# Patient Record
Sex: MALE | Race: WHITE | NOT HISPANIC OR LATINO | Employment: OTHER | RURAL
[De-identification: names, ages, dates, MRNs, and addresses within clinical notes are randomized per-mention and may not be internally consistent; named-entity substitution may affect disease eponyms.]

---

## 2017-01-20 ENCOUNTER — OFFICE VISIT (OUTPATIENT)
Dept: FAMILY MEDICINE CLINIC | Facility: CLINIC | Age: 82
End: 2017-01-20

## 2017-01-20 VITALS
RESPIRATION RATE: 18 BRPM | OXYGEN SATURATION: 92 % | SYSTOLIC BLOOD PRESSURE: 110 MMHG | HEIGHT: 71 IN | HEART RATE: 54 BPM | BODY MASS INDEX: 29.96 KG/M2 | TEMPERATURE: 98 F | DIASTOLIC BLOOD PRESSURE: 70 MMHG | WEIGHT: 214 LBS

## 2017-01-20 DIAGNOSIS — R25.1 TREMOR: ICD-10-CM

## 2017-01-20 DIAGNOSIS — F51.5 BAD DREAMS: Primary | ICD-10-CM

## 2017-01-20 DIAGNOSIS — R25.8 NOCTURNAL LEG MOVEMENTS: ICD-10-CM

## 2017-01-20 DIAGNOSIS — F32.A DEPRESSION, UNSPECIFIED DEPRESSION TYPE: ICD-10-CM

## 2017-01-20 DIAGNOSIS — Z23 NEEDS FLU SHOT: ICD-10-CM

## 2017-01-20 PROBLEM — E78.5 HYPERLIPIDEMIA: Chronic | Status: ACTIVE | Noted: 2017-01-20

## 2017-01-20 PROBLEM — N52.9 ERECTILE DYSFUNCTION: Chronic | Status: ACTIVE | Noted: 2017-01-20

## 2017-01-20 PROBLEM — E11.9 DIABETES TYPE 2, CONTROLLED: Chronic | Status: ACTIVE | Noted: 2017-01-20

## 2017-01-20 PROBLEM — Z00.00 WELLNESS EXAMINATION: Status: ACTIVE | Noted: 2017-01-20

## 2017-01-20 PROBLEM — I83.90 VARICOSE VEIN OF LEG: Chronic | Status: ACTIVE | Noted: 2017-01-20

## 2017-01-20 PROBLEM — I25.10 CORONARY ARTERY DISEASE: Chronic | Status: ACTIVE | Noted: 2017-01-20

## 2017-01-20 PROBLEM — M72.2 PLANTAR FASCIITIS: Status: ACTIVE | Noted: 2017-01-20

## 2017-01-20 PROBLEM — N18.9 CHRONIC KIDNEY DISEASE: Chronic | Status: ACTIVE | Noted: 2017-01-20

## 2017-01-20 PROBLEM — I10 HYPERTENSION: Chronic | Status: ACTIVE | Noted: 2017-01-20

## 2017-01-20 PROBLEM — N40.0 PROSTATISM: Chronic | Status: ACTIVE | Noted: 2017-01-20

## 2017-01-20 PROBLEM — E66.9 OBESITY: Chronic | Status: ACTIVE | Noted: 2017-01-20

## 2017-01-20 PROCEDURE — G0008 ADMIN INFLUENZA VIRUS VAC: HCPCS | Performed by: FAMILY MEDICINE

## 2017-01-20 PROCEDURE — 99213 OFFICE O/P EST LOW 20 MIN: CPT | Performed by: FAMILY MEDICINE

## 2017-01-20 RX ORDER — FLUOXETINE 10 MG/1
CAPSULE ORAL
Qty: 20 CAPSULE | Refills: 0 | Status: SHIPPED | OUTPATIENT
Start: 2017-01-20 | End: 2018-07-16

## 2017-01-20 NOTE — MR AVS SNAPSHOT
Gonzalo Durham   1/20/2017 1:45 PM   Office Visit    Dept Phone:  [phone]   Encounter #:  [id number]    Provider:  Abel Romero MD   Department:  [facility]                Your Full Care Plan              Today's Medication Changes          These changes are accurate as of: 1/20/17  2:24 PM.  If you have any questions, ask your nurse or doctor.               Medication(s)that have changed:     FLUoxetine 10 MG capsule   Commonly known as:  PROZAC   Stop the 20 mg and start the 10 each day; after a week use 10 qod for a week then stop   What changed:    - medication strength  - how much to take  - how to take this  - when to take this  - additional instructions   Changed by:  Abel Romero MD            Where to Get Your Medications      These medications were sent to [facility] [address] [place] [address] - [phone] [phone] [address] [address]     Phone:  [phone]     FLUoxetine 10 MG capsule                  Your Updated Medication List          This list is accurate as of: 1/20/17  2:24 PM.  Always use your most recent med list.                acetaminophen 325 MG tablet   Commonly known as:  TYLENOL   Take 2 tablets by mouth 2 (Two) Times a Day.       aspirin  MG tablet   Take 1 tablet by mouth Every Other Day.       calcium carbonate-vitamin d 600-400 MG-UNIT per tablet   Take 1 tablet by mouth 2 (Two) Times a Day.       finasteride 5 MG tablet   Commonly known as:  PROSCAR   Take 1 tablet by mouth Daily.       FLUoxetine 10 MG capsule   Commonly known as:  PROZAC   Stop the 20 mg and start the 10 each day; after a week use 10 qod for a week then stop       freestyle lancets   Use once daily       FREESTYLE LITE test strip   Generic drug:  glucose blood   1 each by Other route Daily.       gabapentin 100 MG capsule   Commonly known as:  NEURONTIN   Take 1 capsule by  mouth 3 (Three) Times a Day.       glyBURIDE 5 MG tablet   Commonly known as:  DIAbeta   Take 1.5 tablets by mouth 2 (Two) Times a Day With Meals.       JUST TEARS EYE DROPS solution   Apply 1-2 drops to eye Daily As Needed (dry eyes).       loratadine 10 MG tablet   Commonly known as:  CLARITIN   Take 1 tablet by mouth Daily.       metFORMIN  MG 24 hr tablet   Commonly known as:  GLUCOPHAGE XR   Take 2 tablets by mouth Every Night.       PRAVACHOL 80 MG tablet   Generic drug:  pravastatin   Take 1 tablet by mouth Daily.       terazosin 10 MG capsule   Commonly known as:  HYTRIN   Take 1 capsule by mouth Daily.               We Performed the Following     Flu Vaccine Tri (FLUVIRIN)       You Were Diagnosed With        Codes Comments    Bad dreams    -  Primary ICD-10-CM: F51.5  ICD-9-CM: 307.47     Nocturnal leg movements     ICD-10-CM: R25.8  ICD-9-CM: 781.0     Tremor     ICD-10-CM: R25.1  ICD-9-CM: 781.0     Depression, unspecified depression type     ICD-10-CM: F32.9  ICD-9-CM: 311     Needs flu shot     ICD-10-CM: Z23  ICD-9-CM: V04.81       Instructions     None    Patient Instructions History      Upcoming Appointments     Visit Type Date Time Department    OFFICE VISIT 2017  1:45 PM [facility]    FOLLOW UP 3/6/2017 11:15 AM [facility] Signup     [facility] [employer] allows you to send messages to your doctor, view your test results, renew your prescriptions, schedule appointments, and more. To sign up, go to [employer] and click on the Sign Up Now link in the New User? box. Enter your [employer] Activation Code exactly as it appears below along with the last four digits of your Social Security Number and your Date of Birth () to complete the sign-up process. If you do not sign up before the expiration date, you must request a new code.    [employer] Activation Code: [email or web address]  Expires: 2/3/2017  2:23 PM    If you have questions, you can email  [email or web address] or call [phone] to talk to our MyChart staff. Remember, [employer] is NOT to be used for urgent needs. For medical emergencies, dial 911.               Other Info from Your Visit           Your Appointments     Mar 06, 2017 11:15 AM CST   Follow Up with Abel Romero MD   [facility] (--)    [address] [place] [id number] [phone]           Arrive 15 minutes prior to appointment.              Allergies     No Known Allergies      Reason for Visit     kicking in sleep           Vital Signs     Blood Pressure Pulse Temperature Respirations Height Weight    110/70 (BP Location: Right arm, Patient Position: Sitting, Cuff Size: Large Adult) 54 98 °F (36.7 °C) (Oral) 18 71\" (180.3 cm) 214 lb (97.1 kg)    Oxygen Saturation Body Mass Index Smoking Status             92% 29.85 kg/m2 Former Smoker         Problems and Diagnoses Noted     Bad dreams    Chronic kidney disease    Coronary heart disease    Depression    Type 2 diabetes mellitus, controlled    Erectile dysfunction    High cholesterol or triglycerides    High blood pressure    Nocturnal leg movements    Obesity    Plantar fasciitis    Enlarged prostate    Tremor    Varicose vein of leg    Wellness examination    Needs flu shot          Immunizations Administered     Name Date    Influenza (IM) Preservative Free         "

## 2017-01-20 NOTE — PROGRESS NOTES
Gonzalo Durham, 1934,  Is here today to receive a Influenza vaccine.      INFLUENZA VACCINE CONSENT (VERBAL)      Verbal Consent:  I have been given a copy of the current Vaccine Information Statement published by the CDC and Prevention CDC and I have read the information about the Influenza and the vaccine.  I have had an opportunity to ask questions, which were answered to my satisfaction.  I understand the benefits and risk of th flu vaccine as described and request that the flu vaccine be administered to me (or to the patient named below for whom I am legal guardian or for whom I hold power of  to make healthcare decisions).        Verbal Consent Give? [x] YES                [] NO      QUESTIONS    1. Do you have a moderate or  acute illness with or without a fever?   No                                                                                                                              ,  2. For women, are you pregnant or do you expect to become pregnant during the month after your flu vaccination?   No    3. Are you allergic to eggs or egg products?   No    4. Are you allergic to latex?   No,    5. Are you allergic to thimerosal?   No    6.   After receiving a vaccination in the past, have you ever had a serious reation?   No,     7.   Have you developed a neurological illness (such as Guillain -[place] Syndrome) after getting vaccinated?   No     8.  Do you have any drug allergies? If the answer is YES, please inform Doctor or Nurse   No

## 2017-01-21 NOTE — PROGRESS NOTES
Subjective   Gonzalo Durham is a 82 y.o. male presenting with chief complaint of:   Chief Complaint   Patient presents with   • kicking in sleep       History of Present Illness :  With wife.  Last year asked for Rx for anxiety/depression.  Prozac helped; but has noticed usual tremor has worsened; and has jerking of legs at night.  Also dreams are often now bad.     Other chronic problem/s to consider:  HTN.  The HTN has been present for years/it is chronic.  The HTN is assumed essential/without testing needed to look for other.  The HTN is controlled manifest by todays blood pressure and home weekly monitoring.   DM2: This has been present for years/over a year.  It is chronic.  It is controlled.  Home accuchecks run 100s fasting.   No hypoglycemia manifest as syncope/near syncope or diaphoretic/sweating episodes  Tremor: Has had > 1 yr/ a family and chronic problem.  No other associations.  Worse with the prozac or timing.     The following portions of the patient's history were reviewed and updated as appropriate: allergies, current medications, past family history, past medical history, past social history, past surgical history and problem list.  TCC migrated      Current Outpatient Prescriptions:   •  acetaminophen (TYLENOL) 325 MG tablet, Take 2 tablets by mouth 2 (Two) Times a Day., Disp: 360 tablet, Rfl: 1  •  Artificial Tear Solution (JUST TEARS EYE DROPS) solution, Apply 1-2 drops to eye Daily As Needed (dry eyes)., Disp: 45 mL, Rfl: 1  •  aspirin  MG tablet, Take 1 tablet by mouth Every Other Day., Disp: 45 tablet, Rfl: 1  •  calcium carbonate-vitamin d 600-400 MG-UNIT per tablet, Take 1 tablet by mouth 2 (Two) Times a Day., Disp: 180 tablet, Rfl: 1  •  finasteride (PROSCAR) 5 MG tablet, Take 1 tablet by mouth Daily., Disp: 90 tablet, Rfl: 1  •  FREESTYLE LITE test strip, 1 each by Other route Daily., Disp: 100 each, Rfl: 1  •  gabapentin (NEURONTIN) 100 MG capsule, Take 1 capsule by mouth 3 (Three)  Times a Day., Disp: 270 capsule, Rfl: 1  •  glyBURIDE (DIABETA) 5 MG tablet, Take 1.5 tablets by mouth 2 (Two) Times a Day With Meals., Disp: 270 tablet, Rfl: 1  •  Lancets (FREESTYLE) lancets, Use once daily, Disp: 100 each, Rfl: 1  •  loratadine (CLARITIN) 10 MG tablet, Take 1 tablet by mouth Daily., Disp: 90 tablet, Rfl: 1  •  metFORMIN XR (GLUCOPHAGE XR) 500 MG 24 hr tablet, Take 2 tablets by mouth Every Night., Disp: 180 tablet, Rfl: 1  •  PRAVACHOL 80 MG tablet, Take 1 tablet by mouth Daily., Disp: 90 tablet, Rfl: 1  •  terazosin (HYTRIN) 10 MG capsule, Take 1 capsule by mouth Daily., Disp: 90 capsule, Rfl: 1  •  FLUoxetine (PROZAC) 10 MG capsule, Stop the 20 mg and start the 10 each day; after a week use 10 qod for a week then stop, Disp: 20 capsule, Rfl: 0    No Known Allergies    Review of Systems    GENERAL:  Active/slower with limits, speed, samni for age. Sleeps ok just bad dreams. No fever.  ENDO:  No syncope, near or diaphoretic sweaty spells.  BS Ok; no download noted.  HEENT: No head injury or headache,  No vision change, No hearing loss/pain/drainage.  No sore throat.  No nasal/sinus congestion/drainage. No epistaxis.  CHEST: No chest wall tenderness or mass. No cough, wheeze, SOB or hemoptysis.  CV: No chest pain, palpatations, ankle edema.  GI: No heartburn, dysphagia, abdominal pain, diarrhea, constipation, rectal bleeding, or melena.  :  Voids without dysuria, or incontience to completion.  ORTHO: No painful/swollen joints but various on /off sore.  No change occ sore neck or back.  No acute neck or back pain without recent injury.   NEURO: No dizziness, weakness of extremities.  No change occ LE/numbness/parethesias.   PSYCH: No memory loss.  Mood good; not anxious, depressed or suicidal.  Tolerated stress.     Results for orders placed or performed in visit on 08/30/16   Comprehensive metabolic panel   Result Value Ref Range    Glucose 118 (H) 70 - 100 mg/dL    BUN 24 (H) 5 - 21 mg/dL     Creatinine 1.10 0.5 - 1.4 mg/dL    eGFR Non African Am 64 mL/min/1.732    eGFR African Am 78 mL/min/1.732    BUN/Creatinine Ratio 21.8     Sodium 142 135 - 145 mmol/L    Potassium 5.4 (H) 3.5 - 5.3 mmol/L    Chloride 99 98 - 110 mmol/L    Total CO2 33 (H) 24 - 31 mmol/L    Calcium 9.9 8.4 - 10.4 mg/dL    Total Protein 7.2 6.3 - 8.7 g/dL    Albumin 4.2 3.5 - 5.0 g/dL    Globulin 3.0 g/dL    A/G Ratio 1.4 1.1 - 2.5    Total Bilirubin 0.5 0.1 - 1.0 mg/dL    Alkaline Phosphatase 68 24 - 120 Units/L    AST (SGOT) 19 7 - 45 Units/L    ALT (SGPT) 33 0 - 54 Units/L   Hemoglobin A1c   Result Value Ref Range    Hemoglobin A1C 6.8 (H) 0.0 - 5.9 %   CBC w AUTO Differential   Result Value Ref Range    WBC 7.26 4.80 - 10.80 K/mcL    RBC 4.85 4.80 - 5.90 M/mcL    Hemoglobin 14.1 14.0 - 18.0 g/dL    Hematocrit 45.0 40.0 - 52.0 %    MCV 92.8 82.0 - 95.0 fL    MCH 29.1 28.0 - 32.0 pg    MCHC 31.3 (L) 33.0 - 36.0 gm/dL    RDW 14.0 12 - 15 %    Platelets 276 130 - 400 K/mcL    Neutrophil Rel % 61.80 39.00 - 78.00 %    Lymphocyte Rel % 21.80 15.00 - 45.00 %    Monocyte Rel % 8.70 4.00 - 12.00 %    Eosinophil Rel % 6.90 (H) 0.00 - 4.00 %    Basophil Rel % 0.70 0.00 - 2.00 %    Neutrophils Absolute 4.49 1.87 - 8.40 K/mcL    Lymphocytes Absolute 1.58 0.72 - 4.86 K/mcL    Monocytes Absolute 0.63 0.19 - 1.30 K/mcL    Eosinophils Absolute 0.50 0.00 - 0.70 K/mcL    Basophils Absolute 0.05 0.00 - 0.20 K/mcL    Immature Grans Absolute 0.01 K/mcL    Hematology Comments: Comment        Lab Results   Component Value Date    HGBA1C 6.8 (H) 08/30/2016       Lab Results   Component Value Date     08/30/2016    K 5.4 (H) 08/30/2016    CL 99 08/30/2016    CO2 33 (H) 08/30/2016    BUN 24 (H) 08/30/2016    CREATININE 1.10 08/30/2016    CALCIUM 9.9 08/30/2016       No results found for: PSA     Objective   Visit Vitals   • /70 (BP Location: Right arm, Patient Position: Sitting, Cuff Size: Large Adult)   • Pulse 54   • Temp 98 °F (36.7 °C)  "(Oral)   • Resp 18   • Ht 71\" (180.3 cm)   • Wt 214 lb (97.1 kg)   • SpO2 92%   • BMI 29.85 kg/m2       Physical Exam    GENERAL:  Well nourished/developed  in no acute distress. Obese.   SKIN: Turgor excellent, without wound, rash, lesion.   HEENT: Normal cephalic without trauma.  Pupils equal round reactive to light. Extraocular motions full without nystagmus.   No thyroidmegaly, mass, tenderness, lymphadenopathy and supple.   CV: Regular rhythm.  No murmur, gallop, edema. Posterior pulses intact.  No carotid bruits.   CHEST: No chest wall tenderness or mass.   LUNGS: Symmetric motion with clear to auscultation.    ABD: Soft, nontender without mass.   ORTHO: Symmetric extremities without swelling/point tenderness.  Full gross range of motion.    NEURO: CN 2-12 grossly intact.  Symmetric facies. 1/4 x 4 bicepss, knees equal reflexes.  UE/LE   4-/5 strength throughout.  Nonfocal use extremities. Speech clear.    PSYCH: Oriented x 3.  Pleasant calm, well kept.  Purposeful/directed conservation with intact short/long gross memory.     Assessment/Plan     1. Bad dreams  ? prozac    2. Nocturnal leg movements  ? prozac    3. 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 Worse prozac    4. Depression, unspecified depression type  Doing ok; thinks ok with/without Rx  - FLUoxetine (PROZAC) 10 MG capsule; Stop the 20 mg and start the 10 each day; after a week use 10 qod for a week then stop  Dispense: 20 capsule; Refill: 0    5. Needs flu shot  - Flu Vaccine Tri (FLUVIRIN)      Rx: reviewed.  Changes above and weaning prozac   report if problems  LAB: reviewed above, orders above and same 6/12m  Wrap-up/other instructions    There are no Patient Instructions on file for this visit.    Follow up: Return for 3.6.17 as planned.  "

## 2017-03-06 ENCOUNTER — OFFICE VISIT (OUTPATIENT)
Dept: FAMILY MEDICINE CLINIC | Facility: CLINIC | Age: 82
End: 2017-03-06

## 2017-03-06 VITALS
DIASTOLIC BLOOD PRESSURE: 70 MMHG | OXYGEN SATURATION: 90 % | HEART RATE: 64 BPM | HEIGHT: 71 IN | RESPIRATION RATE: 18 BRPM | WEIGHT: 215 LBS | TEMPERATURE: 97.9 F | SYSTOLIC BLOOD PRESSURE: 120 MMHG | BODY MASS INDEX: 30.1 KG/M2

## 2017-03-06 DIAGNOSIS — E78.5 HYPERLIPIDEMIA, UNSPECIFIED HYPERLIPIDEMIA TYPE: Chronic | ICD-10-CM

## 2017-03-06 DIAGNOSIS — I25.10 CORONARY ARTERY DISEASE INVOLVING NATIVE CORONARY ARTERY OF NATIVE HEART WITHOUT ANGINA PECTORIS: Primary | Chronic | ICD-10-CM

## 2017-03-06 DIAGNOSIS — E11.9 CONTROLLED TYPE 2 DIABETES MELLITUS WITHOUT COMPLICATION, WITHOUT LONG-TERM CURRENT USE OF INSULIN (HCC): Chronic | ICD-10-CM

## 2017-03-06 DIAGNOSIS — N18.30 CHRONIC KIDNEY DISEASE, STAGE 3 (MODERATE): Chronic | ICD-10-CM

## 2017-03-06 DIAGNOSIS — I10 ESSENTIAL HYPERTENSION: Chronic | ICD-10-CM

## 2017-03-06 DIAGNOSIS — R10.9 ABDOMINAL PAIN, UNSPECIFIED LOCATION: ICD-10-CM

## 2017-03-06 PROBLEM — G62.9 NEUROPATHY: Status: ACTIVE | Noted: 2017-03-06

## 2017-03-06 PROCEDURE — 99213 OFFICE O/P EST LOW 20 MIN: CPT | Performed by: FAMILY MEDICINE

## 2017-03-06 NOTE — PROGRESS NOTES
Subjective   Gonzalo Durham is a 83 y.o. male presenting with chief complaint of:   Chief Complaint   Patient presents with   • Abdominal Pain   • Coronary Artery Disease   • Hypertension   • Hyperlipidemia   • Diabetes   • Chronic Kidney Disease       History of Present Illness :  With wife.  Has multiple chronic problems; interval appointment.  One of these problems is HTN.  The HTN has been present for years/it is chronic.  The HTN is assumed essential/without testing needed to look for other.  The HTN is controlled manifest by todays blood pressure and no home monitoring.   Other chronic problem/s to consider:  Coronary artery disease: This was discovered years ago/it is chronic.  There is currently no chest pain, shortness of breath. No planned/recent CV evaluations.   CKD3: This has been present for years/over a year.  It is chronic.  It is stable by serial labs here. Warned of risks NSAID-many other Rx (best to be sure any prescriber aware of kidney issue), x-ray contrast, dehydration  DM2: This has been present for years/over a year.  It is chronic.  It is controlled.  Home accuchecks run ok/good.  No hypoglycemia manifest as syncope/near syncope or diaphoretic/sweating episodes.  A1c below.   Hyperlipidemia: This has been present for years/over a year.  It is chronic.   It is controlled.   Labs are done regularly and prove control    Has an acute issue today: 1m on/off AFSHIN, LLQ pain.      The following portions of the patient's history were reviewed and updated as appropriate: allergies, current medications, past family history, past medical history, past social history, past surgical history and problem list.  TCC migrated if needed/reviewed.      Current Outpatient Prescriptions:   •  acetaminophen (TYLENOL) 325 MG tablet, Take 2 tablets by mouth 2 (Two) Times a Day., Disp: 360 tablet, Rfl: 1  •  Artificial Tear Solution (JUST TEARS EYE DROPS) solution, Apply 1-2 drops to eye Daily As Needed (dry eyes).,  Disp: 45 mL, Rfl: 1  •  aspirin  MG tablet, Take 1 tablet by mouth Every Other Day., Disp: 45 tablet, Rfl: 1  •  calcium carbonate-vitamin d 600-400 MG-UNIT per tablet, Take 1 tablet by mouth 2 (Two) Times a Day., Disp: 180 tablet, Rfl: 1  •  finasteride (PROSCAR) 5 MG tablet, Take 1 tablet by mouth Daily., Disp: 90 tablet, Rfl: 1  •  FLUoxetine (PROZAC) 10 MG capsule, Stop the 20 mg and start the 10 each day; after a week use 10 qod for a week then stop, Disp: 20 capsule, Rfl: 0  •  FREESTYLE LITE test strip, 1 each by Other route Daily., Disp: 100 each, Rfl: 1  •  gabapentin (NEURONTIN) 100 MG capsule, Take 1 capsule by mouth 3 (Three) Times a Day., Disp: 270 capsule, Rfl: 1  •  glyBURIDE (DIABETA) 5 MG tablet, Take 1.5 tablets by mouth 2 (Two) Times a Day With Meals., Disp: 270 tablet, Rfl: 1  •  Lancets (FREESTYLE) lancets, Use once daily, Disp: 100 each, Rfl: 1  •  loratadine (CLARITIN) 10 MG tablet, Take 1 tablet by mouth Daily., Disp: 90 tablet, Rfl: 1  •  metFORMIN XR (GLUCOPHAGE XR) 500 MG 24 hr tablet, Take 2 tablets by mouth Every Night., Disp: 180 tablet, Rfl: 1  •  PRAVACHOL 80 MG tablet, Take 1 tablet by mouth Daily., Disp: 90 tablet, Rfl: 1  •  terazosin (HYTRIN) 10 MG capsule, Take 1 capsule by mouth Daily., Disp: 90 capsule, Rfl: 1    No Known Allergies    Review of Systems  GENERAL:  Active/slower with limits, speed, samni for age and no regular exercise. Sleep is ok; apnea denied.  ENDO:  No syncope, near or diaphoretic sweaty spells.  BS Ok: without download.  HEENT: No head injury  headache,  No vision change, No hearing loss.  Ears without pain/drainage.  No sore throat.  No nasal/sinus congestion/drainage. No epistaxis.  CHEST: No chest wall tenderness or mass. No cough, without wheeze, SOB; no hemoptysis.  CV: No chest pain, palpatations, ankle edema.  GI: No heartburn, dysphagia.  No diarrhea, constipation, rectal bleeding, or melena; but AFSHIN/LLQ on/off pain mild.   :  Voids without  dysuria, or incontience to completion.  ORTHO: No painful/swollen joints but various on /off sore.  No sore neck or back.  No acute neck or back pain without recent injury.   NEURO: No dizziness, weakness of extremities.  No numbness/parethesias.   PSYCH: No memory loss.  Mood good; occ mildly anxious, depressed but/and not suicidal.  Tolerated stress.     Results for orders placed or performed in visit on 08/30/16   Comprehensive metabolic panel   Result Value Ref Range    Glucose 118 (H) 70 - 100 mg/dL    BUN 24 (H) 5 - 21 mg/dL    Creatinine 1.10 0.5 - 1.4 mg/dL    eGFR Non African Am 64 mL/min/1.732    eGFR African Am 78 mL/min/1.732    BUN/Creatinine Ratio 21.8     Sodium 142 135 - 145 mmol/L    Potassium 5.4 (H) 3.5 - 5.3 mmol/L    Chloride 99 98 - 110 mmol/L    Total CO2 33 (H) 24 - 31 mmol/L    Calcium 9.9 8.4 - 10.4 mg/dL    Total Protein 7.2 6.3 - 8.7 g/dL    Albumin 4.2 3.5 - 5.0 g/dL    Globulin 3.0 g/dL    A/G Ratio 1.4 1.1 - 2.5    Total Bilirubin 0.5 0.1 - 1.0 mg/dL    Alkaline Phosphatase 68 24 - 120 Units/L    AST (SGOT) 19 7 - 45 Units/L    ALT (SGPT) 33 0 - 54 Units/L   Hemoglobin A1c   Result Value Ref Range    Hemoglobin A1C 6.8 (H) 0.0 - 5.9 %   CBC w AUTO Differential   Result Value Ref Range    WBC 7.26 4.80 - 10.80 K/mcL    RBC 4.85 4.80 - 5.90 M/mcL    Hemoglobin 14.1 14.0 - 18.0 g/dL    Hematocrit 45.0 40.0 - 52.0 %    MCV 92.8 82.0 - 95.0 fL    MCH 29.1 28.0 - 32.0 pg    MCHC 31.3 (L) 33.0 - 36.0 gm/dL    RDW 14.0 12 - 15 %    Platelets 276 130 - 400 K/mcL    Neutrophil Rel % 61.80 39.00 - 78.00 %    Lymphocyte Rel % 21.80 15.00 - 45.00 %    Monocyte Rel % 8.70 4.00 - 12.00 %    Eosinophil Rel % 6.90 (H) 0.00 - 4.00 %    Basophil Rel % 0.70 0.00 - 2.00 %    Neutrophils Absolute 4.49 1.87 - 8.40 K/mcL    Lymphocytes Absolute 1.58 0.72 - 4.86 K/mcL    Monocytes Absolute 0.63 0.19 - 1.30 K/mcL    Eosinophils Absolute 0.50 0.00 - 0.70 K/mcL    Basophils Absolute 0.05 0.00 - 0.20 K/mcL     "Immature Grans Absolute 0.01 K/mcL    Hematology Comments: Comment        CBC:     BMP:      Lab Results   Component Value Date    HGBA1C 6.8 (H) 08/30/2016       Lab Results   Component Value Date     08/30/2016    K 5.4 (H) 08/30/2016    CL 99 08/30/2016    CO2 33 (H) 08/30/2016    BUN 24 (H) 08/30/2016    CREATININE 1.10 08/30/2016    CALCIUM 9.9 08/30/2016       No results found for: PSA     Objective   Visit Vitals   • /70 (BP Location: Left arm, Patient Position: Sitting, Cuff Size: Adult)   • Pulse 64   • Temp 97.9 °F (36.6 °C) (Oral)   • Resp 18   • Ht 71\" (180.3 cm)   • Wt 215 lb (97.5 kg)   • SpO2 90%   • BMI 29.99 kg/m2       Physical Exam  GENERAL:  Well nourished/developed in no acute distress. Obese  SKIN: Turgor excellent, without wound, rash, lesion.  HEENT: Normal cephalic without trauma.  Pupils equal round reactive to light. Extraocular motions full without nystagmus.   External canals nonobstructive nontender without reddness. Tymphatic membranes tone with nigel structures intact.   Oral cavity without growths, exudates, and moist.  Posterior pharnyx without mass, obstruction, reddness.  No thyroidmegaly, mass, tenderness, lymphadenopathy and supple.  CV: Regular rhythm.  No murmur, gallop,  edema. Posterior pulses intact.  No carotid bruits.  CHEST: No chest wall tenderness or mass.   LUNGS: Symmetric motion with clear to auscultation.  No dullness to percussion  ABD: Soft, nontender without mass.   PROSTATE: Sees urology  ORTHO: Symmetric extremities without swelling/point tenderness.  Full gross range of motion.    NEURO: CN 2-12 grossly intact.  Symmetric facies. 1/4 x bicep knee equal reflexes.  UE/LE   3-4/5 strength throughout.  Nonfocal use extremities. Speech clear. Intact light touch with monofilament, vibratory sensation with tuning fork; equal toes/distal feet.    PSYCH: Oriented x 3.  Pleasant calm, well kept.  Purposeful/directed conservation with intact short/long " gross memory.     Assessment/Plan     1. Coronary artery disease involving native coronary artery of native heart without angina pectoris  asx  - CBC & Differential  - Comprehensive metabolic panel  - Hemoglobin A1c  - Lipid panel    2. Hyperlipidemia, unspecified hyperlipidemia type  pravachol 80 treated  - CBC & Differential  - Comprehensive metabolic panel  - Hemoglobin A1c  - Lipid panel    3. Essential hypertension  controlled  - CBC & Differential  - Comprehensive metabolic panel  - Hemoglobin A1c  - Lipid panel    4. Controlled type 2 diabetes mellitus without complication, without long-term current use of insulin  Last A1c  - CBC & Differential  - Comprehensive metabolic panel  - Hemoglobin A1c  - Lipid panel    5. Chronic kidney disease, stage 3 (moderate)  Stable last  - CBC & Differential  - Comprehensive metabolic panel  - Hemoglobin A1c  - Lipid panel    6. Abdominal pain, unspecified location  new  - CBC & Differential  - Comprehensive metabolic panel      Rx: reviewed.  Changes maybe based on labs to review  LAB: reviewed/above, and if orders today  Wrap-up/other instructions  Regular cardio exercise something everyone should consider and try to do; discussed  Normal weight a goal for everyone; discussed  Healthy diet helpful for weight management, illness prevention; discussed  If over 50-screening exams include men PSA/rectal exam, women mammograms, and  everyone colonoscopy screening for colon cancer.   Patient Instructions   Expect us after labs to do CT abdomen if able; and refer you back sooner to GI      Follow up: Return for lab today; ro 6m.

## 2017-03-07 LAB
ALBUMIN SERPL-MCNC: 4.3 G/DL (ref 3.5–5)
ALBUMIN/GLOB SERPL: 1.6 G/DL (ref 1.1–2.5)
ALP SERPL-CCNC: 65 U/L (ref 24–120)
ALT SERPL-CCNC: 24 U/L (ref 0–54)
AST SERPL-CCNC: 20 U/L (ref 7–45)
BASOPHILS # BLD AUTO: 0.04 10*3/MM3 (ref 0–0.2)
BASOPHILS NFR BLD AUTO: 0.5 % (ref 0–2)
BILIRUB SERPL-MCNC: 0.4 MG/DL (ref 0.1–1)
BUN SERPL-MCNC: 26 MG/DL (ref 5–21)
BUN/CREAT SERPL: 25.2 (ref 7–25)
CALCIUM SERPL-MCNC: 9.5 MG/DL (ref 8.4–10.4)
CHLORIDE SERPL-SCNC: 102 MMOL/L (ref 98–110)
CHOLEST SERPL-MCNC: 170 MG/DL (ref 130–200)
CO2 SERPL-SCNC: 30 MMOL/L (ref 24–31)
CREAT SERPL-MCNC: 1.03 MG/DL (ref 0.5–1.4)
EOSINOPHIL # BLD AUTO: 0.53 10*3/MM3 (ref 0–0.7)
EOSINOPHIL NFR BLD AUTO: 7.1 % (ref 0–4)
ERYTHROCYTE [DISTWIDTH] IN BLOOD BY AUTOMATED COUNT: 14.2 % (ref 12–15)
GLOBULIN SER CALC-MCNC: 2.7 GM/DL
GLUCOSE SERPL-MCNC: 120 MG/DL (ref 70–100)
HBA1C MFR BLD: 6.7 %
HCT VFR BLD AUTO: 44.1 % (ref 40–52)
HDLC SERPL-MCNC: 36 MG/DL
HGB BLD-MCNC: 13.8 G/DL (ref 14–18)
IMM GRANULOCYTES # BLD: 0.01 10*3/MM3 (ref 0–0.03)
IMM GRANULOCYTES NFR BLD: 0.1 % (ref 0–5)
LDLC SERPL CALC-MCNC: 101 MG/DL (ref 0–99)
LYMPHOCYTES # BLD AUTO: 1.81 10*3/MM3 (ref 0.72–4.86)
LYMPHOCYTES NFR BLD AUTO: 24.3 % (ref 15–45)
MCH RBC QN AUTO: 29.1 PG (ref 28–32)
MCHC RBC AUTO-ENTMCNC: 31.3 G/DL (ref 33–36)
MCV RBC AUTO: 92.8 FL (ref 82–95)
MONOCYTES # BLD AUTO: 0.62 10*3/MM3 (ref 0.19–1.3)
MONOCYTES NFR BLD AUTO: 8.3 % (ref 4–12)
NEUTROPHILS # BLD AUTO: 4.45 10*3/MM3 (ref 1.87–8.4)
NEUTROPHILS NFR BLD AUTO: 59.7 % (ref 39–78)
PLATELET # BLD AUTO: 235 10*3/MM3 (ref 130–400)
POTASSIUM SERPL-SCNC: 4.7 MMOL/L (ref 3.5–5.3)
PROT SERPL-MCNC: 7 G/DL (ref 6.3–8.7)
RBC # BLD AUTO: 4.75 10*6/MM3 (ref 4.8–5.9)
SODIUM SERPL-SCNC: 144 MMOL/L (ref 135–145)
TRIGL SERPL-MCNC: 164 MG/DL (ref 0–149)
VLDLC SERPL CALC-MCNC: 32.8 MG/DL
WBC # BLD AUTO: 7.46 10*3/MM3 (ref 4.8–10.8)

## 2017-03-28 RX ORDER — GLYBURIDE 5 MG/1
7.5 TABLET ORAL 2 TIMES DAILY WITH MEALS
Qty: 270 TABLET | Refills: 1 | Status: SHIPPED | OUTPATIENT
Start: 2017-03-28 | End: 2017-06-23 | Stop reason: SDUPTHER

## 2017-03-28 RX ORDER — DEXTRAN 70/HYPROMELLOSE
1-2 DROPS OPHTHALMIC (EYE) DAILY PRN
Qty: 45 ML | Refills: 1 | Status: SHIPPED | OUTPATIENT
Start: 2017-03-28 | End: 2017-06-23 | Stop reason: SDUPTHER

## 2017-03-28 RX ORDER — PRAVASTATIN SODIUM 80 MG/1
80 TABLET ORAL DAILY
Qty: 90 TABLET | Refills: 1 | Status: SHIPPED | OUTPATIENT
Start: 2017-03-28 | End: 2017-06-23 | Stop reason: SDUPTHER

## 2017-03-28 RX ORDER — BLOOD-GLUCOSE METER
1 KIT MISCELLANEOUS DAILY
Qty: 100 EACH | Refills: 1 | Status: SHIPPED | OUTPATIENT
Start: 2017-03-28 | End: 2017-06-23 | Stop reason: SDUPTHER

## 2017-03-28 RX ORDER — GABAPENTIN 100 MG/1
100 CAPSULE ORAL 3 TIMES DAILY
Qty: 270 CAPSULE | Refills: 1 | Status: SHIPPED | OUTPATIENT
Start: 2017-03-28 | End: 2017-06-23 | Stop reason: SDUPTHER

## 2017-03-28 RX ORDER — LORATADINE 10 MG/1
10 TABLET ORAL DAILY
Qty: 90 TABLET | Refills: 1 | Status: SHIPPED | OUTPATIENT
Start: 2017-03-28 | End: 2017-06-23 | Stop reason: SDUPTHER

## 2017-03-28 RX ORDER — FINASTERIDE 5 MG/1
5 TABLET, FILM COATED ORAL DAILY
Qty: 90 TABLET | Refills: 1 | Status: SHIPPED | OUTPATIENT
Start: 2017-03-28 | End: 2017-06-23 | Stop reason: SDUPTHER

## 2017-03-28 RX ORDER — METFORMIN HYDROCHLORIDE 500 MG/1
1000 TABLET, EXTENDED RELEASE ORAL NIGHTLY
Qty: 180 TABLET | Refills: 1 | Status: SHIPPED | OUTPATIENT
Start: 2017-03-28 | End: 2017-06-23 | Stop reason: SDUPTHER

## 2017-03-28 RX ORDER — LANCETS 28 GAUGE
EACH MISCELLANEOUS
Qty: 100 EACH | Refills: 1 | Status: SHIPPED | OUTPATIENT
Start: 2017-03-28 | End: 2017-06-23 | Stop reason: SDUPTHER

## 2017-03-28 RX ORDER — ACETAMINOPHEN 325 MG/1
650 TABLET ORAL 2 TIMES DAILY
Qty: 360 TABLET | Refills: 1 | Status: SHIPPED | OUTPATIENT
Start: 2017-03-28 | End: 2017-06-23 | Stop reason: SDUPTHER

## 2017-03-28 RX ORDER — TERAZOSIN 10 MG/1
10 CAPSULE ORAL DAILY
Qty: 90 CAPSULE | Refills: 1 | Status: SHIPPED | OUTPATIENT
Start: 2017-03-28 | End: 2017-06-23 | Stop reason: SDUPTHER

## 2017-03-28 RX ORDER — ASPIRIN 325 MG
325 TABLET, DELAYED RELEASE (ENTERIC COATED) ORAL EVERY OTHER DAY
Qty: 45 TABLET | Refills: 1 | Status: SHIPPED | OUTPATIENT
Start: 2017-03-28 | End: 2017-06-23 | Stop reason: SDUPTHER

## 2017-04-13 ENCOUNTER — OFFICE VISIT (OUTPATIENT)
Dept: GASTROENTEROLOGY | Facility: CLINIC | Age: 82
End: 2017-04-13

## 2017-04-13 VITALS
SYSTOLIC BLOOD PRESSURE: 118 MMHG | BODY MASS INDEX: 29.4 KG/M2 | HEART RATE: 64 BPM | HEIGHT: 71 IN | DIASTOLIC BLOOD PRESSURE: 64 MMHG | OXYGEN SATURATION: 91 % | WEIGHT: 210 LBS

## 2017-04-13 DIAGNOSIS — R10.32 LEFT LOWER QUADRANT PAIN: Primary | ICD-10-CM

## 2017-04-13 DIAGNOSIS — Z86.010 HX OF COLONIC POLYPS: ICD-10-CM

## 2017-04-13 DIAGNOSIS — E11.9 CONTROLLED TYPE 2 DIABETES MELLITUS WITHOUT COMPLICATION, WITHOUT LONG-TERM CURRENT USE OF INSULIN (HCC): ICD-10-CM

## 2017-04-13 DIAGNOSIS — K21.9 GASTROESOPHAGEAL REFLUX DISEASE, ESOPHAGITIS PRESENCE NOT SPECIFIED: ICD-10-CM

## 2017-04-13 DIAGNOSIS — I10 HTN (HYPERTENSION), BENIGN: ICD-10-CM

## 2017-04-13 PROBLEM — Z86.0100 HX OF COLONIC POLYPS: Status: ACTIVE | Noted: 2017-04-13

## 2017-04-13 PROCEDURE — 99214 OFFICE O/P EST MOD 30 MIN: CPT | Performed by: CLINICAL NURSE SPECIALIST

## 2017-04-13 RX ORDER — METRONIDAZOLE 500 MG/1
500 TABLET ORAL 3 TIMES DAILY
Qty: 30 TABLET | Refills: 0 | Status: ON HOLD | OUTPATIENT
Start: 2017-04-13 | End: 2017-06-15 | Stop reason: ALTCHOICE

## 2017-04-13 NOTE — PROGRESS NOTES
Gonzalo Durham  1934 4/13/2017  Chief Complaint   Patient presents with   • Abdominal Pain     Patient states that he has abdominal pain on upper and lower side and upper and lower stomach. He also states metformin is causing diarrhea.     Subjective   HPI  Gonzalo Durham is a 83 y.o. male who presents with a complaint of lower left side abdominal pain that  Began years ago off and on. He has had diverticulosis for years progressive ongoing. He says that he has had few flares with diverticulitis off and on over the years. He saw Dr Romero on 3/6/2017 and was hurting at that time it is better today. He says he did not require antibiotics at that time. He did have labs and his WBC was 7.46. No BRBPR. No fever chills or sweats. He is having diarrhea up to 3 times per day which he has had for years while taking his metformin he says.  He has had a colonoscopy in the past 2013 reviewed today with polyps removed. No family hx for colon cancer. No wt loss.     He is also having some indigestion and reflux often occurring several times per week moderate to severe progressive over the past few months. He is taking OTC antiacids for the treatment of this. No BRBPR. No melena. He describes it as a burn that comes up to his throat. He says that he awakens at night with reflux. He does say it is worse when he eats late at night.    Past Medical History:   Diagnosis Date   • Arthritis    • CAD (coronary artery disease)    • CKD (chronic kidney disease)    • Diabetes mellitus     Type 2   • DM (diabetes mellitus screen)    • Hypercholesteremia    • Hypertension      Past Surgical History:   Procedure Laterality Date   • APPENDECTOMY     • COLONOSCOPY W/ POLYPECTOMY  10/21/2013    2 Tubular adenomatous polyps, Diverticulosis repeat exam in 5 years   • CORONARY ARTERY BYPASS GRAFT       Outpatient Prescriptions Marked as Taking for the 4/13/17 encounter (Office Visit) with DOMINIQUE Oviedo   Medication Sig Dispense  Refill   • acetaminophen (TYLENOL) 325 MG tablet Take 2 tablets by mouth 2 (Two) Times a Day. 360 tablet 1   • Artificial Tear Solution (JUST TEARS EYE DROPS) solution Apply 1-2 drops to eye Daily As Needed (dry eyes). 45 mL 1   • aspirin  MG tablet Take 1 tablet by mouth Every Other Day. 45 tablet 1   • calcium carbonate-vitamin d 600-400 MG-UNIT per tablet Take 1 tablet by mouth 2 (Two) Times a Day. 180 tablet 1   • finasteride (PROSCAR) 5 MG tablet Take 1 tablet by mouth Daily. 90 tablet 1   • FLUoxetine (PROZAC) 10 MG capsule Stop the 20 mg and start the 10 each day; after a week use 10 qod for a week then stop 20 capsule 0   • FREESTYLE LITE test strip 1 each by Other route Daily. 100 each 1   • gabapentin (NEURONTIN) 100 MG capsule Take 1 capsule by mouth 3 (Three) Times a Day. 270 capsule 1   • glyBURIDE (DIAbeta) 5 MG tablet Take 1.5 tablets by mouth 2 (Two) Times a Day With Meals. 270 tablet 1   • Lancets (FREESTYLE) lancets Use once daily 100 each 1   • loratadine (CLARITIN) 10 MG tablet Take 1 tablet by mouth Daily. 90 tablet 1   • metFORMIN ER (GLUCOPHAGE XR) 500 MG 24 hr tablet Take 2 tablets by mouth Every Night. 180 tablet 1   • PRAVACHOL 80 MG tablet Take 1 tablet by mouth Daily. 90 tablet 1   • terazosin (HYTRIN) 10 MG capsule Take 1 capsule by mouth Daily. 90 capsule 1     No Known Allergies  Social History     Social History   • Marital status:      Spouse name: N/A   • Number of children: N/A   • Years of education: N/A     Occupational History   • Not on file.     Social History Main Topics   • Smoking status: Former Smoker     Packs/day: 1.00     Years: 26.00     Types: Cigarettes     Start date: 1954     Quit date: 1980   • Smokeless tobacco: Never Used   • Alcohol use No   • Drug use: No   • Sexual activity: Defer     Other Topics Concern   • Not on file     Social History Narrative     History reviewed. No pertinent family history.  Health Maintenance   Topic Date Due   •  "TDAP/TD VACCINES (1 - Tdap) 01/27/1953   • PNEUMOCOCCAL VACCINES (65+ LOW/MEDIUM RISK) (1 of 2 - PCV13) 01/27/1999   • MEDICARE ANNUAL WELLNESS  08/30/2016   • DIABETIC FOOT EXAM  08/30/2016   • URINE MICROALBUMIN  08/30/2016   • ZOSTER VACCINE  08/30/2016   • DIABETIC EYE EXAM  11/05/2016   • HEMOGLOBIN A1C  09/06/2017   • LIPID PANEL  03/06/2018   • INFLUENZA VACCINE  Completed     Review of Systems   Constitutional: Negative for activity change, appetite change, chills, diaphoresis, fatigue, fever and unexpected weight change.   HENT: Negative for ear pain, hearing loss, mouth sores, sore throat, trouble swallowing and voice change.    Eyes: Negative.    Respiratory: Negative for cough, choking, shortness of breath and wheezing.    Cardiovascular: Negative for chest pain and palpitations.   Gastrointestinal: Positive for abdominal pain and diarrhea. Negative for blood in stool, constipation, nausea and vomiting.   Endocrine: Negative for cold intolerance and heat intolerance.   Genitourinary: Positive for frequency. Negative for decreased urine volume, dysuria, hematuria and urgency.   Musculoskeletal: Positive for arthralgias and myalgias. Negative for back pain and gait problem.   Skin: Negative for color change, pallor and rash.   Allergic/Immunologic: Negative for food allergies and immunocompromised state.   Neurological: Negative for dizziness, tremors, seizures, syncope, weakness, light-headedness, numbness and headaches.   Hematological: Negative for adenopathy. Does not bruise/bleed easily.   Psychiatric/Behavioral: Negative for agitation and confusion. The patient is not nervous/anxious.    All other systems reviewed and are negative.    Objective   Vitals:    04/13/17 1015   BP: 118/64   Pulse: 64   SpO2: 91%   Weight: 210 lb (95.3 kg)   Height: 71\" (180.3 cm)     Body mass index is 29.29 kg/(m^2).  Physical Exam   Constitutional: He is oriented to person, place, and time. He appears well-developed and " well-nourished.   HENT:   Head: Normocephalic and atraumatic.   Eyes: Pupils are equal, round, and reactive to light.   Neck: Normal range of motion. Neck supple. No tracheal deviation present.   Cardiovascular: Normal rate, regular rhythm and normal heart sounds.  Exam reveals no gallop and no friction rub.    No murmur heard.  Pulmonary/Chest: Effort normal and breath sounds normal. No respiratory distress. He has no wheezes. He has no rales. He exhibits no tenderness.   Abdominal: Soft. Bowel sounds are normal. He exhibits no distension. There is no hepatosplenomegaly. There is tenderness (LLQ tenderness). There is no rigidity, no rebound and no guarding.   Musculoskeletal: Normal range of motion. He exhibits no edema, tenderness or deformity.   Neurological: He is alert and oriented to person, place, and time. He has normal reflexes.   Skin: Skin is warm and dry. No rash noted. No pallor.   Psychiatric: He has a normal mood and affect. His behavior is normal. Judgment and thought content normal.     Assessment/Plan   Gonzalo was seen today for abdominal pain.    Diagnoses and all orders for this visit:    Left lower quadrant pain  Comments:  possible diverticulitis better today exam tenderness on exam LLQ  Orders:  -     Case Request; Standing  -     Implement Anesthesia Orders Day of Procedure; Standing  -     Obtain Informed Consent; Standing  -     Verify Informed Consent; Standing  -     Verify bowel prep was successful; Standing  -     Case Request  -     metroNIDAZOLE (FLAGYL) 500 MG tablet; Take 1 tablet by mouth 3 (Three) Times a Day.    Hx of colonic polyps    Gastroesophageal reflux disease, esophagitis presence not specified  -     Case Request; Standing  -     Implement Anesthesia Orders Day of Procedure; Standing  -     Obtain Informed Consent; Standing  -     Verify Informed Consent; Standing  -     Case Request    Controlled type 2 diabetes mellitus without complication, without long-term current  use of insulin  Comments:  Hold metformin the am of procedure    HTN (hypertension), benign  Comments:  continue BP medication the am of procedure      ESOPHAGOGASTRODUODENOSCOPY WITH ANESTHESIA (N/A)  EMR Dragon/transcription disclaimer: Much of this encounter note is electronic transcription/translation of spoken language to printed text. The electronic translation of spoken language may be erroneous, or at times, nonsensical words or phrases may be inadvertently transcribed. Although I have reviewed the note for such errors, some may still exist.  Body mass index is 29.29 kg/(m^2).  Return if symptoms worsen or fail to improve.  There are no Patient Instructions on file for this visit.    All risks, benefits, alternatives, and indications of colonoscopy and/or Endoscopy procedure have been discussed with the patient. Risks to include perforation of the colon requiring possible surgery or colostomy, risk of bleeding from biopsies or removal of colon tissue, possibility of missing a colon polyp or cancer, or adverse drug reaction.  Benefits to include the diagnosis and management of disease of the colon and rectum. Alternatives to include barium enema, radiographic evaluation, lab testing or no intervention. Pt verbalizes understanding and agrees.

## 2017-05-31 ENCOUNTER — TELEPHONE (OUTPATIENT)
Dept: GASTROENTEROLOGY | Facility: CLINIC | Age: 82
End: 2017-05-31

## 2017-06-14 ENCOUNTER — ANESTHESIA EVENT (OUTPATIENT)
Dept: GASTROENTEROLOGY | Facility: HOSPITAL | Age: 82
End: 2017-06-14

## 2017-06-15 ENCOUNTER — HOSPITAL ENCOUNTER (OUTPATIENT)
Facility: HOSPITAL | Age: 82
Setting detail: HOSPITAL OUTPATIENT SURGERY
Discharge: HOME | End: 2017-06-15
Attending: INTERNAL MEDICINE | Admitting: INTERNAL MEDICINE

## 2017-06-15 ENCOUNTER — ANESTHESIA (OUTPATIENT)
Dept: GASTROENTEROLOGY | Facility: HOSPITAL | Age: 82
End: 2017-06-15

## 2017-06-15 VITALS
HEIGHT: 71 IN | OXYGEN SATURATION: 93 % | WEIGHT: 210 LBS | SYSTOLIC BLOOD PRESSURE: 137 MMHG | HEART RATE: 57 BPM | TEMPERATURE: 97.2 F | DIASTOLIC BLOOD PRESSURE: 99 MMHG | BODY MASS INDEX: 29.4 KG/M2 | RESPIRATION RATE: 17 BRPM

## 2017-06-15 DIAGNOSIS — R10.32 LEFT LOWER QUADRANT PAIN: ICD-10-CM

## 2017-06-15 PROCEDURE — 25010000002 PROPOFOL 10 MG/ML EMULSION: Performed by: NURSE ANESTHETIST, CERTIFIED REGISTERED

## 2017-06-15 PROCEDURE — 43235 EGD DIAGNOSTIC BRUSH WASH: CPT | Performed by: INTERNAL MEDICINE

## 2017-06-15 PROCEDURE — 45378 DIAGNOSTIC COLONOSCOPY: CPT | Performed by: INTERNAL MEDICINE

## 2017-06-15 RX ORDER — PANTOPRAZOLE SODIUM 40 MG/1
40 TABLET, DELAYED RELEASE ORAL DAILY
Qty: 90 TABLET | Refills: 3 | Status: SHIPPED | OUTPATIENT
Start: 2017-06-15 | End: 2017-06-23 | Stop reason: SDUPTHER

## 2017-06-15 RX ORDER — LIDOCAINE HYDROCHLORIDE 20 MG/ML
INJECTION, SOLUTION INFILTRATION; PERINEURAL AS NEEDED
Status: DISCONTINUED | OUTPATIENT
Start: 2017-06-15 | End: 2017-06-15 | Stop reason: SURG

## 2017-06-15 RX ORDER — SODIUM CHLORIDE 9 MG/ML
100 INJECTION, SOLUTION INTRAVENOUS CONTINUOUS
Status: DISCONTINUED | OUTPATIENT
Start: 2017-06-15 | End: 2017-06-15 | Stop reason: HOSPADM

## 2017-06-15 RX ORDER — SODIUM CHLORIDE 0.9 % (FLUSH) 0.9 %
1-10 SYRINGE (ML) INJECTION AS NEEDED
Status: DISCONTINUED | OUTPATIENT
Start: 2017-06-15 | End: 2017-06-15 | Stop reason: HOSPADM

## 2017-06-15 RX ORDER — PROPOFOL 10 MG/ML
VIAL (ML) INTRAVENOUS AS NEEDED
Status: DISCONTINUED | OUTPATIENT
Start: 2017-06-15 | End: 2017-06-15 | Stop reason: SURG

## 2017-06-15 RX ADMIN — PROPOFOL 50 MG: 10 INJECTION, EMULSION INTRAVENOUS at 13:12

## 2017-06-15 RX ADMIN — LIDOCAINE HYDROCHLORIDE 100 MG: 20 INJECTION, SOLUTION INFILTRATION; PERINEURAL at 13:00

## 2017-06-15 RX ADMIN — PROPOFOL 50 MG: 10 INJECTION, EMULSION INTRAVENOUS at 13:00

## 2017-06-15 RX ADMIN — PROPOFOL 50 MG: 10 INJECTION, EMULSION INTRAVENOUS at 13:06

## 2017-06-15 RX ADMIN — SODIUM CHLORIDE 100 ML/HR: 9 INJECTION, SOLUTION INTRAVENOUS at 11:34

## 2017-06-15 RX ADMIN — LIDOCAINE HYDROCHLORIDE 0.5 ML: 10 INJECTION, SOLUTION EPIDURAL; INFILTRATION; INTRACAUDAL; PERINEURAL at 11:34

## 2017-06-15 NOTE — PLAN OF CARE
Problem: Patient Care Overview (Adult)  Goal: Plan of Care Review  Outcome: Ongoing (interventions implemented as appropriate)    06/15/17 1308   Patient Care Overview   Progress improving   Outcome Evaluation   Outcome Summary/Follow up Plan pt tolerating procedure well          Problem: GI Endoscopy (Adult)  Goal: Signs and Symptoms of Listed Potential Problems Will be Absent or Manageable (GI Endoscopy)  Outcome: Ongoing (interventions implemented as appropriate)    06/15/17 1308   GI Endoscopy   Problems Assessed (GI Endoscopy) all   Problems Present (GI Endoscopy) none

## 2017-06-15 NOTE — H&P
[facility]  Pre Procedure History & Physical    Chief Complaint:   Abdominal pain    Subjective     HPI:   Here today for evaluation of abdominal pain.  Please see office note dated 4/13/2017    Past Medical History:   Past Medical History:   Diagnosis Date   • Arthritis    • CAD (coronary artery disease)    • CKD (chronic kidney disease)    • Diabetes mellitus     Type 2   • DM (diabetes mellitus screen)    • Hypercholesteremia    • Hypertension        Past Surgical History:  [unfilled]    Family History:  History reviewed. No pertinent family history.    Social History:   reports that he quit smoking about 37 years ago. His smoking use included Cigarettes. He started smoking about 63 years ago. He has a 26.00 pack-year smoking history. He has never used smokeless tobacco. He reports that he does not drink alcohol or use illicit drugs.    Medications:   Prior to Admission medications    Medication Sig Start Date End Date Taking? Authorizing Provider   acetaminophen (TYLENOL) 325 MG tablet Take 2 tablets by mouth 2 (Two) Times a Day. 3/28/17  Yes Abel Romero MD   Artificial Tear Solution (JUST TEARS EYE DROPS) solution Apply 1-2 drops to eye Daily As Needed (dry eyes). 3/28/17  Yes Abel Romero MD   aspirin  MG tablet Take 1 tablet by mouth Every Other Day. 3/28/17  Yes Abel Romero MD   calcium carbonate-vitamin d 600-400 MG-UNIT per tablet Take 1 tablet by mouth 2 (Two) Times a Day. 3/28/17  Yes Abel Romero MD   finasteride (PROSCAR) 5 MG tablet Take 1 tablet by mouth Daily. 3/28/17  Yes Abel Romero MD   FLUoxetine (PROZAC) 10 MG capsule Stop the 20 mg and start the 10 each day; after a week use 10 qod for a week then stop 1/20/17  Yes Abel Romero MD   gabapentin (NEURONTIN) 100 MG capsule Take 1 capsule by mouth 3 (Three) Times a Day. 3/28/17  Yes Abel Romero MD   glyBURIDE (DIAbeta) 5 MG tablet Take 1.5 tablets by mouth 2 (Two)  "Times a Day With Meals. 3/28/17  Yes Abel Romero MD   loratadine (CLARITIN) 10 MG tablet Take 1 tablet by mouth Daily. 3/28/17  Yes Abel Romero MD   metFORMIN ER (GLUCOPHAGE XR) 500 MG 24 hr tablet Take 2 tablets by mouth Every Night. 3/28/17  Yes Abel Romero MD   PRAVACHOL 80 MG tablet Take 1 tablet by mouth Daily. 3/28/17  Yes Abel Romero MD   terazosin (HYTRIN) 10 MG capsule Take 1 capsule by mouth Daily. 3/28/17  Yes Abel Romero MD   FREESTYLE LITE test strip 1 each by Other route Daily. 3/28/17   Abel Romero MD   Lancets (FREESTYLE) lancets Use once daily 3/28/17   Abel Romero MD   metroNIDAZOLE (FLAGYL) 500 MG tablet Take 1 tablet by mouth 3 (Three) Times a Day. 4/13/17 6/15/17  DOMINIQUE Oviedo       Allergies:  Review of patient's allergies indicates no known allergies.    Objective     Blood pressure 133/54, pulse 73, temperature 97.2 °F (36.2 °C), temperature source Temporal Artery , resp. rate 20, height 71\" (180.3 cm), weight 210 lb (95.3 kg), SpO2 90 %.    Physical Exam   Constitutional: Pt is oriented to person, place, and in no distress.   HENT: Mouth/Throat: Oropharynx is clear.   Cardiovascular: Normal rate, regular rhythm.    Pulmonary/Chest: Effort normal. No respiratory distress. No  wheezes.   Abdominal: Soft. Non-distended.  Skin: Skin is warm and dry.   Psychiatric: Mood, memory, affect and judgment appear normal.     Assessment/Plan     Diagnosis:  Tunnel pain    Anticipated Surgical Procedure:    Proceed with endoscopy and colonoscopy as scheduled    The risks, benefits, and alternatives of this procedure have been discussed with the patient or the responsible party- the patient understands and agrees to proceed.    EMR Dragon/transcription disclaimer: Much of this encounter note is an electronic transcription/translation of spoken language to printed text.  The electronic translation of spoken language may permit " erroneous, or at times, nonsensical words or phrases to be inadvertently transcribed.  Although I have reviewed the note for such errors, some may still exist.    Sly Ahn MD  12:52 PM  6/15/2017

## 2017-06-15 NOTE — PLAN OF CARE
Problem: Patient Care Overview (Adult)  Goal: Plan of Care Review  Outcome: Outcome(s) achieved Date Met:  06/15/17    06/15/17 132   Patient Care Overview   Progress improving   Outcome Evaluation   Outcome Summary/Follow up Plan D/C CRITERIA MET   Coping/Psychosocial Response Interventions   Plan Of Care Reviewed With patient;spouse

## 2017-06-15 NOTE — PLAN OF CARE
Problem: GI Endoscopy (Adult)  Goal: Signs and Symptoms of Listed Potential Problems Will be Absent or Manageable (GI Endoscopy)  Outcome: Outcome(s) achieved Date Met:  06/15/17

## 2017-06-15 NOTE — ANESTHESIA POSTPROCEDURE EVALUATION
Patient: Gonzalo Durham    Procedure Summary     Date Anesthesia Start Anesthesia Stop Room / Location    06/15/17 [id number] ENDOSCOPY 4 /  [facility]       Procedure Diagnosis Surgeon Provider    COLONOSCOPY WITH ANESTHESIA (N/A ); ESOPHAGOGASTRODUODENOSCOPY WITH ANESTHESIA (N/A Esophagus) Left lower quadrant pain  (Left lower quadrant pain [R10.32]) MD Maksim Patterson CRNA          Anesthesia Type: general  Last vitals  /68 (06/15/17 1330)    Temp      Pulse 63 (06/15/17 1330)   Resp 19 (06/15/17 1330)    SpO2 94 % (06/15/17 1330)      Post Anesthesia Care and Evaluation    Patient location during evaluation: PACU  Patient participation: complete - patient participated  Level of consciousness: awake and alert  Pain score: 0  Pain management: adequate  Airway patency: patent  Anesthetic complications: No anesthetic complications  PONV Status: none  Cardiovascular status: acceptable and stable  Respiratory status: acceptable  Hydration status: acceptable

## 2017-06-15 NOTE — ANESTHESIA PREPROCEDURE EVALUATION
Anesthesia Evaluation     Patient summary reviewed   no history of anesthetic complications:  NPO Solid Status: > 8 hours       Airway   Mallampati: II  TM distance: >3 FB  Neck ROM: full  Dental      Pulmonary    (-) sleep apnea, not a smoker    ROS comment: Baseline spO2 87%.  Denies dyspnea, cough.  Cardiovascular   Exercise tolerance: good (4-7 METS)    (+) CAD, CABG (1980), hyperlipidemia  (-) pacemaker, angina, cardiac stents      Neuro/Psych  (-) seizures, CVA  GI/Hepatic/Renal/Endo    (+)  GERD, diabetes mellitus,   (-) liver disease, no renal disease    Musculoskeletal     Abdominal    Substance History      OB/GYN          Other                                        Anesthesia Plan    ASA 3     general     intravenous induction   Anesthetic plan and risks discussed with patient.

## 2017-06-23 RX ORDER — FINASTERIDE 5 MG/1
5 TABLET, FILM COATED ORAL DAILY
Qty: 90 TABLET | Refills: 1 | Status: SHIPPED | OUTPATIENT
Start: 2017-06-23 | End: 2017-12-11 | Stop reason: SDUPTHER

## 2017-06-23 RX ORDER — PANTOPRAZOLE SODIUM 40 MG/1
40 TABLET, DELAYED RELEASE ORAL DAILY
Qty: 90 TABLET | Refills: 3 | Status: SHIPPED | OUTPATIENT
Start: 2017-06-23 | End: 2017-09-21 | Stop reason: SDUPTHER

## 2017-06-23 RX ORDER — PRAVASTATIN SODIUM 80 MG/1
80 TABLET ORAL DAILY
Qty: 90 TABLET | Refills: 1 | Status: SHIPPED | OUTPATIENT
Start: 2017-06-23 | End: 2017-12-11 | Stop reason: SDUPTHER

## 2017-06-23 RX ORDER — ASPIRIN 325 MG
325 TABLET, DELAYED RELEASE (ENTERIC COATED) ORAL EVERY OTHER DAY
Qty: 45 TABLET | Refills: 1 | Status: SHIPPED | OUTPATIENT
Start: 2017-06-23 | End: 2017-12-11 | Stop reason: SDUPTHER

## 2017-06-23 RX ORDER — FINASTERIDE 5 MG/1
5 TABLET, FILM COATED ORAL DAILY
Qty: 90 TABLET | Refills: 1 | Status: SHIPPED | OUTPATIENT
Start: 2017-06-23 | End: 2017-06-23 | Stop reason: SDUPTHER

## 2017-06-23 RX ORDER — TERAZOSIN 10 MG/1
10 CAPSULE ORAL DAILY
Qty: 90 CAPSULE | Refills: 1 | Status: SHIPPED | OUTPATIENT
Start: 2017-06-23 | End: 2017-12-11 | Stop reason: SDUPTHER

## 2017-06-23 RX ORDER — LANCETS 28 GAUGE
EACH MISCELLANEOUS
Qty: 100 EACH | Refills: 1 | Status: SHIPPED | OUTPATIENT
Start: 2017-06-23 | End: 2018-11-20 | Stop reason: SDUPTHER

## 2017-06-23 RX ORDER — LORATADINE 10 MG/1
10 TABLET ORAL DAILY
Qty: 90 TABLET | Refills: 1 | Status: SHIPPED | OUTPATIENT
Start: 2017-06-23 | End: 2017-12-11 | Stop reason: SDUPTHER

## 2017-06-23 RX ORDER — GABAPENTIN 100 MG/1
100 CAPSULE ORAL 3 TIMES DAILY
Qty: 270 CAPSULE | Refills: 1 | Status: SHIPPED | OUTPATIENT
Start: 2017-06-23 | End: 2017-12-11 | Stop reason: SDUPTHER

## 2017-06-23 RX ORDER — DEXTRAN 70/HYPROMELLOSE
1-2 DROPS OPHTHALMIC (EYE) DAILY PRN
Qty: 45 ML | Refills: 1 | Status: SHIPPED | OUTPATIENT
Start: 2017-06-23 | End: 2017-12-11 | Stop reason: SDUPTHER

## 2017-06-23 RX ORDER — LORATADINE 10 MG/1
10 TABLET ORAL DAILY
Qty: 90 TABLET | Refills: 1 | Status: SHIPPED | OUTPATIENT
Start: 2017-06-23 | End: 2017-06-23 | Stop reason: SDUPTHER

## 2017-06-23 RX ORDER — METFORMIN HYDROCHLORIDE 500 MG/1
1000 TABLET, EXTENDED RELEASE ORAL NIGHTLY
Qty: 180 TABLET | Refills: 1 | Status: SHIPPED | OUTPATIENT
Start: 2017-06-23 | End: 2017-12-11 | Stop reason: SDUPTHER

## 2017-06-23 RX ORDER — BLOOD-GLUCOSE METER
1 KIT MISCELLANEOUS DAILY
Qty: 100 EACH | Refills: 1 | Status: SHIPPED | OUTPATIENT
Start: 2017-06-23 | End: 2017-12-11 | Stop reason: SDUPTHER

## 2017-06-23 RX ORDER — GLYBURIDE 5 MG/1
7.5 TABLET ORAL 2 TIMES DAILY WITH MEALS
Qty: 270 TABLET | Refills: 1 | Status: SHIPPED | OUTPATIENT
Start: 2017-06-23 | End: 2017-12-11 | Stop reason: SDUPTHER

## 2017-06-23 RX ORDER — ACETAMINOPHEN 325 MG/1
650 TABLET ORAL 2 TIMES DAILY
Qty: 360 TABLET | Refills: 1 | Status: SHIPPED | OUTPATIENT
Start: 2017-06-23 | End: 2017-12-11 | Stop reason: SDUPTHER

## 2017-06-23 NOTE — TELEPHONE ENCOUNTER
Tylenol,Just tears, Aspirin, Kieran/Vit d, Proscar, test strips, Neurontin, Diabeta, lancets,Claritin, Glucophage ER, Protonix, Pravachol, Hytrin refill [facility] done and escribed

## 2017-08-07 ENCOUNTER — OFFICE VISIT (OUTPATIENT)
Dept: UROLOGY | Facility: CLINIC | Age: 82
End: 2017-08-07

## 2017-08-07 VITALS
DIASTOLIC BLOOD PRESSURE: 58 MMHG | TEMPERATURE: 98.3 F | BODY MASS INDEX: 30.46 KG/M2 | WEIGHT: 217.6 LBS | HEIGHT: 71 IN | SYSTOLIC BLOOD PRESSURE: 118 MMHG

## 2017-08-07 DIAGNOSIS — N40.1 BPH WITH URINARY OBSTRUCTION: Primary | ICD-10-CM

## 2017-08-07 DIAGNOSIS — N13.8 BPH WITH URINARY OBSTRUCTION: Primary | ICD-10-CM

## 2017-08-07 LAB
BILIRUB BLD-MCNC: NEGATIVE MG/DL
CLARITY, POC: CLEAR
COLOR UR: YELLOW
GLUCOSE UR STRIP-MCNC: NEGATIVE MG/DL
KETONES UR QL: NEGATIVE
LEUKOCYTE EST, POC: NEGATIVE
NITRITE UR-MCNC: NEGATIVE MG/ML
PH UR: 6.5 [PH] (ref 5–8)
PROT UR STRIP-MCNC: NEGATIVE MG/DL
RBC # UR STRIP: NEGATIVE /UL
SP GR UR: 1.02 (ref 1–1.03)
UROBILINOGEN UR QL: NORMAL

## 2017-08-07 PROCEDURE — 51798 US URINE CAPACITY MEASURE: CPT | Performed by: UROLOGY

## 2017-08-07 PROCEDURE — 81003 URINALYSIS AUTO W/O SCOPE: CPT | Performed by: UROLOGY

## 2017-08-07 PROCEDURE — 99203 OFFICE O/P NEW LOW 30 MIN: CPT | Performed by: UROLOGY

## 2017-08-07 NOTE — PROGRESS NOTES
Mr. Durham is 83 y.o. male    Chief Complaint   Patient presents with   • Benign Prostatic Hypertrophy       Benign Prostatic Hypertrophy   This is a chronic problem. The current episode started more than 1 year ago. The problem is unchanged. Irritative symptoms include urgency. Irritative symptoms do not include frequency. Obstructive symptoms include an intermittent stream. Pertinent negatives include no chills, dysuria or hematuria. AUA score is 8-19. Nothing aggravates the symptoms. Past treatments include finasteride and terazosin. The treatment provided moderate relief. He has been using treatment for 1 to 2 years.       The following portions of the patient's history were reviewed and updated as appropriate: allergies, current medications, past family history, past medical history, past social history, past surgical history and problem list.    Review of Systems   Constitutional: Negative for chills and fever.   HENT: Negative for congestion and sore throat.    Eyes: Negative for pain and itching.   Respiratory: Negative for cough and shortness of breath.    Cardiovascular: Negative for chest pain.   Gastrointestinal: Positive for abdominal pain. Negative for anal bleeding and blood in stool.   Endocrine: Negative for cold intolerance and heat intolerance.   Genitourinary: Positive for urgency. Negative for difficulty urinating, dysuria, frequency and hematuria.   Musculoskeletal: Negative for back pain and neck pain.   Skin: Negative for color change and rash.   Allergic/Immunologic: Negative for food allergies.   Neurological: Negative for dizziness and headaches.   Hematological: Does not bruise/bleed easily.   Psychiatric/Behavioral: Negative for confusion and sleep disturbance.         Current Outpatient Prescriptions:   •  acetaminophen (TYLENOL) 325 MG tablet, Take 2 tablets by mouth 2 (Two) Times a Day., Disp: 360 tablet, Rfl: 1  •  Artificial Tear Solution (JUST TEARS EYE DROPS) solution, Apply 1-2  drops to eye Daily As Needed (dry eyes)., Disp: 45 mL, Rfl: 1  •  aspirin  MG tablet, Take 1 tablet by mouth Every Other Day., Disp: 45 tablet, Rfl: 1  •  calcium carbonate-vitamin d 600-400 MG-UNIT per tablet, Take 1 tablet by mouth 2 (Two) Times a Day., Disp: 180 tablet, Rfl: 1  •  finasteride (PROSCAR) 5 MG tablet, Take 1 tablet by mouth Daily., Disp: 90 tablet, Rfl: 1  •  FLUoxetine (PROZAC) 10 MG capsule, Stop the 20 mg and start the 10 each day; after a week use 10 qod for a week then stop, Disp: 20 capsule, Rfl: 0  •  FREESTYLE LITE test strip, 1 each by Other route Daily., Disp: 100 each, Rfl: 1  •  gabapentin (NEURONTIN) 100 MG capsule, Take 1 capsule by mouth 3 (Three) Times a Day., Disp: 270 capsule, Rfl: 1  •  glyBURIDE (DIAbeta) 5 MG tablet, Take 1.5 tablets by mouth 2 (Two) Times a Day With Meals., Disp: 270 tablet, Rfl: 1  •  Lancets (FREESTYLE) lancets, Use once daily, Disp: 100 each, Rfl: 1  •  loratadine (CLARITIN) 10 MG tablet, Take 1 tablet by mouth Daily., Disp: 90 tablet, Rfl: 1  •  metFORMIN ER (GLUCOPHAGE XR) 500 MG 24 hr tablet, Take 2 tablets by mouth Every Night., Disp: 180 tablet, Rfl: 1  •  pantoprazole (PROTONIX) 40 MG EC tablet, Take 1 tablet by mouth Daily., Disp: 90 tablet, Rfl: 3  •  PRAVACHOL 80 MG tablet, Take 1 tablet by mouth Daily., Disp: 90 tablet, Rfl: 1  •  terazosin (HYTRIN) 10 MG capsule, Take 1 capsule by mouth Daily., Disp: 90 capsule, Rfl: 1    Past Medical History:   Diagnosis Date   • Arthritis    • BPH (benign prostatic hypertrophy)    • CAD (coronary artery disease)    • CKD (chronic kidney disease)    • Diabetes mellitus     Type 2   • DM (diabetes mellitus screen)    • Hypercholesteremia    • Hypertension        Past Surgical History:   Procedure Laterality Date   • APPENDECTOMY     • COLONOSCOPY N/A 6/15/2017    Procedure: COLONOSCOPY WITH ANESTHESIA;  Surgeon: Sly Ahn MD;  Location: [facility] ENDOSCOPY;  Service:    • COLONOSCOPY W/ POLYPECTOMY   "10/21/2013    2 Tubular adenomatous polyps, Diverticulosis repeat exam in 5 years   • CORONARY ARTERY BYPASS GRAFT     • ENDOSCOPY N/A 6/15/2017    Procedure: ESOPHAGOGASTRODUODENOSCOPY WITH ANESTHESIA;  Surgeon: Sly Ahn MD;  Location: [facility] ENDOSCOPY;  Service:        Social History     Social History   • Marital status:      Spouse name: N/A   • Number of children: N/A   • Years of education: N/A     Social History Main Topics   • Smoking status: Former Smoker     Packs/day: 1.00     Years: 26.00     Types: Cigarettes     Start date: 1954     Quit date: 1980   • Smokeless tobacco: Never Used   • Alcohol use No   • Drug use: No   • Sexual activity: Defer     Other Topics Concern   • None     Social History Narrative       History reviewed. No pertinent family history.    Objective    /58  Temp 98.3 °F (36.8 °C)  Ht 71\" (180.3 cm)  Wt 217 lb 9.6 oz (98.7 kg)  BMI 30.35 kg/m2    Physical Exam   Constitutional: He is oriented to person, place, and time. He appears well-developed and well-nourished. No distress.   HENT:   Nose: Nose normal.   Neck: Normal range of motion. Neck supple. No tracheal deviation present. No thyromegaly present.   Cardiovascular: Normal rate, regular rhythm and intact distal pulses.    No significant edema or tenderness    Pulmonary/Chest: Breath sounds normal. No accessory muscle usage. No respiratory distress.   Abdominal: Soft. Bowel sounds are normal. He exhibits no distension, no ascites and no mass. There is no hepatosplenomegaly. There is no tenderness. There is no rebound, no guarding and no CVA tenderness. No hernia.   Stool specimen is not indicated for my portion of the exam   Genitourinary: Testes normal and penis normal. Rectal exam shows no mass, no tenderness, anal tone normal and guaiac negative stool. Tender: no nodules. Right testis shows no mass, no swelling and no tenderness. Left testis shows no mass, no swelling and no tenderness. No penile " tenderness (no lesion or deformities).   Genitourinary Comments:  The urethral meatus normal in position without evidence of stricture. Epididymis without mass or tenderness. Vas Deferens is palpably normal.Anus and perineum without mass or tenderness. The seminal vesicle are without mass or enlargement. The prostate is approximately 40-50 ml. It is Symmetric, with a Soft consistency. There are no nodules present. .      Lymphadenopathy:     He has no cervical adenopathy. No inguinal adenopathy noted on the right or left side.        Right: No inguinal adenopathy present.        Left: No inguinal adenopathy present.   Neurological: He is alert and oriented to person, place, and time.   Skin: Skin is warm and dry. No rash noted. He is not diaphoretic. No pallor.   Psychiatric: He has a normal mood and affect. His behavior is normal.   Vitals reviewed.      Office Visit on 03/06/2017   Component Date Value Ref Range Status   • WBC 03/06/2017 7.46  4.80 - 10.80 10*3/mm3 Final   • RBC 03/06/2017 4.75* 4.80 - 5.90 10*6/mm3 Final   • Hemoglobin 03/06/2017 13.8* 14.0 - 18.0 g/dL Final   • Hematocrit 03/06/2017 44.1  40.0 - 52.0 % Final   • MCV 03/06/2017 92.8  82.0 - 95.0 fL Final   • MCH 03/06/2017 29.1  28.0 - 32.0 pg Final   • MCHC 03/06/2017 31.3* 33.0 - 36.0 g/dL Final   • RDW 03/06/2017 14.2  12.0 - 15.0 % Final   • Platelets 03/06/2017 235  130 - 400 10*3/mm3 Final   • Neutrophil Rel % 03/06/2017 59.7  39.0 - 78.0 % Final   • Lymphocyte Rel % 03/06/2017 24.3  15.0 - 45.0 % Final   • Monocyte Rel % 03/06/2017 8.3  4.0 - 12.0 % Final   • Eosinophil Rel % 03/06/2017 7.1* 0.0 - 4.0 % Final   • Basophil Rel % 03/06/2017 0.5  0.0 - 2.0 % Final   • Neutrophils Absolute 03/06/2017 4.45  1.87 - 8.40 10*3/mm3 Final   • Lymphocytes Absolute 03/06/2017 1.81  0.72 - 4.86 10*3/mm3 Final   • Monocytes Absolute 03/06/2017 0.62  0.19 - 1.30 10*3/mm3 Final   • Eosinophils Absolute 03/06/2017 0.53  0.00 - 0.70 10*3/mm3 Final   •  Basophils Absolute 03/06/2017 0.04  0.00 - 0.20 10*3/mm3 Final   • Immature Granulocyte Rel % 03/06/2017 0.1  0.0 - 5.0 % Final   • Immature Grans Absolute 03/06/2017 0.01  0.00 - 0.03 10*3/mm3 Final   • Glucose 03/06/2017 120* 70 - 100 mg/dL Final   • BUN 03/06/2017 26* 5 - 21 mg/dL Final   • Creatinine 03/06/2017 1.03  0.50 - 1.40 mg/dL Final   • eGFR Non African Am 03/06/2017 69  >60 mL/min/1.73 Final    Comment: The MDRD GFR formula is only valid for adults with stable  renal function between ages 18 and 70.     • eGFR  Am 03/06/2017 84  >60 mL/min/1.73 Final   • BUN/Creatinine Ratio 03/06/2017 25.2* 7.0 - 25.0 Final   • Sodium 03/06/2017 144  135 - 145 mmol/L Final   • Potassium 03/06/2017 4.7  3.5 - 5.3 mmol/L Final   • Chloride 03/06/2017 102  98 - 110 mmol/L Final   • Total CO2 03/06/2017 30.0  24.0 - 31.0 mmol/L Final   • Calcium 03/06/2017 9.5  8.4 - 10.4 mg/dL Final   • Total Protein 03/06/2017 7.0  6.3 - 8.7 g/dL Final   • Albumin 03/06/2017 4.30  3.50 - 5.00 g/dL Final   • Globulin 03/06/2017 2.7  gm/dL Final   • A/G Ratio 03/06/2017 1.6  1.1 - 2.5 g/dL Final   • Total Bilirubin 03/06/2017 0.4  0.1 - 1.0 mg/dL Final   • Alkaline Phosphatase 03/06/2017 65  24 - 120 U/L Final   • AST (SGOT) 03/06/2017 20  7 - 45 U/L Final   • ALT (SGPT) 03/06/2017 24  0 - 54 U/L Final   • Hemoglobin A1C 03/06/2017 6.70  % Final    Comment: Less than 6.0           Non-Diabetic Range  6.0-7.0                 ADA Therapeutic Target  Greater than 7.0        Action Suggested     • Total Cholesterol 03/06/2017 170  130 - 200 mg/dL Final   • Triglycerides 03/06/2017 164* 0 - 149 mg/dL Final   • HDL Cholesterol 03/06/2017 36* >=40 mg/dL Final   • VLDL Cholesterol 03/06/2017 32.8  mg/dL Final   • LDL Cholesterol  03/06/2017 101* 0 - 99 mg/dL Final       Results for orders placed or performed in visit on 08/07/17   POC Urinalysis Dipstick, Automated   Result Value Ref Range    Color Yellow Yellow, Straw, Dark Yellow, Beryl     Clarity, UA Clear Clear    Glucose, UA Negative Negative, 1000 mg/dL (3+) mg/dL    Bilirubin Negative Negative    Ketones, UA Negative Negative    Specific Gravity  1.020 1.005 - 1.030    Blood, UA Negative Negative    pH, Urine 6.5 5.0 - 8.0    Protein, POC Negative Negative mg/dL    Urobilinogen, UA Normal Normal    Leukocytes Negative Negative    Nitrite, UA Negative Negative     Assessment and Plan    Gonzalo was seen today for benign prostatic hypertrophy.    Diagnoses and all orders for this visit:    BPH with urinary obstruction  -     POC Urinalysis Dipstick, Automated     I reviewed records from Dr. Valdes.  This is an 83-year-old gentleman with a long-standing history of BPH on 10 mg of Hytrin and finasteride for several years.  Last PSA checked was last year and was resulted as 1.1, corrected to 2.2    Mr. Durham has stable symptoms at this point.  He is entering his bladder well.  He is overall happy with how he is urinating.  He would like to continue on these medications.  In fact at this point he has been getting his medications filled by his primary care physician.  In addition we discussed the utility of future PSA testing, and I discussed with him that at his age with a PSA last year of 2.2 that I would not recommend any further PSA testing.  He expressed understanding and was given the option to come back to me yearly for symptom check versus to continue with his primary care physician for refills on his medications.  He would like to go to his primary care physician.  I will see him back as needed if his symptoms worsen.    Estimation of residual urine via abdominal ultrasound  Residual Urine: 0 ml  Indication: bph  Position: Supine  Examination: Incremental scanning of the suprapubic area using 3 MHz transducer using copious amounts of acoustic gel.   Findings: An anechoic area was demonstrated which represented the bladder, with measurement of residual urine as noted. I inspected this myself.  In that the residual urine was stable or insignificant, no treatment will be necessary at this time.

## 2017-09-08 ENCOUNTER — OFFICE VISIT (OUTPATIENT)
Dept: FAMILY MEDICINE CLINIC | Facility: CLINIC | Age: 82
End: 2017-09-08

## 2017-09-08 VITALS
TEMPERATURE: 97.9 F | WEIGHT: 218 LBS | HEIGHT: 71 IN | OXYGEN SATURATION: 90 % | SYSTOLIC BLOOD PRESSURE: 134 MMHG | RESPIRATION RATE: 18 BRPM | BODY MASS INDEX: 30.52 KG/M2 | DIASTOLIC BLOOD PRESSURE: 72 MMHG | HEART RATE: 68 BPM

## 2017-09-08 DIAGNOSIS — K21.9 GASTROESOPHAGEAL REFLUX DISEASE, ESOPHAGITIS PRESENCE NOT SPECIFIED: ICD-10-CM

## 2017-09-08 DIAGNOSIS — I25.10 CORONARY ARTERY DISEASE INVOLVING NATIVE CORONARY ARTERY OF NATIVE HEART WITHOUT ANGINA PECTORIS: Primary | Chronic | ICD-10-CM

## 2017-09-08 DIAGNOSIS — I10 ESSENTIAL HYPERTENSION: Chronic | ICD-10-CM

## 2017-09-08 DIAGNOSIS — Z79.01 ANTICOAGULATED: ICD-10-CM

## 2017-09-08 DIAGNOSIS — N52.9 ERECTILE DYSFUNCTION, UNSPECIFIED ERECTILE DYSFUNCTION TYPE: Chronic | ICD-10-CM

## 2017-09-08 DIAGNOSIS — N18.30 CHRONIC KIDNEY DISEASE, STAGE 3 (MODERATE): Chronic | ICD-10-CM

## 2017-09-08 DIAGNOSIS — N40.0 PROSTATISM: Chronic | ICD-10-CM

## 2017-09-08 DIAGNOSIS — F32.A DEPRESSION, UNSPECIFIED DEPRESSION TYPE: ICD-10-CM

## 2017-09-08 DIAGNOSIS — E78.5 HYPERLIPIDEMIA, UNSPECIFIED HYPERLIPIDEMIA TYPE: Chronic | ICD-10-CM

## 2017-09-08 DIAGNOSIS — E11.9 CONTROLLED TYPE 2 DIABETES MELLITUS WITHOUT COMPLICATION, WITHOUT LONG-TERM CURRENT USE OF INSULIN (HCC): Chronic | ICD-10-CM

## 2017-09-08 PROBLEM — Z01.89 LABORATORY TEST: Status: ACTIVE | Noted: 2017-09-08

## 2017-09-08 PROBLEM — IMO0002 DIABETES TYPE 2, UNCONTROLLED: Status: ACTIVE | Noted: 2017-04-13

## 2017-09-08 PROCEDURE — 99213 OFFICE O/P EST LOW 20 MIN: CPT | Performed by: FAMILY MEDICINE

## 2017-09-08 NOTE — PROGRESS NOTES
Subjective   Gonzalo Durham is a 83 y.o. male presenting with chief complaint of:   Chief Complaint   Patient presents with   • Coronary Artery Disease   • Heartburn   • Hyperlipidemia   • Hypertension   • Diabetes       History of Present Illness :  With wife.   Has multiple chronic problems; interval appointment.  One of these problems is HTN.       Other chronic problem/s to consider:   HTN.  The HTN has been present for years/it is chronic.  The HTN is assumed essential/without testing needed to look for other.  The HTN is controlled manifest by todays blood pressure and no home monitoring.  Associated illness below.   Depression: This has been present for years/over a year.  It is chronic.  It is stable.  It is associated with stressors: that are stable.  Medications being used help.   No suicide ideation/intent.   Coronary artery disease: This was discovered years ago/it is chronic.  There is currently no chest pain, shortness of breath. No planned/recent CV evaluations.   CKD3/renal insufficiency: This has been present for years/over a year.  It is chronic.  It is stable by serial labs here. Previously and today warned of risks NSAID-many other Rx (best to be sure any prescriber aware of kidney issue), x-ray contrast, dehydration.  Associated with fatigue, occ LE edema.   Chronic neck pain:  The pain has been present for years/over a year.   It is chronic.   The pain is stable. 1-2 /10 pain most of time and usually is worse after activities.  The pain limits activities.  The problem has been evaulated and the patient has no desire for more testing/change in tx.   DM2: This has been present for years/over a year.  It is chronic.  It is controlled.  Home accuchecks run fasting 100, random 140.   No hypoglycemia manifest as syncope/near syncope or diaphoretic/sweating episodes.  A1c below if available. .   GE reflux/heartburn: This has been present for years/over a year.  It is a chronic condition.   It is stable  as there is no change in infrequent heartburn and no dysphagia.  Medication required to control symptoms.  Protonix treated.   Hyperlipidemia: This has been present for years/over a year.  It is chronic.   It is controlled.   Labs are done regularly and prove control.  Labs are needed periodic to monitor condition/safetly.  Compliant with Pravachol therapy.  Prostatism: This has been present for years/over a year. It is chronic.  With hytrin, proscar Rx he is voiding ok.  Sees urology if needed; released for routine care.   Erectile dysfunction/sexual dysfunction:  This has been present for   This is chronic.  This has not been tested.  It is manifest as weaker/at times erections making intercoarse difficult/incomplete.    Anticoagulation: Requires anticoagulation with ASA 81 for CAD, DM2.   This needs to be continually monitored for risk/benefit.     Has an/another acute issue today: .    The following portions of the patient's history were reviewed and updated as appropriate: allergies, current medications, past family history, past medical history, past social history, past surgical history and problem list.  Records acquired and reviewed; TCC migrated.      Current Outpatient Prescriptions:   •  acetaminophen (TYLENOL) 325 MG tablet, Take 2 tablets by mouth 2 (Two) Times a Day., Disp: 360 tablet, Rfl: 1  •  Artificial Tear Solution (JUST TEARS EYE DROPS) solution, Apply 1-2 drops to eye Daily As Needed (dry eyes)., Disp: 45 mL, Rfl: 1  •  aspirin  MG tablet, Take 1 tablet by mouth Every Other Day., Disp: 45 tablet, Rfl: 1  •  calcium carbonate-vitamin d 600-400 MG-UNIT per tablet, Take 1 tablet by mouth 2 (Two) Times a Day., Disp: 180 tablet, Rfl: 1  •  finasteride (PROSCAR) 5 MG tablet, Take 1 tablet by mouth Daily., Disp: 90 tablet, Rfl: 1  •  FLUoxetine (PROZAC) 10 MG capsule, Stop the 20 mg and start the 10 each day; after a week use 10 qod for a week then stop, Disp: 20 capsule, Rfl: 0  •  FREESTYLE  LITE test strip, 1 each by Other route Daily., Disp: 100 each, Rfl: 1  •  gabapentin (NEURONTIN) 100 MG capsule, Take 1 capsule by mouth 3 (Three) Times a Day., Disp: 270 capsule, Rfl: 1  •  glyBURIDE (DIAbeta) 5 MG tablet, Take 1.5 tablets by mouth 2 (Two) Times a Day With Meals., Disp: 270 tablet, Rfl: 1  •  Lancets (FREESTYLE) lancets, Use once daily, Disp: 100 each, Rfl: 1  •  loratadine (CLARITIN) 10 MG tablet, Take 1 tablet by mouth Daily., Disp: 90 tablet, Rfl: 1  •  metFORMIN ER (GLUCOPHAGE XR) 500 MG 24 hr tablet, Take 2 tablets by mouth Every Night., Disp: 180 tablet, Rfl: 1  •  pantoprazole (PROTONIX) 40 MG EC tablet, Take 1 tablet by mouth Daily., Disp: 90 tablet, Rfl: 3  •  PRAVACHOL 80 MG tablet, Take 1 tablet by mouth Daily., Disp: 90 tablet, Rfl: 1  •  terazosin (HYTRIN) 10 MG capsule, Take 1 capsule by mouth Daily., Disp: 90 capsule, Rfl: 1    No Known Allergies    Review of Systems  GENERAL:  Active/slower with limits, speed, samni for age; recent travels. Sleep is ok.   ENDO:  No syncope, near or diaphoretic sweaty spells.  BS Ok: without download noted.  HEENT: No head injury, headache,  No vision change,  Same mild hearing loss.  Ears without pain/drainage.  No sore throat.  No significant nasal/sinus congestion/drainage. No epistaxis.  CHEST: No chest wall tenderness or mass. No significant cough,  without wheeze, SOB; no hemoptysis.  CV: No chest pain, palpatations, ankle edema.  GI: No heartburn, dysphagia.  No abdominal pain, diarrhea, constipation, rectal bleeding, or melena.    Colon-div/Mc/BH/6.15.17/prn    :  Voids without dysuria, or incontience to completion.  ORTHO: No painful/swollen joints but various on /off sore.  No change occ sore neck or back.  No acute neck or back pain without recent injury.   NEURO: No dizziness, weakness of extremities.  No change some LE numbness/parethesias.   PSYCH: No memory loss.  Mood good; not thatanxious, depressed but/and not suicidal.  Tolerated  "stress.     Objective   /72 (BP Location: Right arm, Patient Position: Sitting, Cuff Size: Large Adult)  Pulse 68  Temp 97.9 °F (36.6 °C) (Oral)   Resp 18  Ht 71\" (180.3 cm)  Wt 218 lb (98.9 kg)  SpO2 90%  BMI 30.4 kg/m2    Physical Exam  GENERAL:  Well nourished/developed in no acute distress. Obese   SKIN: Turgor excellent, without wound, rash, lesion.  HEENT: Normal cephalic without trauma.  Pupils equal round reactive to light. Extraocular motions full without nystagmus.   External canals nonobstructive nontender without reddness. Tymphatic membranes tone with nigel structures intact.   Oral cavity without growths, exudates, and moist.  Posterior pharnyx without mass, obstruction, reddness.  No thyroidmegaly, mass, tenderness, lymphadenopathy and supple.  CV: Regular rhythm.  No murmur, gallop,  edema. Posterior pulses intact.  No carotid bruits.  CHEST: No chest wall tenderness or mass.   LUNGS: Symmetric motion with clear to auscultation.   ABD: Soft, nontender without mass.   ORTHO: Symmetric extremities without swelling/point tenderness.  Full gross range of motion.  NEURO: CN 2-12 grossly intact.  Symmetric facies. 1/4 x bicep knee equal reflexes.  UE/LE   3/5 strength throughout.  Nonfocal use extremities. Speech clear.  Reduced light touch with monofilament, vibratory sensation with tuning fork; equal toes/distal feet.    PSYCH: Oriented x 3.  Pleasant calm, well kept.  Purposeful/directed conservation with intact short/long gross memory.     Assessment/Plan     1. Coronary artery disease involving native coronary artery of native heart without angina pectoris  Post cabg/asx  - Comprehensive metabolic panel  - Lipid panel  - CBC and Differential  - TSH  - PSA  - Hemoglobin A1c  - Vitamin D 25 Hydroxy    2. Hyperlipidemia, unspecified hyperlipidemia type  pravachol treated  - Comprehensive metabolic panel  - Lipid panel  - CBC and Differential  - TSH  - PSA  - Hemoglobin A1c  - Vitamin D 25 " Hydroxy    3. Essential hypertension  controlled  - Comprehensive metabolic panel  - Lipid panel  - CBC and Differential  - TSH  - PSA  - Hemoglobin A1c  - Vitamin D 25 Hydroxy    4. Gastroesophageal reflux disease, esophagitis presence not specified  asx  - Comprehensive metabolic panel  - Lipid panel  - CBC and Differential  - TSH  - PSA  - Hemoglobin A1c  - Vitamin D 25 Hydroxy    5. Controlled type 2 diabetes mellitus without complication, without long-term current use of insulin  - Comprehensive metabolic panel  - Lipid panel  - CBC and Differential  - TSH  - PSA  - Hemoglobin A1c  - Vitamin D 25 Hydroxy    6. Chronic kidney disease, stage 3 (moderate)  Stable/followed  - Comprehensive metabolic panel  - Lipid panel  - CBC and Differential  - TSH  - PSA  - Hemoglobin A1c  - Vitamin D 25 Hydroxy    7. Depression, unspecified depression type  Chronic/stable  - Comprehensive metabolic panel  - Lipid panel  - CBC and Differential  - TSH  - PSA  - Hemoglobin A1c  - Vitamin D 25 Hydroxy    8. Anticoagulated-CAD, DM2/ASA 81  - Comprehensive metabolic panel  - Lipid panel  - CBC and Differential  - TSH  - PSA  - Hemoglobin A1c  - Vitamin D 25 Hydroxy    9. Prostatism-young available  Doing ok hytrin/proscar  - Comprehensive metabolic panel  - Lipid panel  - CBC and Differential  - TSH  - PSA  - Hemoglobin A1c  - Vitamin D 25 Hydroxy    10. Erectile dysfunction, unspecified erectile dysfunction type  Maybe partly proscar affect  - Comprehensive metabolic panel  - Lipid panel  - CBC and Differential  - TSH  - PSA  - Hemoglobin A1c  - Vitamin D 25 Hydroxy      Rx: reviewed.  Any other changes above and:   LAB: reviewed/above.  Orders above and:   6m CBC, BMP, A1c  12m CBC, CMP, A1c, TSH, LIPID, PSAs, Vit D    Wrap-up/other instructions: discussed as applicable  Regular cardio exercise something everyone should consider and try to do; even if health limitations (ie find that exercise UE/LE/cardio that they can  tolerate).  Normal weight a goal for everyone.  Healthy diet helpful for weight management, illness prevention.  If over 50-screening exams include men PSA/rectal exam, women mammograms, and  everyone colonoscopy screening for colon cancer.     There are no Patient Instructions on file for this visit.    Follow up: Return for 1) Dr Romero 6m.  Future Appointments  Date Time Provider Department Center   3/15/2018 11:15 AM Abel Romero MD [facility] None

## 2017-09-09 LAB
25(OH)D3+25(OH)D2 SERPL-MCNC: 42.1 NG/ML (ref 30–100)
ALBUMIN SERPL-MCNC: 4.5 G/DL (ref 3.5–5)
ALBUMIN/GLOB SERPL: 1.7 G/DL (ref 1.1–2.5)
ALP SERPL-CCNC: 61 U/L (ref 24–120)
ALT SERPL-CCNC: 34 U/L (ref 0–54)
AST SERPL-CCNC: 21 U/L (ref 7–45)
BASOPHILS # BLD AUTO: 0.05 10*3/MM3 (ref 0–0.2)
BASOPHILS NFR BLD AUTO: 0.6 % (ref 0–2)
BILIRUB SERPL-MCNC: 0.5 MG/DL (ref 0.1–1)
BUN SERPL-MCNC: 24 MG/DL (ref 5–21)
BUN/CREAT SERPL: 23.1 (ref 7–25)
CALCIUM SERPL-MCNC: 9.6 MG/DL (ref 8.4–10.4)
CHLORIDE SERPL-SCNC: 101 MMOL/L (ref 98–110)
CHOLEST SERPL-MCNC: 180 MG/DL (ref 130–200)
CO2 SERPL-SCNC: 31 MMOL/L (ref 24–31)
CREAT SERPL-MCNC: 1.04 MG/DL (ref 0.5–1.4)
EOSINOPHIL # BLD AUTO: 0.6 10*3/MM3 (ref 0–0.7)
EOSINOPHIL NFR BLD AUTO: 7.7 % (ref 0–4)
ERYTHROCYTE [DISTWIDTH] IN BLOOD BY AUTOMATED COUNT: 14.3 % (ref 12–15)
GLOBULIN SER CALC-MCNC: 2.7 GM/DL
GLUCOSE SERPL-MCNC: 118 MG/DL (ref 70–100)
HBA1C MFR BLD: 6.9 %
HCT VFR BLD AUTO: 46 % (ref 40–52)
HDLC SERPL-MCNC: 33 MG/DL
HGB BLD-MCNC: 14.3 G/DL (ref 14–18)
IMM GRANULOCYTES # BLD: 0.02 10*3/MM3 (ref 0–0.03)
IMM GRANULOCYTES NFR BLD: 0.3 % (ref 0–5)
LDLC SERPL CALC-MCNC: 104 MG/DL (ref 0–99)
LYMPHOCYTES # BLD AUTO: 1.49 10*3/MM3 (ref 0.72–4.86)
LYMPHOCYTES NFR BLD AUTO: 19.1 % (ref 15–45)
MCH RBC QN AUTO: 28.5 PG (ref 28–32)
MCHC RBC AUTO-ENTMCNC: 31.1 G/DL (ref 33–36)
MCV RBC AUTO: 91.8 FL (ref 82–95)
MONOCYTES # BLD AUTO: 0.57 10*3/MM3 (ref 0.19–1.3)
MONOCYTES NFR BLD AUTO: 7.3 % (ref 4–12)
NEUTROPHILS # BLD AUTO: 5.08 10*3/MM3 (ref 1.87–8.4)
NEUTROPHILS NFR BLD AUTO: 65 % (ref 39–78)
PLATELET # BLD AUTO: 244 10*3/MM3 (ref 130–400)
POTASSIUM SERPL-SCNC: 4.7 MMOL/L (ref 3.5–5.3)
PROT SERPL-MCNC: 7.2 G/DL (ref 6.3–8.7)
PSA SERPL-MCNC: 1.24 NG/ML (ref 0–4)
RBC # BLD AUTO: 5.01 10*6/MM3 (ref 4.8–5.9)
SODIUM SERPL-SCNC: 142 MMOL/L (ref 135–145)
TRIGL SERPL-MCNC: 216 MG/DL (ref 0–149)
TSH SERPL DL<=0.005 MIU/L-ACNC: 1.56 MIU/ML (ref 0.47–4.68)
VLDLC SERPL CALC-MCNC: 43.2 MG/DL
WBC # BLD AUTO: 7.81 10*3/MM3 (ref 4.8–10.8)

## 2017-09-21 RX ORDER — PANTOPRAZOLE SODIUM 40 MG/1
40 TABLET, DELAYED RELEASE ORAL DAILY
Qty: 90 TABLET | Refills: 1 | Status: SHIPPED | OUTPATIENT
Start: 2017-09-21 | End: 2017-12-11 | Stop reason: SDUPTHER

## 2017-12-11 RX ORDER — FINASTERIDE 5 MG/1
5 TABLET, FILM COATED ORAL DAILY
Qty: 90 TABLET | Refills: 1 | Status: SHIPPED | OUTPATIENT
Start: 2017-12-11 | End: 2018-06-18 | Stop reason: SDUPTHER

## 2017-12-11 RX ORDER — DEXTRAN 70/HYPROMELLOSE
1-2 DROPS OPHTHALMIC (EYE) DAILY PRN
Qty: 45 ML | Refills: 1 | Status: SHIPPED | OUTPATIENT
Start: 2017-12-11 | End: 2018-06-18 | Stop reason: SDUPTHER

## 2017-12-11 RX ORDER — METFORMIN HYDROCHLORIDE 500 MG/1
1000 TABLET, EXTENDED RELEASE ORAL NIGHTLY
Qty: 180 TABLET | Refills: 1 | Status: SHIPPED | OUTPATIENT
Start: 2017-12-11 | End: 2018-06-18 | Stop reason: SDUPTHER

## 2017-12-11 RX ORDER — BLOOD-GLUCOSE METER
1 KIT MISCELLANEOUS DAILY
Qty: 100 EACH | Refills: 1 | Status: SHIPPED | OUTPATIENT
Start: 2017-12-11 | End: 2018-06-18 | Stop reason: SDUPTHER

## 2017-12-11 RX ORDER — TERAZOSIN 10 MG/1
10 CAPSULE ORAL DAILY
Qty: 90 CAPSULE | Refills: 1 | Status: SHIPPED | OUTPATIENT
Start: 2017-12-11 | End: 2018-06-18 | Stop reason: SDUPTHER

## 2017-12-11 RX ORDER — ASPIRIN 325 MG
325 TABLET, DELAYED RELEASE (ENTERIC COATED) ORAL EVERY OTHER DAY
Qty: 45 TABLET | Refills: 1 | Status: SHIPPED | OUTPATIENT
Start: 2017-12-11 | End: 2018-06-18 | Stop reason: SDUPTHER

## 2017-12-11 RX ORDER — PRAVASTATIN SODIUM 80 MG/1
80 TABLET ORAL DAILY
Qty: 90 TABLET | Refills: 1 | Status: SHIPPED | OUTPATIENT
Start: 2017-12-11 | End: 2018-06-18 | Stop reason: SDUPTHER

## 2017-12-11 RX ORDER — GABAPENTIN 100 MG/1
100 CAPSULE ORAL 3 TIMES DAILY
Qty: 270 CAPSULE | Refills: 1 | Status: SHIPPED | OUTPATIENT
Start: 2017-12-11 | End: 2017-12-21 | Stop reason: SDUPTHER

## 2017-12-11 RX ORDER — GLYBURIDE 5 MG/1
7.5 TABLET ORAL 2 TIMES DAILY WITH MEALS
Qty: 270 TABLET | Refills: 1 | Status: SHIPPED | OUTPATIENT
Start: 2017-12-11 | End: 2018-06-18 | Stop reason: SDUPTHER

## 2017-12-11 RX ORDER — PANTOPRAZOLE SODIUM 40 MG/1
40 TABLET, DELAYED RELEASE ORAL DAILY
Qty: 90 TABLET | Refills: 1 | Status: SHIPPED | OUTPATIENT
Start: 2017-12-11 | End: 2018-06-18 | Stop reason: SDUPTHER

## 2017-12-11 RX ORDER — ACETAMINOPHEN 325 MG/1
650 TABLET ORAL 2 TIMES DAILY
Qty: 360 TABLET | Refills: 1 | Status: SHIPPED | OUTPATIENT
Start: 2017-12-11 | End: 2018-06-18 | Stop reason: SDUPTHER

## 2017-12-11 RX ORDER — LORATADINE 10 MG/1
10 TABLET ORAL DAILY
Qty: 90 TABLET | Refills: 1 | Status: SHIPPED | OUTPATIENT
Start: 2017-12-11 | End: 2018-06-18 | Stop reason: SDUPTHER

## 2017-12-21 RX ORDER — GABAPENTIN 100 MG/1
200 CAPSULE ORAL 2 TIMES DAILY
Qty: 360 CAPSULE | Refills: 1 | Status: SHIPPED | OUTPATIENT
Start: 2017-12-21 | End: 2018-03-15 | Stop reason: SDUPTHER

## 2018-01-29 ENCOUNTER — FLU SHOT (OUTPATIENT)
Dept: FAMILY MEDICINE CLINIC | Facility: CLINIC | Age: 83
End: 2018-01-29

## 2018-01-29 DIAGNOSIS — Z23 NEED FOR INFLUENZA VACCINE: ICD-10-CM

## 2018-01-29 PROCEDURE — 90686 IIV4 VACC NO PRSV 0.5 ML IM: CPT | Performed by: FAMILY MEDICINE

## 2018-01-29 PROCEDURE — G0008 ADMIN INFLUENZA VIRUS VAC: HCPCS | Performed by: FAMILY MEDICINE

## 2018-03-15 RX ORDER — GABAPENTIN 100 MG/1
200 CAPSULE ORAL 2 TIMES DAILY
Qty: 360 CAPSULE | Refills: 1 | Status: SHIPPED | OUTPATIENT
Start: 2018-03-15 | End: 2018-06-18 | Stop reason: SDUPTHER

## 2018-03-23 ENCOUNTER — PATIENT MESSAGE (OUTPATIENT)
Dept: FAMILY MEDICINE CLINIC | Facility: CLINIC | Age: 83
End: 2018-03-23

## 2018-06-18 DIAGNOSIS — E78.5 HYPERLIPIDEMIA, UNSPECIFIED HYPERLIPIDEMIA TYPE: Primary | Chronic | ICD-10-CM

## 2018-06-18 DIAGNOSIS — Z79.01 ANTICOAGULATED: ICD-10-CM

## 2018-06-18 DIAGNOSIS — E11.9 CONTROLLED TYPE 2 DIABETES MELLITUS WITHOUT COMPLICATION, WITHOUT LONG-TERM CURRENT USE OF INSULIN (HCC): Chronic | ICD-10-CM

## 2018-06-18 DIAGNOSIS — G62.9 NEUROPATHY: ICD-10-CM

## 2018-06-18 DIAGNOSIS — N40.0 PROSTATISM: Chronic | ICD-10-CM

## 2018-06-18 DIAGNOSIS — K21.9 GASTROESOPHAGEAL REFLUX DISEASE, ESOPHAGITIS PRESENCE NOT SPECIFIED: ICD-10-CM

## 2018-06-18 RX ORDER — METFORMIN HYDROCHLORIDE 500 MG/1
1000 TABLET, EXTENDED RELEASE ORAL NIGHTLY
Qty: 180 TABLET | Refills: 2 | Status: SHIPPED | OUTPATIENT
Start: 2018-06-18 | End: 2018-11-20 | Stop reason: SDUPTHER

## 2018-06-18 RX ORDER — DEXTRAN 70/HYPROMELLOSE
1-2 DROPS OPHTHALMIC (EYE) DAILY PRN
Qty: 45 ML | Refills: 2 | Status: SHIPPED | OUTPATIENT
Start: 2018-06-18 | End: 2018-11-20 | Stop reason: SDUPTHER

## 2018-06-18 RX ORDER — PANTOPRAZOLE SODIUM 40 MG/1
40 TABLET, DELAYED RELEASE ORAL DAILY
Qty: 90 TABLET | Refills: 2 | Status: SHIPPED | OUTPATIENT
Start: 2018-06-18 | End: 2018-11-20 | Stop reason: SDUPTHER

## 2018-06-18 RX ORDER — LORATADINE 10 MG/1
10 TABLET ORAL DAILY
Qty: 90 TABLET | Refills: 2 | Status: SHIPPED | OUTPATIENT
Start: 2018-06-18 | End: 2018-11-20 | Stop reason: SDUPTHER

## 2018-06-18 RX ORDER — BLOOD-GLUCOSE METER
1 KIT MISCELLANEOUS DAILY
Qty: 100 EACH | Refills: 2 | Status: SHIPPED | OUTPATIENT
Start: 2018-06-18 | End: 2018-11-20 | Stop reason: SDUPTHER

## 2018-06-18 RX ORDER — GLYBURIDE 5 MG/1
7.5 TABLET ORAL 2 TIMES DAILY WITH MEALS
Qty: 270 TABLET | Refills: 2 | Status: SHIPPED | OUTPATIENT
Start: 2018-06-18 | End: 2018-11-20 | Stop reason: SDUPTHER

## 2018-06-18 RX ORDER — ACETAMINOPHEN 325 MG/1
650 TABLET ORAL 2 TIMES DAILY
Qty: 360 TABLET | Refills: 2 | Status: SHIPPED | OUTPATIENT
Start: 2018-06-18 | End: 2018-11-20 | Stop reason: SDUPTHER

## 2018-06-18 RX ORDER — GABAPENTIN 100 MG/1
200 CAPSULE ORAL 2 TIMES DAILY
Qty: 360 CAPSULE | Refills: 2 | Status: SHIPPED | OUTPATIENT
Start: 2018-06-18 | End: 2018-06-18 | Stop reason: SDUPTHER

## 2018-06-18 RX ORDER — GABAPENTIN 100 MG/1
200 CAPSULE ORAL 2 TIMES DAILY
Qty: 360 CAPSULE | Refills: 2 | Status: SHIPPED | OUTPATIENT
Start: 2018-06-18 | End: 2018-10-04 | Stop reason: SDUPTHER

## 2018-06-18 RX ORDER — FINASTERIDE 5 MG/1
5 TABLET, FILM COATED ORAL DAILY
Qty: 90 TABLET | Refills: 2 | Status: SHIPPED | OUTPATIENT
Start: 2018-06-18 | End: 2018-06-20 | Stop reason: SDUPTHER

## 2018-06-18 RX ORDER — ASPIRIN 325 MG
325 TABLET, DELAYED RELEASE (ENTERIC COATED) ORAL EVERY OTHER DAY
Qty: 45 TABLET | Refills: 2 | Status: SHIPPED | OUTPATIENT
Start: 2018-06-18 | End: 2018-11-20 | Stop reason: SDUPTHER

## 2018-06-18 RX ORDER — PRAVASTATIN SODIUM 80 MG/1
80 TABLET ORAL DAILY
Qty: 90 TABLET | Refills: 2 | Status: SHIPPED | OUTPATIENT
Start: 2018-06-18 | End: 2018-11-20 | Stop reason: SDUPTHER

## 2018-06-18 RX ORDER — TERAZOSIN 10 MG/1
10 CAPSULE ORAL DAILY
Qty: 90 CAPSULE | Refills: 2 | Status: SHIPPED | OUTPATIENT
Start: 2018-06-18 | End: 2018-11-20 | Stop reason: SDUPTHER

## 2018-06-18 NOTE — TELEPHONE ENCOUNTER
Pt came in to have refills to [facility]    neurontin 100mg, two bid, protonix 40 mg, Ca vit D one bid, natural tears, metformin xr 500mg two QHS, ASA 325mg, test strips, pravachol 80mg, hytrin 10mg, tylenol 325mg two bid, proscar 5mg, claritin 10mg daily, glyburide 5mg 1.5 tablet bid, pt also requested prozac, but was to be weaned off? Called and left message for pt to return call to question this medication?

## 2018-06-18 NOTE — TELEPHONE ENCOUNTER
Called [facility] as Neurontin printed out again and pharmacy doesn't accept as E-rx and pt will have to bring hard copy, called pt and advised and placed at  with spouses Rx

## 2018-06-20 DIAGNOSIS — N40.0 PROSTATISM: Chronic | ICD-10-CM

## 2018-06-20 RX ORDER — FINASTERIDE 5 MG/1
5 TABLET, FILM COATED ORAL DAILY
Qty: 90 TABLET | Refills: 2 | Status: SHIPPED | OUTPATIENT
Start: 2018-06-20 | End: 2018-08-30 | Stop reason: SDUPTHER

## 2018-06-20 NOTE — TELEPHONE ENCOUNTER
[facility] did not have Proscar and needed a refill printed to take to local pharm done and printed

## 2018-07-16 ENCOUNTER — OFFICE VISIT (OUTPATIENT)
Dept: FAMILY MEDICINE CLINIC | Facility: CLINIC | Age: 83
End: 2018-07-16

## 2018-07-16 ENCOUNTER — HOSPITAL ENCOUNTER (OUTPATIENT)
Dept: HOSPITAL 58 - CAR | Age: 83
Discharge: HOME | End: 2018-07-16
Attending: FAMILY MEDICINE

## 2018-07-16 VITALS
HEIGHT: 71 IN | RESPIRATION RATE: 18 BRPM | SYSTOLIC BLOOD PRESSURE: 108 MMHG | WEIGHT: 208 LBS | DIASTOLIC BLOOD PRESSURE: 70 MMHG | HEART RATE: 71 BPM | OXYGEN SATURATION: 87 % | TEMPERATURE: 97.8 F | BODY MASS INDEX: 29.12 KG/M2

## 2018-07-16 DIAGNOSIS — N18.30 STAGE 3 CHRONIC KIDNEY DISEASE (HCC): Chronic | ICD-10-CM

## 2018-07-16 DIAGNOSIS — R06.02 SHORTNESS OF BREATH: ICD-10-CM

## 2018-07-16 DIAGNOSIS — IMO0001 UNCONTROLLED TYPE 2 DIABETES MELLITUS WITHOUT COMPLICATION, WITHOUT LONG-TERM CURRENT USE OF INSULIN: ICD-10-CM

## 2018-07-16 DIAGNOSIS — R06.02: Primary | ICD-10-CM

## 2018-07-16 DIAGNOSIS — E11.9 CONTROLLED TYPE 2 DIABETES MELLITUS WITHOUT COMPLICATION, WITHOUT LONG-TERM CURRENT USE OF INSULIN (HCC): Primary | Chronic | ICD-10-CM

## 2018-07-16 DIAGNOSIS — I10 HYPERTENSION, UNSPECIFIED TYPE: Chronic | ICD-10-CM

## 2018-07-16 DIAGNOSIS — R09.02 HYPOXEMIA: ICD-10-CM

## 2018-07-16 DIAGNOSIS — I25.10 CORONARY ARTERY DISEASE INVOLVING NATIVE CORONARY ARTERY OF NATIVE HEART WITHOUT ANGINA PECTORIS: Chronic | ICD-10-CM

## 2018-07-16 DIAGNOSIS — E78.5 HYPERLIPIDEMIA, UNSPECIFIED HYPERLIPIDEMIA TYPE: Chronic | ICD-10-CM

## 2018-07-16 DIAGNOSIS — Z79.01 ANTICOAGULATED: ICD-10-CM

## 2018-07-16 PROCEDURE — 94761 N-INVAS EAR/PLS OXIMETRY MLT: CPT

## 2018-07-16 PROCEDURE — 99214 OFFICE O/P EST MOD 30 MIN: CPT | Performed by: FAMILY MEDICINE

## 2018-07-16 RX ORDER — ASCORBIC ACID 500 MG
500 TABLET ORAL DAILY
COMMUNITY
End: 2018-11-20 | Stop reason: SDUPTHER

## 2018-07-16 NOTE — PROGRESS NOTES
Subjective   Gonzalo Durham is a 84 y.o. male presenting with chief complaint of:   Chief Complaint   Patient presents with   • Coronary Artery Disease     Patient is here today for a 6 month follow-up.   • Diabetes   • Hyperlipidemia       History of Present Illness :  With wife/daughter.   Has multiple chronic problems to consider that might have a bearing on today's issues;  an interval appointment.       Chronic/acute problems to review today:   1. Controlled type 2 diabetes mellitus without complication, without long-term current use of insulin (CMS/[facility]): chronic/stable for years with labs and home monitoring.  No hypoglycemia.    2. Stage 3 chronic kidney disease: chronic/stable with serial labs.  Knows nephrotoxic situations.   3. Coronary artery disease involving native coronary artery of native heart without angina pectoris: chronic/variable with recent sob with some exertion.  Denies chest pain.     4. Hyperlipidemia, unspecified hyperlipidemia type: chronic/statin treated and labs improved/stable.    5. Hypertension, unspecified type: chronic/stable on serial visits here and home numbers   7. Anticoagulated-CAD, DM2/ASA 81: chronic/for reasons noted and no GI upset, melena    8. Hypoxemia: acute; no past findings of 87% sat today.  Says when was in [place] on vacation/higher elevation was sob.  On/off SOB, but planned to go home and work on a  line.    9. Shortness of breath: chronic/stable as he did not feel that bad today.  No leg edema.      Has an/another acute issue today: none.    The following portions of the patient's history were reviewed and updated as appropriate: allergies, current medications, past family history, past medical history, past social history, past surgical history and problem list.  Records acquired and reviewed; [facility] migrated.      Current Outpatient Prescriptions:   •  acetaminophen (TYLENOL) 325 MG tablet, Take 2 tablets by mouth 2 (Two) Times a Day., Disp: 360 tablet, Rfl: 2  •   Artificial Tear Solution (JUST TEARS EYE DROPS) solution, Apply 1-2 drops to eye Daily As Needed (dry eyes)., Disp: 45 mL, Rfl: 2  •  aspirin  MG tablet, Take 1 tablet by mouth Every Other Day., Disp: 45 tablet, Rfl: 2  •  calcium carbonate-vitamin d 600-400 MG-UNIT per tablet, Take 1 tablet by mouth 2 (Two) Times a Day., Disp: 180 tablet, Rfl: 2  •  CINNAMON PO, Take  by mouth Daily., Disp: , Rfl:   •  finasteride (PROSCAR) 5 MG tablet, Take 1 tablet by mouth Daily., Disp: 90 tablet, Rfl: 2  •  FREESTYLE LITE test strip, 1 each by Other route Daily., Disp: 100 each, Rfl: 2  •  gabapentin (NEURONTIN) 100 MG capsule, Take 2 capsules by mouth 2 (Two) Times a Day., Disp: 360 capsule, Rfl: 2  •  glyBURIDE (DIAbeta) 5 MG tablet, Take 1.5 tablets by mouth 2 (Two) Times a Day With Meals., Disp: 270 tablet, Rfl: 2  •  Lancets (FREESTYLE) lancets, Use once daily, Disp: 100 each, Rfl: 1  •  loratadine (CLARITIN) 10 MG tablet, Take 1 tablet by mouth Daily., Disp: 90 tablet, Rfl: 2  •  metFORMIN ER (GLUCOPHAGE XR) 500 MG 24 hr tablet, Take 2 tablets by mouth Every Night., Disp: 180 tablet, Rfl: 2  •  Multiple Vitamins-Minerals (EYE VITAMINS & MINERALS PO), Take  by mouth Daily., Disp: , Rfl:   •  Multiple Vitamins-Minerals (MULTIVITAL PO), Take  by mouth Daily., Disp: , Rfl:   •  pantoprazole (PROTONIX) 40 MG EC tablet, Take 1 tablet by mouth Daily., Disp: 90 tablet, Rfl: 2  •  pravastatin (PRAVACHOL) 80 MG tablet, Take 1 tablet by mouth Daily., Disp: 90 tablet, Rfl: 2  •  terazosin (HYTRIN) 10 MG capsule, Take 1 capsule by mouth Daily., Disp: 90 capsule, Rfl: 2  •  vitamin C (ASCORBIC ACID) 500 MG tablet, Take 500 mg by mouth Daily., Disp: , Rfl:   •  vitamin E 100 UNIT capsule, Take 100 Units by mouth Daily., Disp: , Rfl:     No problems with medications.  Refills if needed done    Allergies   Allergen Reactions   • Prozac [Fluoxetine Hcl] Mental Status Change     Bad dreams.       Review of Systems  GENERAL:   Active/slower with limits, speed, samni for age; recent travels. Sleep is ok.   ENDO:  No syncope, near or diaphoretic sweaty spells.  BS Ok: without download noted.  HEENT: No head injury, headache,  No vision change,  Same mild hearing loss.  Ears without pain/drainage.  No sore throat.  No significant nasal/sinus congestion/drainage. No epistaxis.  CHEST: No chest wall tenderness or mass. No significant cough,  without wheeze.  Mild as above SOB; no hemoptysis.  CV: No chest pain, palpatations, ankle edema.  GI: No heartburn, dysphagia.  No abdominal pain, diarrhea, constipation, rectal bleeding, or melena.    :  Voids without dysuria, or incontience to completion.  ORTHO: No painful/swollen joints but various on /off sore.  No change occ sore neck or back.  No acute neck or back pain without recent injury.   NEURO: No dizziness, weakness of extremities.  No change some LE numbness/parethesias.   PSYCH: No memory loss.  Mood good; not that anxious, depressed but/and not suicidal.  Tolerated stress.   Screening:  Mammogram: NA  Bone density: NA  Low dose CT chest: Na  GI: Colon-div/[facility]/6.15.17/prn       Results for orders placed or performed in visit on 09/08/17   Comprehensive metabolic panel   Result Value Ref Range    Glucose 118 (H) 70 - 100 mg/dL    BUN 24 (H) 5 - 21 mg/dL    Creatinine 1.04 0.50 - 1.40 mg/dL    eGFR Non African Am 68 >60 mL/min/1.73    eGFR African Am 83 >60 mL/min/1.73    BUN/Creatinine Ratio 23.1 7.0 - 25.0    Sodium 142 135 - 145 mmol/L    Potassium 4.7 3.5 - 5.3 mmol/L    Chloride 101 98 - 110 mmol/L    Total CO2 31.0 24.0 - 31.0 mmol/L    Calcium 9.6 8.4 - 10.4 mg/dL    Total Protein 7.2 6.3 - 8.7 g/dL    Albumin 4.50 3.50 - 5.00 g/dL    Globulin 2.7 gm/dL    A/G Ratio 1.7 1.1 - 2.5 g/dL    Total Bilirubin 0.5 0.1 - 1.0 mg/dL    Alkaline Phosphatase 61 24 - 120 U/L    AST (SGOT) 21 7 - 45 U/L    ALT (SGPT) 34 0 - 54 U/L   Lipid panel   Result Value Ref Range    Total Cholesterol 180  130 - 200 mg/dL    Triglycerides 216 (H) 0 - 149 mg/dL    HDL Cholesterol 33 (L) >=40 mg/dL    VLDL Cholesterol 43.2 mg/dL    LDL Cholesterol  104 (H) 0 - 99 mg/dL   TSH   Result Value Ref Range    TSH 1.560 0.470 - 4.680 mIU/mL   PSA   Result Value Ref Range    PSA 1.240 0.000 - 4.000 ng/mL   Hemoglobin A1c   Result Value Ref Range    Hemoglobin A1C 6.90 %   Vitamin D 25 Hydroxy   Result Value Ref Range    25 Hydroxy, Vitamin D 42.1 30.0 - 100.0 ng/mL   CBC and Differential   Result Value Ref Range    WBC 7.81 4.80 - 10.80 10*3/mm3    RBC 5.01 4.80 - 5.90 10*6/mm3    Hemoglobin 14.3 14.0 - 18.0 g/dL    Hematocrit 46.0 40.0 - 52.0 %    MCV 91.8 82.0 - 95.0 fL    MCH 28.5 28.0 - 32.0 pg    MCHC 31.1 (L) 33.0 - 36.0 g/dL    RDW 14.3 12.0 - 15.0 %    Platelets 244 130 - 400 10*3/mm3    Neutrophil Rel % 65.0 39.0 - 78.0 %    Lymphocyte Rel % 19.1 15.0 - 45.0 %    Monocyte Rel % 7.3 4.0 - 12.0 %    Eosinophil Rel % 7.7 (H) 0.0 - 4.0 %    Basophil Rel % 0.6 0.0 - 2.0 %    Neutrophils Absolute 5.08 1.87 - 8.40 10*3/mm3    Lymphocytes Absolute 1.49 0.72 - 4.86 10*3/mm3    Monocytes Absolute 0.57 0.19 - 1.30 10*3/mm3    Eosinophils Absolute 0.60 0.00 - 0.70 10*3/mm3    Basophils Absolute 0.05 0.00 - 0.20 10*3/mm3    Immature Granulocyte Rel % 0.3 0.0 - 5.0 %    Immature Grans Absolute 0.02 0.00 - 0.03 10*3/mm3       Lab Results   Component Value Date    PSA 1.240 09/08/2017        Lab Results:  CBC:    Lab Results - Last 18 Months  Lab Units 09/08/17  1050 03/06/17  1105   WBC 10*3/mm3 7.81 7.46   HEMOGLOBIN g/dL 14.3 13.8*   HEMATOCRIT % 46.0 44.1   PLATELETS 10*3/mm3 244 235      BMP/CMP:    Lab Results - Last 18 Months  Lab Units 09/08/17  1050 03/06/17  1105   SODIUM mmol/L 142 144   POTASSIUM mmol/L 4.7 4.7   CHLORIDE mmol/L 101 102   TOTAL CO2, ARTERIAL mmol/L 31.0 30.0   GLUCOSE mg/dL 118* 120*   BUN mg/dL 24* 26*   CREATININE mg/dL 1.04 1.03   EGFR IF NONAFRICN AM mL/min/1.73 68 69   EGFR IF AFRICN AM mL/min/1.73 83  "84   CALCIUM mg/dL 9.6 9.5     HEPATIC:    Lab Results - Last 18 Months  Lab Units 09/08/17  1050 03/06/17  1105   ALT (SGPT) U/L 34 24   AST (SGOT) U/L 21 20   ALK PHOS U/L 61 65     THYROID:    Lab Results - Last 18 Months  Lab Units 09/08/17  1050   TSH mIU/mL 1.560     A1C:    Lab Results - Last 18 Months  Lab Units 09/08/17  1050 03/06/17  1105   HEMOGLOBIN A1C % 6.90 6.70     PSA:    Lab Results - Last 18 Months  Lab Units 09/08/17  1050   PSA ng/mL 1.240       Objective   /70 (BP Location: Left arm, Patient Position: Sitting)   Pulse 71   Temp 97.8 °F (36.6 °C) (Oral)   Resp 18   Ht 180.3 cm (71\")   Wt 94.3 kg (208 lb)   SpO2 (!) 87%   BMI 29.01 kg/m²   Body mass index is 29.01 kg/m².    Physical Exam  GENERAL:  Well nourished/developed in no acute distress. Obese   SKIN: Turgor excellent, without wound, rash, lesion.  HEENT: Normal cephalic without trauma.  Pupils equal round reactive to light. Extraocular motions full without nystagmus.   External canals nonobstructive nontender without reddness. Tymphatic membranes tone with nigel structures intact.   Oral cavity without growths, exudates, and moist.  Posterior pharnyx without mass, obstruction, reddness.  No thyroidmegaly, mass, tenderness, lymphadenopathy and supple.  CV: Regular rhythm.  No murmur, gallop,  edema. Posterior pulses intact.  No carotid bruits.  CHEST: No chest wall tenderness or mass.   LUNGS: Symmetric motion with clear to auscultation.   ABD: Soft, nontender without mass.   ORTHO: Symmetric extremities without swelling/point tenderness.  Full gross range of motion.  NEURO: CN 2-12 grossly intact.  Symmetric facies. 1/4 x bicep knee equal reflexes.  UE/LE   3/5 strength throughout.  Nonfocal use extremities. Speech clear.  Reduced light touch with monofilament, vibratory sensation with tuning fork; equal toes/distal feet.    PSYCH: Oriented x 3.  Pleasant calm, well kept.  Purposeful/directed conservation with intact " short/long gross memory.     Assessment/Plan     1. Controlled type 2 diabetes mellitus without complication, without long-term current use of insulin (CMS/[facility])    2. Stage 3 chronic kidney disease    3. Coronary artery disease involving native coronary artery of native heart without angina pectoris    4. Hyperlipidemia, unspecified hyperlipidemia type    5. Hypertension, unspecified type    6. Uncontrolled type 2 diabetes mellitus without complication, without long-term current use of insulin (CMS/HCC)    7. Anticoagulated-CAD, DM2/ASA 81    8. Hypoxemia    9. Shortness of breath    he looks quite well; especially for someone with sat ? 87% room air.     Rx: reviewed/changes:  same    LAB/Testing/Referrals: reviewed/orders:   Today:   Orders Placed This Encounter   Procedures   • XR Chest PA & Lateral   • Comprehensive Metabolic Panel   • proBNP   • Adult Transthoracic Echo Complete W/ Cont if Necessary Per Protocol   • Walking Oximetry   • CBC & Differential     Usual:   6m CBC, BMP, A1c  12m CBC, CMP, A1c, TSH, LIPID, PSAs, Vit D    Discussions:   ED if worsens;   Body mass index is 29.01 kg/m².   Patient's Body mass index is 29.01 kg/m². BMI is within normal parameters. No follow-up required.  Non-smoker      There are no Patient Instructions on file for this visit.    Follow up: Return for dr romero Thursday.  Future Appointments  Date Time Provider Department Center   7/19/2018 2:30 PM Abel Romero MD [facility] None

## 2018-07-17 DIAGNOSIS — I25.10 CORONARY ARTERY DISEASE INVOLVING NATIVE CORONARY ARTERY OF NATIVE HEART WITHOUT ANGINA PECTORIS: Primary | Chronic | ICD-10-CM

## 2018-07-17 DIAGNOSIS — R06.02 SHORTNESS OF BREATH: ICD-10-CM

## 2018-07-17 PROBLEM — R09.02 HYPOXIA: Status: ACTIVE | Noted: 2018-07-17

## 2018-07-17 LAB
ALBUMIN SERPL-MCNC: 4.4 G/DL (ref 3.5–4.7)
ALBUMIN/GLOB SERPL: 1.9 {RATIO} (ref 1.2–2.2)
ALP SERPL-CCNC: 55 IU/L (ref 39–117)
ALT SERPL-CCNC: 13 IU/L (ref 0–44)
AST SERPL-CCNC: 15 IU/L (ref 0–40)
BASOPHILS # BLD AUTO: 0 X10E3/UL (ref 0–0.2)
BASOPHILS NFR BLD AUTO: 1 %
BILIRUB SERPL-MCNC: 0.4 MG/DL (ref 0–1.2)
BUN SERPL-MCNC: 23 MG/DL (ref 8–27)
BUN/CREAT SERPL: 21 (ref 10–24)
CALCIUM SERPL-MCNC: 9.6 MG/DL (ref 8.6–10.2)
CHLORIDE SERPL-SCNC: 103 MMOL/L (ref 96–106)
CO2 SERPL-SCNC: 28 MMOL/L (ref 20–29)
CREAT SERPL-MCNC: 1.09 MG/DL (ref 0.76–1.27)
EOSINOPHIL # BLD AUTO: 0.7 X10E3/UL (ref 0–0.4)
EOSINOPHIL NFR BLD AUTO: 8 %
ERYTHROCYTE [DISTWIDTH] IN BLOOD BY AUTOMATED COUNT: 15.6 % (ref 12.3–15.4)
GLOBULIN SER CALC-MCNC: 2.3 G/DL (ref 1.5–4.5)
GLUCOSE SERPL-MCNC: 108 MG/DL (ref 65–99)
HCT VFR BLD AUTO: 45.6 % (ref 37.5–51)
HGB BLD-MCNC: 15.2 G/DL (ref 13–17.7)
IMM GRANULOCYTES # BLD: 0 X10E3/UL (ref 0–0.1)
IMM GRANULOCYTES NFR BLD: 0 %
LYMPHOCYTES # BLD AUTO: 2.1 X10E3/UL (ref 0.7–3.1)
LYMPHOCYTES NFR BLD AUTO: 25 %
MCH RBC QN AUTO: 28 PG (ref 26.6–33)
MCHC RBC AUTO-ENTMCNC: 33.3 G/DL (ref 31.5–35.7)
MCV RBC AUTO: 84 FL (ref 79–97)
MONOCYTES # BLD AUTO: 0.6 X10E3/UL (ref 0.1–0.9)
MONOCYTES NFR BLD AUTO: 7 %
NEUTROPHILS # BLD AUTO: 4.9 X10E3/UL (ref 1.4–7)
NEUTROPHILS NFR BLD AUTO: 59 %
NT-PROBNP SERPL-MCNC: 316 PG/ML (ref 0–486)
PLATELET # BLD AUTO: 171 X10E3/UL (ref 150–379)
POTASSIUM SERPL-SCNC: 5 MMOL/L (ref 3.5–5.2)
PROT SERPL-MCNC: 6.7 G/DL (ref 6–8.5)
RBC # BLD AUTO: 5.42 X10E6/UL (ref 4.14–5.8)
SODIUM SERPL-SCNC: 144 MMOL/L (ref 134–144)
WBC # BLD AUTO: 8.3 X10E3/UL (ref 3.4–10.8)

## 2018-07-19 ENCOUNTER — OFFICE VISIT (OUTPATIENT)
Dept: FAMILY MEDICINE CLINIC | Facility: CLINIC | Age: 83
End: 2018-07-19

## 2018-07-19 VITALS
BODY MASS INDEX: 29.4 KG/M2 | SYSTOLIC BLOOD PRESSURE: 138 MMHG | OXYGEN SATURATION: 87 % | DIASTOLIC BLOOD PRESSURE: 78 MMHG | HEIGHT: 71 IN | TEMPERATURE: 98.2 F | HEART RATE: 70 BPM | WEIGHT: 210 LBS | RESPIRATION RATE: 18 BRPM

## 2018-07-19 DIAGNOSIS — E11.9 CONTROLLED TYPE 2 DIABETES MELLITUS WITHOUT COMPLICATION, WITHOUT LONG-TERM CURRENT USE OF INSULIN (HCC): Chronic | ICD-10-CM

## 2018-07-19 DIAGNOSIS — I25.10 CORONARY ARTERY DISEASE INVOLVING NATIVE CORONARY ARTERY OF NATIVE HEART WITHOUT ANGINA PECTORIS: Chronic | ICD-10-CM

## 2018-07-19 DIAGNOSIS — I10 HYPERTENSION, UNSPECIFIED TYPE: Chronic | ICD-10-CM

## 2018-07-19 DIAGNOSIS — N18.30 STAGE 3 CHRONIC KIDNEY DISEASE (HCC): Primary | Chronic | ICD-10-CM

## 2018-07-19 PROCEDURE — 99214 OFFICE O/P EST MOD 30 MIN: CPT | Performed by: FAMILY MEDICINE

## 2018-07-19 NOTE — PROGRESS NOTES
RA 89%, exercise 85%, 91% on 2.5L    Add order for oxygen 2.5 L continuous  Add pulmonary referral  ADD CT angio

## 2018-07-19 NOTE — PROGRESS NOTES
Pt notified and would like O2 from [facility], Dr Turk pulmonary and CT angio at [facility] orders done

## 2018-07-19 NOTE — PROGRESS NOTES
Subjective   Gonzalo Durham is a 84 y.o. male presenting with chief complaint of:   Chief Complaint   Patient presents with   • Follow-up     oxygen study       History of Present Illness :  Alone.  Here for primarily an acute issue today; f/u last visit this week with asymptomatic hypoxemia; new.   Has multiple chronic problems to consider that might have a bearing on today's issues; not an interval appointment.       Review  Tobacco-smoker/age 22/1 ppd/dc 1980  [place]AB[place]/1981    Recent trip [place]; [place] SOB higher altitude  7.16.18 ro visit; asymptomatic  7.16.18 88%/RA sat  7.16.18 CXR neg  7.16.18/7.18.18   RA 89%, exercise 85%, 91% on 2.5L  Add order for oxygen 2.5 L continuous  Add pulmonary referral  ADD CT angio    Chronic/acute problems to review today:   1. Stage 3 chronic kidney disease: chronic/stable to consider with above.     2. Controlled type 2 diabetes mellitus without complication, without long-term current use of insulin (CMS/[facility]): chronic/stable to consider with above.  No pattern hyper/hypoglycemia.    3. Hypertension, unspecified type: chronic/stable through recent visits.    4. Coronary artery disease involving native coronary artery of native heart without angina pectoris: chronic/stable without chest pain.      Has an/another acute issue today: none.    The following portions of the patient's history were reviewed and updated as appropriate: allergies, current medications, past family history, past medical history, past social history, past surgical history and problem list.  Records acquired and reviewed; [facility] migrated.      Current Outpatient Prescriptions:   •  acetaminophen (TYLENOL) 325 MG tablet, Take 2 tablets by mouth 2 (Two) Times a Day., Disp: 360 tablet, Rfl: 2  •  Artificial Tear Solution (JUST TEARS EYE DROPS) solution, Apply 1-2 drops to eye Daily As Needed (dry eyes)., Disp: 45 mL, Rfl: 2  •  aspirin  MG tablet, Take 1 tablet by mouth Every Other Day., Disp: 45  tablet, Rfl: 2  •  calcium carbonate-vitamin d 600-400 MG-UNIT per tablet, Take 1 tablet by mouth 2 (Two) Times a Day., Disp: 180 tablet, Rfl: 2  •  CINNAMON PO, Take  by mouth Daily., Disp: , Rfl:   •  finasteride (PROSCAR) 5 MG tablet, Take 1 tablet by mouth Daily., Disp: 90 tablet, Rfl: 2  •  FREESTYLE LITE test strip, 1 each by Other route Daily., Disp: 100 each, Rfl: 2  •  gabapentin (NEURONTIN) 100 MG capsule, Take 2 capsules by mouth 2 (Two) Times a Day., Disp: 360 capsule, Rfl: 2  •  glyBURIDE (DIAbeta) 5 MG tablet, Take 1.5 tablets by mouth 2 (Two) Times a Day With Meals., Disp: 270 tablet, Rfl: 2  •  Lancets (FREESTYLE) lancets, Use once daily, Disp: 100 each, Rfl: 1  •  loratadine (CLARITIN) 10 MG tablet, Take 1 tablet by mouth Daily., Disp: 90 tablet, Rfl: 2  •  metFORMIN ER (GLUCOPHAGE XR) 500 MG 24 hr tablet, Take 2 tablets by mouth Every Night. (Patient taking differently: Take 500 mg by mouth Every Night.), Disp: 180 tablet, Rfl: 2  •  Multiple Vitamins-Minerals (EYE VITAMINS & MINERALS PO), Take  by mouth Daily., Disp: , Rfl:   •  Multiple Vitamins-Minerals (MULTIVITAL PO), Take  by mouth Daily., Disp: , Rfl:   •  pantoprazole (PROTONIX) 40 MG EC tablet, Take 1 tablet by mouth Daily., Disp: 90 tablet, Rfl: 2  •  pravastatin (PRAVACHOL) 80 MG tablet, Take 1 tablet by mouth Daily., Disp: 90 tablet, Rfl: 2  •  terazosin (HYTRIN) 10 MG capsule, Take 1 capsule by mouth Daily., Disp: 90 capsule, Rfl: 2  •  vitamin C (ASCORBIC ACID) 500 MG tablet, Take 500 mg by mouth Daily., Disp: , Rfl:   •  vitamin E 100 UNIT capsule, Take 100 Units by mouth Daily., Disp: , Rfl:        No problems with medications.  Refills if needed done    Allergies   Allergen Reactions   • Prozac [Fluoxetine Hcl] Mental Status Change     Bad dreams.       Review of Systems  GENERAL:  Active/slower with limits, speed, samni for age; recent travels. Sleep is ok.   ENDO:  No syncope, near or diaphoretic sweaty spells.  BS Ok:  without download noted.  HEENT: No head injury, headache,  No vision change,  Same mild hearing loss.  Ears without pain/drainage.  No sore throat.  No significant nasal/sinus congestion/drainage. No epistaxis.  CHEST: No chest wall tenderness or mass. No significant cough,  without wheeze.  Mild as above SOB; no hemoptysis.  CV: No chest pain, palpatations, ankle edema.  GI: No heartburn, dysphagia.  No abdominal pain, diarrhea, constipation, rectal bleeding, or melena.    :  Voids without dysuria, or incontience to completion.  ORTHO: No painful/swollen joints but various on /off sore.  No change occ sore neck or back.  No acute neck or back pain without recent injury.   NEURO: No dizziness, weakness of extremities.  No change some LE numbness/parethesias.   PSYCH: No memory loss.  Mood good; not that anxious, depressed but/and not suicidal.  Tolerated stress.   Screening:  Mammogram: NA  Bone density: NA  Low dose CT chest: Na  GI: Colon-div///6.15.17/prn       Results for orders placed or performed in visit on 07/16/18   Comprehensive Metabolic Panel   Result Value Ref Range    Glucose 108 (H) 65 - 99 mg/dL    BUN 23 8 - 27 mg/dL    Creatinine 1.09 0.76 - 1.27 mg/dL    eGFR Non African Am 62 >59 mL/min/1.73    eGFR African Am 72 >59 mL/min/1.73    BUN/Creatinine Ratio 21 10 - 24    Sodium 144 134 - 144 mmol/L    Potassium 5.0 3.5 - 5.2 mmol/L    Chloride 103 96 - 106 mmol/L    Total CO2 28 20 - 29 mmol/L    Calcium 9.6 8.6 - 10.2 mg/dL    Total Protein 6.7 6.0 - 8.5 g/dL    Albumin 4.4 3.5 - 4.7 g/dL    Globulin 2.3 1.5 - 4.5 g/dL    A/G Ratio 1.9 1.2 - 2.2    Total Bilirubin 0.4 0.0 - 1.2 mg/dL    Alkaline Phosphatase 55 39 - 117 IU/L    AST (SGOT) 15 0 - 40 IU/L    ALT (SGPT) 13 0 - 44 IU/L   proBNP   Result Value Ref Range    proBNP 316 0 - 486 pg/mL   CBC & Differential   Result Value Ref Range    WBC 8.3 3.4 - 10.8 x10E3/uL    RBC 5.42 4.14 - 5.80 x10E6/uL    Hemoglobin 15.2 13.0 - 17.7 g/dL     Hematocrit 45.6 37.5 - 51.0 %    MCV 84 79 - 97 fL    MCH 28.0 26.6 - 33.0 pg    MCHC 33.3 31.5 - 35.7 g/dL    RDW 15.6 (H) 12.3 - 15.4 %    Platelets 171 150 - 379 x10E3/uL    Neutrophil Rel % 59 Not Estab. %    Lymphocyte Rel % 25 Not Estab. %    Monocyte Rel % 7 Not Estab. %    Eosinophil Rel % 8 Not Estab. %    Basophil Rel % 1 Not Estab. %    Neutrophils Absolute 4.9 1.4 - 7.0 x10E3/uL    Lymphocytes Absolute 2.1 0.7 - 3.1 x10E3/uL    Monocytes Absolute 0.6 0.1 - 0.9 x10E3/uL    Eosinophils Absolute 0.7 (H) 0.0 - 0.4 x10E3/uL    Basophils Absolute 0.0 0.0 - 0.2 x10E3/uL    Immature Granulocyte Rel % 0 Not Estab. %    Immature Grans Absolute 0.0 0.0 - 0.1 x10E3/uL       Lab Results   Component Value Date    PSA 1.240 09/08/2017        Lab Results:  CBC:    Lab Results - Last 18 Months  Lab Units 07/15/18  2300 09/08/17  1050 03/06/17  1105   WBC 10*3/mm3  --  7.81 7.46   HEMOGLOBIN g/dL 15.2 14.3 13.8*   HEMATOCRIT % 45.6 46.0 44.1   PLATELETS x10E3/uL 171 244 235      BMP/CMP:    Lab Results - Last 18 Months  Lab Units 07/15/18  2300 09/08/17  1050 03/06/17  1105   SODIUM mmol/L 144 142 144   POTASSIUM mmol/L 5.0 4.7 4.7   CHLORIDE mmol/L 103 101 102   TOTAL CO2, ARTERIAL mmol/L 28 31.0 30.0   GLUCOSE mg/dL 108* 118* 120*   BUN mg/dL 23 24* 26*   CREATININE mg/dL 1.09 1.04 1.03   EGFR IF NONAFRICN AM mL/min/1.73 62 68 69   EGFR IF AFRICN AM mL/min/1.73 72 83 84   CALCIUM mg/dL 9.6 9.6 9.5     HEPATIC:    Lab Results - Last 18 Months  Lab Units 07/15/18  2300 09/08/17  1050 03/06/17  1105   ALT (SGPT) IU/L 13 34 24   AST (SGOT) IU/L 15 21 20   ALK PHOS IU/L 55 61 65     THYROID:    Lab Results - Last 18 Months  Lab Units 09/08/17  1050   TSH mIU/mL 1.560     A1C:    Lab Results - Last 18 Months  Lab Units 09/08/17  1050 03/06/17  1105   HEMOGLOBIN A1C % 6.90 6.70     PSA:    Lab Results - Last 18 Months  Lab Units 09/08/17  1050   PSA ng/mL 1.240       Objective   /78 (BP Location: Right arm, Patient  "Position: Sitting, Cuff Size: Large Adult)   Pulse 70   Temp 98.2 °F (36.8 °C) (Oral)   Resp 18   Ht 180.3 cm (71\")   Wt 95.3 kg (210 lb)   SpO2 (!) 87% Comment: 87% to 91%  BMI 29.29 kg/m²   Body mass index is 29.29 kg/m².    Physical Exam  GENERAL:  Well nourished/developed in no acute distress. Obese   SKIN: Turgor excellent, without wound, rash, lesion.  HEENT: Normal cephalic without trauma.  Pupils equal round reactive to light. Extraocular motions full without nystagmus.   External canals nonobstructive nontender without reddness. Tymphatic membranes tone with nigel structures intact.   Oral cavity without growths, exudates, and moist.  Posterior pharnyx without mass, obstruction, reddness.  No thyroidmegaly, mass, tenderness, lymphadenopathy and supple.  CV: Regular rhythm.  No murmur, gallop,  edema. Posterior pulses intact.  No carotid bruits.  CHEST: No chest wall tenderness or mass.   LUNGS: Symmetric motion with clear to auscultation.   ABD: Soft, nontender without mass.   ORTHO: Symmetric extremities without swelling/point tenderness.  Full gross range of motion.  NEURO: CN 2-12 grossly intact.  Symmetric facies. 1/4 x bicep knee equal reflexes.  UE/LE   3/5 strength throughout.  Nonfocal use extremities. Speech clear.  Reduced light touch with monofilament, vibratory sensation with tuning fork; equal toes/distal feet.    PSYCH: Oriented x 3.  Pleasant calm, well kept.  Purposeful/directed conservation with intact short/long gross memory.     Assessment/Plan     1. Stage 3 chronic kidney disease    2. Controlled type 2 diabetes mellitus without complication, without long-term current use of insulin (CMS/[facility])    3. Hypertension, unspecified type    4. Coronary artery disease involving native coronary artery of native heart without angina pectoris        Rx: reviewed/changes:  Adding oxygen 2.5 L continuous through eval    LAB/Testing/Referrals: reviewed/orders:   Today: above  No orders of the " defined types were placed in this encounter.      Discussions:   Continued eval until done  Body mass index is 29.29 kg/m².   Patient's Body mass index is 29.29 kg/m². BMI is within normal parameters. No follow-up required.  Non-smoker    There are no Patient Instructions on file for this visit.    Follow up: Return for Dr Romero-after testing.  Future Appointments  Date Time Provider Department Center   7/24/2018 11:00 AM [facility] ECHO ROOM 1  [facility]   7/24/2018 12:30 PM [facility] STRESS LAB 4  [facility]

## 2018-07-24 ENCOUNTER — HOSPITAL ENCOUNTER (OUTPATIENT)
Dept: CT IMAGING | Facility: HOSPITAL | Age: 83
Discharge: HOME | End: 2018-07-24
Attending: FAMILY MEDICINE

## 2018-07-24 ENCOUNTER — HOSPITAL ENCOUNTER (OUTPATIENT)
Dept: CARDIOLOGY | Facility: HOSPITAL | Age: 83
Discharge: HOME | End: 2018-07-24
Attending: FAMILY MEDICINE | Admitting: FAMILY MEDICINE

## 2018-07-24 ENCOUNTER — HOSPITAL ENCOUNTER (OUTPATIENT)
Dept: CARDIOLOGY | Facility: HOSPITAL | Age: 83
Discharge: HOME | End: 2018-07-24
Attending: FAMILY MEDICINE

## 2018-07-24 VITALS
DIASTOLIC BLOOD PRESSURE: 78 MMHG | SYSTOLIC BLOOD PRESSURE: 138 MMHG | BODY MASS INDEX: 29.4 KG/M2 | WEIGHT: 210 LBS | HEIGHT: 71 IN

## 2018-07-24 DIAGNOSIS — R06.02 SHORTNESS OF BREATH: ICD-10-CM

## 2018-07-24 DIAGNOSIS — I25.10 CORONARY ARTERY DISEASE INVOLVING NATIVE CORONARY ARTERY OF NATIVE HEART WITHOUT ANGINA PECTORIS: Chronic | ICD-10-CM

## 2018-07-24 LAB
BH CV ECHO MEAS - AO MAX PG (FULL): 0.17 MMHG
BH CV ECHO MEAS - AO MAX PG: 4.8 MMHG
BH CV ECHO MEAS - AO MEAN PG (FULL): 0 MMHG
BH CV ECHO MEAS - AO MEAN PG: 2 MMHG
BH CV ECHO MEAS - AO ROOT AREA (BSA CORRECTED): 1.6
BH CV ECHO MEAS - AO ROOT AREA: 8.8 CM^2
BH CV ECHO MEAS - AO ROOT DIAM: 3.4 CM
BH CV ECHO MEAS - AO V2 MAX: 109 CM/SEC
BH CV ECHO MEAS - AO V2 MEAN: 63.1 CM/SEC
BH CV ECHO MEAS - AO V2 VTI: 23.1 CM
BH CV ECHO MEAS - AVA(I,A): 4.8 CM^2
BH CV ECHO MEAS - AVA(I,D): 4.8 CM^2
BH CV ECHO MEAS - AVA(V,A): 3.7 CM^2
BH CV ECHO MEAS - AVA(V,D): 3.7 CM^2
BH CV ECHO MEAS - BSA(HAYCOCK): 2.2 M^2
BH CV ECHO MEAS - BSA: 2.2 M^2
BH CV ECHO MEAS - BZI_BMI: 29.3 KILOGRAMS/M^2
BH CV ECHO MEAS - BZI_METRIC_HEIGHT: 180.3 CM
BH CV ECHO MEAS - BZI_METRIC_WEIGHT: 95.3 KG
BH CV ECHO MEAS - CONTRAST EF 4CH: 64.5 ML/M^2
BH CV ECHO MEAS - EDV(CUBED): 193.1 ML
BH CV ECHO MEAS - EDV(MOD-SP4): 106 ML
BH CV ECHO MEAS - EDV(TEICH): 165.2 ML
BH CV ECHO MEAS - EF(CUBED): 60 %
BH CV ECHO MEAS - EF(MOD-BP): 66 %
BH CV ECHO MEAS - EF(MOD-SP4): 64.5 %
BH CV ECHO MEAS - EF(TEICH): 50.8 %
BH CV ECHO MEAS - ESV(CUBED): 77.3 ML
BH CV ECHO MEAS - ESV(MOD-SP4): 37.6 ML
BH CV ECHO MEAS - ESV(TEICH): 81.3 ML
BH CV ECHO MEAS - FS: 26.3 %
BH CV ECHO MEAS - IVS/LVPW: 0.85
BH CV ECHO MEAS - IVSD: 0.79 CM
BH CV ECHO MEAS - LA DIMENSION: 4.2 CM
BH CV ECHO MEAS - LA/AO: 1.3
BH CV ECHO MEAS - LAT PEAK E' VEL: 10.7 CM/SEC
BH CV ECHO MEAS - LV DIASTOLIC VOL/BSA (35-75): 49.2 ML/M^2
BH CV ECHO MEAS - LV MASS(C)D: 190 GRAMS
BH CV ECHO MEAS - LV MASS(C)DI: 88.2 GRAMS/M^2
BH CV ECHO MEAS - LV MAX PG: 4.6 MMHG
BH CV ECHO MEAS - LV MEAN PG: 2 MMHG
BH CV ECHO MEAS - LV SYSTOLIC VOL/BSA (12-30): 17.5 ML/M^2
BH CV ECHO MEAS - LV V1 MAX: 107 CM/SEC
BH CV ECHO MEAS - LV V1 MEAN: 59.7 CM/SEC
BH CV ECHO MEAS - LV V1 VTI: 29.1 CM
BH CV ECHO MEAS - LVIDD: 5.8 CM
BH CV ECHO MEAS - LVIDS: 4.3 CM
BH CV ECHO MEAS - LVLD AP4: 9.7 CM
BH CV ECHO MEAS - LVLS AP4: 8.8 CM
BH CV ECHO MEAS - LVOT AREA (M): 3.8 CM^2
BH CV ECHO MEAS - LVOT AREA: 3.8 CM^2
BH CV ECHO MEAS - LVOT DIAM: 2.2 CM
BH CV ECHO MEAS - LVPWD: 0.93 CM
BH CV ECHO MEAS - MED PEAK E' VEL: 4.79 CM/SEC
BH CV ECHO MEAS - MV A MAX VEL: 86.3 CM/SEC
BH CV ECHO MEAS - MV DEC TIME: 0.32 SEC
BH CV ECHO MEAS - MV E MAX VEL: 58.7 CM/SEC
BH CV ECHO MEAS - MV E/A: 0.68
BH CV ECHO MEAS - RAP SYSTOLE: 5 MMHG
BH CV ECHO MEAS - RVDD: 4.2 CM
BH CV ECHO MEAS - RVSP: 29.4 MMHG
BH CV ECHO MEAS - SI(AO): 94.6 ML/M^2
BH CV ECHO MEAS - SI(CUBED): 53.8 ML/M^2
BH CV ECHO MEAS - SI(LVOT): 51.4 ML/M^2
BH CV ECHO MEAS - SI(MOD-SP4): 31.8 ML/M^2
BH CV ECHO MEAS - SI(TEICH): 39 ML/M^2
BH CV ECHO MEAS - SV(AO): 203.6 ML
BH CV ECHO MEAS - SV(CUBED): 115.8 ML
BH CV ECHO MEAS - SV(LVOT): 110.6 ML
BH CV ECHO MEAS - SV(MOD-SP4): 68.4 ML
BH CV ECHO MEAS - SV(TEICH): 84 ML
BH CV ECHO MEAS - TR MAX VEL: 247 CM/SEC
BH CV ECHO MEASUREMENTS AVERAGE E/E' RATIO: 7.58
LEFT ATRIUM VOLUME INDEX: 27.9 ML/M2
LEFT ATRIUM VOLUME: 60 CM3

## 2018-07-24 PROCEDURE — 93350 STRESS TTE ONLY: CPT

## 2018-07-24 PROCEDURE — 93306 TTE W/DOPPLER COMPLETE: CPT

## 2018-07-24 PROCEDURE — 93017 CV STRESS TEST TRACING ONLY: CPT

## 2018-07-24 PROCEDURE — 93018 CV STRESS TEST I&R ONLY: CPT | Performed by: INTERNAL MEDICINE

## 2018-07-24 PROCEDURE — 25010000002 PERFLUTREN 6.52 MG/ML SUSPENSION: Performed by: INTERNAL MEDICINE

## 2018-07-24 PROCEDURE — 25010000003 DOBUTAMINE PER 250 MG: Performed by: INTERNAL MEDICINE

## 2018-07-24 PROCEDURE — 93350 STRESS TTE ONLY: CPT | Performed by: INTERNAL MEDICINE

## 2018-07-24 PROCEDURE — 71275 CT ANGIOGRAPHY CHEST: CPT

## 2018-07-24 PROCEDURE — 93352 ADMIN ECG CONTRAST AGENT: CPT | Performed by: INTERNAL MEDICINE

## 2018-07-24 PROCEDURE — 93306 TTE W/DOPPLER COMPLETE: CPT | Performed by: INTERNAL MEDICINE

## 2018-07-24 PROCEDURE — 0 IOPAMIDOL PER 1 ML: Performed by: FAMILY MEDICINE

## 2018-07-24 RX ORDER — DOBUTAMINE HYDROCHLORIDE 100 MG/100ML
10-50 INJECTION INTRAVENOUS CONTINUOUS
Status: DISCONTINUED | OUTPATIENT
Start: 2018-07-24 | End: 2018-07-25 | Stop reason: HOSPADM

## 2018-07-24 RX ADMIN — IOPAMIDOL 150 ML: 755 INJECTION, SOLUTION INTRAVENOUS at 11:00

## 2018-07-24 RX ADMIN — PERFLUTREN 8.48 MG: 6.52 INJECTION, SUSPENSION INTRAVENOUS at 13:00

## 2018-07-24 RX ADMIN — Medication 10 MCG/KG/MIN: at 12:59

## 2018-07-24 RX ADMIN — ATROPINE SULFATE 0.3 MG: 0.1 INJECTION, SOLUTION ENDOTRACHEAL; INTRAMUSCULAR; INTRAVENOUS; SUBCUTANEOUS at 13:36

## 2018-07-25 ENCOUNTER — TELEPHONE (OUTPATIENT)
Dept: FAMILY MEDICINE CLINIC | Facility: CLINIC | Age: 83
End: 2018-07-25

## 2018-07-25 DIAGNOSIS — I25.10 CORONARY ARTERY DISEASE INVOLVING NATIVE CORONARY ARTERY OF NATIVE HEART WITHOUT ANGINA PECTORIS: Primary | Chronic | ICD-10-CM

## 2018-07-25 LAB
BH CV STRESS BP STAGE 1: NORMAL
BH CV STRESS BP STAGE 2: NORMAL
BH CV STRESS BP STAGE 3: NORMAL
BH CV STRESS BP STAGE 4: NORMAL
BH CV STRESS DOB - ATROPINE STAGE 4: 0.3
BH CV STRESS DOSE DOBUTAMINE STAGE 1: 10
BH CV STRESS DOSE DOBUTAMINE STAGE 2: 20
BH CV STRESS DOSE DOBUTAMINE STAGE 3: 30
BH CV STRESS DOSE DOBUTAMINE STAGE 4: 40
BH CV STRESS DURATION MIN STAGE 1: 3
BH CV STRESS DURATION MIN STAGE 2: 3
BH CV STRESS DURATION MIN STAGE 3: 3
BH CV STRESS DURATION MIN STAGE 4: 0
BH CV STRESS DURATION SEC STAGE 1: 0
BH CV STRESS DURATION SEC STAGE 2: 0
BH CV STRESS DURATION SEC STAGE 3: 0
BH CV STRESS DURATION SEC STAGE 4: 51
BH CV STRESS HR STAGE 1: 67
BH CV STRESS HR STAGE 2: 81
BH CV STRESS HR STAGE 3: 85
BH CV STRESS HR STAGE 4: 133
BH CV STRESS PROTOCOL 1: NORMAL
BH CV STRESS RECOVERY BP: NORMAL MMHG
BH CV STRESS RECOVERY HR: 99 BPM
BH CV STRESS STAGE 1: 1
BH CV STRESS STAGE 2: 2
BH CV STRESS STAGE 3: 3
BH CV STRESS STAGE 4: 4
MAXIMAL PREDICTED HEART RATE: 136 BPM
PERCENT MAX PREDICTED HR: 97.79 %
STRESS BASELINE BP: NORMAL MMHG
STRESS BASELINE HR: 64 BPM
STRESS PERCENT HR: 115 %
STRESS POST EXERCISE DUR MIN: 9 MIN
STRESS POST EXERCISE DUR SEC: 51 SEC
STRESS POST PEAK BP: NORMAL MMHG
STRESS POST PEAK HR: 133 BPM
STRESS TARGET HR: 116 BPM

## 2018-07-25 NOTE — TELEPHONE ENCOUNTER
----- Message from Abel Romero MD sent at 7/25/2018  4:20 PM CDT -----  7.25.18 EST/echo  The following left ventricular wall segments are hypokinetic: mid anterior, apical lateral, apical septal, apex hypokinetic and mid anteroseptal.  HIGH PROBABILITY OF LAD DISTRIBUTION ISCHEMIA-to cardiology    Daja to try to get him into cardiology soon

## 2018-07-30 ENCOUNTER — OFFICE VISIT (OUTPATIENT)
Dept: CARDIOLOGY | Facility: CLINIC | Age: 83
End: 2018-07-30

## 2018-07-30 VITALS
OXYGEN SATURATION: 96 % | DIASTOLIC BLOOD PRESSURE: 64 MMHG | HEIGHT: 71 IN | BODY MASS INDEX: 29.12 KG/M2 | HEART RATE: 74 BPM | SYSTOLIC BLOOD PRESSURE: 130 MMHG | WEIGHT: 208 LBS

## 2018-07-30 DIAGNOSIS — E78.2 MIXED HYPERLIPIDEMIA: Chronic | ICD-10-CM

## 2018-07-30 DIAGNOSIS — I25.10 CORONARY ARTERY DISEASE INVOLVING NATIVE CORONARY ARTERY WITHOUT ANGINA PECTORIS, UNSPECIFIED WHETHER NATIVE OR TRANSPLANTED HEART: Primary | Chronic | ICD-10-CM

## 2018-07-30 DIAGNOSIS — Z79.01 ANTICOAGULATED: ICD-10-CM

## 2018-07-30 DIAGNOSIS — R06.02 SHORTNESS OF BREATH: ICD-10-CM

## 2018-07-30 DIAGNOSIS — R09.02 HYPOXIA: ICD-10-CM

## 2018-07-30 DIAGNOSIS — I83.90 VARICOSE VEIN OF LEG: Chronic | ICD-10-CM

## 2018-07-30 DIAGNOSIS — I10 ESSENTIAL HYPERTENSION: Chronic | ICD-10-CM

## 2018-07-30 PROCEDURE — 93000 ELECTROCARDIOGRAM COMPLETE: CPT | Performed by: INTERNAL MEDICINE

## 2018-07-30 PROCEDURE — 99214 OFFICE O/P EST MOD 30 MIN: CPT | Performed by: INTERNAL MEDICINE

## 2018-07-30 RX ORDER — ISOSORBIDE MONONITRATE 30 MG/1
30 TABLET, EXTENDED RELEASE ORAL DAILY
Qty: 90 TABLET | Refills: 3 | Status: SHIPPED | OUTPATIENT
Start: 2018-07-30 | End: 2018-08-30 | Stop reason: SDUPTHER

## 2018-07-30 NOTE — PROGRESS NOTES
Gonzalo Durham  [id number]  1934  84 y.o.  male    Referring Provider: Abel Romero MD    Reason for Follow-up Visit:  Initial visit for evaluation for  Shortness of breath   coronary artery disease Coronary artery bypass grafting 1980 x 3 [facility]    Subjective    Moderate chronic exertional shortness of breath on exertion relieved with rest  No significant cough or wheezing  Going on for several months  No palpitations  No associated chest pain  No significant pedal edema  No fever or chills  No significant expectoration  No hemoptysis  No presyncope or syncope   Feels tired   Arthritic pain in small joints   Abnormal cardiac stress test raising suspicion for underlying hemodynamically significant obstructive coronary artery disease .     History of present illness:  Gonzalo Durham is a 84 y.o. yo male with history of coronary artery disease Coronary artery bypass grafting who presents today for   Chief Complaint   Patient presents with   • Coronary Artery Disease     NEW PT    • Shortness of Breath   .    History  Past Medical History:   Diagnosis Date   • Arthritis    • BPH (benign prostatic hypertrophy)    • CAD (coronary artery disease)    • CKD (chronic kidney disease)    • Diabetes mellitus (CMS/HCC)     Type 2   • DM (diabetes mellitus screen)    • Hypercholesteremia    • Hypertension    ,   Past Surgical History:   Procedure Laterality Date   • APPENDECTOMY     • CARDIAC CATHETERIZATION     • COLONOSCOPY N/A 6/15/2017    Procedure: COLONOSCOPY WITH ANESTHESIA;  Surgeon: Sly Ahn MD;  Location: [facility] ENDOSCOPY;  Service:    • COLONOSCOPY W/ POLYPECTOMY  10/21/2013    2 Tubular adenomatous polyps, Diverticulosis repeat exam in 5 years   • CORONARY ARTERY BYPASS GRAFT  1980    X 3   • ENDOSCOPY N/A 6/15/2017    Procedure: ESOPHAGOGASTRODUODENOSCOPY WITH ANESTHESIA;  Surgeon: Sly Ahn MD;  Location: [facility] ENDOSCOPY;  Service:    ,   Family History   Problem Relation Age of Onset   •  Heart disease Mother    • Stroke Mother    • Melanoma Mother    • Heart disease Father    • Diabetes Father    • Prostate cancer Father    ,   Social History   Substance Use Topics   • Smoking status: Former Smoker     Packs/day: 1.00     Years: 26.00     Types: Cigarettes     Start date: 1954     Quit date: 1980   • Smokeless tobacco: Never Used   • Alcohol use No   ,     Medications  Current Outpatient Prescriptions   Medication Sig Dispense Refill   • acetaminophen (TYLENOL) 325 MG tablet Take 2 tablets by mouth 2 (Two) Times a Day. 360 tablet 2   • Artificial Tear Solution (JUST TEARS EYE DROPS) solution Apply 1-2 drops to eye Daily As Needed (dry eyes). 45 mL 2   • aspirin  MG tablet Take 1 tablet by mouth Every Other Day. 45 tablet 2   • calcium carbonate-vitamin d 600-400 MG-UNIT per tablet Take 1 tablet by mouth 2 (Two) Times a Day. 180 tablet 2   • CINNAMON PO Take  by mouth Daily.     • finasteride (PROSCAR) 5 MG tablet Take 1 tablet by mouth Daily. 90 tablet 2   • FREESTYLE LITE test strip 1 each by Other route Daily. 100 each 2   • gabapentin (NEURONTIN) 100 MG capsule Take 2 capsules by mouth 2 (Two) Times a Day. 360 capsule 2   • glyBURIDE (DIAbeta) 5 MG tablet Take 1.5 tablets by mouth 2 (Two) Times a Day With Meals. 270 tablet 2   • Lancets (FREESTYLE) lancets Use once daily 100 each 1   • loratadine (CLARITIN) 10 MG tablet Take 1 tablet by mouth Daily. 90 tablet 2   • metFORMIN ER (GLUCOPHAGE XR) 500 MG 24 hr tablet Take 2 tablets by mouth Every Night. (Patient taking differently: Take 500 mg by mouth Every Night.) 180 tablet 2   • Multiple Vitamins-Minerals (EYE VITAMINS & MINERALS PO) Take  by mouth Daily.     • Multiple Vitamins-Minerals (MULTIVITAL PO) Take  by mouth Daily.     • pantoprazole (PROTONIX) 40 MG EC tablet Take 1 tablet by mouth Daily. 90 tablet 2   • pravastatin (PRAVACHOL) 80 MG tablet Take 1 tablet by mouth Daily. 90 tablet 2   • terazosin (HYTRIN) 10 MG capsule Take 1  "capsule by mouth Daily. 90 capsule 2   • vitamin C (ASCORBIC ACID) 500 MG tablet Take 500 mg by mouth Daily.     • vitamin E 100 UNIT capsule Take 100 Units by mouth Daily.     • isosorbide mononitrate (IMDUR) 30 MG 24 hr tablet Take 1 tablet by mouth Daily. 90 tablet 3   • metoprolol tartrate (LOPRESSOR) 25 MG tablet Take 1 tablet by mouth 2 (Two) Times a Day. 180 tablet 3     No current facility-administered medications for this visit.        Allergies:  Prozac [fluoxetine hcl]    Review of Systems  Review of Systems   Constitution: Positive for weakness and malaise/fatigue.   HENT: Negative.    Eyes: Negative.    Cardiovascular: Positive for dyspnea on exertion. Negative for chest pain, claudication, cyanosis, irregular heartbeat, leg swelling, near-syncope, orthopnea, palpitations, paroxysmal nocturnal dyspnea and syncope.   Respiratory: Negative.    Endocrine: Negative.    Hematologic/Lymphatic: Negative.    Skin: Negative.    Musculoskeletal: Positive for arthritis and joint pain.   Gastrointestinal: Negative for anorexia.   Genitourinary: Negative.    Psychiatric/Behavioral: Negative.        Objective     Physical Exam:  /64   Pulse 74   Ht 180.3 cm (70.98\")   Wt 94.3 kg (208 lb)   SpO2 96%   BMI 29.02 kg/m²     Physical Exam   Constitutional: He appears well-developed.   HENT:   Head: Normocephalic.   Neck: Normal carotid pulses and no JVD present. No tracheal tenderness present. Carotid bruit is not present. No tracheal deviation and no edema present.   Cardiovascular: Regular rhythm and normal pulses.    Murmur heard.   Systolic murmur is present with a grade of 2/6   Pulmonary/Chest: Effort normal. No stridor.   Abdominal: Soft.   Neurological: He is alert. He has normal strength. No cranial nerve deficit or sensory deficit.   Skin: Skin is warm.   Psychiatric: He has a normal mood and affect. His speech is normal and behavior is normal.       Results Review:    Results for orders placed during " the hospital encounter of 07/24/18   Adult Stress Echo W/ Cont or Stress Agent if Necessary Per Protocol    Addendum  · The following left ventricular wall segments are hypokinetic: mid  anterior, apical lateral, apical septal, apex hypokinetic and mid  anteroseptal.   HIGH PROBABILITY OF LAD DISTRIBUTION ISCHEMIA       Tom Dhillon MD 7/25/2018  1:45 PM          Narrative · The following left ventricular wall segments are hypokinetic: mid   anterior, apical lateral, apical septal, apex hypokinetic and mid   anteroseptal.     HIGH PROBABILITY OF LAD DISTRIBUTION ISCHEMIA       ·  Right ventricular cavity is mild-to-moderately dilated.  · Left ventricular diastolic dysfunction (grade I) consistent with impaired relaxation.  · Left ventricular systolic function is normal.  Left atrial cavity size is borderline dilated.    ECG 12 Lead  Date/Time: 7/30/2018 10:31 AM  Performed by: FLIP CHARLES  Authorized by: FLIP CHARLES   Comparison: compared with previous ECG from 7/24/2018  Comparison to previous ECG: premature ventricular contraction not seen  atrial premature contractions new  Rhythm: sinus rhythm  Ectopy: atrial premature contractions  Rate: normal  Conduction: conduction normal  ST Segments: ST segments normal  T Waves: T waves normal  QRS axis: normal  Other: no other findings  Clinical impression: abnormal ECG            Assessment/Plan   Gonzalo was seen today for coronary artery disease and shortness of breath.    Diagnoses and all orders for this visit:    Coronary artery disease involving native coronary artery without angina pectoris, unspecified whether native or transplanted heart    Essential hypertension    Mixed hyperlipidemia    Varicose vein of leg    Shortness of breath    Hypoxia#to pulmonary    Anticoagulated-CAD, DM2/ASA 81    Other orders  -     ECG 12 Lead  -     metoprolol tartrate (LOPRESSOR) 25 MG tablet; Take 1 tablet by mouth 2 (Two) Times a Day.  -     isosorbide mononitrate  "(IMDUR) 30 MG 24 hr tablet; Take 1 tablet by mouth Daily.         No significant pedal edema. Compliant with medications and diet. Latest labs and medications reviewed.    Plan:    Low salt/ HTN/ Heart healthy carbohydrate restricted cardiac diet as applicable to this patient's current diagnoses.   This handout has relevant information regarding shopping for food, preparing meals, what to eat at restaurants, tracking of food intake, information regarding sodium intake and salt content, how to read food labels, knowing what to eat, tips reagarding physical activity, calorie count and calorie expenditure. What foods to avoid. Information regarding alcoholic drinks along with \"good\" and \"bad\" fats.  Reiterated prior recommendations     The patient is advised to reduce or avoid caffeine or other cardiac stimulants.     The patient was advised that NSAID-type medications have three  very important potential side effects: cardiovascular complications, gastrointestinal irritation including hemorrhage and renal injuries.  Do not use anti-inflammatories such as Motrin/ibuprofen, Alleve/naprosyn, Mobic and like medications Use Tylenol instead.The patient expresses understanding of these issues and questions were answered.   Monitor for any signs of bleeding including red or dark stools. Fall precautions.       Monitor for any signs of bleeding including red or dark stools. Fall precautions.   Patient is asked to monitor BP at home or work, several times per month and return with written values at next office visit.     Advised staying uptodate with immunizations per established standard guidelines.    Reviewed available prior notes, consults, prior visits, laboratory findings, radiology And cardiology relevant reports. Updated chart as applicable. I have reviewed the patient's medical history in detail and updated the computerized patient record as relevant.    Updated patient regarding any new or relevant abnormalities on " review of records or any new findings on physical exam. Mentioned to patient about purpose of visit and desirable health short and long term goals and objectives.    Offered to give patient  a copy of my notes and relevant tests/ prior ECG etc for the patient to review and follow specific advise and relevant findings if any, prognosis, along with my current and future plans.      Questions were encouraged, asked and answered to the patient's  understanding and satisfaction. Questions if any regarding current medications and side effects, need for refills and importance of compliance to medications stressed.    Reviewed available prior notes, consults, prior visits, laboratory findings, radiology and cardiology relevant reports. Updated chart as applicable. I have reviewed the patient's medical history in detail and updated the computerized patient record as relevant.    Updated patient regarding any new or relevant abnormalities on review of records or any new findings on physical exam. Mentioned to patient about purpose of visit and desirable health short and long term goals and objectives.    Monitor CBC, CMP, TSH (as indicated) and Lipid Panel by primary    Conservative medical therapy per patient. Risks, benefits and alternatives explained. The patient understands and wishes to proceed with only conservative therapy.     Requested Prescriptions     Signed Prescriptions Disp Refills   • metoprolol tartrate (LOPRESSOR) 25 MG tablet 180 tablet 3     Sig: Take 1 tablet by mouth 2 (Two) Times a Day.   • isosorbide mononitrate (IMDUR) 30 MG 24 hr tablet 90 tablet 3     Sig: Take 1 tablet by mouth Daily.          Return in about 3 months (around 10/30/2018).

## 2018-07-31 ENCOUNTER — OFFICE VISIT (OUTPATIENT)
Dept: FAMILY MEDICINE CLINIC | Facility: CLINIC | Age: 83
End: 2018-07-31

## 2018-07-31 VITALS
BODY MASS INDEX: 29.12 KG/M2 | DIASTOLIC BLOOD PRESSURE: 68 MMHG | WEIGHT: 208 LBS | RESPIRATION RATE: 18 BRPM | HEART RATE: 60 BPM | HEIGHT: 71 IN | OXYGEN SATURATION: 91 % | SYSTOLIC BLOOD PRESSURE: 124 MMHG | TEMPERATURE: 98.2 F

## 2018-07-31 DIAGNOSIS — R09.02 HYPOXIA: ICD-10-CM

## 2018-07-31 DIAGNOSIS — I10 HYPERTENSION, UNSPECIFIED TYPE: Chronic | ICD-10-CM

## 2018-07-31 DIAGNOSIS — J98.4 CHRONIC LUNG DISEASE: ICD-10-CM

## 2018-07-31 DIAGNOSIS — J96.11 CHRONIC RESPIRATORY FAILURE WITH HYPOXIA (HCC): ICD-10-CM

## 2018-07-31 DIAGNOSIS — I25.10 CORONARY ARTERY DISEASE INVOLVING NATIVE CORONARY ARTERY WITHOUT ANGINA PECTORIS, UNSPECIFIED WHETHER NATIVE OR TRANSPLANTED HEART: Primary | Chronic | ICD-10-CM

## 2018-07-31 DIAGNOSIS — R06.02 SHORTNESS OF BREATH: ICD-10-CM

## 2018-07-31 PROCEDURE — 99214 OFFICE O/P EST MOD 30 MIN: CPT | Performed by: FAMILY MEDICINE

## 2018-07-31 RX ORDER — MV-MIN/FA/VIT K/LUTEIN/ZEAXANT 200MCG-5MG
1 CAPSULE ORAL DAILY
COMMUNITY
End: 2018-11-20 | Stop reason: SDUPTHER

## 2018-08-01 PROBLEM — J96.90 RESPIRATORY FAILURE (HCC): Status: ACTIVE | Noted: 2018-08-01

## 2018-08-30 DIAGNOSIS — I10 HYPERTENSION, UNSPECIFIED TYPE: Chronic | ICD-10-CM

## 2018-08-30 DIAGNOSIS — I25.10 CORONARY ARTERY DISEASE INVOLVING NATIVE CORONARY ARTERY WITHOUT ANGINA PECTORIS, UNSPECIFIED WHETHER NATIVE OR TRANSPLANTED HEART: Primary | Chronic | ICD-10-CM

## 2018-08-30 DIAGNOSIS — J98.4 CHRONIC LUNG DISEASE: ICD-10-CM

## 2018-08-30 DIAGNOSIS — N40.0 PROSTATISM: Chronic | ICD-10-CM

## 2018-08-30 RX ORDER — ISOSORBIDE MONONITRATE 30 MG/1
30 TABLET, EXTENDED RELEASE ORAL DAILY
Qty: 90 TABLET | Refills: 3 | Status: SHIPPED | OUTPATIENT
Start: 2018-08-30 | End: 2018-11-20 | Stop reason: SDUPTHER

## 2018-08-30 RX ORDER — ISOSORBIDE MONONITRATE 30 MG/1
30 TABLET, EXTENDED RELEASE ORAL DAILY
Qty: 90 TABLET | Refills: 3 | Status: SHIPPED | OUTPATIENT
Start: 2018-08-30 | End: 2018-08-30 | Stop reason: SDUPTHER

## 2018-08-30 RX ORDER — FINASTERIDE 5 MG/1
5 TABLET, FILM COATED ORAL DAILY
Qty: 90 TABLET | Refills: 3 | Status: SHIPPED | OUTPATIENT
Start: 2018-08-30 | End: 2018-08-30 | Stop reason: SDUPTHER

## 2018-08-30 RX ORDER — FINASTERIDE 5 MG/1
5 TABLET, FILM COATED ORAL DAILY
Qty: 90 TABLET | Refills: 3 | Status: SHIPPED | OUTPATIENT
Start: 2018-08-30 | End: 2018-11-20 | Stop reason: SDUPTHER

## 2018-08-30 NOTE — TELEPHONE ENCOUNTER
Refill proscar, spiriva reapimat, lopressor 25mg bid, imdur 30mg daily, E-rx done to [facility] and printed at pt request. /got printed Rx's signed by Dr Romero and called pt and placed at , advised pt to call more than one day in advance, pt was thankful and agreeable

## 2018-10-04 DIAGNOSIS — G62.9 NEUROPATHY: ICD-10-CM

## 2018-10-04 RX ORDER — GABAPENTIN 100 MG/1
200 CAPSULE ORAL 2 TIMES DAILY
Qty: 360 CAPSULE | Refills: 2 | Status: SHIPPED | OUTPATIENT
Start: 2018-10-04 | End: 2018-11-20 | Stop reason: SDUPTHER

## 2018-10-05 ENCOUNTER — TELEPHONE (OUTPATIENT)
Dept: FAMILY MEDICINE CLINIC | Facility: CLINIC | Age: 83
End: 2018-10-05

## 2018-10-05 DIAGNOSIS — R06.02 SHORTNESS OF BREATH: ICD-10-CM

## 2018-10-05 DIAGNOSIS — R25.1 TREMOR: ICD-10-CM

## 2018-10-05 DIAGNOSIS — I83.90 VARICOSE VEINS OF LOWER EXTREMITY, UNSPECIFIED LATERALITY, UNSPECIFIED WHETHER COMPLICATED: Chronic | ICD-10-CM

## 2018-10-05 DIAGNOSIS — G62.9 NEUROPATHY: ICD-10-CM

## 2018-10-05 DIAGNOSIS — M72.2 PLANTAR FASCIITIS: Primary | ICD-10-CM

## 2018-10-05 DIAGNOSIS — I25.10 CORONARY ARTERY DISEASE INVOLVING NATIVE CORONARY ARTERY WITHOUT ANGINA PECTORIS, UNSPECIFIED WHETHER NATIVE OR TRANSPLANTED HEART: Chronic | ICD-10-CM

## 2018-10-05 DIAGNOSIS — E11.9 CONTROLLED TYPE 2 DIABETES MELLITUS WITHOUT COMPLICATION, WITHOUT LONG-TERM CURRENT USE OF INSULIN (HCC): Chronic | ICD-10-CM

## 2018-10-09 ENCOUNTER — FLU SHOT (OUTPATIENT)
Dept: FAMILY MEDICINE CLINIC | Facility: CLINIC | Age: 83
End: 2018-10-09

## 2018-10-09 PROCEDURE — 90662 IIV NO PRSV INCREASED AG IM: CPT | Performed by: FAMILY MEDICINE

## 2018-10-09 PROCEDURE — G0008 ADMIN INFLUENZA VIRUS VAC: HCPCS | Performed by: FAMILY MEDICINE

## 2018-10-24 ENCOUNTER — HOSPITAL ENCOUNTER (OUTPATIENT)
Dept: PULMONOLOGY | Facility: HOSPITAL | Age: 83
Discharge: HOME | End: 2018-10-24
Admitting: NURSE PRACTITIONER

## 2018-10-24 ENCOUNTER — TRANSCRIBE ORDERS (OUTPATIENT)
Dept: ADMINISTRATIVE | Facility: HOSPITAL | Age: 83
End: 2018-10-24

## 2018-10-24 ENCOUNTER — APPOINTMENT (OUTPATIENT)
Dept: LAB | Facility: HOSPITAL | Age: 83
End: 2018-10-24

## 2018-10-24 DIAGNOSIS — J96.11 CHRONIC RESPIRATORY FAILURE WITH HYPOXIA (HCC): Primary | ICD-10-CM

## 2018-10-24 LAB
ARTERIAL PATENCY WRIST A: ABNORMAL
ATMOSPHERIC PRESS: 757 MMHG
BASE EXCESS BLDA CALC-SCNC: 5.9 MMOL/L (ref 0–2)
BDY SITE: ABNORMAL
BODY TEMPERATURE: 37 C
COHGB MFR BLD: 1.3 % (ref 0–5)
GAS FLOW AIRWAY: 2.5 LPM
HCO3 BLDA-SCNC: 31.6 MMOL/L (ref 20–26)
HCT VFR BLD CALC: 40.4 % (ref 38–51)
HGB BLDA-MCNC: 13.2 G/DL (ref 14–18)
Lab: ABNORMAL
Lab: ABNORMAL
METHGB BLD QL: 0.6 % (ref 0–3)
MODALITY: ABNORMAL
NOTE: ABNORMAL
NOTIFIED BY: ABNORMAL
NOTIFIED WHO: ABNORMAL
OXYHGB MFR BLDV: 87 % (ref 94–99)
PCO2 BLDA: 48.9 MM HG (ref 35–45)
PCO2 TEMP ADJ BLD: ABNORMAL MM HG (ref 35–45)
PH BLDA: 7.42 PH UNITS (ref 7.35–7.45)
PH, TEMP CORRECTED: ABNORMAL PH UNITS (ref 7.35–7.45)
PO2 BLDA: 52.6 MM HG (ref 83–108)
PO2 TEMP ADJ BLD: ABNORMAL MM HG (ref 83–108)
POTASSIUM BLDA-SCNC: 4.3 MMOL/L (ref 3.5–5.2)
SAO2 % BLDCOA: 88.6 % (ref 94–99)
SODIUM BLDA-SCNC: 143 MMOL/L (ref 136–145)
VENTILATOR MODE: ABNORMAL

## 2018-10-24 PROCEDURE — 82375 ASSAY CARBOXYHB QUANT: CPT

## 2018-10-24 PROCEDURE — 36600 WITHDRAWAL OF ARTERIAL BLOOD: CPT

## 2018-10-24 PROCEDURE — 82805 BLOOD GASES W/O2 SATURATION: CPT

## 2018-10-24 PROCEDURE — 83050 HGB METHEMOGLOBIN QUAN: CPT

## 2018-10-30 ENCOUNTER — OFFICE VISIT (OUTPATIENT)
Dept: CARDIOLOGY | Facility: CLINIC | Age: 83
End: 2018-10-30

## 2018-10-30 VITALS
HEIGHT: 71 IN | HEART RATE: 70 BPM | OXYGEN SATURATION: 84 % | BODY MASS INDEX: 31.22 KG/M2 | WEIGHT: 223 LBS | SYSTOLIC BLOOD PRESSURE: 118 MMHG | DIASTOLIC BLOOD PRESSURE: 60 MMHG

## 2018-10-30 DIAGNOSIS — N18.30 STAGE 3 CHRONIC KIDNEY DISEASE (HCC): Chronic | ICD-10-CM

## 2018-10-30 DIAGNOSIS — J98.4 CHRONIC LUNG DISEASE: ICD-10-CM

## 2018-10-30 DIAGNOSIS — J96.11 CHRONIC RESPIRATORY FAILURE WITH HYPOXIA (HCC): ICD-10-CM

## 2018-10-30 DIAGNOSIS — E11.9 CONTROLLED TYPE 2 DIABETES MELLITUS WITHOUT COMPLICATION, WITHOUT LONG-TERM CURRENT USE OF INSULIN (HCC): Chronic | ICD-10-CM

## 2018-10-30 DIAGNOSIS — R09.02 HYPOXIA: ICD-10-CM

## 2018-10-30 DIAGNOSIS — I10 ESSENTIAL HYPERTENSION: Chronic | ICD-10-CM

## 2018-10-30 DIAGNOSIS — E11.65 UNCONTROLLED TYPE 2 DIABETES MELLITUS WITH HYPERGLYCEMIA (HCC): ICD-10-CM

## 2018-10-30 DIAGNOSIS — I83.90 VARICOSE VEINS OF LOWER EXTREMITY, UNSPECIFIED LATERALITY, UNSPECIFIED WHETHER COMPLICATED: Chronic | ICD-10-CM

## 2018-10-30 DIAGNOSIS — I25.10 CORONARY ARTERY DISEASE INVOLVING NATIVE CORONARY ARTERY WITHOUT ANGINA PECTORIS, UNSPECIFIED WHETHER NATIVE OR TRANSPLANTED HEART: Chronic | ICD-10-CM

## 2018-10-30 DIAGNOSIS — R06.02 SHORTNESS OF BREATH: Primary | ICD-10-CM

## 2018-10-30 DIAGNOSIS — Z79.01 ANTICOAGULATED: ICD-10-CM

## 2018-10-30 DIAGNOSIS — E78.2 MIXED HYPERLIPIDEMIA: Chronic | ICD-10-CM

## 2018-10-30 PROCEDURE — 93000 ELECTROCARDIOGRAM COMPLETE: CPT | Performed by: INTERNAL MEDICINE

## 2018-10-30 PROCEDURE — 99214 OFFICE O/P EST MOD 30 MIN: CPT | Performed by: INTERNAL MEDICINE

## 2018-10-30 NOTE — PROGRESS NOTES
Gonzalo Durham  [id number]  1934  84 y.o.  male    Referring Provider: Abel Romero MD    Reason for Follow-up Visit:   Routine follow up.   visit for evaluation for  Shortness of breath   coronary artery disease Coronary artery bypass grafting 1980 x 3 [facility]    Subjective    Moderate chronic exertional shortness of breath on exertion relieved with rest  No significant cough or wheezing  Going on for several months  No palpitations    No associated chest pain  No significant pedal edema  No fever or chills  No significant expectoration  No hemoptysis    No presyncope or syncope   Feels tired   Arthritic pain in small joints   Abnormal cardiac stress test raising suspicion for underlying hemodynamically significant obstructive coronary artery disease .     No symptoms reminiscent of prior angina.      History of present illness:  Gonzalo Durham is a 84 y.o. yo male with history of coronary artery disease Coronary artery bypass grafting who presents today for   Chief Complaint   Patient presents with   • Coronary Artery Disease     3 mo f/u   .    History  Past Medical History:   Diagnosis Date   • Arthritis    • BPH (benign prostatic hypertrophy)    • CAD (coronary artery disease)    • CKD (chronic kidney disease)    • Diabetes mellitus (CMS/HCC)     Type 2   • DM (diabetes mellitus screen)    • Hypercholesteremia    • Hypertension    ,   Past Surgical History:   Procedure Laterality Date   • APPENDECTOMY     • CARDIAC CATHETERIZATION     • COLONOSCOPY N/A 6/15/2017    Procedure: COLONOSCOPY WITH ANESTHESIA;  Surgeon: Sly Ahn MD;  Location: [facility] ENDOSCOPY;  Service:    • COLONOSCOPY W/ POLYPECTOMY  10/21/2013    2 Tubular adenomatous polyps, Diverticulosis repeat exam in 5 years   • CORONARY ARTERY BYPASS GRAFT  1980    X 3   • ENDOSCOPY N/A 6/15/2017    Procedure: ESOPHAGOGASTRODUODENOSCOPY WITH ANESTHESIA;  Surgeon: Sly Ahn MD;  Location: [facility] ENDOSCOPY;  Service:    ,   Family  History   Problem Relation Age of Onset   • Heart disease Mother    • Stroke Mother    • Melanoma Mother    • Heart disease Father    • Diabetes Father    • Prostate cancer Father    ,   Social History   Substance Use Topics   • Smoking status: Former Smoker     Packs/day: 1.00     Years: 26.00     Types: Cigarettes     Start date: 1954     Quit date: 1980   • Smokeless tobacco: Never Used   • Alcohol use No   ,     Medications  Current Outpatient Prescriptions   Medication Sig Dispense Refill   • acetaminophen (TYLENOL) 325 MG tablet Take 2 tablets by mouth 2 (Two) Times a Day. 360 tablet 2   • Artificial Tear Solution (JUST TEARS EYE DROPS) solution Apply 1-2 drops to eye Daily As Needed (dry eyes). 45 mL 2   • aspirin  MG tablet Take 1 tablet by mouth Every Other Day. 45 tablet 2   • calcium carbonate-vitamin d 600-400 MG-UNIT per tablet Take 1 tablet by mouth 2 (Two) Times a Day. 180 tablet 2   • CINNAMON PO Take  by mouth Daily.     • finasteride (PROSCAR) 5 MG tablet Take 1 tablet by mouth Daily. 90 tablet 3   • FREESTYLE LITE test strip 1 each by Other route Daily. 100 each 2   • gabapentin (NEURONTIN) 100 MG capsule Take 2 capsules by mouth 2 (Two) Times a Day. 360 capsule 2   • glyBURIDE (DIAbeta) 5 MG tablet Take 1.5 tablets by mouth 2 (Two) Times a Day With Meals. (Patient taking differently: Take 5 mg by mouth 2 (Two) Times a Day With Meals.) 270 tablet 2   • isosorbide mononitrate (IMDUR) 30 MG 24 hr tablet Take 1 tablet by mouth Daily. 90 tablet 3   • Lancets (FREESTYLE) lancets Use once daily 100 each 1   • loratadine (CLARITIN) 10 MG tablet Take 1 tablet by mouth Daily. 90 tablet 2   • metFORMIN ER (GLUCOPHAGE XR) 500 MG 24 hr tablet Take 2 tablets by mouth Every Night. 180 tablet 2   • metoprolol tartrate (LOPRESSOR) 25 MG tablet Take 1 tablet by mouth 2 (Two) Times a Day. 180 tablet 3   • Multiple Vitamins-Minerals (CENTRUM SILVER ADULT 50+ PO) Take 1 tablet/day by mouth Daily.     • Multiple  "Vitamins-Minerals (PRESERVISION AREDS 2+MULTI VIT) capsule Take 1 capsule by mouth Daily.     • pantoprazole (PROTONIX) 40 MG EC tablet Take 1 tablet by mouth Daily. 90 tablet 2   • pravastatin (PRAVACHOL) 80 MG tablet Take 1 tablet by mouth Daily. 90 tablet 2   • terazosin (HYTRIN) 10 MG capsule Take 1 capsule by mouth Daily. 90 capsule 2   • tiotropium bromide monohydrate (SPIRIVA RESPIMAT) 2.5 MCG/ACT aerosol solution inhaler Inhale 2 puffs Daily. 3 inhaler 3   • vitamin C (ASCORBIC ACID) 500 MG tablet Take 500 mg by mouth Daily.     • vitamin E 100 UNIT capsule Take 100 Units by mouth Daily.       No current facility-administered medications for this visit.        Allergies:  Prozac [fluoxetine hcl]    Review of Systems  Review of Systems   Constitution: Positive for weakness and malaise/fatigue.   HENT: Negative.    Eyes: Negative.    Cardiovascular: Positive for dyspnea on exertion. Negative for chest pain, claudication, cyanosis, irregular heartbeat, leg swelling, near-syncope, orthopnea, palpitations, paroxysmal nocturnal dyspnea and syncope.   Respiratory: Negative.    Endocrine: Negative.    Hematologic/Lymphatic: Negative.    Skin: Negative.    Musculoskeletal: Positive for arthritis and joint pain.   Gastrointestinal: Negative for anorexia.   Genitourinary: Negative.    Psychiatric/Behavioral: Negative.        Objective     Physical Exam:  /60   Pulse 70   Ht 180.3 cm (71\")   Wt 101 kg (223 lb)   SpO2 (!) 84% Comment: on 4 LPM 24/7  BMI 31.10 kg/m²     Physical Exam   Constitutional: He appears well-developed.   HENT:   Head: Normocephalic.   Neck: Normal carotid pulses and no JVD present. No tracheal tenderness present. Carotid bruit is not present. No tracheal deviation and no edema present.   Cardiovascular: Regular rhythm and normal pulses.    Murmur heard.   Systolic murmur is present with a grade of 2/6   Pulmonary/Chest: Effort normal. No stridor.   Abdominal: Soft.   Neurological: He " is alert. He has normal strength. No cranial nerve deficit or sensory deficit.   Skin: Skin is warm.   Psychiatric: He has a normal mood and affect. His speech is normal and behavior is normal.       Results Review:    Results for orders placed during the hospital encounter of 07/24/18   Adult Stress Echo W/ Cont or Stress Agent if Necessary Per Protocol    Addendum  · The following left ventricular wall segments are hypokinetic: mid  anterior, apical lateral, apical septal, apex hypokinetic and mid  anteroseptal.   HIGH PROBABILITY OF LAD DISTRIBUTION ISCHEMIA       Tom Dhillon MD 7/25/2018  1:45 PM          Narrative · The following left ventricular wall segments are hypokinetic: mid   anterior, apical lateral, apical septal, apex hypokinetic and mid   anteroseptal.     HIGH PROBABILITY OF LAD DISTRIBUTION ISCHEMIA       ·  Right ventricular cavity is mild-to-moderately dilated.  · Left ventricular diastolic dysfunction (grade I) consistent with impaired relaxation.  · Left ventricular systolic function is normal.  Left atrial cavity size is borderline dilated.    ECG 12 Lead  Date/Time: 10/30/2018 11:33 AM  Performed by: FLIP CHARLES  Authorized by: FLIP CHARLES   Comparison: compared with previous ECG from 7/30/2018  Similar to previous ECG  Rhythm: sinus rhythm  Ectopy: atrial premature contractions  Rate: normal  Conduction: conduction normal  ST Segments: ST segments normal  T Waves: T waves normal  QRS axis: normal  Other: no other findings  Clinical impression: abnormal ECG            Assessment/Plan   Gonzalo was seen today for coronary artery disease.    Diagnoses and all orders for this visit:    Shortness of breath    Hypoxia#to pulmonary    Chronic lung disease    Chronic respiratory failure with hypoxia (CMS/HCC)    Stage 3 chronic kidney disease (CMS/HCC)    Controlled type 2 diabetes mellitus without complication, without long-term current use of insulin (CMS/HCC)    Uncontrolled type 2  "diabetes mellitus with hyperglycemia (CMS/[facility])    Anticoagulated-CAD, DM2/ASA 81    Varicose veins of lower extremity, unspecified laterality, unspecified whether complicated    Essential hypertension    Mixed hyperlipidemia    Coronary artery disease involving native coronary artery without angina pectoris, unspecified whether native or transplanted heart    Other orders  -     ECG 12 Lead         No significant pedal edema. Compliant with medications and diet. Latest labs and medications reviewed.      Plan      Keep A1c less than 7 Primary to monitor  Keep LDL below 70 mg/dl. Monitor liver and renal functions.   Monitor CBC, CMP, TSH (as indicated) and Lipid Panel by primary      Similar recommendations as last visit  Conservative medical therapy per patient. Risks, benefits and alternatives explained. The patient understands and wishes to proceed with only conservative therapy.     S/L NTG PRN for chest pain, call me or go to ER if has to use S/L nitroglycerin     No additional cardiac testing required at this point in time.        xxxxxxxxxxxxxxxxxxxxxxxxxxxxxxxxxxxxxxxxxxxxxxxxxxxxxxxxxxxxxxxxxxxxxxxxxxxxxxxxxxxxxxxxxxxxxxxxxxxxxxxxxxxxxxxxxxxxxxxxxxxxxxxxxxxxxxxxxxxxxxxxxxxxxxxxxxxxxxxxxxxxxxxxxxxxxxxxxxxxxxxxxxxxxxxxxxxxxxxxxxxxxxxxxxxxxxxxxxxxxxxxxxxxxxxxxxxxxxxxxxxxxxxx  Health maintenance    Low salt/ HTN/ Heart healthy carbohydrate restricted cardiac diet as applicable to this patient's current diagnoses.   This handout has relevant information regarding shopping for food, preparing meals, what to eat at restaurants, tracking of food intake, information regarding sodium intake and salt content, how to read food labels, knowing what to eat, tips reagarding physical activity, calorie count and calorie expenditure. What foods to avoid. Information regarding alcoholic drinks along with \"good\" and \"bad\" fats.  Reiterated prior recommendations     The patient is advised to reduce or avoid caffeine or other " cardiac stimulants.     The patient was advised that NSAID-type medications have three  very important potential side effects: cardiovascular complications, gastrointestinal irritation including hemorrhage and renal injuries.  Do not use anti-inflammatories such as Motrin/ibuprofen, Alleve/naprosyn, Mobic and like medications Use Tylenol instead.The patient expresses understanding of these issues and questions were answered.   Monitor for any signs of bleeding including red or dark stools. Fall precautions.       Monitor for any signs of bleeding including red or dark stools. Fall precautions.   Patient is asked to monitor BP at home or work, several times per month and return with written values at next office visit.     Advised staying uptodate with immunizations per established standard guidelines.      Offered to give patient  a copy of my notes and relevant tests/ prior ECG etc for the patient to review and follow specific advise and relevant findings if any, prognosis, along with my current and future plans.      Questions were encouraged, asked and answered to the patient's  understanding and satisfaction. Questions if any regarding current medications and side effects, need for refills and importance of compliance to medications stressed.    Reviewed available prior notes, consults, prior visits, laboratory findings, radiology and cardiology relevant reports. Updated chart as applicable. I have reviewed the patient's medical history in detail and updated the computerized patient record as relevant.      Updated patient regarding any new or relevant abnormalities on review of records or any new findings on physical exam. Mentioned to patient about purpose of visit and desirable health short and long term goals and  objectives.        xxxxxxxxxxxxxxxxxxxxxxxxxxxxxxxxxxxxxxxxxxxxxxxxxxxxxxxxxxxxxxxxxxxxxxxxxxxxxxxxxxxxxxxxxxxxxxxxxxxxxxxxxxxxxxxxxxxxxxxxxxxxxxxxxxxxxxxxxxxxxxxxxxxxxxxxxxxxxxxxxxxxxxxxxxxxxxxxxxxxxxxxxxxxxxxxxxxxxxxxxxxxxxxxxxxxxxxxxxxxxxxxxxxxxxxxxxxxxxxxxxxxxxxx                 Return in about 6 months (around 4/30/2019).

## 2018-11-08 ENCOUNTER — OFFICE VISIT (OUTPATIENT)
Dept: FAMILY MEDICINE CLINIC | Facility: CLINIC | Age: 83
End: 2018-11-08

## 2018-11-08 ENCOUNTER — HOSPITAL ENCOUNTER (OUTPATIENT)
Dept: HOSPITAL 58 - RAD | Age: 83
Discharge: HOME | End: 2018-11-08
Attending: FAMILY MEDICINE

## 2018-11-08 VITALS
HEART RATE: 48 BPM | OXYGEN SATURATION: 97 % | TEMPERATURE: 97.7 F | BODY MASS INDEX: 31.08 KG/M2 | DIASTOLIC BLOOD PRESSURE: 72 MMHG | WEIGHT: 222 LBS | HEIGHT: 71 IN | SYSTOLIC BLOOD PRESSURE: 120 MMHG | RESPIRATION RATE: 18 BRPM

## 2018-11-08 DIAGNOSIS — M25.552: ICD-10-CM

## 2018-11-08 DIAGNOSIS — G62.9 PERIPHERAL POLYNEUROPATHY: ICD-10-CM

## 2018-11-08 DIAGNOSIS — I83.90 VARICOSE VEINS OF LOWER EXTREMITY, UNSPECIFIED LATERALITY, UNSPECIFIED WHETHER COMPLICATED: Primary | Chronic | ICD-10-CM

## 2018-11-08 DIAGNOSIS — E11.9 CONTROLLED TYPE 2 DIABETES MELLITUS WITHOUT COMPLICATION, WITHOUT LONG-TERM CURRENT USE OF INSULIN (HCC): Chronic | ICD-10-CM

## 2018-11-08 DIAGNOSIS — M25.551 PAIN OF BOTH HIP JOINTS: ICD-10-CM

## 2018-11-08 DIAGNOSIS — M25.552 PAIN OF BOTH HIP JOINTS: ICD-10-CM

## 2018-11-08 DIAGNOSIS — L84 CORN OR CALLUS: ICD-10-CM

## 2018-11-08 DIAGNOSIS — I10 ESSENTIAL HYPERTENSION: Chronic | ICD-10-CM

## 2018-11-08 DIAGNOSIS — M72.2 PLANTAR FASCIITIS: ICD-10-CM

## 2018-11-08 DIAGNOSIS — Z12.5 ENCOUNTER FOR PROSTATE CANCER SCREENING: ICD-10-CM

## 2018-11-08 DIAGNOSIS — M25.551: Primary | ICD-10-CM

## 2018-11-08 PROCEDURE — 99214 OFFICE O/P EST MOD 30 MIN: CPT | Performed by: FAMILY MEDICINE

## 2018-11-08 NOTE — PROGRESS NOTES
"Subjective   Gonzalo Durham is a 84 y.o. male presenting with chief complaint of:   Chief Complaint   Patient presents with   • Diabetes     \"foot exam for his diabetic shoes, my feet are burning\"   • Chronic Kidney Disease   • Hypertension   • Hip Pain     \"both are bothering me the more I walk\"       History of Present Illness :  With wife/daughter.  Here for primarily an acute issue today; DM shoes.   Has multiple chronic problems to consider that might have a bearing on today's issues; somewhat an interval appointment.       Chronic/acute problems to review today:   1. Varicose veins of lower extremity, unspecified laterality, unspecified whether complicated: chronic/variable soreness; has had for years.     2. Essential hypertension: Chronic/stable. Stable here/if home blood pressures.  No significant chest pain, SOB, LE edema, orthopnea, near syncope, dizziness/light headness.      3. Neuropathy: chronic/stable clinical opinion based on stinging/pains.  No TCC EMG   4. Plantar fasciitis: chornic/variable occ pain heels.    5. Controlled type 2 diabetes mellitus without complication, without long-term current use of insulin (CMS/[facility]): Chronic/stable. No problem/pattern hypoglycemia/hyperglycemia manifest by poly- dypsia, phagia, uria, or sweats, diaphoretic episodes, syncope/near.     6. Pain of both hip joints: chronic/stable with certain degree of walking both hips get sore.    7. Encounter for prostate cancer screening: chronic/yearly.    8.      History on/off callous of the feet; tried to keep them smooth.     Has an/another acute issue today: none.    The following portions of the patient's history were reviewed and updated as appropriate: allergies, current medications, past family history, past medical history, past social history, past surgical history and problem list.      Current Outpatient Prescriptions:   •  acetaminophen (TYLENOL) 325 MG tablet, Take 2 tablets by mouth 2 (Two) Times a Day., Disp: " 360 tablet, Rfl: 2  •  Artificial Tear Solution (JUST TEARS EYE DROPS) solution, Apply 1-2 drops to eye Daily As Needed (dry eyes)., Disp: 45 mL, Rfl: 2  •  aspirin  MG tablet, Take 1 tablet by mouth Every Other Day., Disp: 45 tablet, Rfl: 2  •  calcium carbonate-vitamin d 600-400 MG-UNIT per tablet, Take 1 tablet by mouth 2 (Two) Times a Day. (Patient taking differently: Take 1 tablet by mouth Daily.), Disp: 180 tablet, Rfl: 2  •  CINNAMON PO, Take  by mouth Daily., Disp: , Rfl:   •  finasteride (PROSCAR) 5 MG tablet, Take 1 tablet by mouth Daily., Disp: 90 tablet, Rfl: 3  •  FREESTYLE LITE test strip, 1 each by Other route Daily., Disp: 100 each, Rfl: 2  •  gabapentin (NEURONTIN) 100 MG capsule, Take 2 capsules by mouth 2 (Two) Times a Day., Disp: 360 capsule, Rfl: 2  •  glyBURIDE (DIAbeta) 5 MG tablet, Take 1.5 tablets by mouth 2 (Two) Times a Day With Meals. (Patient taking differently: Take 5 mg by mouth 2 (Two) Times a Day With Meals. One 5mg tablet twice daily), Disp: 270 tablet, Rfl: 2  •  isosorbide mononitrate (IMDUR) 30 MG 24 hr tablet, Take 1 tablet by mouth Daily., Disp: 90 tablet, Rfl: 3  •  Lancets (FREESTYLE) lancets, Use once daily, Disp: 100 each, Rfl: 1  •  loratadine (CLARITIN) 10 MG tablet, Take 1 tablet by mouth Daily., Disp: 90 tablet, Rfl: 2  •  metFORMIN ER (GLUCOPHAGE XR) 500 MG 24 hr tablet, Take 2 tablets by mouth Every Night. (Patient taking differently: Take 500 mg by mouth Every Night.), Disp: 180 tablet, Rfl: 2  •  metoprolol tartrate (LOPRESSOR) 25 MG tablet, Take 1 tablet by mouth 2 (Two) Times a Day., Disp: 180 tablet, Rfl: 3  •  Multiple Vitamins-Minerals (CENTRUM SILVER ADULT 50+ PO), Take 1 tablet/day by mouth Daily., Disp: , Rfl:   •  Multiple Vitamins-Minerals (PRESERVISION AREDS 2+MULTI VIT) capsule, Take 1 capsule by mouth Daily., Disp: , Rfl:   •  O2 (OXYGEN), Inhale 4 L/min Continuous. Per nasal cannula, Disp: , Rfl:   •  pantoprazole (PROTONIX) 40 MG EC tablet, Take  1 tablet by mouth Daily., Disp: 90 tablet, Rfl: 2  •  pravastatin (PRAVACHOL) 80 MG tablet, Take 1 tablet by mouth Daily., Disp: 90 tablet, Rfl: 2  •  terazosin (HYTRIN) 10 MG capsule, Take 1 capsule by mouth Daily., Disp: 90 capsule, Rfl: 2  •  tiotropium bromide monohydrate (SPIRIVA RESPIMAT) 2.5 MCG/ACT aerosol solution inhaler, Inhale 2 puffs Daily., Disp: 3 inhaler, Rfl: 3  •  vitamin C (ASCORBIC ACID) 500 MG tablet, Take 500 mg by mouth Daily., Disp: , Rfl:   •  vitamin E 100 UNIT capsule, Take 100 Units by mouth Daily., Disp: , Rfl:     No problems with medications.  Refills if needed done    Allergies   Allergen Reactions   • Prozac [Fluoxetine Hcl] Mental Status Change     Bad dreams.       Review of Systems  GENERAL:  Active/slower with limits, speed, samni for age; recent travels. Sleep is ok.   SKIN:  Ongoing callous of feet.   ENDO:  No syncope, near or diaphoretic sweaty spells.  BS Ok: without download noted last visit.   HEENT: No head injury, headache,  No vision change,  Same mild hearing loss.  Ears without pain/drainage.  No sore throat.  No significant nasal/sinus congestion/drainage. No epistaxis.  CHEST: No chest wall tenderness or mass. No significant cough,  without wheeze.  Mild as above SOB; no hemoptysis.  CV: No chest pain, palpatations, ankle edema.  GI: No heartburn, dysphagia.  No abdominal pain, diarrhea, constipation, rectal bleeding, or melena.    :  Voids without dysuria, or incontience to completion.  ORTHO: No painful/swollen joints but various on /off sore.  No change occ sore neck or back.  No acute neck or back pain without recent injury.   NEURO: No dizziness, weakness of extremities.  No change some LE numbness/parethesias.   PSYCH: No memory loss.  Mood good; not that anxious, depressed but/and not suicidal.  Tolerated stress.   Screening:  Mammogram: NA  Bone density: NA  Low dose CT chest: NA (had CT angio)  GI: Colon-div/[facility]/6.15.17/prn  Prostate: Kupper in past    Usual lab order  6m CBC, BMP, A1c  12m CBC, CMP, A1c, TSH, LIPID, PSAs, Vit D    Results for orders placed or performed during the hospital encounter of 10/24/18   Blood Gas, Arterial With Co-Ox   Result Value Ref Range    Site Left Brachial     Doni's Test N/A     pH, Arterial 7.418 7.350 - 7.450 pH units    pCO2, Arterial 48.9 (H) 35.0 - 45.0 mm Hg    pO2, Arterial 52.6 (C) 83.0 - 108.0 mm Hg    HCO3, Arterial 31.6 (H) 20.0 - 26.0 mmol/L    Base Excess, Arterial 5.9 (H) 0.0 - 2.0 mmol/L    O2 Saturation, Arterial 88.6 (L) 94.0 - 99.0 %    Hemoglobin, Blood Gas 13.2 (L) 14 - 18 g/dL    Hematocrit, Blood Gas 40.4 38.0 - 51.0 %    Oxyhemoglobin 87.0 (L) 94 - 99 %    Methemoglobin 0.60 0.00 - 3.00 %    Carboxyhemoglobin 1.3 0 - 5 %    Temperature 37.0 C    Sodium, Arterial 143 136 - 145 mmol/L    Potassium, Arterial 4.3 3.5 - 5.2 mmol/L    Barometric Pressure for Blood Gas 757 mmHg    Modality Nasal Cannula     Flow Rate 2.5 lpm    Ventilator Mode NA     Note      Notified Who Notification Not Needed     Notified By [id number]     Notified Time 10/24/2018 16:08     Collected by [id number]     pH, Temp Corrected  7.350 - 7.450 pH Units    pCO2, Temperature Corrected  35 - 45 mm Hg    pO2, Temperature Corrected  83 - 108 mm Hg       Lab Results   Component Value Date    PSA 1.240 09/08/2017        Lab Results:  CBC:    Lab Results - Last 18 Months  Lab Units 07/15/18  2300 09/08/17  1050   WBC 10*3/mm3  --  7.81   HEMOGLOBIN g/dL 15.2 14.3   HEMATOCRIT % 45.6 46.0   PLATELETS x10E3/uL 171 244      BMP/CMP:    Lab Results - Last 18 Months  Lab Units 10/24/18  1535 07/15/18  2300 09/08/17  1050   SODIUM mmol/L  --  144 142   SODIUM, ARTERIAL mmol/L 143  --   --    POTASSIUM mmol/L  --  5.0 4.7   CHLORIDE mmol/L  --  103 101   TOTAL CO2, ARTERIAL mmol/L  --  28 31.0   GLUCOSE mg/dL  --  108* 118*   BUN mg/dL  --  23 24*   CREATININE mg/dL  --  1.09 1.04   EGFR IF NONAFRICN AM mL/min/1.73  --  62 68   EGFR IF AFRICN AM  "mL/min/1.73  --  72 83   CALCIUM mg/dL  --  9.6 9.6     HEPATIC:    Lab Results - Last 18 Months  Lab Units 07/15/18  2300 09/08/17  1050   ALT (SGPT) IU/L 13 34   AST (SGOT) IU/L 15 21   ALK PHOS IU/L 55 61     THYROID:    Lab Results - Last 18 Months  Lab Units 09/08/17  1050   TSH mIU/mL 1.560     A1C:    Lab Results - Last 18 Months  Lab Units 09/08/17  1050   HEMOGLOBIN A1C % 6.90     PSA:    Lab Results - Last 18 Months  Lab Units 09/08/17  1050   PSA ng/mL 1.240       Objective   /72 (BP Location: Right arm, Patient Position: Sitting, Cuff Size: Large Adult)   Pulse (!) 48   Temp 97.7 °F (36.5 °C) (Oral)   Resp 18   Ht 180.3 cm (71\")   Wt 101 kg (222 lb)   SpO2 97%   BMI 30.96 kg/m²   Body mass index is 30.96 kg/m².    Physical Exam  GENERAL:  Well nourished/developed in no acute distress. Obese   SKIN: Turgor excellent, without wound, rash, lesion; but does have hyperkeratotic pressure areas (callous) of several pressure areas of both feet.   HEENT: Normal cephalic without trauma.  Pupils equal round reactive to light. Extraocular motions full without nystagmus.    No thyroidmegaly, mass, tenderness, lymphadenopathy and supple.  CV: Regular rhythm.  No murmur, gallop,  edema. Posterior pulses intact.  No carotid bruits.  CHEST: No chest wall tenderness or mass.   LUNGS: Symmetric motion with clear to auscultation.   ABD: Soft, nontender without mass.   ORTHO: Symmetric extremities without swelling/point tenderness.  Full gross range of motion except hips reduced.  Hips sore with ROM.   NEURO: CN 2-12 grossly intact.  Symmetric facies. 1/4 x bicep knee equal reflexes.  UE/LE   3/5 strength throughout.  Nonfocal use extremities. Speech clear.  Light touch decreased bilateral feet.   PSYCH: Oriented x 3.  Pleasant calm, well kept.  Purposeful/directed conservation with intact short/long gross memory.     Assessment/Plan     1. Varicose veins of lower extremity, unspecified laterality, unspecified " whether complicated    2. Essential hypertension    3. Peripheral polyneuropathy    4. Plantar fasciitis    5. Controlled type 2 diabetes mellitus without complication, without long-term current use of insulin (CMS/[facility])    6. Pain of both hip joints    7. Encounter for prostate cancer screening    8. Corn or callus        Rx: reviewed/changes:  DM shoes    LAB/Testing/Referrals: reviewed/orders:   Today:   Orders Placed This Encounter   Procedures   • XR Hip With or Without Pelvis 2 - 3 View Right   • XR Hip With or Without Pelvis 2 - 3 View Left   • PSA Screen   • Basic Metabolic Panel   • Hemoglobin A1c   • CBC & Differential       Discussions:   Travel to [place] likely possible if goes slow and has oxygen arranged along the way.   Body mass index is 30.96 kg/m².   Patient's Body mass index is 30.96 kg/m². BMI is above normal parameters. Recommendations include: exercise counseling, nutrition counseling and as able.  Non-smoker      There are no Patient Instructions on file for this visit.    Follow up: Return for lab today;  lab/Dr Romero 6m.  Future Appointments  Date Time Provider Department Center   11/29/2018 1:30 PM Erika Elena APRN [facility] None   2/25/2019 2:30 PM Feliz Frye APRN [facility] None   4/29/2019 9:00 AM Bradley Carver MD [facility]   5/14/2019 8:30 AM [facility] None   5/16/2019 1:30 PM Abel Romero MD [facility] None

## 2018-11-09 DIAGNOSIS — M48.062 NEUROGENIC CLAUDICATION DUE TO LUMBAR SPINAL STENOSIS: Primary | ICD-10-CM

## 2018-11-09 DIAGNOSIS — M25.552 PAIN OF BOTH HIP JOINTS: ICD-10-CM

## 2018-11-09 DIAGNOSIS — M25.551 PAIN OF BOTH HIP JOINTS: ICD-10-CM

## 2018-11-09 PROBLEM — L84 CORN OR CALLUS: Status: ACTIVE | Noted: 2018-11-09

## 2018-11-09 LAB
BASOPHILS # BLD AUTO: 0.05 10*3/MM3 (ref 0–0.2)
BASOPHILS NFR BLD AUTO: 0.7 % (ref 0–2)
BUN SERPL-MCNC: 27 MG/DL (ref 5–21)
BUN/CREAT SERPL: 25.5 (ref 7–25)
CALCIUM SERPL-MCNC: 9.9 MG/DL (ref 8.4–10.4)
CHLORIDE SERPL-SCNC: 99 MMOL/L (ref 98–110)
CO2 SERPL-SCNC: 33 MMOL/L (ref 24–31)
CREAT SERPL-MCNC: 1.06 MG/DL (ref 0.5–1.4)
EOSINOPHIL # BLD AUTO: 0.61 10*3/MM3 (ref 0–0.7)
EOSINOPHIL NFR BLD AUTO: 8.1 % (ref 0–4)
ERYTHROCYTE [DISTWIDTH] IN BLOOD BY AUTOMATED COUNT: 14.6 % (ref 12–15)
GLUCOSE SERPL-MCNC: 82 MG/DL (ref 70–100)
HBA1C MFR BLD: 6.3 %
HCT VFR BLD AUTO: 40.4 % (ref 40–52)
HGB BLD-MCNC: 12.6 G/DL (ref 14–18)
IMM GRANULOCYTES # BLD: 0.02 10*3/MM3 (ref 0–0.03)
IMM GRANULOCYTES NFR BLD: 0.3 % (ref 0–5)
LYMPHOCYTES # BLD AUTO: 1.84 10*3/MM3 (ref 0.72–4.86)
LYMPHOCYTES NFR BLD AUTO: 24.5 % (ref 15–45)
MCH RBC QN AUTO: 29.6 PG (ref 28–32)
MCHC RBC AUTO-ENTMCNC: 31.2 G/DL (ref 33–36)
MCV RBC AUTO: 95.1 FL (ref 82–95)
MONOCYTES # BLD AUTO: 0.68 10*3/MM3 (ref 0.19–1.3)
MONOCYTES NFR BLD AUTO: 9 % (ref 4–12)
NEUTROPHILS # BLD AUTO: 4.32 10*3/MM3 (ref 1.87–8.4)
NEUTROPHILS NFR BLD AUTO: 57.4 % (ref 39–78)
NRBC BLD AUTO-RTO: 0 /100 WBC (ref 0–0)
PLATELET # BLD AUTO: 242 10*3/MM3 (ref 130–400)
POTASSIUM SERPL-SCNC: 4.6 MMOL/L (ref 3.5–5.3)
PSA SERPL-MCNC: 1.23 NG/ML (ref 0–4)
RBC # BLD AUTO: 4.25 10*6/MM3 (ref 4.8–5.9)
SODIUM SERPL-SCNC: 141 MMOL/L (ref 135–145)
WBC # BLD AUTO: 7.52 10*3/MM3 (ref 4.8–10.8)

## 2018-11-09 NOTE — PROGRESS NOTES
Pt informed some hip arthritis, but not alot leaving the possibilty related to arthritis in the spine/back pitching a nerve. Pt is agreeable to get MRI of his back.

## 2018-11-13 ENCOUNTER — TELEPHONE (OUTPATIENT)
Dept: FAMILY MEDICINE CLINIC | Facility: CLINIC | Age: 83
End: 2018-11-13

## 2018-11-13 DIAGNOSIS — D64.9 ANEMIA, UNSPECIFIED TYPE: Primary | ICD-10-CM

## 2018-11-13 NOTE — TELEPHONE ENCOUNTER
She was informed about his labs and the miild anemia. He is coming in tomorrow for the estra anemia labs and  hemoccults. She wanted Dr. Romero to know he does have an appt the end of the month to see GI.

## 2018-11-14 ENCOUNTER — RESULTS ENCOUNTER (OUTPATIENT)
Dept: FAMILY MEDICINE CLINIC | Facility: CLINIC | Age: 83
End: 2018-11-14

## 2018-11-14 DIAGNOSIS — D64.9 ANEMIA, UNSPECIFIED TYPE: ICD-10-CM

## 2018-11-15 PROBLEM — D64.9 ANEMIA: Status: ACTIVE | Noted: 2018-11-15

## 2018-11-15 LAB
FOLATE SERPL-MCNC: >20 NG/ML
IRON SATN MFR SERPL: 25 % (ref 20–45)
IRON SERPL-MCNC: 81 MCG/DL (ref 42–180)
RETICS/RBC NFR AUTO: 1.04 % (ref 0.6–1.8)
TIBC SERPL-MCNC: 323 MCG/DL (ref 225–420)
UIBC SERPL-MCNC: 242 MCG/DL
VIT B12 SERPL-MCNC: 436 PG/ML (ref 239–931)

## 2018-11-19 ENCOUNTER — HOSPITAL ENCOUNTER (OUTPATIENT)
Dept: MRI IMAGING | Facility: HOSPITAL | Age: 83
Discharge: HOME | End: 2018-11-19
Attending: FAMILY MEDICINE | Admitting: FAMILY MEDICINE

## 2018-11-19 DIAGNOSIS — M48.062 NEUROGENIC CLAUDICATION DUE TO LUMBAR SPINAL STENOSIS: ICD-10-CM

## 2018-11-19 PROBLEM — I70.90 ATHEROSCLEROSIS: Status: ACTIVE | Noted: 2018-11-19

## 2018-11-19 PROBLEM — I71.9 AORTIC ANEURYSM: Status: ACTIVE | Noted: 2018-11-19

## 2018-11-19 PROCEDURE — 72148 MRI LUMBAR SPINE W/O DYE: CPT

## 2018-11-20 ENCOUNTER — CLINICAL SUPPORT (OUTPATIENT)
Dept: FAMILY MEDICINE CLINIC | Facility: CLINIC | Age: 83
End: 2018-11-20

## 2018-11-20 DIAGNOSIS — N40.0 PROSTATISM: Chronic | ICD-10-CM

## 2018-11-20 DIAGNOSIS — E11.9 CONTROLLED TYPE 2 DIABETES MELLITUS WITHOUT COMPLICATION, WITHOUT LONG-TERM CURRENT USE OF INSULIN (HCC): Chronic | ICD-10-CM

## 2018-11-20 DIAGNOSIS — Z79.01 ANTICOAGULATED: ICD-10-CM

## 2018-11-20 DIAGNOSIS — K21.9 GASTROESOPHAGEAL REFLUX DISEASE, ESOPHAGITIS PRESENCE NOT SPECIFIED: ICD-10-CM

## 2018-11-20 DIAGNOSIS — G62.9 NEUROPATHY: ICD-10-CM

## 2018-11-20 DIAGNOSIS — I10 HYPERTENSION, UNSPECIFIED TYPE: Chronic | ICD-10-CM

## 2018-11-20 DIAGNOSIS — J98.4 CHRONIC LUNG DISEASE: ICD-10-CM

## 2018-11-20 DIAGNOSIS — I25.10 CORONARY ARTERY DISEASE INVOLVING NATIVE CORONARY ARTERY WITHOUT ANGINA PECTORIS, UNSPECIFIED WHETHER NATIVE OR TRANSPLANTED HEART: Chronic | ICD-10-CM

## 2018-11-20 DIAGNOSIS — E78.5 HYPERLIPIDEMIA, UNSPECIFIED HYPERLIPIDEMIA TYPE: Chronic | ICD-10-CM

## 2018-11-20 DIAGNOSIS — D64.9 ANEMIA, UNSPECIFIED TYPE: Primary | ICD-10-CM

## 2018-11-20 LAB
HEMOCCULT STL QL IA: POSITIVE

## 2018-11-20 PROCEDURE — 82272 OCCULT BLD FECES 1-3 TESTS: CPT | Performed by: FAMILY MEDICINE

## 2018-11-20 RX ORDER — METFORMIN HYDROCHLORIDE 500 MG/1
500 TABLET, EXTENDED RELEASE ORAL NIGHTLY
Qty: 180 TABLET | Refills: 2 | Status: SHIPPED | OUTPATIENT
Start: 2018-11-20 | End: 2020-02-26 | Stop reason: SDUPTHER

## 2018-11-20 RX ORDER — DEXTRAN 70/HYPROMELLOSE
1-2 DROPS OPHTHALMIC (EYE) DAILY PRN
Qty: 45 ML | Refills: 2 | Status: SHIPPED | OUTPATIENT
Start: 2018-11-20 | End: 2021-04-08 | Stop reason: SDUPTHER

## 2018-11-20 RX ORDER — MV-MIN/FA/VIT K/LUTEIN/ZEAXANT 200MCG-5MG
1 CAPSULE ORAL DAILY
Qty: 90 CAPSULE | Refills: 1 | Status: SHIPPED | OUTPATIENT
Start: 2018-11-20 | End: 2021-04-08 | Stop reason: SDUPTHER

## 2018-11-20 RX ORDER — PANTOPRAZOLE SODIUM 40 MG/1
40 TABLET, DELAYED RELEASE ORAL DAILY
Qty: 90 TABLET | Refills: 2 | Status: SHIPPED | OUTPATIENT
Start: 2018-11-20 | End: 2019-08-30 | Stop reason: SDUPTHER

## 2018-11-20 RX ORDER — LORATADINE 10 MG/1
10 TABLET ORAL DAILY
Qty: 90 TABLET | Refills: 2 | Status: SHIPPED | OUTPATIENT
Start: 2018-11-20 | End: 2019-08-30 | Stop reason: SDUPTHER

## 2018-11-20 RX ORDER — TERAZOSIN 10 MG/1
10 CAPSULE ORAL DAILY
Qty: 90 CAPSULE | Refills: 2 | Status: SHIPPED | OUTPATIENT
Start: 2018-11-20 | End: 2020-02-26 | Stop reason: SDUPTHER

## 2018-11-20 RX ORDER — AMPICILLIN TRIHYDRATE 250 MG
CAPSULE ORAL
Qty: 90 CAPSULE | Refills: 1 | Status: SHIPPED | OUTPATIENT
Start: 2018-11-20 | End: 2019-08-22

## 2018-11-20 RX ORDER — GABAPENTIN 100 MG/1
200 CAPSULE ORAL 2 TIMES DAILY
Qty: 360 CAPSULE | Refills: 2 | Status: SHIPPED | OUTPATIENT
Start: 2018-11-20 | End: 2019-08-05 | Stop reason: SDUPTHER

## 2018-11-20 RX ORDER — BLOOD-GLUCOSE METER
1 KIT MISCELLANEOUS DAILY
Qty: 100 EACH | Refills: 2 | Status: SHIPPED | OUTPATIENT
Start: 2018-11-20 | End: 2020-02-26 | Stop reason: SDUPTHER

## 2018-11-20 RX ORDER — ACETAMINOPHEN 325 MG/1
650 TABLET ORAL 2 TIMES DAILY
Qty: 360 TABLET | Refills: 2 | Status: SHIPPED | OUTPATIENT
Start: 2018-11-20 | End: 2019-08-30 | Stop reason: SDUPTHER

## 2018-11-20 RX ORDER — PRAVASTATIN SODIUM 80 MG/1
80 TABLET ORAL DAILY
Qty: 90 TABLET | Refills: 2 | Status: SHIPPED | OUTPATIENT
Start: 2018-11-20 | End: 2019-08-30 | Stop reason: SDUPTHER

## 2018-11-20 RX ORDER — MULTIVIT-MIN/FA/LYCOPEN/LUTEIN .4-300-25
50 TABLET ORAL DAILY
Qty: 90 TABLET | Refills: 1 | Status: SHIPPED | OUTPATIENT
Start: 2018-11-20 | End: 2021-04-08 | Stop reason: SDUPTHER

## 2018-11-20 RX ORDER — ASCORBIC ACID 500 MG
500 TABLET ORAL DAILY
Qty: 90 TABLET | Refills: 1 | Status: SHIPPED | OUTPATIENT
Start: 2018-11-20 | End: 2021-04-08 | Stop reason: CLARIF

## 2018-11-20 RX ORDER — GABAPENTIN 100 MG/1
200 CAPSULE ORAL 2 TIMES DAILY
Qty: 360 CAPSULE | Refills: 2 | Status: SHIPPED | OUTPATIENT
Start: 2018-11-20 | End: 2018-11-20 | Stop reason: SDUPTHER

## 2018-11-20 RX ORDER — ISOSORBIDE MONONITRATE 30 MG/1
30 TABLET, EXTENDED RELEASE ORAL DAILY
Qty: 90 TABLET | Refills: 3 | Status: SHIPPED | OUTPATIENT
Start: 2018-11-20 | End: 2019-08-30 | Stop reason: SDUPTHER

## 2018-11-20 RX ORDER — GLYBURIDE 5 MG/1
7.5 TABLET ORAL 2 TIMES DAILY WITH MEALS
Qty: 270 TABLET | Refills: 2 | Status: SHIPPED | OUTPATIENT
Start: 2018-11-20 | End: 2019-05-16

## 2018-11-20 RX ORDER — LANCETS 28 GAUGE
EACH MISCELLANEOUS
Qty: 100 EACH | Refills: 1 | Status: SHIPPED | OUTPATIENT
Start: 2018-11-20 | End: 2020-02-26 | Stop reason: SDUPTHER

## 2018-11-20 RX ORDER — FINASTERIDE 5 MG/1
5 TABLET, FILM COATED ORAL DAILY
Qty: 90 TABLET | Refills: 3 | Status: SHIPPED | OUTPATIENT
Start: 2018-11-20 | End: 2020-02-26 | Stop reason: SDUPTHER

## 2018-11-20 RX ORDER — ASPIRIN 325 MG
325 TABLET, DELAYED RELEASE (ENTERIC COATED) ORAL EVERY OTHER DAY
Qty: 45 TABLET | Refills: 2 | Status: SHIPPED | OUTPATIENT
Start: 2018-11-20 | End: 2019-08-05 | Stop reason: DRUGHIGH

## 2018-11-20 NOTE — TELEPHONE ENCOUNTER
Needs all his rxs written for Mague Tamayo. He will  here.    Gabapentin was sent to [facility] by mistake. Needs written

## 2018-11-21 RX ORDER — FLUTICASONE PROPIONATE 50 MCG
SPRAY, SUSPENSION (ML) NASAL
Status: ON HOLD | COMMUNITY
Start: 2017-09-29 | End: 2019-05-22

## 2018-11-21 RX ORDER — NITROGLYCERIN 0.4 MG/1
TABLET SUBLINGUAL
COMMUNITY
Start: 2018-11-02 | End: 2018-11-30 | Stop reason: SDUPTHER

## 2018-11-21 RX ORDER — ALBUTEROL SULFATE 90 UG/1
AEROSOL, METERED RESPIRATORY (INHALATION)
COMMUNITY
Start: 2017-09-28 | End: 2018-11-30 | Stop reason: SDUPTHER

## 2018-11-29 ENCOUNTER — OFFICE VISIT (OUTPATIENT)
Dept: GASTROENTEROLOGY | Facility: CLINIC | Age: 83
End: 2018-11-29

## 2018-11-29 VITALS
BODY MASS INDEX: 29.68 KG/M2 | HEIGHT: 71 IN | OXYGEN SATURATION: 98 % | WEIGHT: 212 LBS | HEART RATE: 49 BPM | SYSTOLIC BLOOD PRESSURE: 142 MMHG | DIASTOLIC BLOOD PRESSURE: 82 MMHG

## 2018-11-29 DIAGNOSIS — J44.9 CHRONIC OBSTRUCTIVE PULMONARY DISEASE, UNSPECIFIED COPD TYPE (HCC): ICD-10-CM

## 2018-11-29 DIAGNOSIS — R19.5 HEME POSITIVE STOOL: Primary | ICD-10-CM

## 2018-11-29 PROCEDURE — 99212 OFFICE O/P EST SF 10 MIN: CPT | Performed by: CLINICAL NURSE SPECIALIST

## 2018-11-29 NOTE — PROGRESS NOTES
Gonzalo Durham  1934 11/29/2018  Chief Complaint   Patient presents with   • GI Problem     Heme + Stools     Subjective   HPI  Gonzalo Durham is a 84 y.o. male who presents with a complaint of heme positive stools incidental finding 11/20/2018. H/H 12.6/40.4 MCV 95.1. He has been followed by us in the past and has had fairly recent Endoscopy and colonoscopy in June 2017 reviewed today. He denies any associated symptoms. This was incidental finding. No visible bleeding.    Past Medical History:   Diagnosis Date   • Arthritis    • BPH (benign prostatic hypertrophy)    • CAD (coronary artery disease)    • CKD (chronic kidney disease)    • Diabetes mellitus (CMS/[facility])     Type 2   • DM (diabetes mellitus screen)    • Hx of colonic polyp    • Hypercholesteremia    • Hypertension      Past Surgical History:   Procedure Laterality Date   • APPENDECTOMY     • CARDIAC CATHETERIZATION     • COLONOSCOPY N/A 6/15/2017    Diverticulosis repeat exam prn   • COLONOSCOPY W/ POLYPECTOMY  10/21/2013    2 Tubular adenomatous polyps, Diverticulosis repeat exam in 5 years   • COLONOSCOPY WITH ANESTHESIA N/A 6/15/2017    Performed by Sly Ahn MD at [facility] ENDOSCOPY   • CORONARY ARTERY BYPASS GRAFT  1980    X 3   • ENDOSCOPY N/A 6/15/2017    LA Grade A reflux esophagitis   • ESOPHAGOGASTRODUODENOSCOPY WITH ANESTHESIA N/A 6/15/2017    Performed by Sly Ahn MD at [facility] ENDOSCOPY       Outpatient Medications Marked as Taking for the 11/29/18 encounter (Office Visit) with Erika Elena APRN   Medication Sig Dispense Refill   • acetaminophen (TYLENOL) 325 MG tablet Take 2 tablets by mouth 2 (Two) Times a Day. 360 tablet 2   • albuterol (PROAIR HFA) 108 (90 Base) MCG/ACT inhaler      • Artificial Tear Solution (JUST TEARS EYE DROPS) solution Apply 1-2 drops to eye(s) as directed by provider Daily As Needed (dry eyes). 45 mL 2   • aspirin  MG tablet Take 1 tablet by mouth Every Other Day. 45 tablet 2   •  calcium carbonate-vitamin d 600-400 MG-UNIT per tablet Take 1 tablet by mouth 2 (Two) Times a Day. 180 tablet 2   • Cinnamon 500 MG capsule Take 1 capsule daily 90 capsule 1   • finasteride (PROSCAR) 5 MG tablet Take 1 tablet by mouth Daily. 90 tablet 3   • fluticasone (FLONASE) 50 MCG/ACT nasal spray      • FREESTYLE LITE test strip 1 each by Other route Daily. 100 each 2   • gabapentin (NEURONTIN) 100 MG capsule Take 2 capsules by mouth 2 (Two) Times a Day. 360 capsule 2   • glyBURIDE (DIAbeta) 5 MG tablet Take 1.5 tablets by mouth 2 (Two) Times a Day With Meals. 270 tablet 2   • isosorbide mononitrate (IMDUR) 30 MG 24 hr tablet Take 1 tablet by mouth Daily. 90 tablet 3   • Lancets (FREESTYLE) lancets Use once daily 100 each 1   • loratadine (CLARITIN) 10 MG tablet Take 1 tablet by mouth Daily. 90 tablet 2   • metFORMIN ER (GLUCOPHAGE XR) 500 MG 24 hr tablet Take 1 tablet by mouth Every Night. 180 tablet 2   • metoprolol tartrate (LOPRESSOR) 25 MG tablet Take 1 tablet by mouth 2 (Two) Times a Day. 180 tablet 3   • Multiple Vitamins-Minerals (CENTRUM SILVER ADULT 50+) tablet Take 50 mg by mouth Daily. 90 tablet 1   • Multiple Vitamins-Minerals (PRESERVISION AREDS 2+MULTI VIT) capsule Take 1 capsule by mouth Daily. 90 capsule 1   • nitroglycerin (NITROSTAT) 0.4 MG SL tablet      • O2 (OXYGEN) Inhale 4 L/min Continuous. Per nasal cannula     • pantoprazole (PROTONIX) 40 MG EC tablet Take 1 tablet by mouth Daily. 90 tablet 2   • pravastatin (PRAVACHOL) 80 MG tablet Take 1 tablet by mouth Daily. 90 tablet 2   • terazosin (HYTRIN) 10 MG capsule Take 1 capsule by mouth Daily. 90 capsule 2   • tiotropium bromide monohydrate (SPIRIVA RESPIMAT) 2.5 MCG/ACT aerosol solution inhaler Inhale 2 puffs Daily. 3 inhaler 3   • vitamin C (ASCORBIC ACID) 500 MG tablet Take 1 tablet by mouth Daily. 90 tablet 1   • vitamin E 100 UNIT capsule Take 1 capsule by mouth Daily. 90 capsule 1     Allergies   Allergen Reactions   • Prozac  [Fluoxetine Hcl] Mental Status Change     Bad dreams.     Social History     Socioeconomic History   • Marital status:      Spouse name: Not on file   • Number of children: Not on file   • Years of education: Not on file   • Highest education level: Not on file   Social Needs   • Financial resource strain: Not on file   • Food insecurity - worry: Not on file   • Food insecurity - inability: Not on file   • Transportation needs - medical: Not on file   • Transportation needs - non-medical: Not on file   Occupational History   • Not on file   Tobacco Use   • Smoking status: Former Smoker     Packs/day: 1.00     Years: 26.00     Pack years: 26.00     Types: Cigarettes     Start date:      Last attempt to quit: 1980     Years since quittin.9   • Smokeless tobacco: Never Used   Substance and Sexual Activity   • Alcohol use: No   • Drug use: No   • Sexual activity: Defer   Other Topics Concern   • Not on file   Social History Narrative   • Not on file     Family History   Problem Relation Age of Onset   • Heart disease Mother    • Stroke Mother    • Melanoma Mother    • Heart disease Father    • Diabetes Father    • Prostate cancer Father    • Colon cancer Neg Hx    • Colon polyps Neg Hx      Health Maintenance   Topic Date Due   • URINE MICROALBUMIN  1934   • TDAP/TD VACCINES (1 - Tdap) 1953   • ZOSTER VACCINE (1 of 2) 1984   • MEDICARE ANNUAL WELLNESS  2016   • PNEUMOCOCCAL VACCINES (65+ LOW/MEDIUM RISK) (2 of 2 - PPSV23) 10/21/2016   • LIPID PANEL  2018   • HEMOGLOBIN A1C  2019   • INFLUENZA VACCINE  Completed     Review of Systems   Constitutional: Negative for activity change, appetite change, chills, diaphoresis, fatigue, fever and unexpected weight change.   HENT: Negative for ear pain, hearing loss, mouth sores, sore throat, trouble swallowing and voice change.    Eyes: Negative.    Respiratory: Positive for shortness of breath. Negative for cough, choking and  "wheezing.    Cardiovascular: Negative for chest pain and palpitations.   Gastrointestinal: Negative for abdominal pain, blood in stool, constipation, diarrhea, nausea and vomiting.   Endocrine: Negative for cold intolerance and heat intolerance.   Genitourinary: Negative for decreased urine volume, dysuria, frequency, hematuria and urgency.   Musculoskeletal: Negative for back pain, gait problem and myalgias.   Skin: Negative for color change, pallor and rash.   Allergic/Immunologic: Negative for food allergies and immunocompromised state.   Neurological: Negative for dizziness, tremors, seizures, syncope, weakness, light-headedness, numbness and headaches.   Hematological: Negative for adenopathy. Does not bruise/bleed easily.   Psychiatric/Behavioral: Negative for agitation and confusion. The patient is not nervous/anxious.    All other systems reviewed and are negative.    Objective   Vitals:    11/29/18 1317   BP: 142/82   Pulse: (!) 49   SpO2: 98%   Weight: 96.2 kg (212 lb)   Height: 180.3 cm (71\")     Body mass index is 29.57 kg/m².  Physical Exam   Constitutional: He is oriented to person, place, and time. He appears well-developed and well-nourished.   HENT:   Head: Normocephalic and atraumatic.   Eyes: Pupils are equal, round, and reactive to light.   Neck: Normal range of motion. Neck supple. No tracheal deviation present.   Cardiovascular: Normal rate, regular rhythm and normal heart sounds. Exam reveals no gallop and no friction rub.   No murmur heard.  Pulmonary/Chest: Effort normal and breath sounds normal. No respiratory distress. He has no wheezes. He has no rales. He exhibits no tenderness.   Abdominal: Soft. Bowel sounds are normal. He exhibits no distension. There is no hepatosplenomegaly. There is no tenderness. There is no rigidity, no rebound and no guarding.   Musculoskeletal: Normal range of motion. He exhibits no edema, tenderness or deformity.   Neurological: He is alert and oriented to " person, place, and time. He has normal reflexes.   Skin: Skin is warm and dry. No rash noted. No pallor.   Psychiatric: He has a normal mood and affect. His behavior is normal. Judgment and thought content normal.     Assessment/Plan   Gonzalo was seen today for gi problem.    Diagnoses and all orders for this visit:    Heme positive stool    Chronic obstructive pulmonary disease, unspecified COPD type (CMS/HCC)    HE HAS HAD FAIRLY RECENT ENDOSCOPY AND COLONOSCOPY. HE HAS PROGRESSIVE COPD WITH OXYGEN DEPENDENCE. WE HAVE DISCUSSED ALL RISKS BENEFITS AND ALTERNATIVES TO THE COLONOSCOPY AT THIS TIME. HE DOES NOT WISH TO PROCEED AT THIS TIME. HE IS AWARE THAT HE COULD DEVELOP A COLON CANCER OR OTHER AND DOES NOT WISH TO ASSUME THE RISK IN THE SETTING OF HIS LUNG DISEASE.     EMR Dragon/transcription disclaimer: Much of this encounter note is electronic transcription/translation of spoken language to printed text. The electronic translation of spoken language may be erroneous, or at times, nonsensical words or phrases may be inadvertently transcribed. Although I have reviewed the note for such errors, some may still exist.  Body mass index is 29.57 kg/m².  No Follow-up on file.  There are no Patient Instructions on file for this visit.  Patient's Body mass index is 29.57 kg/m². BMI is above normal parameters. Recommendations include: nutrition counseling.      All risks, benefits, alternatives, and indications of colonoscopy and/or Endoscopy procedure have been discussed with the patient. Risks to include perforation of the colon requiring possible surgery or colostomy, risk of bleeding from biopsies or removal of colon tissue, possibility of missing a colon polyp or cancer, or adverse drug reaction.  Benefits to include the diagnosis and management of disease of the colon and rectum. Alternatives to include barium enema, radiographic evaluation, lab testing or no intervention. Pt verbalizes understanding and agrees.      Erika Elena, APRN  11/29/2018  1:52 PM      Obesity, Adult  Obesity is the condition of having too much total body fat. Being overweight or obese means that your weight is greater than what is considered healthy for your body size. Obesity is determined by a measurement called BMI. BMI is an estimate of body fat and is calculated from height and weight. For adults, a BMI of 30 or higher is considered obese.  Obesity can eventually lead to other health concerns and major illnesses, including:  · Stroke.  · Coronary artery disease (CAD).  · Type 2 diabetes.  · Some types of cancer, including cancers of the colon, breast, uterus, and gallbladder.  · Osteoarthritis.  · High blood pressure (hypertension).  · High cholesterol.  · Sleep apnea.  · Gallbladder stones.  · Infertility problems.  What are the causes?  The main cause of obesity is taking in (consuming) more calories than your body uses for energy. Other factors that contribute to this condition may include:  · Being born with genes that make you more likely to become obese.  · Having a medical condition that causes obesity. These conditions include:  ¨ Hypothyroidism.  ¨ Polycystic ovarian syndrome (PCOS).  ¨ Binge-eating disorder.  ¨ Cushing syndrome.  · Taking certain medicines, such as steroids, antidepressants, and seizure medicines.  · Not being physically active (sedentary lifestyle).  · Living where there are limited places to exercise safely or buy healthy foods.  · Not getting enough sleep.  What increases the risk?  The following factors may increase your risk of this condition:  · Having a family history of obesity.  · Being a woman of -American descent.  · Being a man of  descent.  What are the signs or symptoms?  Having excessive body fat is the main symptom of this condition.  How is this diagnosed?  This condition may be diagnosed based on:  · Your symptoms.  · Your medical history.  · A physical exam. Your health care  provider may measure:  ¨ Your BMI. If you are an adult with a BMI between 25 and less than 30, you are considered overweight. If you are an adult with a BMI of 30 or higher, you are considered obese.  ¨ The distances around your hips and your waist (circumferences). These may be compared to each other to help diagnose your condition.  ¨ Your skinfold thickness. Your health care provider may gently pinch a fold of your skin and measure it.  How is this treated?  Treatment for this condition often includes changing your lifestyle. Treatment may include some or all of the following:  · Dietary changes. Work with your health care provider and a dietitian to set a weight-loss goal that is healthy and reasonable for you. Dietary changes may include eating:  ¨ Smaller portions. A portion size is the amount of a particular food that is healthy for you to eat at one time. This varies from person to person.  ¨ Low-calorie or low-fat options.  ¨ More whole grains, fruits, and vegetables.  · Regular physical activity. This may include aerobic activity (cardio) and strength training.  · Medicine to help you lose weight. Your health care provider may prescribe medicine if you are unable to lose 1 pound a week after 6 weeks of eating more healthily and doing more physical activity.  · Surgery. Surgical options may include gastric banding and gastric bypass. Surgery may be done if:  ¨ Other treatments have not helped to improve your condition.  ¨ You have a BMI of 40 or higher.  ¨ You have life-threatening health problems related to obesity.  Follow these instructions at home:     Eating and drinking     · Follow recommendations from your health care provider about what you eat and drink. Your health care provider may advise you to:  ¨ Limit fast foods, sweets, and processed snack foods.  ¨ Choose low-fat options, such as low-fat milk instead of whole milk.  ¨ Eat 5 or more servings of fruits or vegetables every day.  ¨ Eat at home  more often. This gives you more control over what you eat.  ¨ Choose healthy foods when you eat out.  ¨ Learn what a healthy portion size is.  ¨ Keep low-fat snacks on hand.  ¨ Avoid sugary drinks, such as soda, fruit juice, iced tea sweetened with sugar, and flavored milk.  ¨ Eat a healthy breakfast.  · Drink enough water to keep your urine clear or pale yellow.  · Do not go without eating for long periods of time (do not fast) or follow a fad diet. Fasting and fad diets can be unhealthy and even dangerous.  Physical Activity   · Exercise regularly, as told by your health care provider. Ask your health care provider what types of exercise are safe for you and how often you should exercise.  · Warm up and stretch before being active.  · Cool down and stretch after being active.  · Rest between periods of activity.  Lifestyle   · Limit the time that you spend in front of your TV, computer, or video game system.  · Find ways to reward yourself that do not involve food.  · Limit alcohol intake to no more than 1 drink a day for nonpregnant women and 2 drinks a day for men. One drink equals 12 oz of beer, 5 oz of wine, or 1½ oz of hard liquor.  General instructions   · Keep a weight loss journal to keep track of the food you eat and how much you exercise you get.  · Take over-the-counter and prescription medicines only as told by your health care provider.  · Take vitamins and supplements only as told by your health care provider.  · Consider joining a support group. Your health care provider may be able to recommend a support group.  · Keep all follow-up visits as told by your health care provider. This is important.  Contact a health care provider if:  · You are unable to meet your weight loss goal after 6 weeks of dietary and lifestyle changes.  This information is not intended to replace advice given to you by your health care provider. Make sure you discuss any questions you have with your health care  provider.  Document Released: 01/25/2006 Document Revised: 05/22/2017 Document Reviewed: 10/05/2016  [employer] Interactive Patient Education © 2017 [employer]      If you smoke or use tobacco, 4 minutes reading provided  Steps to Quit Smoking  Smoking tobacco can be harmful to your health and can affect almost every organ in your body. Smoking puts you, and those around you, at risk for developing many serious chronic diseases. Quitting smoking is difficult, but it is one of the best things that you can do for your health. It is never too late to quit.  What are the benefits of quitting smoking?  When you quit smoking, you lower your risk of developing serious diseases and conditions, such as:  · Lung cancer or lung disease, such as COPD.  · Heart disease.  · Stroke.  · Heart attack.  · Infertility.  · Osteoporosis and bone fractures.  Additionally, symptoms such as coughing, wheezing, and shortness of breath may get better when you quit. You may also find that you get sick less often because your body is stronger at fighting off colds and infections. If you are pregnant, quitting smoking can help to reduce your chances of having a baby of low birth weight.  How do I get ready to quit?  When you decide to quit smoking, create a plan to make sure that you are successful. Before you quit:  · Pick a date to quit. Set a date within the next two weeks to give you time to prepare.  · Write down the reasons why you are quitting. Keep this list in places where you will see it often, such as on your bathroom mirror or in your car or wallet.  · Identify the people, places, things, and activities that make you want to smoke (triggers) and avoid them. Make sure to take these actions:  ¨ Throw away all cigarettes at home, at work, and in your car.  ¨ Throw away smoking accessories, such as ashtrays and lighters.  ¨ Clean your car and make sure to empty the ashtray.  ¨ Clean your home, including curtains and carpets.  · Tell  your family, friends, and coworkers that you are quitting. Support from your loved ones can make quitting easier.  · Talk with your health care provider about your options for quitting smoking.  · Find out what treatment options are covered by your health insurance.  What strategies can I use to quit smoking?  Talk with your healthcare provider about different strategies to quit smoking. Some strategies include:  · Quitting smoking altogether instead of gradually lessening how much you smoke over a period of time. Research shows that quitting “cold turkey” is more successful than gradually quitting.  · Attending in-person counseling to help you build problem-solving skills. You are more likely to have success in quitting if you attend several counseling sessions. Even short sessions of 10 minutes can be effective.  · Finding resources and support systems that can help you to quit smoking and remain smoke-free after you quit. These resources are most helpful when you use them often. They can include:  ¨ Online chats with a counselor.  ¨ Telephone quitlines.  ¨ Printed self-help materials.  ¨ Support groups or group counseling.  ¨ Text messaging programs.  ¨ Mobile phone applications.  · Taking medicines to help you quit smoking. (If you are pregnant or breastfeeding, talk with your health care provider first.) Some medicines contain nicotine and some do not. Both types of medicines help with cravings, but the medicines that include nicotine help to relieve withdrawal symptoms. Your health care provider may recommend:  ¨ Nicotine patches, gum, or lozenges.  ¨ Nicotine inhalers or sprays.  ¨ Non-nicotine medicine that is taken by mouth.  Talk with your health care provider about combining strategies, such as taking medicines while you are also receiving in-person counseling. Using these two strategies together makes you more likely to succeed in quitting than if you used either strategy on its own.  If you are pregnant  or breastfeeding, talk with your health care provider about finding counseling or other support strategies to quit smoking. Do not take medicine to help you quit smoking unless told to do so by your health care provider.  What things can I do to make it easier to quit?  Quitting smoking might feel overwhelming at first, but there is a lot that you can do to make it easier. Take these important actions:  · Reach out to your family and friends and ask that they support and encourage you during this time. Call telephone quitlines, reach out to support groups, or work with a counselor for support.  · Ask people who smoke to avoid smoking around you.  · Avoid places that trigger you to smoke, such as bars, parties, or smoke-break areas at work.  · Spend time around people who do not smoke.  · Lessen stress in your life, because stress can be a smoking trigger for some people. To lessen stress, try:  ¨ Exercising regularly.  ¨ Deep-breathing exercises.  ¨ Yoga.  ¨ Meditating.  ¨ Performing a body scan. This involves closing your eyes, scanning your body from head to toe, and noticing which parts of your body are particularly tense. Purposefully relax the muscles in those areas.  · Download or purchase mobile phone or tablet apps (applications) that can help you stick to your quit plan by providing reminders, tips, and encouragement. There are many free apps, such as QuitGuide from the CDC (Centers for Disease Control and Prevention). You can find other support for quitting smoking (smoking cessation) through smokefree.gov and other websites.  How will I feel when I quit smoking?  Within the first 24 hours of quitting smoking, you may start to feel some withdrawal symptoms. These symptoms are usually most noticeable 2-3 days after quitting, but they usually do not last beyond 2-3 weeks. Changes or symptoms that you might experience include:  · Mood swings.  · Restlessness, anxiety, or irritation.  · Difficulty  concentrating.  · Dizziness.  · Strong cravings for sugary foods in addition to nicotine.  · Mild weight gain.  · Constipation.  · Nausea.  · Coughing or a sore throat.  · Changes in how your medicines work in your body.  · A depressed mood.  · Difficulty sleeping (insomnia).  After the first 2-3 weeks of quitting, you may start to notice more positive results, such as:  · Improved sense of smell and taste.  · Decreased coughing and sore throat.  · Slower heart rate.  · Lower blood pressure.  · Clearer skin.  · The ability to breathe more easily.  · Fewer sick days.  Quitting smoking is very challenging for most people. Do not get discouraged if you are not successful the first time. Some people need to make many attempts to quit before they achieve long-term success. Do your best to stick to your quit plan, and talk with your health care provider if you have any questions or concerns.  This information is not intended to replace advice given to you by your health care provider. Make sure you discuss any questions you have with your health care provider.  Document Released: 12/12/2002 Document Revised: 08/15/2017 Document Reviewed: 05/03/2016  [employer] Interactive Patient Education © 2017 [employer] Inc.

## 2018-11-30 ENCOUNTER — TELEPHONE (OUTPATIENT)
Dept: FAMILY MEDICINE CLINIC | Facility: CLINIC | Age: 83
End: 2018-11-30

## 2018-11-30 DIAGNOSIS — R04.0 EPISTAXIS, RECURRENT: Primary | ICD-10-CM

## 2018-11-30 RX ORDER — NITROGLYCERIN 0.4 MG/1
0.4 TABLET SUBLINGUAL
Qty: 30 TABLET | Refills: 0 | Status: SHIPPED | OUTPATIENT
Start: 2018-11-30 | End: 2019-02-25 | Stop reason: SDUPTHER

## 2018-11-30 RX ORDER — ALBUTEROL SULFATE 90 UG/1
2 AEROSOL, METERED RESPIRATORY (INHALATION)
Qty: 1 INHALER | Refills: 1 | Status: SHIPPED | OUTPATIENT
Start: 2018-11-30 | End: 2019-02-25 | Stop reason: SDUPTHER

## 2018-11-30 NOTE — TELEPHONE ENCOUNTER
He says he has been having occasional nose bleeds on the right side now for several months. He thinks the O2 has something to do with it. He is wanting to get a referral to ENT for this. Also he thinks he needs an inhaler like his Wife gets to help with congestion he gets in his chest.

## 2018-12-11 ENCOUNTER — OFFICE VISIT (OUTPATIENT)
Dept: OTOLARYNGOLOGY | Facility: CLINIC | Age: 83
End: 2018-12-11

## 2018-12-11 VITALS
TEMPERATURE: 98 F | SYSTOLIC BLOOD PRESSURE: 140 MMHG | DIASTOLIC BLOOD PRESSURE: 76 MMHG | WEIGHT: 215 LBS | RESPIRATION RATE: 20 BRPM | BODY MASS INDEX: 30.78 KG/M2 | HEART RATE: 65 BPM | HEIGHT: 70 IN

## 2018-12-11 DIAGNOSIS — R04.0 EPISTAXIS: Primary | ICD-10-CM

## 2018-12-11 DIAGNOSIS — Z79.01 ANTICOAGULATED: ICD-10-CM

## 2018-12-11 PROCEDURE — 99214 OFFICE O/P EST MOD 30 MIN: CPT | Performed by: NURSE PRACTITIONER

## 2018-12-11 RX ORDER — POLYVINYL ALCOHOL 14 MG/ML
SOLUTION/ DROPS OPHTHALMIC
COMMUNITY
Start: 2018-11-27 | End: 2020-02-26

## 2018-12-11 NOTE — PATIENT INSTRUCTIONS
MyPlate from [employer]  The general, healthful diet is based on the 2010 Dietary Guidelines for Americans. The amount of food you need to eat from each food group depends on your age, sex, and level of physical activity and can be individualized by a dietitian. Go to ChooseMyPlate.gov for more information.  What do I need to know about the MyPlate plan?  · Enjoy your food, but eat less.  · Avoid oversized portions.  ? ½ of your plate should include fruits and vegetables.  ? ¼ of your plate should be grains.  ? ¼ of your plate should be protein.  Grains  · Make at least half of your grains whole grains.  · For a 2,000 calorie daily food plan, eat 6 oz every day.  · 1 oz is about 1 slice bread, 1 cup cereal, or ½ cup cooked rice, cereal, or pasta.  Vegetables  · Make half your plate fruits and vegetables.  · For a 2,000 calorie daily food plan, eat 2½ cups every day.  · 1 cup is about 1 cup raw or cooked vegetables or vegetable juice or 2 cups raw leafy greens.  Fruits  · Make half your plate fruits and vegetables.  · For a 2,000 calorie daily food plan, eat 2 cups every day.  · 1 cup is about 1 cup fruit or 100% fruit juice or ½ cup dried fruit.  Protein  · For a 2,000 calorie daily food plan, eat 5½ oz every day.  · 1 oz is about 1 oz meat, poultry, or fish, ¼ cup cooked beans, 1 egg, 1 Tbsp peanut butter, or ½ oz nuts or seeds.  Dairy  · Switch to fat-free or low-fat (1%) milk.  · For a 2,000 calorie daily food plan, eat 3 cups every day.  · 1 cup is about 1 cup milk or yogurt or soy milk (soy beverage), 1½ oz natural cheese, or 2 oz processed cheese.  Fats, Oils, and Empty Calories  · Only small amounts of oils are recommended.  · Empty calories are calories from solid fats or added sugars.  · Compare sodium in foods like soup, bread, and frozen meals. Choose the foods with lower numbers.  · Drink water instead of sugary drinks.  What foods can I eat?  Grains  Whole grains such as whole wheat, quinoa, millet, and  bulgur. Bread, rolls, and pasta made from whole grains. Brown or wild rice. Hot or cold cereals made from whole grains and without added sugar.  Vegetables  All fresh vegetables, especially fresh red, dark green, or orange vegetables. Peas and beans. Low-sodium frozen or canned vegetables prepared without added salt. Low-sodium vegetable juices.  Fruits  All fresh, frozen, and dried fruits. Canned fruit packed in water or fruit juice without added sugar. Fruit juices without added sugar.  Meats and Other Protein Sources  Boiled, baked, or grilled lean meat trimmed of fat. Skinless poultry. Fresh seafood and shellfish. Canned seafood packed in water. Unsalted nuts and unsalted nut butters. Tofu. Dried beans and pea. Eggs.  Dairy  Low-fat or fat-free milk, yogurt, and cheeses.  Sweets and Desserts  Frozen desserts made from low-fat milk.  Fats and Oils  Olive, peanut, and canola oils and margarine. Salad dressing and mayonnaise made from these oils.  Other  Soups and casseroles made from allowed ingredients and without added fat or salt.  The items listed above may not be a complete list of recommended foods or beverages. Contact your dietitian for more options.  What foods are not recommended?  Grains  Sweetened, low-fiber cereals. Packaged baked goods. Snack crackers and chips. Cheese crackers, butter crackers, and biscuits. Frozen waffles, sweet breads, doughnuts, pastries, packaged baking mixes, pancakes, cakes, and cookies.  Vegetables  Regular canned or frozen vegetables or vegetables prepared with salt. Canned tomatoes. Canned tomato sauce. Fried vegetables. Vegetables in cream sauce or cheese sauce.  Fruits  Fruits packed in syrup or made with added sugar.  Meats and Other Protein Sources  Marbled or fatty meats such as ribs. Poultry with skin. Fried meats, poultry, eggs, or fish. Sausages, hot dogs, and deli meats such as pastrami, bologna, or salami.  Dairy  Whole milk, cream, cheeses made from whole milk,  sour cream. Ice cream or yogurt made from whole milk or with added sugar.  Beverages  For adults, no more than one alcoholic drink per day. Regular soft drinks or other sugary beverages. Juice drinks.  Sweets and Desserts  Sugary or fatty desserts, candy, and other sweets.  Fats and Oils  Solid shortening or partially hydrogenated oils. Solid margarine. Margarine that contains trans fats. Butter.  The items listed above may not be a complete list of foods and beverages to avoid. Contact your dietitian for more information.  This information is not intended to replace advice given to you by your health care provider. Make sure you discuss any questions you have with your health care provider.  Document Released: 01/06/2009 Document Revised: 05/25/2017 Document Reviewed: 11/26/2014  [employer] Interactive Patient Education © 2018 [employer]     Calorie Counting for Weight Loss  Calories are units of energy. Your body needs a certain amount of calories from food to keep you going throughout the day. When you eat more calories than your body needs, your body stores the extra calories as fat. When you eat fewer calories than your body needs, your body burns fat to get the energy it needs.  Calorie counting means keeping track of how many calories you eat and drink each day. Calorie counting can be helpful if you need to lose weight. If you make sure to eat fewer calories than your body needs, you should lose weight. Ask your health care provider what a healthy weight is for you.  For calorie counting to work, you will need to eat the right number of calories in a day in order to lose a healthy amount of weight per week. A dietitian can help you determine how many calories you need in a day and will give you suggestions on how to reach your calorie goal.  · A healthy amount of weight to lose per week is usually 1-2 lb (0.5-0.9 kg). This usually means that your daily calorie intake should be reduced by 500-750  calories.  · Eating 1,200 - 1,500 calories per day can help most women lose weight.  · Eating 1,500 - 1,800 calories per day can help most men lose weight.    What do I need to know about calorie counting?  In order to meet your daily calorie goal, you will need to:  · Find out how many calories are in each food you would like to eat. Try to do this before you eat.  · Decide how much of the food you plan to eat.  · Write down what you ate and how many calories it had. Doing this is called keeping a food log.    To successfully lose weight, it is important to balance calorie counting with a healthy lifestyle that includes regular activity. Aim for 150 minutes of moderate exercise (such as walking) or 75 minutes of vigorous exercise (such as running) each week.  Where do I find calorie information?    The number of calories in a food can be found on a Nutrition Facts label. If a food does not have a Nutrition Facts label, try to look up the calories online or ask your dietitian for help.  Remember that calories are listed per serving. If you choose to have more than one serving of a food, you will have to multiply the calories per serving by the amount of servings you plan to eat. For example, the label on a package of bread might say that a serving size is 1 slice and that there are 90 calories in a serving. If you eat 1 slice, you will have eaten 90 calories. If you eat 2 slices, you will have eaten 180 calories.  How do I keep a food log?  Immediately after each meal, record the following information in your food log:  · What you ate. Don't forget to include toppings, sauces, and other extras on the food.  · How much you ate. This can be measured in cups, ounces, or number of items.  · How many calories each food and drink had.  · The total number of calories in the meal.    Keep your food log near you, such as in a small notebook in your pocket, or use a mobile steven or website. Some programs will calculate calories  "for you and show you how many calories you have left for the day to meet your goal.  What are some calorie counting tips?  · Use your calories on foods and drinks that will fill you up and not leave you hungry:  ? Some examples of foods that fill you up are nuts and nut butters, vegetables, lean proteins, and high-fiber foods like whole grains. High-fiber foods are foods with more than 5 g fiber per serving.  ? Drinks such as sodas, specialty coffee drinks, alcohol, and juices have a lot of calories, yet do not fill you up.  · Eat nutritious foods and avoid empty calories. Empty calories are calories you get from foods or beverages that do not have many vitamins or protein, such as candy, sweets, and soda. It is better to have a nutritious high-calorie food (such as an avocado) than a food with few nutrients (such as a bag of chips).  · Know how many calories are in the foods you eat most often. This will help you calculate calorie counts faster.  · Pay attention to calories in drinks. Low-calorie drinks include water and unsweetened drinks.  · Pay attention to nutrition labels for \"low fat\" or \"fat free\" foods. These foods sometimes have the same amount of calories or more calories than the full fat versions. They also often have added sugar, starch, or salt, to make up for flavor that was removed with the fat.  · Find a way of tracking calories that works for you. Get creative. Try different apps or programs if writing down calories does not work for you.  What are some portion control tips?  · Know how many calories are in a serving. This will help you know how many servings of a certain food you can have.  · Use a measuring cup to measure serving sizes. You could also try weighing out portions on a kitchen scale. With time, you will be able to estimate serving sizes for some foods.  · Take some time to put servings of different foods on your favorite plates, bowls, and cups so you know what a serving looks " like.  · Try not to eat straight from a bag or box. Doing this can lead to overeating. Put the amount you would like to eat in a cup or on a plate to make sure you are eating the right portion.  · Use smaller plates, glasses, and bowls to prevent overeating.  · Try not to multitask (for example, watch TV or use your computer) while eating. If it is time to eat, sit down at a table and enjoy your food. This will help you to know when you are full. It will also help you to be aware of what you are eating and how much you are eating.  What are tips for following this plan?  Reading food labels  · Check the calorie count compared to the serving size. The serving size may be smaller than what you are used to eating.  · Check the source of the calories. Make sure the food you are eating is high in vitamins and protein and low in saturated and trans fats.  Shopping  · Read nutrition labels while you shop. This will help you make healthy decisions before you decide to purchase your food.  · Make a grocery list and stick to it.  Cooking  · Try to cook your favorite foods in a healthier way. For example, try baking instead of frying.  · Use low-fat dairy products.  Meal planning  · Use more fruits and vegetables. Half of your plate should be fruits and vegetables.  · Include lean proteins like poultry and fish.  How do I count calories when eating out?  · Ask for smaller portion sizes.  · Consider sharing an entree and sides instead of getting your own entree.  · If you get your own entree, eat only half. Ask for a box at the beginning of your meal and put the rest of your entree in it so you are not tempted to eat it.  · If calories are listed on the menu, choose the lower calorie options.  · Choose dishes that include vegetables, fruits, whole grains, low-fat dairy products, and lean protein.  · Choose items that are boiled, broiled, grilled, or steamed. Stay away from items that are buttered, battered, fried, or served  with cream sauce. Items labeled “crispy” are usually fried, unless stated otherwise.  · Choose water, low-fat milk, unsweetened iced tea, or other drinks without added sugar. If you want an alcoholic beverage, choose a lower calorie option such as a glass of wine or light beer.  · Ask for dressings, sauces, and syrups on the side. These are usually high in calories, so you should limit the amount you eat.  · If you want a salad, choose a garden salad and ask for grilled meats. Avoid extra toppings like molina, cheese, or fried items. Ask for the dressing on the side, or ask for olive oil and vinegar or lemon to use as dressing.  · Estimate how many servings of a food you are given. For example, a serving of cooked rice is ½ cup or about the size of half a baseball. Knowing serving sizes will help you be aware of how much food you are eating at restaurants. The list below tells you how big or small some common portion sizes are based on everyday objects:  ? 1 oz--4 stacked dice.  ? 3 oz--1 deck of cards.  ? 1 tsp--1 die.  ? 1 Tbsp--½ a ping-pong ball.  ? 2 Tbsp--1 ping-pong ball.  ? ½ cup--½ baseball.  ? 1 cup--1 baseball.  Summary  · Calorie counting means keeping track of how many calories you eat and drink each day. If you eat fewer calories than your body needs, you should lose weight.  · A healthy amount of weight to lose per week is usually 1-2 lb (0.5-0.9 kg). This usually means reducing your daily calorie intake by 500-750 calories.  · The number of calories in a food can be found on a Nutrition Facts label. If a food does not have a Nutrition Facts label, try to look up the calories online or ask your dietitian for help.  · Use your calories on foods and drinks that will fill you up, and not on foods and drinks that will leave you hungry.  · Use smaller plates, glasses, and bowls to prevent overeating.  This information is not intended to replace advice given to you by your health care provider. Make sure you  discuss any questions you have with your health care provider.  Document Released: 12/18/2006 Document Revised: 11/17/2017 Document Reviewed: 11/17/2017  [employer] Interactive Patient Education © 2018 [employer]     Exercising to Lose Weight  Exercising can help you to lose weight. In order to lose weight through exercise, you need to do vigorous-intensity exercise. You can tell that you are exercising with vigorous intensity if you are breathing very hard and fast and cannot hold a conversation while exercising.  Moderate-intensity exercise helps to maintain your current weight. You can tell that you are exercising at a moderate level if you have a higher heart rate and faster breathing, but you are still able to hold a conversation.  How often should I exercise?  Choose an activity that you enjoy and set realistic goals. Your health care provider can help you to make an activity plan that works for you. Exercise regularly as directed by your health care provider. This may include:  · Doing resistance training twice each week, such as:  ? Push-ups.  ? Sit-ups.  ? Lifting weights.  ? Using resistance bands.  · Doing a given intensity of exercise for a given amount of time. Choose from these options:  ? 150 minutes of moderate-intensity exercise every week.  ? 75 minutes of vigorous-intensity exercise every week.  ? A mix of moderate-intensity and vigorous-intensity exercise every week.    Children, pregnant women, people who are out of shape, people who are overweight, and older adults may need to consult a health care provider for individual recommendations. If you have any sort of medical condition, be sure to consult your health care provider before starting a new exercise program.  What are some activities that can help me to lose weight?  · Walking at a rate of at least 4.5 miles an hour.  · Jogging or running at a rate of 5 miles per hour.  · Biking at a rate of at least 10 miles per hour.  · Lap  swimming.  · Roller-skating or in-line skating.  · Cross-country skiing.  · Vigorous competitive sports, such as football, basketball, and soccer.  · Jumping rope.  · Aerobic dancing.  How can I be more active in my day-to-day activities?  · Use the stairs instead of the elevator.  · Take a walk during your lunch break.  · If you drive, park your car farther away from work or school.  · If you take public transportation, get off one stop early and walk the rest of the way.  · Make all of your phone calls while standing up and walking around.  · Get up, stretch, and walk around every 30 minutes throughout the day.  What guidelines should I follow while exercising?  · Do not exercise so much that you hurt yourself, feel dizzy, or get very short of breath.  · Consult your health care provider prior to starting a new exercise program.  · Wear comfortable clothes and shoes with good support.  · Drink plenty of water while you exercise to prevent dehydration or heat stroke. Body water is lost during exercise and must be replaced.  · Work out until you breathe faster and your heart beats faster.  This information is not intended to replace advice given to you by your health care provider. Make sure you discuss any questions you have with your health care provider.  Document Released: 01/20/2012 Document Revised: 05/25/2017 Document Reviewed: 05/21/2015  [employer] Interactive Patient Education © 2018 [employer]

## 2018-12-11 NOTE — PROGRESS NOTES
PRIMARY CARE PROVIDER: Abel Romero MD  REFERRING PROVIDER: No ref. provider found    Chief Complaint   Patient presents with   • Nose Bleed       Subjective   History of Present Illness:  Gonzalo Durham is a  84 y.o. male who complains of epistaxis. The symptoms are localized to the right nasal cavity. The patient has had moderate symptoms. The symptoms have been present for the last several years. He states he has had this problem for many years, but reports it is increasing in frequency. This occurs 2-3 times per week and takes several minutes to stop. This has not required intervention in the past. The symptoms are aggravated by  home O2, nose picking, ASA use. The symptoms are improved by digital pressure.     Review of Systems:  Review of Systems   Constitutional: Negative for chills and fever.   HENT: Positive for hearing loss, nosebleeds and postnasal drip. Negative for congestion, ear discharge, ear pain, trouble swallowing and voice change.    Cardiovascular: Negative for chest pain.   Musculoskeletal: Positive for arthralgias.   Hematological: Bruises/bleeds easily.       Past History:  Past Medical History:   Diagnosis Date   • Arthritis    • BPH (benign prostatic hypertrophy)    • CAD (coronary artery disease)    • CKD (chronic kidney disease)    • Diabetes mellitus (CMS/[facility])     Type 2   • DM (diabetes mellitus screen)    • Hx of colonic polyp    • Hypercholesteremia    • Hypertension      Past Surgical History:   Procedure Laterality Date   • APPENDECTOMY     • CARDIAC CATHETERIZATION     • COLONOSCOPY N/A 6/15/2017    Diverticulosis repeat exam prn   • COLONOSCOPY W/ POLYPECTOMY  10/21/2013    2 Tubular adenomatous polyps, Diverticulosis repeat exam in 5 years   • CORONARY ARTERY BYPASS GRAFT  1980    X 3   • ENDOSCOPY N/A 6/15/2017    LA Grade A reflux esophagitis     Family History   Problem Relation Age of Onset   • Heart disease Mother    • Stroke Mother    • Melanoma Mother    • Heart  disease Father    • Diabetes Father    • Prostate cancer Father    • Colon cancer Neg Hx    • Colon polyps Neg Hx      Social History     Tobacco Use   • Smoking status: Former Smoker     Packs/day: 1.00     Years: 26.00     Pack years: 26.00     Types: Cigarettes     Start date:      Last attempt to quit: 1980     Years since quittin.0   • Smokeless tobacco: Never Used   Substance Use Topics   • Alcohol use: No   • Drug use: No     Allergies:  Prozac [fluoxetine hcl]    Current Outpatient Medications:   •  acetaminophen (TYLENOL) 325 MG tablet, Take 2 tablets by mouth 2 (Two) Times a Day., Disp: 360 tablet, Rfl: 2  •  albuterol (PROAIR HFA) 108 (90 Base) MCG/ACT inhaler, Inhale 2 puffs 4 (Four) Times a Day., Disp: 1 inhaler, Rfl: 1  •  Artificial Tear Solution (JUST TEARS EYE DROPS) solution, Apply 1-2 drops to eye(s) as directed by provider Daily As Needed (dry eyes)., Disp: 45 mL, Rfl: 2  •  ARTIFICIAL TEARS 1.4 % ophthalmic solution, , Disp: , Rfl:   •  aspirin  MG tablet, Take 1 tablet by mouth Every Other Day., Disp: 45 tablet, Rfl: 2  •  calcium carbonate-vitamin d 600-400 MG-UNIT per tablet, Take 1 tablet by mouth 2 (Two) Times a Day., Disp: 180 tablet, Rfl: 2  •  Cinnamon 500 MG capsule, Take 1 capsule daily, Disp: 90 capsule, Rfl: 1  •  finasteride (PROSCAR) 5 MG tablet, Take 1 tablet by mouth Daily., Disp: 90 tablet, Rfl: 3  •  fluticasone (FLONASE) 50 MCG/ACT nasal spray, , Disp: , Rfl:   •  FREESTYLE LITE test strip, 1 each by Other route Daily., Disp: 100 each, Rfl: 2  •  gabapentin (NEURONTIN) 100 MG capsule, Take 2 capsules by mouth 2 (Two) Times a Day., Disp: 360 capsule, Rfl: 2  •  glyBURIDE (DIAbeta) 5 MG tablet, Take 1.5 tablets by mouth 2 (Two) Times a Day With Meals., Disp: 270 tablet, Rfl: 2  •  isosorbide mononitrate (IMDUR) 30 MG 24 hr tablet, Take 1 tablet by mouth Daily., Disp: 90 tablet, Rfl: 3  •  Lancets (FREESTYLE) lancets, Use once daily, Disp: 100 each, Rfl: 1  •   "loratadine (CLARITIN) 10 MG tablet, Take 1 tablet by mouth Daily., Disp: 90 tablet, Rfl: 2  •  metFORMIN ER (GLUCOPHAGE XR) 500 MG 24 hr tablet, Take 1 tablet by mouth Every Night., Disp: 180 tablet, Rfl: 2  •  metoprolol tartrate (LOPRESSOR) 25 MG tablet, Take 1 tablet by mouth 2 (Two) Times a Day., Disp: 180 tablet, Rfl: 3  •  Multiple Vitamins-Minerals (CENTRUM SILVER ADULT 50+) tablet, Take 50 mg by mouth Daily., Disp: 90 tablet, Rfl: 1  •  Multiple Vitamins-Minerals (PRESERVISION AREDS 2+MULTI VIT) capsule, Take 1 capsule by mouth Daily., Disp: 90 capsule, Rfl: 1  •  nitroglycerin (NITROSTAT) 0.4 MG SL tablet, Place 1 tablet under the tongue Every 5 (Five) Minutes As Needed for Chest Pain., Disp: 30 tablet, Rfl: 0  •  O2 (OXYGEN), Inhale 4 L/min Continuous. Per nasal cannula, Disp: , Rfl:   •  pantoprazole (PROTONIX) 40 MG EC tablet, Take 1 tablet by mouth Daily., Disp: 90 tablet, Rfl: 2  •  pravastatin (PRAVACHOL) 80 MG tablet, Take 1 tablet by mouth Daily., Disp: 90 tablet, Rfl: 2  •  terazosin (HYTRIN) 10 MG capsule, Take 1 capsule by mouth Daily., Disp: 90 capsule, Rfl: 2  •  tiotropium bromide monohydrate (SPIRIVA RESPIMAT) 2.5 MCG/ACT aerosol solution inhaler, Inhale 2 puffs Daily., Disp: 3 inhaler, Rfl: 3  •  vitamin C (ASCORBIC ACID) 500 MG tablet, Take 1 tablet by mouth Daily., Disp: 90 tablet, Rfl: 1  •  vitamin E 100 UNIT capsule, Take 1 capsule by mouth Daily., Disp: 90 capsule, Rfl: 1      Objective     Vital Signs:    /76   Pulse 65   Temp 98 °F (36.7 °C)   Resp 20   Ht 177.8 cm (70\")   Wt 97.5 kg (215 lb)   BMI 30.85 kg/m²     Physical Exam:  Physical Exam  CONSTITUTIONAL: well nourished, well-developed, alert, oriented, in no acute distress   COMMUNICATION AND VOICE: able to communicate normally, normal voice quality  HEAD: normocephalic, no lesions, atraumatic, no tenderness, no masses   FACE: appearance normal, no lesions, no tenderness, no deformities, facial motion " symmetric  SALIVARY GLANDS: parotid glands with no tenderness, no swelling, no masses, submandibular glands with normal size, nontender  EYES: ocular motility normal, eyelids normal, orbits normal, no proptosis, conjunctiva normal , pupils equal, round   EARS:  Hearing: response to conversational voice normal bilaterally   External Ears: auricles without lesions  Otoscopic: tympanic membrane appearance normal, no lesions, no perforation, normal mobility, no fluid  NOSE:  External Nose: structure normal, no tenderness on palpation, no nasal discharge, no lesions, no evidence of trauma, nostrils patent   Intranasal Exam: nasal mucosa normal, vestibule within normal limits, inferior turbinate normal, right nasal septum with crusting anteriorly at [place] area  ORAL:  Lips: upper and lower lips without lesion   Teeth: dentition within normal limits for age   Gums: gingivae healthy   Oral Mucosa: oral mucosa normal, no mucosal lesions   Floor of Mouth: Warthin’s duct patent, mucosa normal  Tongue: lingual mucosa normal without lesions, normal tongue mobility   Palate: soft and hard palates with normal mucosa and structure  Oropharynx: oropharyngeal mucosa normal  NECK: neck appearance normal, no masses or tenderness  LYMPH NODES: no lymphadenopathy  CHEST/RESPIRATORY: respiratory effort normal, normal breath sounds   CARDIOVASCULAR: rate and rhythm normal, extremities without cyanosis or edema    NEUROLOGIC/PSYCHIATRIC: oriented to time, place and person, mood normal, affect appropriate, CN II-XII intact grossly      Assessment   Assessment:  1. Epistaxis    2. BMI 30.0-30.9,adult    3. Anticoagulated-CAD, DM2/ASA 81        Plan   Plan:    New Medications Ordered This Visit   Medications   • mupirocin (BACTROBAN) 2 % ointment     Sig: Apply  topically to the appropriate area as directed 3 (Three) Times a Day for 7 days. APPLY INTRANASALLY     Dispense:  22 g     Refill:  0     Continue to humidify home O2. NO NOSE  PICKING. Stop Flonase for now. Start Mupirocin and saline spray intranasally. Epistaxis precautions discussed and handout given.     QUALITY MEASURES    Body Mass Index Screening and Follow-Up Plan  Body mass index is 30.85 kg/m².  Patient's Body mass index is 30.85 kg/m². BMI is above normal parameters. Recommendations include: educational material.    Tobacco Use: Screening and Cessation Intervention  Social History    Tobacco Use      Smoking status: Former Smoker        Packs/day: 1.00        Years: 26.00        Pack years: 26        Types: Cigarettes        Start date:         Quit date:         Years since quittin.0      Smokeless tobacco: Never Used      Return in about 6 weeks (around 2019) for Recheck.    My findings and recommendations were discussed and questions were answered.     Prema Barraza, APRN

## 2018-12-13 ENCOUNTER — TELEPHONE (OUTPATIENT)
Dept: PULMONOLOGY | Facility: CLINIC | Age: 83
End: 2018-12-13

## 2018-12-13 NOTE — TELEPHONE ENCOUNTER
Pt's dtr, Suyapa, called this morning asking if you thought he could use mupirocin ointment inside his nose. His ENT  Gave this to him for a noncancerous lesion inside his nose. Per Suyapa, [facility] has told the pt not to use any petroleum based products due to him wearing home 02. They told him it was a fire hazard.    Suyapa's phone number [phone]

## 2018-12-14 ENCOUNTER — TELEPHONE (OUTPATIENT)
Dept: FAMILY MEDICINE CLINIC | Facility: CLINIC | Age: 83
End: 2018-12-14

## 2018-12-14 DIAGNOSIS — R09.02 HYPOXIA: Primary | ICD-10-CM

## 2018-12-14 DIAGNOSIS — J96.11 CHRONIC RESPIRATORY FAILURE WITH HYPOXIA (HCC): ICD-10-CM

## 2018-12-14 DIAGNOSIS — R06.02 SHORTNESS OF BREATH: ICD-10-CM

## 2018-12-14 DIAGNOSIS — J98.4 CHRONIC LUNG DISEASE: ICD-10-CM

## 2018-12-14 NOTE — TELEPHONE ENCOUNTER
"\"I want an order for a portable oxygen concentrator\" order done and notified pt at the , per vm  "

## 2019-01-22 ENCOUNTER — OFFICE VISIT (OUTPATIENT)
Dept: OTOLARYNGOLOGY | Facility: CLINIC | Age: 84
End: 2019-01-22

## 2019-01-22 VITALS
DIASTOLIC BLOOD PRESSURE: 74 MMHG | HEART RATE: 80 BPM | RESPIRATION RATE: 20 BRPM | BODY MASS INDEX: 30.8 KG/M2 | WEIGHT: 220 LBS | HEIGHT: 71 IN | TEMPERATURE: 98 F | SYSTOLIC BLOOD PRESSURE: 132 MMHG

## 2019-01-22 DIAGNOSIS — Z79.01 ANTICOAGULATED: ICD-10-CM

## 2019-01-22 DIAGNOSIS — R04.0 EPISTAXIS: Primary | ICD-10-CM

## 2019-01-22 PROCEDURE — 99213 OFFICE O/P EST LOW 20 MIN: CPT | Performed by: NURSE PRACTITIONER

## 2019-01-22 NOTE — PROGRESS NOTES
PRIMARY CARE PROVIDER: Abel Romero MD  REFERRING PROVIDER: No ref. provider found    Chief Complaint   Patient presents with   • Follow-up     Nosebleeds       Subjective   History of Present Illness:  Gonzalo Durham is a  84 y.o. male who is here to follow-up from complaints of epistaxis. The symptoms are localized to the right nasal cavity. The patient has had a resolution of the symptoms. The symptoms have been resolved for the last several weeks. He has not had any more episodes of epistaxis since his last visit. The symptoms are aggravated by  home O2, nose picking, ASA use. The symptoms are improved by Mupirocin and saline nasal spray.       Review of Systems:  Review of Systems   Constitutional: Negative for chills and fever.   HENT: Positive for hearing loss and postnasal drip. Negative for congestion, ear discharge, ear pain, nosebleeds, trouble swallowing and voice change.    Cardiovascular: Negative for chest pain.   Musculoskeletal: Positive for arthralgias.   Hematological: Bruises/bleeds easily.       Past History:  Past Medical History:   Diagnosis Date   • Arthritis    • BPH (benign prostatic hypertrophy)    • CAD (coronary artery disease)    • CKD (chronic kidney disease)    • Diabetes mellitus (CMS/HCC)     Type 2   • DM (diabetes mellitus screen)    • Hx of colonic polyp    • Hypercholesteremia    • Hypertension      Past Surgical History:   Procedure Laterality Date   • APPENDECTOMY     • CARDIAC CATHETERIZATION     • COLONOSCOPY N/A 6/15/2017    Diverticulosis repeat exam prn   • COLONOSCOPY W/ POLYPECTOMY  10/21/2013    2 Tubular adenomatous polyps, Diverticulosis repeat exam in 5 years   • CORONARY ARTERY BYPASS GRAFT  1980    X 3   • ENDOSCOPY N/A 6/15/2017    LA Grade A reflux esophagitis     Family History   Problem Relation Age of Onset   • Heart disease Mother    • Stroke Mother    • Melanoma Mother    • Heart disease Father    • Diabetes Father    • Prostate cancer Father    •  Colon cancer Neg Hx    • Colon polyps Neg Hx      Social History     Tobacco Use   • Smoking status: Former Smoker     Packs/day: 1.00     Years: 26.00     Pack years: 26.00     Types: Cigarettes     Start date:      Last attempt to quit: 1980     Years since quittin.0   • Smokeless tobacco: Never Used   Substance Use Topics   • Alcohol use: No   • Drug use: No     Allergies:  Prozac [fluoxetine hcl]    Current Outpatient Medications:   •  acetaminophen (TYLENOL) 325 MG tablet, Take 2 tablets by mouth 2 (Two) Times a Day., Disp: 360 tablet, Rfl: 2  •  albuterol (PROAIR HFA) 108 (90 Base) MCG/ACT inhaler, Inhale 2 puffs 4 (Four) Times a Day., Disp: 1 inhaler, Rfl: 1  •  Artificial Tear Solution (JUST TEARS EYE DROPS) solution, Apply 1-2 drops to eye(s) as directed by provider Daily As Needed (dry eyes)., Disp: 45 mL, Rfl: 2  •  ARTIFICIAL TEARS 1.4 % ophthalmic solution, , Disp: , Rfl:   •  aspirin  MG tablet, Take 1 tablet by mouth Every Other Day., Disp: 45 tablet, Rfl: 2  •  calcium carbonate-vitamin d 600-400 MG-UNIT per tablet, Take 1 tablet by mouth 2 (Two) Times a Day., Disp: 180 tablet, Rfl: 2  •  Cinnamon 500 MG capsule, Take 1 capsule daily, Disp: 90 capsule, Rfl: 1  •  finasteride (PROSCAR) 5 MG tablet, Take 1 tablet by mouth Daily., Disp: 90 tablet, Rfl: 3  •  fluticasone (FLONASE) 50 MCG/ACT nasal spray, , Disp: , Rfl:   •  FREESTYLE LITE test strip, 1 each by Other route Daily., Disp: 100 each, Rfl: 2  •  gabapentin (NEURONTIN) 100 MG capsule, Take 2 capsules by mouth 2 (Two) Times a Day., Disp: 360 capsule, Rfl: 2  •  glyBURIDE (DIAbeta) 5 MG tablet, Take 1.5 tablets by mouth 2 (Two) Times a Day With Meals., Disp: 270 tablet, Rfl: 2  •  isosorbide mononitrate (IMDUR) 30 MG 24 hr tablet, Take 1 tablet by mouth Daily., Disp: 90 tablet, Rfl: 3  •  Lancets (FREESTYLE) lancets, Use once daily, Disp: 100 each, Rfl: 1  •  loratadine (CLARITIN) 10 MG tablet, Take 1 tablet by mouth Daily., Disp:  "90 tablet, Rfl: 2  •  metFORMIN ER (GLUCOPHAGE XR) 500 MG 24 hr tablet, Take 1 tablet by mouth Every Night., Disp: 180 tablet, Rfl: 2  •  metoprolol tartrate (LOPRESSOR) 25 MG tablet, Take 1 tablet by mouth 2 (Two) Times a Day., Disp: 180 tablet, Rfl: 3  •  Multiple Vitamins-Minerals (CENTRUM SILVER ADULT 50+) tablet, Take 50 mg by mouth Daily., Disp: 90 tablet, Rfl: 1  •  Multiple Vitamins-Minerals (PRESERVISION AREDS 2+MULTI VIT) capsule, Take 1 capsule by mouth Daily., Disp: 90 capsule, Rfl: 1  •  nitroglycerin (NITROSTAT) 0.4 MG SL tablet, Place 1 tablet under the tongue Every 5 (Five) Minutes As Needed for Chest Pain., Disp: 30 tablet, Rfl: 0  •  O2 (OXYGEN), Inhale 4 L/min Continuous. Per nasal cannula, Disp: , Rfl:   •  pantoprazole (PROTONIX) 40 MG EC tablet, Take 1 tablet by mouth Daily., Disp: 90 tablet, Rfl: 2  •  pravastatin (PRAVACHOL) 80 MG tablet, Take 1 tablet by mouth Daily., Disp: 90 tablet, Rfl: 2  •  terazosin (HYTRIN) 10 MG capsule, Take 1 capsule by mouth Daily., Disp: 90 capsule, Rfl: 2  •  tiotropium bromide monohydrate (SPIRIVA RESPIMAT) 2.5 MCG/ACT aerosol solution inhaler, Inhale 2 puffs Daily., Disp: 3 inhaler, Rfl: 3  •  vitamin C (ASCORBIC ACID) 500 MG tablet, Take 1 tablet by mouth Daily., Disp: 90 tablet, Rfl: 1  •  vitamin E 100 UNIT capsule, Take 1 capsule by mouth Daily., Disp: 90 capsule, Rfl: 1      Objective     Vital Signs:  Temp:  [98 °F (36.7 °C)] 98 °F (36.7 °C)  Heart Rate:  [80] 80  Resp:  [20] 20  BP: (132)/(74) 132/74 /74   Pulse 80   Temp 98 °F (36.7 °C)   Resp 20   Ht 180.3 cm (71\")   Wt 99.8 kg (220 lb)   BMI 30.68 kg/m²     Physical Exam:  Physical Exam  CONSTITUTIONAL: well nourished, well-developed, alert, oriented, in no acute distress   COMMUNICATION AND VOICE: able to communicate normally, normal voice quality  HEAD: normocephalic, no lesions, atraumatic, no tenderness, no masses   FACE: appearance normal, no lesions, no tenderness, no deformities, " facial motion symmetric  SALIVARY GLANDS: parotid glands with no tenderness, no swelling, no masses, submandibular glands with normal size, nontender  EYES: ocular motility normal, eyelids normal, orbits normal, no proptosis, conjunctiva normal , pupils equal, round   EARS:  Hearing: response to conversational voice normal bilaterally   External Ears: auricles without lesions  Otoscopic: tympanic membrane appearance normal, no lesions, no perforation, normal mobility, no fluid  NOSE:  External Nose: structure normal, no tenderness on palpation, no nasal discharge, no lesions, no evidence of trauma, nostrils patent   Intranasal Exam: nasal mucosa normal, vestibule within normal limits, inferior turbinate normal, right nasal septum is well healed without crusting  ORAL:  Lips: upper and lower lips without lesion   Teeth: dentition within normal limits for age   Gums: gingivae healthy   Oral Mucosa: oral mucosa normal, no mucosal lesions   Floor of Mouth: Warthin’s duct patent, mucosa normal  Tongue: lingual mucosa normal without lesions, normal tongue mobility   Palate: soft and hard palates with normal mucosa and structure  Oropharynx: oropharyngeal mucosa normal  NECK: neck appearance normal, no masses or tenderness  LYMPH NODES: no lymphadenopathy  CHEST/RESPIRATORY: respiratory effort normal, normal breath sounds   CARDIOVASCULAR: rate and rhythm normal, extremities without cyanosis or edema    NEUROLOGIC/PSYCHIATRIC: oriented to time, place and person, mood normal, affect appropriate, CN II-XII intact grossly      Assessment   Assessment:  1. Epistaxis    2. BMI 30.0-30.9,adult    3. Anticoagulated-CAD, DM2/ASA 81        Plan   Plan:    Continue to humidify home O2. NO NOSE PICKING. Continue saline spray intranasally PRN. Epistaxis precautions. Call for any problems or worsening symptoms especially recurrent epistaxis.    QUALITY MEASURES    Body Mass Index Screening and Follow-Up Plan  Body mass index is 30.68  kg/m².  Patient's Body mass index is 30.68 kg/m². BMI is above normal parameters. Recommendations include: educational material.    Tobacco Use: Screening and Cessation Intervention  Social History    Tobacco Use      Smoking status: Former Smoker        Packs/day: 1.00        Years: 26.00        Pack years: 26        Types: Cigarettes        Start date:         Quit date:         Years since quittin.0      Smokeless tobacco: Never Used      Return if symptoms worsen or fail to improve, for Recheck.    My findings and recommendations were discussed and questions were answered.     Prema Barraza, APRN

## 2019-02-20 PROBLEM — J44.9 STAGE 2 MODERATE COPD BY GOLD CLASSIFICATION (HCC): Status: ACTIVE | Noted: 2019-02-20

## 2019-02-23 NOTE — PROGRESS NOTES
DOMINIQUE Yeung  [facility]   Respiratory Disease Clinic  [address] [place] [address]  Phone: [phone]  Fax: [phone]     Gonzalo Durham is a 85 y.o. male.   CC:   Chief Complaint   Patient presents with   • Shortness of Breath        HPI: Mr. Durham is here for follow-up today with spirometry.  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  When he was here in October he was given Symbicort to try and had a bad reaction after taking it.  He reports that he passed out and that is now listed as an allergy.  He remains on oxygen most hours of the day.  He reports daily cough and sometimes has thick sputum that is difficult for him to clear.  He is agreeable to obtain a flutter valve and he is already taking Mucinex twice daily.  He continues on Spiriva and uses pro-air as needed.  He is followed by Dr. Romero for routine medical care and is up-to-date on his flu and pneumonia vaccines.  [facility]  He is accompanied by his daughter again today.    The following portions of the patient's history were reviewed and updated as appropriate: allergies, current medications, past family history, past medical history, past social history, past surgical history and problem list.    Past Medical History:   Diagnosis Date   • Arthritis    • BPH (benign prostatic hypertrophy)    • CAD (coronary artery disease)    • CKD (chronic kidney disease)    • Diabetes mellitus (CMS/[facility])     Type 2   • DM (diabetes mellitus screen)    • Hx of colonic polyp    • Hypercholesteremia    • Hypertension        Family History   Problem Relation Age of Onset   • Heart disease Mother    • Stroke Mother    • Melanoma Mother    • Heart disease Father    • Diabetes Father    • Prostate cancer Father    • Colon cancer  "Neg Hx    • Colon polyps Neg Hx        Social History     Socioeconomic History   • Marital status:      Spouse name: Not on file   • Number of children: Not on file   • Years of education: Not on file   • Highest education level: Not on file   Social Needs   • Financial resource strain: Not on file   • Food insecurity - worry: Not on file   • Food insecurity - inability: Not on file   • Transportation needs - medical: Not on file   • Transportation needs - non-medical: Not on file   Occupational History   • Not on file   Tobacco Use   • Smoking status: Former Smoker     Packs/day: 1.00     Years: 26.00     Pack years: 26.00     Types: Cigarettes     Start date:      Last attempt to quit: 1980     Years since quittin.1   • Smokeless tobacco: Never Used   Substance and Sexual Activity   • Alcohol use: No   • Drug use: No   • Sexual activity: Defer   Other Topics Concern   • Not on file   Social History Narrative   • Not on file       Review of Systems   Constitutional: Negative for appetite change, chills, fatigue and fever.   HENT: Negative for trouble swallowing and voice change.         Hard of hearing   Eyes: Negative for visual disturbance.   Respiratory: Positive for cough and shortness of breath. Negative for wheezing.    Cardiovascular: Negative for chest pain and leg swelling.   Gastrointestinal: Negative for abdominal distention, abdominal pain, nausea and vomiting.   Genitourinary: Negative.    Musculoskeletal: Negative for gait problem and myalgias.   Skin: Negative.    Neurological: Negative for weakness.   Hematological: Negative.    Psychiatric/Behavioral: The patient is not nervous/anxious.        /84   Pulse 70   Ht 181.6 cm (71.5\")   Wt 99.8 kg (220 lb)   SpO2 90% Comment: 3L  BMI 30.26 kg/m²     Physical Exam   Constitutional: He is oriented to person, place, and time. He appears well-developed and well-nourished. No distress. Nasal cannula in place.   HENT:   Head: " Normocephalic and atraumatic.   Bilateral hearing aids   Eyes: Pupils are equal, round, and reactive to light. No scleral icterus.   Neck: Normal range of motion. Neck supple.   Cardiovascular: Normal rate, regular rhythm, S1 normal and S2 normal.   Pulmonary/Chest: Effort normal. No tachypnea. He has decreased breath sounds (Throughout). He has no wheezes. He has no rhonchi. He has no rales.   Abdominal: Soft. Bowel sounds are normal. He exhibits no distension.   Musculoskeletal: Normal range of motion. He exhibits no edema.   Lymphadenopathy:     He has no cervical adenopathy.   Neurological: He is alert and oriented to person, place, and time.   Skin: Skin is warm and dry. No rash noted. No cyanosis. Nails show no clubbing.   Psychiatric: He has a normal mood and affect. His behavior is normal.   Vitals reviewed.      My interpretation of  Pulmonary Functions Testing: Spirometry shows a moderate obstructive defect with FEV1 of 66% predicted.  Actual FEV1/FVC is 54.93.  The patient has moderate, Gold stage II COPD.    FEV1   Date Value Ref Range Status   02/25/2019 66% liters Final     FVC   Date Value Ref Range Status   02/25/2019 92% liters Final     FEV1/FVC   Date Value Ref Range Status   02/25/2019 54.93% % Final       Gonzalo was seen today for shortness of breath.    Diagnoses and all orders for this visit:    Stage 2 moderate COPD by GOLD classification (CMS/[facility])  -     Pulmonary Function Test  -     OSCILLATING POSITIVE EXIRATORY PRESSURE (OPEP); Future    BMI 30.0-30.9,adult    Pulmonary emphysema, unspecified emphysema type (CMS/[facility])    Scarring of lung    Chronic respiratory failure with hypoxia and hypercapnia (CMS/[facility])    Asbestos exposure      Patient's Body mass index is 30.26 kg/m². BMI is above normal parameters. Recommendations include: Continue to try to increase physical activity.      Follow-up/Plan: He continues on chronic oxygen.  Continue Spiriva, pro-air and Mucinex.  He was given a  prescription to obtain a flutter valve to use throughout the day to help with airway clearance.  Return to clinic in 6 months.    Feliz Frye, APRN  2/25/2019  6:13 PM

## 2019-02-25 ENCOUNTER — OFFICE VISIT (OUTPATIENT)
Dept: PULMONOLOGY | Facility: CLINIC | Age: 84
End: 2019-02-25

## 2019-02-25 ENCOUNTER — TELEPHONE (OUTPATIENT)
Dept: FAMILY MEDICINE CLINIC | Facility: CLINIC | Age: 84
End: 2019-02-25

## 2019-02-25 VITALS
SYSTOLIC BLOOD PRESSURE: 130 MMHG | HEIGHT: 72 IN | HEART RATE: 70 BPM | DIASTOLIC BLOOD PRESSURE: 84 MMHG | WEIGHT: 220 LBS | OXYGEN SATURATION: 90 % | BODY MASS INDEX: 29.8 KG/M2

## 2019-02-25 DIAGNOSIS — J96.12 CHRONIC RESPIRATORY FAILURE WITH HYPOXIA AND HYPERCAPNIA (HCC): ICD-10-CM

## 2019-02-25 DIAGNOSIS — J43.9 PULMONARY EMPHYSEMA, UNSPECIFIED EMPHYSEMA TYPE (HCC): ICD-10-CM

## 2019-02-25 DIAGNOSIS — J44.9 STAGE 2 MODERATE COPD BY GOLD CLASSIFICATION (HCC): Primary | ICD-10-CM

## 2019-02-25 DIAGNOSIS — J96.11 CHRONIC RESPIRATORY FAILURE WITH HYPOXIA AND HYPERCAPNIA (HCC): ICD-10-CM

## 2019-02-25 DIAGNOSIS — J98.4 SCARRING OF LUNG: ICD-10-CM

## 2019-02-25 DIAGNOSIS — Z77.090 ASBESTOS EXPOSURE: ICD-10-CM

## 2019-02-25 LAB
FEV1/FVC: NORMAL %
FEV1: NORMAL LITERS
FVC VOL RESPIRATORY: NORMAL LITERS

## 2019-02-25 PROCEDURE — 94010 BREATHING CAPACITY TEST: CPT | Performed by: NURSE PRACTITIONER

## 2019-02-25 PROCEDURE — 99214 OFFICE O/P EST MOD 30 MIN: CPT | Performed by: NURSE PRACTITIONER

## 2019-02-25 RX ORDER — ALBUTEROL SULFATE 90 UG/1
2 AEROSOL, METERED RESPIRATORY (INHALATION)
Qty: 1 INHALER | Refills: 1 | Status: SHIPPED | OUTPATIENT
Start: 2019-02-25 | End: 2019-08-30 | Stop reason: SDUPTHER

## 2019-02-25 RX ORDER — NITROGLYCERIN 0.4 MG/1
0.4 TABLET SUBLINGUAL
Qty: 30 TABLET | Refills: 0 | Status: SHIPPED | OUTPATIENT
Start: 2019-02-25 | End: 2021-03-29 | Stop reason: SDUPTHER

## 2019-02-25 NOTE — TELEPHONE ENCOUNTER
Pt came in requesting that his Pro Air, nitro Rx be [facility].      Printed Rx to have Dr. Romero to sign

## 2019-03-06 ENCOUNTER — TELEPHONE (OUTPATIENT)
Dept: FAMILY MEDICINE CLINIC | Facility: CLINIC | Age: 84
End: 2019-03-06

## 2019-03-06 NOTE — TELEPHONE ENCOUNTER
"Per [facility] \"he is starting our program and wanted to see if we could get his last office visit and med list sent to us?  "

## 2019-03-26 ENCOUNTER — TELEPHONE (OUTPATIENT)
Dept: FAMILY MEDICINE CLINIC | Facility: CLINIC | Age: 84
End: 2019-03-26

## 2019-03-26 DIAGNOSIS — R00.1 SLOW HEART RATE: Primary | ICD-10-CM

## 2019-03-26 NOTE — TELEPHONE ENCOUNTER
She says he told her his heart rate yesterday was 37 and he felt dizzy like he was going to pass out. Today his bp was 110/60 pluse was 63. She said he told her he was going to stop by our office and talk to a nurse, but has not showed up yet.

## 2019-03-29 ENCOUNTER — HOSPITAL ENCOUNTER (OUTPATIENT)
Dept: CARDIOLOGY | Facility: HOSPITAL | Age: 84
Discharge: HOME | End: 2019-03-29
Admitting: FAMILY MEDICINE

## 2019-03-29 DIAGNOSIS — R00.1 SLOW HEART RATE: ICD-10-CM

## 2019-03-29 PROCEDURE — 93225 XTRNL ECG REC<48 HRS REC: CPT

## 2019-03-29 PROCEDURE — 93226 XTRNL ECG REC<48 HR SCAN A/R: CPT

## 2019-04-12 PROCEDURE — 93227 XTRNL ECG REC<48 HR R&I: CPT | Performed by: INTERNAL MEDICINE

## 2019-04-29 ENCOUNTER — OFFICE VISIT (OUTPATIENT)
Dept: CARDIOLOGY | Facility: CLINIC | Age: 84
End: 2019-04-29

## 2019-04-29 VITALS
BODY MASS INDEX: 30.1 KG/M2 | DIASTOLIC BLOOD PRESSURE: 60 MMHG | WEIGHT: 215 LBS | HEART RATE: 63 BPM | SYSTOLIC BLOOD PRESSURE: 110 MMHG | HEIGHT: 71 IN

## 2019-04-29 DIAGNOSIS — I25.10 CORONARY ARTERY DISEASE INVOLVING NATIVE CORONARY ARTERY OF NATIVE HEART WITHOUT ANGINA PECTORIS: Chronic | ICD-10-CM

## 2019-04-29 DIAGNOSIS — R25.1 TREMOR: ICD-10-CM

## 2019-04-29 DIAGNOSIS — N18.9 CHRONIC KIDNEY DISEASE, UNSPECIFIED CKD STAGE: Chronic | ICD-10-CM

## 2019-04-29 DIAGNOSIS — E78.2 MIXED HYPERLIPIDEMIA: Chronic | ICD-10-CM

## 2019-04-29 DIAGNOSIS — J98.4 CHRONIC LUNG DISEASE: ICD-10-CM

## 2019-04-29 DIAGNOSIS — R94.39 ABNORMAL STRESS TEST: ICD-10-CM

## 2019-04-29 DIAGNOSIS — I70.90 ATHEROSCLEROSIS: ICD-10-CM

## 2019-04-29 DIAGNOSIS — R06.09 DOE (DYSPNEA ON EXERTION): ICD-10-CM

## 2019-04-29 DIAGNOSIS — I10 ESSENTIAL HYPERTENSION: Chronic | ICD-10-CM

## 2019-04-29 DIAGNOSIS — E11.21 CONTROLLED TYPE 2 DIABETES MELLITUS WITH DIABETIC NEPHROPATHY, WITHOUT LONG-TERM CURRENT USE OF INSULIN (HCC): Chronic | ICD-10-CM

## 2019-04-29 DIAGNOSIS — Z79.01 ANTICOAGULATED: Primary | ICD-10-CM

## 2019-04-29 PROBLEM — IMO0002 DIABETES TYPE 2, UNCONTROLLED: Status: RESOLVED | Noted: 2017-04-13 | Resolved: 2019-04-29

## 2019-04-29 PROCEDURE — 99214 OFFICE O/P EST MOD 30 MIN: CPT | Performed by: INTERNAL MEDICINE

## 2019-04-29 PROCEDURE — 93000 ELECTROCARDIOGRAM COMPLETE: CPT | Performed by: INTERNAL MEDICINE

## 2019-04-29 NOTE — PROGRESS NOTES
Gonzalo Durhma  [id number]  1934  85 y.o.  male    Referring Provider: Abel Romero MD    Reason for Follow-up Visit:   Routine follow up.   visit for evaluation for  Shortness of breath   coronary artery disease Coronary artery bypass grafting 1980 x 3 [facility]    Subjective    Moderate chronic exertional shortness of breath on exertion relieved with rest  No significant cough or wheezing  Going on for several months  No palpitations    No significant pedal edema  No fever or chills  No significant expectoration  No hemoptysis    No presyncope or syncope   Feels tired   Arthritic pain in small joints   Abnormal cardiac stress test raising suspicion for underlying hemodynamically significant obstructive coronary artery disease .     No symptoms reminiscent of prior angina.      History of present illness:  Gonzalo Durham is a 85 y.o. yo male with history of coronary artery disease Coronary artery bypass grafting who presents today for   Chief Complaint   Patient presents with   • Coronary Artery Disease     6 mo f/u   • Fatigue     patient stated he doesnt have any energy    .    History  Past Medical History:   Diagnosis Date   • Arthritis    • BPH (benign prostatic hypertrophy)    • CAD (coronary artery disease)    • CKD (chronic kidney disease)    • Diabetes mellitus (CMS/HCC)     Type 2   • DM (diabetes mellitus screen)    • Hx of colonic polyp    • Hypercholesteremia    • Hypertension    ,   Past Surgical History:   Procedure Laterality Date   • APPENDECTOMY     • CARDIAC CATHETERIZATION     • COLONOSCOPY N/A 6/15/2017    Diverticulosis repeat exam prn   • COLONOSCOPY W/ POLYPECTOMY  10/21/2013    2 Tubular adenomatous polyps, Diverticulosis repeat exam in 5 years   • CORONARY ARTERY BYPASS GRAFT  1980    X 3   • ENDOSCOPY N/A 6/15/2017    LA Grade A reflux esophagitis   ,   Family History   Problem Relation Age of Onset   • Heart disease Mother    • Stroke Mother    • Melanoma Mother    • Heart  disease Father    • Diabetes Father    • Prostate cancer Father    • Colon cancer Neg Hx    • Colon polyps Neg Hx    ,   Social History     Tobacco Use   • Smoking status: Former Smoker     Packs/day: 1.00     Years: 26.00     Pack years: 26.00     Types: Cigarettes     Start date:      Last attempt to quit: 1980     Years since quittin.3   • Smokeless tobacco: Never Used   Substance Use Topics   • Alcohol use: No   • Drug use: No   ,     Medications  Current Outpatient Medications   Medication Sig Dispense Refill   • acetaminophen (TYLENOL) 325 MG tablet Take 2 tablets by mouth 2 (Two) Times a Day. 360 tablet 2   • albuterol sulfate HFA (PROAIR HFA) 108 (90 Base) MCG/ACT inhaler Inhale 2 puffs 4 (Four) Times a Day. 1 inhaler 1   • Artificial Tear Solution (JUST TEARS EYE DROPS) solution Apply 1-2 drops to eye(s) as directed by provider Daily As Needed (dry eyes). 45 mL 2   • ARTIFICIAL TEARS 1.4 % ophthalmic solution      • aspirin  MG tablet Take 1 tablet by mouth Every Other Day. 45 tablet 2   • calcium carbonate-vitamin d 600-400 MG-UNIT per tablet Take 1 tablet by mouth 2 (Two) Times a Day. 180 tablet 2   • Cinnamon 500 MG capsule Take 1 capsule daily 90 capsule 1   • finasteride (PROSCAR) 5 MG tablet Take 1 tablet by mouth Daily. 90 tablet 3   • fluticasone (FLONASE) 50 MCG/ACT nasal spray      • FREESTYLE LITE test strip 1 each by Other route Daily. 100 each 2   • gabapentin (NEURONTIN) 100 MG capsule Take 2 capsules by mouth 2 (Two) Times a Day. 360 capsule 2   • glyBURIDE (DIAbeta) 5 MG tablet Take 1.5 tablets by mouth 2 (Two) Times a Day With Meals. 270 tablet 2   • isosorbide mononitrate (IMDUR) 30 MG 24 hr tablet Take 1 tablet by mouth Daily. 90 tablet 3   • Lancets (FREESTYLE) lancets Use once daily 100 each 1   • loratadine (CLARITIN) 10 MG tablet Take 1 tablet by mouth Daily. 90 tablet 2   • metFORMIN ER (GLUCOPHAGE XR) 500 MG 24 hr tablet Take 1 tablet by mouth Every Night. 180 tablet  2   • metoprolol tartrate (LOPRESSOR) 25 MG tablet Take 1 tablet by mouth 2 (Two) Times a Day. 180 tablet 3   • Multiple Vitamins-Minerals (CENTRUM SILVER ADULT 50+) tablet Take 50 mg by mouth Daily. 90 tablet 1   • Multiple Vitamins-Minerals (PRESERVISION AREDS 2+MULTI VIT) capsule Take 1 capsule by mouth Daily. 90 capsule 1   • nitroglycerin (NITROSTAT) 0.4 MG SL tablet Place 1 tablet under the tongue Every 5 (Five) Minutes As Needed for Chest Pain. 30 tablet 0   • O2 (OXYGEN) Inhale 4 L/min Continuous. Per nasal cannula     • pantoprazole (PROTONIX) 40 MG EC tablet Take 1 tablet by mouth Daily. 90 tablet 2   • pravastatin (PRAVACHOL) 80 MG tablet Take 1 tablet by mouth Daily. 90 tablet 2   • terazosin (HYTRIN) 10 MG capsule Take 1 capsule by mouth Daily. 90 capsule 2   • tiotropium bromide monohydrate (SPIRIVA RESPIMAT) 2.5 MCG/ACT aerosol solution inhaler Inhale 2 puffs Daily. 3 inhaler 3   • vitamin C (ASCORBIC ACID) 500 MG tablet Take 1 tablet by mouth Daily. 90 tablet 1   • vitamin E 100 UNIT capsule Take 1 capsule by mouth Daily. 90 capsule 1     No current facility-administered medications for this visit.        Allergies:  Symbicort [budesonide-formoterol fumarate] and Prozac [fluoxetine hcl]    Review of Systems  Review of Systems   Constitution: Positive for weakness and malaise/fatigue.   HENT: Negative.    Eyes: Negative.    Cardiovascular: Positive for dyspnea on exertion. Negative for chest pain, claudication, cyanosis, irregular heartbeat, leg swelling, near-syncope, orthopnea, palpitations, paroxysmal nocturnal dyspnea and syncope.   Respiratory: Negative.    Endocrine: Negative.    Hematologic/Lymphatic: Negative.    Skin: Negative.    Musculoskeletal: Positive for arthritis and joint pain.   Gastrointestinal: Negative for anorexia.   Genitourinary: Negative.    Psychiatric/Behavioral: Negative.        Objective     Physical Exam:  /60 (BP Location: Left arm, Patient Position: Sitting, Cuff  "Size: Adult)   Pulse 63   Ht 180.3 cm (71\")   Wt 97.5 kg (215 lb)   BMI 29.99 kg/m²     Physical Exam   Constitutional: He appears well-developed.   HENT:   Head: Normocephalic.   Neck: Normal carotid pulses and no JVD present. No tracheal tenderness present. Carotid bruit is not present. No tracheal deviation and no edema present.   Cardiovascular: Regular rhythm and normal pulses.   Murmur heard.   Systolic murmur is present with a grade of 2/6.  Pulmonary/Chest: Effort normal. No stridor.   Abdominal: Soft.   Neurological: He is alert. He has normal strength. No cranial nerve deficit or sensory deficit.   Skin: Skin is warm.   Psychiatric: He has a normal mood and affect. His speech is normal and behavior is normal.       Results Review:    Echo     · Right ventricular cavity is mild-to-moderately dilated.  · Left ventricular diastolic dysfunction (grade I) consistent with impaired relaxation.  · Left ventricular systolic function is normal.  · Left atrial cavity size is borderline dilated.     RIGHT HEART ENLARGEMENT - RVSP NOT ASSESSABLE  NORMAL LV AND RV SYSTOLIC FUNCTION  NO SIGNIFICANT VALVULAR DYSFUNCTION    Holter    · Average HR: 71. Min HR: 37. Max HR: 173.     · Monitored for approximately 1 day   · The predominant rhythm noted during the testing period was sinus rhythm.  · Bradycardia in usual sleeping hours.   · 1 premature ventricular contractions: PVCs:  <0.1%   · 45   atrial premature contractions:   APCs: <0.1%             · No significant supraventricular  tachy or justina arrhythmia.  · No significant  ventricular tachy or justina arrhythmia.  · No correlated arrhythmia  · No significant pauses above 3 seconds    Results for orders placed during the hospital encounter of 07/24/18   Adult Stress Echo W/ Cont or Stress Agent if Necessary Per Protocol    Addendum  · The following left ventricular wall segments are hypokinetic: mid  anterior, apical lateral, apical septal, apex hypokinetic and mid  " anteroseptal.   HIGH PROBABILITY OF LAD DISTRIBUTION ISCHEMIA       Tom Dhillon MD 7/25/2018  1:45 PM          Narrative · The following left ventricular wall segments are hypokinetic: mid   anterior, apical lateral, apical septal, apex hypokinetic and mid   anteroseptal.     HIGH PROBABILITY OF LAD DISTRIBUTION ISCHEMIA       ·  Right ventricular cavity is mild-to-moderately dilated.  · Left ventricular diastolic dysfunction (grade I) consistent with impaired relaxation.  · Left ventricular systolic function is normal.  Left atrial cavity size is borderline dilated.    ECG 12 Lead  Date/Time: 4/29/2019 9:31 AM  Performed by: Bradley Carver MD  Authorized by: Bradley Carver MD   Comparison: compared with previous ECG from 10/30/2018  Similar to previous ECG  Rhythm: sinus rhythm  Ectopy: atrial premature contractions  Conduction: conduction normal  ST Segments: ST segments normal  T Waves: T waves normal  Other: no other findings    Clinical impression: abnormal EKG            Assessment/Plan   Gonzalo was seen today for coronary artery disease and fatigue.    Diagnoses and all orders for this visit:    Anticoagulated-CAD, DM2/ASA 81    Atherosclerosis: CAD, AAA    BMI 30.0-30.9,adult    Chronic kidney disease, unspecified CKD stage    Chronic lung disease    Coronary artery disease involving native coronary artery of native heart without angina pectoris  -     ECG 12 Lead    Controlled type 2 diabetes mellitus with diabetic nephropathy, without long-term current use of insulin (CMS/[facility])    Essential hypertension    Mixed hyperlipidemia    Tremor    Abnormal stress test  -     XR chest pa and lateral; Future  -     Case Request Cath Lab: Cardiac Catheterization/Vascular Study  Positive occult blood in stools   ? BMS vs REGGIE  Get cabg report        DAMON (dyspnea on exertion)  -     Adult Transthoracic Echo Limited W/ Cont if Necessary Per Protocol; Future    Other orders  -     Clip bilateral groin.;  Standing  -     Notify MD if patient is taking the following medications or has a contrast allergy.; Standing  -     CBC & Differential; Standing  -     Comprehensive Metabolic Panel; Standing  -     Protime-INR; Standing          Plan        In past wanted conservative management but due to marked  shortness of breath wants cardiac cath now    Recommend cardiac catheterization, selective coronary angiography, left ventriculography and percutaneous coronary intervention with application of arteriotomy hemostatic closure device.    I discussed cardiac catheterization, the procedure, risks (including bleeding, infection, vascular damage [including minor oozing, bruising, bleeding, and up to and including but not limited to the need for vascular surgery, emergency cardiothoracic surgery, contrast reaction, renal failure, respiratory failure, heart attack, stroke, arrhythmia and even death), benefits, and alternatives and the patient has voiced understanding and is willing to proceed.    Adequate pre-hydration and post cardiac catheterization hydration.  Premedications as required and indicated for cardiac catheterization.    No contraindication to drug eluting stent placement if required  Further recommendations pending results of cardiac catheterization      Limited echo for RVSP and LV and RV function to minimize contrast during cath if possible    ____________________________________________________________________________________________________________________________________________  Health maintenance and recommendations      Low salt/ HTN/ Heart healthy carbohydrate restricted cardiac diet   The patient is advised to reduce or avoid caffeine or other cardiac stimulants.     The patient was advised to avoid long term NSAIDS , use Tylenol PRN instead  Avoid cardiac stimulants including common drugs like Pseudoephedrine or excessive amounts of caffeine    Monitor for any signs of bleeding including red or dark  stools. Fall precautions.        Advised staying uptodate with immunizations per established standard guidelines.      Offered to give patient  a copy      Questions were encouraged, asked and answered to the patient's  understanding and satisfaction. Questions if any regarding current medications and side effects, need for refills and importance of compliance to medications stressed.    Reviewed available prior notes, consults, prior visits, laboratory findings, radiology and cardiology relevant reports. Updated chart as applicable. I have reviewed the patient's medical history in detail and updated the computerized patient record as relevant.      Updated patient regarding any new or relevant abnormalities on review of records or any new findings on physical exam. Mentioned to patient about purpose of visit and desirable health short and long term goals and objectives.    Primary to monitor CBC CMP Lipid panel and TSH as applicable    ___________________________________________________________________________________________________________________________________________            Return in about 3 months (around 7/29/2019).

## 2019-05-14 DIAGNOSIS — E55.9 VITAMIN D DEFICIENCY: ICD-10-CM

## 2019-05-14 DIAGNOSIS — E78.5 HYPERLIPIDEMIA, UNSPECIFIED HYPERLIPIDEMIA TYPE: ICD-10-CM

## 2019-05-14 DIAGNOSIS — Z12.5 SCREENING PSA (PROSTATE SPECIFIC ANTIGEN): ICD-10-CM

## 2019-05-14 DIAGNOSIS — I10 HYPERTENSION, UNSPECIFIED TYPE: Primary | ICD-10-CM

## 2019-05-14 DIAGNOSIS — D64.9 ANEMIA, UNSPECIFIED TYPE: ICD-10-CM

## 2019-05-14 DIAGNOSIS — E11.9 CONTROLLED TYPE 2 DIABETES MELLITUS WITHOUT COMPLICATION, WITHOUT LONG-TERM CURRENT USE OF INSULIN (HCC): ICD-10-CM

## 2019-05-15 ENCOUNTER — HOSPITAL ENCOUNTER (OUTPATIENT)
Dept: CARDIOLOGY | Facility: HOSPITAL | Age: 84
Discharge: HOME | End: 2019-05-15
Admitting: INTERNAL MEDICINE

## 2019-05-15 VITALS
WEIGHT: 215 LBS | DIASTOLIC BLOOD PRESSURE: 50 MMHG | HEIGHT: 71 IN | BODY MASS INDEX: 30.1 KG/M2 | SYSTOLIC BLOOD PRESSURE: 110 MMHG

## 2019-05-15 DIAGNOSIS — R06.09 DOE (DYSPNEA ON EXERTION): ICD-10-CM

## 2019-05-15 PROCEDURE — 93325 DOPPLER ECHO COLOR FLOW MAPG: CPT | Performed by: INTERNAL MEDICINE

## 2019-05-15 PROCEDURE — 93325 DOPPLER ECHO COLOR FLOW MAPG: CPT

## 2019-05-15 PROCEDURE — 93321 DOPPLER ECHO F-UP/LMTD STD: CPT

## 2019-05-15 PROCEDURE — 93308 TTE F-UP OR LMTD: CPT | Performed by: INTERNAL MEDICINE

## 2019-05-15 PROCEDURE — 93321 DOPPLER ECHO F-UP/LMTD STD: CPT | Performed by: INTERNAL MEDICINE

## 2019-05-15 PROCEDURE — 93308 TTE F-UP OR LMTD: CPT

## 2019-05-16 ENCOUNTER — OFFICE VISIT (OUTPATIENT)
Dept: FAMILY MEDICINE CLINIC | Facility: CLINIC | Age: 84
End: 2019-05-16

## 2019-05-16 VITALS
RESPIRATION RATE: 18 BRPM | SYSTOLIC BLOOD PRESSURE: 120 MMHG | WEIGHT: 214 LBS | OXYGEN SATURATION: 92 % | HEART RATE: 73 BPM | DIASTOLIC BLOOD PRESSURE: 78 MMHG | BODY MASS INDEX: 29.96 KG/M2 | HEIGHT: 71 IN | TEMPERATURE: 98.3 F

## 2019-05-16 DIAGNOSIS — I25.10 CORONARY ARTERY DISEASE INVOLVING NATIVE CORONARY ARTERY OF NATIVE HEART WITHOUT ANGINA PECTORIS: Chronic | ICD-10-CM

## 2019-05-16 DIAGNOSIS — L84 CORN OR CALLUS: ICD-10-CM

## 2019-05-16 DIAGNOSIS — I71.40 ABDOMINAL AORTIC ANEURYSM (AAA) WITHOUT RUPTURE (HCC): ICD-10-CM

## 2019-05-16 DIAGNOSIS — I10 ESSENTIAL HYPERTENSION: Chronic | ICD-10-CM

## 2019-05-16 DIAGNOSIS — R20.2 TINGLING OF BOTH FEET: ICD-10-CM

## 2019-05-16 DIAGNOSIS — E78.2 MIXED HYPERLIPIDEMIA: Chronic | ICD-10-CM

## 2019-05-16 DIAGNOSIS — N18.9 CHRONIC KIDNEY DISEASE, UNSPECIFIED CKD STAGE: Chronic | ICD-10-CM

## 2019-05-16 DIAGNOSIS — J96.11 CHRONIC RESPIRATORY FAILURE WITH HYPOXIA (HCC): ICD-10-CM

## 2019-05-16 DIAGNOSIS — R06.02 SHORTNESS OF BREATH: Primary | ICD-10-CM

## 2019-05-16 DIAGNOSIS — E11.9 CONTROLLED TYPE 2 DIABETES MELLITUS WITHOUT COMPLICATION, WITHOUT LONG-TERM CURRENT USE OF INSULIN (HCC): Chronic | ICD-10-CM

## 2019-05-16 PROBLEM — J98.4 CHRONIC LUNG DISEASE: Status: RESOLVED | Noted: 2018-07-31 | Resolved: 2019-05-16

## 2019-05-16 LAB
25(OH)D3+25(OH)D2 SERPL-MCNC: 40.9 NG/ML (ref 30–100)
ALBUMIN SERPL-MCNC: 3.9 G/DL (ref 3.5–5.2)
ALBUMIN/GLOB SERPL: 1.6 G/DL
ALP SERPL-CCNC: 55 U/L (ref 39–117)
ALT SERPL-CCNC: 11 U/L (ref 1–41)
AST SERPL-CCNC: 15 U/L (ref 1–40)
BASOPHILS # BLD AUTO: 0.05 10*3/MM3 (ref 0–0.2)
BASOPHILS NFR BLD AUTO: 0.7 % (ref 0–1.5)
BILIRUB SERPL-MCNC: 0.2 MG/DL (ref 0.2–1.2)
BUN SERPL-MCNC: 23 MG/DL (ref 8–23)
BUN/CREAT SERPL: 20.5 (ref 7–25)
CALCIUM SERPL-MCNC: 9.8 MG/DL (ref 8.6–10.5)
CHLORIDE SERPL-SCNC: 105 MMOL/L (ref 98–107)
CHOLEST SERPL-MCNC: 156 MG/DL (ref 0–200)
CHOLEST/HDLC SERPL: 4.59 {RATIO}
CO2 SERPL-SCNC: 27.8 MMOL/L (ref 22–29)
CREAT SERPL-MCNC: 1.12 MG/DL (ref 0.76–1.27)
EOSINOPHIL # BLD AUTO: 0.7 10*3/MM3 (ref 0–0.4)
EOSINOPHIL NFR BLD AUTO: 10.4 % (ref 0.3–6.2)
ERYTHROCYTE [DISTWIDTH] IN BLOOD BY AUTOMATED COUNT: 13.9 % (ref 12.3–15.4)
GLOBULIN SER CALC-MCNC: 2.5 GM/DL
GLUCOSE SERPL-MCNC: 128 MG/DL (ref 65–99)
HBA1C MFR BLD: 6.3 % (ref 4.8–5.6)
HCT VFR BLD AUTO: 40.6 % (ref 37.5–51)
HDLC SERPL-MCNC: 34 MG/DL (ref 40–60)
HGB BLD-MCNC: 12.3 G/DL (ref 13–17.7)
IMM GRANULOCYTES # BLD AUTO: 0.02 10*3/MM3 (ref 0–0.05)
IMM GRANULOCYTES NFR BLD AUTO: 0.3 % (ref 0–0.5)
LDLC SERPL CALC-MCNC: 93 MG/DL (ref 0–100)
LYMPHOCYTES # BLD AUTO: 1.48 10*3/MM3 (ref 0.7–3.1)
LYMPHOCYTES NFR BLD AUTO: 22 % (ref 19.6–45.3)
MCH RBC QN AUTO: 29.4 PG (ref 26.6–33)
MCHC RBC AUTO-ENTMCNC: 30.3 G/DL (ref 31.5–35.7)
MCV RBC AUTO: 96.9 FL (ref 79–97)
MICROALBUMIN UR-MCNC: 4.9 UG/ML
MONOCYTES # BLD AUTO: 0.62 10*3/MM3 (ref 0.1–0.9)
MONOCYTES NFR BLD AUTO: 9.2 % (ref 5–12)
NEUTROPHILS # BLD AUTO: 3.85 10*3/MM3 (ref 1.7–7)
NEUTROPHILS NFR BLD AUTO: 57.4 % (ref 42.7–76)
NRBC BLD AUTO-RTO: 0.1 /100 WBC (ref 0–0.2)
PLATELET # BLD AUTO: 234 10*3/MM3 (ref 140–450)
POTASSIUM SERPL-SCNC: 4.9 MMOL/L (ref 3.5–5.2)
PROT SERPL-MCNC: 6.4 G/DL (ref 6–8.5)
PSA SERPL-MCNC: 1.1 NG/ML (ref 0–4)
RBC # BLD AUTO: 4.19 10*6/MM3 (ref 4.14–5.8)
SODIUM SERPL-SCNC: 144 MMOL/L (ref 136–145)
TRIGL SERPL-MCNC: 145 MG/DL (ref 0–150)
TSH SERPL DL<=0.005 MIU/L-ACNC: 2.57 MIU/ML (ref 0.27–4.2)
VLDLC SERPL CALC-MCNC: 29 MG/DL
WBC # BLD AUTO: 6.72 10*3/MM3 (ref 3.4–10.8)

## 2019-05-16 PROCEDURE — 99214 OFFICE O/P EST MOD 30 MIN: CPT | Performed by: FAMILY MEDICINE

## 2019-05-16 RX ORDER — GLYBURIDE 5 MG/1
TABLET ORAL
Start: 2019-05-16 | End: 2020-07-17 | Stop reason: SDUPTHER

## 2019-05-16 NOTE — PATIENT INSTRUCTIONS
Your A1c today is 6.3  Lab Results - Last 18 Months   Lab Units 05/15/19  0708 11/08/18  1328   HEMOGLOBIN A1C % 6.30* 6.30       A1c values:   <5.5 what we see on a normal/non-diabetic person  6.5 what it takes to be diagnosed with diabetes AND what most endocrinologist recommend if you are a diabetic  7.5 what the American diabetic association says you should be.      ########################  Ask your julia guido pharmacist how to write or call in the [facility]

## 2019-05-16 NOTE — PROGRESS NOTES
Subjective   Gonzalo Durham is a 85 y.o. male presenting with chief complaint of:   Chief Complaint   Patient presents with   • Hypertension     6 month follow up   • Diabetes   • Tingling     bilateral feet       History of Present Illness :  With daughter.   Has multiple chronic problems to consider that might have a bearing on today's issues;  an interval appointment.       Chronic/acute problems reviewed today:   1. Shortness of breath: chronic/stable.  Worse with exertion/improved with oxgyen.  Dr Carver thinks needs cath and to be done next week.    2. Controlled type 2 diabetes mellitus without complication, without long-term current use of insulin (CMS/[facility]): Chronic/stable. No problem/pattern hyperglycemia manifest by poly- dypsia, phagia, uria, or sweats, diaphoretic episodes, syncope/near but often AM BS 60s; on glyburide 5.0 bid.     3. Coronary artery disease involving native coronary artery of native heart with ? angina pectoris: chronic/unstable as no chest pain but occ SOB; no use of NTG      4. Essential hypertension: Chronic/stable. Stable here/if home blood pressures.  No significant chest pain, SOB, LE edema, orthopnea, near syncope, dizziness/light headness.      5. Mixed hyperlipidemia: Chronic/stable.  Tolerated use of statin with labs showing improved lipid values and tolerant liver labs. No muscle aches unexpected.      6. Corn or callus: chronic/stable without open sores.    7. Chronic kidney disease, unspecified CKD stage: Chronic/stable.  Sees no nephrology.  No dysuria.  Previously warned/reminded:   To avoid further kidney function decline  A. Treat any time you think you have infection  B. Stay hydrated (dont get dehydrated); drink at least 60 oz fluid every 24 hr (1800 cc or nearly a 2L)  C. Do not allow any xrays with dye WITHOUT the doctor ordering checking your renal function  D. Do not get new medications without the doctor considering your renal condition  E. Do not use  motrin/ibuprofen, alleve/naprosyn and these types of medications     8. Tingling of both feet-to consider NCV: chronic/variable LE; no desire to change anything/tolerated.    9. Abdominal aortic aneurysm (AAA) without rupture (CMS/HCC): from 2015 CT incidental 4.0 cm to 2017 MRI incidential 4.2 cm.     10. Chronic respiratory failure with hypoxia (CMS/HCC): chronic/stable use of oxygen. Sees pulmonary.      Has an/another acute issue today: none.    The following portions of the patient's history were reviewed and updated as appropriate: allergies, current medications, past family history, past medical history, past social history, past surgical history and problem list.      Current Outpatient Medications:   •  acetaminophen (TYLENOL) 325 MG tablet, Take 2 tablets by mouth 2 (Two) Times a Day., Disp: 360 tablet, Rfl: 2  •  albuterol sulfate HFA (PROAIR HFA) 108 (90 Base) MCG/ACT inhaler, Inhale 2 puffs 4 (Four) Times a Day., Disp: 1 inhaler, Rfl: 1  •  Artificial Tear Solution (JUST TEARS EYE DROPS) solution, Apply 1-2 drops to eye(s) as directed by provider Daily As Needed (dry eyes)., Disp: 45 mL, Rfl: 2  •  ARTIFICIAL TEARS 1.4 % ophthalmic solution, , Disp: , Rfl:   •  aspirin  MG tablet, Take 1 tablet by mouth Every Other Day., Disp: 45 tablet, Rfl: 2  •  calcium carbonate-vitamin d 600-400 MG-UNIT per tablet, Take 1 tablet by mouth 2 (Two) Times a Day., Disp: 180 tablet, Rfl: 2  •  Cinnamon 500 MG capsule, Take 1 capsule daily, Disp: 90 capsule, Rfl: 1  •  finasteride (PROSCAR) 5 MG tablet, Take 1 tablet by mouth Daily., Disp: 90 tablet, Rfl: 3  •  fluticasone (FLONASE) 50 MCG/ACT nasal spray, , Disp: , Rfl:   •  FREESTYLE LITE test strip, 1 each by Other route Daily., Disp: 100 each, Rfl: 2  •  gabapentin (NEURONTIN) 100 MG capsule, Take 2 capsules by mouth 2 (Two) Times a Day., Disp: 360 capsule, Rfl: 2  •  glyBURIDE (DIAbeta) 5 MG tablet, Take 1.5 tablets by mouth 2 (Two) Times a Day With Meals.  (Patient taking differently: Take 5 mg by mouth 2 (Two) Times a Day With Meals.), Disp: 270 tablet, Rfl: 2  •  isosorbide mononitrate (IMDUR) 30 MG 24 hr tablet, Take 1 tablet by mouth Daily., Disp: 90 tablet, Rfl: 3  •  Lancets (FREESTYLE) lancets, Use once daily, Disp: 100 each, Rfl: 1  •  loratadine (CLARITIN) 10 MG tablet, Take 1 tablet by mouth Daily., Disp: 90 tablet, Rfl: 2  •  metFORMIN ER (GLUCOPHAGE XR) 500 MG 24 hr tablet, Take 1 tablet by mouth Every Night., Disp: 180 tablet, Rfl: 2  •  metoprolol tartrate (LOPRESSOR) 25 MG tablet, Take 1 tablet by mouth 2 (Two) Times a Day., Disp: 180 tablet, Rfl: 3  •  Multiple Vitamins-Minerals (CENTRUM SILVER ADULT 50+) tablet, Take 50 mg by mouth Daily., Disp: 90 tablet, Rfl: 1  •  Multiple Vitamins-Minerals (PRESERVISION AREDS 2+MULTI VIT) capsule, Take 1 capsule by mouth Daily., Disp: 90 capsule, Rfl: 1  •  nitroglycerin (NITROSTAT) 0.4 MG SL tablet, Place 1 tablet under the tongue Every 5 (Five) Minutes As Needed for Chest Pain., Disp: 30 tablet, Rfl: 0  •  O2 (OXYGEN), Inhale 4 L/min Continuous. Per nasal cannula, Disp: , Rfl:   •  pantoprazole (PROTONIX) 40 MG EC tablet, Take 1 tablet by mouth Daily., Disp: 90 tablet, Rfl: 2  •  pravastatin (PRAVACHOL) 80 MG tablet, Take 1 tablet by mouth Daily., Disp: 90 tablet, Rfl: 2  •  terazosin (HYTRIN) 10 MG capsule, Take 1 capsule by mouth Daily., Disp: 90 capsule, Rfl: 2  •  tiotropium bromide monohydrate (SPIRIVA RESPIMAT) 2.5 MCG/ACT aerosol solution inhaler, Inhale 2 puffs Daily., Disp: 3 inhaler, Rfl: 3  •  vitamin C (ASCORBIC ACID) 500 MG tablet, Take 1 tablet by mouth Daily., Disp: 90 tablet, Rfl: 1  •  vitamin E 100 UNIT capsule, Take 1 capsule by mouth Daily., Disp: 90 capsule, Rfl: 1  Oxygen home 4L; when out can only run 3L    No problems with medications.  Refills if needed done    Allergies   Allergen Reactions   • Symbicort [Budesonide-Formoterol Fumarate] Dizziness     Patient passed out and fell   •  Prozac [Fluoxetine Hcl] Mental Status Change     Bad dreams.       Review of Systems  GENERAL:  Active/slower with limits, speed, samni for age; recent travels. Sleep is ok.   SKIN:  Ongoing callous of feet.   ENDO:  No syncope, near or diaphoretic sweaty spells.  BS Ok: without download noted last visit.   HEENT: No head injury, headache,  No vision change,  Same mild hearing loss.  Ears without pain/drainage.  No sore throat.  No significant nasal/sinus congestion/drainage. No epistaxis.  CHEST: No chest wall tenderness or mass. No significant cough,  without wheeze.  Mild as above SOB; no hemoptysis.  CV: No chest pain, palpatations, ankle edema.  GI: No heartburn, dysphagia.  No abdominal pain, diarrhea, constipation, rectal bleeding, or melena.    :  Voids without dysuria, or incontience to completion.  ORTHO: No painful/swollen joints but various on /off sore.  No change occ sore neck or back.  No acute neck or back pain without recent injury.   NEURO: No dizziness, weakness of extremities.  No change some LE numbness/parethesias.   PSYCH: No memory loss.  Mood good; not that anxious, depressed but/and not suicidal.  Tolerated stress.   Screening:  Mammogram: NA  Bone density: NA  Low dose CT chest: NA (had CT angio)  GI: Colon-div/[facility]/6.15.17/prn  Prostate: Kupper in past   Usual lab order  6m CBC, BMP, A1c  12m CBC, CMP, A1c, TSH, LIPID, PSAs, Vit D    Lab Results:  Results for orders placed or performed during the hospital encounter of 05/15/19   Adult Transthoracic Echo Limited W/ Cont if Necessary Per Protocol   Result Value Ref Range    BSA 2.2 m^2    IVSd 1.1 cm    LVIDd 4.9 cm    LVIDs 3.2 cm    LVPWd 1.1 cm    IVS/LVPW 0.98     FS 35.1 %    EDV(Teich) 112.8 ml    ESV(Teich) 40.3 ml    EF(Teich) 64.2 %    EDV(cubed) 117.6 ml    ESV(cubed) 32.2 ml    EF(cubed) 72.7 %    LV mass(C)d 203.0 grams    LV mass(C)dI 93.4 grams/m^2    SV(Teich) 72.5 ml    SI(Teich) 33.3 ml/m^2    SV(cubed) 85.5 ml     SI(cubed) 39.3 ml/m^2    Ao root diam 3.8 cm    Ao root area 11.3 cm^2    LA dimension 4.1 cm    LA/Ao 1.1     LVOT diam 2.0 cm    LVOT area 3.1 cm^2    LVOT area(traced) 3.1 cm^2    LVLd ap4 8.3 cm    EDV(MOD-sp4) 117.0 ml    LVLs ap4 8.0 cm    ESV(MOD-sp4) 46.7 ml    EF(MOD-sp4) 60.1 %    SV(MOD-sp4) 70.3 ml    SI(MOD-sp4) 32.3 ml/m^2    Ao root area (BSA corrected) 1.7     LV Shaikh Vol (BSA corrected) 53.8 ml/m^2    LV Sys Vol (BSA corrected) 21.5 ml/m^2    MV E max chandler 101.0 cm/sec    MV A max chandler 92.2 cm/sec    MV E/A 1.1     MV P1/2t max chandler 107.0 cm/sec    MV P1/2t 105.2 msec    MVA(P1/2t) 2.1 cm^2    MV dec slope 298.0 cm/sec^2    MV dec time 0.15 sec    Ao pk chandler 138.0 cm/sec    Ao max PG 7.6 mmHg    Ao max PG (full) 1.8 mmHg    Ao V2 mean 81.5 cm/sec    Ao mean PG 3.0 mmHg    Ao mean PG (full) 0 mmHg    Ao V2 VTI 29.1 cm    TEMI(I,A) 3.2 cm^2    TEMI(I,D) 3.2 cm^2    TEMI(V,A) 2.8 cm^2    TEMI(V,D) 2.8 cm^2    LV V1 max PG 5.9 mmHg    LV V1 mean PG 3.0 mmHg    LV V1 max 121.0 cm/sec    LV V1 mean 76.0 cm/sec    LV V1 VTI 30.0 cm    SV(Ao) 330.0 ml    SI(Ao) 151.8 ml/m^2    SV(LVOT) 94.2 ml    SI(LVOT) 43.3 ml/m^2    PI end-d chandler 124.0 cm/sec    MVA P1/2T LCG 2.1 cm^2    BH CV ECHO JANAE - BZI_BMI 30.0 kilograms/m^2    BH CV ECHO JANAE - BSA([facility]) 2.2 m^2     CV ECHO JANAE - BZI_METRIC_WEIGHT 97.5 kg     CV ECHO JANAE - BZI_METRIC_HEIGHT 180.3 cm    Target HR (85%) 115 bpm    Max. Pred. HR (100%) 135 bpm    Avg E/e' ratio 11.81     Lat Peak E' Chandler 12.3 cm/sec    Med Peak E' Chandler 4.80 cm/sec       A1C:  Lab Results - Last 18 Months   Lab Units 05/15/19  0708 11/08/18  1328   HEMOGLOBIN A1C % 6.30* 6.30     PSA:  Lab Results - Last 18 Months   Lab Units 05/15/19  0708 11/08/18  1328   PSA ng/mL 1.100 1.230     CBC:  Lab Results - Last 18 Months   Lab Units 05/15/19  0708 11/14/18  0845 11/08/18  1328 07/15/18  2300   WBC 10*3/mm3 6.72  --  7.52 8.3   HEMOGLOBIN g/dL 12.3*  --  12.6* 15.2   HEMATOCRIT % 40.6  --   "40.4 45.6   PLATELETS 10*3/mm3 234  --  242 171   IRON mcg/dL  --  81  --   --       BMP/CMP:  Lab Results - Last 18 Months   Lab Units 05/15/19  0708 11/08/18  1328 10/24/18  1535 07/15/18  2300   SODIUM mmol/L 144 141  --  144   SODIUM, ARTERIAL mmol/L  --   --  143  --    POTASSIUM mmol/L 4.9 4.6  --  5.0   CHLORIDE mmol/L 105 99  --  103   TOTAL CO2 mmol/L 27.8 33.0*  --  28   GLUCOSE mg/dL 128* 82  --  108*   BUN mg/dL 23 27*  --  23   CREATININE mg/dL 1.12 1.06  --  1.09   EGFR IF NONAFRICN AM mL/min/1.73 62 67  --  62   EGFR IF AFRICN AM mL/min/1.73 76 81  --  72   CALCIUM mg/dL 9.8 9.9  --  9.6     HEPATIC:  Lab Results - Last 18 Months   Lab Units 05/15/19  0708 07/15/18  2300   ALT (SGPT) U/L 11 13   AST (SGOT) U/L 15 15   ALK PHOS U/L 55 55     THYROID:  Lab Results - Last 18 Months   Lab Units 05/15/19  0708   TSH mIU/mL 2.570       Objective   /78 (BP Location: Left arm, Patient Position: Sitting, Cuff Size: Adult)   Pulse 73   Temp 98.3 °F (36.8 °C) (Oral)   Resp 18   Ht 180.3 cm (71\")   Wt 97.1 kg (214 lb)   SpO2 92%   BMI 29.85 kg/m²   Body mass index is 29.85 kg/m².    Physical Exam  GENERAL:  Well nourished/developed in no acute distress. Obese   SKIN: Turgor excellent, without wound, rash, lesion; but does have hyperkeratotic pressure areas (callous) of several pressure areas of both feet.   HEENT: Normal cephalic without trauma.  Pupils equal round reactive to light. Extraocular motions full without nystagmus.    No thyroidmegaly, mass, tenderness, lymphadenopathy and supple.  CV: Regular rhythm.  No murmur, gallop,  edema. Posterior pulses intact.  No carotid bruits.  CHEST: No chest wall tenderness or mass.   LUNGS: Symmetric motion with clear to auscultation.   ABD: Soft, nontender without mass.   ORTHO: Symmetric extremities without swelling/point tenderness.  Full gross range of motion except hips reduced.  Hips sore with ROM.   NEURO: CN 2-12 grossly intact.  Symmetric facies. " 1/4 x bicep knee equal reflexes.  UE/LE   3/5 strength throughout.  Nonfocal use extremities. Speech clear.  Light touch decreased bilateral feet.   PSYCH: Oriented x 3.  Pleasant calm, well kept.  Purposeful/directed conservation with intact short/long gross memory.     Assessment/Plan     1. Shortness of breath    2. Controlled type 2 diabetes mellitus without complication, without long-term current use of insulin (CMS/HCC)    3. Coronary artery disease involving native coronary artery of native heart without angina pectoris    4. Essential hypertension    5. Mixed hyperlipidemia    6. Corn or callus    7. Chronic kidney disease, unspecified CKD stage    8. Tingling of both feet-to consider NCV    9. Abdominal aortic aneurysm (AAA) without rupture (CMS/HCC)    10. Chronic respiratory failure with hypoxia (CMS/HCC)        Rx: reviewed/changes:  New Medications Ordered This Visit   Medications   • glyBURIDE (DIAbeta) 5 MG tablet     Sig: Take 1 tablet by mouth Every Morning AND 0.5 tablets Daily Before Supper.       LAB/Testing/Referrals: reviewed/orders:   Today:   No orders of the defined types were placed in this encounter.    Chronic/recurrent labs above or change to:   same     Discussions:   Reduce glyburide; AM BS too low  Body mass index is 29.85 kg/m².  Patient's Body mass index is 29.85 kg/m². BMI is above normal parameters. Recommendations include: none (medical contraindication).    Tobacco use reviewed:  Non-smoker    Patient Instructions     Your A1c today is 6.3  Lab Results - Last 18 Months   Lab Units 05/15/19  0708 11/08/18  1328   HEMOGLOBIN A1C % 6.30* 6.30       A1c values:   <5.5 what we see on a normal/non-diabetic person  6.5 what it takes to be diagnosed with diabetes AND what most endocrinologist recommend if you are a diabetic  7.5 what the American diabetic association says you should be.      ########################  Ask your julia guido pharmacist how to write or call in the  [facility]      Follow up: Return for lab;, Dr Romero-, 6 m;.  Future Appointments   Date Time Provider Department Center   7/29/2019  8:30 AM Bradley Carver MD [facility]   8/29/2019 11:00 AM Feliz Frye APRN [facility] None   11/19/2019  8:10 AM LAB [facility] None   11/22/2019 11:00 AM Abel Romero MD [facility] None

## 2019-05-17 LAB
BH CV ECHO MEAS - AO MAX PG (FULL): 1.8 MMHG
BH CV ECHO MEAS - AO MAX PG: 7.6 MMHG
BH CV ECHO MEAS - AO MEAN PG (FULL): 0 MMHG
BH CV ECHO MEAS - AO MEAN PG: 3 MMHG
BH CV ECHO MEAS - AO ROOT AREA (BSA CORRECTED): 1.7
BH CV ECHO MEAS - AO ROOT AREA: 11.3 CM^2
BH CV ECHO MEAS - AO ROOT DIAM: 3.8 CM
BH CV ECHO MEAS - AO V2 MAX: 138 CM/SEC
BH CV ECHO MEAS - AO V2 MEAN: 81.5 CM/SEC
BH CV ECHO MEAS - AO V2 VTI: 29.1 CM
BH CV ECHO MEAS - AVA(I,A): 3.2 CM^2
BH CV ECHO MEAS - AVA(I,D): 3.2 CM^2
BH CV ECHO MEAS - AVA(V,A): 2.8 CM^2
BH CV ECHO MEAS - AVA(V,D): 2.8 CM^2
BH CV ECHO MEAS - BSA(HAYCOCK): 2.2 M^2
BH CV ECHO MEAS - BSA: 2.2 M^2
BH CV ECHO MEAS - BZI_BMI: 30 KILOGRAMS/M^2
BH CV ECHO MEAS - BZI_METRIC_HEIGHT: 180.3 CM
BH CV ECHO MEAS - BZI_METRIC_WEIGHT: 97.5 KG
BH CV ECHO MEAS - EDV(CUBED): 117.6 ML
BH CV ECHO MEAS - EDV(MOD-SP4): 117 ML
BH CV ECHO MEAS - EDV(TEICH): 112.8 ML
BH CV ECHO MEAS - EF(CUBED): 72.7 %
BH CV ECHO MEAS - EF(MOD-SP4): 60.1 %
BH CV ECHO MEAS - EF(TEICH): 64.2 %
BH CV ECHO MEAS - ESV(CUBED): 32.2 ML
BH CV ECHO MEAS - ESV(MOD-SP4): 46.7 ML
BH CV ECHO MEAS - ESV(TEICH): 40.3 ML
BH CV ECHO MEAS - FS: 35.1 %
BH CV ECHO MEAS - IVS/LVPW: 0.98
BH CV ECHO MEAS - IVSD: 1.1 CM
BH CV ECHO MEAS - LA DIMENSION: 4.1 CM
BH CV ECHO MEAS - LA/AO: 1.1
BH CV ECHO MEAS - LAT PEAK E' VEL: 12.3 CM/SEC
BH CV ECHO MEAS - LV DIASTOLIC VOL/BSA (35-75): 53.8 ML/M^2
BH CV ECHO MEAS - LV MASS(C)D: 203 GRAMS
BH CV ECHO MEAS - LV MASS(C)DI: 93.4 GRAMS/M^2
BH CV ECHO MEAS - LV MAX PG: 5.9 MMHG
BH CV ECHO MEAS - LV MEAN PG: 3 MMHG
BH CV ECHO MEAS - LV SYSTOLIC VOL/BSA (12-30): 21.5 ML/M^2
BH CV ECHO MEAS - LV V1 MAX: 121 CM/SEC
BH CV ECHO MEAS - LV V1 MEAN: 76 CM/SEC
BH CV ECHO MEAS - LV V1 VTI: 30 CM
BH CV ECHO MEAS - LVIDD: 4.9 CM
BH CV ECHO MEAS - LVIDS: 3.2 CM
BH CV ECHO MEAS - LVLD AP4: 8.3 CM
BH CV ECHO MEAS - LVLS AP4: 8 CM
BH CV ECHO MEAS - LVOT AREA (M): 3.1 CM^2
BH CV ECHO MEAS - LVOT AREA: 3.1 CM^2
BH CV ECHO MEAS - LVOT DIAM: 2 CM
BH CV ECHO MEAS - LVPWD: 1.1 CM
BH CV ECHO MEAS - MED PEAK E' VEL: 4.8 CM/SEC
BH CV ECHO MEAS - MV A MAX VEL: 92.2 CM/SEC
BH CV ECHO MEAS - MV DEC SLOPE: 298 CM/SEC^2
BH CV ECHO MEAS - MV DEC TIME: 0.15 SEC
BH CV ECHO MEAS - MV E MAX VEL: 101 CM/SEC
BH CV ECHO MEAS - MV E/A: 1.1
BH CV ECHO MEAS - MV P1/2T MAX VEL: 107 CM/SEC
BH CV ECHO MEAS - MV P1/2T: 105.2 MSEC
BH CV ECHO MEAS - MVA P1/2T LCG: 2.1 CM^2
BH CV ECHO MEAS - MVA(P1/2T): 2.1 CM^2
BH CV ECHO MEAS - PI END-D VEL: 124 CM/SEC
BH CV ECHO MEAS - SI(AO): 151.8 ML/M^2
BH CV ECHO MEAS - SI(CUBED): 39.3 ML/M^2
BH CV ECHO MEAS - SI(LVOT): 43.3 ML/M^2
BH CV ECHO MEAS - SI(MOD-SP4): 32.3 ML/M^2
BH CV ECHO MEAS - SI(TEICH): 33.3 ML/M^2
BH CV ECHO MEAS - SV(AO): 330 ML
BH CV ECHO MEAS - SV(CUBED): 85.5 ML
BH CV ECHO MEAS - SV(LVOT): 94.2 ML
BH CV ECHO MEAS - SV(MOD-SP4): 70.3 ML
BH CV ECHO MEAS - SV(TEICH): 72.5 ML
BH CV ECHO MEASUREMENTS AVERAGE E/E' RATIO: 11.81
LV EF 2D ECHO EST: 55 %
MAXIMAL PREDICTED HEART RATE: 135 BPM
STRESS TARGET HR: 115 BPM

## 2019-05-22 ENCOUNTER — HOSPITAL ENCOUNTER (OUTPATIENT)
Facility: HOSPITAL | Age: 84
Discharge: HOME | End: 2019-05-23
Attending: INTERNAL MEDICINE | Admitting: INTERNAL MEDICINE

## 2019-05-22 DIAGNOSIS — R94.39 ABNORMAL STRESS TEST: ICD-10-CM

## 2019-05-22 LAB
ALBUMIN SERPL-MCNC: 4.6 G/DL (ref 3.5–5)
ALBUMIN/GLOB SERPL: 1.4 G/DL (ref 1.1–2.5)
ALP SERPL-CCNC: 59 U/L (ref 24–120)
ALT SERPL W P-5'-P-CCNC: 17 U/L (ref 0–54)
ANION GAP SERPL CALCULATED.3IONS-SCNC: 6 MMOL/L (ref 4–13)
AST SERPL-CCNC: 33 U/L (ref 7–45)
BASOPHILS # BLD AUTO: 0.06 10*3/MM3 (ref 0–0.2)
BASOPHILS NFR BLD AUTO: 0.9 % (ref 0–2)
BILIRUB SERPL-MCNC: 0.4 MG/DL (ref 0.1–1)
BUN BLD-MCNC: 26 MG/DL (ref 5–21)
BUN/CREAT SERPL: 23.4 (ref 7–25)
CALCIUM SPEC-SCNC: 9.4 MG/DL (ref 8.4–10.4)
CHLORIDE SERPL-SCNC: 103 MMOL/L (ref 98–110)
CO2 SERPL-SCNC: 32 MMOL/L (ref 24–31)
CREAT BLD-MCNC: 1.11 MG/DL (ref 0.5–1.4)
DEPRECATED RDW RBC AUTO: 44.2 FL (ref 40–54)
EOSINOPHIL # BLD AUTO: 0.48 10*3/MM3 (ref 0–0.7)
EOSINOPHIL NFR BLD AUTO: 6.9 % (ref 0–4)
ERYTHROCYTE [DISTWIDTH] IN BLOOD BY AUTOMATED COUNT: 13.5 % (ref 12–15)
GFR SERPL CREATININE-BSD FRML MDRD: 63 ML/MIN/1.73
GLOBULIN UR ELPH-MCNC: 3.2 GM/DL
GLUCOSE BLD-MCNC: 112 MG/DL (ref 70–100)
HCT VFR BLD AUTO: 41.3 % (ref 40–52)
HGB BLD-MCNC: 13.4 G/DL (ref 14–18)
IMM GRANULOCYTES # BLD AUTO: 0.02 10*3/MM3 (ref 0–0.05)
IMM GRANULOCYTES NFR BLD AUTO: 0.3 % (ref 0–5)
INR PPP: 0.93 (ref 0.91–1.09)
LYMPHOCYTES # BLD AUTO: 1.72 10*3/MM3 (ref 0.72–4.86)
LYMPHOCYTES NFR BLD AUTO: 24.6 % (ref 15–45)
MCH RBC QN AUTO: 29.1 PG (ref 28–32)
MCHC RBC AUTO-ENTMCNC: 32.4 G/DL (ref 33–36)
MCV RBC AUTO: 89.6 FL (ref 82–95)
MONOCYTES # BLD AUTO: 0.58 10*3/MM3 (ref 0.19–1.3)
MONOCYTES NFR BLD AUTO: 8.3 % (ref 4–12)
NEUTROPHILS # BLD AUTO: 4.12 10*3/MM3 (ref 1.87–8.4)
NEUTROPHILS NFR BLD AUTO: 59 % (ref 39–78)
NRBC BLD AUTO-RTO: 0 /100 WBC (ref 0–0.2)
PLATELET # BLD AUTO: 253 10*3/MM3 (ref 130–400)
PMV BLD AUTO: 9.2 FL (ref 6–12)
POTASSIUM BLD-SCNC: 4.7 MMOL/L (ref 3.5–5.3)
PROT SERPL-MCNC: 7.8 G/DL (ref 6.3–8.7)
PROTHROMBIN TIME: 12.7 SECONDS (ref 11.9–14.6)
RBC # BLD AUTO: 4.61 10*6/MM3 (ref 4.8–5.9)
SODIUM BLD-SCNC: 141 MMOL/L (ref 135–145)
WBC NRBC COR # BLD: 6.98 10*3/MM3 (ref 4.8–10.8)

## 2019-05-22 PROCEDURE — A9270 NON-COVERED ITEM OR SERVICE: HCPCS | Performed by: INTERNAL MEDICINE

## 2019-05-22 PROCEDURE — 80053 COMPREHEN METABOLIC PANEL: CPT | Performed by: INTERNAL MEDICINE

## 2019-05-22 PROCEDURE — 85025 COMPLETE CBC W/AUTO DIFF WBC: CPT | Performed by: INTERNAL MEDICINE

## 2019-05-22 PROCEDURE — 63710000001 FINASTERIDE 5 MG TABLET: Performed by: INTERNAL MEDICINE

## 2019-05-22 PROCEDURE — 25010000002 HEPARIN (PORCINE): Performed by: INTERNAL MEDICINE

## 2019-05-22 PROCEDURE — 93567 NJX CAR CTH SPRVLV AORTGRPHY: CPT | Performed by: INTERNAL MEDICINE

## 2019-05-22 PROCEDURE — C1894 INTRO/SHEATH, NON-LASER: HCPCS | Performed by: INTERNAL MEDICINE

## 2019-05-22 PROCEDURE — 63710000001 VITAMIN C 500 MG TABLET: Performed by: INTERNAL MEDICINE

## 2019-05-22 PROCEDURE — C1769 GUIDE WIRE: HCPCS | Performed by: INTERNAL MEDICINE

## 2019-05-22 PROCEDURE — 63710000001 ISOSORBIDE MONONITRATE 30 MG TABLET SUSTAINED-RELEASE 24 HOUR: Performed by: INTERNAL MEDICINE

## 2019-05-22 PROCEDURE — 63710000001 ATORVASTATIN 10 MG TABLET: Performed by: INTERNAL MEDICINE

## 2019-05-22 PROCEDURE — 63710000001 PANTOPRAZOLE 40 MG TABLET DELAYED-RELEASE: Performed by: INTERNAL MEDICINE

## 2019-05-22 PROCEDURE — 25010000002 FENTANYL CITRATE (PF) 100 MCG/2ML SOLUTION: Performed by: INTERNAL MEDICINE

## 2019-05-22 PROCEDURE — 85610 PROTHROMBIN TIME: CPT | Performed by: INTERNAL MEDICINE

## 2019-05-22 PROCEDURE — 63710000001 GABAPENTIN 100 MG CAPSULE: Performed by: INTERNAL MEDICINE

## 2019-05-22 PROCEDURE — 25010000002 MIDAZOLAM PER 1 MG: Performed by: INTERNAL MEDICINE

## 2019-05-22 PROCEDURE — 63710000001 VITAMIN E 100 UNIT CAPSULE: Performed by: INTERNAL MEDICINE

## 2019-05-22 PROCEDURE — 93459 L HRT ART/GRFT ANGIO: CPT | Performed by: INTERNAL MEDICINE

## 2019-05-22 PROCEDURE — C1760 CLOSURE DEV, VASC: HCPCS | Performed by: INTERNAL MEDICINE

## 2019-05-22 PROCEDURE — 63710000001 ASPIRIN EC 325 MG TABLET DELAYED-RELEASE: Performed by: INTERNAL MEDICINE

## 2019-05-22 PROCEDURE — 99152 MOD SED SAME PHYS/QHP 5/>YRS: CPT | Performed by: INTERNAL MEDICINE

## 2019-05-22 PROCEDURE — 63710000001: Performed by: INTERNAL MEDICINE

## 2019-05-22 PROCEDURE — 94799 UNLISTED PULMONARY SVC/PX: CPT

## 2019-05-22 PROCEDURE — 63710000001 ACETAMINOPHEN 325 MG TABLET: Performed by: INTERNAL MEDICINE

## 2019-05-22 PROCEDURE — 25010000002 DIPHENHYDRAMINE PER 50 MG: Performed by: INTERNAL MEDICINE

## 2019-05-22 PROCEDURE — 63710000001 METOPROLOL TARTRATE 25 MG TABLET: Performed by: INTERNAL MEDICINE

## 2019-05-22 PROCEDURE — G0278 ILIAC ART ANGIO,CARDIAC CATH: HCPCS | Performed by: INTERNAL MEDICINE

## 2019-05-22 RX ORDER — DIPHENHYDRAMINE HYDROCHLORIDE 50 MG/ML
INJECTION INTRAMUSCULAR; INTRAVENOUS AS NEEDED
Status: DISCONTINUED | OUTPATIENT
Start: 2019-05-22 | End: 2019-05-22 | Stop reason: HOSPADM

## 2019-05-22 RX ORDER — LIDOCAINE HYDROCHLORIDE 20 MG/ML
INJECTION, SOLUTION INFILTRATION; PERINEURAL AS NEEDED
Status: DISCONTINUED | OUTPATIENT
Start: 2019-05-22 | End: 2019-05-22 | Stop reason: HOSPADM

## 2019-05-22 RX ORDER — PANTOPRAZOLE SODIUM 40 MG/1
40 TABLET, DELAYED RELEASE ORAL DAILY
Status: DISCONTINUED | OUTPATIENT
Start: 2019-05-22 | End: 2019-05-23 | Stop reason: HOSPADM

## 2019-05-22 RX ORDER — ASPIRIN 325 MG
325 TABLET, DELAYED RELEASE (ENTERIC COATED) ORAL EVERY OTHER DAY
Status: DISCONTINUED | OUTPATIENT
Start: 2019-05-22 | End: 2019-05-23 | Stop reason: HOSPADM

## 2019-05-22 RX ORDER — FENTANYL CITRATE 50 UG/ML
INJECTION, SOLUTION INTRAMUSCULAR; INTRAVENOUS AS NEEDED
Status: DISCONTINUED | OUTPATIENT
Start: 2019-05-22 | End: 2019-05-22 | Stop reason: HOSPADM

## 2019-05-22 RX ORDER — ACETAMINOPHEN 325 MG/1
650 TABLET ORAL 2 TIMES DAILY
Status: DISCONTINUED | OUTPATIENT
Start: 2019-05-22 | End: 2019-05-23 | Stop reason: HOSPADM

## 2019-05-22 RX ORDER — SODIUM CHLORIDE 9 MG/ML
50 INJECTION, SOLUTION INTRAVENOUS CONTINUOUS
Status: DISCONTINUED | OUTPATIENT
Start: 2019-05-22 | End: 2019-05-23 | Stop reason: HOSPADM

## 2019-05-22 RX ORDER — FINASTERIDE 5 MG/1
5 TABLET, FILM COATED ORAL DAILY
Status: DISCONTINUED | OUTPATIENT
Start: 2019-05-22 | End: 2019-05-23 | Stop reason: HOSPADM

## 2019-05-22 RX ORDER — NITROGLYCERIN 0.4 MG/1
0.4 TABLET SUBLINGUAL
Status: DISCONTINUED | OUTPATIENT
Start: 2019-05-22 | End: 2019-05-23 | Stop reason: HOSPADM

## 2019-05-22 RX ORDER — TERAZOSIN 10 MG/1
10 CAPSULE ORAL DAILY
Status: DISCONTINUED | OUTPATIENT
Start: 2019-05-22 | End: 2019-05-23 | Stop reason: HOSPADM

## 2019-05-22 RX ORDER — ENALAPRILAT 2.5 MG/2ML
INJECTION INTRAVENOUS AS NEEDED
Status: DISCONTINUED | OUTPATIENT
Start: 2019-05-22 | End: 2019-05-22 | Stop reason: HOSPADM

## 2019-05-22 RX ORDER — GABAPENTIN 100 MG/1
200 CAPSULE ORAL 2 TIMES DAILY
Status: DISCONTINUED | OUTPATIENT
Start: 2019-05-22 | End: 2019-05-23 | Stop reason: HOSPADM

## 2019-05-22 RX ORDER — ASCORBIC ACID 500 MG
500 TABLET ORAL DAILY
Status: DISCONTINUED | OUTPATIENT
Start: 2019-05-22 | End: 2019-05-23 | Stop reason: HOSPADM

## 2019-05-22 RX ORDER — SODIUM CHLORIDE 9 MG/ML
100 INJECTION, SOLUTION INTRAVENOUS CONTINUOUS
Status: DISCONTINUED | OUTPATIENT
Start: 2019-05-22 | End: 2019-05-23 | Stop reason: HOSPADM

## 2019-05-22 RX ORDER — SODIUM CHLORIDE 0.9 % (FLUSH) 0.9 %
5 SYRINGE (ML) INJECTION AS NEEDED
Status: DISCONTINUED | OUTPATIENT
Start: 2019-05-22 | End: 2019-05-23 | Stop reason: HOSPADM

## 2019-05-22 RX ORDER — MIDAZOLAM HYDROCHLORIDE 1 MG/ML
INJECTION INTRAMUSCULAR; INTRAVENOUS AS NEEDED
Status: DISCONTINUED | OUTPATIENT
Start: 2019-05-22 | End: 2019-05-22 | Stop reason: HOSPADM

## 2019-05-22 RX ORDER — ISOSORBIDE MONONITRATE 30 MG/1
30 TABLET, EXTENDED RELEASE ORAL DAILY
Status: DISCONTINUED | OUTPATIENT
Start: 2019-05-22 | End: 2019-05-23 | Stop reason: HOSPADM

## 2019-05-22 RX ORDER — GLIPIZIDE 5 MG/1
5 TABLET ORAL
Status: DISCONTINUED | OUTPATIENT
Start: 2019-05-23 | End: 2019-05-23 | Stop reason: HOSPADM

## 2019-05-22 RX ORDER — ATORVASTATIN CALCIUM 10 MG/1
20 TABLET, FILM COATED ORAL NIGHTLY
Status: DISCONTINUED | OUTPATIENT
Start: 2019-05-22 | End: 2019-05-23 | Stop reason: HOSPADM

## 2019-05-22 RX ORDER — SODIUM CHLORIDE 0.9 % (FLUSH) 0.9 %
3 SYRINGE (ML) INJECTION EVERY 12 HOURS SCHEDULED
Status: DISCONTINUED | OUTPATIENT
Start: 2019-05-22 | End: 2019-05-23 | Stop reason: HOSPADM

## 2019-05-22 RX ADMIN — ASPIRIN 325 MG: 325 TABLET, COATED ORAL at 18:32

## 2019-05-22 RX ADMIN — SODIUM CHLORIDE 50 ML/HR: 9 INJECTION, SOLUTION INTRAVENOUS at 13:11

## 2019-05-22 RX ADMIN — GABAPENTIN 200 MG: 100 CAPSULE ORAL at 21:49

## 2019-05-22 RX ADMIN — ISOSORBIDE MONONITRATE 30 MG: 30 TABLET, EXTENDED RELEASE ORAL at 18:32

## 2019-05-22 RX ADMIN — TERAZOSIN HYDROCHLORIDE 10 MG: 10 CAPSULE ORAL at 18:32

## 2019-05-22 RX ADMIN — METOPROLOL TARTRATE 25 MG: 25 TABLET, FILM COATED ORAL at 21:49

## 2019-05-22 RX ADMIN — PANTOPRAZOLE SODIUM 40 MG: 40 TABLET, DELAYED RELEASE ORAL at 18:32

## 2019-05-22 RX ADMIN — ACETAMINOPHEN 650 MG: 325 TABLET, FILM COATED ORAL at 21:49

## 2019-05-22 RX ADMIN — FINASTERIDE 5 MG: 5 TABLET, FILM COATED ORAL at 18:32

## 2019-05-22 RX ADMIN — ATORVASTATIN CALCIUM 20 MG: 10 TABLET, FILM COATED ORAL at 21:49

## 2019-05-22 RX ADMIN — HYPROMELLOSE 2910 2 DROP: 5 SOLUTION OPHTHALMIC at 21:50

## 2019-05-22 RX ADMIN — SODIUM CHLORIDE 50 ML/HR: 9 INJECTION, SOLUTION INTRAVENOUS at 17:17

## 2019-05-22 RX ADMIN — VITAMIN E CAP 100 UNIT 100 UNITS: 100 CAP at 18:33

## 2019-05-22 RX ADMIN — OXYCODONE HYDROCHLORIDE AND ACETAMINOPHEN 500 MG: 500 TABLET ORAL at 18:32

## 2019-05-23 VITALS
WEIGHT: 211.2 LBS | RESPIRATION RATE: 18 BRPM | TEMPERATURE: 97.9 F | DIASTOLIC BLOOD PRESSURE: 41 MMHG | BODY MASS INDEX: 28.61 KG/M2 | OXYGEN SATURATION: 98 % | HEART RATE: 64 BPM | SYSTOLIC BLOOD PRESSURE: 129 MMHG | HEIGHT: 72 IN

## 2019-05-23 DIAGNOSIS — E11.21 CONTROLLED TYPE 2 DIABETES MELLITUS WITH DIABETIC NEPHROPATHY, WITHOUT LONG-TERM CURRENT USE OF INSULIN (HCC): Primary | Chronic | ICD-10-CM

## 2019-05-23 LAB
ANION GAP SERPL CALCULATED.3IONS-SCNC: 8 MMOL/L (ref 4–13)
ARTICHOKE IGE QN: 93 MG/DL (ref 0–99)
BUN BLD-MCNC: 21 MG/DL (ref 5–21)
BUN/CREAT SERPL: 16.8 (ref 7–25)
CALCIUM SPEC-SCNC: 8.7 MG/DL (ref 8.4–10.4)
CHLORIDE SERPL-SCNC: 102 MMOL/L (ref 98–110)
CHOLEST SERPL-MCNC: 149 MG/DL (ref 130–200)
CO2 SERPL-SCNC: 31 MMOL/L (ref 24–31)
CREAT BLD-MCNC: 1.25 MG/DL (ref 0.5–1.4)
DEPRECATED RDW RBC AUTO: 47.5 FL (ref 40–54)
ERYTHROCYTE [DISTWIDTH] IN BLOOD BY AUTOMATED COUNT: 13.8 % (ref 12–15)
GFR SERPL CREATININE-BSD FRML MDRD: 55 ML/MIN/1.73
GLUCOSE BLD-MCNC: 114 MG/DL (ref 70–100)
HBA1C MFR BLD: 6.4 %
HCT VFR BLD AUTO: 38.1 % (ref 40–52)
HDLC SERPL-MCNC: 25 MG/DL
HGB BLD-MCNC: 11.9 G/DL (ref 14–18)
LDLC/HDLC SERPL: 3.81 {RATIO}
MCH RBC QN AUTO: 29.1 PG (ref 28–32)
MCHC RBC AUTO-ENTMCNC: 31.2 G/DL (ref 33–36)
MCV RBC AUTO: 93.2 FL (ref 82–95)
PLATELET # BLD AUTO: 229 10*3/MM3 (ref 130–400)
PMV BLD AUTO: 9.8 FL (ref 6–12)
POTASSIUM BLD-SCNC: 4.5 MMOL/L (ref 3.5–5.3)
RBC # BLD AUTO: 4.09 10*6/MM3 (ref 4.8–5.9)
SODIUM BLD-SCNC: 141 MMOL/L (ref 135–145)
TRIGL SERPL-MCNC: 144 MG/DL (ref 0–149)
WBC NRBC COR # BLD: 8.12 10*3/MM3 (ref 4.8–10.8)

## 2019-05-23 PROCEDURE — A9270 NON-COVERED ITEM OR SERVICE: HCPCS | Performed by: INTERNAL MEDICINE

## 2019-05-23 PROCEDURE — 63710000001 GLIPIZIDE 5 MG TABLET: Performed by: INTERNAL MEDICINE

## 2019-05-23 PROCEDURE — 63710000001 METOPROLOL TARTRATE 25 MG TABLET: Performed by: INTERNAL MEDICINE

## 2019-05-23 PROCEDURE — 63710000001 ISOSORBIDE MONONITRATE 30 MG TABLET SUSTAINED-RELEASE 24 HOUR: Performed by: INTERNAL MEDICINE

## 2019-05-23 PROCEDURE — 99217 PR OBSERVATION CARE DISCHARGE MANAGEMENT: CPT | Performed by: INTERNAL MEDICINE

## 2019-05-23 PROCEDURE — 94760 N-INVAS EAR/PLS OXIMETRY 1: CPT

## 2019-05-23 PROCEDURE — 63710000001 VITAMIN C 500 MG TABLET: Performed by: INTERNAL MEDICINE

## 2019-05-23 PROCEDURE — 85027 COMPLETE CBC AUTOMATED: CPT | Performed by: INTERNAL MEDICINE

## 2019-05-23 PROCEDURE — 63710000001 ACETAMINOPHEN 325 MG TABLET: Performed by: INTERNAL MEDICINE

## 2019-05-23 PROCEDURE — 80061 LIPID PANEL: CPT | Performed by: INTERNAL MEDICINE

## 2019-05-23 PROCEDURE — 63710000001 VITAMIN E 100 UNIT CAPSULE: Performed by: INTERNAL MEDICINE

## 2019-05-23 PROCEDURE — 83036 HEMOGLOBIN GLYCOSYLATED A1C: CPT | Performed by: INTERNAL MEDICINE

## 2019-05-23 PROCEDURE — 63710000001 PANTOPRAZOLE 40 MG TABLET DELAYED-RELEASE: Performed by: INTERNAL MEDICINE

## 2019-05-23 PROCEDURE — 94799 UNLISTED PULMONARY SVC/PX: CPT

## 2019-05-23 PROCEDURE — 63710000001 GABAPENTIN 100 MG CAPSULE: Performed by: INTERNAL MEDICINE

## 2019-05-23 PROCEDURE — 80048 BASIC METABOLIC PNL TOTAL CA: CPT | Performed by: INTERNAL MEDICINE

## 2019-05-23 PROCEDURE — 63710000001: Performed by: INTERNAL MEDICINE

## 2019-05-23 RX ORDER — BLOOD-GLUCOSE METER
1 KIT MISCELLANEOUS DAILY
Qty: 1 EACH | Refills: 0 | Status: SHIPPED | OUTPATIENT
Start: 2019-05-23 | End: 2020-02-26

## 2019-05-23 RX ADMIN — PANTOPRAZOLE SODIUM 40 MG: 40 TABLET, DELAYED RELEASE ORAL at 09:18

## 2019-05-23 RX ADMIN — HYPROMELLOSE 2910 2 DROP: 5 SOLUTION OPHTHALMIC at 09:19

## 2019-05-23 RX ADMIN — VITAMIN E CAP 100 UNIT 100 UNITS: 100 CAP at 09:18

## 2019-05-23 RX ADMIN — ACETAMINOPHEN 650 MG: 325 TABLET, FILM COATED ORAL at 09:18

## 2019-05-23 RX ADMIN — OXYCODONE HYDROCHLORIDE AND ACETAMINOPHEN 500 MG: 500 TABLET ORAL at 09:19

## 2019-05-23 RX ADMIN — GABAPENTIN 200 MG: 100 CAPSULE ORAL at 09:18

## 2019-05-23 RX ADMIN — SODIUM CHLORIDE, PRESERVATIVE FREE 3 ML: 5 INJECTION INTRAVENOUS at 09:21

## 2019-05-23 RX ADMIN — METOPROLOL TARTRATE 25 MG: 25 TABLET, FILM COATED ORAL at 09:19

## 2019-05-23 RX ADMIN — ISOSORBIDE MONONITRATE 30 MG: 30 TABLET, EXTENDED RELEASE ORAL at 09:19

## 2019-05-23 RX ADMIN — GLIPIZIDE 5 MG: 5 TABLET ORAL at 09:18

## 2019-05-23 RX ADMIN — TERAZOSIN HYDROCHLORIDE 10 MG: 10 CAPSULE ORAL at 09:18

## 2019-05-23 NOTE — PLAN OF CARE
Problem: Patient Care Overview  Goal: Plan of Care Review  Outcome: Ongoing (interventions implemented as appropriate)   05/23/19 0337   Coping/Psychosocial   Plan of Care Reviewed With patient   Plan of Care Review   Progress improving   OTHER   Outcome Summary VSS. No c/o pain. Cath to right groin, no interventions, safeguard removed/DORIS. Pt ambulating to bathroom with no c/o. Continue to monitor.     Goal: Individualization and Mutuality  Outcome: Ongoing (interventions implemented as appropriate)   05/23/19 0337   Individualization   Patient Specific Goals (Include Timeframe) No falls, No bleeding   Patient Specific Interventions Safety precautions, post cath precautions       Problem: Cardiac: ACS (Acute Coronary Syndrome) (Adult)  Goal: Signs and Symptoms of Listed Potential Problems Will be Absent, Minimized or Managed (Cardiac: ACS)  Outcome: Ongoing (interventions implemented as appropriate)   05/23/19 0337   Goal/Outcome Evaluation   Problems Assessed (Acute Coronary Syndrome) all   Problems Present (Acute Coronary Syn) situational response       Problem: Fall Risk (Adult)  Goal: Identify Related Risk Factors and Signs and Symptoms  Outcome: Ongoing (interventions implemented as appropriate)   05/23/19 0337   Fall Risk (Adult)   Related Risk Factors (Fall Risk) environment unfamiliar   Signs and Symptoms (Fall Risk) presence of risk factors     Goal: Absence of Fall  Outcome: Ongoing (interventions implemented as appropriate)   05/23/19 0337   Fall Risk (Adult)   Absence of Fall achieves outcome

## 2019-05-25 NOTE — DISCHARGE SUMMARY
Date of Discharge:  5/24/2019     LOS: 0 days   Patient Care Team:  Abel Romero MD as PCP - General (Family Medicine)  Abel Romero MD as PCP - Claims Attributed  Dawson Claros MD as Consulting Physician (Urology)  Abel Romero MD as Referring Physician (Family Medicine)  Bradley Carver MD as Cardiologist (Cardiology)  Sly Ahn MD as Consulting Physician (Gastroenterology)  Abel Romero MD as Referring Physician (Family Medicine)  Juan Lane MD as Consulting Physician (Otolaryngology)  Feliz Frye APRN as Nurse Practitioner (Pulmonary Disease)    Chief Complaint:   Abnormal stress test    Shortness of breath    Subjective  Feeling  better  No chest pain   No excessive shortness of breath  No new complaints    Interval History: Improved overall      Denies chest pain currently. Denies excessive shortness of breath. Denies abdominal pain, nausea vomiting or diarrhoea.    Telemetry: no malignant arrhythmia. No significant pauses.    Review of Systems   Constitutional: No chills   No fatigue   No fever.   HENT: Negative.    Eyes: Negative.    Respiratory: Negative for cough,   No chest wall soreness,   Mild shortness of breath,   no wheezing, no stridor.    Cardiovascular: Negative for chest pain,   No palpitations   No significant  leg swelling.   Gastrointestinal: Negative for abdominal distention,  No abdominal pain,   No blood in stool, constipation, diarrhea, nausea or vomiting.   Endocrine: Negative.    Genitourinary: Negative for difficulty urinating, dysuria, flank pain and hematuria.   Musculoskeletal: Negative.    Skin: Negative for rash and wound.   Allergic/Immunologic: Negative.    Neurological: Negative for dizziness, syncope, weakness,   No light-headedness or headaches.   Hematological: Does not bruise/bleed easily.   Psychiatric/Behavioral: Negative for agitation, behavioral problems, confusion, the patient does not appear to be  nervous/anxious.       History:   Past Medical History:   Diagnosis Date   • Arthritis    • BPH (benign prostatic hypertrophy)    • CAD (coronary artery disease)    • CKD (chronic kidney disease)    • Diabetes mellitus (CMS/HCC)     Type 2   • DM (diabetes mellitus screen)    • Hx of colonic polyp    • Hypercholesteremia    • Hypertension      Past Surgical History:   Procedure Laterality Date   • APPENDECTOMY     • CARDIAC CATHETERIZATION     • CARDIAC CATHETERIZATION Left 2019    Procedure: Cardiac Catheterization/Vascular Study Positive occult blood in stools  ? BMS vs REGGIE Get cabg report  ;  Surgeon: Bradley Carver MD;  Location:  [facility];  Service: Cardiology   • COLONOSCOPY N/A 6/15/2017    Diverticulosis repeat exam prn   • COLONOSCOPY W/ POLYPECTOMY  10/21/2013    2 Tubular adenomatous polyps, Diverticulosis repeat exam in 5 years   • CORONARY ARTERY BYPASS GRAFT  1980    X 3   • ENDOSCOPY N/A 6/15/2017    LA Grade A reflux esophagitis     Social History     Socioeconomic History   • Marital status:      Spouse name: Not on file   • Number of children: Not on file   • Years of education: Not on file   • Highest education level: Not on file   Tobacco Use   • Smoking status: Former Smoker     Packs/day: 1.00     Years: 26.00     Pack years: 26.00     Types: Cigarettes     Start date:      Last attempt to quit: 1980     Years since quittin.4   • Smokeless tobacco: Never Used   Substance and Sexual Activity   • Alcohol use: No   • Drug use: No   • Sexual activity: Defer     Family History   Problem Relation Age of Onset   • Heart disease Mother    • Stroke Mother    • Melanoma Mother    • Heart disease Father    • Diabetes Father    • Prostate cancer Father    • Colon cancer Neg Hx    • Colon polyps Neg Hx        Labs:  WBC WBC   Date Value Ref Range Status   2019 8.12 4.80 - 10.80 10*3/mm3 Final   2019 6.98 4.80 - 10.80 10*3/mm3 Final      HGB Hemoglobin    Date Value Ref Range Status   05/23/2019 11.9 (L) 14.0 - 18.0 g/dL Final   05/22/2019 13.4 (L) 14.0 - 18.0 g/dL Final      HCT Hematocrit   Date Value Ref Range Status   05/23/2019 38.1 (L) 40.0 - 52.0 % Final   05/22/2019 41.3 40.0 - 52.0 % Final      Platlets Platelets   Date Value Ref Range Status   05/23/2019 229 130 - 400 10*3/mm3 Final   05/22/2019 253 130 - 400 10*3/mm3 Final      MCV MCV   Date Value Ref Range Status   05/23/2019 93.2 82.0 - 95.0 fL Final   05/22/2019 89.6 82.0 - 95.0 fL Final        Results from last 7 days   Lab Units 05/23/19  0423 05/22/19  1211   SODIUM mmol/L 141 141   POTASSIUM mmol/L 4.5 4.7   CHLORIDE mmol/L 102 103   CO2 mmol/L 31.0 32.0*   BUN mg/dL 21 26*   CREATININE mg/dL 1.25 1.11   CALCIUM mg/dL 8.7 9.4   BILIRUBIN mg/dL  --  0.4   ALK PHOS U/L  --  59   ALT (SGPT) U/L  --  17   AST (SGOT) U/L  --  33   GLUCOSE mg/dL 114* 112*   No results found for: CKTOTAL, CKMB, CKMBINDEX, TROPONINI, TROPONINT  PT/INR:    Protime   Date Value Ref Range Status   05/22/2019 12.7 11.9 - 14.6 Seconds Final   /  INR   Date Value Ref Range Status   05/22/2019 0.93 0.91 - 1.09 Final       Imaging Results (last 72 hours)     ** No results found for the last 72 hours. **          Objective     Allergies   Allergen Reactions   • Symbicort [Budesonide-Formoterol Fumarate] Dizziness     Patient passed out and fell   • Prozac [Fluoxetine Hcl] Mental Status Change     Bad dreams.       Medication Review: Performed  No current facility-administered medications for this encounter.      Current Outpatient Medications   Medication Sig Dispense Refill   • acetaminophen (TYLENOL) 325 MG tablet Take 2 tablets by mouth 2 (Two) Times a Day. 360 tablet 2   • albuterol sulfate HFA (PROAIR HFA) 108 (90 Base) MCG/ACT inhaler Inhale 2 puffs 4 (Four) Times a Day. 1 inhaler 1   • Artificial Tear Solution (JUST TEARS EYE DROPS) solution Apply 1-2 drops to eye(s) as directed by provider Daily As Needed (dry eyes). 45  mL 2   • aspirin  MG tablet Take 1 tablet by mouth Every Other Day. 45 tablet 2   • calcium carbonate-vitamin d 600-400 MG-UNIT per tablet Take 1 tablet by mouth 2 (Two) Times a Day. 180 tablet 2   • Cinnamon 500 MG capsule Take 1 capsule daily 90 capsule 1   • finasteride (PROSCAR) 5 MG tablet Take 1 tablet by mouth Daily. 90 tablet 3   • gabapentin (NEURONTIN) 100 MG capsule Take 2 capsules by mouth 2 (Two) Times a Day. 360 capsule 2   • glyBURIDE (DIAbeta) 5 MG tablet Take 1 tablet by mouth Every Morning AND 0.5 tablets Daily Before Supper.     • isosorbide mononitrate (IMDUR) 30 MG 24 hr tablet Take 1 tablet by mouth Daily. 90 tablet 3   • loratadine (CLARITIN) 10 MG tablet Take 1 tablet by mouth Daily. 90 tablet 2   • metoprolol tartrate (LOPRESSOR) 25 MG tablet Take 1 tablet by mouth 2 (Two) Times a Day. 180 tablet 3   • Multiple Vitamins-Minerals (CENTRUM SILVER ADULT 50+) tablet Take 50 mg by mouth Daily. 90 tablet 1   • Multiple Vitamins-Minerals (PRESERVISION AREDS 2+MULTI VIT) capsule Take 1 capsule by mouth Daily. 90 capsule 1   • pantoprazole (PROTONIX) 40 MG EC tablet Take 1 tablet by mouth Daily. 90 tablet 2   • pravastatin (PRAVACHOL) 80 MG tablet Take 1 tablet by mouth Daily. 90 tablet 2   • terazosin (HYTRIN) 10 MG capsule Take 1 capsule by mouth Daily. 90 capsule 2   • vitamin C (ASCORBIC ACID) 500 MG tablet Take 1 tablet by mouth Daily. 90 tablet 1   • vitamin E 100 UNIT capsule Take 1 capsule by mouth Daily. 90 capsule 1   • ARTIFICIAL TEARS 1.4 % ophthalmic solution      • Blood Glucose Monitoring Suppl (FREESTYLE LITE) device 1 Device Daily. 1 each 0   • FREESTYLE LITE test strip 1 each by Other route Daily. 100 each 2   • Lancets (FREESTYLE) lancets Use once daily 100 each 1   • metFORMIN ER (GLUCOPHAGE XR) 500 MG 24 hr tablet Take 1 tablet by mouth Every Night. 180 tablet 2   • nitroglycerin (NITROSTAT) 0.4 MG SL tablet Place 1 tablet under the tongue Every 5 (Five) Minutes As Needed  "for Chest Pain. 30 tablet 0   • O2 (OXYGEN) Inhale 4 L/min Continuous. Per nasal cannula     • tiotropium bromide monohydrate (SPIRIVA RESPIMAT) 2.5 MCG/ACT aerosol solution inhaler Inhale 2 puffs Daily. 3 inhaler 3       Vital Sign Min/Max for last 24 hours  No Data Recorded   No Data Recorded   No Data Recorded   No Data Recorded   No Data Recorded   No Data Recorded   No Data Recorded     Flowsheet Rows      First Filed Value   Admission Height  180.3 cm (71\") Documented at 05/22/2019 1305   Admission Weight  94.1 kg (207 lb 6 oz) Documented at 05/22/2019 1305          Results for orders placed during the hospital encounter of 05/15/19   Adult Transthoracic Echo Limited W/ Cont if Necessary Per Protocol    Narrative · Estimated EF = 55%.  · Left ventricular systolic function is normal.  · Left ventricular diastolic dysfunction.            Consults:   Consults     No orders found from 4/23/2019 to 5/23/2019.          Procedures Performed  Procedure(s):  Cardiac Catheterization/Vascular Study Positive occult blood in stools  ? BMS vs REGGIE Get cabg report         Physical Exam:  General Appearance: Awake, alert, in no acute distress  Eyes: Pupils equal and reactive    Ears: Appear intact with no abnormalities noted  Nose: Nares normal, no drainage  Neck: supple, trachea midline, no carotid bruit and no JVD  Back: no kyphosis present,    Lungs: respirations regular, respirations even and respirations unlabored  Heart: normal S1, S2,  2/6 pansystolic murmur in the left sternal border,  no rub and no click  Abdomen: normal bowel sounds, no masses, no hepatomegaly, no splenomegaly, guarding and no rebound tenderness   Skin: no bleeding, bruising or rash  Extremities: no cyanosis  Psychiatric/Behavioral: Negative for agitation, behavioral problems, confusion, the patient does not appear to be nervous/anxious.     Right groin: Arteriotomy site healthy,  No bleeding, swelling or excessive bruising. Preserved distal pulses. "      Results Review:   I reviewed the patient's new clinical results.  I reviewed the patient's new imaging results and agree with the interpretation.  I reviewed the patient's other test results and agree with the interpretation  I personally viewed and interpreted the patient's EKG/Telemetry data  Discussed with patient,        Assessment/Plan     Discharge diagnosis with prior   Abnormal stress test  Anticoagulated-CAD, DM2/ASA 81  Atherosclerosis: CAD, AAA  BMI 30.0-30.9,adult  Chronic kidney disease, unspecified CKD stage  Chronic lung disease  Coronary artery disease involving native coronary artery of native heart without angina pectoris  Controlled type 2 diabetes mellitus with diabetic nephropathy, without long-term current use of insulin (CMS/[facility])  Essential hypertension  Mixed hyperlipidemia  Tremor  Abnormal stress test  70% stenosis of diagonal branch          Hospital Course  Patient is a 85 y.o. male presented with symptoms and findings consistent with ischemic heart disease and underwent cardiac  Catheterization with findings and recommendations as below:  Conclusion of cardiac catheterization           Left main coronary artery has moderate atherosclerotic plaque without any high-grade lesions  Left anterior descending coronary artery is occluded after origin of a diagonal branch  Saphenous vein graft to the left anterior descending coronary artery has moderate atherosclerotic plaque without any high-grade lesions and patent  The first diagonal branch has a 70% tubular stenosis that extends from the left anterior descending coronary artery to the proximal aspect  Right coronary artery is occluded in the mid segment  Widely patent saphenous venous graft to the right coronary artery without any significant stenosis  Bilateral internal mammary arteries have not been used as a conduit  No other grafts identified     Left ventricular end-diastolic pressure moderately elevated to 90 mmHg with no gradient  across aortic valve on pullback     Left ventricular ejection fraction approximately 45% with basal inferior hypokinesis no significant mitral regurgitation  Aortogram performed to look for vein grafts with no dissection or significant dilatation  Atherosclerotic plaque buildup noted in the aortic root will admit patient has significant use of contrast and given his age high risk of contrast-induced nephropathy           ____________________________________________________________________________________________________________________________________________     Plan after cardiac catheterization        70% stenosis of diagonal branch without any grafting of this vessel  Patent saphenous venous graft to the left anterior descending coronary artery  Patent saphenous venous graft to the right coronary artery  Occluded mid left anterior stent coronary artery  Occluded proximal right coronary artery           Will hydrate patient  Monitor for any signs of bleeding around the right femoral arteriotomy site where he had oozing from around the 7 Syriac sheath  Intensive risk factor modifications for both primary and secondary prevention if applicable  Hydration   Observation     If patient continues to be symptomatic consider coronary intervention with drug-eluting stent placement into the ostial and proximal portion of the diagonal branch.      Condition on Discharge: Stable and Improved  ______________________________________________________________________________________________________________________________________________________________________________________________________________________________       Plan on discharge  ______________________________________________________________________________________________________________________________________________________________________________________________________________________________       OK to discharge  Low salt cardiac diet.   Discharge to home and  or self care with improvement or resolution of presenting symptoms or complaints  Ambulating    Optimal medical therapy  Deep vein thrombosis precautions with hydration and increasing mobility  Counseled regarding disease appropriate diet, fluid, sodium, caffeine, stimulants intake   Stressed compliance to diet and medications   Avoid NSAIDS    Follow up with primary provider and primary cardiologist as scheduled   Overall improved and deemed fit for discharge  Will be provided with written discharge instructions including diet and medications including prescriptions as required and labs as applicable    Questions were encouraged, asked and answered to the patient's  understanding and satisfaction.    Go to nearest emergency room if recurrence of primary complaints or any suspected disabling or life threatening symptoms or complaints such as chest pain, increasing shortness of breath, profound dizziness, weakness, significant palpitations, passing out    episodes, cold sweats, excessive nausea etc  More than 30 minutes spent in the discharge process.     No heavy lifting for 5-7 days greater than 10 pounds.  No heavy or strenuous pushing or pulling.  No tub baths, hot tubs, swimming pools, or submerging groin underwater for 1 week.  Wash groin site daily with antibacterial soap and water. Rinse and keep clean and dry.  No lotions, powders, or topical ointments to site. Keep open to air and dry.  Call our office with any concerns or if you notice redness, swelling, drainage, warmth, or pain at the site.     _______________________________________________________________________________________________________________________________________________________________________________________________________________________________________    Discharge Disposition: To home and self care  Home or Self Care    Discharge Medications     Discharge Medications      Continue These Medications      Instructions Start Date    acetaminophen 325 MG tablet  Commonly known as:  TYLENOL   650 mg, Oral, 2 Times Daily      albuterol sulfate  (90 Base) MCG/ACT inhaler  Commonly known as:  PROAIR HFA   2 puffs, Inhalation, 4 Times Daily - RT      ARTIFICIAL TEARS 1.4 % ophthalmic solution  Generic drug:  polyvinyl alcohol   No dose, route, or frequency recorded.      aspirin  MG tablet   325 mg, Oral, Every Other Day      calcium carbonate-vitamin d 600-400 MG-UNIT per tablet   1 tablet, Oral, 2 Times Daily      Cinnamon 500 MG capsule   Take 1 capsule daily      finasteride 5 MG tablet  Commonly known as:  PROSCAR   5 mg, Oral, Daily      freestyle lancets   Use once daily      FREESTYLE LITE test strip  Generic drug:  glucose blood   1 each, Other, Daily      gabapentin 100 MG capsule  Commonly known as:  NEURONTIN   200 mg, Oral, 2 Times Daily      glyBURIDE 5 MG tablet  Commonly known as:  DIAbeta   Take 1 tablet by mouth Every Morning AND 0.5 tablets Daily Before Supper.      isosorbide mononitrate 30 MG 24 hr tablet  Commonly known as:  IMDUR   30 mg, Oral, Daily      JUST TEARS EYE DROPS solution   1-2 drops, Ophthalmic, Daily PRN      loratadine 10 MG tablet  Commonly known as:  CLARITIN   10 mg, Oral, Daily      metFORMIN  MG 24 hr tablet  Commonly known as:  GLUCOPHAGE XR   500 mg, Oral, Nightly      metoprolol tartrate 25 MG tablet  Commonly known as:  LOPRESSOR   25 mg, Oral, 2 Times Daily      nitroglycerin 0.4 MG SL tablet  Commonly known as:  NITROSTAT   0.4 mg, Sublingual, Every 5 Minutes PRN      O2  Commonly known as:  OXYGEN   4 L/min, Inhalation, Continuous, Per nasal cannula       pantoprazole 40 MG EC tablet  Commonly known as:  PROTONIX   40 mg, Oral, Daily      pravastatin 80 MG tablet  Commonly known as:  PRAVACHOL   80 mg, Oral, Daily      PRESERVISION AREDS 2+MULTI VIT capsule   1 capsule, Oral, Daily      CENTRUM SILVER ADULT 50+ tablet   50 mg, Oral, Daily      terazosin 10 MG capsule  Commonly  known as:  HYTRIN   10 mg, Oral, Daily      tiotropium bromide monohydrate 2.5 MCG/ACT aerosol solution inhaler  Commonly known as:  SPIRIVA RESPIMAT   2 puffs, Inhalation, Daily - RT      vitamin C 500 MG tablet  Commonly known as:  ASCORBIC ACID   500 mg, Oral, Daily      vitamin E 100 UNIT capsule   100 Units, Oral, Daily             Discharge Diet:  Low salt heart healthy cardiac diet    Activity at Discharge:  As tolerated    Follow-up Appointments  Future Appointments   Date Time Provider Department Center   6/18/2019 10:00 AM Abel Romero MD [facility] None   6/26/2019 10:00 AM Bradley Carver MD [facility]   8/29/2019 11:00 AM Feliz Frye APRN [facility] None   11/19/2019  8:10 AM [facility] [place] [facility] None   11/22/2019 11:00 AM Abel Romero MD [facility] None         Test Results Pending at Discharge: None       Bradley Carver MD  05/24/19  10:08 PM

## 2019-06-18 ENCOUNTER — OFFICE VISIT (OUTPATIENT)
Dept: FAMILY MEDICINE CLINIC | Facility: CLINIC | Age: 84
End: 2019-06-18

## 2019-06-18 VITALS
OXYGEN SATURATION: 94 % | RESPIRATION RATE: 18 BRPM | DIASTOLIC BLOOD PRESSURE: 68 MMHG | HEART RATE: 63 BPM | BODY MASS INDEX: 28.99 KG/M2 | SYSTOLIC BLOOD PRESSURE: 108 MMHG | WEIGHT: 214 LBS | HEIGHT: 72 IN | TEMPERATURE: 97.6 F

## 2019-06-18 DIAGNOSIS — R06.02 SHORTNESS OF BREATH: ICD-10-CM

## 2019-06-18 DIAGNOSIS — J96.11 CHRONIC RESPIRATORY FAILURE WITH HYPOXIA (HCC): ICD-10-CM

## 2019-06-18 DIAGNOSIS — E11.21 CONTROLLED TYPE 2 DIABETES MELLITUS WITH DIABETIC NEPHROPATHY, WITHOUT LONG-TERM CURRENT USE OF INSULIN (HCC): Chronic | ICD-10-CM

## 2019-06-18 DIAGNOSIS — R53.83 OTHER FATIGUE: ICD-10-CM

## 2019-06-18 DIAGNOSIS — I25.10 CORONARY ARTERY DISEASE INVOLVING NATIVE CORONARY ARTERY OF NATIVE HEART WITHOUT ANGINA PECTORIS: Primary | Chronic | ICD-10-CM

## 2019-06-18 DIAGNOSIS — I10 ESSENTIAL HYPERTENSION: Chronic | ICD-10-CM

## 2019-06-18 PROCEDURE — 99214 OFFICE O/P EST MOD 30 MIN: CPT | Performed by: FAMILY MEDICINE

## 2019-06-18 NOTE — PROGRESS NOTES
Subjective   Gonzalo Durham is a 85 y.o. male presenting with chief complaint of:   Chief Complaint   Patient presents with   • Coronary Artery Disease     Heart Cath 5/22- Hospital Follow up       History of Present Illness :  With daughter.   Has multiple chronic problems to consider that might have a bearing on today's issues; somewhat an interval appointment.   Recent and if her heart cath and this is somewhat a follow-up visit for that.  Nothing significant was found on the calf but medication changes were made and he feels significantly better.    Chronic/acute problems reviewed today:   1. Coronary artery disease involving native coronary artery of native heart without angina pectoris: chronic/stable as no chest pain, SOB, use of NTG     2. SOB: copd,CAD,#: Chronic/stable.  Primary players to this point include COPD coronary disease and his weight.  Medications help.  Is worse with exertion.  He has portable concentrator.   3. Other fatigue chronic/variable.  Currently doing much better.   4. Essential hypertension: Chronic/stable. Stable here/if home blood pressures.  No significant chest pain, SOB, LE edema, orthopnea, near syncope, dizziness/light headness.      5. Chronic respiratory failure with hypoxia (CMS/HCC): Chronic/stable.  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   6. Controlled type 2 diabetes mellitus with diabetic nephropathy, without long-term current use of insulin (CMS/HCC): Chronic/stable.variable. No problem/pattern hypoglycemia/hyperglycemia manifest by poly- dypsia, phagia, uria, or sweats, diaphoretic episodes, syncope/near.       Has an/another acute issue today: none.    The following portions of the patient's history were reviewed and updated as appropriate: allergies, current medications, past family history, past medical history, past social history, past  surgical history and problem list.      Current Outpatient Medications:   •  acetaminophen (TYLENOL) 325 MG tablet, Take 2 tablets by mouth 2 (Two) Times a Day., Disp: 360 tablet, Rfl: 2  •  albuterol sulfate HFA (PROAIR HFA) 108 (90 Base) MCG/ACT inhaler, Inhale 2 puffs 4 (Four) Times a Day., Disp: 1 inhaler, Rfl: 1  •  Artificial Tear Solution (JUST TEARS EYE DROPS) solution, Apply 1-2 drops to eye(s) as directed by provider Daily As Needed (dry eyes)., Disp: 45 mL, Rfl: 2  •  ARTIFICIAL TEARS 1.4 % ophthalmic solution, , Disp: , Rfl:   •  aspirin  MG tablet, Take 1 tablet by mouth Every Other Day., Disp: 45 tablet, Rfl: 2  •  Blood Glucose Monitoring Suppl (FREESTYLE LITE) device, 1 Device Daily., Disp: 1 each, Rfl: 0  •  calcium carbonate-vitamin d 600-400 MG-UNIT per tablet, Take 1 tablet by mouth 2 (Two) Times a Day., Disp: 180 tablet, Rfl: 2  •  Cinnamon 500 MG capsule, Take 1 capsule daily, Disp: 90 capsule, Rfl: 1  •  finasteride (PROSCAR) 5 MG tablet, Take 1 tablet by mouth Daily., Disp: 90 tablet, Rfl: 3  •  FREESTYLE LITE test strip, 1 each by Other route Daily., Disp: 100 each, Rfl: 2  •  gabapentin (NEURONTIN) 100 MG capsule, Take 2 capsules by mouth 2 (Two) Times a Day., Disp: 360 capsule, Rfl: 2  •  glyBURIDE (DIAbeta) 5 MG tablet, Take 1 tablet by mouth Every Morning AND 0.5 tablets Daily Before Supper., Disp: , Rfl:   •  isosorbide mononitrate (IMDUR) 30 MG 24 hr tablet, Take 1 tablet by mouth Daily., Disp: 90 tablet, Rfl: 3  •  Lancets (FREESTYLE) lancets, Use once daily, Disp: 100 each, Rfl: 1  •  loratadine (CLARITIN) 10 MG tablet, Take 1 tablet by mouth Daily., Disp: 90 tablet, Rfl: 2  •  metFORMIN ER (GLUCOPHAGE XR) 500 MG 24 hr tablet, Take 1 tablet by mouth Every Night., Disp: 180 tablet, Rfl: 2  •  metoprolol tartrate (LOPRESSOR) 25 MG tablet, Take 1 tablet by mouth 2 (Two) Times a Day., Disp: 180 tablet, Rfl: 3  •  Multiple Vitamins-Minerals (CENTRUM SILVER ADULT 50+) tablet, Take 50  mg by mouth Daily., Disp: 90 tablet, Rfl: 1  •  Multiple Vitamins-Minerals (PRESERVISION AREDS 2+MULTI VIT) capsule, Take 1 capsule by mouth Daily., Disp: 90 capsule, Rfl: 1  •  nitroglycerin (NITROSTAT) 0.4 MG SL tablet, Place 1 tablet under the tongue Every 5 (Five) Minutes As Needed for Chest Pain., Disp: 30 tablet, Rfl: 0  •  O2 (OXYGEN), Inhale 4 L/min Continuous. Per nasal cannula, Disp: , Rfl:   •  pantoprazole (PROTONIX) 40 MG EC tablet, Take 1 tablet by mouth Daily., Disp: 90 tablet, Rfl: 2  •  pravastatin (PRAVACHOL) 80 MG tablet, Take 1 tablet by mouth Daily., Disp: 90 tablet, Rfl: 2  •  terazosin (HYTRIN) 10 MG capsule, Take 1 capsule by mouth Daily., Disp: 90 capsule, Rfl: 2  •  tiotropium bromide monohydrate (SPIRIVA RESPIMAT) 2.5 MCG/ACT aerosol solution inhaler, Inhale 2 puffs Daily., Disp: 3 inhaler, Rfl: 3  •  vitamin C (ASCORBIC ACID) 500 MG tablet, Take 1 tablet by mouth Daily., Disp: 90 tablet, Rfl: 1  •  vitamin E 100 UNIT capsule, Take 1 capsule by mouth Daily., Disp: 90 capsule, Rfl: 1    No problems with medications.  Refills if needed done    Allergies   Allergen Reactions   • Symbicort [Budesonide-Formoterol Fumarate] Dizziness     Patient passed out and fell   • Prozac [Fluoxetine Hcl] Mental Status Change     Bad dreams.       Review of Systems  GENERAL:  Active/slower with limits, speed, samni for age and SOB.  Sleep is ok.   SKIN:  Ongoing callous of feet.   ENDO:  No syncope, near or diaphoretic sweaty spells.  BS Ok: without download noted last visit.   HEENT: No head injury, headache.  No vision change.  Same mild hearing loss.  Ears without pain/drainage.  No sore throat.  No significant nasal/sinus congestion/drainage. No epistaxis.  CHEST: No chest wall tenderness or mass. No significant cough (occ cough),  without wheeze.  Mild as above SOB; no hemoptysis.  CV: No chest pain, palpatations, ankle edema.  GI: No heartburn, dysphagia.  No abdominal pain, diarrhea, constipation,  rectal bleeding, or melena.    :  Voids without dysuria, or incontience to completion.  ORTHO: No painful/swollen joints but various on /off sore.  No change occ sore neck or back.  No acute neck or back pain without recent injury.   NEURO: No dizziness, weakness of extremities.  No change some LE numbness/parethesias.   PSYCH: No memory loss.  Mood good; not that anxious, depressed but/and not suicidal.  Tolerated stress.   Screening:  Mammogram: NA  Bone density: NA  Low dose CT chest: NA (had CT angio)  GI: Colon-div///6.15.17/prn  Prostate: Kupper in past   Usual lab order  6m CBC, BMP, A1c  12m CBC, CMP, A1c, TSH, LIPID, PSAs, Vit D    Lab Results:  Results for orders placed or performed during the hospital encounter of 05/22/19   Comprehensive Metabolic Panel   Result Value Ref Range    Glucose 112 (H) 70 - 100 mg/dL    BUN 26 (H) 5 - 21 mg/dL    Creatinine 1.11 0.50 - 1.40 mg/dL    Sodium 141 135 - 145 mmol/L    Potassium 4.7 3.5 - 5.3 mmol/L    Chloride 103 98 - 110 mmol/L    CO2 32.0 (H) 24.0 - 31.0 mmol/L    Calcium 9.4 8.4 - 10.4 mg/dL    Total Protein 7.8 6.3 - 8.7 g/dL    Albumin 4.60 3.50 - 5.00 g/dL    ALT (SGPT) 17 0 - 54 U/L    AST (SGOT) 33 7 - 45 U/L    Alkaline Phosphatase 59 24 - 120 U/L    Total Bilirubin 0.4 0.1 - 1.0 mg/dL    eGFR Non African Amer 63 >60 mL/min/1.73    Globulin 3.2 gm/dL    A/G Ratio 1.4 1.1 - 2.5 g/dL    BUN/Creatinine Ratio 23.4 7.0 - 25.0    Anion Gap 6.0 4.0 - 13.0 mmol/L   Protime-INR   Result Value Ref Range    Protime 12.7 11.9 - 14.6 Seconds    INR 0.93 0.91 - 1.09   CBC Auto Differential   Result Value Ref Range    WBC 6.98 4.80 - 10.80 10*3/mm3    RBC 4.61 (L) 4.80 - 5.90 10*6/mm3    Hemoglobin 13.4 (L) 14.0 - 18.0 g/dL    Hematocrit 41.3 40.0 - 52.0 %    MCV 89.6 82.0 - 95.0 fL    MCH 29.1 28.0 - 32.0 pg    MCHC 32.4 (L) 33.0 - 36.0 g/dL    RDW 13.5 12.0 - 15.0 %    RDW-SD 44.2 40.0 - 54.0 fl    MPV 9.2 6.0 - 12.0 fL    Platelets 253 130 - 400 10*3/mm3     Neutrophil % 59.0 39.0 - 78.0 %    Lymphocyte % 24.6 15.0 - 45.0 %    Monocyte % 8.3 4.0 - 12.0 %    Eosinophil % 6.9 (H) 0.0 - 4.0 %    Basophil % 0.9 0.0 - 2.0 %    Immature Grans % 0.3 0.0 - 5.0 %    Neutrophils, Absolute 4.12 1.87 - 8.40 10*3/mm3    Lymphocytes, Absolute 1.72 0.72 - 4.86 10*3/mm3    Monocytes, Absolute 0.58 0.19 - 1.30 10*3/mm3    Eosinophils, Absolute 0.48 0.00 - 0.70 10*3/mm3    Basophils, Absolute 0.06 0.00 - 0.20 10*3/mm3    Immature Grans, Absolute 0.02 0.00 - 0.05 10*3/mm3    nRBC 0.0 0.0 - 0.2 /100 WBC   CBC (No Diff)   Result Value Ref Range    WBC 8.12 4.80 - 10.80 10*3/mm3    RBC 4.09 (L) 4.80 - 5.90 10*6/mm3    Hemoglobin 11.9 (L) 14.0 - 18.0 g/dL    Hematocrit 38.1 (L) 40.0 - 52.0 %    MCV 93.2 82.0 - 95.0 fL    MCH 29.1 28.0 - 32.0 pg    MCHC 31.2 (L) 33.0 - 36.0 g/dL    RDW 13.8 12.0 - 15.0 %    RDW-SD 47.5 40.0 - 54.0 fl    MPV 9.8 6.0 - 12.0 fL    Platelets 229 130 - 400 10*3/mm3   Basic Metabolic Panel   Result Value Ref Range    Glucose 114 (H) 70 - 100 mg/dL    BUN 21 5 - 21 mg/dL    Creatinine 1.25 0.50 - 1.40 mg/dL    Sodium 141 135 - 145 mmol/L    Potassium 4.5 3.5 - 5.3 mmol/L    Chloride 102 98 - 110 mmol/L    CO2 31.0 24.0 - 31.0 mmol/L    Calcium 8.7 8.4 - 10.4 mg/dL    eGFR Non African Amer 55 (L) >60 mL/min/1.73    BUN/Creatinine Ratio 16.8 7.0 - 25.0    Anion Gap 8.0 4.0 - 13.0 mmol/L   Hemoglobin A1c   Result Value Ref Range    Hemoglobin A1C 6.4 %   Lipid Panel   Result Value Ref Range    Total Cholesterol 149 130 - 200 mg/dL    Triglycerides 144 0 - 149 mg/dL    HDL Cholesterol 25 (L) >=40 mg/dL    LDL Cholesterol  93 0 - 99 mg/dL    LDL/HDL Ratio 3.81        A1C:  Lab Results - Last 18 Months   Lab Units 05/23/19  0423 05/15/19  0708 11/08/18  1328   HEMOGLOBIN A1C % 6.4 6.30* 6.30     PSA:  Lab Results - Last 18 Months   Lab Units 05/15/19  0708 11/08/18  1328   PSA ng/mL 1.100 1.230     CBC:  Lab Results - Last 18 Months   Lab Units 05/23/19  0423  "05/22/19  1211 05/15/19  0708 11/14/18  0845 11/08/18  1328 07/15/18  2300   WBC 10*3/mm3 8.12 6.98 6.72  --  7.52 8.3   HEMOGLOBIN g/dL 11.9* 13.4* 12.3*  --  12.6* 15.2   HEMATOCRIT % 38.1* 41.3 40.6  --  40.4 45.6   PLATELETS 10*3/mm3 229 253 234  --  242 171   IRON mcg/dL  --   --   --  81  --   --       BMP/CMP:  Lab Results - Last 18 Months   Lab Units 05/23/19  0423 05/22/19  1211 05/15/19  0708 11/08/18  1328 10/24/18  1535 07/15/18  2300   SODIUM mmol/L 141 141 144 141  --  144   SODIUM, ARTERIAL mmol/L  --   --   --   --  143  --    POTASSIUM mmol/L 4.5 4.7 4.9 4.6  --  5.0   CHLORIDE mmol/L 102 103 105 99  --  103   TOTAL CO2 mmol/L  --   --  27.8 33.0*  --  28   CO2 mmol/L 31.0 32.0*  --   --   --   --    GLUCOSE mg/dL  --   --  128* 82  --  108*   BUN mg/dL 21 26* 23 27*  --  23   CREATININE mg/dL 1.25 1.11 1.12 1.06  --  1.09   EGFR IF NONAFRICN AM mL/min/1.73 55* 63 62 67  --  62   EGFR IF AFRICN AM mL/min/1.73  --   --  76 81  --  72   CALCIUM mg/dL 8.7 9.4 9.8 9.9  --  9.6     HEPATIC:  Lab Results - Last 18 Months   Lab Units 05/22/19  1211 05/15/19  0708 07/15/18  2300   ALT (SGPT) U/L 17 11 13   AST (SGOT) U/L 33 15 15   ALK PHOS U/L 59 55 55     THYROID:  Lab Results - Last 18 Months   Lab Units 05/15/19  0708   TSH mIU/mL 2.570       Objective   /68 (BP Location: Left arm, Patient Position: Sitting, Cuff Size: Adult)   Pulse 63   Temp 97.6 °F (36.4 °C) (Oral)   Resp 18   Ht 181.6 cm (71.5\")   Wt 97.1 kg (214 lb)   SpO2 94%   BMI 29.43 kg/m²   Body mass index is 29.43 kg/m².    Physical Exam  GENERAL:  Well nourished/developed in no acute distress. Obese   SKIN: Turgor excellent, without wound, rash, lesion; but does have hyperkeratotic pressure areas (callous) of several pressure areas of both feet.   HEENT: Normal cephalic without trauma.  Pupils equal round reactive to light. Extraocular motions full without nystagmus.    No thyroidmegaly, mass, tenderness, lymphadenopathy and " supple.  CV: Regular rhythm.  No murmur, gallop,  edema. Posterior pulses intact.  No carotid bruits.  CHEST: No chest wall tenderness or mass.   LUNGS: Symmetric motion with clear to auscultation.   ABD: Soft, nontender without mass.   ORTHO: Symmetric extremities without swelling/point tenderness.  Full gross range of motion except hips reduced.  Hips sore with ROM.   NEURO: CN 2-12 grossly intact.  Symmetric facies. 1/4 x bicep knee equal reflexes.  UE/LE   3/5 strength throughout.  Nonfocal use extremities. Speech clear.  Light touch decreased bilateral feet.   PSYCH: Oriented x 3.  Pleasant calm, well kept.  Purposeful/directed conservation with intact short/long gross memory.     Assessment/Plan     1. Coronary artery disease involving native coronary artery of native heart without angina pectoris    2. SOB: copd,CAD,#    3. Other fatigue    4. Essential hypertension    5. Chronic respiratory failure with hypoxia (CMS/HCC)    6. Controlled type 2 diabetes mellitus with diabetic nephropathy, without long-term current use of insulin (CMS/HCC)      It appears recent medication changes makes him feel better in general.  He still has a various diagnosis above that appear to be stable.    Rx: reviewed/changes:  No orders of the defined types were placed in this encounter.    LAB/Testing/Referrals: reviewed/orders:   Today:   No orders of the defined types were placed in this encounter.    Chronic/recurrent labs above or change to:   same     Discussions:   same  Body mass index is 29.43 kg/m².  Patient's Body mass index is 29.43 kg/m². BMI is above normal parameters. Recommendations include: exercise counseling, nutrition counseling and as able.    Tobacco use reviewed:  Non-smoker      There are no Patient Instructions on file for this visit.    Follow up: Return for lab;, Dr Romero-, as planned;.  Future Appointments   Date Time Provider Department Center   6/26/2019 10:00 AM Bradley Carver MD [facility]   8/29/2019 11:00 AM Feliz Frye APRN [facility] None   11/19/2019  8:10 AM [facility] None   11/22/2019 11:00 AM Abel Romero MD [facility] None

## 2019-06-26 ENCOUNTER — OFFICE VISIT (OUTPATIENT)
Dept: CARDIOLOGY | Facility: CLINIC | Age: 84
End: 2019-06-26

## 2019-06-26 VITALS
DIASTOLIC BLOOD PRESSURE: 60 MMHG | HEART RATE: 64 BPM | BODY MASS INDEX: 29.46 KG/M2 | OXYGEN SATURATION: 90 % | WEIGHT: 210.4 LBS | HEIGHT: 71 IN | SYSTOLIC BLOOD PRESSURE: 140 MMHG

## 2019-06-26 DIAGNOSIS — E11.21 CONTROLLED TYPE 2 DIABETES MELLITUS WITH DIABETIC NEPHROPATHY, WITHOUT LONG-TERM CURRENT USE OF INSULIN (HCC): Chronic | ICD-10-CM

## 2019-06-26 DIAGNOSIS — E78.2 MIXED HYPERLIPIDEMIA: Chronic | ICD-10-CM

## 2019-06-26 DIAGNOSIS — R06.02 SHORTNESS OF BREATH: ICD-10-CM

## 2019-06-26 DIAGNOSIS — M72.2 PLANTAR FASCIITIS: ICD-10-CM

## 2019-06-26 DIAGNOSIS — I70.90 ATHEROSCLEROSIS: ICD-10-CM

## 2019-06-26 DIAGNOSIS — I25.10 CORONARY ARTERY DISEASE INVOLVING NATIVE CORONARY ARTERY OF NATIVE HEART WITHOUT ANGINA PECTORIS: Chronic | ICD-10-CM

## 2019-06-26 DIAGNOSIS — I10 ESSENTIAL HYPERTENSION: Chronic | ICD-10-CM

## 2019-06-26 DIAGNOSIS — R25.1 TREMOR: ICD-10-CM

## 2019-06-26 DIAGNOSIS — Z79.01 ANTICOAGULATED: Primary | ICD-10-CM

## 2019-06-26 PROCEDURE — 99214 OFFICE O/P EST MOD 30 MIN: CPT | Performed by: INTERNAL MEDICINE

## 2019-06-26 NOTE — PROGRESS NOTES
Gonzalo Durham  [id number]  1934  85 y.o.  male    Referring Provider: Abel Romero MD    Reason for Follow-up Visit:   Routine follow up.   visit for evaluation for  Shortness of breath   coronary artery disease Coronary artery bypass grafting 1980 x 3 [facility]  S/P cardiac cath    Subjective      Feels much better     Much less  exertional shortness of breath on exertion relieved with rest  Walking 5 rounds inside [employer] now  No significant cough or wheezing  Going on for several months  No palpitations    No significant pedal edema  No fever or chills  No significant expectoration  No hemoptysis    No presyncope or syncope   Feels less  tired   Arthritic pain in small joints     No symptoms reminiscent of prior angina.    Using oxygen at home both during daytime and at night continously       History of present illness:  Gonzalo Durham is a 85 y.o. yo male with history of coronary artery disease Coronary artery bypass grafting who presents today for   Chief Complaint   Patient presents with   • Coronary Artery Disease     1 month s/p cath - patient stated he feels better now after the cath then he did before.    .    History  Past Medical History:   Diagnosis Date   • Arthritis    • BPH (benign prostatic hypertrophy)    • CAD (coronary artery disease)    • CKD (chronic kidney disease)    • Diabetes mellitus (CMS/[facility])     Type 2   • DM (diabetes mellitus screen)    • Hx of colonic polyp    • Hypercholesteremia    • Hypertension    ,   Past Surgical History:   Procedure Laterality Date   • APPENDECTOMY     • CARDIAC CATHETERIZATION     • CARDIAC CATHETERIZATION Left 5/22/2019    Procedure: Cardiac Catheterization/Vascular Study Positive occult blood in stools  ? BMS vs REGGIE Get cabg report  ;  Surgeon: Bradley Carver MD;  Location: [facility] CATH INVASIVE LOCATION;  Service: Cardiology   • COLONOSCOPY N/A 6/15/2017    Diverticulosis repeat exam prn   • COLONOSCOPY W/ POLYPECTOMY  10/21/2013    2 Tubular  adenomatous polyps, Diverticulosis repeat exam in 5 years   • CORONARY ARTERY BYPASS GRAFT  1980    X 3   • ENDOSCOPY N/A 6/15/2017    LA Grade A reflux esophagitis   ,   Family History   Problem Relation Age of Onset   • Heart disease Mother    • Stroke Mother    • Melanoma Mother    • Heart disease Father    • Diabetes Father    • Prostate cancer Father    • Colon cancer Neg Hx    • Colon polyps Neg Hx    ,   Social History     Tobacco Use   • Smoking status: Former Smoker     Packs/day: 1.00     Years: 26.00     Pack years: 26.00     Types: Cigarettes     Start date:      Last attempt to quit: 1980     Years since quittin.5   • Smokeless tobacco: Never Used   Substance Use Topics   • Alcohol use: No   • Drug use: No   ,     Medications  Current Outpatient Medications   Medication Sig Dispense Refill   • acetaminophen (TYLENOL) 325 MG tablet Take 2 tablets by mouth 2 (Two) Times a Day. 360 tablet 2   • albuterol sulfate HFA (PROAIR HFA) 108 (90 Base) MCG/ACT inhaler Inhale 2 puffs 4 (Four) Times a Day. 1 inhaler 1   • Artificial Tear Solution (JUST TEARS EYE DROPS) solution Apply 1-2 drops to eye(s) as directed by provider Daily As Needed (dry eyes). 45 mL 2   • ARTIFICIAL TEARS 1.4 % ophthalmic solution      • aspirin  MG tablet Take 1 tablet by mouth Every Other Day. 45 tablet 2   • Blood Glucose Monitoring Suppl (FREESTYLE LITE) device 1 Device Daily. 1 each 0   • calcium carbonate-vitamin d 600-400 MG-UNIT per tablet Take 1 tablet by mouth 2 (Two) Times a Day. 180 tablet 2   • Cinnamon 500 MG capsule Take 1 capsule daily 90 capsule 1   • finasteride (PROSCAR) 5 MG tablet Take 1 tablet by mouth Daily. 90 tablet 3   • FREESTYLE LITE test strip 1 each by Other route Daily. 100 each 2   • gabapentin (NEURONTIN) 100 MG capsule Take 2 capsules by mouth 2 (Two) Times a Day. 360 capsule 2   • glyBURIDE (DIAbeta) 5 MG tablet Take 1 tablet by mouth Every Morning AND 0.5 tablets Daily Before Supper.     •  isosorbide mononitrate (IMDUR) 30 MG 24 hr tablet Take 1 tablet by mouth Daily. 90 tablet 3   • Lancets (FREESTYLE) lancets Use once daily 100 each 1   • loratadine (CLARITIN) 10 MG tablet Take 1 tablet by mouth Daily. 90 tablet 2   • metFORMIN ER (GLUCOPHAGE XR) 500 MG 24 hr tablet Take 1 tablet by mouth Every Night. 180 tablet 2   • metoprolol tartrate (LOPRESSOR) 25 MG tablet Take 1 tablet by mouth 2 (Two) Times a Day. 180 tablet 3   • Multiple Vitamins-Minerals (CENTRUM SILVER ADULT 50+) tablet Take 50 mg by mouth Daily. 90 tablet 1   • Multiple Vitamins-Minerals (PRESERVISION AREDS 2+MULTI VIT) capsule Take 1 capsule by mouth Daily. 90 capsule 1   • nitroglycerin (NITROSTAT) 0.4 MG SL tablet Place 1 tablet under the tongue Every 5 (Five) Minutes As Needed for Chest Pain. 30 tablet 0   • O2 (OXYGEN) Inhale 4 L/min Continuous. Per nasal cannula     • pantoprazole (PROTONIX) 40 MG EC tablet Take 1 tablet by mouth Daily. 90 tablet 2   • pravastatin (PRAVACHOL) 80 MG tablet Take 1 tablet by mouth Daily. 90 tablet 2   • terazosin (HYTRIN) 10 MG capsule Take 1 capsule by mouth Daily. 90 capsule 2   • tiotropium bromide monohydrate (SPIRIVA RESPIMAT) 2.5 MCG/ACT aerosol solution inhaler Inhale 2 puffs Daily. 3 inhaler 3   • vitamin C (ASCORBIC ACID) 500 MG tablet Take 1 tablet by mouth Daily. 90 tablet 1   • vitamin E 100 UNIT capsule Take 1 capsule by mouth Daily. 90 capsule 1     No current facility-administered medications for this visit.        Allergies:  Symbicort [budesonide-formoterol fumarate] and Prozac [fluoxetine hcl]    Review of Systems  Review of Systems   Constitution: Positive for malaise/fatigue.   HENT: Negative.    Eyes: Negative.    Cardiovascular: Positive for dyspnea on exertion. Negative for chest pain, claudication, cyanosis, irregular heartbeat, leg swelling, near-syncope, orthopnea, palpitations, paroxysmal nocturnal dyspnea and syncope.   Respiratory: Negative.    Endocrine: Negative.   "  Hematologic/Lymphatic: Negative.    Skin: Negative.    Musculoskeletal: Positive for arthritis and joint pain.   Gastrointestinal: Negative for anorexia.   Genitourinary: Negative.    Psychiatric/Behavioral: Negative.        Objective     Physical Exam:  /60 (BP Location: Right arm, Patient Position: Sitting, Cuff Size: Adult)   Pulse 64   Ht 180.3 cm (71\")   Wt 95.4 kg (210 lb 6.4 oz)   SpO2 90%   BMI 29.34 kg/m²     Physical Exam   Constitutional: He appears well-developed.   HENT:   Head: Normocephalic.   Neck: Normal carotid pulses and no JVD present. No tracheal tenderness present. Carotid bruit is not present. No tracheal deviation and no edema present.   Cardiovascular: Regular rhythm and normal pulses.   Murmur heard.   Systolic murmur is present with a grade of 2/6.  Pulmonary/Chest: Effort normal. No stridor.   Abdominal: Soft.   Neurological: He is alert. He has normal strength. No cranial nerve deficit or sensory deficit.   Skin: Skin is warm.   Psychiatric: He has a normal mood and affect. His speech is normal and behavior is normal.       Results Review:    Echo     · Right ventricular cavity is mild-to-moderately dilated.  · Left ventricular diastolic dysfunction (grade I) consistent with impaired relaxation.  · Left ventricular systolic function is normal.  · Left atrial cavity size is borderline dilated.     RIGHT HEART ENLARGEMENT - RVSP NOT ASSESSABLE  NORMAL LV AND RV SYSTOLIC FUNCTION  NO SIGNIFICANT VALVULAR DYSFUNCTION    Holter    · Average HR: 71. Min HR: 37. Max HR: 173.     · Monitored for approximately 1 day   · The predominant rhythm noted during the testing period was sinus rhythm.  · Bradycardia in usual sleeping hours.   · 1 premature ventricular contractions: PVCs:  <0.1%   · 45   atrial premature contractions:   APCs: <0.1%             · No significant supraventricular  tachy or justina arrhythmia.  · No significant  ventricular tachy or justina arrhythmia.  · No correlated " arrhythmia  · No significant pauses above 3 seconds    Results for orders placed during the hospital encounter of 05/15/19   Adult Transthoracic Echo Limited W/ Cont if Necessary Per Protocol    Narrative · Estimated EF = 55%.  · Left ventricular systolic function is normal.  · Left ventricular diastolic dysfunction.        ·  Right ventricular cavity is mild-to-moderately dilated.  · Left ventricular diastolic dysfunction (grade I) consistent with impaired relaxation.  · Left ventricular systolic function is normal.  Left atrial cavity size is borderline dilated.  Procedures    Assessment/Plan   Gonzalo was seen today for coronary artery disease.    Diagnoses and all orders for this visit:    Anticoagulated-CAD, DM2/ASA 81    Atherosclerosis: CAD, AAA    Coronary artery disease involving native coronary artery of native heart without angina pectoris    Controlled type 2 diabetes mellitus with diabetic nephropathy, without long-term current use of insulin (CMS/[facility])    Essential hypertension    Mixed hyperlipidemia    Plantar fasciitis    SOB: copd,CAD,#, hypoxemia    Tremor          Plan      The current medical regimen is effective;  continue present plan and medications.   Overall much better   Defer PCI of diagonal branch and treat medically    Keep A1c less than 7 Primary to monitor  Keep LDL below 70 mg/dl. Monitor liver and renal functions.   Monitor CBC, CMP, TSH (as indicated) and Lipid Panel by primary      S/L NTG PRN for chest pain, call me or go to ER if has to use S/L nitroglycerin     ____________________________________________________________________________________________________________________________________________  Health maintenance and recommendations      Similar recommendations as last visit       Offered to give patient  a copy      Questions were encouraged, asked and answered to the patient's  understanding and satisfaction. Questions if any regarding current medications and side effects,  need for refills and importance of compliance to medications stressed.    Reviewed available prior notes, consults, prior visits, laboratory findings, radiology and cardiology relevant reports. Updated chart as applicable. I have reviewed the patient's medical history in detail and updated the computerized patient record as relevant.      Updated patient regarding any new or relevant abnormalities on review of records or any new findings on physical exam. Mentioned to patient about purpose of visit and desirable health short and long term goals and objectives.    Primary to monitor CBC CMP Lipid panel and TSH as applicable    ___________________________________________________________________________________________________________________________________________            Return in about 6 months (around 12/26/2019).

## 2019-06-27 DIAGNOSIS — G62.9 NEUROPATHY: ICD-10-CM

## 2019-06-27 RX ORDER — GABAPENTIN 100 MG/1
CAPSULE ORAL
Qty: 360 CAPSULE | Refills: 2 | Status: SHIPPED | OUTPATIENT
Start: 2019-06-27 | End: 2019-12-27

## 2019-07-30 ENCOUNTER — HOSPITAL ENCOUNTER (OUTPATIENT)
Dept: CT IMAGING | Facility: HOSPITAL | Age: 84
Discharge: HOME | End: 2019-07-30
Admitting: FAMILY MEDICINE

## 2019-07-30 ENCOUNTER — HOSPITAL ENCOUNTER (OUTPATIENT)
Dept: ULTRASOUND IMAGING | Facility: HOSPITAL | Age: 84
Discharge: HOME | End: 2019-07-30

## 2019-07-30 ENCOUNTER — OFFICE VISIT (OUTPATIENT)
Dept: FAMILY MEDICINE CLINIC | Facility: CLINIC | Age: 84
End: 2019-07-30

## 2019-07-30 VITALS
RESPIRATION RATE: 22 BRPM | SYSTOLIC BLOOD PRESSURE: 108 MMHG | HEIGHT: 71 IN | OXYGEN SATURATION: 91 % | WEIGHT: 211 LBS | HEART RATE: 80 BPM | DIASTOLIC BLOOD PRESSURE: 50 MMHG | TEMPERATURE: 98.3 F | BODY MASS INDEX: 29.54 KG/M2

## 2019-07-30 DIAGNOSIS — N18.9 CHRONIC KIDNEY DISEASE, UNSPECIFIED CKD STAGE: Chronic | ICD-10-CM

## 2019-07-30 DIAGNOSIS — I63.49 CEREBRAL INFARCTION DUE TO EMBOLISM OF OTHER CEREBRAL ARTERY (HCC): ICD-10-CM

## 2019-07-30 DIAGNOSIS — I67.848 OTHER CEREBROVASCULAR VASOSPASM AND VASOCONSTRICTION: ICD-10-CM

## 2019-07-30 DIAGNOSIS — R29.898 LUE WEAKNESS: ICD-10-CM

## 2019-07-30 DIAGNOSIS — I70.90 ATHEROSCLEROSIS: ICD-10-CM

## 2019-07-30 DIAGNOSIS — I10 ESSENTIAL HYPERTENSION: Chronic | ICD-10-CM

## 2019-07-30 DIAGNOSIS — G45.9 TIA (TRANSIENT ISCHEMIC ATTACK): ICD-10-CM

## 2019-07-30 DIAGNOSIS — E11.21 CONTROLLED TYPE 2 DIABETES MELLITUS WITH DIABETIC NEPHROPATHY, WITHOUT LONG-TERM CURRENT USE OF INSULIN (HCC): Chronic | ICD-10-CM

## 2019-07-30 DIAGNOSIS — I63.9 CEREBROVASCULAR ACCIDENT (CVA), UNSPECIFIED MECHANISM (HCC): Primary | ICD-10-CM

## 2019-07-30 PROCEDURE — 70450 CT HEAD/BRAIN W/O DYE: CPT

## 2019-07-30 PROCEDURE — 93880 EXTRACRANIAL BILAT STUDY: CPT

## 2019-07-30 PROCEDURE — 99214 OFFICE O/P EST MOD 30 MIN: CPT | Performed by: FAMILY MEDICINE

## 2019-07-30 PROCEDURE — 93880 EXTRACRANIAL BILAT STUDY: CPT | Performed by: SURGERY

## 2019-07-31 NOTE — PROGRESS NOTES
Subjective   Gonzalo Durham is a 85 y.o. male presenting with chief complaint of:   Chief Complaint   Patient presents with   • COPD   • Hypertension   • Diabetes       History of Present Illness :  With daughter.  Here for primarily an acute issue today; LUE weakness.  Was out of town when 7.27.19 noticed LUE weakness.  Last significantly 2-3 hrs; then got better to now mild weakness.  With this he denied palpitations chest pain slurred speech extremity weakness anywhere else.  His balance was okay.  He went to a walk-in clinic where he was; no CAT scan was done and he was discussed more that he was having shoulder problems.  Here to review..       Has multiple chronic problems to consider that might have a bearing on today's issues; not an interval appointment.       Chronic/acute problems reviewed today:   1. Cerebrovascular accident (CVA), unspecified mechanism (CMS/HCC): acute above   2. Atherosclerosis: CAD, AAA: Chronic/stable ongoing known coronary disease and aortic disease.  Recent heart cath though was nonsurgical.   3. Essential hypertension: Chronic/stable. Stable here/if home blood pressures.  No significant chest pain, SOB, LE edema, orthopnea, near syncope, dizziness/light headness.      4. Controlled type 2 diabetes mellitus with diabetic nephropathy, without long-term current use of insulin (CMS/HCC): Chronic/stable. No problem/pattern hypoglycemia/hyperglycemia manifest by poly- dypsia, phagia, uria, or sweats, diaphoretic episodes, syncope/near.     5. Chronic kidney disease, unspecified CKD stage: Chronic/stable.  Sees no nephrology.  No dysuria.  Previously warned/reminded:   To avoid further kidney function decline  A. Treat any time you think you have infection  B. Stay hydrated (dont get dehydrated); drink at least 60 oz fluid every 24 hr (1800 cc or nearly a 2L)  C. Do not allow any xrays with dye WITHOUT the doctor ordering checking your renal function  D. Do not get new medications without  the doctor considering your renal condition  E. Do not use motrin/ibuprofen, alleve/naprosyn and these types of medications      6. LUE weakness: acute/above   7. TIA (transient ischemic attack): acute/above; working dx    8. Other cerebrovascular vasospasm and vasoconstriction     9. 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    The following portions of the patient's history were reviewed and updated as appropriate: allergies, current medications, past family history, past medical history, past social history, past surgical history and problem list.      Current Outpatient Medications:   •  acetaminophen (TYLENOL) 325 MG tablet, Take 2 tablets by mouth 2 (Two) Times a Day., Disp: 360 tablet, Rfl: 2  •  albuterol sulfate HFA (PROAIR HFA) 108 (90 Base) MCG/ACT inhaler, Inhale 2 puffs 4 (Four) Times a Day., Disp: 1 inhaler, Rfl: 1  •  Artificial Tear Solution (JUST TEARS EYE DROPS) solution, Apply 1-2 drops to eye(s) as directed by provider Daily As Needed (dry eyes)., Disp: 45 mL, Rfl: 2  •  ARTIFICIAL TEARS 1.4 % ophthalmic solution, , Disp: , Rfl:   •  aspirin  MG tablet, Take 1 tablet by mouth Every Other Day., Disp: 45 tablet, Rfl: 2  •  Blood Glucose Monitoring Suppl (FREESTYLE LITE) device, 1 Device Daily., Disp: 1 each, Rfl: 0  •  calcium carbonate-vitamin d 600-400 MG-UNIT per tablet, Take 1 tablet by mouth 2 (Two) Times a Day., Disp: 180 tablet, Rfl: 2  •  Cinnamon 500 MG capsule, Take 1 capsule daily, Disp: 90 capsule, Rfl: 1  •  finasteride (PROSCAR) 5 MG tablet, Take 1 tablet by mouth Daily., Disp: 90 tablet, Rfl: 3  •  FREESTYLE LITE test strip, 1 each by Other route Daily., Disp: 100 each, Rfl: 2  •  gabapentin (NEURONTIN) 100 MG capsule, Take 2 capsules by mouth 2 (Two) Times a Day., Disp: 360 capsule, Rfl: 2  •  gabapentin (NEURONTIN) 100 MG capsule, TAKE 2 CAPSULES BY MOUTH TWICE DAILY, Disp: 360 capsule, Rfl: 2  •   glyBURIDE (DIAbeta) 5 MG tablet, Take 1 tablet by mouth Every Morning AND 0.5 tablets Daily Before Supper., Disp: , Rfl:   •  isosorbide mononitrate (IMDUR) 30 MG 24 hr tablet, Take 1 tablet by mouth Daily., Disp: 90 tablet, Rfl: 3  •  Lancets (FREESTYLE) lancets, Use once daily, Disp: 100 each, Rfl: 1  •  loratadine (CLARITIN) 10 MG tablet, Take 1 tablet by mouth Daily., Disp: 90 tablet, Rfl: 2  •  metFORMIN ER (GLUCOPHAGE XR) 500 MG 24 hr tablet, Take 1 tablet by mouth Every Night., Disp: 180 tablet, Rfl: 2  •  metoprolol tartrate (LOPRESSOR) 25 MG tablet, Take 1 tablet by mouth 2 (Two) Times a Day., Disp: 180 tablet, Rfl: 3  •  Multiple Vitamins-Minerals (CENTRUM SILVER ADULT 50+) tablet, Take 50 mg by mouth Daily., Disp: 90 tablet, Rfl: 1  •  Multiple Vitamins-Minerals (PRESERVISION AREDS 2+MULTI VIT) capsule, Take 1 capsule by mouth Daily., Disp: 90 capsule, Rfl: 1  •  nitroglycerin (NITROSTAT) 0.4 MG SL tablet, Place 1 tablet under the tongue Every 5 (Five) Minutes As Needed for Chest Pain., Disp: 30 tablet, Rfl: 0  •  O2 (OXYGEN), Inhale 4 L/min Continuous. Per nasal cannula, Disp: , Rfl:   •  pantoprazole (PROTONIX) 40 MG EC tablet, Take 1 tablet by mouth Daily., Disp: 90 tablet, Rfl: 2  •  pravastatin (PRAVACHOL) 80 MG tablet, Take 1 tablet by mouth Daily., Disp: 90 tablet, Rfl: 2  •  terazosin (HYTRIN) 10 MG capsule, Take 1 capsule by mouth Daily., Disp: 90 capsule, Rfl: 2  •  tiotropium bromide monohydrate (SPIRIVA RESPIMAT) 2.5 MCG/ACT aerosol solution inhaler, Inhale 2 puffs Daily., Disp: 3 inhaler, Rfl: 3  •  vitamin C (ASCORBIC ACID) 500 MG tablet, Take 1 tablet by mouth Daily., Disp: 90 tablet, Rfl: 1  •  vitamin E 100 UNIT capsule, Take 1 capsule by mouth Daily., Disp: 90 capsule, Rfl: 1    No problems with medications.  Refills if needed done    Allergies   Allergen Reactions   • Symbicort [Budesonide-Formoterol Fumarate] Dizziness     Patient passed out and fell   • Prozac [Fluoxetine Hcl] Mental  Status Change     Bad dreams.       Review of Systems  GENERAL:  Active/slower with limits, speed, samni for age and SOB.  Sleep is ok.   SKIN:  Ongoing callous of feet; no problems with this/other skin today.   ENDO:  No syncope, near or diaphoretic sweaty spells.  BS Ok: without download noted last visit.   HEENT: No head injury, headache.  No vision change.  Same mild hearing loss.  Ears without pain/drainage.  No sore throat.  No significant nasal/sinus congestion/drainage. No epistaxis.  CHEST: No chest wall tenderness or mass. No significant cough (occ cough),  without wheeze.  Mild as above SOB; no hemoptysis.  CV: No chest pain, palpatations, ankle edema.  GI: No heartburn, dysphagia.  No abdominal pain, diarrhea, constipation, rectal bleeding, or melena.    :  Voids without dysuria, or incontience to completion.  ORTHO: No painful/swollen joints but various on /off sore.  No change occ sore neck or back.  No acute neck or back pain without recent injury.   NEURO: No dizziness; recent LUE weakness of extremities.  No change some LE numbness/parethesias.   PSYCH: No memory loss.  Mood good; not that anxious, depressed but/and not suicidal.  Tolerated stress.   Screening:  Mammogram: NA  Bone density: NA  Low dose CT chest: NA (had CT angio)  GI: Colon-div/[facility]/6.15.17/prn  Prostate: Kupper in past   Usual lab order  6m CBC, BMP, A1c  12m CBC, CMP, A1c, TSH, LIPID, PSAs, Vit D    Lab Results:  Results for orders placed or performed during the hospital encounter of 05/22/19   Comprehensive Metabolic Panel   Result Value Ref Range    Glucose 112 (H) 70 - 100 mg/dL    BUN 26 (H) 5 - 21 mg/dL    Creatinine 1.11 0.50 - 1.40 mg/dL    Sodium 141 135 - 145 mmol/L    Potassium 4.7 3.5 - 5.3 mmol/L    Chloride 103 98 - 110 mmol/L    CO2 32.0 (H) 24.0 - 31.0 mmol/L    Calcium 9.4 8.4 - 10.4 mg/dL    Total Protein 7.8 6.3 - 8.7 g/dL    Albumin 4.60 3.50 - 5.00 g/dL    ALT (SGPT) 17 0 - 54 U/L    AST (SGOT) 33 7 - 45  U/L    Alkaline Phosphatase 59 24 - 120 U/L    Total Bilirubin 0.4 0.1 - 1.0 mg/dL    eGFR Non African Amer 63 >60 mL/min/1.73    Globulin 3.2 gm/dL    A/G Ratio 1.4 1.1 - 2.5 g/dL    BUN/Creatinine Ratio 23.4 7.0 - 25.0    Anion Gap 6.0 4.0 - 13.0 mmol/L   Protime-INR   Result Value Ref Range    Protime 12.7 11.9 - 14.6 Seconds    INR 0.93 0.91 - 1.09   CBC Auto Differential   Result Value Ref Range    WBC 6.98 4.80 - 10.80 10*3/mm3    RBC 4.61 (L) 4.80 - 5.90 10*6/mm3    Hemoglobin 13.4 (L) 14.0 - 18.0 g/dL    Hematocrit 41.3 40.0 - 52.0 %    MCV 89.6 82.0 - 95.0 fL    MCH 29.1 28.0 - 32.0 pg    MCHC 32.4 (L) 33.0 - 36.0 g/dL    RDW 13.5 12.0 - 15.0 %    RDW-SD 44.2 40.0 - 54.0 fl    MPV 9.2 6.0 - 12.0 fL    Platelets 253 130 - 400 10*3/mm3    Neutrophil % 59.0 39.0 - 78.0 %    Lymphocyte % 24.6 15.0 - 45.0 %    Monocyte % 8.3 4.0 - 12.0 %    Eosinophil % 6.9 (H) 0.0 - 4.0 %    Basophil % 0.9 0.0 - 2.0 %    Immature Grans % 0.3 0.0 - 5.0 %    Neutrophils, Absolute 4.12 1.87 - 8.40 10*3/mm3    Lymphocytes, Absolute 1.72 0.72 - 4.86 10*3/mm3    Monocytes, Absolute 0.58 0.19 - 1.30 10*3/mm3    Eosinophils, Absolute 0.48 0.00 - 0.70 10*3/mm3    Basophils, Absolute 0.06 0.00 - 0.20 10*3/mm3    Immature Grans, Absolute 0.02 0.00 - 0.05 10*3/mm3    nRBC 0.0 0.0 - 0.2 /100 WBC   CBC (No Diff)   Result Value Ref Range    WBC 8.12 4.80 - 10.80 10*3/mm3    RBC 4.09 (L) 4.80 - 5.90 10*6/mm3    Hemoglobin 11.9 (L) 14.0 - 18.0 g/dL    Hematocrit 38.1 (L) 40.0 - 52.0 %    MCV 93.2 82.0 - 95.0 fL    MCH 29.1 28.0 - 32.0 pg    MCHC 31.2 (L) 33.0 - 36.0 g/dL    RDW 13.8 12.0 - 15.0 %    RDW-SD 47.5 40.0 - 54.0 fl    MPV 9.8 6.0 - 12.0 fL    Platelets 229 130 - 400 10*3/mm3   Basic Metabolic Panel   Result Value Ref Range    Glucose 114 (H) 70 - 100 mg/dL    BUN 21 5 - 21 mg/dL    Creatinine 1.25 0.50 - 1.40 mg/dL    Sodium 141 135 - 145 mmol/L    Potassium 4.5 3.5 - 5.3 mmol/L    Chloride 102 98 - 110 mmol/L    CO2 31.0 24.0 - 31.0  mmol/L    Calcium 8.7 8.4 - 10.4 mg/dL    eGFR Non African Amer 55 (L) >60 mL/min/1.73    BUN/Creatinine Ratio 16.8 7.0 - 25.0    Anion Gap 8.0 4.0 - 13.0 mmol/L   Hemoglobin A1c   Result Value Ref Range    Hemoglobin A1C 6.4 %   Lipid Panel   Result Value Ref Range    Total Cholesterol 149 130 - 200 mg/dL    Triglycerides 144 0 - 149 mg/dL    HDL Cholesterol 25 (L) >=40 mg/dL    LDL Cholesterol  93 0 - 99 mg/dL    LDL/HDL Ratio 3.81        A1C:  Lab Results - Last 18 Months   Lab Units 05/23/19  0423 05/15/19  0708 11/08/18  1328   HEMOGLOBIN A1C % 6.4 6.30* 6.30     PSA:  Lab Results - Last 18 Months   Lab Units 05/15/19  0708 11/08/18  1328   PSA ng/mL 1.100 1.230     CBC:  Lab Results - Last 18 Months   Lab Units 05/23/19  0423 05/22/19  1211 05/15/19  0708 11/14/18  0845 11/08/18  1328 07/15/18  2300   WBC 10*3/mm3 8.12 6.98 6.72  --  7.52 8.3   HEMOGLOBIN g/dL 11.9* 13.4* 12.3*  --  12.6* 15.2   HEMATOCRIT % 38.1* 41.3 40.6  --  40.4 45.6   PLATELETS 10*3/mm3 229 253 234  --  242 171   IRON mcg/dL  --   --   --  81  --   --       BMP/CMP:  Lab Results - Last 18 Months   Lab Units 05/23/19  0423 05/22/19  1211 05/15/19  0708 11/08/18  1328 10/24/18  1535 07/15/18  2300   SODIUM mmol/L 141 141 144 141  --  144   SODIUM, ARTERIAL mmol/L  --   --   --   --  143  --    POTASSIUM mmol/L 4.5 4.7 4.9 4.6  --  5.0   CHLORIDE mmol/L 102 103 105 99  --  103   TOTAL CO2 mmol/L  --   --  27.8 33.0*  --  28   CO2 mmol/L 31.0 32.0*  --   --   --   --    GLUCOSE mg/dL  --   --  128* 82  --  108*   BUN mg/dL 21 26* 23 27*  --  23   CREATININE mg/dL 1.25 1.11 1.12 1.06  --  1.09   EGFR IF NONAFRICN AM mL/min/1.73 55* 63 62 67  --  62   EGFR IF AFRICN AM mL/min/1.73  --   --  76 81  --  72   CALCIUM mg/dL 8.7 9.4 9.8 9.9  --  9.6     HEPATIC:  Lab Results - Last 18 Months   Lab Units 05/22/19  1211 05/15/19  0708 07/15/18  2300   ALT (SGPT) U/L 17 11 13   AST (SGOT) U/L 33 15 15   ALK PHOS U/L 59 55 55     THYROID:  Lab Results  "- Last 18 Months   Lab Units 05/15/19  0708   TSH mIU/mL 2.570       Objective   /50 (BP Location: Left arm, Patient Position: Sitting)   Pulse 80   Temp 98.3 °F (36.8 °C) (Oral)   Resp 22   Ht 180.3 cm (71\")   Wt 95.7 kg (211 lb)   SpO2 91%   BMI 29.43 kg/m²   Body mass index is 29.43 kg/m².    Physical Exam  GENERAL:  Well nourished/developed in no acute distress. Obese   SKIN: Turgor excellent, without wound, rash, lesion; but does have hyperkeratotic pressure areas (callous) of several pressure areas of both feet.   HEENT: Normal cephalic without trauma.  Pupils equal round reactive to light. Extraocular motions full without nystagmus.    No thyroidmegaly, mass, tenderness, lymphadenopathy and supple.  CV: Regular rhythm.  No murmur, gallop,  edema. Posterior pulses intact.  No carotid bruits.  CHEST: No chest wall tenderness or mass.   LUNGS: Symmetric motion with clear to auscultation.   ABD: Soft, nontender without mass.   ORTHO: Symmetric extremities without swelling/point tenderness.  Full gross range of motion except hips reduced.  Hips sore with ROM.   NEURO: CN 2-12 grossly intact.  Symmetric facies. 1/4 x bicep knee equal reflexes.  UE/LE   3/5 strength throughout except 2/5  L.  Nonfocal use extremities. Speech clear.  Light touch decreased bilateral feet.   PSYCH: Oriented x 3.  Pleasant calm, well kept.  Purposeful/directed conservation with intact short/long gross memory.     Assessment/Plan     1. Cerebrovascular accident (CVA), unspecified mechanism (CMS/HCC)    2. Atherosclerosis: CAD, AAA    3. Essential hypertension    4. Controlled type 2 diabetes mellitus with diabetic nephropathy, without long-term current use of insulin (CMS/HCC)    5. Chronic kidney disease, unspecified CKD stage    6. LUE weakness    7. TIA (transient ischemic attack)    8. Other cerebrovascular vasospasm and vasoconstriction     9. 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  We will send him for carotids that were nonsurgical or acute.  He had a CT of his head showed a small right-sided stroke; ischemic without bleed.  Course is change the diagnosis to CVA.  He has had several days now that this is not worsened; I think we only need to add to this an echocardiogram and consideration for whether his aspirin needs to be modified by the addition of say Plavix.    Rx: reviewed/changes:  No orders of the defined types were placed in this encounter.    LAB/Testing/Referrals: reviewed/orders:   Today:   Orders Placed This Encounter   Procedures   • CT Head Without Contrast   • US Carotid Bilateral   • Ambulatory Referral to Neurology   • Adult Transthoracic Echo Complete W/ Cont if Necessary Per Protocol     Chronic/recurrent labs above or change to:   same     Discussions:   Add echo  Body mass index is 29.43 kg/m².  Patient's Body mass index is 29.43 kg/m². BMI is within normal parameters. No follow-up required..    Tobacco use reviewed:    Non-smoker    There are no Patient Instructions on file for this visit.    Follow up: Return for as planned.  Future Appointments   Date Time Provider Department Center   8/29/2019 11:00 AM Feliz Frye APRN [facility] None   11/19/2019  8:10 AM [facility] None   11/22/2019 11:00 AM Abel Romero MD [facility] None   12/30/2019 10:00 AM Bradley Carver MD [facility]     Patient is first visit note for imaging.  Returned after that we discussed the results  A copy of this is being sent to Dr. Carver to see if he thinks the Plavix be added to his aspirin (and change ASA 81+  qod to just ASA 81/plavix?)

## 2019-08-01 ENCOUNTER — HOSPITAL ENCOUNTER (OUTPATIENT)
Dept: CARDIOLOGY | Facility: HOSPITAL | Age: 84
Discharge: HOME | End: 2019-08-01
Admitting: FAMILY MEDICINE

## 2019-08-01 VITALS
HEIGHT: 71 IN | SYSTOLIC BLOOD PRESSURE: 108 MMHG | BODY MASS INDEX: 29.54 KG/M2 | WEIGHT: 211 LBS | DIASTOLIC BLOOD PRESSURE: 50 MMHG

## 2019-08-01 DIAGNOSIS — I63.49 CEREBRAL INFARCTION DUE TO EMBOLISM OF OTHER CEREBRAL ARTERY (HCC): ICD-10-CM

## 2019-08-01 DIAGNOSIS — I63.9 CEREBROVASCULAR ACCIDENT (CVA), UNSPECIFIED MECHANISM (HCC): ICD-10-CM

## 2019-08-01 PROCEDURE — 93306 TTE W/DOPPLER COMPLETE: CPT | Performed by: INTERNAL MEDICINE

## 2019-08-01 PROCEDURE — 93306 TTE W/DOPPLER COMPLETE: CPT

## 2019-08-02 LAB
BH CV ECHO MEAS - AO MAX PG (FULL): 4.5 MMHG
BH CV ECHO MEAS - AO MAX PG: 11.4 MMHG
BH CV ECHO MEAS - AO MEAN PG (FULL): 2 MMHG
BH CV ECHO MEAS - AO MEAN PG: 5 MMHG
BH CV ECHO MEAS - AO ROOT AREA (BSA CORRECTED): 1.7
BH CV ECHO MEAS - AO ROOT AREA: 10.8 CM^2
BH CV ECHO MEAS - AO ROOT DIAM: 3.7 CM
BH CV ECHO MEAS - AO V2 MAX: 169 CM/SEC
BH CV ECHO MEAS - AO V2 MEAN: 100 CM/SEC
BH CV ECHO MEAS - AO V2 VTI: 34.6 CM
BH CV ECHO MEAS - AVA(I,A): 2.7 CM^2
BH CV ECHO MEAS - AVA(I,D): 2.7 CM^2
BH CV ECHO MEAS - AVA(V,A): 2.5 CM^2
BH CV ECHO MEAS - AVA(V,D): 2.5 CM^2
BH CV ECHO MEAS - BSA(HAYCOCK): 2.2 M^2
BH CV ECHO MEAS - BSA: 2.2 M^2
BH CV ECHO MEAS - BZI_BMI: 29.4 KILOGRAMS/M^2
BH CV ECHO MEAS - BZI_METRIC_HEIGHT: 180.3 CM
BH CV ECHO MEAS - BZI_METRIC_WEIGHT: 95.7 KG
BH CV ECHO MEAS - EDV(CUBED): 111.3 ML
BH CV ECHO MEAS - EDV(MOD-SP4): 125 ML
BH CV ECHO MEAS - EDV(TEICH): 108 ML
BH CV ECHO MEAS - EF(CUBED): 70.6 %
BH CV ECHO MEAS - EF(MOD-SP4): 63.6 %
BH CV ECHO MEAS - EF(TEICH): 62.1 %
BH CV ECHO MEAS - ESV(CUBED): 32.8 ML
BH CV ECHO MEAS - ESV(MOD-SP4): 45.5 ML
BH CV ECHO MEAS - ESV(TEICH): 41 ML
BH CV ECHO MEAS - FS: 33.5 %
BH CV ECHO MEAS - IVS/LVPW: 1.1
BH CV ECHO MEAS - IVSD: 1.2 CM
BH CV ECHO MEAS - LA DIMENSION: 4.4 CM
BH CV ECHO MEAS - LA/AO: 1.2
BH CV ECHO MEAS - LAT PEAK E' VEL: 12.2 CM/SEC
BH CV ECHO MEAS - LV DIASTOLIC VOL/BSA (35-75): 57.9 ML/M^2
BH CV ECHO MEAS - LV MASS(C)D: 193.4 GRAMS
BH CV ECHO MEAS - LV MASS(C)DI: 89.6 GRAMS/M^2
BH CV ECHO MEAS - LV MAX PG: 7 MMHG
BH CV ECHO MEAS - LV MEAN PG: 3 MMHG
BH CV ECHO MEAS - LV SYSTOLIC VOL/BSA (12-30): 21.1 ML/M^2
BH CV ECHO MEAS - LV V1 MAX: 132 CM/SEC
BH CV ECHO MEAS - LV V1 MEAN: 78.4 CM/SEC
BH CV ECHO MEAS - LV V1 VTI: 29.8 CM
BH CV ECHO MEAS - LVIDD: 4.8 CM
BH CV ECHO MEAS - LVIDS: 3.2 CM
BH CV ECHO MEAS - LVLD AP4: 9.8 CM
BH CV ECHO MEAS - LVLS AP4: 7.2 CM
BH CV ECHO MEAS - LVOT AREA (M): 3.1 CM^2
BH CV ECHO MEAS - LVOT AREA: 3.1 CM^2
BH CV ECHO MEAS - LVOT DIAM: 2 CM
BH CV ECHO MEAS - LVPWD: 1 CM
BH CV ECHO MEAS - MED PEAK E' VEL: 5.5 CM/SEC
BH CV ECHO MEAS - MV A MAX VEL: 85.9 CM/SEC
BH CV ECHO MEAS - MV DEC SLOPE: 381 CM/SEC^2
BH CV ECHO MEAS - MV DEC TIME: 0.19 SEC
BH CV ECHO MEAS - MV E MAX VEL: 61.6 CM/SEC
BH CV ECHO MEAS - MV E/A: 0.72
BH CV ECHO MEAS - MV P1/2T MAX VEL: 103 CM/SEC
BH CV ECHO MEAS - MV P1/2T: 79.2 MSEC
BH CV ECHO MEAS - MVA P1/2T LCG: 2.1 CM^2
BH CV ECHO MEAS - MVA(P1/2T): 2.8 CM^2
BH CV ECHO MEAS - PI END-D VEL: 179 CM/SEC
BH CV ECHO MEAS - RAP SYSTOLE: 5 MMHG
BH CV ECHO MEAS - RVSP: 40.8 MMHG
BH CV ECHO MEAS - SI(AO): 172.4 ML/M^2
BH CV ECHO MEAS - SI(CUBED): 36.4 ML/M^2
BH CV ECHO MEAS - SI(LVOT): 43.4 ML/M^2
BH CV ECHO MEAS - SI(MOD-SP4): 36.8 ML/M^2
BH CV ECHO MEAS - SI(TEICH): 31.1 ML/M^2
BH CV ECHO MEAS - SV(AO): 372 ML
BH CV ECHO MEAS - SV(CUBED): 78.5 ML
BH CV ECHO MEAS - SV(LVOT): 93.6 ML
BH CV ECHO MEAS - SV(MOD-SP4): 79.5 ML
BH CV ECHO MEAS - SV(TEICH): 67.1 ML
BH CV ECHO MEAS - TR MAX VEL: 299 CM/SEC
BH CV ECHO MEASUREMENTS AVERAGE E/E' RATIO: 6.96
LEFT ATRIUM VOLUME INDEX: 28.5 ML/M2
LV EF 2D ECHO EST: 60 %
MAXIMAL PREDICTED HEART RATE: 135 BPM
STRESS TARGET HR: 115 BPM

## 2019-08-04 PROBLEM — I50.9 CHF (CONGESTIVE HEART FAILURE): Status: ACTIVE | Noted: 2019-08-04

## 2019-08-05 RX ORDER — ASPIRIN 81 MG/1
81 TABLET ORAL DAILY
Start: 2019-08-05 | End: 2020-07-17 | Stop reason: SDUPTHER

## 2019-08-05 RX ORDER — CLOPIDOGREL BISULFATE 75 MG/1
75 TABLET ORAL DAILY
Qty: 90 TABLET | Refills: 1 | Status: SHIPPED | OUTPATIENT
Start: 2019-08-05 | End: 2019-08-30 | Stop reason: SDUPTHER

## 2019-08-06 ENCOUNTER — TELEPHONE (OUTPATIENT)
Dept: FAMILY MEDICINE CLINIC | Facility: CLINIC | Age: 84
End: 2019-08-06

## 2019-08-06 NOTE — TELEPHONE ENCOUNTER
"[facility] called to advise \"his heart rate is in the low 40's, it is 44 and his blood pressure is ok 118/60. He is has no symptoms although he dose say that he gets dizzy and that is no new. He also said his pulse runs low, but I felt like I needed to let Dr Romero be aware.\"  "

## 2019-08-06 NOTE — TELEPHONE ENCOUNTER
"Daisha we received this message from a counseling [facility] service that we have here [place].  He goes there to be in group therapy (and gets a lot of good out of that).  I am not sure that his pulse being 44 means anything.  I did not know whether you might want to have 1 of the mid levels in your office to review this from the cardiac standpoint     Message: [facility] called to advise \"his heart rate is in the low 40's, it is 44 and his blood pressure is ok 118/60. He is has no symptoms although he dose say that he gets dizzy and that is no new. He also said his pulse runs low, but I felt like I needed to let Dr Romero be aware.\"  "

## 2019-08-08 ENCOUNTER — APPOINTMENT (OUTPATIENT)
Dept: CARDIOLOGY | Facility: HOSPITAL | Age: 84
End: 2019-08-08

## 2019-08-09 ENCOUNTER — OFFICE VISIT (OUTPATIENT)
Dept: CARDIOLOGY | Facility: CLINIC | Age: 84
End: 2019-08-09

## 2019-08-09 VITALS
BODY MASS INDEX: 29.26 KG/M2 | OXYGEN SATURATION: 96 % | WEIGHT: 209 LBS | HEART RATE: 64 BPM | HEIGHT: 71 IN | DIASTOLIC BLOOD PRESSURE: 52 MMHG | SYSTOLIC BLOOD PRESSURE: 112 MMHG

## 2019-08-09 DIAGNOSIS — R19.5 HEME POSITIVE STOOL: ICD-10-CM

## 2019-08-09 DIAGNOSIS — I25.10 CORONARY ARTERY DISEASE INVOLVING NATIVE CORONARY ARTERY OF NATIVE HEART WITHOUT ANGINA PECTORIS: Chronic | ICD-10-CM

## 2019-08-09 DIAGNOSIS — I63.9 CEREBROVASCULAR ACCIDENT (CVA), UNSPECIFIED MECHANISM (HCC): ICD-10-CM

## 2019-08-09 DIAGNOSIS — Z79.01 ANTICOAGULATED: ICD-10-CM

## 2019-08-09 DIAGNOSIS — I71.40 ABDOMINAL AORTIC ANEURYSM (AAA) WITHOUT RUPTURE (HCC): ICD-10-CM

## 2019-08-09 DIAGNOSIS — J44.9 STAGE 2 MODERATE COPD BY GOLD CLASSIFICATION (HCC): ICD-10-CM

## 2019-08-09 DIAGNOSIS — Z86.010 HX OF COLONIC POLYPS: ICD-10-CM

## 2019-08-09 DIAGNOSIS — I70.90 ATHEROSCLEROSIS: Primary | ICD-10-CM

## 2019-08-09 DIAGNOSIS — R06.02 SHORTNESS OF BREATH: ICD-10-CM

## 2019-08-09 PROCEDURE — 99214 OFFICE O/P EST MOD 30 MIN: CPT | Performed by: INTERNAL MEDICINE

## 2019-08-09 PROCEDURE — 93000 ELECTROCARDIOGRAM COMPLETE: CPT | Performed by: INTERNAL MEDICINE

## 2019-08-09 NOTE — PROGRESS NOTES
Gonzalo Durham  [id number]  1934  85 y.o.  male    Referring Provider: Abel Romero MD    Reason for Follow-up Visit:   Routine follow up.   visit for evaluation for  Shortness of breath   coronary artery disease Coronary artery bypass grafting 1980 x 3 [facility]  S/P cardiac cath  Recent cerebrovascular accident with left arm weakness now resolved  Lasted for for over 1 week 7/27/19  CT head showed anterior small infarction  7/27/19  sinus rhythm in [place]       Subjective    Mild chronic exertional shortness of breath on exertion relieved with rest  No significant cough or wheezing  Going on for several months or longer    No palpitations  No associated chest pain  No significant pedal edema    No fever or chills  No significant expectoration    No hemoptysis  No presyncope or syncope     No symptoms reminiscent of prior angina.    Using oxygen at home both during daytime and at night continously       History of present illness:  Gonzalo Durham is a 85 y.o. yo male with history of coronary artery disease Coronary artery bypass grafting who presents today for   Chief Complaint   Patient presents with   • Slow Heart Rate     1 MON FU/ RESULTS (PER DR ROMERO)   .    History  Past Medical History:   Diagnosis Date   • Arthritis    • BPH (benign prostatic hypertrophy)    • CAD (coronary artery disease)    • CKD (chronic kidney disease)    • Diabetes mellitus (CMS/HCC)     Type 2   • DM (diabetes mellitus screen)    • Hx of colonic polyp    • Hypercholesteremia    • Hypertension    ,   Past Surgical History:   Procedure Laterality Date   • APPENDECTOMY     • CARDIAC CATHETERIZATION     • CARDIAC CATHETERIZATION Left 5/22/2019    Procedure: Cardiac Catheterization/Vascular Study Positive occult blood in stools  ? BMS vs REGGIE Get cabg report  ;  Surgeon: Bradley Carver MD;  Location:  [facility];  Service: Cardiology   • COLONOSCOPY N/A 6/15/2017    Diverticulosis repeat exam prn   • COLONOSCOPY  W/ POLYPECTOMY  10/21/2013    2 Tubular adenomatous polyps, Diverticulosis repeat exam in 5 years   • CORONARY ARTERY BYPASS GRAFT  1980    X 3   • ENDOSCOPY N/A 6/15/2017    LA Grade A reflux esophagitis   ,   Family History   Problem Relation Age of Onset   • Heart disease Mother    • Stroke Mother    • Melanoma Mother    • Heart disease Father    • Diabetes Father    • Prostate cancer Father    • Colon cancer Neg Hx    • Colon polyps Neg Hx    ,   Social History     Tobacco Use   • Smoking status: Former Smoker     Packs/day: 1.00     Years: 26.00     Pack years: 26.00     Types: Cigarettes     Start date:      Last attempt to quit: 1980     Years since quittin.6   • Smokeless tobacco: Never Used   Substance Use Topics   • Alcohol use: No   • Drug use: No   ,     Medications  Current Outpatient Medications   Medication Sig Dispense Refill   • acetaminophen (TYLENOL) 325 MG tablet Take 2 tablets by mouth 2 (Two) Times a Day. 360 tablet 2   • albuterol sulfate HFA (PROAIR HFA) 108 (90 Base) MCG/ACT inhaler Inhale 2 puffs 4 (Four) Times a Day. 1 inhaler 1   • Artificial Tear Solution (JUST TEARS EYE DROPS) solution Apply 1-2 drops to eye(s) as directed by provider Daily As Needed (dry eyes). 45 mL 2   • ARTIFICIAL TEARS 1.4 % ophthalmic solution      • aspirin 81 MG EC tablet Take 1 tablet by mouth Daily.     • Blood Glucose Monitoring Suppl (FREESTYLE LITE) device 1 Device Daily. 1 each 0   • calcium carbonate-vitamin d 600-400 MG-UNIT per tablet Take 1 tablet by mouth 2 (Two) Times a Day. 180 tablet 2   • Cinnamon 500 MG capsule Take 1 capsule daily 90 capsule 1   • clopidogrel (PLAVIX) 75 MG tablet Take 1 tablet by mouth Daily. 90 tablet 1   • finasteride (PROSCAR) 5 MG tablet Take 1 tablet by mouth Daily. 90 tablet 3   • FREESTYLE LITE test strip 1 each by Other route Daily. 100 each 2   • gabapentin (NEURONTIN) 100 MG capsule TAKE 2 CAPSULES BY MOUTH TWICE DAILY 360 capsule 2   • glyBURIDE (DIAbeta) 5  MG tablet Take 1 tablet by mouth Every Morning AND 0.5 tablets Daily Before Supper.     • isosorbide mononitrate (IMDUR) 30 MG 24 hr tablet Take 1 tablet by mouth Daily. 90 tablet 3   • Lancets (FREESTYLE) lancets Use once daily 100 each 1   • loratadine (CLARITIN) 10 MG tablet Take 1 tablet by mouth Daily. 90 tablet 2   • metFORMIN ER (GLUCOPHAGE XR) 500 MG 24 hr tablet Take 1 tablet by mouth Every Night. 180 tablet 2   • metoprolol tartrate (LOPRESSOR) 25 MG tablet Take 1 tablet by mouth 2 (Two) Times a Day. 180 tablet 3   • Multiple Vitamins-Minerals (CENTRUM SILVER ADULT 50+) tablet Take 50 mg by mouth Daily. 90 tablet 1   • Multiple Vitamins-Minerals (PRESERVISION AREDS 2+MULTI VIT) capsule Take 1 capsule by mouth Daily. 90 capsule 1   • nitroglycerin (NITROSTAT) 0.4 MG SL tablet Place 1 tablet under the tongue Every 5 (Five) Minutes As Needed for Chest Pain. 30 tablet 0   • O2 (OXYGEN) Inhale 4 L/min Continuous. Per nasal cannula     • pantoprazole (PROTONIX) 40 MG EC tablet Take 1 tablet by mouth Daily. 90 tablet 2   • pravastatin (PRAVACHOL) 80 MG tablet Take 1 tablet by mouth Daily. 90 tablet 2   • terazosin (HYTRIN) 10 MG capsule Take 1 capsule by mouth Daily. 90 capsule 2   • tiotropium bromide monohydrate (SPIRIVA RESPIMAT) 2.5 MCG/ACT aerosol solution inhaler Inhale 2 puffs Daily. 3 inhaler 3   • vitamin C (ASCORBIC ACID) 500 MG tablet Take 1 tablet by mouth Daily. 90 tablet 1   • vitamin E 100 UNIT capsule Take 1 capsule by mouth Daily. 90 capsule 1     No current facility-administered medications for this visit.        Allergies:  Symbicort [budesonide-formoterol fumarate] and Prozac [fluoxetine hcl]    Review of Systems  Review of Systems   Constitution: Positive for malaise/fatigue.   HENT: Negative.    Eyes: Negative.    Cardiovascular: Positive for dyspnea on exertion. Negative for chest pain, claudication, cyanosis, irregular heartbeat, leg swelling, near-syncope, orthopnea, palpitations,  "paroxysmal nocturnal dyspnea and syncope.   Respiratory: Negative.    Endocrine: Negative.    Hematologic/Lymphatic: Negative.    Skin: Negative.    Musculoskeletal: Positive for arthritis and joint pain.   Gastrointestinal: Negative for anorexia.   Genitourinary: Negative.    Psychiatric/Behavioral: Negative.        Objective     Physical Exam:  /52   Pulse 64   Ht 180.3 cm (70.98\")   Wt 94.8 kg (209 lb)   SpO2 96%   BMI 29.16 kg/m²     Physical Exam   Constitutional: He appears well-developed.   HENT:   Head: Normocephalic.   Neck: Normal carotid pulses and no JVD present. No tracheal tenderness present. Carotid bruit is not present. No tracheal deviation and no edema present.   Cardiovascular: Regular rhythm and normal pulses.   Murmur heard.   Systolic murmur is present with a grade of 2/6.  Pulmonary/Chest: Effort normal. No stridor.   Abdominal: Soft.   Neurological: He is alert. He has normal strength. No cranial nerve deficit or sensory deficit.   Skin: Skin is warm.   Psychiatric: He has a normal mood and affect. His speech is normal and behavior is normal.       Results Review:      5/19 Cardiac Cath        Conclusion of cardiac catheterization           Left main coronary artery has moderate atherosclerotic plaque without any high-grade lesions  Left anterior descending coronary artery is occluded after origin of a diagonal branch  Saphenous vein graft to the left anterior descending coronary artery has moderate atherosclerotic plaque without any high-grade lesions and patent  The first diagonal branch has a 70% tubular stenosis that extends from the left anterior descending coronary artery to the proximal aspect  Right coronary artery is occluded in the mid segment  Widely patent saphenous venous graft to the right coronary artery without any significant stenosis  Bilateral internal mammary arteries have not been used as a conduit  No other grafts identified     Left ventricular end-diastolic " pressure moderately elevated to 90 mmHg with no gradient across aortic valve on pullback     Left ventricular ejection fraction approximately 45% with basal inferior hypokinesis no significant mitral regurgitation  Aortogram performed to look for vein grafts with no dissection or significant dilatation  Atherosclerotic plaque buildup noted in the aortic root will admit patient has significant use of contrast and given his age high risk of contrast-induced nephropathy           ____________________________________________________________________________________________________________________________________________     Plan after cardiac catheterization        70% stenosis of diagonal branch without any grafting of this vessel  Patent saphenous venous graft to the left anterior descending coronary artery  Patent saphenous venous graft to the right coronary artery  Occluded mid left anterior stent coronary artery  Occluded proximal right coronary artery           Will hydrate patient  Monitor for any signs of bleeding around the right femoral arteriotomy site where he had oozing from around the 7 Telugu sheath  Intensive risk factor modifications for both primary and secondary prevention if applicable  Hydration   Observation     If patient continues to be symptomatic consider coronary intervention with drug-eluting stent placement into the ostial and proximal portion of the diagonal branch.    Echo     · Right ventricular cavity is mild-to-moderately dilated.  · Left ventricular diastolic dysfunction (grade I) consistent with impaired relaxation.  · Left ventricular systolic function is normal.  · Left atrial cavity size is borderline dilated.     RIGHT HEART ENLARGEMENT - RVSP NOT ASSESSABLE  NORMAL LV AND RV SYSTOLIC FUNCTION  NO SIGNIFICANT VALVULAR DYSFUNCTION    Holter    · Average HR: 71. Min HR: 37. Max HR: 173.     · Monitored for approximately 1 day   · The predominant rhythm noted during the testing period  was sinus rhythm.  · Bradycardia in usual sleeping hours.   · 1 premature ventricular contractions: PVCs:  <0.1%   · 45   atrial premature contractions:   APCs: <0.1%             · No significant supraventricular  tachy or justina arrhythmia.  · No significant  ventricular tachy or justina arrhythmia.  · No correlated arrhythmia  · No significant pauses above 3 seconds        Results for orders placed during the hospital encounter of 08/01/19   Adult Transthoracic Echo Complete W/ Cont if Necessary Per Protocol    Narrative · Estimated EF = 60%.  · Left ventricular systolic function is normal.  · Left ventricular diastolic dysfunction.  · Mild pulmonary hypertension is present.        ·  Right ventricular cavity is mild-to-moderately dilated.  · Left ventricular diastolic dysfunction (grade I) consistent with impaired relaxation.  · Left ventricular systolic function is normal.  Left atrial cavity size is borderline dilated.    ECG 12 Lead  Date/Time: 8/9/2019 9:10 AM  Performed by: Bradley Carver MD  Authorized by: Bradley Carver MD   Comparison: compared with previous ECG from 4/29/2019  Rhythm: sinus rhythm  Ectopy: atrial premature contractions  Rate: normal  T inversion: II, III and aVF  QRS axis: normal    Clinical impression: abnormal EKG            Assessment/Plan   Gonzalo was seen today for slow heart rate.    Diagnoses and all orders for this visit:    Atherosclerosis: CAD, AAA    Anticoagulated-CAD, DM2, CVA/ASA 81+()+plavix     Abdominal aortic aneurysm (AAA) without rupture (CMS/HCC)    Cerebrovascular accident (CVA), unspecified mechanism (CMS/HCC)    Heme positive stool    Hx of colonic polyps    Stage 2 moderate COPD by GOLD classification (CMS/HCC)    SOB: copd,CAD,#, hypoxemia          Plan         Orders Placed This Encounter   Procedures   • Loop Recorder Implant - Treatment Room     Standing Status:   Future     Standing Expiration Date:   8/9/2020     Order Specific Question:   What type of  sedation does the patient require?     Answer:   Other     Order Specific Question:   Other (comment)     Answer:   Sedation     Order Specific Question:   Reason for Exam:     Answer:   Cryptogenic stroke   • ECG 12 Lead     This order was created via procedure documentation        ____________________________________________________________________________________________________________________________________________  Health maintenance and recommendations      Similar recommendations as last visit       Offered to give patient  a copy      Questions were encouraged, asked and answered to the patient's  understanding and satisfaction. Questions if any regarding current medications and side effects, need for refills and importance of compliance to medications stressed.    Reviewed available prior notes, consults, prior visits, laboratory findings, radiology and cardiology relevant reports. Updated chart as applicable. I have reviewed the patient's medical history in detail and updated the computerized patient record as relevant.      Updated patient regarding any new or relevant abnormalities on review of records or any new findings on physical exam. Mentioned to patient about purpose of visit and desirable health short and long term goals and objectives.    Primary to monitor CBC CMP Lipid panel and TSH as applicable    ___________________________________________________________________________________________________________________________________________          Keep existing follow up appointment   No Follow-up on file.

## 2019-08-22 ENCOUNTER — HOSPITAL ENCOUNTER (OUTPATIENT)
Dept: CARDIOLOGY | Facility: HOSPITAL | Age: 84
Discharge: HOME | End: 2019-08-22
Admitting: INTERNAL MEDICINE

## 2019-08-22 VITALS
SYSTOLIC BLOOD PRESSURE: 128 MMHG | WEIGHT: 206.13 LBS | DIASTOLIC BLOOD PRESSURE: 68 MMHG | BODY MASS INDEX: 28.86 KG/M2 | HEART RATE: 50 BPM | OXYGEN SATURATION: 100 % | RESPIRATION RATE: 12 BRPM | HEIGHT: 71 IN

## 2019-08-22 DIAGNOSIS — I63.9 CEREBROVASCULAR ACCIDENT (CVA), UNSPECIFIED MECHANISM (HCC): ICD-10-CM

## 2019-08-22 PROCEDURE — 33285 INSJ SUBQ CAR RHYTHM MNTR: CPT

## 2019-08-22 PROCEDURE — 33285 INSJ SUBQ CAR RHYTHM MNTR: CPT | Performed by: INTERNAL MEDICINE

## 2019-08-22 PROCEDURE — C1764 EVENT RECORDER, CARDIAC: HCPCS

## 2019-08-22 RX ORDER — LIDOCAINE HYDROCHLORIDE 10 MG/ML
INJECTION, SOLUTION INFILTRATION; PERINEURAL
Status: DISPENSED
Start: 2019-08-22 | End: 2019-08-23

## 2019-08-28 NOTE — PROGRESS NOTES
" DOMINIQUE Yeung  [facility]   [facility]  [address] [place] [address]  Phone: [phone]  Fax: [phone]     Gonzalo Durham is a 85 y.o. male.   CC:   Chief Complaint   Patient presents with   • Follow up COPD        HPI: Since he was last seen here in February he has had several events to happen including having a \"mild stroke\" for which she is now on Plavix.  He and his daughter report that he has no residual effects of the stroke.  He also had a heart cath done by Dr. Carver and had some medication changes but no intervention was required.  Last week he had a loop monitor implanted.  On exam today, it is noted that he is bradycardic with a heart rate of 48 when I checked him.  The patient says he is not symptomatic with that but I wanted him to be aware of this finding.  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  He is a former smoker who quit in 1980.  He is accompanied by his daughter as typical, who helps in his care.  He is on chronic oxygen therapy and also takes Mucinex daily and Spiriva.  He obtained a flutter valve as I recommended at his last visit and he does find that helpful in mobilizing secretions.  He has pro-air to use as needed.  He is followed by Dr. Romero for routine medical care and stays up-to-date on flu and pneumonia vaccines.  [facility]    The following portions of the patient's history were reviewed and updated as appropriate: allergies, current medications, past family history, past medical history, past social history, past surgical history and problem list.    Past Medical History:   Diagnosis Date   • Arthritis    • BPH (benign prostatic hypertrophy)    • CAD (coronary artery disease)    • CKD (chronic kidney disease)    • " "Diabetes mellitus (CMS/HCC)     Type 2   • DM (diabetes mellitus screen)    • Hx of colonic polyp    • Hypercholesteremia    • Hypertension        Family History   Problem Relation Age of Onset   • Heart disease Mother    • Stroke Mother    • Melanoma Mother    • Heart disease Father    • Diabetes Father    • Prostate cancer Father    • Colon cancer Neg Hx    • Colon polyps Neg Hx        Social History     Socioeconomic History   • Marital status:      Spouse name: Not on file   • Number of children: Not on file   • Years of education: Not on file   • Highest education level: Not on file   Tobacco Use   • Smoking status: Former Smoker     Packs/day: 1.00     Years: 26.00     Pack years: 26.00     Types: Cigarettes     Start date:      Last attempt to quit:      Years since quittin.6   • Smokeless tobacco: Never Used   Substance and Sexual Activity   • Alcohol use: No   • Drug use: No   • Sexual activity: Defer       Review of Systems   Constitutional: Negative for appetite change, chills, fatigue and fever.   HENT: Negative for swollen glands, trouble swallowing and voice change.    Eyes: Negative for visual disturbance.   Respiratory: Positive for shortness of breath. Negative for cough and wheezing.    Cardiovascular: Positive for palpitations (Occasionally). Negative for leg swelling.   Gastrointestinal: Negative for abdominal distention, abdominal pain, nausea and vomiting.   Genitourinary: Negative.    Musculoskeletal: Negative for gait problem and myalgias.   Skin: Negative.    Neurological: Negative for dizziness, weakness and light-headedness.   Hematological: Negative.    Psychiatric/Behavioral: The patient is not nervous/anxious.        /58   Pulse (!) 46   Ht 180.3 cm (71\")   Wt 96.2 kg (212 lb)   SpO2 98% Comment: 3L  BMI 29.57 kg/m²     Physical Exam   Constitutional: He is oriented to person, place, and time. He appears well-developed and well-nourished. No distress. " Nasal cannula in place.   HENT:   Head: Normocephalic and atraumatic.   Bilateral hearing aids   Eyes: Pupils are equal, round, and reactive to light. No scleral icterus.   Neck: Normal range of motion. Neck supple.   Cardiovascular: Normal rate, S1 normal and S2 normal. An irregularly irregular rhythm present.   Pulmonary/Chest: Effort normal. No tachypnea. He has decreased breath sounds (Diminished throughout). He has no wheezes. He has no rhonchi. He has no rales.   Abdominal: Soft. Bowel sounds are normal. He exhibits no distension.   Musculoskeletal: Normal range of motion. He exhibits no edema.   Lymphadenopathy:     He has no cervical adenopathy.   Neurological: He is alert and oriented to person, place, and time.   Skin: Skin is warm and dry. No rash noted. No cyanosis. Nails show no clubbing.   Psychiatric: He has a normal mood and affect. His behavior is normal.   Vitals reviewed.      Gonzalo was seen today for follow up copd.    Diagnoses and all orders for this visit:    Stage 2 moderate COPD by GOLD classification (CMS/[facility])  -     Cancel: XR Chest 2 View; Future  -     XR Chest 2 View; Future    Pulmonary emphysema, unspecified emphysema type (CMS/HCC)  -     Cancel: XR Chest 2 View; Future  -     XR Chest 2 View; Future    Scarring of lung  -     Cancel: XR Chest 2 View; Future  -     XR Chest 2 View; Future    Chronic respiratory failure with hypoxia and hypercapnia (CMS/[facility])  -     Mask    Asbestos exposure  -     Cancel: XR Chest 2 View; Future  -     XR Chest 2 View; Future      Patient's Body mass index is 29.57 kg/m². BMI is above normal parameters. Recommendations include: referral to primary care.      Follow-up/Plan: The patient was made aware of his slow heart rate today.  He was rechecked to confirm that his heart rate was around 48.  I sent an order to [facility] for the patient to obtain simple oxygen facemasks to wear at night because he reports he is having difficulty keeping the nasal  cannula and while he sleeps.  He will continue on his pulmonary regimen of daily Spiriva, Mucinex, oxygen and using the flutter valve.  He has a rescue inhaler (ProAir air) to use as needed.  I encouraged him to update his flu shot by mid October.  I will see him back in office in approximately 8 months and he will obtain a chest x-ray at [facility] just prior.  The patient plans to take a trip to [place] in May so I will see him back in late April prior to this.    Feliz Frye, APRN  8/29/2019  12:06 PM

## 2019-08-29 ENCOUNTER — OFFICE VISIT (OUTPATIENT)
Dept: PULMONOLOGY | Facility: CLINIC | Age: 84
End: 2019-08-29

## 2019-08-29 VITALS
BODY MASS INDEX: 29.68 KG/M2 | HEART RATE: 46 BPM | WEIGHT: 212 LBS | HEIGHT: 71 IN | SYSTOLIC BLOOD PRESSURE: 120 MMHG | OXYGEN SATURATION: 98 % | DIASTOLIC BLOOD PRESSURE: 58 MMHG

## 2019-08-29 DIAGNOSIS — Z77.090 ASBESTOS EXPOSURE: ICD-10-CM

## 2019-08-29 DIAGNOSIS — J96.12 CHRONIC RESPIRATORY FAILURE WITH HYPOXIA AND HYPERCAPNIA (HCC): ICD-10-CM

## 2019-08-29 DIAGNOSIS — J43.9 PULMONARY EMPHYSEMA, UNSPECIFIED EMPHYSEMA TYPE (HCC): ICD-10-CM

## 2019-08-29 DIAGNOSIS — J96.11 CHRONIC RESPIRATORY FAILURE WITH HYPOXIA AND HYPERCAPNIA (HCC): ICD-10-CM

## 2019-08-29 DIAGNOSIS — J98.4 SCARRING OF LUNG: ICD-10-CM

## 2019-08-29 DIAGNOSIS — J44.9 STAGE 2 MODERATE COPD BY GOLD CLASSIFICATION (HCC): Primary | ICD-10-CM

## 2019-08-29 PROCEDURE — 99214 OFFICE O/P EST MOD 30 MIN: CPT | Performed by: NURSE PRACTITIONER

## 2019-08-29 NOTE — PATIENT INSTRUCTIONS
Mucinex-plain take up to 2400 mg per day with adequate water    Guaifenesin oral ER tablets  What is this medicine?  GUAIFENESIN (gwnino FEN e sin) is an expectorant. It helps to thin mucous and make coughs more productive. This medicine is used to treat coughs caused by colds or the flu. It is not intended to treat chronic cough caused by smoking, asthma, emphysema, or heart failure.  This medicine may be used for other purposes; ask your health care provider or pharmacist if you have questions.  COMMON BRAND NAME(S): Gumaro, Mucinex  What should I tell my health care provider before I take this medicine?  They need to know if you have any of these conditions:  -fever  -kidney disease  -an unusual or allergic reaction to guaifenesin, other medicines, foods, dyes, or preservatives  -pregnant or trying to get pregnant  -breast-feeding  How should I use this medicine?  Take this medicine by mouth with a full glass of water. Follow the directions on the prescription label. Do not break, chew or crush this medicine. You may take with food or on an empty stomach. Take your medicine at regular intervals. Do not take your medicine more often than directed.  Talk to your pediatrician regarding the use of this medicine in children. While this drug may be prescribed for children as young as 12 years old for selected conditions, precautions do apply.  Overdosage: If you think you have taken too much of this medicine contact a poison control center or emergency room at once.  NOTE: This medicine is only for you. Do not share this medicine with others.  What if I miss a dose?  If you miss a dose, take it as soon as you can. If it is almost time for your next dose, take only that dose. Do not take double or extra doses.  What may interact with this medicine?  Interactions are not expected.  This list may not describe all possible interactions. Give your health care provider a list of all the medicines, herbs, non-prescription  drugs, or dietary supplements you use. Also tell them if you smoke, drink alcohol, or use illegal drugs. Some items may interact with your medicine.  What should I watch for while using this medicine?  Do not treat a cough for more than 1 week without consulting your doctor or health care professional. If you also have a high fever, skin rash, continuing headache, or sore throat, see your doctor.  For best results, drink 6 to 8 glasses water daily while you are taking this medicine.  What side effects may I notice from receiving this medicine?  Side effects that you should report to your doctor or health care professional as soon as possible:  -allergic reactions like skin rash, itching or hives, swelling of the face, lips, or tongue  Side effects that usually do not require medical attention (report to your doctor or health care professional if they continue or are bothersome):  -dizziness  -headache  -stomach upset  This list may not describe all possible side effects. Call your doctor for medical advice about side effects. You may report side effects to FDA at 8-200-FDA-4340.  Where should I keep my medicine?  Keep out of the reach of children.  Store at room temperature between 20 and 25 degrees C (68 and 77 degrees F). Keep container tightly closed. Throw away any unused medicine after the expiration date.  NOTE: This sheet is a summary. It may not cover all possible information. If you have questions about this medicine, talk to your doctor, pharmacist, or health care provider.  © 2019 [employer]/Gold Standard (2009-04-29 12:14:14)

## 2019-08-30 DIAGNOSIS — K21.9 GASTROESOPHAGEAL REFLUX DISEASE, ESOPHAGITIS PRESENCE NOT SPECIFIED: ICD-10-CM

## 2019-08-30 DIAGNOSIS — I25.10 CORONARY ARTERY DISEASE INVOLVING NATIVE CORONARY ARTERY WITHOUT ANGINA PECTORIS, UNSPECIFIED WHETHER NATIVE OR TRANSPLANTED HEART: Chronic | ICD-10-CM

## 2019-08-30 DIAGNOSIS — E78.5 HYPERLIPIDEMIA, UNSPECIFIED HYPERLIPIDEMIA TYPE: Chronic | ICD-10-CM

## 2019-08-30 DIAGNOSIS — I10 HYPERTENSION, UNSPECIFIED TYPE: Chronic | ICD-10-CM

## 2019-08-30 RX ORDER — ACETAMINOPHEN 325 MG/1
650 TABLET ORAL 2 TIMES DAILY
Qty: 360 TABLET | Refills: 2 | Status: SHIPPED | OUTPATIENT
Start: 2019-08-30 | End: 2020-07-17 | Stop reason: SDUPTHER

## 2019-08-30 RX ORDER — LORATADINE 10 MG/1
10 TABLET ORAL DAILY
Qty: 90 TABLET | Refills: 2 | Status: SHIPPED | OUTPATIENT
Start: 2019-08-30 | End: 2020-07-17 | Stop reason: SDUPTHER

## 2019-08-30 RX ORDER — ISOSORBIDE MONONITRATE 30 MG/1
30 TABLET, EXTENDED RELEASE ORAL DAILY
Qty: 90 TABLET | Refills: 3 | Status: SHIPPED | OUTPATIENT
Start: 2019-08-30 | End: 2019-11-22 | Stop reason: SDUPTHER

## 2019-08-30 RX ORDER — PRAVASTATIN SODIUM 80 MG/1
80 TABLET ORAL DAILY
Qty: 90 TABLET | Refills: 2 | Status: SHIPPED | OUTPATIENT
Start: 2019-08-30 | End: 2020-07-17 | Stop reason: SDUPTHER

## 2019-08-30 RX ORDER — ALBUTEROL SULFATE 90 UG/1
2 AEROSOL, METERED RESPIRATORY (INHALATION)
Qty: 1 INHALER | Refills: 1 | Status: SHIPPED | OUTPATIENT
Start: 2019-08-30 | End: 2020-07-17 | Stop reason: SDUPTHER

## 2019-08-30 RX ORDER — PANTOPRAZOLE SODIUM 40 MG/1
40 TABLET, DELAYED RELEASE ORAL DAILY
Qty: 90 TABLET | Refills: 2 | Status: SHIPPED | OUTPATIENT
Start: 2019-08-30 | End: 2020-07-17 | Stop reason: SDUPTHER

## 2019-08-30 RX ORDER — CLOPIDOGREL BISULFATE 75 MG/1
75 TABLET ORAL DAILY
Qty: 90 TABLET | Refills: 1 | Status: SHIPPED | OUTPATIENT
Start: 2019-08-30 | End: 2019-09-16

## 2019-09-11 ENCOUNTER — TELEPHONE (OUTPATIENT)
Dept: CARDIOLOGY | Facility: CLINIC | Age: 84
End: 2019-09-11

## 2019-09-11 ENCOUNTER — DOCUMENTATION (OUTPATIENT)
Dept: CARDIOLOGY | Facility: CLINIC | Age: 84
End: 2019-09-11

## 2019-09-11 DIAGNOSIS — I63.9 CRYPTOGENIC STROKE (HCC): Primary | ICD-10-CM

## 2019-09-11 NOTE — PROGRESS NOTES
[employer] Report- REMOTE/CARELINK    September 11, 2019    Primary Cardiologist:  Wen  Reason for implant:  cryptogenic stroke  Battery:  OK  Events recorded since 8/22/2019:  280 AF episodes, burden 28.6%.  Some artifact noted but some true AF as well.  Overall ventricular rates controlled.  Medications include ASA and Plavix.  Nighttime bradycardia noted, longest duration 2 minutes, rates 38-39 bpm.  Changes:  No changes    Follow up:  1 month

## 2019-09-11 NOTE — PROGRESS NOTES
I have reviewed the notes, assessments, and/or procedures performed by  Cordelia Worrell RN, I concur with her  documentation  of Gonzalo Durham.  Needs to be seen sooner for discussion regarding anticoagulation

## 2019-09-16 ENCOUNTER — OFFICE VISIT (OUTPATIENT)
Dept: CARDIOLOGY | Facility: CLINIC | Age: 84
End: 2019-09-16

## 2019-09-16 VITALS
HEIGHT: 71 IN | HEART RATE: 48 BPM | WEIGHT: 212 LBS | DIASTOLIC BLOOD PRESSURE: 62 MMHG | OXYGEN SATURATION: 96 % | BODY MASS INDEX: 29.68 KG/M2 | SYSTOLIC BLOOD PRESSURE: 126 MMHG

## 2019-09-16 DIAGNOSIS — J44.9 STAGE 2 MODERATE COPD BY GOLD CLASSIFICATION (HCC): ICD-10-CM

## 2019-09-16 DIAGNOSIS — I71.40 ABDOMINAL AORTIC ANEURYSM (AAA) WITHOUT RUPTURE (HCC): ICD-10-CM

## 2019-09-16 DIAGNOSIS — I63.9 CRYPTOGENIC STROKE (HCC): ICD-10-CM

## 2019-09-16 DIAGNOSIS — R19.5 HEME POSITIVE STOOL: ICD-10-CM

## 2019-09-16 DIAGNOSIS — I25.10 CORONARY ARTERY DISEASE INVOLVING NATIVE CORONARY ARTERY OF NATIVE HEART WITHOUT ANGINA PECTORIS: Chronic | ICD-10-CM

## 2019-09-16 DIAGNOSIS — I48.0 PAF (PAROXYSMAL ATRIAL FIBRILLATION) (HCC): ICD-10-CM

## 2019-09-16 DIAGNOSIS — N18.9 CHRONIC KIDNEY DISEASE, UNSPECIFIED CKD STAGE: Primary | Chronic | ICD-10-CM

## 2019-09-16 DIAGNOSIS — I83.90 VARICOSE VEINS OF LOWER EXTREMITY, UNSPECIFIED LATERALITY, UNSPECIFIED WHETHER COMPLICATED: Chronic | ICD-10-CM

## 2019-09-16 PROCEDURE — 93000 ELECTROCARDIOGRAM COMPLETE: CPT | Performed by: INTERNAL MEDICINE

## 2019-09-16 PROCEDURE — 99214 OFFICE O/P EST MOD 30 MIN: CPT | Performed by: INTERNAL MEDICINE

## 2019-09-16 NOTE — PROGRESS NOTES
Gonzalo Durham  [id number]  1934  85 y.o.  male    Referring Provider: Abel Romero MD    Reason for Follow-up Visit:          Called back as possible Paroxysmal atrial fibrillation on LINQ   Some are atrial premature contractions that are very frequent with often bigeminy    Prior visit for evaluation for  Shortness of breath   coronary artery disease Coronary artery bypass grafting 1980 x 3 [place]  S/P cardiac cath    Recent cerebrovascular accident with left arm weakness now resolved  Lasted for for over 1 week 7/27/19  CT head showed anterior small infarction  7/27/19  sinus rhythm in [place]    Labs reviewed including A1c         Subjective      Overall the patient feels no major change from baseline symptoms   No new events or complaints since last visit   No further transient ischemic attacks or cerebrovascular accident     Mild chronic exertional shortness of breath on exertion relieved with rest  No significant cough or wheezing  Going on for several months or longer    No palpitations  No associated chest pain  No significant pedal edema    No fever or chills  No significant expectoration    No hemoptysis  No presyncope or syncope     No symptoms reminiscent of prior angina.    Using oxygen at home both during daytime and at night continously       History of present illness:  Gonzalo Durham is a 85 y.o. yo male with history of coronary artery disease Coronary artery bypass grafting who presents today for   Chief Complaint   Patient presents with   • Atrial Fibrillation     1 mo f/u    .    History  Past Medical History:   Diagnosis Date   • Arthritis    • BPH (benign prostatic hypertrophy)    • CAD (coronary artery disease)    • CKD (chronic kidney disease)    • Diabetes mellitus (CMS/HCC)     Type 2   • DM (diabetes mellitus screen)    • Hx of colonic polyp    • Hypercholesteremia    • Hypertension    ,   Past Surgical History:   Procedure Laterality Date   • APPENDECTOMY     • CARDIAC  CATHETERIZATION     • CARDIAC CATHETERIZATION Left 2019    Procedure: Cardiac Catheterization/Vascular Study Positive occult blood in stools  ? BMS vs REGGIE Get cabg report  ;  Surgeon: Bradley Carver MD;  Location: [facility] CATH INVASIVE LOCATION;  Service: Cardiology   • COLONOSCOPY N/A 6/15/2017    Diverticulosis repeat exam prn   • COLONOSCOPY W/ POLYPECTOMY  10/21/2013    2 Tubular adenomatous polyps, Diverticulosis repeat exam in 5 years   • CORONARY ARTERY BYPASS GRAFT  1980    X 3   • ENDOSCOPY N/A 6/15/2017    LA Grade A reflux esophagitis   ,   Family History   Problem Relation Age of Onset   • Heart disease Mother    • Stroke Mother    • Melanoma Mother    • Heart disease Father    • Diabetes Father    • Prostate cancer Father    • Colon cancer Neg Hx    • Colon polyps Neg Hx    ,   Social History     Tobacco Use   • Smoking status: Former Smoker     Packs/day: 1.00     Years: 26.00     Pack years: 26.00     Types: Cigarettes     Start date:      Last attempt to quit: 1980     Years since quittin.7   • Smokeless tobacco: Never Used   Substance Use Topics   • Alcohol use: No   • Drug use: No   ,     Medications  Current Outpatient Medications   Medication Sig Dispense Refill   • acetaminophen (TYLENOL) 325 MG tablet Take 2 tablets by mouth 2 (Two) Times a Day. 360 tablet 2   • albuterol sulfate HFA (PROAIR HFA) 108 (90 Base) MCG/ACT inhaler Inhale 2 puffs 4 (Four) Times a Day. 1 inhaler 1   • Artificial Tear Solution (JUST TEARS EYE DROPS) solution Apply 1-2 drops to eye(s) as directed by provider Daily As Needed (dry eyes). 45 mL 2   • ARTIFICIAL TEARS 1.4 % ophthalmic solution      • aspirin 81 MG EC tablet Take 1 tablet by mouth Daily.     • Blood Glucose Monitoring Suppl (FREESTYLE LITE) device 1 Device Daily. 1 each 0   • calcium carbonate-vitamin d 600-400 MG-UNIT per tablet Take 1 tablet by mouth 2 (Two) Times a Day. 180 tablet 2   • clopidogrel (PLAVIX) 75 MG tablet Take 1 tablet by mouth  Daily. 90 tablet 1   • finasteride (PROSCAR) 5 MG tablet Take 1 tablet by mouth Daily. 90 tablet 3   • FREESTYLE LITE test strip 1 each by Other route Daily. 100 each 2   • gabapentin (NEURONTIN) 100 MG capsule TAKE 2 CAPSULES BY MOUTH TWICE DAILY 360 capsule 2   • glyBURIDE (DIAbeta) 5 MG tablet Take 1 tablet by mouth Every Morning AND 0.5 tablets Daily Before Supper.     • isosorbide mononitrate (IMDUR) 30 MG 24 hr tablet Take 1 tablet by mouth Daily. 90 tablet 3   • Lancets (FREESTYLE) lancets Use once daily 100 each 1   • loratadine (CLARITIN) 10 MG tablet Take 1 tablet by mouth Daily. 90 tablet 2   • metFORMIN ER (GLUCOPHAGE XR) 500 MG 24 hr tablet Take 1 tablet by mouth Every Night. 180 tablet 2   • metoprolol tartrate (LOPRESSOR) 25 MG tablet Take 1 tablet by mouth 2 (Two) Times a Day. 180 tablet 3   • Multiple Vitamins-Minerals (CENTRUM SILVER ADULT 50+) tablet Take 50 mg by mouth Daily. 90 tablet 1   • Multiple Vitamins-Minerals (PRESERVISION AREDS 2+MULTI VIT) capsule Take 1 capsule by mouth Daily. 90 capsule 1   • nitroglycerin (NITROSTAT) 0.4 MG SL tablet Place 1 tablet under the tongue Every 5 (Five) Minutes As Needed for Chest Pain. 30 tablet 0   • O2 (OXYGEN) Inhale 4 L/min Continuous. Per nasal cannula     • O2 (OXYGEN) Inhale 4 L/min. 4lpm     • pantoprazole (PROTONIX) 40 MG EC tablet Take 1 tablet by mouth Daily. 90 tablet 2   • pravastatin (PRAVACHOL) 80 MG tablet Take 1 tablet by mouth Daily. 90 tablet 2   • terazosin (HYTRIN) 10 MG capsule Take 1 capsule by mouth Daily. 90 capsule 2   • tiotropium bromide monohydrate (SPIRIVA RESPIMAT) 2.5 MCG/ACT aerosol solution inhaler Inhale 2 puffs Daily. 3 inhaler 3   • vitamin C (ASCORBIC ACID) 500 MG tablet Take 1 tablet by mouth Daily. 90 tablet 1     No current facility-administered medications for this visit.        Allergies:  Symbicort [budesonide-formoterol fumarate] and Prozac [fluoxetine hcl]    Review of Systems  Review of Systems  "  Constitution: Positive for malaise/fatigue.   HENT: Negative.    Eyes: Negative.    Cardiovascular: Positive for dyspnea on exertion. Negative for chest pain, claudication, cyanosis, irregular heartbeat, leg swelling, near-syncope, orthopnea, palpitations, paroxysmal nocturnal dyspnea and syncope.   Respiratory: Negative.    Endocrine: Negative.    Hematologic/Lymphatic: Negative.    Skin: Negative.    Musculoskeletal: Positive for arthritis and joint pain.   Gastrointestinal: Negative for anorexia.   Genitourinary: Negative.    Psychiatric/Behavioral: Negative.        Objective     Physical Exam:  /62   Pulse (!) 48   Ht 180.3 cm (71\")   Wt 96.2 kg (212 lb)   SpO2 96%   BMI 29.57 kg/m²     Physical Exam   Constitutional: He appears well-developed.   HENT:   Head: Normocephalic.   Neck: Normal carotid pulses and no JVD present. No tracheal tenderness present. Carotid bruit is not present. No tracheal deviation and no edema present.   Cardiovascular: Regular rhythm and normal pulses.   Murmur heard.   Systolic murmur is present with a grade of 2/6.  Pulmonary/Chest: Effort normal. No stridor.   Abdominal: Soft. He exhibits no distension. There is no hepatosplenomegaly. There is no tenderness.   Neurological: He is alert. He has normal strength. No cranial nerve deficit or sensory deficit.   Skin: Skin is warm.   Psychiatric: He has a normal mood and affect. His speech is normal and behavior is normal.       Results Review:      5/19 Cardiac Cath        Conclusion of cardiac catheterization           Left main coronary artery has moderate atherosclerotic plaque without any high-grade lesions  Left anterior descending coronary artery is occluded after origin of a diagonal branch  Saphenous vein graft to the left anterior descending coronary artery has moderate atherosclerotic plaque without any high-grade lesions and patent  The first diagonal branch has a 70% tubular stenosis that extends from the left " anterior descending coronary artery to the proximal aspect  Right coronary artery is occluded in the mid segment  Widely patent saphenous venous graft to the right coronary artery without any significant stenosis  Bilateral internal mammary arteries have not been used as a conduit  No other grafts identified     Left ventricular end-diastolic pressure moderately elevated to 90 mmHg with no gradient across aortic valve on pullback     Left ventricular ejection fraction approximately 45% with basal inferior hypokinesis no significant mitral regurgitation  Aortogram performed to look for vein grafts with no dissection or significant dilatation  Atherosclerotic plaque buildup noted in the aortic root will admit patient has significant use of contrast and given his age high risk of contrast-induced nephropathy           ____________________________________________________________________________________________________________________________________________     Plan after cardiac catheterization        70% stenosis of diagonal branch without any grafting of this vessel  Patent saphenous venous graft to the left anterior descending coronary artery  Patent saphenous venous graft to the right coronary artery  Occluded mid left anterior stent coronary artery  Occluded proximal right coronary artery           Will hydrate patient  Monitor for any signs of bleeding around the right femoral arteriotomy site where he had oozing from around the 7 [place] sheath  Intensive risk factor modifications for both primary and secondary prevention if applicable  Hydration   Observation     If patient continues to be symptomatic consider coronary intervention with drug-eluting stent placement into the ostial and proximal portion of the diagonal branch.    Echo     · Right ventricular cavity is mild-to-moderately dilated.  · Left ventricular diastolic dysfunction (grade I) consistent with impaired relaxation.  · Left ventricular systolic  function is normal.  · Left atrial cavity size is borderline dilated.     RIGHT HEART ENLARGEMENT - RVSP NOT ASSESSABLE  NORMAL LV AND RV SYSTOLIC FUNCTION  NO SIGNIFICANT VALVULAR DYSFUNCTION    Holter    · Average HR: 71. Min HR: 37. Max HR: 173.     · Monitored for approximately 1 day   · The predominant rhythm noted during the testing period was sinus rhythm.  · Bradycardia in usual sleeping hours.   · 1 premature ventricular contractions: PVCs:  <0.1%   · 45   atrial premature contractions:   APCs: <0.1%             · No significant supraventricular  tachy or justina arrhythmia.  · No significant  ventricular tachy or justina arrhythmia.  · No correlated arrhythmia  · No significant pauses above 3 seconds        Results for orders placed during the hospital encounter of 08/01/19   Adult Transthoracic Echo Complete W/ Cont if Necessary Per Protocol    Narrative · Estimated EF = 60%.  · Left ventricular systolic function is normal.  · Left ventricular diastolic dysfunction.  · Mild pulmonary hypertension is present.        ·  Right ventricular cavity is mild-to-moderately dilated.  · Left ventricular diastolic dysfunction (grade I) consistent with impaired relaxation.  · Left ventricular systolic function is normal.  Left atrial cavity size is borderline dilated.    ECG 12 Lead  Date/Time: 9/16/2019 9:14 AM  Performed by: Bradley Carver MD  Authorized by: Bradley Carver MD   Comparison: compared with previous ECG from 8/9/2019  Similar to previous ECG  Rhythm: sinus rhythm  Ectopy: atrial premature contractions  Rate: normal  Conduction: conduction normal  ST Segments: ST segments normal  T inversion: III  QRS axis: normal  Other: no other findings    Clinical impression: abnormal EKG            Assessment/Plan   Gonzalo was seen today for atrial fibrillation.    Diagnoses and all orders for this visit:    Chronic kidney disease, unspecified CKD stage    Coronary artery disease involving native coronary artery of native  heart without angina pectoris    Abdominal aortic aneurysm (AAA) without rupture (CMS/HCC)    Cryptogenic stroke (CMS/HCC)    Heme positive stool    Stage 2 moderate COPD by GOLD classification (CMS/HCC)    Varicose veins of lower extremity, unspecified laterality, unspecified whether complicated    Other orders  -     ECG 12 Lead          Plan         Given prior CVA and high [id number]  Would recommend on anticoagulation     Stop Plavix    Start Eliquis  Requested Prescriptions     Signed Prescriptions Disp Refills   • apixaban (ELIQUIS) 5 MG tablet tablet 60 tablet 11     Sig: Take 1 tablet by mouth Every 12 (Twelve) Hours.        Prior hemoccult positive with negative colonoscopy and also UGIE  and no signs of bleeding  Monitor cardiac rhythm deviceLINQ  function regularly per established schedule or PRN        ____________________________________________________________________________________________________________________________________________  Health maintenance and recommendations      Similar recommendations as last visit     Monitor for GIB bleeding      Offered to give patient  a copy      Questions were encouraged, asked and answered to the patient's  understanding and satisfaction. Questions if any regarding current medications and side effects, need for refills and importance of compliance to medications stressed.    Reviewed available prior notes, consults, prior visits, laboratory findings, radiology and cardiology relevant reports. Updated chart as applicable. I have reviewed the patient's medical history in detail and updated the computerized patient record as relevant.      Updated patient regarding any new or relevant abnormalities on review of records or any new findings on physical exam. Mentioned to patient about purpose of visit and desirable health short and long term goals and objectives.    Primary to monitor CBC CMP Lipid panel and TSH as  applicable    ___________________________________________________________________________________________________________________________________________       Keep follow up as scheduled or PRN sooner if any new or worsening problem.   No Follow-up on file.

## 2019-09-25 ENCOUNTER — CLINICAL SUPPORT (OUTPATIENT)
Dept: CARDIOLOGY | Facility: CLINIC | Age: 84
End: 2019-09-25

## 2019-09-25 DIAGNOSIS — I63.9 CRYPTOGENIC STROKE (HCC): ICD-10-CM

## 2019-09-25 PROCEDURE — 93299 PR REM INTERROG ICPMS/SCRMS <30 D TECH REVIEW: CPT | Performed by: PHYSICIAN ASSISTANT

## 2019-09-25 PROCEDURE — 93298 REM INTERROG DEV EVAL SCRMS: CPT | Performed by: PHYSICIAN ASSISTANT

## 2019-09-27 ENCOUNTER — OFFICE VISIT (OUTPATIENT)
Dept: NEUROLOGY | Facility: CLINIC | Age: 84
End: 2019-09-27

## 2019-09-27 ENCOUNTER — DOCUMENTATION (OUTPATIENT)
Dept: CARDIOLOGY | Facility: CLINIC | Age: 84
End: 2019-09-27

## 2019-09-27 VITALS
DIASTOLIC BLOOD PRESSURE: 80 MMHG | BODY MASS INDEX: 29.4 KG/M2 | HEIGHT: 71 IN | WEIGHT: 210 LBS | RESPIRATION RATE: 18 BRPM | SYSTOLIC BLOOD PRESSURE: 130 MMHG | HEART RATE: 66 BPM

## 2019-09-27 DIAGNOSIS — I63.9 CEREBROVASCULAR ACCIDENT (CVA), UNSPECIFIED MECHANISM (HCC): Primary | ICD-10-CM

## 2019-09-27 DIAGNOSIS — I10 ESSENTIAL (PRIMARY) HYPERTENSION: ICD-10-CM

## 2019-09-27 PROCEDURE — 99204 OFFICE O/P NEW MOD 45 MIN: CPT | Performed by: PSYCHIATRY & NEUROLOGY

## 2019-09-27 RX ORDER — GUAIFENESIN 600 MG/1
600 TABLET, EXTENDED RELEASE ORAL 2 TIMES DAILY
COMMUNITY
End: 2019-12-02 | Stop reason: SDUPTHER

## 2019-09-27 NOTE — PROGRESS NOTES
PEMAXX CHARLES - PT IS OKAY TO PROCEED WITH MRI (WITH LOOP). ALSO - PT IS ON ELIQUIS FOR AFIB & WILL NOT NEED A BUBBLE STUDY    PT NOTIFIED

## 2019-09-27 NOTE — PROGRESS NOTES
Subjective   Gonzalo Durham, 1934, is a male who is being seen today for   Chief Complaint   Patient presents with   • Stroke       HISTORY OF PRESENT ILLNESS: New patient work-up for CVA.  Patient had left arm weakness onset while in [place] in July and was seen in urgent care there subsequently when he returned to his PCP CT head scan was done which showed right frontal parietal infarct.  Patient was on aspirin and Plavix at that time and was switched to Eliquis and aspirin after loop recorder found atrial fibrillation.  Patient has not had any further stroke symptoms.  Patient still has some residual tingling and numbness in the arm.  Patient has history of COPD wears oxygen 24/7 usually at 4 L/min at home and 3 L/min when out of his house.  Patient is right-handed.  Patient has not had any blood work done since this event.  Patient has not had any further stroke symptoms.  Patient denies any significant headaches although sometimes gets some neck pain and occipital headache.  Patient has had noninvasive carotids that showed less than 50% stenosis of the internal carotids bilaterally and anterograde vertebrals with some plaque.  Patient had echocardiogram without bubble study which did not show any major abnormalities.  Patient has not had MRI brain.    REVIEW OF SYSTEMS:   GENERAL: Blood pressure today is 130/72 left arm seated with 130/80 left arm standing and pulse 66 with irregularity.  PULMONARY: As above  CVS: Patient had previous heart attack several years ago and CABG but denies any other medical leg would contraindicate MRI.  GASTROINTESTINAL: No acute GI distress    GENITOURINARY: No acute  distress  GYN: Not applicable  MUSCULOSKELETAL: No acute musculoskeletal symptoms  HEENT: Patient has macular degeneration right eye  ENDOCRINE: No acute endocrine symptoms other than his diabetes  PSYCHIATRIC: No acute psychiatric symptoms  HEMATOLOGY: Borderline anemic  SKIN: No acute skin changes  Family history  positive for heart disease and also mother had stroke  Social history: Patient denies smoking or drug or alcohol use    PHYSICAL EXAMINATION:    GENERAL: No acute distress  CRANIUM: Normocephalic/atraumatic  HEENT:       EYES: No acute fundic abnormalities.  Pupils equal round reactive to light.  EOMs intact without nystagmus and fields full to confrontation       EARS: Tympanic membranes normal and hears tuning fork bilaterally but does wear hearing aids       THROAT: No oropharynx abnormalities       NECK: No bruit/no lymphadenopathy  CHEST: No acute cardiopulmonary abnormalities by auscultation  ABDOMEN: Nondistended  EXTREMITIES: Pulses symmetrical  NEURO: Patient alert and follows commands without difficulty  SPEECH: Normal  CRANIAL NERVES: Motor/sensory about the face normal symmetric    MOTOR STRENGTH: Motor strength upper and lower extremities normal  STATION AND GAIT: Gait normal/Romberg negative  CEREBELLAR: Finger-nose and heel shin normal  SENSORY: Decreased pin and vibration distal proximal lower extremities to the ankles bilaterally otherwise normal pin and vibration throughout  REFLEXES: Absent ankle jerks bilaterally with decreased knee jerks and biceps and toes equivocal.  No clonus noted      ASSESSMENT AND PLAN: Patient with history of CVA and probable cardioembolic source.  Patient is to discuss with Dr. Carver about whether echocardiogram with bubble study necessary.  Also patient to check with cardiology about loop recorder to make sure it does not lose information for the MRI.  Patient to get brain MRI/MRA and get to the emergency room immediately if stroke symptoms occur.  Patient to have driving precautions safety precautions as discussed.Patient's Body mass index is 29.29 kg/m². BMI is above normal parameters. Recommendations include: referral to primary care.      Gonzalo was seen today for stroke.    Diagnoses and all orders for this visit:    Cerebrovascular accident (CVA), unspecified  mechanism (CMS/HCC)  -     CBC & Differential; Future  -     Comprehensive Metabolic Panel; Future  -     Lipid Panel; Future  -     Magnesium; Future  -     MRI Brain Without Contrast; Future  -     MRI Angiogram Head Without Contrast; Future  -     Sedimentation Rate; Future  -     T4, Free; Future  -     Vitamin B12; Future  -     Folate; Future    Essential (primary) hypertension   -     T4, Free; Future        Dictated utilizing Dragon voice recognition software

## 2019-09-27 NOTE — PATIENT INSTRUCTIONS
Patient to get with PCP about weight and diet control.  Patient to get with Dr. Carver regarding the loop recorder and MRI, regarding whether further echocardiogram with bubble study necessary to rule out PFO.  Patient to get to emergency room immediately if stroke symptoms occur.  Patient not to be climbing or working out in the heat or working with sharp cutting tools or working at heights.  Patient to have driving precautions.

## 2019-09-30 ENCOUNTER — TELEPHONE (OUTPATIENT)
Dept: CARDIOLOGY | Facility: CLINIC | Age: 84
End: 2019-09-30

## 2019-09-30 DIAGNOSIS — I63.9 CEREBROVASCULAR ACCIDENT (CVA), UNSPECIFIED MECHANISM (HCC): ICD-10-CM

## 2019-09-30 DIAGNOSIS — I10 ESSENTIAL (PRIMARY) HYPERTENSION: ICD-10-CM

## 2019-09-30 DIAGNOSIS — I63.9 CEREBROVASCULAR ACCIDENT (CVA), UNSPECIFIED MECHANISM (HCC): Primary | ICD-10-CM

## 2019-09-30 DIAGNOSIS — E11.21 CONTROLLED TYPE 2 DIABETES MELLITUS WITH DIABETIC NEPHROPATHY, WITHOUT LONG-TERM CURRENT USE OF INSULIN (HCC): Chronic | ICD-10-CM

## 2019-10-01 LAB
ALBUMIN SERPL-MCNC: 4.5 G/DL (ref 3.5–5.2)
ALBUMIN/GLOB SERPL: 2 G/DL
ALP SERPL-CCNC: 56 U/L (ref 39–117)
ALT SERPL-CCNC: 15 U/L (ref 1–41)
AST SERPL-CCNC: 14 U/L (ref 1–40)
BASOPHILS # BLD AUTO: 0.07 10*3/MM3 (ref 0–0.2)
BASOPHILS NFR BLD AUTO: 0.9 % (ref 0–1.5)
BILIRUB SERPL-MCNC: 0.3 MG/DL (ref 0.2–1.2)
BUN SERPL-MCNC: 32 MG/DL (ref 8–23)
BUN/CREAT SERPL: 28.8 (ref 7–25)
CALCIUM SERPL-MCNC: 9.6 MG/DL (ref 8.6–10.5)
CHLORIDE SERPL-SCNC: 102 MMOL/L (ref 98–107)
CHOLEST SERPL-MCNC: 165 MG/DL (ref 0–200)
CO2 SERPL-SCNC: 29.4 MMOL/L (ref 22–29)
CREAT SERPL-MCNC: 1.11 MG/DL (ref 0.76–1.27)
EOSINOPHIL # BLD AUTO: 0.66 10*3/MM3 (ref 0–0.4)
EOSINOPHIL NFR BLD AUTO: 8.1 % (ref 0.3–6.2)
ERYTHROCYTE [DISTWIDTH] IN BLOOD BY AUTOMATED COUNT: 13.3 % (ref 12.3–15.4)
ERYTHROCYTE [SEDIMENTATION RATE] IN BLOOD BY WESTERGREN METHOD: 12 MM/HR (ref 0–20)
FOLATE SERPL-MCNC: 19.4 NG/ML (ref 4.78–24.2)
GLOBULIN SER CALC-MCNC: 2.3 GM/DL
GLUCOSE SERPL-MCNC: 101 MG/DL (ref 65–99)
HBA1C MFR BLD: 6.3 % (ref 4.8–5.6)
HCT VFR BLD AUTO: 39.5 % (ref 37.5–51)
HDLC SERPL-MCNC: 33 MG/DL (ref 40–60)
HGB BLD-MCNC: 12.9 G/DL (ref 13–17.7)
IMM GRANULOCYTES # BLD AUTO: 0.03 10*3/MM3 (ref 0–0.05)
IMM GRANULOCYTES NFR BLD AUTO: 0.4 % (ref 0–0.5)
LDLC SERPL CALC-MCNC: 89 MG/DL (ref 0–100)
LYMPHOCYTES # BLD AUTO: 1.69 10*3/MM3 (ref 0.7–3.1)
LYMPHOCYTES NFR BLD AUTO: 20.7 % (ref 19.6–45.3)
MCH RBC QN AUTO: 29.1 PG (ref 26.6–33)
MCHC RBC AUTO-ENTMCNC: 32.7 G/DL (ref 31.5–35.7)
MCV RBC AUTO: 89 FL (ref 79–97)
MONOCYTES # BLD AUTO: 0.66 10*3/MM3 (ref 0.1–0.9)
MONOCYTES NFR BLD AUTO: 8.1 % (ref 5–12)
NEUTROPHILS # BLD AUTO: 5.07 10*3/MM3 (ref 1.7–7)
NEUTROPHILS NFR BLD AUTO: 61.8 % (ref 42.7–76)
NRBC BLD AUTO-RTO: 0 /100 WBC (ref 0–0.2)
PLATELET # BLD AUTO: 251 10*3/MM3 (ref 140–450)
POTASSIUM SERPL-SCNC: 5 MMOL/L (ref 3.5–5.2)
PROT SERPL-MCNC: 6.8 G/DL (ref 6–8.5)
RBC # BLD AUTO: 4.44 10*6/MM3 (ref 4.14–5.8)
SODIUM SERPL-SCNC: 144 MMOL/L (ref 136–145)
T4 FREE SERPL-MCNC: 1.13 NG/DL (ref 0.93–1.7)
TRIGL SERPL-MCNC: 217 MG/DL (ref 0–150)
TSH SERPL DL<=0.005 MIU/L-ACNC: 2.54 UIU/ML (ref 0.27–4.2)
VIT B12 SERPL-MCNC: 498 PG/ML (ref 211–946)
VLDLC SERPL CALC-MCNC: 43.4 MG/DL
WBC # BLD AUTO: 8.18 10*3/MM3 (ref 3.4–10.8)

## 2019-10-15 ENCOUNTER — TELEPHONE (OUTPATIENT)
Dept: CARDIOLOGY | Facility: CLINIC | Age: 84
End: 2019-10-15

## 2019-10-15 NOTE — TELEPHONE ENCOUNTER
Patients daughter called yesterday 10/14/19 stating the patient had to take one nitro tablet. She wanted to know if his [employer] monitor sent anything in. We discuss the Carelink monitor not having a good signal, therefore she would need to send in a manual transmission.     We did receive a transmission. Patient did not have his symptom activator with him at the time of the episode.     Patient has been in AFIB. He is anticoagulated with Eliquis.      I advised patient and his daughter if CP continues or worsens to come get evaluate in the ED. They both verbalized understanding.

## 2019-10-25 ENCOUNTER — CLINICAL SUPPORT (OUTPATIENT)
Dept: CARDIOLOGY | Facility: CLINIC | Age: 84
End: 2019-10-25

## 2019-10-25 DIAGNOSIS — I63.9 CRYPTOGENIC STROKE (HCC): ICD-10-CM

## 2019-10-25 PROCEDURE — 93299 PR REM INTERROG ICPMS/SCRMS <30 D TECH REVIEW: CPT | Performed by: PHYSICIAN ASSISTANT

## 2019-10-25 PROCEDURE — 93298 REM INTERROG DEV EVAL SCRMS: CPT | Performed by: PHYSICIAN ASSISTANT

## 2019-10-28 ENCOUNTER — HOSPITAL ENCOUNTER (OUTPATIENT)
Dept: MRI IMAGING | Facility: HOSPITAL | Age: 84
Discharge: HOME | End: 2019-10-28
Admitting: PSYCHIATRY & NEUROLOGY

## 2019-10-28 DIAGNOSIS — I63.9 CEREBROVASCULAR ACCIDENT (CVA), UNSPECIFIED MECHANISM (HCC): ICD-10-CM

## 2019-10-28 PROCEDURE — 70551 MRI BRAIN STEM W/O DYE: CPT

## 2019-10-29 ENCOUNTER — HOSPITAL ENCOUNTER (OUTPATIENT)
Dept: MRI IMAGING | Facility: HOSPITAL | Age: 84
Discharge: HOME | End: 2019-10-29
Admitting: PSYCHIATRY & NEUROLOGY

## 2019-10-29 DIAGNOSIS — I63.9 CEREBROVASCULAR ACCIDENT (CVA), UNSPECIFIED MECHANISM (HCC): ICD-10-CM

## 2019-10-29 PROCEDURE — 70544 MR ANGIOGRAPHY HEAD W/O DYE: CPT

## 2019-11-03 NOTE — PROGRESS NOTES
I have reviewed the notes, assessments, and/or procedures performed by  Ivette López PA-C  , I concur with her  documentation of Gonzalo Durham.

## 2019-11-05 ENCOUNTER — HOSPITAL ENCOUNTER (OUTPATIENT)
Dept: GENERAL RADIOLOGY | Facility: HOSPITAL | Age: 84
Discharge: HOME | End: 2019-11-05

## 2019-11-05 ENCOUNTER — OFFICE VISIT (OUTPATIENT)
Dept: NEUROLOGY | Facility: CLINIC | Age: 84
End: 2019-11-05

## 2019-11-05 ENCOUNTER — HOSPITAL ENCOUNTER (OUTPATIENT)
Dept: CT IMAGING | Facility: HOSPITAL | Age: 84
Discharge: HOME | End: 2019-11-05
Admitting: PSYCHIATRY & NEUROLOGY

## 2019-11-05 VITALS
WEIGHT: 211 LBS | BODY MASS INDEX: 29.54 KG/M2 | RESPIRATION RATE: 18 BRPM | SYSTOLIC BLOOD PRESSURE: 118 MMHG | HEART RATE: 68 BPM | HEIGHT: 71 IN | DIASTOLIC BLOOD PRESSURE: 70 MMHG

## 2019-11-05 DIAGNOSIS — S09.90XA TRAUMATIC INJURY OF HEAD, INITIAL ENCOUNTER: ICD-10-CM

## 2019-11-05 DIAGNOSIS — I63.9 CEREBROVASCULAR ACCIDENT (CVA), UNSPECIFIED MECHANISM (HCC): Primary | ICD-10-CM

## 2019-11-05 DIAGNOSIS — S99.921A INJURY OF RIGHT FOOT, INITIAL ENCOUNTER: ICD-10-CM

## 2019-11-05 DIAGNOSIS — G47.50 PARASOMNIA, UNSPECIFIED TYPE: ICD-10-CM

## 2019-11-05 DIAGNOSIS — R09.02 HYPOXIA: ICD-10-CM

## 2019-11-05 PROCEDURE — 70450 CT HEAD/BRAIN W/O DYE: CPT

## 2019-11-05 PROCEDURE — 73660 X-RAY EXAM OF TOE(S): CPT

## 2019-11-05 PROCEDURE — 99215 OFFICE O/P EST HI 40 MIN: CPT | Performed by: PSYCHIATRY & NEUROLOGY

## 2019-11-05 NOTE — PATIENT INSTRUCTIONS
Patient to get CT head scan and x-ray of the right foot today.  Patient to be driving only with another adult  in the car.  Patient not to be climbing or working with sharp cutting tools or working over hot fire/stove/water.  Patient had fall precautions.  Patient to get with PCP about blood under right great toenail.  Patient to get with PCP about weight and diet control and blood sugar follow-up.  Patient to get to emergency room immediately if chest pain occurs or stroke symptoms occur.

## 2019-11-05 NOTE — PROGRESS NOTES
Subjective   Gonzalo Durham, 1934, is a male who is being seen today for   Chief Complaint   Patient presents with   • Stroke       HISTORY OF PRESENT ILLNESS: Extended follow-up.  Patient had multiple problems addressed today.  Status post stroke with MRI showing multiple strokes in the right hemisphere including frontal parietal and occipital probably cardioembolic.  Patient continues to Eliquis and aspirin.  Patient is being followed from a heart standpoint by Dr. Carver and had episode of chest pain which is being monitored by Dr. Craver.  Patient wears his oxygen 24/7 and is having episodes at night of parasomnias vivid dreams and kicking and punching and fell out of the bed 2 days ago hitting his left side of his head and his right great toe.  The right great toe was swollen and tender and blood underneath the nail and there is some bruising in the left forehead area.  Patient has never had a sleep study.  Patient needs to get overnight continuous oximetry on his oxygen to determine oxygenation with possible deficits.  Patient says he does not think his blood sugar is changing significantly during the night.  Patient may need polysomnogram to rule out parasomnias/REM behavior disorder.  Patient is to get a CT head scan noncontrast due to day and x-ray of his right great toe.  Patient has had episodes of lightheadedness.  I discussed safety precautions and driving precautions and patient not to be driving without adult  in the car.  Patient is on multiple medications that can affect his blood pressure.  Patient's MRA brain okay.  Patient checked with Dr. Carver regarding possible bubble study for the echocardiogram but Dr. Carver did not feel that was necessary.  Patient is having some speech and memory difficulty post stroke.  MMSE is 27 of 30 and formal memory testing and speech evaluation to be done.  Patient denies any headache    REVIEW OF SYSTEMS:   GENERAL: Blood pressure today is 118/70 left arm seated  in same standing with pulse 68 and irregular  PULMONARY: As above  CVS: As above  GASTROINTESTINAL: No acute GI distress  GENITOURINARY: No acute  distress  GYN: Not applicable  MUSCULOSKELETAL: No acute musculoskeletal symptoms other than as noted above  HEENT: Patient wears hearing aids  ENDOCRINE: Patient is diabetic  PSYCHIATRIC: No acute psychiatric symptoms  HEMATOLOGY: Borderline anemic.  Patient's triglycerides modestly elevated and BUN elevated.  Magnesium pending  SKIN: As above  Family history reviewed and otherwise noncontributory  Social history: Patient denies smoking or drug or alcohol use      PHYSICAL EXAMINATION:    GENERAL: No acute distress  CRANIUM: As above  HEENT:        EYES: EOMs intact without nystagmus and fields full to confrontation.  Pupils equal round reactive to light.  No acute fundic abnormalities.       EARS: Tympanic membranes normal and hears tuning fork bilaterally with hearing aids in       THROAT: No oropharynx abnormalities       NECK: No bruit/no lymphadenopathy  CHEST: No acute cardiopulmonary abnormalities by auscultation  ABDOMEN: Nondistended  EXTREMITIES: Pulses symmetrical  NEURO: Patient alert and follows commands without difficulty  SPEECH: Normal    CRANIAL NERVES: Motor/sensory about the face normal symmetric    MOTOR STRENGTH: Motor strength upper and lower extremities normal  STATION AND GAIT: Gait normal/Romberg negative  CEREBELLAR: Finger-nose and heel shin normal  SENSORY: Patient has decreased pin and vibration distal proximal of the lower extremities to the ankles bilaterally but otherwise normal pin and vibration throughout.  REFLEXES: Reflexes decreased throughout upper and lower extremity without clonus or Babinski      ASSESSMENT AND PLAN: Patient with history of multiple stroke syndrome probably cardiac related.  Patient has having parasomnias at night and had recent fall hitting head and toe.  To get studies as above.  Safety precautions and driving  precautions again reviewed.Patient's Body mass index is 29.43 kg/m². BMI is above normal parameters. Recommendations include: referral to primary care.  I spent 40 minutes with this patient 30 minutes counseling..      Gonzalo was seen today for stroke.    Diagnoses and all orders for this visit:    Cerebrovascular accident (CVA), unspecified mechanism (CMS/[facility])  -     Ambulatory Referral to Speech Therapy    Traumatic injury of head, initial encounter  -     CT Head Without Contrast; Future    Injury of right foot, initial encounter  -     XR toe 2+ vw right; Future    Parasomnia, unspecified type  -     Overnight Sleep Oximetry Study; Future  -     Polysomnography 4 or More Parameters; Future    Hypoxia  -     Overnight Sleep Oximetry Study; Future    Other orders  -     Cancel: EEG; Future  -     Cancel: Glucose Tolerance, 3 Hours; Future        Dictated utilizing Dragon voice recognition software

## 2019-11-12 ENCOUNTER — HOSPITAL ENCOUNTER (OUTPATIENT)
Dept: CT IMAGING | Facility: HOSPITAL | Age: 84
Discharge: HOME | End: 2019-11-12
Admitting: NURSE PRACTITIONER

## 2019-11-12 ENCOUNTER — TELEPHONE (OUTPATIENT)
Dept: FAMILY MEDICINE CLINIC | Facility: CLINIC | Age: 84
End: 2019-11-12

## 2019-11-12 ENCOUNTER — OFFICE VISIT (OUTPATIENT)
Dept: FAMILY MEDICINE CLINIC | Facility: CLINIC | Age: 84
End: 2019-11-12

## 2019-11-12 VITALS
HEIGHT: 72 IN | OXYGEN SATURATION: 96 % | RESPIRATION RATE: 18 BRPM | HEART RATE: 57 BPM | SYSTOLIC BLOOD PRESSURE: 138 MMHG | TEMPERATURE: 97.7 F | DIASTOLIC BLOOD PRESSURE: 76 MMHG | BODY MASS INDEX: 28.71 KG/M2 | WEIGHT: 212 LBS

## 2019-11-12 DIAGNOSIS — R10.9 ACUTE RIGHT FLANK PAIN: ICD-10-CM

## 2019-11-12 DIAGNOSIS — R31.9 HEMATURIA, UNSPECIFIED TYPE: Primary | ICD-10-CM

## 2019-11-12 DIAGNOSIS — R31.9 HEMATURIA, UNSPECIFIED TYPE: ICD-10-CM

## 2019-11-12 DIAGNOSIS — K42.9 UMBILICAL HERNIA WITHOUT OBSTRUCTION AND WITHOUT GANGRENE: ICD-10-CM

## 2019-11-12 PROCEDURE — 74176 CT ABD & PELVIS W/O CONTRAST: CPT

## 2019-11-12 PROCEDURE — 99213 OFFICE O/P EST LOW 20 MIN: CPT | Performed by: NURSE PRACTITIONER

## 2019-11-12 NOTE — PROGRESS NOTES
SUBJECTIVE    Chief Complaint:  Abdominal protrusion, blood in urine    History of Present Illness:  This 85 y.o. male was seen in the office today for bites of abdominal protrusion around the umbilical area presenting 2 months ago.  Requesting specialty consultation.  Reports acute blood in urine x2 days.  Reports right flank and low back pain.    Past Medical, Surgical, Social, and Family History:  Past Medical History:   Diagnosis Date   • Arthritis    • BPH (benign prostatic hypertrophy)    • CAD (coronary artery disease)    • CKD (chronic kidney disease)    • Diabetes mellitus (CMS/HCC)     Type 2   • DM (diabetes mellitus screen)    • Hx of colonic polyp    • Hypercholesteremia    • Hypertension      Past Surgical History:   Procedure Laterality Date   • APPENDECTOMY     • CARDIAC CATHETERIZATION     • CARDIAC CATHETERIZATION Left 2019    Procedure: Cardiac Catheterization/Vascular Study Positive occult blood in stools  ? BMS vs REGGIE Get cabg report  ;  Surgeon: Bradley Carver MD;  Location: [facility] CATH INVASIVE LOCATION;  Service: Cardiology   • COLONOSCOPY N/A 6/15/2017    Diverticulosis repeat exam prn   • COLONOSCOPY W/ POLYPECTOMY  10/21/2013    2 Tubular adenomatous polyps, Diverticulosis repeat exam in 5 years   • CORONARY ARTERY BYPASS GRAFT  1980    X 3   • ENDOSCOPY N/A 6/15/2017    LA Grade A reflux esophagitis     Social History     Socioeconomic History   • Marital status:      Spouse name: Not on file   • Number of children: Not on file   • Years of education: Not on file   • Highest education level: Not on file   Tobacco Use   • Smoking status: Former Smoker     Packs/day: 1.00     Years: 26.00     Pack years: 26.00     Types: Cigarettes     Start date:      Last attempt to quit: 1980     Years since quittin.8   • Smokeless tobacco: Never Used   Substance and Sexual Activity   • Alcohol use: No   • Drug use: No   • Sexual activity: Defer     Family History   Problem Relation  "Age of Onset   • Heart disease Mother    • Stroke Mother    • Melanoma Mother    • Heart disease Father    • Diabetes Father    • Prostate cancer Father    • Colon cancer Neg Hx    • Colon polyps Neg Hx      Review of Systems   Constitutional: Negative for chills, diaphoresis and fatigue.   HENT: Negative for congestion, ear discharge, ear pain, rhinorrhea and sinus pressure.    Eyes: Negative for pain, discharge and itching.   Respiratory: Negative for cough, chest tightness, shortness of breath and wheezing.    Cardiovascular: Negative for chest pain and palpitations.   Gastrointestinal: Negative for abdominal distention and abdominal pain.        Umbilical protrusion   Endocrine: Negative for cold intolerance and heat intolerance.   Genitourinary: Positive for flank pain (right) and hematuria. Negative for dysuria and urgency.   Musculoskeletal: Negative for arthralgias and myalgias.   Skin: Negative for rash, skin lesions and bruise.   Allergic/Immunologic: Negative for environmental allergies and food allergies.   Neurological: Negative for dizziness, speech difficulty and numbness.   Hematological: Negative for adenopathy. Does not bruise/bleed easily.   Psychiatric/Behavioral: Negative for agitation, behavioral problems, self-injury, suicidal ideas, depressed mood and stress. The patient is not nervous/anxious.      OBJECTIVE    Vital Signs:  /76 (BP Location: Left arm)   Pulse 57   Temp 97.7 °F (36.5 °C) (Oral)   Resp 18   Ht 181.6 cm (71.5\")   Wt 96.2 kg (212 lb)   SpO2 96%   BMI 29.16 kg/m²   Physical Exam   Constitutional: He is oriented to person, place, and time. No distress.   HENT:   Head: Normocephalic and atraumatic.   Mouth/Throat: No oropharyngeal exudate.   Eyes: Conjunctivae and EOM are normal. Pupils are equal, round, and reactive to light.   Neck: Normal range of motion. Neck supple. No JVD present. No tracheal deviation present. No thyromegaly present.   Cardiovascular: Normal " rate, regular rhythm, normal heart sounds and intact distal pulses. Exam reveals no gallop and no friction rub.   No murmur heard.  Pulmonary/Chest: Effort normal and breath sounds normal. No respiratory distress. He has no rales.   O2 in use   Abdominal: Soft. Bowel sounds are normal. He exhibits no distension. There is no tenderness. There is no rebound and no guarding. A hernia (umbilical) is present.   Genitourinary:   Genitourinary Comments: Right low back flank tenderness.   Musculoskeletal: Normal range of motion. He exhibits no edema, tenderness or deformity.   Lymphadenopathy:     He has no cervical adenopathy.   Neurological: He is alert and oriented to person, place, and time.   Skin: Skin is warm and dry. Capillary refill takes less than 2 seconds. No rash noted. He is not diaphoretic.   Psychiatric: He has a normal mood and affect. His behavior is normal. Judgment and thought content normal.   Nursing note and vitals reviewed.    ASSESSMENT/PLAN    Diagnoses and all orders for this visit:    1. Hematuria, unspecified type (Primary)  -     Urinalysis With Culture If Indicated - Urine, Clean Catch  -     Cancel: UA / M With / Rflx Culture([facility] ONLY) - Urine, Clean Catch; Future  -     Urinalysis With Microscopic If Indicated (No Culture) - Urine, Clean Catch  -     CT Abdomen Pelvis Stone Protocol; Future    2. Acute right flank pain  -     CT Abdomen Pelvis Stone Protocol; Future    3. Umbilical hernia without obstruction and without gangrene  -     Ambulatory Referral to General Surgery    Discussion:  Advised and educated plan of care.  We will call with the results in 2 days with the culture, in the meantime, will check for renal calculi.  Advised to stay hydrated.  If infection negative, stones negative, will consider prostate as underlying cause.  He has a follow-up with Dr. Romero next week.  Advised to keep the follow-up appointment, if prostate is suspected, possibly reconsult urology as he  has had a history of a nodular enlarged prostate in the past.  Is it causing problems today?    Follow-up:  Return if symptoms worsen or fail to improve - will call results and coordinate plan.    Note e-Signed by DOMINIQUE Resendiz on 11/12/2019 at 12:42 PM

## 2019-11-12 NOTE — TELEPHONE ENCOUNTER
"Vm from dtr [facility] \"his urine is really dark like coffee and it started yesterday, we have a sample or does he need to be seen?\"    Son in law came in with sample that wife cleaned out with alcohol, and advised couldn't accept that and pt needs to be seen, son in law advised he would call his wife and see what time she could bring pt in?  "

## 2019-11-13 DIAGNOSIS — R09.02 HYPOXIA: ICD-10-CM

## 2019-11-13 DIAGNOSIS — N40.0 ENLARGED PROSTATE: ICD-10-CM

## 2019-11-13 DIAGNOSIS — R31.0 GROSS HEMATURIA: Primary | ICD-10-CM

## 2019-11-13 DIAGNOSIS — G47.50 PARASOMNIA, UNSPECIFIED TYPE: ICD-10-CM

## 2019-11-13 LAB
APPEARANCE UR: CLEAR
BACTERIA #/AREA URNS HPF: ABNORMAL /HPF
BILIRUB UR QL STRIP: NEGATIVE
COLOR UR: ABNORMAL
EPI CELLS #/AREA URNS HPF: ABNORMAL /HPF
GLUCOSE UR QL: NEGATIVE
HGB UR QL STRIP: ABNORMAL
KETONES UR QL STRIP: NEGATIVE
LEUKOCYTE ESTERASE UR QL STRIP: NEGATIVE
NITRITE UR QL STRIP: NEGATIVE
PH UR STRIP: 6 [PH] (ref 5–8)
PROT UR QL STRIP: ABNORMAL
RBC #/AREA URNS HPF: ABNORMAL /HPF
SP GR UR: 1.01 (ref 1–1.03)
UROBILINOGEN UR STRIP-MCNC: ABNORMAL MG/DL
WBC #/AREA URNS HPF: ABNORMAL /HPF

## 2019-11-14 ENCOUNTER — OFFICE VISIT (OUTPATIENT)
Dept: UROLOGY | Facility: CLINIC | Age: 84
End: 2019-11-14

## 2019-11-14 VITALS — WEIGHT: 212 LBS | HEIGHT: 71 IN | BODY MASS INDEX: 29.68 KG/M2 | TEMPERATURE: 97.4 F

## 2019-11-14 DIAGNOSIS — J44.9 STAGE 2 MODERATE COPD BY GOLD CLASSIFICATION (HCC): ICD-10-CM

## 2019-11-14 DIAGNOSIS — R31.0 GROSS HEMATURIA: Primary | ICD-10-CM

## 2019-11-14 DIAGNOSIS — I10 ESSENTIAL HYPERTENSION: Chronic | ICD-10-CM

## 2019-11-14 DIAGNOSIS — E11.21 CONTROLLED TYPE 2 DIABETES MELLITUS WITH DIABETIC NEPHROPATHY, WITHOUT LONG-TERM CURRENT USE OF INSULIN (HCC): Chronic | ICD-10-CM

## 2019-11-14 LAB
BILIRUB BLD-MCNC: NEGATIVE MG/DL
CLARITY, POC: CLEAR
COLOR UR: YELLOW
GLUCOSE UR STRIP-MCNC: NEGATIVE MG/DL
KETONES UR QL: NEGATIVE
LEUKOCYTE EST, POC: NEGATIVE
NITRITE UR-MCNC: NEGATIVE MG/ML
PH UR: 5.5 [PH] (ref 5–8)
PROT UR STRIP-MCNC: ABNORMAL MG/DL
RBC # UR STRIP: ABNORMAL /UL
SP GR UR: 1 (ref 1–1.03)
UROBILINOGEN UR QL: NORMAL

## 2019-11-14 PROCEDURE — 81001 URINALYSIS AUTO W/SCOPE: CPT | Performed by: UROLOGY

## 2019-11-14 PROCEDURE — 99214 OFFICE O/P EST MOD 30 MIN: CPT | Performed by: UROLOGY

## 2019-11-14 NOTE — PROGRESS NOTES
Subjective    Mr. Durham is 85 y.o. male    Chief Complaint: Gross hematuria    History of Present Illness  85-year-old male established patient on oxygen and on Eliquis along with longtime finasteride and Hytrin 10 mg, previously seen by Dr. Claros in 2017 for enlarged prostate here for new problem of painless total gross hematuria.  Quality gross hematuria.  Painless.  Timing intermittent.  Onset 2 weeks ago.  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    I independently visualized and reviewed the patient's prior imaging studies today in clinic and discussed the imaging findings with the patient.    I spent time today reviewing and summarizing old records last urology clinic visit with Dr. Claros 8/7/2017.        The following portions of the patient's history were reviewed and updated as appropriate: allergies, current medications, past family history, past medical history, past social history, past surgical history and problem list.    Review of Systems   Constitutional: Negative for chills and fever.   Gastrointestinal: Negative for abdominal pain, anal bleeding and blood in stool.   Genitourinary: Positive for hematuria (GROSS ). Negative for dysuria, frequency and urgency.   All other systems reviewed and are negative.        Current Outpatient Medications:   •  acetaminophen (TYLENOL) 325 MG tablet, Take 2 tablets by mouth 2 (Two) Times a Day., Disp: 360 tablet, Rfl: 2  •  albuterol sulfate HFA (PROAIR HFA) 108 (90 Base) MCG/ACT inhaler, Inhale 2 puffs 4 (Four) Times a Day., Disp: 1 inhaler, Rfl: 1  •  apixaban (ELIQUIS) 5 MG tablet tablet, Take 1 tablet by mouth Every 12 (Twelve) Hours., Disp: 60 tablet, Rfl: 11  •  Artificial Tear Solution (JUST TEARS EYE DROPS) solution, Apply 1-2 drops to eye(s) as directed by provider Daily As Needed (dry eyes)., Disp: 45 mL, Rfl: 2  •  ARTIFICIAL TEARS 1.4 %  ophthalmic solution, , Disp: , Rfl:   •  aspirin 81 MG EC tablet, Take 1 tablet by mouth Daily., Disp: , Rfl:   •  Blood Glucose Monitoring Suppl (FREESTYLE LITE) device, 1 Device Daily., Disp: 1 each, Rfl: 0  •  calcium carbonate-vitamin d 600-400 MG-UNIT per tablet, Take 1 tablet by mouth 2 (Two) Times a Day., Disp: 180 tablet, Rfl: 2  •  finasteride (PROSCAR) 5 MG tablet, Take 1 tablet by mouth Daily., Disp: 90 tablet, Rfl: 3  •  FREESTYLE LITE test strip, 1 each by Other route Daily., Disp: 100 each, Rfl: 2  •  gabapentin (NEURONTIN) 100 MG capsule, TAKE 2 CAPSULES BY MOUTH TWICE DAILY, Disp: 360 capsule, Rfl: 2  •  glyBURIDE (DIAbeta) 5 MG tablet, Take 1 tablet by mouth Every Morning AND 0.5 tablets Daily Before Supper., Disp: , Rfl:   •  guaiFENesin (MUCINEX) 600 MG 12 hr tablet, Take 600 mg by mouth 2 (Two) Times a Day., Disp: , Rfl:   •  isosorbide mononitrate (IMDUR) 30 MG 24 hr tablet, Take 1 tablet by mouth Daily., Disp: 90 tablet, Rfl: 3  •  Lancets (FREESTYLE) lancets, Use once daily, Disp: 100 each, Rfl: 1  •  loratadine (CLARITIN) 10 MG tablet, Take 1 tablet by mouth Daily., Disp: 90 tablet, Rfl: 2  •  metFORMIN ER (GLUCOPHAGE XR) 500 MG 24 hr tablet, Take 1 tablet by mouth Every Night., Disp: 180 tablet, Rfl: 2  •  metoprolol tartrate (LOPRESSOR) 25 MG tablet, Take 1 tablet by mouth 2 (Two) Times a Day., Disp: 180 tablet, Rfl: 3  •  Multiple Vitamins-Minerals (CENTRUM SILVER ADULT 50+) tablet, Take 50 mg by mouth Daily., Disp: 90 tablet, Rfl: 1  •  Multiple Vitamins-Minerals (PRESERVISION AREDS 2+MULTI VIT) capsule, Take 1 capsule by mouth Daily., Disp: 90 capsule, Rfl: 1  •  nitroglycerin (NITROSTAT) 0.4 MG SL tablet, Place 1 tablet under the tongue Every 5 (Five) Minutes As Needed for Chest Pain., Disp: 30 tablet, Rfl: 0  •  O2 (OXYGEN), Inhale 4 L/min Continuous. Per nasal cannula, Disp: , Rfl:   •  O2 (OXYGEN), Inhale 4 L/min. 4lpm, Disp: , Rfl:   •  pantoprazole (PROTONIX) 40 MG EC tablet, Take  1 tablet by mouth Daily., Disp: 90 tablet, Rfl: 2  •  pravastatin (PRAVACHOL) 80 MG tablet, Take 1 tablet by mouth Daily., Disp: 90 tablet, Rfl: 2  •  terazosin (HYTRIN) 10 MG capsule, Take 1 capsule by mouth Daily., Disp: 90 capsule, Rfl: 2  •  tiotropium bromide monohydrate (SPIRIVA RESPIMAT) 2.5 MCG/ACT aerosol solution inhaler, Inhale 2 puffs Daily., Disp: 3 inhaler, Rfl: 3  •  vitamin C (ASCORBIC ACID) 500 MG tablet, Take 1 tablet by mouth Daily., Disp: 90 tablet, Rfl: 1    Past Medical History:   Diagnosis Date   • Arthritis    • BPH (benign prostatic hypertrophy)    • CAD (coronary artery disease)    • CKD (chronic kidney disease)    • Diabetes mellitus (CMS/HCC)     Type 2   • DM (diabetes mellitus screen)    • Hx of colonic polyp    • Hypercholesteremia    • Hypertension        Past Surgical History:   Procedure Laterality Date   • APPENDECTOMY     • CARDIAC CATHETERIZATION     • CARDIAC CATHETERIZATION Left 2019    Procedure: Cardiac Catheterization/Vascular Study Positive occult blood in stools  ? BMS vs REGGIE Get cabg report  ;  Surgeon: Bradley Carver MD;  Location: [facility] CATH INVASIVE LOCATION;  Service: Cardiology   • COLONOSCOPY N/A 6/15/2017    Diverticulosis repeat exam prn   • COLONOSCOPY W/ POLYPECTOMY  10/21/2013    2 Tubular adenomatous polyps, Diverticulosis repeat exam in 5 years   • CORONARY ARTERY BYPASS GRAFT  1980    X 3   • ENDOSCOPY N/A 6/15/2017    LA Grade A reflux esophagitis       Social History     Socioeconomic History   • Marital status:      Spouse name: Not on file   • Number of children: Not on file   • Years of education: Not on file   • Highest education level: Not on file   Tobacco Use   • Smoking status: Former Smoker     Packs/day: 1.00     Years: 26.00     Pack years: 26.00     Types: Cigarettes     Start date:      Last attempt to quit: 1980     Years since quittin.8   • Smokeless tobacco: Never Used   Substance and Sexual Activity   • Alcohol use: No  "  • Drug use: No   • Sexual activity: Defer       Family History   Problem Relation Age of Onset   • Heart disease Mother    • Stroke Mother    • Melanoma Mother    • Heart disease Father    • Diabetes Father    • Prostate cancer Father    • Colon cancer Neg Hx    • Colon polyps Neg Hx        Objective    Temp 97.4 °F (36.3 °C)   Ht 180.3 cm (71\")   Wt 96.2 kg (212 lb)   BMI 29.57 kg/m²     Physical Exam  Constitutional: Well nourished, Well developed; No apparent distress; Vital reviewed as above  Psychiatric: Appropriate affect; Alert and oriented  Eyes: Unremarkable  Musculoskeletal: Normal gait and station  GI: Abdomen is soft, non-tender  Respiratory: No distress; using supplemental oxygen   Skin: No pallor or diaphoresis  Lymphatic: No adenopathy neck or groin      Results for orders placed or performed in visit on 11/12/19   Urinalysis With Microscopic If Indicated (No Culture) - Urine, Clean Catch   Result Value Ref Range    Specific Gravity, UA 1.012 1.005 - 1.030    pH, UA 6.0 5.0 - 8.0    Color, UA Red (A)     Appearance, UA Clear Clear    Leukocytes, UA Negative Negative    Protein See below: (A) Negative    Glucose, UA Negative Negative    Ketones Negative Negative    Blood, UA See below: (A) Negative    Bilirubin, UA Negative Negative    Urobilinogen, UA Comment     Nitrite, UA Negative Negative   Microscopic Examination -   Result Value Ref Range    WBC, UA 0-2 /HPF    RBC, UA 21-30 (A) /HPF    Epithelial Cells (non renal) 0-2 /HPF    Bacteria, UA Comment None Seen /HPF   Patient's Body mass index is 29.57 kg/m². BMI is above normal parameters. Recommendations include: educational material.    International Prostate Symptom Score  The following is posted based on patient questionnaire answers:  0 - not at all    1-7 mild symptoms  1- Less than one time in five  8-19 moderate symptoms  2 -Less than half the time  20-35 severe symptoms  3 - About half the time  4 - More than half the time  5 - Almost " always     For following sections:  Incomplete Emptying: - How often have you had the sensation  of not emptying your bladder completely after you finished urinating?  4  Frequency: -How often have you had to urinate again less than   two hours after you finished urinating?      3  Intermittency: -How often have you found you stopped and started again  Several times when you urinate?       1  Urgency: -How often do you find it difficult to postpone urination?             2  Weak stream: - How often have you had a weak urinary stream?             1  Straining: - How often have you had to push or strain to begin  Urination?          1  Sleeping: -How many times did you most typically get up to urinate   From the time you went to bed at night until the time you got up in the   3  Morning          Total `  15    Quality of Life  How would you feel if you had to live with your urinary condition the way   2  It is now, no better, no worse for the rest of your life?    Where: 0=delighted; 1= pleased, 2= mostly satisfied, 3= mixed, 4 = mostly  Dissatisfied, 5= Unhappy, 6 = terrible    Assessment and Plan    Diagnoses and all orders for this visit:    Gross hematuria  -     POC Urinalysis Dipstick, Multipro    Essential hypertension    Stage 2 moderate COPD by GOLD classification (CMS/[facility])    Controlled type 2 diabetes mellitus with diabetic nephropathy, without long-term current use of insulin (CMS/[facility])        New problem of gross hematuria with known enlarged prostate on chronic anticoagulation.  Recommended follow-up for flexible cystoscopy in office to rule out bladder tumor.

## 2019-11-14 NOTE — PATIENT INSTRUCTIONS

## 2019-11-15 DIAGNOSIS — E11.21 CONTROLLED TYPE 2 DIABETES MELLITUS WITH DIABETIC NEPHROPATHY, WITHOUT LONG-TERM CURRENT USE OF INSULIN (HCC): Primary | ICD-10-CM

## 2019-11-15 DIAGNOSIS — N18.9 CHRONIC KIDNEY DISEASE, UNSPECIFIED CKD STAGE: Chronic | ICD-10-CM

## 2019-11-19 ENCOUNTER — RESULTS ENCOUNTER (OUTPATIENT)
Dept: FAMILY MEDICINE CLINIC | Facility: CLINIC | Age: 84
End: 2019-11-19

## 2019-11-19 DIAGNOSIS — E11.21 CONTROLLED TYPE 2 DIABETES MELLITUS WITH DIABETIC NEPHROPATHY, WITHOUT LONG-TERM CURRENT USE OF INSULIN (HCC): ICD-10-CM

## 2019-11-19 DIAGNOSIS — N18.9 CHRONIC KIDNEY DISEASE, UNSPECIFIED CKD STAGE: Chronic | ICD-10-CM

## 2019-11-20 ENCOUNTER — OFFICE VISIT (OUTPATIENT)
Dept: PHYSICAL THERAPY | Facility: CLINIC | Age: 84
End: 2019-11-20

## 2019-11-20 DIAGNOSIS — G47.50 PARASOMNIA, UNSPECIFIED TYPE: ICD-10-CM

## 2019-11-20 DIAGNOSIS — R41.89 OTHER SYMPTOMS AND SIGNS INVOLVING COGNITIVE FUNCTIONS AND AWARENESS: Primary | ICD-10-CM

## 2019-11-20 LAB
BASOPHILS # BLD AUTO: 0.05 10*3/MM3 (ref 0–0.2)
BASOPHILS NFR BLD AUTO: 0.6 % (ref 0–1.5)
BUN SERPL-MCNC: 22 MG/DL (ref 8–23)
BUN/CREAT SERPL: 19.8 (ref 7–25)
CALCIUM SERPL-MCNC: 8.9 MG/DL (ref 8.6–10.5)
CHLORIDE SERPL-SCNC: 105 MMOL/L (ref 98–107)
CO2 SERPL-SCNC: 30.5 MMOL/L (ref 22–29)
CREAT SERPL-MCNC: 1.11 MG/DL (ref 0.76–1.27)
EOSINOPHIL # BLD AUTO: 0.7 10*3/MM3 (ref 0–0.4)
EOSINOPHIL NFR BLD AUTO: 8.6 % (ref 0.3–6.2)
ERYTHROCYTE [DISTWIDTH] IN BLOOD BY AUTOMATED COUNT: 13.3 % (ref 12.3–15.4)
GLUCOSE SERPL-MCNC: 122 MG/DL (ref 65–99)
HBA1C MFR BLD: 6.4 % (ref 4.8–5.6)
HCT VFR BLD AUTO: 38.5 % (ref 37.5–51)
HGB BLD-MCNC: 12.4 G/DL (ref 13–17.7)
IMM GRANULOCYTES # BLD AUTO: 0.03 10*3/MM3 (ref 0–0.05)
IMM GRANULOCYTES NFR BLD AUTO: 0.4 % (ref 0–0.5)
LYMPHOCYTES # BLD AUTO: 1.67 10*3/MM3 (ref 0.7–3.1)
LYMPHOCYTES NFR BLD AUTO: 20.6 % (ref 19.6–45.3)
MCH RBC QN AUTO: 29 PG (ref 26.6–33)
MCHC RBC AUTO-ENTMCNC: 32.2 G/DL (ref 31.5–35.7)
MCV RBC AUTO: 90.2 FL (ref 79–97)
MONOCYTES # BLD AUTO: 0.64 10*3/MM3 (ref 0.1–0.9)
MONOCYTES NFR BLD AUTO: 7.9 % (ref 5–12)
NEUTROPHILS # BLD AUTO: 5.02 10*3/MM3 (ref 1.7–7)
NEUTROPHILS NFR BLD AUTO: 61.9 % (ref 42.7–76)
NRBC BLD AUTO-RTO: 0 /100 WBC (ref 0–0.2)
PLATELET # BLD AUTO: 220 10*3/MM3 (ref 140–450)
POTASSIUM SERPL-SCNC: 4.6 MMOL/L (ref 3.5–5.2)
RBC # BLD AUTO: 4.27 10*6/MM3 (ref 4.14–5.8)
SODIUM SERPL-SCNC: 147 MMOL/L (ref 136–145)
WBC # BLD AUTO: 8.11 10*3/MM3 (ref 3.4–10.8)

## 2019-11-20 PROCEDURE — 96125 COGNITIVE TEST BY HC PRO: CPT | Performed by: SPEECH-LANGUAGE PATHOLOGIST

## 2019-11-20 NOTE — PROGRESS NOTES
Outpatient Speech Language Pathology   Adult Speech Language Cognitive Eval/Discharge       Patient Name: Gonzalo Durham  : 1934  MRN: [id number]  Today's Date: 2019         Visit Date: 2019    Patient Active Problem List   Diagnosis   • Wellness examination-done   • Obesity   • Diabetes type 2, controlled (CMS/HCC)   • Chronic kidney disease-3   • Erectile dysfunction   • Coronary artery disease-CABG   • Hyperlipidemia-statin   • Hypertension   • Prostatism-young available   • Tremor   • Varicose vein of leg   • Plantar fasciitis   • Nocturnal leg movements   • Bad dreams   • Depression   • Peripheral neuropathy- clinical   • Hx of colonic polyps   • Gastroesophageal reflux disease   • Left lower quadrant pain   • Laboratory test*   • Anticoagulated-CAD, DM2, CVA/ASA 81+()+plavix    • SOB: copd,CAD,#, hypoxemia   • Hypoxia#to pulmonary   • Respiratory qbqabze-nexecwn-N6-continuous   • Corn or callus   • Anemia: 3/3+ to gi   • Atherosclerosis: CAD, AAA   • AAA: 2018/ro   • Heme positive stool   • BMI 30.0-30.9,adult   • Stage 2 moderate COPD by GOLD classification (CMS/HCC)   • Abnormal stress test   • Tingling of both feet-to consider NCV   • Cryptogenic stroke (CMS/HCC)   • CHF (congestive heart failure) (CMS/HCC)-diastolic/chronic        Past Medical History:   Diagnosis Date   • Arthritis    • BPH (benign prostatic hypertrophy)    • CAD (coronary artery disease)    • CKD (chronic kidney disease)    • Diabetes mellitus (CMS/HCC)     Type 2   • DM (diabetes mellitus screen)    • Hx of colonic polyp    • Hypercholesteremia    • Hypertension         Past Surgical History:   Procedure Laterality Date   • APPENDECTOMY     • CARDIAC CATHETERIZATION     • CARDIAC CATHETERIZATION Left 2019    Procedure: Cardiac Catheterization/Vascular Study Positive occult blood in stools  ? BMS vs REGGIE Get cabg report  ;  Surgeon: Bradley Carver MD;  Location:  [facility];  Service:  "Cardiology   • COLONOSCOPY N/A 6/15/2017    Diverticulosis repeat exam prn   • COLONOSCOPY W/ POLYPECTOMY  10/21/2013    2 Tubular adenomatous polyps, Diverticulosis repeat exam in 5 years   • CORONARY ARTERY BYPASS GRAFT  1980    X 3   • ENDOSCOPY N/A 6/15/2017    LA Grade A reflux esophagitis         Visit Dx:    ICD-10-CM ICD-9-CM   1. Other symptoms and signs involving cognitive functions and awareness R41.89 799.59   2. Parasomnia, unspecified type G47.50 307.47     Patient was seen today for a memory evaluation. Patient was alert and cooperative. History is significant for stroke. Patient complains of word finding difficulty, but states that he has \"always had that\". He does not think he has any significant problems. He stated that he does not sleep well. He has difficulty hearing his wife.  He appeared to put forth good effort on testing tasks. Difficulty was noted in the area of immediate memory, but this improved with repetition and delayed memory was WNL. See below for RBANS scores.     RBANS: The Repeatable Battery for the Assessment of Neuropsychological Status (RBANS) assesses patient function in the areas of Immediate and Delayed memory, visuospatial/constructional skills, language and attention. It is used to detect and track neurocognitive deficits.   Index score Percentile Qualitative Description   Immediate Memory 81 7 Borderline   Visuospatial 81 10 Low Average   Language 107 68 Average   Attention 97 42 Average   Delayed Memory 104 61 Average   Total Scale 90 25 Average   Comments:          SLP SLC Evaluation - 11/20/19 1400        Communication Assessment/Intervention    Document Type  evaluation   -KG    Subjective Information  no complaints   -KG    Patient Observations  agree to therapy;cooperative;alert   -KG    Patient/Family Observations  Pt accompanied by his daughter   -KG    Patient Effort  good   -KG    Symptoms Noted During/After Treatment  none   -KG       General Information    " Patient Profile Reviewed  yes   -KG    Pertinent History Of Current Problem  CVA,    -KG    Precautions/Limitations, Vision  WFL with corrective lenses;for purposes of eval   -KG    Precautions/Limitations, Hearing  hearing aid, bilaterally   -KG    Patient Level of Education  High school   -KG    Prior Level of Function-Communication  WFL   -KG    Plans/Goals Discussed with  patient and family   -KG    Barriers to Rehab  none identified   -KG    Patient's Goals for Discharge  return to home   -KG    Family Goals for Discharge  family did not state   -KG    Standardized Assessment Used  RBANS   -KG       Cognitive Assessment Intervention- SLP    Orientation Status (Cognition)  WFL   -KG    Memory (Cognitive)  WFL   -KG    Attention (Cognitive)  WFL   -KG    Right Hemisphere Function  visuo-spatial;visuo-perceptual;WFL   -KG       Standardized Tests    Cognitive/Memory Tests  RBANS: Repeatable Battery for the Assessment of Neuropsychological Status   -KG       RBANS- Repeatable Battery for the Assessment of Neuropsychological Status    RBANS Comments  See report for scores   -KG       SLP Clinical Impressions    SLP Diagnosis  Memory WFL   -KG    Rehab Potential/Prognosis  good   -KG    SLC Criteria for Skilled Therapy Interventions Met  no problems identified which require skilled intervention   -KG    Functional Impact  no impact on function   -KG       Recommendations    Therapy Frequency (SLP SLC)  evaluation only   -KG       SLP Discharge Summary    Discharge Destination  home   -KG      User Key  (r) = Recorded By, (t) = Taken By, (c) = Cosigned By    Initials Name Provider Type    Nola Barkley CCC-SLP Speech and Language Pathologist                        OP SLP Education     Row Name 11/20/19 [facility]       Education    Barriers to Learning  No barriers identified  -KG      User Key  (r) = Recorded By, (t) = Taken By, (c) = Cosigned By    Initials Name Effective Dates    Nola Barkley CCC-SLP  02/05/19 -               OP SLP Assessment/Plan - 11/20/19 1500        SLP Assessment    Functional Problems  Speech Language- Adult/Cognition   -KG    Impact on Function: Adult Speech Language/Cognition  Other (comment0 WFL    WFL  -KG    Clinical Impression: Speech Language-Adult/Congnition  Speech Language WFL;Cognitive Communication WFL   -KG    Clinical Impression Comments  Memory, language and attention scores WFL   -KG    Please refer to items scanned into chart for additional diagnostic informaiton and handouts as provided by clinician  Yes   -KG    SLP Diagnosis  Memory WFL   -KG    Prognosis  Good (comment)   -KG    Patient/caregiver participated in establishment of treatment plan and goals  Yes   -KG    Patient would benefit from skilled therapy intervention  No   -KG       SLP Plan    Plan Comments  Follow up with MD   -FADI      User Key  (r) = Recorded By, (t) = Taken By, (c) = Cosigned By    Initials Name Provider Type    Nola Barkley CCC-SLP Speech and Language Pathologist            OP SLP Discharge Summary  Date of Discharge: 11/20/19  Reason for Discharge: all goals and outcomes met, no further needs identified  Progress Toward Achieving Short/long Term Goals: discharge on same date as initial evaluation  Discharge Destination: home  Discharge Instructions: Follow up with MD    Thank you for this referral.   Nola Hightower CCC-SLP  11/20/2019

## 2019-11-22 ENCOUNTER — OFFICE VISIT (OUTPATIENT)
Dept: FAMILY MEDICINE CLINIC | Facility: CLINIC | Age: 84
End: 2019-11-22

## 2019-11-22 VITALS
TEMPERATURE: 97.4 F | SYSTOLIC BLOOD PRESSURE: 138 MMHG | DIASTOLIC BLOOD PRESSURE: 72 MMHG | BODY MASS INDEX: 29.82 KG/M2 | HEART RATE: 48 BPM | OXYGEN SATURATION: 95 % | WEIGHT: 213 LBS | HEIGHT: 71 IN | RESPIRATION RATE: 20 BRPM

## 2019-11-22 DIAGNOSIS — R53.83 FATIGUE, UNSPECIFIED TYPE: ICD-10-CM

## 2019-11-22 DIAGNOSIS — I25.10 CORONARY ARTERY DISEASE INVOLVING NATIVE CORONARY ARTERY WITHOUT ANGINA PECTORIS, UNSPECIFIED WHETHER NATIVE OR TRANSPLANTED HEART: Chronic | ICD-10-CM

## 2019-11-22 DIAGNOSIS — D64.9 ANEMIA, UNSPECIFIED TYPE: Primary | ICD-10-CM

## 2019-11-22 DIAGNOSIS — I10 HYPERTENSION, UNSPECIFIED TYPE: Chronic | ICD-10-CM

## 2019-11-22 DIAGNOSIS — Z79.01 ANTICOAGULATED: ICD-10-CM

## 2019-11-22 DIAGNOSIS — I10 ESSENTIAL HYPERTENSION: Chronic | ICD-10-CM

## 2019-11-22 DIAGNOSIS — Z23 NEED FOR IMMUNIZATION AGAINST INFLUENZA: ICD-10-CM

## 2019-11-22 DIAGNOSIS — F51.5 BAD DREAMS: ICD-10-CM

## 2019-11-22 PROCEDURE — 99214 OFFICE O/P EST MOD 30 MIN: CPT | Performed by: FAMILY MEDICINE

## 2019-11-22 PROCEDURE — 90653 IIV ADJUVANT VACCINE IM: CPT | Performed by: FAMILY MEDICINE

## 2019-11-22 PROCEDURE — G0008 ADMIN INFLUENZA VIRUS VAC: HCPCS | Performed by: FAMILY MEDICINE

## 2019-11-22 RX ORDER — ISOSORBIDE MONONITRATE 30 MG/1
30 TABLET, EXTENDED RELEASE ORAL DAILY
Qty: 90 TABLET | Refills: 3 | Status: SHIPPED | OUTPATIENT
Start: 2019-11-22 | End: 2020-06-08 | Stop reason: SDUPTHER

## 2019-11-22 NOTE — PROGRESS NOTES
"Daniella Durham is a 85 y.o. male presenting with chief complaint of:   Chief Complaint   Patient presents with   • Fatigue   • dreaming     \"having alot, some are volient and some are not\"       History of Present Illness :  With wife/daughter.       Has multiple chronic problems to consider that might have a bearing on today's issues; somewhat an interval appointment.       Chronic/acute problems reviewed today:   1. Anemia, unspecified type: Chronic or past history/variable: This has been present before.    There has been GI evaulation in the past. There is no current melena, hematochezia. It has been benign to date and stable/treating/watching.  Contributing comorbidities to date: ASA 81 tx, gi (hemocult 3/3+, diverticular disease, colon polyps and esophagitis histories.       2. Need for immunization against influenza: Chronic/ongoing need to review pro/cons for various vaccinations based on age, health issues.  Needs vaccination today for influenza.      3. Essential hypertension: Chronic/stable. Stable here/if home blood pressures.  No significant chest pain, SOB, LE edema, orthopnea, near syncope, dizziness/light headness.      4. Bad dreams: acute/variable on/off.  Can make him jerk/kick and like. Not Rx list.    5. Fatigue, unspecified type: chronic;/variable levels of fatigue worse with activities.    6. Coronary artery disease involving native coronary artery without angina pectoris, unspecified whether native or transplanted heart: chronic/stable as no chest pain, SOB, use of NTG      7. Hypertension, unspecified type: Chronic/stable. Stable here/if home blood pressures.  No significant chest pain, SOB, LE edema, orthopnea, near syncope, dizziness/light headness.      8. Anticoagulated-CAD, DM2, CVA/ASA 81+()+plavix: Chronic/stable reason and use of.  Denies bleeding issues; especially epistaxis, melena, hematochezia.  Upper arms/others bruise easilyNo significant bruising or falls.    "     Has an/another acute issue today: Chari sad not to drive.  No history seizures.    The following portions of the patient's history were reviewed and updated as appropriate: allergies, current medications, past family history, past medical history, past social history, past surgical history and problem list.      Current Outpatient Medications:   •  albuterol sulfate HFA (PROAIR HFA) 108 (90 Base) MCG/ACT inhaler, Inhale 2 puffs 4 (Four) Times a Day., Disp: 1 inhaler, Rfl: 1  •  apixaban (ELIQUIS) 5 MG tablet tablet, Take 1 tablet by mouth Every 12 (Twelve) Hours., Disp: 60 tablet, Rfl: 11  •  Artificial Tear Solution (JUST TEARS EYE DROPS) solution, Apply 1-2 drops to eye(s) as directed by provider Daily As Needed (dry eyes)., Disp: 45 mL, Rfl: 2  •  aspirin 81 MG EC tablet, Take 1 tablet by mouth Daily., Disp: , Rfl:   •  Blood Glucose Monitoring Suppl (FREESTYLE LITE) device, 1 Device Daily., Disp: 1 each, Rfl: 0  •  calcium carbonate-vitamin d 600-400 MG-UNIT per tablet, Take 1 tablet by mouth 2 (Two) Times a Day., Disp: 180 tablet, Rfl: 2  •  finasteride (PROSCAR) 5 MG tablet, Take 1 tablet by mouth Daily., Disp: 90 tablet, Rfl: 3  •  FREESTYLE LITE test strip, 1 each by Other route Daily., Disp: 100 each, Rfl: 2  •  gabapentin (NEURONTIN) 100 MG capsule, TAKE 2 CAPSULES BY MOUTH TWICE DAILY, Disp: 360 capsule, Rfl: 2  •  glyBURIDE (DIAbeta) 5 MG tablet, Take 1 tablet by mouth Every Morning AND 0.5 tablets Daily Before Supper., Disp: , Rfl:   •  guaiFENesin (MUCINEX) 600 MG 12 hr tablet, Take 600 mg by mouth 2 (Two) Times a Day., Disp: , Rfl:   •  isosorbide mononitrate (IMDUR) 30 MG 24 hr tablet, Take 1 tablet by mouth Daily., Disp: 90 tablet, Rfl: 3  •  Lancets (FREESTYLE) lancets, Use once daily, Disp: 100 each, Rfl: 1  •  loratadine (CLARITIN) 10 MG tablet, Take 1 tablet by mouth Daily., Disp: 90 tablet, Rfl: 2  •  metFORMIN ER (GLUCOPHAGE XR) 500 MG 24 hr tablet, Take 1 tablet by mouth Every Night.,  Disp: 180 tablet, Rfl: 2  •  metoprolol tartrate (LOPRESSOR) 25 MG tablet, Take 1 tablet by mouth 2 (Two) Times a Day., Disp: 180 tablet, Rfl: 3  •  Multiple Vitamins-Minerals (CENTRUM SILVER ADULT 50+) tablet, Take 50 mg by mouth Daily., Disp: 90 tablet, Rfl: 1  •  Multiple Vitamins-Minerals (PRESERVISION AREDS 2+MULTI VIT) capsule, Take 1 capsule by mouth Daily., Disp: 90 capsule, Rfl: 1  •  O2 (OXYGEN), Inhale 4 L/min Continuous. Per nasal cannula, Disp: , Rfl:   •  O2 (OXYGEN), Inhale 4 L/min. 4lpm, Disp: , Rfl:   •  pantoprazole (PROTONIX) 40 MG EC tablet, Take 1 tablet by mouth Daily., Disp: 90 tablet, Rfl: 2  •  pravastatin (PRAVACHOL) 80 MG tablet, Take 1 tablet by mouth Daily., Disp: 90 tablet, Rfl: 2  •  terazosin (HYTRIN) 10 MG capsule, Take 1 capsule by mouth Daily., Disp: 90 capsule, Rfl: 2  •  tiotropium bromide monohydrate (SPIRIVA RESPIMAT) 2.5 MCG/ACT aerosol solution inhaler, Inhale 2 puffs Daily., Disp: 3 inhaler, Rfl: 3  •  vitamin C (ASCORBIC ACID) 500 MG tablet, Take 1 tablet by mouth Daily., Disp: 90 tablet, Rfl: 1  •  acetaminophen (TYLENOL) 325 MG tablet, Take 2 tablets by mouth 2 (Two) Times a Day., Disp: 360 tablet, Rfl: 2  •  ARTIFICIAL TEARS 1.4 % ophthalmic solution, , Disp: , Rfl:   •  nitroglycerin (NITROSTAT) 0.4 MG SL tablet, Place 1 tablet under the tongue Every 5 (Five) Minutes As Needed for Chest Pain., Disp: 30 tablet, Rfl: 0    No problems with medications.  Refills if needed done    Allergies   Allergen Reactions   • Symbicort [Budesonide-Formoterol Fumarate] Dizziness     Patient passed out and fell   • Prozac [Fluoxetine Hcl] Mental Status Change     Bad dreams.       Review of Systems  GENERAL:  Active/slower with limits, speed, samni for age and SOB.  Sleep is ok.   SKIN:  Ongoing callous of feet; no problems with this/other skin today.   ENDO:  No syncope, near or diaphoretic sweaty spells.  BS Ok: without download noted last visit.   HEENT: No head injury, headache.  No  vision change.  Same mild hearing loss.  Ears without pain/drainage.  No sore throat.  No significant nasal/sinus congestion/drainage. No epistaxis.  CHEST: No chest wall tenderness or mass. No significant cough (occ cough),  without wheeze.  Mild as above SOB; no hemoptysis.  CV: No chest pain, palpatations, ankle edema.  GI: No heartburn, dysphagia.  No abdominal pain, diarrhea, constipation, rectal bleeding, or melena.    :  Voids without dysuria, or incontience to completion.  ORTHO: No painful/swollen joints but various on /off sore.  No change occ sore neck or back.  No acute neck or back pain without recent injury.   NEURO: No dizziness; recent LUE weakness of extremities.  No change some LE numbness/parethesias.   PSYCH: No memory loss.  Mood good; not that anxious, depressed but/and not suicidal.  Tolerated stress.   Screening:  Mammogram: NA  Bone density: NA  Low dose CT chest: Tobacco-smoker/age 22/1 ppd/dc 1980: (22): NA (had CT angio)  GI: Colon-div/[facility]/6.15.17/prn  Prostate: Cadena/confirmed 11.22.19  Genieper in past   Usual lab order  6m CBC, BMP, A1c  12m CBC, CMP, A1c, TSH, LIPID, PSAs, Vit D    Lab Results:  Results for orders placed or performed in visit on 11/19/19   Basic Metabolic Panel   Result Value Ref Range    Glucose 122 (H) 65 - 99 mg/dL    BUN 22 8 - 23 mg/dL    Creatinine 1.11 0.76 - 1.27 mg/dL    eGFR Non African Am 63 >60 mL/min/1.73    eGFR African Am 76 >60 mL/min/1.73    BUN/Creatinine Ratio 19.8 7.0 - 25.0    Sodium 147 (H) 136 - 145 mmol/L    Potassium 4.6 3.5 - 5.2 mmol/L    Chloride 105 98 - 107 mmol/L    Total CO2 30.5 (H) 22.0 - 29.0 mmol/L    Calcium 8.9 8.6 - 10.5 mg/dL   Hemoglobin A1c   Result Value Ref Range    Hemoglobin A1C 6.40 (H) 4.80 - 5.60 %   CBC & Differential   Result Value Ref Range    WBC 8.11 3.40 - 10.80 10*3/mm3    RBC 4.27 4.14 - 5.80 10*6/mm3    Hemoglobin 12.4 (L) 13.0 - 17.7 g/dL    Hematocrit 38.5 37.5 - 51.0 %    MCV 90.2 79.0 - 97.0 fL    MCH  29.0 26.6 - 33.0 pg    MCHC 32.2 31.5 - 35.7 g/dL    RDW 13.3 12.3 - 15.4 %    Platelets 220 140 - 450 10*3/mm3    Neutrophil Rel % 61.9 42.7 - 76.0 %    Lymphocyte Rel % 20.6 19.6 - 45.3 %    Monocyte Rel % 7.9 5.0 - 12.0 %    Eosinophil Rel % 8.6 (H) 0.3 - 6.2 %    Basophil Rel % 0.6 0.0 - 1.5 %    Neutrophils Absolute 5.02 1.70 - 7.00 10*3/mm3    Lymphocytes Absolute 1.67 0.70 - 3.10 10*3/mm3    Monocytes Absolute 0.64 0.10 - 0.90 10*3/mm3    Eosinophils Absolute 0.70 (H) 0.00 - 0.40 10*3/mm3    Basophils Absolute 0.05 0.00 - 0.20 10*3/mm3    Immature Granulocyte Rel % 0.4 0.0 - 0.5 %    Immature Grans Absolute 0.03 0.00 - 0.05 10*3/mm3    nRBC 0.0 0.0 - 0.2 /100 WBC       A1C:  Lab Results - Last 18 Months   Lab Units 11/19/19  0719 09/30/19  0716 05/23/19  0423 05/15/19  0708 11/08/18  1328   HEMOGLOBIN A1C % 6.40* 6.30* 6.4 6.30* 6.30     PSA:  Lab Results - Last 18 Months   Lab Units 05/15/19  0708 11/08/18  1328   PSA ng/mL 1.100 1.230     CBC:  Lab Results - Last 18 Months   Lab Units 11/19/19  0719 09/30/19  0716 05/23/19  0423 05/22/19  1211 05/15/19  0708 11/14/18  0845 11/08/18  1328 07/15/18  2300   WBC 10*3/mm3 8.11 8.18 8.12 6.98 6.72  --  7.52 8.3   HEMOGLOBIN g/dL 12.4* 12.9* 11.9* 13.4* 12.3*  --  12.6* 15.2   HEMATOCRIT % 38.5 39.5 38.1* 41.3 40.6  --  40.4 45.6   PLATELETS 10*3/mm3 220 251 229 253 234  --  242 171   IRON mcg/dL  --   --   --   --   --  81  --   --       BMP/CMP:  Lab Results - Last 18 Months   Lab Units 11/19/19  0719 09/30/19  0716 05/23/19  0423 05/22/19  1211 05/15/19  0708 11/08/18  1328 10/24/18  1535 07/15/18  2300   SODIUM mmol/L 147* 144 141 141 144 141  --  144   SODIUM, ARTERIAL mmol/L  --   --   --   --   --   --  143  --    POTASSIUM mmol/L 4.6 5.0 4.5 4.7 4.9 4.6  --  5.0   CHLORIDE mmol/L 105 102 102 103 105 99  --  103   TOTAL CO2 mmol/L 30.5* 29.4*  --   --  27.8 33.0*  --  28   CO2 mmol/L  --   --  31.0 32.0*  --   --   --   --    GLUCOSE mg/dL 122* 101*  --    "--  128* 82  --  108*   BUN mg/dL 22 32* 21 26* 23 27*  --  23   CREATININE mg/dL 1.11 1.11 1.25 1.11 1.12 1.06  --  1.09   EGFR IF NONAFRICN AM mL/min/1.73 63 63 55* 63 62 67  --  62   EGFR IF AFRICN AM mL/min/1.73 76 76  --   --  76 81  --  72   CALCIUM mg/dL 8.9 9.6 8.7 9.4 9.8 9.9  --  9.6     HEPATIC:  Lab Results - Last 18 Months   Lab Units 09/30/19  0716 05/22/19  1211 05/15/19  0708 07/15/18  2300   ALT (SGPT) U/L 15 17 11 13   AST (SGOT) U/L 14 33 15 15   ALK PHOS U/L 56 59 55 55     THYROID:  Lab Results - Last 18 Months   Lab Units 09/30/19  0716 05/15/19  0708   TSH uIU/mL 2.540 2.570       Objective   /72 (BP Location: Right arm, Patient Position: Sitting, Cuff Size: Large Adult)   Pulse (!) 48   Temp 97.4 °F (36.3 °C) (Oral)   Resp 20   Ht 180.3 cm (71\")   Wt 96.6 kg (213 lb)   SpO2 95% Comment: portable O2@3L per NC  BMI 29.71 kg/m²   Body mass index is 29.71 kg/m².    Physical Exam  GENERAL:  Well nourished/developed in no acute distress. Obese   SKIN: Turgor excellent, without wound, rash, lesion; but does have hyperkeratotic pressure areas (callous) of several pressure areas of both feet.   HEENT: Normal cephalic without trauma.  Pupils equal round reactive to light. Extraocular motions full without nystagmus.    No thyroidmegaly, mass, tenderness, lymphadenopathy and supple.  CV: Regular rhythm.  No murmur, gallop,  edema. Posterior pulses intact.  No carotid bruits.  CHEST: No chest wall tenderness or mass.   LUNGS: Symmetric motion with clear to auscultation.   ABD: Soft, nontender without mass.   ORTHO: Symmetric extremities without swelling/point tenderness.  Full gross range of motion except hips reduced.  Hips sore with ROM.   NEURO: CN 2-12 grossly intact.  Symmetric facies. 1/4 x bicep knee equal reflexes.  UE/LE   3/5 strength throughout except 2/5  L.  Nonfocal use extremities. Speech clear.  Light touch decreased bilateral feet.   PSYCH: Oriented x 3.  Pleasant calm, " well kept.  Purposeful/directed conservation with intact short/long gross memory.     Assessment/Plan     1. Anemia, unspecified type    2. Need for immunization against influenza    3. Essential hypertension    4. Bad dreams    5. Fatigue, unspecified type    6. Coronary artery disease involving native coronary artery without angina pectoris, unspecified whether native or transplanted heart    7. Hypertension, unspecified type    8. Anticoagulated-CAD, DM2, CVA/ASA 81+()+plavix         Rx: reviewed/changes:  New Medications Ordered This Visit   Medications   • apixaban (ELIQUIS) 5 MG tablet tablet     Sig: Take 1 tablet by mouth Every 12 (Twelve) Hours.     Dispense:  180 tablet     Refill:  3   • isosorbide mononitrate (IMDUR) 30 MG 24 hr tablet     Sig: Take 1 tablet by mouth Daily.     Dispense:  90 tablet     Refill:  3     90 day supply please       LAB/Testing/Referrals: reviewed/orders:   Today:   Orders Placed This Encounter   Procedures   • Fluad Tri 65yr+     Chronic/recurrent labs above or change to:   same     Discussions:   Fatigue likely combination of small contributions of many things.   Body mass index is 29.71 kg/m².  Patient's Body mass index is 29.71 kg/m². BMI is within normal parameters. No follow-up required..    Tobacco use reviewed:    Non-smoker    There are no Patient Instructions on file for this visit.    Follow up: Return for lab;, Dr Romero-, 6 m;.  Future Appointments   Date Time Provider Department Center   11/25/2019 10:45 AM [facility]   12/12/2019 10:00 AM Danie Cadena MD [facility] None   12/27/2019  8:45 AM [facility]   12/30/2019 10:00 AM Bradley Carver MD [facility]   2/5/2020 10:15 AM Juan Marcelino MD [facility] None   4/28/2020 11:15 AM Feliz Frye APRN [facility] None   5/19/2020  8:20 AM LAB [facility] [place] [facility] None   5/21/2020  9:15 AM  Abel Romero MD [facility] None

## 2019-11-22 NOTE — PROGRESS NOTES
Pt here to be seen and discussed the benefits and risks of flu shot. Copy of VIS given to pt and verbal and written consent given. Given in R deltoid site, tolerated well.

## 2019-11-25 ENCOUNTER — CLINICAL SUPPORT (OUTPATIENT)
Dept: CARDIOLOGY | Facility: CLINIC | Age: 84
End: 2019-11-25

## 2019-11-25 DIAGNOSIS — I63.9 CRYPTOGENIC STROKE (HCC): ICD-10-CM

## 2019-11-25 DIAGNOSIS — G47.50 PARASOMNIA, UNSPECIFIED TYPE: Primary | ICD-10-CM

## 2019-11-25 PROCEDURE — 93299 PR REM INTERROG ICPMS/SCRMS <30 D TECH REVIEW: CPT | Performed by: PHYSICIAN ASSISTANT

## 2019-11-25 PROCEDURE — 93298 REM INTERROG DEV EVAL SCRMS: CPT | Performed by: PHYSICIAN ASSISTANT

## 2019-12-02 DIAGNOSIS — J34.89 SINUS DRAINAGE: Primary | ICD-10-CM

## 2019-12-02 RX ORDER — GUAIFENESIN 600 MG/1
600 TABLET, EXTENDED RELEASE ORAL 2 TIMES DAILY
Qty: 180 TABLET | Refills: 2 | Status: SHIPPED | OUTPATIENT
Start: 2019-12-02 | End: 2021-03-29 | Stop reason: SDUPTHER

## 2019-12-12 ENCOUNTER — PROCEDURE VISIT (OUTPATIENT)
Dept: UROLOGY | Facility: CLINIC | Age: 84
End: 2019-12-12

## 2019-12-12 DIAGNOSIS — R31.0 GROSS HEMATURIA: Primary | ICD-10-CM

## 2019-12-12 DIAGNOSIS — N40.1 BPH WITH OBSTRUCTION/LOWER URINARY TRACT SYMPTOMS: ICD-10-CM

## 2019-12-12 DIAGNOSIS — N13.8 BPH WITH OBSTRUCTION/LOWER URINARY TRACT SYMPTOMS: ICD-10-CM

## 2019-12-12 LAB
BILIRUB BLD-MCNC: NEGATIVE MG/DL
CLARITY, POC: CLEAR
COLOR UR: YELLOW
GLUCOSE UR STRIP-MCNC: NEGATIVE MG/DL
KETONES UR QL: NEGATIVE
LEUKOCYTE EST, POC: NEGATIVE
NITRITE UR-MCNC: NEGATIVE MG/ML
PH UR: 5 [PH] (ref 5–8)
PROT UR STRIP-MCNC: NEGATIVE MG/DL
RBC # UR STRIP: NEGATIVE /UL
SP GR UR: 1.01 (ref 1–1.03)
UROBILINOGEN UR QL: NORMAL

## 2019-12-12 PROCEDURE — 81003 URINALYSIS AUTO W/O SCOPE: CPT | Performed by: UROLOGY

## 2019-12-12 PROCEDURE — 52000 CYSTOURETHROSCOPY: CPT | Performed by: UROLOGY

## 2019-12-12 NOTE — PROGRESS NOTES
Pre- operative diagnosis:  Hematuria and Benign prostatic hypertrophy    Post operative diagnosis:  BPH    Procedure:  The patient was prepped and draped in a normal sterile fashion.  The urethra was anesthetized with 2% lidocaine jelly.  A flexible cystoscope was introduced per urethra.      Urethra:  Normal    Bladder:  There is no evidence of a stone, foreign body or mass within the bladder.  The bladder is minimally trabeculated.  The bladder neck is without contracture., Normal mucosa, No bladder tumor and moderate trabeculation    Ureteral orifices:  Normal position bilaterally and Clear efflux bilaterally    Prostate:  lateral lobe hypertrophy, median lobe hypertrophy and Large intravesical lobe -kissing lateral lobes.    Patient tolerated the procedure well    Complications: none    Blood loss: minimal    Follow up:    Routine follow up-as needed for any recurrent hematuria, UTI, or retention.  Continue finasteride and Hytrin.  Follow-up 1 year or sooner as needed

## 2019-12-18 ENCOUNTER — HOSPITAL ENCOUNTER (OUTPATIENT)
Dept: SLEEP MEDICINE | Facility: HOSPITAL | Age: 84
Discharge: HOME | End: 2019-12-18
Admitting: PSYCHIATRY & NEUROLOGY

## 2019-12-18 VITALS
HEART RATE: 60 BPM | DIASTOLIC BLOOD PRESSURE: 82 MMHG | SYSTOLIC BLOOD PRESSURE: 135 MMHG | HEIGHT: 71 IN | OXYGEN SATURATION: 96 % | WEIGHT: 213 LBS | RESPIRATION RATE: 22 BRPM | BODY MASS INDEX: 29.82 KG/M2

## 2019-12-18 DIAGNOSIS — G47.50 PARASOMNIA, UNSPECIFIED TYPE: ICD-10-CM

## 2019-12-18 PROCEDURE — 95810 POLYSOM 6/> YRS 4/> PARAM: CPT | Performed by: PSYCHIATRY & NEUROLOGY

## 2019-12-18 PROCEDURE — 95810 POLYSOM 6/> YRS 4/> PARAM: CPT

## 2019-12-23 ENCOUNTER — TRANSCRIBE ORDERS (OUTPATIENT)
Dept: SLEEP MEDICINE | Facility: HOSPITAL | Age: 84
End: 2019-12-23

## 2019-12-23 DIAGNOSIS — G47.33 OBSTRUCTIVE SLEEP APNEA, ADULT: Primary | ICD-10-CM

## 2019-12-24 ENCOUNTER — HOSPITAL ENCOUNTER (OUTPATIENT)
Facility: HOSPITAL | Age: 84
Setting detail: OBSERVATION
LOS: 1 days | Discharge: HOME | End: 2019-12-26
Attending: FAMILY MEDICINE | Admitting: FAMILY MEDICINE

## 2019-12-24 ENCOUNTER — APPOINTMENT (OUTPATIENT)
Dept: GENERAL RADIOLOGY | Facility: HOSPITAL | Age: 84
End: 2019-12-24

## 2019-12-24 DIAGNOSIS — R07.9 CHEST PAIN, UNSPECIFIED TYPE: Primary | ICD-10-CM

## 2019-12-24 LAB
ALBUMIN SERPL-MCNC: 4.4 G/DL (ref 3.5–5.2)
ALBUMIN/GLOB SERPL: 1.5 G/DL
ALP SERPL-CCNC: 72 U/L (ref 39–117)
ALT SERPL W P-5'-P-CCNC: 14 U/L (ref 1–41)
ANION GAP SERPL CALCULATED.3IONS-SCNC: 13 MMOL/L (ref 5–15)
APTT PPP: 34.2 SECONDS (ref 24.1–35)
AST SERPL-CCNC: 15 U/L (ref 1–40)
BASOPHILS # BLD AUTO: 0.06 10*3/MM3 (ref 0–0.2)
BASOPHILS NFR BLD AUTO: 0.7 % (ref 0–1.5)
BILIRUB SERPL-MCNC: <0.2 MG/DL (ref 0.2–1.2)
BUN BLD-MCNC: 31 MG/DL (ref 8–23)
BUN/CREAT SERPL: 27.2 (ref 7–25)
CALCIUM SPEC-SCNC: 9.9 MG/DL (ref 8.6–10.5)
CHLORIDE SERPL-SCNC: 98 MMOL/L (ref 98–107)
CO2 SERPL-SCNC: 29 MMOL/L (ref 22–29)
CREAT BLD-MCNC: 1.14 MG/DL (ref 0.76–1.27)
DEPRECATED RDW RBC AUTO: 42.2 FL (ref 37–54)
EOSINOPHIL # BLD AUTO: 0.7 10*3/MM3 (ref 0–0.4)
EOSINOPHIL NFR BLD AUTO: 7.9 % (ref 0.3–6.2)
ERYTHROCYTE [DISTWIDTH] IN BLOOD BY AUTOMATED COUNT: 13 % (ref 12.3–15.4)
GFR SERPL CREATININE-BSD FRML MDRD: 61 ML/MIN/1.73
GLOBULIN UR ELPH-MCNC: 2.9 GM/DL
GLUCOSE BLD-MCNC: 140 MG/DL (ref 65–99)
HCT VFR BLD AUTO: 38.5 % (ref 37.5–51)
HGB BLD-MCNC: 12.9 G/DL (ref 13–17.7)
IMM GRANULOCYTES # BLD AUTO: 0.02 10*3/MM3 (ref 0–0.05)
IMM GRANULOCYTES NFR BLD AUTO: 0.2 % (ref 0–0.5)
INR PPP: 0.99 (ref 0.91–1.09)
LYMPHOCYTES # BLD AUTO: 2.15 10*3/MM3 (ref 0.7–3.1)
LYMPHOCYTES NFR BLD AUTO: 24.2 % (ref 19.6–45.3)
MCH RBC QN AUTO: 29.3 PG (ref 26.6–33)
MCHC RBC AUTO-ENTMCNC: 33.5 G/DL (ref 31.5–35.7)
MCV RBC AUTO: 87.5 FL (ref 79–97)
MONOCYTES # BLD AUTO: 0.67 10*3/MM3 (ref 0.1–0.9)
MONOCYTES NFR BLD AUTO: 7.5 % (ref 5–12)
NEUTROPHILS # BLD AUTO: 5.28 10*3/MM3 (ref 1.7–7)
NEUTROPHILS NFR BLD AUTO: 59.5 % (ref 42.7–76)
NRBC BLD AUTO-RTO: 0 /100 WBC (ref 0–0.2)
PLATELET # BLD AUTO: 230 10*3/MM3 (ref 140–450)
PMV BLD AUTO: 9.7 FL (ref 6–12)
POTASSIUM BLD-SCNC: 4.1 MMOL/L (ref 3.5–5.2)
PROT SERPL-MCNC: 7.3 G/DL (ref 6–8.5)
PROTHROMBIN TIME: 13.4 SECONDS (ref 11.9–14.6)
RBC # BLD AUTO: 4.4 10*6/MM3 (ref 4.14–5.8)
SODIUM BLD-SCNC: 140 MMOL/L (ref 136–145)
TROPONIN T SERPL-MCNC: <0.01 NG/ML (ref 0–0.03)
WBC NRBC COR # BLD: 8.88 10*3/MM3 (ref 3.4–10.8)

## 2019-12-24 PROCEDURE — 80053 COMPREHEN METABOLIC PANEL: CPT | Performed by: FAMILY MEDICINE

## 2019-12-24 PROCEDURE — 85730 THROMBOPLASTIN TIME PARTIAL: CPT | Performed by: FAMILY MEDICINE

## 2019-12-24 PROCEDURE — 93010 ELECTROCARDIOGRAM REPORT: CPT | Performed by: INTERNAL MEDICINE

## 2019-12-24 PROCEDURE — 93005 ELECTROCARDIOGRAM TRACING: CPT

## 2019-12-24 PROCEDURE — 71045 X-RAY EXAM CHEST 1 VIEW: CPT

## 2019-12-24 PROCEDURE — 84484 ASSAY OF TROPONIN QUANT: CPT

## 2019-12-24 PROCEDURE — 99284 EMERGENCY DEPT VISIT MOD MDM: CPT

## 2019-12-24 PROCEDURE — 85610 PROTHROMBIN TIME: CPT | Performed by: FAMILY MEDICINE

## 2019-12-24 PROCEDURE — 85025 COMPLETE CBC W/AUTO DIFF WBC: CPT | Performed by: FAMILY MEDICINE

## 2019-12-24 RX ORDER — SODIUM CHLORIDE 0.9 % (FLUSH) 0.9 %
10 SYRINGE (ML) INJECTION AS NEEDED
Status: DISCONTINUED | OUTPATIENT
Start: 2019-12-24 | End: 2019-12-26 | Stop reason: HOSPADM

## 2019-12-25 ENCOUNTER — DOCUMENTATION (OUTPATIENT)
Dept: CARDIOLOGY | Facility: CLINIC | Age: 84
End: 2019-12-25

## 2019-12-25 ENCOUNTER — APPOINTMENT (OUTPATIENT)
Dept: CARDIOLOGY | Facility: HOSPITAL | Age: 84
End: 2019-12-25

## 2019-12-25 DIAGNOSIS — I63.9 CRYPTOGENIC STROKE (HCC): Primary | ICD-10-CM

## 2019-12-25 PROBLEM — R07.9 CHEST PAIN: Status: ACTIVE | Noted: 2019-12-25

## 2019-12-25 LAB
BH CV ECHO MEAS - AO MAX PG (FULL): 4.2 MMHG
BH CV ECHO MEAS - AO MAX PG: 6.1 MMHG
BH CV ECHO MEAS - AO MEAN PG (FULL): 2 MMHG
BH CV ECHO MEAS - AO MEAN PG: 3 MMHG
BH CV ECHO MEAS - AO ROOT AREA (BSA CORRECTED): 1.9
BH CV ECHO MEAS - AO ROOT AREA: 13.2 CM^2
BH CV ECHO MEAS - AO ROOT DIAM: 4.1 CM
BH CV ECHO MEAS - AO V2 MAX: 123 CM/SEC
BH CV ECHO MEAS - AO V2 MEAN: 86.3 CM/SEC
BH CV ECHO MEAS - AO V2 VTI: 33.9 CM
BH CV ECHO MEAS - AVA(I,A): 2.3 CM^2
BH CV ECHO MEAS - AVA(I,D): 2.3 CM^2
BH CV ECHO MEAS - AVA(V,A): 2.7 CM^2
BH CV ECHO MEAS - AVA(V,D): 2.7 CM^2
BH CV ECHO MEAS - BSA(HAYCOCK): 2.2 M^2
BH CV ECHO MEAS - BSA: 2.2 M^2
BH CV ECHO MEAS - BZI_BMI: 30.3 KILOGRAMS/M^2
BH CV ECHO MEAS - BZI_METRIC_HEIGHT: 180.3 CM
BH CV ECHO MEAS - BZI_METRIC_WEIGHT: 98.4 KG
BH CV ECHO MEAS - EDV(CUBED): 69.4 ML
BH CV ECHO MEAS - EDV(MOD-SP4): 113 ML
BH CV ECHO MEAS - EDV(TEICH): 74.7 ML
BH CV ECHO MEAS - EF(CUBED): 57.9 %
BH CV ECHO MEAS - EF(MOD-SP4): 56.2 %
BH CV ECHO MEAS - EF(TEICH): 50 %
BH CV ECHO MEAS - ESV(CUBED): 29.2 ML
BH CV ECHO MEAS - ESV(MOD-SP4): 49.5 ML
BH CV ECHO MEAS - ESV(TEICH): 37.3 ML
BH CV ECHO MEAS - FS: 25.1 %
BH CV ECHO MEAS - IVS/LVPW: 1.7
BH CV ECHO MEAS - IVSD: 1.5 CM
BH CV ECHO MEAS - LA DIMENSION: 4.6 CM
BH CV ECHO MEAS - LA/AO: 1.1
BH CV ECHO MEAS - LAT PEAK E' VEL: 10.1 CM/SEC
BH CV ECHO MEAS - LV DIASTOLIC VOL/BSA (35-75): 51.8 ML/M^2
BH CV ECHO MEAS - LV MASS(C)D: 176.1 GRAMS
BH CV ECHO MEAS - LV MASS(C)DI: 80.7 GRAMS/M^2
BH CV ECHO MEAS - LV MAX PG: 1.8 MMHG
BH CV ECHO MEAS - LV MEAN PG: 1 MMHG
BH CV ECHO MEAS - LV SYSTOLIC VOL/BSA (12-30): 22.7 ML/M^2
BH CV ECHO MEAS - LV V1 MAX: 67.2 CM/SEC
BH CV ECHO MEAS - LV V1 MEAN: 54.2 CM/SEC
BH CV ECHO MEAS - LV V1 VTI: 16.1 CM
BH CV ECHO MEAS - LVIDD: 4.1 CM
BH CV ECHO MEAS - LVIDS: 3.1 CM
BH CV ECHO MEAS - LVLD AP4: 8.8 CM
BH CV ECHO MEAS - LVLS AP4: 7.8 CM
BH CV ECHO MEAS - LVOT AREA (M): 4.9 CM^2
BH CV ECHO MEAS - LVOT AREA: 4.9 CM^2
BH CV ECHO MEAS - LVOT DIAM: 2.5 CM
BH CV ECHO MEAS - LVPWD: 1.2 CM
BH CV ECHO MEAS - MED PEAK E' VEL: 5.44 CM/SEC
BH CV ECHO MEAS - MV A MAX VEL: 86.8 CM/SEC
BH CV ECHO MEAS - MV DEC SLOPE: 370 CM/SEC^2
BH CV ECHO MEAS - MV DEC TIME: 0.23 SEC
BH CV ECHO MEAS - MV E MAX VEL: 83.9 CM/SEC
BH CV ECHO MEAS - MV E/A: 0.97
BH CV ECHO MEAS - RAP SYSTOLE: 5 MMHG
BH CV ECHO MEAS - RVSP: 16.3 MMHG
BH CV ECHO MEAS - SI(AO): 205 ML/M^2
BH CV ECHO MEAS - SI(CUBED): 18.4 ML/M^2
BH CV ECHO MEAS - SI(LVOT): 36.2 ML/M^2
BH CV ECHO MEAS - SI(MOD-SP4): 29.1 ML/M^2
BH CV ECHO MEAS - SI(TEICH): 17.1 ML/M^2
BH CV ECHO MEAS - SV(AO): 447.6 ML
BH CV ECHO MEAS - SV(CUBED): 40.2 ML
BH CV ECHO MEAS - SV(LVOT): 79 ML
BH CV ECHO MEAS - SV(MOD-SP4): 63.5 ML
BH CV ECHO MEAS - SV(TEICH): 37.3 ML
BH CV ECHO MEAS - TR MAX VEL: 168 CM/SEC
BH CV ECHO MEASUREMENTS AVERAGE E/E' RATIO: 10.8
D DIMER PPP FEU-MCNC: 0.27 MG/L (FEU) (ref 0–0.5)
GLUCOSE BLDC GLUCOMTR-MCNC: 132 MG/DL (ref 70–130)
GLUCOSE BLDC GLUCOMTR-MCNC: 132 MG/DL (ref 70–130)
GLUCOSE BLDC GLUCOMTR-MCNC: 165 MG/DL (ref 70–130)
GLUCOSE BLDC GLUCOMTR-MCNC: 179 MG/DL (ref 70–130)
HOLD SPECIMEN: NORMAL
HOLD SPECIMEN: NORMAL
LV EF 2D ECHO EST: 55 %
MAXIMAL PREDICTED HEART RATE: 135 BPM
STRESS TARGET HR: 115 BPM
TROPONIN T SERPL-MCNC: <0.01 NG/ML (ref 0–0.03)
TROPONIN T SERPL-MCNC: <0.01 NG/ML (ref 0–0.03)
WHOLE BLOOD HOLD SPECIMEN: NORMAL
WHOLE BLOOD HOLD SPECIMEN: NORMAL

## 2019-12-25 PROCEDURE — 25010000002 PERFLUTREN 6.52 MG/ML SUSPENSION: Performed by: FAMILY MEDICINE

## 2019-12-25 PROCEDURE — 82962 GLUCOSE BLOOD TEST: CPT

## 2019-12-25 PROCEDURE — 99214 OFFICE O/P EST MOD 30 MIN: CPT | Performed by: INTERNAL MEDICINE

## 2019-12-25 PROCEDURE — 93321 DOPPLER ECHO F-UP/LMTD STD: CPT

## 2019-12-25 PROCEDURE — 96360 HYDRATION IV INFUSION INIT: CPT

## 2019-12-25 PROCEDURE — 96361 HYDRATE IV INFUSION ADD-ON: CPT

## 2019-12-25 PROCEDURE — 94760 N-INVAS EAR/PLS OXIMETRY 1: CPT

## 2019-12-25 PROCEDURE — 93308 TTE F-UP OR LMTD: CPT

## 2019-12-25 PROCEDURE — 94640 AIRWAY INHALATION TREATMENT: CPT

## 2019-12-25 PROCEDURE — 93325 DOPPLER ECHO COLOR FLOW MAPG: CPT | Performed by: INTERNAL MEDICINE

## 2019-12-25 PROCEDURE — 93321 DOPPLER ECHO F-UP/LMTD STD: CPT | Performed by: INTERNAL MEDICINE

## 2019-12-25 PROCEDURE — 93308 TTE F-UP OR LMTD: CPT | Performed by: INTERNAL MEDICINE

## 2019-12-25 PROCEDURE — 93325 DOPPLER ECHO COLOR FLOW MAPG: CPT

## 2019-12-25 PROCEDURE — 84484 ASSAY OF TROPONIN QUANT: CPT | Performed by: FAMILY MEDICINE

## 2019-12-25 PROCEDURE — 94799 UNLISTED PULMONARY SVC/PX: CPT

## 2019-12-25 PROCEDURE — 63710000001 INSULIN LISPRO (HUMAN) PER 5 UNITS: Performed by: FAMILY MEDICINE

## 2019-12-25 PROCEDURE — 25810000003 SODIUM CHLORIDE 0.9 % WITH KCL 20 MEQ 20-0.9 MEQ/L-% SOLUTION: Performed by: FAMILY MEDICINE

## 2019-12-25 PROCEDURE — 99222 1ST HOSP IP/OBS MODERATE 55: CPT | Performed by: FAMILY MEDICINE

## 2019-12-25 PROCEDURE — 85379 FIBRIN DEGRADATION QUANT: CPT | Performed by: INTERNAL MEDICINE

## 2019-12-25 RX ORDER — SODIUM CHLORIDE 0.9 % (FLUSH) 0.9 %
10 SYRINGE (ML) INJECTION AS NEEDED
Status: DISCONTINUED | OUTPATIENT
Start: 2019-12-25 | End: 2019-12-26 | Stop reason: HOSPADM

## 2019-12-25 RX ORDER — SODIUM CHLORIDE AND POTASSIUM CHLORIDE 150; 900 MG/100ML; MG/100ML
75 INJECTION, SOLUTION INTRAVENOUS CONTINUOUS
Status: DISCONTINUED | OUTPATIENT
Start: 2019-12-25 | End: 2019-12-26 | Stop reason: HOSPADM

## 2019-12-25 RX ORDER — ALBUTEROL SULFATE 2.5 MG/3ML
2.5 SOLUTION RESPIRATORY (INHALATION) EVERY 6 HOURS PRN
Status: DISCONTINUED | OUTPATIENT
Start: 2019-12-25 | End: 2019-12-26 | Stop reason: HOSPADM

## 2019-12-25 RX ORDER — NITROGLYCERIN 0.4 MG/1
0.4 TABLET SUBLINGUAL
Status: DISCONTINUED | OUTPATIENT
Start: 2019-12-25 | End: 2019-12-26 | Stop reason: HOSPADM

## 2019-12-25 RX ORDER — SODIUM CHLORIDE 0.9 % (FLUSH) 0.9 %
10 SYRINGE (ML) INJECTION EVERY 12 HOURS SCHEDULED
Status: DISCONTINUED | OUTPATIENT
Start: 2019-12-25 | End: 2019-12-26 | Stop reason: HOSPADM

## 2019-12-25 RX ORDER — DEXTROSE MONOHYDRATE 25 G/50ML
25 INJECTION, SOLUTION INTRAVENOUS
Status: DISCONTINUED | OUTPATIENT
Start: 2019-12-25 | End: 2019-12-26 | Stop reason: HOSPADM

## 2019-12-25 RX ORDER — ASCORBIC ACID 500 MG
500 TABLET ORAL DAILY
Status: DISCONTINUED | OUTPATIENT
Start: 2019-12-25 | End: 2019-12-26 | Stop reason: HOSPADM

## 2019-12-25 RX ORDER — PANTOPRAZOLE SODIUM 40 MG/1
40 TABLET, DELAYED RELEASE ORAL DAILY
Status: DISCONTINUED | OUTPATIENT
Start: 2019-12-25 | End: 2019-12-26 | Stop reason: HOSPADM

## 2019-12-25 RX ORDER — TERAZOSIN 10 MG/1
10 CAPSULE ORAL DAILY
Status: DISCONTINUED | OUTPATIENT
Start: 2019-12-25 | End: 2019-12-26 | Stop reason: HOSPADM

## 2019-12-25 RX ORDER — ASPIRIN 81 MG/1
81 TABLET ORAL DAILY
Status: DISCONTINUED | OUTPATIENT
Start: 2019-12-25 | End: 2019-12-26 | Stop reason: HOSPADM

## 2019-12-25 RX ORDER — NICOTINE POLACRILEX 4 MG
15 LOZENGE BUCCAL
Status: DISCONTINUED | OUTPATIENT
Start: 2019-12-25 | End: 2019-12-26 | Stop reason: HOSPADM

## 2019-12-25 RX ORDER — ACETAMINOPHEN 325 MG/1
650 TABLET ORAL 2 TIMES DAILY
Status: DISCONTINUED | OUTPATIENT
Start: 2019-12-25 | End: 2019-12-26 | Stop reason: HOSPADM

## 2019-12-25 RX ORDER — POLYVINYL ALCOHOL 14 MG/ML
1 SOLUTION/ DROPS OPHTHALMIC
Status: DISCONTINUED | OUTPATIENT
Start: 2019-12-25 | End: 2019-12-26 | Stop reason: HOSPADM

## 2019-12-25 RX ORDER — ATORVASTATIN CALCIUM 10 MG/1
20 TABLET, FILM COATED ORAL NIGHTLY
Status: DISCONTINUED | OUTPATIENT
Start: 2019-12-25 | End: 2019-12-26 | Stop reason: HOSPADM

## 2019-12-25 RX ORDER — FINASTERIDE 5 MG/1
5 TABLET, FILM COATED ORAL DAILY
Status: DISCONTINUED | OUTPATIENT
Start: 2019-12-25 | End: 2019-12-26

## 2019-12-25 RX ORDER — GABAPENTIN 100 MG/1
200 CAPSULE ORAL 2 TIMES DAILY
Status: DISCONTINUED | OUTPATIENT
Start: 2019-12-25 | End: 2019-12-26 | Stop reason: HOSPADM

## 2019-12-25 RX ORDER — GUAIFENESIN 600 MG/1
600 TABLET, EXTENDED RELEASE ORAL 2 TIMES DAILY
Status: DISCONTINUED | OUTPATIENT
Start: 2019-12-25 | End: 2019-12-26 | Stop reason: HOSPADM

## 2019-12-25 RX ADMIN — OXYCODONE HYDROCHLORIDE AND ACETAMINOPHEN 500 MG: 500 TABLET ORAL at 09:14

## 2019-12-25 RX ADMIN — GABAPENTIN 200 MG: 100 CAPSULE ORAL at 09:23

## 2019-12-25 RX ADMIN — METOPROLOL TARTRATE 25 MG: 25 TABLET, FILM COATED ORAL at 22:15

## 2019-12-25 RX ADMIN — POTASSIUM CHLORIDE AND SODIUM CHLORIDE 75 ML/HR: 900; 150 INJECTION, SOLUTION INTRAVENOUS at 22:19

## 2019-12-25 RX ADMIN — POTASSIUM CHLORIDE AND SODIUM CHLORIDE 75 ML/HR: 900; 150 INJECTION, SOLUTION INTRAVENOUS at 09:15

## 2019-12-25 RX ADMIN — PANTOPRAZOLE SODIUM 40 MG: 40 TABLET, DELAYED RELEASE ORAL at 09:22

## 2019-12-25 RX ADMIN — METOPROLOL TARTRATE 25 MG: 25 TABLET, FILM COATED ORAL at 09:14

## 2019-12-25 RX ADMIN — APIXABAN 5 MG: 5 TABLET, FILM COATED ORAL at 22:15

## 2019-12-25 RX ADMIN — TERAZOSIN HYDROCHLORIDE 10 MG: 10 CAPSULE ORAL at 09:59

## 2019-12-25 RX ADMIN — ASPIRIN 81 MG: 81 TABLET ORAL at 09:23

## 2019-12-25 RX ADMIN — INSULIN LISPRO 2 UNITS: 100 INJECTION, SOLUTION INTRAVENOUS; SUBCUTANEOUS at 17:25

## 2019-12-25 RX ADMIN — IPRATROPIUM BROMIDE 0.5 MG: 0.5 SOLUTION RESPIRATORY (INHALATION) at 08:14

## 2019-12-25 RX ADMIN — FINASTERIDE 5 MG: 5 TABLET, FILM COATED ORAL at 09:14

## 2019-12-25 RX ADMIN — ACETAMINOPHEN 650 MG: 325 TABLET, FILM COATED ORAL at 22:15

## 2019-12-25 RX ADMIN — GUAIFENESIN 600 MG: 600 TABLET, EXTENDED RELEASE ORAL at 09:14

## 2019-12-25 RX ADMIN — ATORVASTATIN CALCIUM 20 MG: 10 TABLET, FILM COATED ORAL at 22:15

## 2019-12-25 RX ADMIN — GABAPENTIN 200 MG: 100 CAPSULE ORAL at 22:16

## 2019-12-25 RX ADMIN — PERFLUTREN 8.48 MG: 6.52 INJECTION, SUSPENSION INTRAVENOUS at 11:54

## 2019-12-25 RX ADMIN — SODIUM CHLORIDE, PRESERVATIVE FREE 10 ML: 5 INJECTION INTRAVENOUS at 09:15

## 2019-12-25 RX ADMIN — APIXABAN 5 MG: 5 TABLET, FILM COATED ORAL at 09:14

## 2019-12-25 RX ADMIN — INSULIN LISPRO 2 UNITS: 100 INJECTION, SOLUTION INTRAVENOUS; SUBCUTANEOUS at 22:14

## 2019-12-25 RX ADMIN — IPRATROPIUM BROMIDE 0.5 MG: 0.5 SOLUTION RESPIRATORY (INHALATION) at 21:56

## 2019-12-25 RX ADMIN — GUAIFENESIN 600 MG: 600 TABLET, EXTENDED RELEASE ORAL at 22:15

## 2019-12-25 RX ADMIN — ACETAMINOPHEN 650 MG: 325 TABLET, FILM COATED ORAL at 09:22

## 2019-12-25 NOTE — PLAN OF CARE
VSS. Patient went for echo today, results pending. Plans for stress test tomorrow AM. Up ad vilma. No complaints of chest pain. Safety maintained, no falls. Will cont to monitor and notify MD of any changes.

## 2019-12-25 NOTE — H&P
"Patient ID: Gonzalo Durham  MRN: [id number]     Acct:  [id number]  Admit Date: 12/24/2019   Date of service: 12/25/2019    SOURCE: The source of this information is prior knowledge of the patient, review of his office records, his current chart, as well as discussion with he; all are considered reliable.      PATIENT PROFILE: The patient is a 86 y/o white  male resident of [facility]; he was cooperative.      CHIEF COMPLAINT: \"chest pain\"     HISTORY OF PRESENT ILLNESS: known CAD.  Onset 2 pm on/off sticking chest pain over L chest.  NTG responsive.  No heartburn, hemoptysis, dysphagia, cough, leg edema, fever, exertional component.   In ED one enzyme neg/EKG ok; advised he needed admit to r/o and see if cardio wants more done.     Allergies   Allergen Reactions   • Symbicort [Budesonide-Formoterol Fumarate] Dizziness     Patient passed out and fell   • Prozac [Fluoxetine Hcl] Mental Status Change     Bad dreams.       HOME MEDICATIONS:  Prior to Admission medications    Medication Sig Start Date End Date Taking? Authorizing Provider   acetaminophen (TYLENOL) 325 MG tablet Take 2 tablets by mouth 2 (Two) Times a Day. 8/30/19  Yes Abel Romero MD   albuterol sulfate HFA (PROAIR HFA) 108 (90 Base) MCG/ACT inhaler Inhale 2 puffs 4 (Four) Times a Day. 8/30/19  Yes Abel Romero MD   apixaban (ELIQUIS) 5 MG tablet tablet Take 1 tablet by mouth Every 12 (Twelve) Hours. 11/22/19  Yes Abel Romero MD   Artificial Tear Solution (JUST TEARS EYE DROPS) solution Apply 1-2 drops to eye(s) as directed by provider Daily As Needed (dry eyes). 11/20/18  Yes Abel Romero MD   ARTIFICIAL TEARS 1.4 % ophthalmic solution  11/27/18  Yes Provider, MD Gal   aspirin 81 MG EC tablet Take 1 tablet by mouth Daily. 8/5/19  Yes Abel Romero MD   Blood Glucose Monitoring Suppl (FREESTYLE LITE) device 1 Device Daily. 5/23/19  Yes Abel Romero MD   calcium carbonate-vitamin d " 600-400 MG-UNIT per tablet Take 1 tablet by mouth 2 (Two) Times a Day. 11/20/18  Yes Abel Romero MD   finasteride (PROSCAR) 5 MG tablet Take 1 tablet by mouth Daily. 11/20/18  Yes Abel Romero MD   gabapentin (NEURONTIN) 100 MG capsule TAKE 2 CAPSULES BY MOUTH TWICE DAILY 6/27/19  Yes Ulices Rogel MD   glyBURIDE (DIAbeta) 5 MG tablet Take 1 tablet by mouth Every Morning AND 0.5 tablets Daily Before Supper. 5/16/19  Yes Abel Romero MD   guaiFENesin (MUCINEX) 600 MG 12 hr tablet Take 1 tablet by mouth 2 (Two) Times a Day. 12/2/19  Yes Abel Romero MD   isosorbide mononitrate (IMDUR) 30 MG 24 hr tablet Take 1 tablet by mouth Daily. 11/22/19  Yes Abel Romero MD   loratadine (CLARITIN) 10 MG tablet Take 1 tablet by mouth Daily. 8/30/19  Yes Abel Romero MD   metFORMIN ER (GLUCOPHAGE XR) 500 MG 24 hr tablet Take 1 tablet by mouth Every Night. 11/20/18  Yes Abel Romero MD   metoprolol tartrate (LOPRESSOR) 25 MG tablet Take 1 tablet by mouth 2 (Two) Times a Day. 8/30/19  Yes Abel Romero MD   Multiple Vitamins-Minerals (CENTRUM SILVER ADULT 50+) tablet Take 50 mg by mouth Daily. 11/20/18  Yes Abel Romero MD   Multiple Vitamins-Minerals (PRESERVISION AREDS 2+MULTI VIT) capsule Take 1 capsule by mouth Daily. 11/20/18  Yes Abel Romero MD   nitroglycerin (NITROSTAT) 0.4 MG SL tablet Place 1 tablet under the tongue Every 5 (Five) Minutes As Needed for Chest Pain. 2/25/19  Yes Abel Romero MD   O2 (OXYGEN) Inhale 4 L/min Continuous. Per nasal cannula   Yes Gal Fischer MD   O2 (OXYGEN) Inhale 4 L/min. 4lpm   Yes Gal Fischer MD   pantoprazole (PROTONIX) 40 MG EC tablet Take 1 tablet by mouth Daily. 8/30/19  Yes Abel Romero MD   pravastatin (PRAVACHOL) 80 MG tablet Take 1 tablet by mouth Daily. 8/30/19  Yes Abel Romero MD   terazosin (HYTRIN) 10 MG capsule Take 1 capsule by mouth  Daily. 11/20/18  Yes Abel Romero MD   tiotropium bromide monohydrate (SPIRIVA RESPIMAT) 2.5 MCG/ACT aerosol solution inhaler Inhale 2 puffs Daily. 11/20/18  Yes Abel Romero MD   vitamin C (ASCORBIC ACID) 500 MG tablet Take 1 tablet by mouth Daily. 11/20/18  Yes Abel Romero MD   FREESTYLE LITE test strip 1 each by Other route Daily. 11/20/18   Abel Romero MD   Lancets (FREESTYLE) lancets Use once daily 11/20/18   Abel Romero MD       PAST HISTORY:  CHILDHOOD: unremarkable.     PROCEDURES:      SCANNED  Prostate/[place]/confirmed/7.18.16    Colonoscopy+hem/WB//4-7-00  Colonoscopy+jqj-qzwt-shv/WB//11-17-08//  3-5y  Colonoscopy+polyp-div/P//10.21.13/5y  EGD-itis///6.15.17  Colon-div///6.15.17/prn  Cath+45%/Wen//5.22.19  Loop/Wen//8.22.19    SURGERIES:  L 3rd distal finger amputaton-car door/1955  Hernia/1969  Appe/1977  Hernia/1979  CABG/Az/[place]/1981    FAMILY HISTORY:  Heart/f,m  DM/f,m  CA-prostate/f  CA-colon/none  CA-other/none    HABITS:  Tobacco-smoker/age 22/1 ppd/dc 1980  Alcohol/no    SOCIAL HISTORY:  Children/3-1  /1953  Retired/[employer]/1995    ADVISED:  Pneumonia Vaccine/12-01-06  Shingles Vaccine/12-01-06  Colonoscopy?/9-15-08    HOSPITAL ADMITS:   :   [facility]    [facility]:   none    [facility]:   none    Review of Systems  GENERAL:  Inactive/slower with limits, speed, stamina for age and sob, fatigue. Sleep is ok. No fever.  ENDO:  No syncope, near or diaphoretic sweaty spells.  BS Ok.  HEENT: No head injury or headache,  No change vision.  Same mod hearing loss.  Ears without pain/drainage.  No sore throat.  No significant nasal/sinus congestion/drainage. No epistaxis.  CHEST: No chest wall tenderness or mass. No significant cough, wheeze; same SOB; no hemoptysis.  CV: No exertional chest pain, palpitations, ankle edema.  GI: No heartburn, dysphagia.  No abdominal pain, diarrhea, constipation, rectal bleeding, or melena.  :   "Voids without dysuria, or incontinence to completion.  ORTHO: No painful/swollen joints but various on /off sore.  Same sore neck or back.  No acute neck or back pain without recent injury.   NEURO: No dizziness, weakness of extremities.  Same LE numbness/paresthesias.   PSYCH: No memory loss.  Mood good; not that anxious, depressed but/and not suicidal.  Tolerated stress.     PHYSICAL EXAMINATION:  /63 (BP Location: Right arm, Patient Position: Lying)   Pulse 53   Temp 97.5 °F (36.4 °C) (Oral)   Resp 19   Ht 181.6 cm (71.5\")   Wt 98.5 kg (217 lb 3 oz)   SpO2 92%   BMI 29.87 kg/m²     Physical Exam  GENERAL:  Well nourished/developed in no acute distress. Obese   SKIN: Turgor excellent, without wound, rash, lesion.  HEENT: Normal cephalic without trauma.  Pupils equal round reactive to light. Extraocular motions full without nystagmus.     External canals nonobstructive nontender without reddness.  Oral cavity without growths, exudates, and moist.  Posterior pharynx without mass, obstruction, redness.  No thyromegaly, mass, tenderness, lymphadenopathy and supple.  CV: Regular rhythm.  No murmur, gallop,  edema. Posterior pulses intact.  No carotid bruits.   CHEST: No chest wall tenderness or mass.   LUNGS: Symmetric motion with clear to auscultation.   ABD: Soft, nontender without mass.   ORTHO: Symmetric extremities without swelling/point tenderness.  Full gross range of motion.    NEURO: CN 2-12 grossly intact.  Symmetric facies. 1/4 x bicep equal reflexes.  UE/LE   3/5 strength throughout.  Nonfocal use extremities. Speech clear.    PSYCH: Oriented x 3.  Pleasant calm, well kept.  Purposeful/directed conversation with intact short/long gross memory.     ASSESSMENT/PROBLEM LIST:   84 y/o white male; advanced age   Allergy/intolerance: see above  Procedural history: see above  Family history: see above  History tobacco use  Hypertension  DM2  CKD3  Hyperlipidemia  Obesity  Coronary artery disease; post " CABG  AAA  Varicose veins  History CVA  Gastroesophageal reflux disease  Anticoagulated: DM2,CAD,a fib,CVA/() ASA 81,(plavix), eliquist  History colon polyp  Diverticular disease  Hemorrhoids  Deg joint disease  Spinal deg disease  Tremor  Peripheral neuropathy  Copd   Chronic respiratory failure-hypoxic  Depression-chronic  Nocturnal leg movements  Insomnia  Anemia-chronic/recurrent: ASA 81,gi(3/3+,div,c polyp,esophagitis)  BPH  prostatism  nocturnia  ED    REASON FOR ADMISSION:    Chest pain    PLANS:   Rx-reviewed; ordered as needed and will review daily/as needed.   Includes anticoagulation for DVT prevention.   LAB-reviewed; ordered as needed and will review daily.  Particular:  Daily CBC, chemistry  Imaging-reviewed; ordered as needed and will review daily.  Particular:  lexiscan?  Consults cardiology  Diet: once released cardiology  Fluids: gentle/since NPO  Special issues:   DC planning: he expects home  Code status: full

## 2019-12-25 NOTE — ED NOTES
RECEIVED CALL FROM [employer]. THE REPORT SHOWS THAT PATIENT HAS BEEN HAVING PERIODS OF AFIB, NO RVR     Zena Monte, RN  12/25/19 0029

## 2019-12-25 NOTE — PLAN OF CARE
Problem: Patient Care Overview  Goal: Plan of Care Review  Outcome: Ongoing (interventions implemented as appropriate)  Flowsheets (Taken 12/25/2019 9966)  Progress: no change  Plan of Care Reviewed With: patient; family  Outcome Summary: Pt had no c/o chest pain after arriving on floor. Pt is on 4L O2 nasal cannula (at home as well). Contacted Dr Romero last night and he said he would be in this morning very early to see patient, and ordered 2 Troponins, which the charge nurse put in. VSS

## 2019-12-25 NOTE — ED PROVIDER NOTES
Subjective   Mr. Durham is an 85-year-old gentleman with a significant cardiac history including a CABG back in 1980.  More recently had a catheterization in April of this year which showed some blocked vessels but some patent ones and an ejection fraction of 45%.  He also has an event recorder.    Today starting at approximately 2 PM the patient started having episodes that lasted either seconds or up to 2 minutes of pricking chest pain like some he was sticking a pin in his chest on the left side of his sternum.  He had no associated symptoms such as dyspnea or diaphoresis and the pain did not radiate.  He took 2 nitroglycerin which seemed to help with the pain and also keep the episodes away for longer.  He has had no fever or other systemic symptoms.          Review of Systems   Cardiovascular: Positive for chest pain.   All other systems reviewed and are negative.      Past Medical History:   Diagnosis Date   • Arthritis    • BPH (benign prostatic hypertrophy)    • CAD (coronary artery disease)    • CKD (chronic kidney disease)    • Diabetes mellitus (CMS/[facility])     Type 2   • DM (diabetes mellitus screen)    • Hx of colonic polyp    • Hypercholesteremia    • Hypertension        Allergies   Allergen Reactions   • Symbicort [Budesonide-Formoterol Fumarate] Dizziness     Patient passed out and fell   • Prozac [Fluoxetine Hcl] Mental Status Change     Bad dreams.       Past Surgical History:   Procedure Laterality Date   • APPENDECTOMY     • CARDIAC CATHETERIZATION     • CARDIAC CATHETERIZATION Left 5/22/2019    Procedure: Cardiac Catheterization/Vascular Study Positive occult blood in stools  ? BMS vs REGGIE Get cabg report  ;  Surgeon: Bradley Carver MD;  Location:  [facility]AS[facility];  Service: Cardiology   • COLONOSCOPY N/A 6/15/2017    Diverticulosis repeat exam prn   • COLONOSCOPY W/ POLYPECTOMY  10/21/2013    2 Tubular adenomatous polyps, Diverticulosis repeat exam in 5 years   • CORONARY ARTERY BYPASS  GRAFT  1980    X 3   • ENDOSCOPY N/A 6/15/2017    LA Grade A reflux esophagitis       Family History   Problem Relation Age of Onset   • Heart disease Mother    • Stroke Mother    • Melanoma Mother    • Heart disease Father    • Diabetes Father    • Prostate cancer Father    • Colon cancer Neg Hx    • Colon polyps Neg Hx        Social History     Socioeconomic History   • Marital status:      Spouse name: Not on file   • Number of children: Not on file   • Years of education: Not on file   • Highest education level: Not on file   Tobacco Use   • Smoking status: Former Smoker     Packs/day: 1.00     Years: 26.00     Pack years: 26.00     Types: Cigarettes     Start date:      Last attempt to quit:      Years since quittin.0   • Smokeless tobacco: Never Used   Substance and Sexual Activity   • Alcohol use: No   • Drug use: No   • Sexual activity: Defer           Objective   Physical Exam   Constitutional: He is oriented to person, place, and time. He appears well-developed and well-nourished.   HENT:   Head: Normocephalic and atraumatic.   Right Ear: External ear normal.   Left Ear: External ear normal.   Nose: Nose normal.   Mouth/Throat: Oropharynx is clear and moist.   Eyes: Conjunctivae and EOM are normal.   Neck: Normal range of motion. Neck supple.   Cardiovascular: Normal rate, regular rhythm, normal heart sounds and intact distal pulses.   Pulmonary/Chest: Effort normal and breath sounds normal.   Abdominal: Soft. Bowel sounds are normal.   Musculoskeletal: Normal range of motion.   Neurological: He is alert and oriented to person, place, and time.   Skin: Skin is warm and dry. Capillary refill takes less than 2 seconds.   Psychiatric: He has a normal mood and affect. His behavior is normal. Judgment and thought content normal.   Nursing note and vitals reviewed.      Procedures           ED Course                                             HEART Score (for prediction of 6-week risk of  major adverse cardiac event) reviewed and/or performed as part of the patient evaluation and treatment planning process.  The result associated with this review/performance is: 5       MDM  Number of Diagnoses or Management Options     Amount and/or Complexity of Data Reviewed  Clinical lab tests: reviewed and ordered  Tests in the medicine section of CPT®: reviewed and ordered    Critical Care  Total time providing critical care: < 30 minutes    Patient Progress  Patient progress: stable      Final diagnoses:   Chest pain, unspecified type     The patient has a moderately high heart score and deserves admission for serial troponins and some form stress testing.  The patient understands that because of the [place] vacation this might not be tomorrow but he still comfortable with being admitted to Dr. Romero who kindly agreed to the admission.       Jose Luis Dudley MD  12/25/19 0016

## 2019-12-26 ENCOUNTER — APPOINTMENT (OUTPATIENT)
Dept: CARDIOLOGY | Facility: HOSPITAL | Age: 84
End: 2019-12-26

## 2019-12-26 ENCOUNTER — HOSPITAL ENCOUNTER (OUTPATIENT)
Dept: SLEEP MEDICINE | Facility: HOSPITAL | Age: 84
Discharge: HOME | End: 2019-12-26
Admitting: PSYCHIATRY & NEUROLOGY

## 2019-12-26 VITALS
WEIGHT: 205.03 LBS | HEART RATE: 55 BPM | SYSTOLIC BLOOD PRESSURE: 114 MMHG | HEIGHT: 71 IN | TEMPERATURE: 97.5 F | RESPIRATION RATE: 16 BRPM | OXYGEN SATURATION: 94 % | DIASTOLIC BLOOD PRESSURE: 57 MMHG | BODY MASS INDEX: 28.7 KG/M2

## 2019-12-26 DIAGNOSIS — G47.33 OBSTRUCTIVE SLEEP APNEA, ADULT: ICD-10-CM

## 2019-12-26 LAB
ANION GAP SERPL CALCULATED.3IONS-SCNC: 9 MMOL/L (ref 5–15)
BASOPHILS # BLD AUTO: 0.06 10*3/MM3 (ref 0–0.2)
BASOPHILS NFR BLD AUTO: 0.8 % (ref 0–1.5)
BH CV STRESS BP STAGE 1: NORMAL
BH CV STRESS COMMENTS STAGE 1: NORMAL
BH CV STRESS DOSE REGADENOSON STAGE 1: 0.4
BH CV STRESS DURATION MIN STAGE 1: 0
BH CV STRESS DURATION SEC STAGE 1: 10
BH CV STRESS HR STAGE 1: 81
BH CV STRESS PROTOCOL 1: NORMAL
BH CV STRESS RECOVERY BP: NORMAL MMHG
BH CV STRESS RECOVERY HR: 75 BPM
BH CV STRESS STAGE 1: 1
BUN BLD-MCNC: 25 MG/DL (ref 8–23)
BUN/CREAT SERPL: 24.5 (ref 7–25)
CALCIUM SPEC-SCNC: 8.8 MG/DL (ref 8.6–10.5)
CHLORIDE SERPL-SCNC: 104 MMOL/L (ref 98–107)
CO2 SERPL-SCNC: 30 MMOL/L (ref 22–29)
CREAT BLD-MCNC: 1.02 MG/DL (ref 0.76–1.27)
DEPRECATED RDW RBC AUTO: 43.9 FL (ref 37–54)
EOSINOPHIL # BLD AUTO: 0.77 10*3/MM3 (ref 0–0.4)
EOSINOPHIL NFR BLD AUTO: 10.1 % (ref 0.3–6.2)
ERYTHROCYTE [DISTWIDTH] IN BLOOD BY AUTOMATED COUNT: 13.2 % (ref 12.3–15.4)
GFR SERPL CREATININE-BSD FRML MDRD: 69 ML/MIN/1.73
GLUCOSE BLD-MCNC: 124 MG/DL (ref 65–99)
GLUCOSE BLDC GLUCOMTR-MCNC: 116 MG/DL (ref 70–130)
GLUCOSE BLDC GLUCOMTR-MCNC: 136 MG/DL (ref 70–130)
GLUCOSE BLDC GLUCOMTR-MCNC: 193 MG/DL (ref 70–130)
HCT VFR BLD AUTO: 36.9 % (ref 37.5–51)
HGB BLD-MCNC: 11.9 G/DL (ref 13–17.7)
IMM GRANULOCYTES # BLD AUTO: 0.02 10*3/MM3 (ref 0–0.05)
IMM GRANULOCYTES NFR BLD AUTO: 0.3 % (ref 0–0.5)
LV EF NUC BP: 57 %
LYMPHOCYTES # BLD AUTO: 1.7 10*3/MM3 (ref 0.7–3.1)
LYMPHOCYTES NFR BLD AUTO: 22.3 % (ref 19.6–45.3)
MAXIMAL PREDICTED HEART RATE: 135 BPM
MCH RBC QN AUTO: 29.1 PG (ref 26.6–33)
MCHC RBC AUTO-ENTMCNC: 32.2 G/DL (ref 31.5–35.7)
MCV RBC AUTO: 90.2 FL (ref 79–97)
MONOCYTES # BLD AUTO: 0.85 10*3/MM3 (ref 0.1–0.9)
MONOCYTES NFR BLD AUTO: 11.1 % (ref 5–12)
NEUTROPHILS # BLD AUTO: 4.23 10*3/MM3 (ref 1.7–7)
NEUTROPHILS NFR BLD AUTO: 55.4 % (ref 42.7–76)
NRBC BLD AUTO-RTO: 0 /100 WBC (ref 0–0.2)
PERCENT MAX PREDICTED HR: 60 %
PLATELET # BLD AUTO: 175 10*3/MM3 (ref 140–450)
PMV BLD AUTO: 10.2 FL (ref 6–12)
POTASSIUM BLD-SCNC: 4.5 MMOL/L (ref 3.5–5.2)
RBC # BLD AUTO: 4.09 10*6/MM3 (ref 4.14–5.8)
SODIUM BLD-SCNC: 143 MMOL/L (ref 136–145)
STRESS BASELINE BP: NORMAL MMHG
STRESS BASELINE HR: 75 BPM
STRESS PERCENT HR: 71 %
STRESS POST EXERCISE DUR SEC: 10 SEC
STRESS POST PEAK BP: NORMAL MMHG
STRESS POST PEAK HR: 81 BPM
STRESS TARGET HR: 115 BPM
WBC NRBC COR # BLD: 7.63 10*3/MM3 (ref 3.4–10.8)

## 2019-12-26 PROCEDURE — G0378 HOSPITAL OBSERVATION PER HR: HCPCS

## 2019-12-26 PROCEDURE — 78452 HT MUSCLE IMAGE SPECT MULT: CPT

## 2019-12-26 PROCEDURE — 0 TECHNETIUM SESTAMIBI: Performed by: FAMILY MEDICINE

## 2019-12-26 PROCEDURE — 99213 OFFICE O/P EST LOW 20 MIN: CPT | Performed by: INTERNAL MEDICINE

## 2019-12-26 PROCEDURE — 80048 BASIC METABOLIC PNL TOTAL CA: CPT | Performed by: FAMILY MEDICINE

## 2019-12-26 PROCEDURE — 93017 CV STRESS TEST TRACING ONLY: CPT

## 2019-12-26 PROCEDURE — A9500 TC99M SESTAMIBI: HCPCS | Performed by: FAMILY MEDICINE

## 2019-12-26 PROCEDURE — 93018 CV STRESS TEST I&R ONLY: CPT | Performed by: INTERNAL MEDICINE

## 2019-12-26 PROCEDURE — 95811 POLYSOM 6/>YRS CPAP 4/> PARM: CPT

## 2019-12-26 PROCEDURE — 99238 HOSP IP/OBS DSCHRG MGMT 30/<: CPT | Performed by: FAMILY MEDICINE

## 2019-12-26 PROCEDURE — 95811 POLYSOM 6/>YRS CPAP 4/> PARM: CPT | Performed by: PSYCHIATRY & NEUROLOGY

## 2019-12-26 PROCEDURE — 63710000001 INSULIN LISPRO (HUMAN) PER 5 UNITS: Performed by: FAMILY MEDICINE

## 2019-12-26 PROCEDURE — 25010000002 REGADENOSON 0.4 MG/5ML SOLUTION: Performed by: INTERNAL MEDICINE

## 2019-12-26 PROCEDURE — 94799 UNLISTED PULMONARY SVC/PX: CPT

## 2019-12-26 PROCEDURE — 78452 HT MUSCLE IMAGE SPECT MULT: CPT | Performed by: INTERNAL MEDICINE

## 2019-12-26 PROCEDURE — 82962 GLUCOSE BLOOD TEST: CPT

## 2019-12-26 PROCEDURE — 94760 N-INVAS EAR/PLS OXIMETRY 1: CPT

## 2019-12-26 PROCEDURE — 85025 COMPLETE CBC W/AUTO DIFF WBC: CPT | Performed by: FAMILY MEDICINE

## 2019-12-26 RX ORDER — FINASTERIDE 5 MG/1
5 TABLET, FILM COATED ORAL NIGHTLY
Status: DISCONTINUED | OUTPATIENT
Start: 2019-12-26 | End: 2019-12-26 | Stop reason: HOSPADM

## 2019-12-26 RX ADMIN — ASPIRIN 81 MG: 81 TABLET ORAL at 11:37

## 2019-12-26 RX ADMIN — INSULIN LISPRO 2 UNITS: 100 INJECTION, SOLUTION INTRAVENOUS; SUBCUTANEOUS at 17:48

## 2019-12-26 RX ADMIN — PANTOPRAZOLE SODIUM 40 MG: 40 TABLET, DELAYED RELEASE ORAL at 11:37

## 2019-12-26 RX ADMIN — TERAZOSIN HYDROCHLORIDE 10 MG: 10 CAPSULE ORAL at 11:37

## 2019-12-26 RX ADMIN — GUAIFENESIN 600 MG: 600 TABLET, EXTENDED RELEASE ORAL at 11:37

## 2019-12-26 RX ADMIN — ACETAMINOPHEN 650 MG: 325 TABLET, FILM COATED ORAL at 11:38

## 2019-12-26 RX ADMIN — IPRATROPIUM BROMIDE 0.5 MG: 0.5 SOLUTION RESPIRATORY (INHALATION) at 06:58

## 2019-12-26 RX ADMIN — IPRATROPIUM BROMIDE 0.5 MG: 0.5 SOLUTION RESPIRATORY (INHALATION) at 14:17

## 2019-12-26 RX ADMIN — TECHNETIUM TC 99M SESTAMIBI 1 DOSE: 1 INJECTION INTRAVENOUS at 08:40

## 2019-12-26 RX ADMIN — TECHNETIUM TC 99M SESTAMIBI 1 DOSE: 1 INJECTION INTRAVENOUS at 09:44

## 2019-12-26 RX ADMIN — GABAPENTIN 200 MG: 100 CAPSULE ORAL at 11:38

## 2019-12-26 RX ADMIN — APIXABAN 5 MG: 5 TABLET, FILM COATED ORAL at 11:37

## 2019-12-26 RX ADMIN — IPRATROPIUM BROMIDE 0.5 MG: 0.5 SOLUTION RESPIRATORY (INHALATION) at 10:22

## 2019-12-26 RX ADMIN — OXYCODONE HYDROCHLORIDE AND ACETAMINOPHEN 500 MG: 500 TABLET ORAL at 11:37

## 2019-12-26 RX ADMIN — METOPROLOL TARTRATE 25 MG: 25 TABLET, FILM COATED ORAL at 11:37

## 2019-12-26 RX ADMIN — REGADENOSON 0.4 MG: 0.08 INJECTION, SOLUTION INTRAVENOUS at 09:43

## 2019-12-26 NOTE — PROGRESS NOTES
LOS: 1 day   Patient Care Team:  Abel Romero MD as PCP - General (Family Medicine)  Abel Romero MD as PCP - Claims Attributed  Dawson Claros MD as Consulting Physician (Urology)  Abel Romero MD as Referring Physician (Family Medicine)  Bradley Carver MD as Cardiologist (Cardiology)  Sly Ahn MD as Consulting Physician (Gastroenterology)  Abel Romero MD as Referring Physician (Family Medicine)  Juan Lane MD as Consulting Physician (Otolaryngology)  Feliz Frye APRN as Nurse Practitioner (Pulmonary Disease)  [facility] [place] [facility]    Chief Complaint:   Chest pain         Subjective    Gonzalo Durham is a 85 y.o. malewho is being seen in follow-up  Denies chest pain  No excessive shortness of breath  No orthopnea paroxysmal nocturnal dyspnea  No palpitations  No new events or occurrences overnight  Ambulating some  Satisfactory oral intake  Satisfactory bowel and bladder activity  Latest test results reviewed  Results of echocardiogram reviewed and discussed with patient    Telemetry: no malignant arrhythmia. No significant pauses.    Review of Systems     Constitutional: No chills   Has fatigue   No fever.     HENT: Negative.    Eyes: Negative.      Respiratory: Negative for cough,   No chest wall soreness,   Shortness of breath,   no wheezing, no stridor.      Cardiovascular: As above    Gastrointestinal: Negative for abdominal distention,  No abdominal pain,   No blood in stool,   No constipation,   No diarrhea,   No nausea   No vomiting.     Endocrine: Negative.    Genitourinary: Negative for difficulty urinating, dysuria, flank pain and hematuria.     Musculoskeletal: Negative.    Skin: Negative for rash and wound.   Allergic/Immunologic: Negative.      Neurological: Negative for dizziness, syncope, weakness,   No light-headedness  No  headaches.     Hematological: Does not bruise/bleed easily.     Psychiatric/Behavioral: Negative for  agitation or behavioral problems,   No confusion,   the patient is  nervous/anxious.       History:   Past Medical History:   Diagnosis Date   • A-fib (CMS/HCC)    • Arthritis    • BPH (benign prostatic hypertrophy)    • CAD (coronary artery disease)    • CKD (chronic kidney disease)    • Diabetes mellitus (CMS/HCC)     Type 2   • DM (diabetes mellitus screen)    • Hx of colonic polyp    • Hypercholesteremia    • Hypertension    • Myocardial infarction (CMS/HCC)      Past Surgical History:   Procedure Laterality Date   • APPENDECTOMY     • CARDIAC CATHETERIZATION     • CARDIAC CATHETERIZATION Left 2019    Procedure: Cardiac Catheterization/Vascular Study Positive occult blood in stools  ? BMS vs REGGIE Get cabg report  ;  Surgeon: Bradley Carver MD;  Location: [facility] CATH INVASIVE LOCATION;  Service: Cardiology   • COLONOSCOPY N/A 6/15/2017    Diverticulosis repeat exam prn   • COLONOSCOPY W/ POLYPECTOMY  10/21/2013    2 Tubular adenomatous polyps, Diverticulosis repeat exam in 5 years   • CORONARY ARTERY BYPASS GRAFT  1980    X 3   • ENDOSCOPY N/A 6/15/2017    LA Grade A reflux esophagitis     Social History     Socioeconomic History   • Marital status:      Spouse name: Not on file   • Number of children: Not on file   • Years of education: Not on file   • Highest education level: Not on file   Tobacco Use   • Smoking status: Former Smoker     Packs/day: 1.00     Years: 26.00     Pack years: 26.00     Types: Cigarettes     Start date:      Last attempt to quit: 1980     Years since quittin.0   • Smokeless tobacco: Never Used   Substance and Sexual Activity   • Alcohol use: No   • Drug use: No   • Sexual activity: Defer     Family History   Problem Relation Age of Onset   • Heart disease Mother    • Stroke Mother    • Melanoma Mother    • Heart disease Father    • Diabetes Father    • Prostate cancer Father    • Colon cancer Neg Hx    • Colon polyps Neg Hx        Labs:  WBC WBC   Date Value Ref  Range Status   12/26/2019 7.63 3.40 - 10.80 10*3/mm3 Final   12/24/2019 8.88 3.40 - 10.80 10*3/mm3 Final      HGB Hemoglobin   Date Value Ref Range Status   12/26/2019 11.9 (L) 13.0 - 17.7 g/dL Final   12/24/2019 12.9 (L) 13.0 - 17.7 g/dL Final      HCT Hematocrit   Date Value Ref Range Status   12/26/2019 36.9 (L) 37.5 - 51.0 % Final   12/24/2019 38.5 37.5 - 51.0 % Final      Platelets Platelets   Date Value Ref Range Status   12/26/2019 175 140 - 450 10*3/mm3 Final   12/24/2019 230 140 - 450 10*3/mm3 Final      MCV MCV   Date Value Ref Range Status   12/26/2019 90.2 79.0 - 97.0 fL Final   12/24/2019 87.5 79.0 - 97.0 fL Final        Results from last 7 days   Lab Units 12/26/19  0259 12/24/19  2246   SODIUM mmol/L 143 140   POTASSIUM mmol/L 4.5 4.1   CHLORIDE mmol/L 104 98   CO2 mmol/L 30.0* 29.0   BUN mg/dL 25* 31*   CREATININE mg/dL 1.02 1.14   CALCIUM mg/dL 8.8 9.9   BILIRUBIN mg/dL  --  <0.2*   ALK PHOS U/L  --  72   ALT (SGPT) U/L  --  14   AST (SGOT) U/L  --  15   GLUCOSE mg/dL 124* 140*     Lab Results   Component Value Date    TROPONINI <0.02 07/27/2019    TROPONINT <0.010 12/25/2019     PT/INR:    Protime   Date Value Ref Range Status   12/24/2019 13.4 11.9 - 14.6 Seconds Final   /  INR   Date Value Ref Range Status   12/24/2019 0.99 0.91 - 1.09 Final       Imaging Results (Last 72 Hours)     Procedure Component Value Units Date/Time    XR Chest 1 View [[id number]] Collected:  12/25/19 0758     Updated:  12/25/19 0803    Narrative:       EXAM: XR CHEST 1 VW- - 12/24/2019 11:34 PM CST     HISTORY: Chest pain       COMPARISON: 07/24/2018     TECHNIQUE:  1 images.  Frontal view of the chest.     FINDINGS:    No pneumothorax, pleural effusion or focal consolidation. Emphysema.  Cardiac mediastinal silhouette within normal limits. Calcified aortic  atherosclerosis. Sternotomy wires. Cardiac loop recorder. No acute bony  finding.          Impression:       1. No acute cardiopulmonary findings. Emphysema.  This  report was finalized on 12/25/2019 08:00 by Dr Janneth Walton MD.          Objective     Allergies   Allergen Reactions   • Symbicort [Budesonide-Formoterol Fumarate] Dizziness     Patient passed out and fell   • Prozac [Fluoxetine Hcl] Mental Status Change     Bad dreams.       Medication Review: Performed  Current Facility-Administered Medications   Medication Dose Route Frequency Provider Last Rate Last Dose   • acetaminophen (TYLENOL) tablet 650 mg  650 mg Oral BID Abel Romero MD   650 mg at 12/26/19 1138   • albuterol (PROVENTIL) nebulizer solution 0.083% 2.5 mg/3mL  2.5 mg Nebulization Q6H PRN Abel Romero MD       • apixaban (ELIQUIS) tablet 5 mg  5 mg Oral Q12H Abel Romero MD   5 mg at 12/26/19 1137   • aspirin EC tablet 81 mg  81 mg Oral Daily Abel Romero MD   81 mg at 12/26/19 1137   • atorvastatin (LIPITOR) tablet 20 mg  20 mg Oral Nightly Abel Romero MD   20 mg at 12/25/19 2215   • dextrose (D50W) 25 g/ 50mL Intravenous Solution 25 g  25 g Intravenous Q15 Min PRN Abel Romero MD       • dextrose (GLUTOSE) oral gel 15 g  15 g Oral Q15 Min PRN Abel Romero MD       • finasteride (PROSCAR) tablet 5 mg  5 mg Oral Nightly Abel Romero MD       • gabapentin (NEURONTIN) capsule 200 mg  200 mg Oral BID Abel Romero MD   200 mg at 12/26/19 1138   • glucagon (human recombinant) (GLUCAGEN DIAGNOSTIC) injection 1 mg  1 mg Subcutaneous Q15 Min PRN Abel Romero MD       • Glycerin-Hypromellose- (ARTIFICIAL TEARS) 0.2-0.2-1 % ophthalmic solution solution 1 drop  1 drop Both Eyes Q1H PRN Abel Romero MD       • guaiFENesin (MUCINEX) 12 hr tablet 600 mg  600 mg Oral BID Abel Romero MD   600 mg at 12/26/19 1137   • insulin lispro (humaLOG) injection 2-7 Units  2-7 Units Subcutaneous 4x Daily With Meals & Nightly Abel Romero MD   2 Units at 12/25/19 3395   • ipratropium (ATROVENT) nebulizer  "solution 0.5 mg  0.5 mg Nebulization 4x Daily - RT Abel Romero MD   0.5 mg at 12/26/19 1417   • metoprolol tartrate (LOPRESSOR) tablet 25 mg  25 mg Oral BID Abel Romero MD   25 mg at 12/26/19 1137   • nitroglycerin (NITROSTAT) SL tablet 0.4 mg  0.4 mg Sublingual Q5 Min PRN Abel Romero MD       • O2 (OXYGEN)  4 L/min Inhalation Continuous Abel Romero ,000 mL/hr at 12/25/19 0748 4 L/min at 12/25/19 0748   • O2 (OXYGEN)  4 L/min Inhalation Daily Abel Romero MD   4 L/min at 12/26/19 1138   • pantoprazole (PROTONIX) EC tablet 40 mg  40 mg Oral Daily Abel Romero MD   40 mg at 12/26/19 1137   • polyvinyl alcohol (LIQUIFILM) 1.4 % ophthalmic solution 1 drop  1 drop Both Eyes Q1H PRN Abel Romero MD       • sodium chloride 0.9 % flush 10 mL  10 mL Intravenous PRN Emergency, Triage Protocol, MD       • sodium chloride 0.9 % flush 10 mL  10 mL Intravenous Q12H Abel Romero MD   10 mL at 12/25/19 0915   • sodium chloride 0.9 % flush 10 mL  10 mL Intravenous PRN Abel Romero MD       • sodium chloride 0.9 % with KCl 20 mEq/L infusion  75 mL/hr Intravenous Continuous Abel Romero MD 75 mL/hr at 12/25/19 2219 75 mL/hr at 12/25/19 2219   • terazosin (HYTRIN) capsule 10 mg  10 mg Oral Daily Abel Romero MD   10 mg at 12/26/19 1137   • vitamin C (ASCORBIC ACID) tablet 500 mg  500 mg Oral Daily Abel Romero MD   500 mg at 12/26/19 1137       Vital Sign Min/Max for last 24 hours  Temp  Min: 97.5 °F (36.4 °C)  Max: 98.6 °F (37 °C)   BP  Min: 114/57  Max: 149/64   Pulse  Min: 51  Max: 84   Resp  Min: 16  Max: 20   SpO2  Min: 94 %  Max: 100 %   No data recorded   Weight  Min: 93 kg (205 lb)  Max: 93 kg (205 lb 0.4 oz)     Flowsheet Rows      First Filed Value   Admission Height  181.6 cm (71.5\") Documented at 12/24/2019 2247   Admission Weight  99.2 kg (218 lb 12.8 oz) Documented at 12/24/2019 2247          Results for " orders placed during the hospital encounter of 12/24/19   Adult Transthoracic Echo Limited W/ Cont if Necessary Per Protocol    Narrative · Left ventricular wall thickness is consistent with mild concentric   hypertrophy.  · Estimated EF = 55%.  · Left ventricular diastolic dysfunction.  · Mild dilation of the aortic root is present.  · No evidence of pulmonary hypertension is present.          Physical Exam:    General Appearance: Awake, alert, in no acute distress  Eyes: Pupils equal and reactive    Ears: Appear intact with no abnormalities noted  Nose: Nares normal, no drainage  Neck: supple, trachea midline, no carotid bruit and no JVD  Back: no kyphosis present,    Lungs: respirations regular, respirations even and respirations unlabored    Heart: normal S1, S2,  2/6 pansystolic murmur in the left sternal border,    no rub and no click    Abdomen: normal bowel sounds, no tenderness   Skin: no bleeding, bruising or rash  Extremities: no cyanosis  Psychiatric/Behavioral: Negative for agitation, behavioral problems, confusion, the patient does  appear to be nervous/anxious.          Results Review:   I reviewed the patient's new clinical results.  I reviewed the patient's new imaging results and agree with the interpretation.  I reviewed the patient's other test results and agree with the interpretation  I personally viewed and interpreted the patient's EKG/Telemetry data  Discussed with patient    Reviewed available prior notes, consults, prior visits, laboratory findings, radiology and cardiology relevant reports.   Updated chart as applicable.   I have reviewed the patient's medical history in detail and updated the computerized patient record as relevant.      Updated patient regarding any new or relevant abnormalities on review of records or any new findings on physical exam.   Mentioned to patient about purpose of visit and desirable health short and long term goals and objectives.     Assessment/Plan       Chest pain  Coronary artery disease  Pulmonary hypertension  Moderate shortness of breath on exertion  Mixed hyperlipidemia  Benign prostatic hypertrophy  Paroxysmal atrial fibrillation on chronic anticoagulant therapy     Plan     Results of limited echocardiogram discussed with patient  Plans for Lexiscan Cardiolite stress test later today  Yesterday this could not be performed as radioisotope was not available  Further recommendation pending results of above.  Moderate complexity decision making  Ongoing medical therapy  Ongoing risk factor modification  Continue with telemetry  Appropriate diet  DVT prophylaxis      Bradley Carver MD  12/26/19  5:36 PM    EMR Dragon/Transcription was used to dictate part of this note

## 2019-12-26 NOTE — DISCHARGE SUMMARY
"Patient ID: Gonzalo Durham  MRN: [id number]     Acct:  [id number]    Admit Date: 12/24/2019   Discharge Date: 12/26/2019  Date of service: 12/26/2019    Consults:    IP CONSULT TO CARDIOLOGY    PATIENT PROFILE: The patient is a 84 y/o white  male resident of [facility]; he was cooperative.      CHIEF COMPLAINT: \"chest pain\"     HISTORY OF PRESENT ILLNESS: known CAD.  Onset 2 pm on/off sticking chest pain over L chest.  NTG responsive.  No heartburn, hemoptysis, dysphagia, cough, leg edema, fever, exertional component.   In ED one enzyme neg/EKG ok; advised he needed admit to r/o and see if cardio wants more done.            Allergies   Allergen Reactions   • Symbicort [Budesonide-Formoterol Fumarate] Dizziness       Patient passed out and fell   • Prozac [Fluoxetine Hcl] Mental Status Change       Bad dreams.         HOME MEDICATIONS:          Prior to Admission medications    Medication Sig Start Date End Date Taking? Authorizing Provider   acetaminophen (TYLENOL) 325 MG tablet Take 2 tablets by mouth 2 (Two) Times a Day. 8/30/19   Yes Abel Romero MD   albuterol sulfate HFA (PROAIR HFA) 108 (90 Base) MCG/ACT inhaler Inhale 2 puffs 4 (Four) Times a Day. 8/30/19   Yes Abel Romero MD   apixaban (ELIQUIS) 5 MG tablet tablet Take 1 tablet by mouth Every 12 (Twelve) Hours. 11/22/19   Yes Abel Romero MD   Artificial Tear Solution (JUST TEARS EYE DROPS) solution Apply 1-2 drops to eye(s) as directed by provider Daily As Needed (dry eyes). 11/20/18   Yes Abel Romero MD   ARTIFICIAL TEARS 1.4 % ophthalmic solution   11/27/18   Yes Gal Fischer MD   aspirin 81 MG EC tablet Take 1 tablet by mouth Daily. 8/5/19   Yes Abel Romero MD   Blood Glucose Monitoring Suppl (FREESTYLE LITE) device 1 Device Daily. 5/23/19   Yes Abel Romero MD   calcium carbonate-vitamin d 600-400 MG-UNIT per tablet Take 1 tablet by mouth 2 (Two) Times a Day. 11/20/18   Yes Nick" Abel Levy MD   finasteride (PROSCAR) 5 MG tablet Take 1 tablet by mouth Daily. 11/20/18   Yes Abel Romero MD   gabapentin (NEURONTIN) 100 MG capsule TAKE 2 CAPSULES BY MOUTH TWICE DAILY 6/27/19   Yes Ulices Rogel MD   glyBURIDE (DIAbeta) 5 MG tablet Take 1 tablet by mouth Every Morning AND 0.5 tablets Daily Before Supper. 5/16/19   Yes bAel Romero MD   guaiFENesin (MUCINEX) 600 MG 12 hr tablet Take 1 tablet by mouth 2 (Two) Times a Day. 12/2/19   Yes Abel Romero MD   isosorbide mononitrate (IMDUR) 30 MG 24 hr tablet Take 1 tablet by mouth Daily. 11/22/19   Yes Abel Romero MD   loratadine (CLARITIN) 10 MG tablet Take 1 tablet by mouth Daily. 8/30/19   Yes Abel Romero MD   metFORMIN ER (GLUCOPHAGE XR) 500 MG 24 hr tablet Take 1 tablet by mouth Every Night. 11/20/18   Yes Abel Romero MD   metoprolol tartrate (LOPRESSOR) 25 MG tablet Take 1 tablet by mouth 2 (Two) Times a Day. 8/30/19   Yes Abel Romero MD   Multiple Vitamins-Minerals (CENTRUM SILVER ADULT 50+) tablet Take 50 mg by mouth Daily. 11/20/18   Yes Abel Romero MD   Multiple Vitamins-Minerals (PRESERVISION AREDS 2+MULTI VIT) capsule Take 1 capsule by mouth Daily. 11/20/18   Yes Abel Romero MD   nitroglycerin (NITROSTAT) 0.4 MG SL tablet Place 1 tablet under the tongue Every 5 (Five) Minutes As Needed for Chest Pain. 2/25/19   Yes Abel Romero MD   O2 (OXYGEN) Inhale 4 L/min Continuous. Per nasal cannula     Yes Gal Fischer MD   O2 (OXYGEN) Inhale 4 L/min. 4lpm     Yes Gal Fischer MD   pantoprazole (PROTONIX) 40 MG EC tablet Take 1 tablet by mouth Daily. 8/30/19   Yes Abel Romero MD   pravastatin (PRAVACHOL) 80 MG tablet Take 1 tablet by mouth Daily. 8/30/19   Yes Abel Romero MD   terazosin (HYTRIN) 10 MG capsule Take 1 capsule by mouth Daily. 11/20/18   Yes Abel Romero MD   tiotropium bromide  monohydrate (SPIRIVA RESPIMAT) 2.5 MCG/ACT aerosol solution inhaler Inhale 2 puffs Daily. 11/20/18   Yes Abel Romero MD   vitamin C (ASCORBIC ACID) 500 MG tablet Take 1 tablet by mouth Daily. 11/20/18   Yes Abel Romero MD   FREESTYLE LITE test strip 1 each by Other route Daily. 11/20/18     Abel Romero MD   Lancets (FREESTYLE) lancets Use once daily 11/20/18     Abel Romero MD         PAST HISTORY:  CHILDHOOD: unremarkable.      PROCEDURES:      SCANNED  Prostate/[place]/confirmed/7.18.16     Colonoscopy+hem/[facility]//4-7-00  Colonoscopy+bzk-zksc-igp/[facility]//11-17-08//  3-5y  Colonoscopy+polyp-div/[facility]//10.21.13/5y  EGD-itis///6.15.17  Colon-div///6.15.17/prn  Cath+45%/Wen//5.22.19  Loop/Wen//8.22.19     SURGERIES:  L 3rd distal finger amputaton-car door/1955  Hernia/1969  Appe/1977  Hernia/1979  CABG/Az/[place]/1981     FAMILY HISTORY:  Heart/f,m  DM/f,m  CA-prostate/f  CA-colon/none  CA-other/none     HABITS:  Tobacco-smoker/age 22/1 ppd/dc 1980  Alcohol/no     SOCIAL HISTORY:  Children/3-1  /1953  Retired/[employer]/1995     ADVISED:  Pneumonia Vaccine/12-01-06  Shingles Vaccine/12-01-06  Colonoscopy?/9-15-08     HOSPITAL ADMITS:   [facility]:   cath     [facility]:   none     [facility]:   none     Review of Systems  GENERAL:  Inactive/slower with limits, speed, stamina for age and sob, fatigue. Sleep is ok. No fever.  ENDO:  No syncope, near or diaphoretic sweaty spells.  BS Ok.  HEENT: No head injury or headache,  No change vision.  Same mod hearing loss.  Ears without pain/drainage.  No sore throat.  No significant nasal/sinus congestion/drainage. No epistaxis.  CHEST: No chest wall tenderness or mass. No significant cough, wheeze; same SOB; no hemoptysis.  CV: No exertional chest pain, palpitations, ankle edema.  GI: No heartburn, dysphagia.  No abdominal pain, diarrhea, constipation, rectal bleeding, or melena.  :  Voids without dysuria, or incontinence to  "completion.  ORTHO: No painful/swollen joints but various on /off sore.  Same sore neck or back.  No acute neck or back pain without recent injury.   NEURO: No dizziness, weakness of extremities.  Same LE numbness/paresthesias.   PSYCH: No memory loss.  Mood good; not that anxious, depressed but/and not suicidal.  Tolerated stress.      PHYSICAL EXAMINATION:  /63 (BP Location: Right arm, Patient Position: Lying)   Pulse 53   Temp 97.5 °F (36.4 °C) (Oral)   Resp 19   Ht 181.6 cm (71.5\")   Wt 98.5 kg (217 lb 3 oz)   SpO2 92%   BMI 29.87 kg/m²      Physical Exam  GENERAL:  Well nourished/developed in no acute distress. Obese   SKIN: Turgor excellent, without wound, rash, lesion.  HEENT: Normal cephalic without trauma.  Pupils equal round reactive to light. Extraocular motions full without nystagmus.     External canals nonobstructive nontender without reddness.  Oral cavity without growths, exudates, and moist.  Posterior pharynx without mass, obstruction, redness.  No thyromegaly, mass, tenderness, lymphadenopathy and supple.  CV: Regular rhythm.  No murmur, gallop,  edema. Posterior pulses intact.  No carotid bruits.   CHEST: No chest wall tenderness or mass.   LUNGS: Symmetric motion with clear to auscultation.   ABD: Soft, nontender without mass.   ORTHO: Symmetric extremities without swelling/point tenderness.  Full gross range of motion.    NEURO: CN 2-12 grossly intact.  Symmetric facies. 1/4 x bicep equal reflexes.  UE/LE   3/5 strength throughout.  Nonfocal use extremities. Speech clear.    PSYCH: Oriented x 3.  Pleasant calm, well kept.  Purposeful/directed conversation with intact short/long gross memory.     ASSESSMENT/PROBLEM LIST:   86 y/o white male; advanced age   Allergy/intolerance: see above  Procedural history: see above  Family history: see above  History tobacco use  Hypertension  DM2  CKD3  Hyperlipidemia  Obesity  Coronary artery disease; post CABG  AAA  Varicose veins  History " CVA  Gastroesophageal reflux disease  Anticoagulated: DM2,CAD,a fib,CVA/() ASA 81,(plavix), eliquist  History colon polyp  Diverticular disease  Hemorrhoids  Deg joint disease  Spinal deg disease  Tremor  Peripheral neuropathy  Copd   Chronic respiratory failure-hypoxic  Depression-chronic  Nocturnal leg movements  Insomnia  Anemia-chronic/recurrent: ASA 81,gi(3/3+,div,c polyp,esophagitis)  BPH  prostatism  nocturnia  ED     REASON FOR ADMISSION:    Chest pain    HOSPITAL COURSE: His troponins remain negative.  Cardiology wanted do a Lexiscan.  It can be done on the holiday and was done the day after.  It was read as negative.  Cardiology felt he could go home.  He had no further pain while here.  He did not want to pursue GI or any other possibilities for cause.  Various meds were held in case there was can be contrast exposure; sliding scale insulin was used while he was here.    Labs included:     Labs 24 hr before discharge:  Lab Results (last 24 hours)     Procedure Component Value Units Date/Time    POC Glucose Once [[id number]]  (Abnormal) Collected:  12/26/19 1133    Specimen:  Blood Updated:  12/26/19 1202     Glucose 136 mg/dL      Comment: : [id number] Lopez LadonnaMeter ID: [id number]       POC Glucose Once [[id number]]  (Normal) Collected:  12/26/19 0724    Specimen:  Blood Updated:  12/26/19 0756     Glucose 116 mg/dL      Comment: : [id number] Lopez LadonnaMeter ID: [id number]       Basic Metabolic Panel [[id number]]  (Abnormal) Collected:  12/26/19 0259    Specimen:  Blood Updated:  12/26/19 0417     Glucose 124 mg/dL      BUN 25 mg/dL      Creatinine 1.02 mg/dL      Sodium 143 mmol/L      Potassium 4.5 mmol/L      Chloride 104 mmol/L      CO2 30.0 mmol/L      Calcium 8.8 mg/dL      eGFR Non African Amer 69 mL/min/1.73      BUN/Creatinine Ratio 24.5     Anion Gap 9.0 mmol/L     Narrative:       GFR Normal >60  Chronic Kidney Disease <60  Kidney Failure <15      CBC & Differential  [[id number]] Collected:  12/26/19 0259    Specimen:  Blood Updated:  12/26/19 0351    Narrative:       The following orders were created for panel order CBC & Differential.  Procedure                               Abnormality         Status                     ---------                               -----------         ------                     CBC Auto Differential[[id number]]        Abnormal            Final result                 Please view results for these tests on the individual orders.    CBC Auto Differential [[id number]]  (Abnormal) Collected:  12/26/19 0259    Specimen:  Blood Updated:  12/26/19 0351     WBC 7.63 10*3/mm3      RBC 4.09 10*6/mm3      Hemoglobin 11.9 g/dL      Hematocrit 36.9 %      MCV 90.2 fL      MCH 29.1 pg      MCHC 32.2 g/dL      RDW 13.2 %      RDW-SD 43.9 fl      MPV 10.2 fL      Platelets 175 10*3/mm3      Neutrophil % 55.4 %      Lymphocyte % 22.3 %      Monocyte % 11.1 %      Eosinophil % 10.1 %      Basophil % 0.8 %      Immature Grans % 0.3 %      Neutrophils, Absolute 4.23 10*3/mm3      Lymphocytes, Absolute 1.70 10*3/mm3      Monocytes, Absolute 0.85 10*3/mm3      Eosinophils, Absolute 0.77 10*3/mm3      Basophils, Absolute 0.06 10*3/mm3      Immature Grans, Absolute 0.02 10*3/mm3      nRBC 0.0 /100 WBC     POC Glucose Once [[id number]]  (Abnormal) Collected:  12/25/19 2136    Specimen:  Blood Updated:  12/25/19 2147     Glucose 179 mg/dL      Comment: : [id number] Ponce Lynn ID: [id number]       POC Glucose Once [[id number]]  (Abnormal) Collected:  12/25/19 1622    Specimen:  Blood Updated:  12/25/19 1645     Glucose 165 mg/dL      Comment: : [id number] Bulmaro Ramirez ID: [id number]             Lab Results otherwise  CBC:   Results from last 7 days   Lab Units 12/26/19  0259 12/24/19  2246   WBC 10*3/mm3 7.63 8.88   HEMOGLOBIN g/dL 11.9* 12.9*   HEMATOCRIT % 36.9* 38.5   PLATELETS 10*3/mm3 175 230     BMP:  Results from last 7 days   Lab Units 12/26/19  0255  12/24/19  2246   SODIUM mmol/L 143 140   POTASSIUM mmol/L 4.5 4.1   CHLORIDE mmol/L 104 98   CO2 mmol/L 30.0* 29.0   BUN mg/dL 25* 31*   CREATININE mg/dL 1.02 1.14   GLUCOSE mg/dL 124* 140*   CALCIUM mg/dL 8.8 9.9   ALT (SGPT) U/L  --  14     Results for orders placed during the hospital encounter of 12/24/19   Adult Transthoracic Echo Limited W/ Cont if Necessary Per Protocol    Narrative · Left ventricular wall thickness is consistent with mild concentric   hypertrophy.  · Estimated EF = 55%.  · Left ventricular diastolic dysfunction.  · Mild dilation of the aortic root is present.  · No evidence of pulmonary hypertension is present.        lexiscan  · Left ventricular ejection fraction is normal (Calculated EF = 57%).  · Diaphragmatic attenuation artifact is present.  · Raw images reviewed with the following abnormalities noted: vertical motion.  · Myocardial perfusion imaging indicates a normal myocardial perfusion study with no evidence of ischemia.  · Impressions are consistent with a low risk study.    Xr Chest 1 View    Result Date: 12/25/2019  EXAM: XR CHEST 1 VW- - 12/24/2019 11:34 PM CST  HISTORY: Chest pain   COMPARISON: 07/24/2018  TECHNIQUE:  1 images.  Frontal view of the chest.  FINDINGS:  No pneumothorax, pleural effusion or focal consolidation. Emphysema. Cardiac mediastinal silhouette within normal limits. Calcified aortic atherosclerosis. Sternotomy wires. Cardiac loop recorder. No acute bony finding.       1. No acute cardiopulmonary findings. Emphysema. This report was finalized on 12/25/2019 08:00 by Dr Janneth Walton MD.    DISCHARGE ASSESSMENT:  Reasons for admit/problems address while here:   Atypical chest pain   Coronary artery disease  Diabetes 2-controlled  Chronic respiratory failure-hypoxic  copd    Chronic problems affecting stay:  See above      PLAN:   See AVS    Discharge Disposition:  Home or Self Care  (actually on to a sleep study planned tonight here at )    Discharge  Medications:     Discharge Medications      Continue These Medications      Instructions Start Date   acetaminophen 325 MG tablet  Commonly known as:  TYLENOL   650 mg, Oral, 2 Times Daily      albuterol sulfate  (90 Base) MCG/ACT inhaler  Commonly known as:  PROAIR HFA   2 puffs, Inhalation, 4 Times Daily - RT      apixaban 5 MG tablet tablet  Commonly known as:  ELIQUIS   5 mg, Oral, Every 12 Hours Scheduled      ARTIFICIAL TEARS 1.4 % ophthalmic solution  Generic drug:  polyvinyl alcohol   No dose, route, or frequency recorded.      aspirin 81 MG EC tablet   81 mg, Oral, Daily      calcium carbonate-vitamin d 600-400 MG-UNIT per tablet   1 tablet, Oral, 2 Times Daily      finasteride 5 MG tablet  Commonly known as:  PROSCAR   5 mg, Oral, Daily      freestyle lancets   Use once daily      FREESTYLE LITE device   1 Device, Does not apply, Daily      FREESTYLE LITE test strip  Generic drug:  glucose blood   1 each, Other, Daily      gabapentin 100 MG capsule  Commonly known as:  NEURONTIN   TAKE 2 CAPSULES BY MOUTH TWICE DAILY      glyburide 5 MG tablet  Commonly known as:  DIAbeta   Take 1 tablet by mouth Every Morning AND 0.5 tablets Daily Before Supper.      guaiFENesin 600 MG 12 hr tablet  Commonly known as:  MUCINEX   600 mg, Oral, 2 Times Daily      isosorbide mononitrate 30 MG 24 hr tablet  Commonly known as:  IMDUR   30 mg, Oral, Daily      JUST TEARS EYE DROPS solution   1-2 drops, Ophthalmic, Daily PRN      loratadine 10 MG tablet  Commonly known as:  CLARITIN   10 mg, Oral, Daily      metFORMIN  MG 24 hr tablet  Commonly known as:  GLUCOPHAGE XR   500 mg, Oral, Nightly      metoprolol tartrate 25 MG tablet  Commonly known as:  LOPRESSOR   25 mg, Oral, 2 Times Daily      nitroglycerin 0.4 MG SL tablet  Commonly known as:  NITROSTAT   0.4 mg, Sublingual, Every 5 Minutes PRN      O2  Commonly known as:  OXYGEN   4 L/min, Inhalation, Continuous, Per nasal cannula       O2  Commonly known as:   OXYGEN   4 L/min, Inhalation, 4lpm      pantoprazole 40 MG EC tablet  Commonly known as:  PROTONIX   40 mg, Oral, Daily      pravastatin 80 MG tablet  Commonly known as:  PRAVACHOL   80 mg, Oral, Daily      PRESERVISION AREDS 2+MULTI VIT capsule   1 capsule, Oral, Daily      CENTRUM SILVER ADULT 50+ tablet   50 mg, Oral, Daily      terazosin 10 MG capsule  Commonly known as:  HYTRIN   10 mg, Oral, Daily      tiotropium bromide monohydrate 2.5 MCG/ACT aerosol solution inhaler  Commonly known as:  SPIRIVA RESPIMAT   2 puffs, Inhalation, Daily - RT      vitamin C 500 MG tablet  Commonly known as:  ASCORBIC ACID   500 mg, Oral, Daily             Discharge Care Plan/Instructions:   ACTIVITY:  Gradually increase activity as able  Elevate legs as much as can/avoid legs handing down   Suggest no smoking/exposure smoking    DIET:  AHA  ADA  Additional type: no added salt  Consistency:    Solids: general     Fluids thin  Suggest no alcohol   Avoid caffeine   Calories 1884-1388/24 hr  Fluids at least 60 oz/24 hr-stay hydrated    MEDICATIONS:   Per AVS  Oxygen 4 liters continuous    Follow-up Appointments:   Dr Romero [facility] f/u extra 7-10 days (call for time)    Other appointments:   Future Appointments   Date Time Provider Department Center   12/26/2019  8:00 PM  PAD SLEEP ROOM [id number] [facility] SLEEP [facility]   12/27/2019  8:45 AM PACEMAKER HEART GRP [facility]   2/5/2020 10:15 AM Juan Marcelino MD [facility] N PAD None   2/18/2020 11:45 AM Bradley Carver MD [facility]   4/28/2020 11:15 AM Feliz Frye APRN [facility] RD PAD None   5/19/2020  8:20 AM LAB PC [facility] PC METR None   5/21/2020  9:15 AM Abel Romero MD [facility] PC METR None       CONDITION: stable/improved    PROGNOSIS: guarded

## 2019-12-26 NOTE — PLAN OF CARE
Problem: Patient Care Overview  Goal: Plan of Care Review  Outcome: Ongoing (interventions implemented as appropriate)  Flowsheets  Taken 12/26/2019 1517 by Ramona Ruvalcaba RN  Progress: improving  Outcome Summary: Pt had no c/o pain this shift. Safety maintained. Plan to d/c home later this shift per Dr. Romero. Awaiting d/c order then will discuss d/c instructions with pt and family. Stable at d/c. Cont to monitor.  Taken 12/25/2019 1935 by Lindsey Serra, RN  Plan of Care Reviewed With: patient

## 2019-12-26 NOTE — DISCHARGE INSTRUCTIONS
ACTIVITY:  Gradually increase activity as able  Elevate legs as much as can/avoid legs handing down   Suggest no smoking/exposure smoking    DIET:  AHA  ADA  Additional type: no added salt  Consistency:    Solids: general     Fluids thin  Suggest no alcohol   Avoid caffeine   Calories 5967-7787/24 hr  Fluids at least 60 oz/24 hr-stay hydrated    MEDICATIONS:   Per AVS  Oxygen 4 liters continuous           verbal instruction

## 2019-12-26 NOTE — PROGRESS NOTES
Discharge Planning Assessment  [facility]     Patient Name: Gonzalo Durham  MRN: [id number]  Today's Date: 12/26/2019    Admit Date: 12/24/2019    Discharge Needs Assessment     Row Name 12/26/19 1146       Living Environment    Lives With  spouse    Name(s) of Who Lives With Patient  ZIA DURHAM    Current Living Arrangements  home/apartment/condo    Primary Care Provided by  self    Provides Primary Care For  spouse    Caregiving Concerns  WIFE IS IN POOR HEALTH    Family Caregiver if Needed  child(felicita), adult    Family Caregiver Names  NICOLE RAMÍREZ- DAUGHTER    Quality of Family Relationships  unable to assess    Able to Return to Prior Arrangements  yes       Resource/Environmental Concerns    Resource/Environmental Concerns  none    Transportation Concerns  car, none       Transition Planning    Patient/Family Anticipates Transition to  home with family    Patient/Family Anticipated Services at Transition  none    Transportation Anticipated  family or friend will provide       Discharge Needs Assessment    Readmission Within the Last 30 Days  no previous admission in last 30 days    Concerns to be Addressed  no discharge needs identified    Equipment Currently Used at Home  oxygen;respiratory supplies THROUGH [employer]    Anticipated Changes Related to Illness  none    Equipment Needed After Discharge  none    Discharge Coordination/Progress  PLANS TO RETURN HOME WITH WIFE. DENIES NEED FOR HOME HEALTH OR DME. PATIENT HAS PCP AND RX COVERAGE.          Discharge Plan    No documentation.       Destination      Coordination has not been started for this encounter.      Durable Medical Equipment      Coordination has not been started for this encounter.      Dialysis/Infusion      Coordination has not been started for this encounter.      Home Medical Care      Coordination has not been started for this encounter.      Therapy      Coordination has not been started for this encounter.      [facility]       Coordination has not been started for this encounter.          Demographic Summary    No documentation.       Functional Status    No documentation.       Psychosocial    No documentation.       Abuse/Neglect    No documentation.       Legal    No documentation.       Substance Abuse    No documentation.       Patient Forms    No documentation.           Erma Ramirez RN

## 2019-12-26 NOTE — PLAN OF CARE
Problem: Patient Care Overview  Goal: Plan of Care Review  Outcome: Ongoing (interventions implemented as appropriate)  Flowsheets  Taken 12/25/2019 0455 by David Harris, RN  Progress: no change  Taken 12/26/2019 0444 by Lindsey Serra RN  Outcome Summary: VSS, no c/o pain or soa. up ad vilma, IV fluids infusing, NPO for farzana scan today, SA 50-70 on tele, continue to monitor.     Problem: Pain, Chronic (Adult)  Goal: Acceptable Pain/Comfort Level and Functional Ability  Outcome: Ongoing (interventions implemented as appropriate)  Flowsheets (Taken 12/26/2019 0444)  Acceptable Pain/Comfort Level and Functional Ability: making progress toward outcome     Problem: Cardiac: ACS (Acute Coronary Syndrome) (Adult)  Goal: Signs and Symptoms of Listed Potential Problems Will be Absent, Minimized or Managed (Cardiac: ACS)  Outcome: Ongoing (interventions implemented as appropriate)  Flowsheets (Taken 12/26/2019 0444)  Problems Assessed (Acute Coronary Syndrome): all  Problems Present (Acute Coronary Syn): none

## 2019-12-27 ENCOUNTER — READMISSION MANAGEMENT (OUTPATIENT)
Dept: CALL CENTER | Facility: HOSPITAL | Age: 84
End: 2019-12-27

## 2019-12-27 ENCOUNTER — CLINICAL SUPPORT (OUTPATIENT)
Dept: CARDIOLOGY | Facility: CLINIC | Age: 84
End: 2019-12-27

## 2019-12-27 ENCOUNTER — EPISODE CHANGES (OUTPATIENT)
Dept: CASE MANAGEMENT | Facility: OTHER | Age: 84
End: 2019-12-27

## 2019-12-27 DIAGNOSIS — G62.9 NEUROPATHY: ICD-10-CM

## 2019-12-27 DIAGNOSIS — I63.9 CRYPTOGENIC STROKE (HCC): ICD-10-CM

## 2019-12-27 PROCEDURE — 93298 REM INTERROG DEV EVAL SCRMS: CPT | Performed by: PHYSICIAN ASSISTANT

## 2019-12-27 PROCEDURE — 93299 PR REM INTERROG ICPMS/SCRMS <30 D TECH REVIEW: CPT | Performed by: PHYSICIAN ASSISTANT

## 2019-12-27 RX ORDER — GABAPENTIN 100 MG/1
CAPSULE ORAL
Qty: 360 CAPSULE | Refills: 1 | Status: SHIPPED | OUTPATIENT
Start: 2019-12-27 | End: 2020-03-09

## 2019-12-27 NOTE — OUTREACH NOTE
Prep Survey      Responses   Facility patient discharged from?  [facility]   Is patient eligible?  Yes   Discharge diagnosis  Chest pain,   Does the patient have one of the following disease processes/diagnoses(primary or secondary)?  Other   Does the patient have Home health ordered?  No   Is there a DME ordered?  No   Comments regarding appointments  See AVS   Prep survey completed?  Yes          Aicha Griffin RN

## 2019-12-27 NOTE — PROGRESS NOTES
[facility] Report- REMOTE/[facility] FROM [facility] ED    December 27, 2019    Primary Cardiologist:  Wen  Reason for implant:  cryptogenic stroke  Battery:  OK    Events recorded since 11/25/2019:  10 symptoms recorded:  Frequent PVCs, occasional bigeminy noted.  1 symptom marked during AF episode.  Some noise noted on EKGs.  3 justina episodes:  Noted during overnight hours.  Longest 20 seconds, rates 38-39 bpm.  361 AF episodes:  [place] 27.1%.  Available data shows longest duration of 2 hours.  Patient is anticoagulated with Eliquis.     Changes:  No changes    Follow up:  1 month

## 2019-12-27 NOTE — PROGRESS NOTES
Linq Report- [facility]    December 27, 2019    Primary Cardiologist:  Wen  Reason for implant:  cryptogenic stroke  Battery:  ok  Events:  AF alerts noted.  Some are true AF, others are NSR with frequent PACs.  Pt is anticoagulated.  Changes:  n/a    Follow up:  1 month

## 2019-12-29 NOTE — PROGRESS NOTES
I have reviewed the notes, assessments, and/or procedures performed by  Cordelia Worrell RN, I concur with her  documentation of Gonzalo Durham.

## 2019-12-30 ENCOUNTER — READMISSION MANAGEMENT (OUTPATIENT)
Dept: CALL CENTER | Facility: HOSPITAL | Age: 84
End: 2019-12-30

## 2019-12-30 NOTE — OUTREACH NOTE
Medical Week 1 Survey      Responses   Facility patient discharged from?  [facility]   Does the patient have one of the following disease processes/diagnoses(primary or secondary)?  Other   Is there a successful TCM telephone encounter documented?  No   Week 1 attempt successful?  Yes   Call start time  1217   Call end time  1224   Is patient permission given to speak with other caregiver?  Yes   List who call center can speak with  daughter   Medication alerts for this patient  no changes were made.   Meds reviewed with patient/caregiver?  Yes   Is the patient having any side effects they believe may be caused by any medication additions or changes?  No   Is the patient taking all medications as directed (includes completed medication regime)?  N/A   Comments regarding appointments  Pt to see pcp on Jan. 2, 2020.   Does the patient have a primary care provider?   Yes   Does the patient have an appointment with their PCP within 7 days of discharge?  Yes   Has the patient kept scheduled appointments due by today?  N/A   Has home health visited the patient within 72 hours of discharge?  N/A   Psychosocial issues?  No   Did the patient receive a copy of their discharge instructions?  Yes   Nursing interventions  Reviewed instructions with patient   What is the patient's perception of their health status since discharge?  Improving   Is the patient/caregiver able to teach back signs and symptoms related to disease process for when to call PCP?  Yes   Is the patient/caregiver able to teach back signs and symptoms related to disease process for when to call 911?  Yes   Is the patient/caregiver able to teach back the hierarchy of who to call/visit for symptoms/problems? PCP, Specialist, Home health nurse, Urgent Care, ED, 911  Yes   Week 1 call completed?  Yes          Dulce Ortega RN

## 2020-01-02 ENCOUNTER — OFFICE VISIT (OUTPATIENT)
Dept: FAMILY MEDICINE CLINIC | Facility: CLINIC | Age: 85
End: 2020-01-02

## 2020-01-02 VITALS
DIASTOLIC BLOOD PRESSURE: 62 MMHG | WEIGHT: 210 LBS | HEIGHT: 72 IN | HEART RATE: 52 BPM | BODY MASS INDEX: 28.44 KG/M2 | SYSTOLIC BLOOD PRESSURE: 122 MMHG | OXYGEN SATURATION: 93 % | RESPIRATION RATE: 18 BRPM | TEMPERATURE: 97.9 F

## 2020-01-02 DIAGNOSIS — R13.10 DYSPHAGIA, UNSPECIFIED TYPE: ICD-10-CM

## 2020-01-02 DIAGNOSIS — K21.9 GASTROESOPHAGEAL REFLUX DISEASE, ESOPHAGITIS PRESENCE NOT SPECIFIED: ICD-10-CM

## 2020-01-02 DIAGNOSIS — R07.9 CHEST PAIN, UNSPECIFIED TYPE: Primary | ICD-10-CM

## 2020-01-02 PROCEDURE — 99213 OFFICE O/P EST LOW 20 MIN: CPT | Performed by: FAMILY MEDICINE

## 2020-01-02 NOTE — PROGRESS NOTES
Transitional Care Follow Up Visit  Subjective     Gonzalo Durham is a 85 y.o. male who presents for a transitional care management visit.  With son-in-law.    Within 48 business hours after discharge our office contacted him via telephone to coordinate his care and needs.      I reviewed and discussed the details of that call along with the discharge summary, hospital problems, inpatient lab results, inpatient diagnostic studies, and consultation reports with Gonzalo.     Current outpatient and discharge medications have been reconciled for the patient.    No flowsheet data found.  Admit Date: 12/24/2019   Discharge Date: 12/26/2019  Reasons for admit/problems address while here:   Atypical chest pain   Coronary artery disease  Diabetes 2-controlled  Chronic respiratory failure-hypoxic  copd    Risk for Readmission (LACE) Score: 11 (12/26/2019  5:00 AM)      History of Present Illness : known CAD.  Onset 2 pm on/off sticking chest pain over L chest.  NTG responsive.  No heartburn, hemoptysis, dysphagia, cough, leg edema, fever, exertional component. In ED one enzyme neg/EKG ok; advised he needed admit to r/o and see if cardio wants more done.     Course During Hospital Stay:    HOSPITAL COURSE: His troponins remain negative.  Cardiology wanted do a Lexiscan.  It can be done on the holiday and was done the day after.  It was read as negative.  Cardiology felt he could go home.  He had no further pain while here.  He did not want to pursue GI or any other possibilities for cause.  Various meds were held in case there was can be contrast exposure; sliding scale insulin was used while he was here.     The following portions of the patient's history were reviewed and updated as appropriate: allergies, current medications, past family history, past medical history, past social history, past surgical history and problem list.    Review of Systems  GENERAL:  Active/slower with limits, speed, samni for age and SOB.  Sleep is ok.    SKIN:  Ongoing callous of feet; no problems with this/other skin today.   ENDO:  No syncope, near or diaphoretic sweaty spells.  BS Ok: without download noted last visit.   HEENT: No head injury, headache.  No vision change.  Same mild hearing loss.  Ears without pain/drainage.  No sore throat.  No significant nasal/sinus congestion/drainage. No epistaxis.  CHEST: No chest wall tenderness or mass. No significant cough (occ cough),  without wheeze.  Mild/same SOB; no hemoptysis.  CV: No chest pain, palpatations, ankle edema.  GI: No heartburn but occ catching of foods (without choking); dysphagia.  No abdominal pain, diarrhea, constipation, rectal bleeding, or melena.    :  Voids without dysuria, or incontience to completion.  ORTHO: No painful/swollen joints but various on /off sore.  No change occ sore neck or back.  No acute neck or back pain without recent injury.   NEURO: No dizziness; recent LUE weakness of extremities.  No change some LE numbness/parethesias.   PSYCH: No memory loss.  Mood good; not that anxious, depressed but/and not suicidal.  Tolerated stress.   Screening:  Mammogram: NA  Bone density: NA  Low dose CT chest: Tobacco-smoker/age 22/1 ppd/dc 1980: (22): NA (had CT angio)  GI: Colon-div/[facility]/6.15.17/prn  Prostate: Cadena/confirmed 11.22.19  [facility] in past   Usual lab order  6m CBC, BMP, A1c  12m CBC, CMP, A1c, TSH, LIPID, PSAs, Vit D    Results for orders placed or performed during the hospital encounter of 12/24/19   Troponin   Result Value Ref Range    Troponin T <0.010 0.000 - 0.030 ng/mL   Comprehensive Metabolic Panel   Result Value Ref Range    Glucose 140 (H) 65 - 99 mg/dL    BUN 31 (H) 8 - 23 mg/dL    Creatinine 1.14 0.76 - 1.27 mg/dL    Sodium 140 136 - 145 mmol/L    Potassium 4.1 3.5 - 5.2 mmol/L    Chloride 98 98 - 107 mmol/L    CO2 29.0 22.0 - 29.0 mmol/L    Calcium 9.9 8.6 - 10.5 mg/dL    Total Protein 7.3 6.0 - 8.5 g/dL    Albumin 4.40 3.50 - 5.20 g/dL    ALT (SGPT) 14 1 - 41  U/L    AST (SGOT) 15 1 - 40 U/L    Alkaline Phosphatase 72 39 - 117 U/L    Total Bilirubin <0.2 (L) 0.2 - 1.2 mg/dL    eGFR Non African Amer 61 >60 mL/min/1.73    Globulin 2.9 gm/dL    A/G Ratio 1.5 g/dL    BUN/Creatinine Ratio 27.2 (H) 7.0 - 25.0    Anion Gap 13.0 5.0 - 15.0 mmol/L   Protime-INR   Result Value Ref Range    Protime 13.4 11.9 - 14.6 Seconds    INR 0.99 0.91 - 1.09   aPTT   Result Value Ref Range    PTT 34.2 24.1 - 35.0 seconds   CBC Auto Differential   Result Value Ref Range    WBC 8.88 3.40 - 10.80 10*3/mm3    RBC 4.40 4.14 - 5.80 10*6/mm3    Hemoglobin 12.9 (L) 13.0 - 17.7 g/dL    Hematocrit 38.5 37.5 - 51.0 %    MCV 87.5 79.0 - 97.0 fL    MCH 29.3 26.6 - 33.0 pg    MCHC 33.5 31.5 - 35.7 g/dL    RDW 13.0 12.3 - 15.4 %    RDW-SD 42.2 37.0 - 54.0 fl    MPV 9.7 6.0 - 12.0 fL    Platelets 230 140 - 450 10*3/mm3    Neutrophil % 59.5 42.7 - 76.0 %    Lymphocyte % 24.2 19.6 - 45.3 %    Monocyte % 7.5 5.0 - 12.0 %    Eosinophil % 7.9 (H) 0.3 - 6.2 %    Basophil % 0.7 0.0 - 1.5 %    Immature Grans % 0.2 0.0 - 0.5 %    Neutrophils, Absolute 5.28 1.70 - 7.00 10*3/mm3    Lymphocytes, Absolute 2.15 0.70 - 3.10 10*3/mm3    Monocytes, Absolute 0.67 0.10 - 0.90 10*3/mm3    Eosinophils, Absolute 0.70 (H) 0.00 - 0.40 10*3/mm3    Basophils, Absolute 0.06 0.00 - 0.20 10*3/mm3    Immature Grans, Absolute 0.02 0.00 - 0.05 10*3/mm3    nRBC 0.0 0.0 - 0.2 /100 WBC   Troponin   Result Value Ref Range    Troponin T <0.010 0.000 - 0.030 ng/mL   Troponin   Result Value Ref Range    Troponin T <0.010 0.000 - 0.030 ng/mL   D-dimer, Quantitative   Result Value Ref Range    D-Dimer, Quantitative 0.27 0.00 - 0.50 mg/L (FEU)   Basic Metabolic Panel   Result Value Ref Range    Glucose 124 (H) 65 - 99 mg/dL    BUN 25 (H) 8 - 23 mg/dL    Creatinine 1.02 0.76 - 1.27 mg/dL    Sodium 143 136 - 145 mmol/L    Potassium 4.5 3.5 - 5.2 mmol/L    Chloride 104 98 - 107 mmol/L    CO2 30.0 (H) 22.0 - 29.0 mmol/L    Calcium 8.8 8.6 - 10.5 mg/dL     eGFR Non African Amer 69 >60 mL/min/1.73    BUN/Creatinine Ratio 24.5 7.0 - 25.0    Anion Gap 9.0 5.0 - 15.0 mmol/L   CBC Auto Differential   Result Value Ref Range    WBC 7.63 3.40 - 10.80 10*3/mm3    RBC 4.09 (L) 4.14 - 5.80 10*6/mm3    Hemoglobin 11.9 (L) 13.0 - 17.7 g/dL    Hematocrit 36.9 (L) 37.5 - 51.0 %    MCV 90.2 79.0 - 97.0 fL    MCH 29.1 26.6 - 33.0 pg    MCHC 32.2 31.5 - 35.7 g/dL    RDW 13.2 12.3 - 15.4 %    RDW-SD 43.9 37.0 - 54.0 fl    MPV 10.2 6.0 - 12.0 fL    Platelets 175 140 - 450 10*3/mm3    Neutrophil % 55.4 42.7 - 76.0 %    Lymphocyte % 22.3 19.6 - 45.3 %    Monocyte % 11.1 5.0 - 12.0 %    Eosinophil % 10.1 (H) 0.3 - 6.2 %    Basophil % 0.8 0.0 - 1.5 %    Immature Grans % 0.3 0.0 - 0.5 %    Neutrophils, Absolute 4.23 1.70 - 7.00 10*3/mm3    Lymphocytes, Absolute 1.70 0.70 - 3.10 10*3/mm3    Monocytes, Absolute 0.85 0.10 - 0.90 10*3/mm3    Eosinophils, Absolute 0.77 (H) 0.00 - 0.40 10*3/mm3    Basophils, Absolute 0.06 0.00 - 0.20 10*3/mm3    Immature Grans, Absolute 0.02 0.00 - 0.05 10*3/mm3    nRBC 0.0 0.0 - 0.2 /100 WBC   Stress Test With Myocardial Perfusion One Day   Result Value Ref Range    BH CV STRESS PROTOCOL 1 Pharmacologic     Stage 1 1     HR Stage 1 81     BP Stage 1 133/66     Duration Min Stage 1 0     Duration Sec Stage 1 10     Stress Dose Regadenoson Stage 1 0.4     Stress Comments Stage 1 10 sec bolus injection     Baseline HR 75 bpm    Baseline /77 mmHg    Peak HR 81 bpm    Percent Max Pred HR 60.00 %    Percent Target HR 71 %    Peak /66 mmHg    Recovery HR 75 bpm    Recovery /61 mmHg    Target HR (85%) 115 bpm    Max. Pred. HR (100%) 135 bpm    Exercise duration (sec) 10 sec    Nuc Stress EF 57 %   POC Glucose Once   Result Value Ref Range    Glucose 132 (H) 70 - 130 mg/dL   POC Glucose Once   Result Value Ref Range    Glucose 132 (H) 70 - 130 mg/dL   POC Glucose Once   Result Value Ref Range    Glucose 165 (H) 70 - 130 mg/dL   POC Glucose Once   Result  Value Ref Range    Glucose 179 (H) 70 - 130 mg/dL   POC Glucose Once   Result Value Ref Range    Glucose 116 70 - 130 mg/dL   POC Glucose Once   Result Value Ref Range    Glucose 136 (H) 70 - 130 mg/dL   POC Glucose Once   Result Value Ref Range    Glucose 193 (H) 70 - 130 mg/dL   Adult Transthoracic Echo Limited W/ Cont if Necessary Per Protocol   Result Value Ref Range    BSA 2.2 m^2    IVSd 1.5 cm    LVIDd 4.1 cm    LVIDs 3.1 cm    LVPWd 1.2 cm    IVS/LVPW 1.7     FS 25.1 %    EDV(Teich) 74.7 ml    ESV(Teich) 37.3 ml    EF(Teich) 50.0 %    EDV(cubed) 69.4 ml    ESV(cubed) 29.2 ml    EF(cubed) 57.9 %    LV mass(C)d 176.1 grams    LV mass(C)dI 80.7 grams/m^2    SV(Teich) 37.3 ml    SI(Teich) 17.1 ml/m^2    SV(cubed) 40.2 ml    SI(cubed) 18.4 ml/m^2    Ao root diam 4.1 cm    Ao root area 13.2 cm^2    LA dimension 4.6 cm    LA/Ao 1.1     LVOT diam 2.5 cm    LVOT area 4.9 cm^2    LVOT area(traced) 4.9 cm^2    LVLd ap4 8.8 cm    EDV(MOD-sp4) 113.0 ml    LVLs ap4 7.8 cm    ESV(MOD-sp4) 49.5 ml    EF(MOD-sp4) 56.2 %    SV(MOD-sp4) 63.5 ml    SI(MOD-sp4) 29.1 ml/m^2    Ao root area (BSA corrected) 1.9     LV Shaikh Vol (BSA corrected) 51.8 ml/m^2    LV Sys Vol (BSA corrected) 22.7 ml/m^2    MV E max rayray 83.9 cm/sec    MV A max rayray 86.8 cm/sec    MV E/A 0.97     MV dec slope 370.0 cm/sec^2    MV dec time 0.23 sec    Ao pk rayray 123.0 cm/sec    Ao max PG 6.1 mmHg    Ao max PG (full) 4.2 mmHg    Ao V2 mean 86.3 cm/sec    Ao mean PG 3.0 mmHg    Ao mean PG (full) 2.0 mmHg    Ao V2 VTI 33.9 cm    TEMI(I,A) 2.3 cm^2    TEMI(I,D) 2.3 cm^2    TEMI(V,A) 2.7 cm^2    TEMI(V,D) 2.7 cm^2    LV V1 max PG 1.8 mmHg    LV V1 mean PG 1.0 mmHg    LV V1 max 67.2 cm/sec    LV V1 mean 54.2 cm/sec    LV V1 VTI 16.1 cm    SV(Ao) 447.6 ml    SI(Ao) 205.0 ml/m^2    SV(LVOT) 79.0 ml    SI(LVOT) 36.2 ml/m^2    TR max rayray 168.0 cm/sec    RVSP(TR) 16.3 mmHg    RAP systole 5.0 mmHg     CV ECHO JANAE - BZI_BMI 30.3 kilograms/m^2     [facility]  [facility] 2.2 m^2     CV ECHO JANAE - BZI_METRIC_WEIGHT 98.4 kg     CV ECHO AJNAE - BZI_METRIC_HEIGHT 180.3 cm    Target HR (85%) 115 bpm    Max. Pred. HR (100%) 135 bpm    Avg E/e' ratio 10.80     Lat Peak E' Chandler 10.1 cm/sec    Med Peak E' [place] 5.44 cm/sec    Echo EF Estimated 55 %   Light Blue Top   Result Value Ref Range    Extra Tube hold for add-on    Green Top (Gel)   Result Value Ref Range    Extra Tube Hold for add-ons.    Lavender Top   Result Value Ref Range    Extra Tube hold for add-on    Red Top   Result Value Ref Range    Extra Tube Hold for add-ons.        Lab Results   Component Value Date    PSA 1.100 05/15/2019    PSA 1.230 11/08/2018    PSA 1.240 09/08/2017        Lab Results:  CBC:  Lab Results - Last 18 Months   Lab Units 12/26/19  0259 12/24/19  2246 11/19/19 0719 09/30/19  0716 05/23/19  0423 05/22/19  1211 05/15/19  0708 11/14/18  0845   WBC 10*3/mm3 7.63 8.88 8.11 8.18 8.12 6.98 6.72  --    HEMOGLOBIN g/dL 11.9* 12.9* 12.4* 12.9* 11.9* 13.4* 12.3*  --    HEMATOCRIT % 36.9* 38.5 38.5 39.5 38.1* 41.3 40.6  --    PLATELETS 10*3/mm3 175 230 220 251 229 253 234  --    IRON mcg/dL  --   --   --   --   --   --   --  81      BMP/CMP:  Lab Results - Last 18 Months   Lab Units 12/26/19  0259 12/24/19  2246 11/19/19  0719 09/30/19  0716 05/23/19  0423 05/22/19  1211 05/15/19  0708 11/08/18  1328  07/15/18  2300   SODIUM mmol/L 143 140 147* 144 141 141 144 141  --  144   SODIUM, ARTERIAL   --   --   --   --   --   --   --   --    < >  --    POTASSIUM mmol/L 4.5 4.1 4.6 5.0 4.5 4.7 4.9 4.6  --  5.0   CHLORIDE mmol/L 104 98 105 102 102 103 105 99  --  103   TOTAL CO2 mmol/L  --   --  30.5* 29.4*  --   --  27.8 33.0*  --  28   CO2 mmol/L 30.0* 29.0  --   --  31.0 32.0*  --   --   --   --    GLUCOSE mg/dL  --   --  122* 101*  --   --  128* 82  --  108*   BUN mg/dL 25* 31* 22 32* 21 26* 23 27*  --  23   CREATININE mg/dL 1.02 1.14 1.11 1.11 1.25 1.11 1.12 1.06  --  1.09   EGFR IF NONAFRICN AM mL/min/1.73  "69 61 63 63 55* 63 62 67  --  62   EGFR IF AFRICN AM mL/min/1.73  --   --  76 76  --   --  76 81  --  72   CALCIUM mg/dL 8.8 9.9 8.9 9.6 8.7 9.4 9.8 9.9  --  9.6    < > = values in this interval not displayed.     HEPATIC:  Lab Results - Last 18 Months   Lab Units 12/24/19  2246 09/30/19  0716 05/22/19  1211 05/15/19  0708 07/15/18  2300   ALT (SGPT) U/L 14 15 17 11 13   AST (SGOT) U/L 15 14 33 15 15   ALK PHOS U/L 72 56 59 55 55     THYROID:  Lab Results - Last 18 Months   Lab Units 09/30/19  0716 05/15/19  0708   TSH uIU/mL 2.540 2.570   FREE T4 ng/dL 1.13  --      A1C:  Lab Results - Last 18 Months   Lab Units 11/19/19  0719 09/30/19  0716 05/23/19  0423 05/15/19  0708 11/08/18  1328   HEMOGLOBIN A1C % 6.40* 6.30* 6.4 6.30* 6.30     PSA:  Lab Results - Last 18 Months   Lab Units 05/15/19  0708 11/08/18  1328   PSA ng/mL 1.100 1.230       Objective   /62 (BP Location: Left arm, Patient Position: Sitting, Cuff Size: Adult)   Pulse 52   Temp 97.9 °F (36.6 °C) (Oral)   Resp 18   Ht 181.6 cm (71.5\")   Wt 95.3 kg (210 lb)   SpO2 93% Comment: port oxygen at 3L per N/C  BMI 28.88 kg/m²   Body mass index is 28.88 kg/m².    Physical Exam  GENERAL:  Well nourished/developed in no acute distress. Obese   SKIN: Turgor excellent, without wound, rash, lesion; but does have hyperkeratotic pressure areas (callous) of several pressure areas of both feet.   HEENT: Normal cephalic without trauma.  Pupils equal round reactive to light. Extraocular motions full without nystagmus.    No thyroidmegaly, mass, tenderness, lymphadenopathy and supple.  CV: Regular rhythm.  No murmur, gallop,  edema. Posterior pulses intact.  No carotid bruits.  CHEST: No chest wall tenderness or mass.   LUNGS: Symmetric motion with clear to auscultation.   ABD: Soft, nontender without mass.   ORTHO: Symmetric extremities without swelling/point tenderness.  Full gross range of motion except hips reduced.  Hips sore with ROM.   NEURO: CN 2-12 " grossly intact.  Symmetric facies. 1/4 x bicep knee equal reflexes.  UE/LE   3/5 strength throughout except 2/5  L.  Nonfocal use extremities. Speech clear.  Light touch decreased bilateral feet.   PSYCH: Oriented x 3.  Pleasant calm, well kept.  Purposeful/directed conservation with intact short/long gross memory.      Assessment/Plan     1. Chest pain, unspecified type    2. Gastroesophageal reflux disease, esophagitis presence not specified    3. Dysphagia, unspecified type        Rx: reviewed/changes:  No orders of the defined types were placed in this encounter.    LAB/Testing/Referrals: reviewed/orders:   Today:   Orders Placed This Encounter   Procedures   • FL Esophagram Complete     Usual:   same    Discussions:   Maybe GI causes for chest pain and   Dysphagia issues; needs eval  Body mass index is 28.88 kg/m².   Patient's Body mass index is 28.88 kg/m². BMI is within normal parameters. No follow-up required..  Non-smoker  Gonzalo Durham  reports that he quit smoking about 40 years ago. His smoking use included cigarettes. He started smoking about 66 years ago. He has a 26.00 pack-year smoking history. He has never used smokeless tobacco..      There are no Patient Instructions on file for this visit.    Follow up: Return for lab;, Dr Romero-, as planned;.  Future Appointments   Date Time Provider Department Center   1/7/2020 10:00 AM PAD BIC XR FL 1 BH PAD XR BI PAD   1/27/2020  8:45 AM PACEMAKER HE[facility]   2/5/2020 10:15 AM Juan Marcelino MD [facility] N PAD None   2/27/2020  8:15 AM [facility]   3/26/2020  9:45 AM Bradley Carver MD [facility]   4/28/2020 11:15 AM Feliz Frye APRN [facility] RD PAD None   5/19/2020  8:20 AM LAB [facility] None   5/21/2020  9:15 AM Abel Romero MD [facility] None              Current outpatient and discharge medications have been reconciled for the  patient.

## 2020-01-06 ENCOUNTER — READMISSION MANAGEMENT (OUTPATIENT)
Dept: CALL CENTER | Facility: HOSPITAL | Age: 85
End: 2020-01-06

## 2020-01-07 ENCOUNTER — HOSPITAL ENCOUNTER (OUTPATIENT)
Dept: GENERAL RADIOLOGY | Facility: HOSPITAL | Age: 85
Discharge: HOME | End: 2020-01-07
Admitting: FAMILY MEDICINE

## 2020-01-07 DIAGNOSIS — K21.9 GASTROESOPHAGEAL REFLUX DISEASE, ESOPHAGITIS PRESENCE NOT SPECIFIED: ICD-10-CM

## 2020-01-07 DIAGNOSIS — R07.9 CHEST PAIN, UNSPECIFIED TYPE: ICD-10-CM

## 2020-01-07 DIAGNOSIS — R13.10 DYSPHAGIA, UNSPECIFIED TYPE: ICD-10-CM

## 2020-01-07 PROCEDURE — 63710000001 BARIUM SULFATE 96 % RECONSTITUTED SUSPENSION: Performed by: FAMILY MEDICINE

## 2020-01-07 PROCEDURE — 63710000001 BARIUM SULFATE 98 % RECONSTITUTED SUSPENSION: Performed by: FAMILY MEDICINE

## 2020-01-07 PROCEDURE — 74220 X-RAY XM ESOPHAGUS 1CNTRST: CPT

## 2020-01-07 PROCEDURE — A9270 NON-COVERED ITEM OR SERVICE: HCPCS | Performed by: FAMILY MEDICINE

## 2020-01-07 RX ADMIN — BARIUM SULFATE 120 ML: 960 POWDER, FOR SUSPENSION ORAL at 11:42

## 2020-01-07 RX ADMIN — BARIUM SULFATE 120 ML: 980 POWDER, FOR SUSPENSION ORAL at 11:42

## 2020-01-07 NOTE — OUTREACH NOTE
Medical Week 2 Survey      Responses   Facility patient discharged from?  [facility]   Does the patient have one of the following disease processes/diagnoses(primary or secondary)?  Other   Week 2 attempt successful?  Yes   Call start time  1957   Discharge diagnosis  Chest pain,   Call end time  1958   Person spoke with today (if not patient) and relationship  Suyapa-daughter   Meds reviewed with patient/caregiver?  Yes   Is the patient taking all medications as directed (includes completed medication regime)?  Yes   Has the patient kept scheduled appointments due by today?  Yes   Comments  Pt will have swallow test 1/7/20   Psychosocial issues?  No   Comments  Pt wears CPAP at night   What is the patient's perception of their health status since discharge?  Improving [Pt has a lot more energy and reports no chest pains. ]   Week 2 Call Completed?  Yes          Isidra Blackman RN

## 2020-01-17 ENCOUNTER — READMISSION MANAGEMENT (OUTPATIENT)
Dept: CALL CENTER | Facility: HOSPITAL | Age: 85
End: 2020-01-17

## 2020-01-17 NOTE — OUTREACH NOTE
Medical Week 3 Survey      Responses   Facility patient discharged from?  [facility]   Does the patient have one of the following disease processes/diagnoses(primary or secondary)?  Other   Week 3 attempt successful?  Yes   Call start time  1515   Call end time  1519   Discharge diagnosis  Chest pain   Meds reviewed with patient/caregiver?  Yes   Is the patient having any side effects they believe may be caused by any medication additions or changes?  No   Does the patient have all medications ordered at discharge?  N/A   Is the patient taking all medications as directed (includes completed medication regime)?  Yes   Does the patient have a primary care provider?   Yes   Comments regarding PCP  Dr. Romero   Does the patient have an appointment with their PCP within 7 days of discharge?  Yes   Has the patient kept scheduled appointments due by today?  Yes   Has home health visited the patient within 72 hours of discharge?  N/A   Psychosocial issues?  No   Did the patient receive a copy of their discharge instructions?  Yes   Nursing interventions  Reviewed instructions with patient   What is the patient's perception of their health status since discharge?  Improving   Is the patient/caregiver able to teach back signs and symptoms related to disease process for when to call PCP?  Yes   Is the patient/caregiver able to teach back signs and symptoms related to disease process for when to call 911?  Yes   Is the patient/caregiver able to teach back the hierarchy of who to call/visit for symptoms/problems? PCP, Specialist, Home health nurse, Urgent Care, ED, 911  Yes   Week 3 Call Completed?  Yes   Graduated  Yes   Did the patient feel the follow up calls were helpful during their recovery period?  Yes   Graduated/Revoked comments  States he is doing well, denies any needs. States he will call if he needs assistance.           Erika Mancilla RN

## 2020-01-20 ENCOUNTER — EPISODE CHANGES (OUTPATIENT)
Dept: CASE MANAGEMENT | Facility: OTHER | Age: 85
End: 2020-01-20

## 2020-01-27 ENCOUNTER — CLINICAL SUPPORT (OUTPATIENT)
Dept: CARDIOLOGY | Facility: CLINIC | Age: 85
End: 2020-01-27

## 2020-01-27 DIAGNOSIS — I63.9 CRYPTOGENIC STROKE (HCC): ICD-10-CM

## 2020-01-27 PROCEDURE — G2066 INTER DEVC REMOTE 30D: HCPCS | Performed by: PHYSICIAN ASSISTANT

## 2020-01-27 PROCEDURE — 93298 REM INTERROG DEV EVAL SCRMS: CPT | Performed by: PHYSICIAN ASSISTANT

## 2020-02-05 ENCOUNTER — OFFICE VISIT (OUTPATIENT)
Dept: NEUROLOGY | Facility: CLINIC | Age: 85
End: 2020-02-05

## 2020-02-05 VITALS
RESPIRATION RATE: 18 BRPM | BODY MASS INDEX: 28.71 KG/M2 | DIASTOLIC BLOOD PRESSURE: 72 MMHG | WEIGHT: 212 LBS | HEART RATE: 68 BPM | SYSTOLIC BLOOD PRESSURE: 118 MMHG | HEIGHT: 72 IN

## 2020-02-05 DIAGNOSIS — G47.33 OBSTRUCTIVE SLEEP APNEA: ICD-10-CM

## 2020-02-05 DIAGNOSIS — R41.3 MEMORY DIFFICULTY: ICD-10-CM

## 2020-02-05 DIAGNOSIS — I63.9 CEREBROVASCULAR ACCIDENT (CVA), UNSPECIFIED MECHANISM (HCC): Primary | ICD-10-CM

## 2020-02-05 PROCEDURE — 99214 OFFICE O/P EST MOD 30 MIN: CPT | Performed by: PSYCHIATRY & NEUROLOGY

## 2020-02-05 NOTE — PATIENT INSTRUCTIONS
Patient to have driving precautions and fall precautions and safety precautions as previously discussed.  Patient not to be climbing or using sharp cutting tools or working over hot fire/stove/grill/water.  Patient to get with PCP about weight and diet control

## 2020-02-05 NOTE — PROGRESS NOTES
Subjective   Gonzalo Durham, 1934, is a male who is being seen today for   Chief Complaint   Patient presents with   • Stroke   • Sleep Apnea       HISTORY OF PRESENT ILLNESS: Extended follow-up.  Patient has done well with the Pap device with good compliance and AHI of 7.6.  Patient is using 4 L/min oxygen with the CPAP at 9 cm water pressure.  Patient is a full facemask.  Patient uses [facility] as DME.  Overnight continuous oximetry on Pap and oxygen has not been done.  Patient had recent chest pain was thought to be indigestion.  Patient not had any stroke symptoms.  Patient denies any headaches.  Patient currently sleeping a lot more soundly with the Pap device and not having as much parasomnia and just feels better during the day.  [place] Sleepiness Scale is 13.  Patient does drive and patient warned in detail potential risks regarding that.  Patient to have driving precautions (patient insists on driving) and fall precautions and safety precautions as previously discussed.  Patient's memory evaluation did not show any significant abnormalities.  MMSE today is 29 of 30.  Neck circumference is 17-1/2 inches.  Patient uses a cane and is slightly unsteady at times  REVIEW OF SYSTEMS:   GENERAL: Blood pressure today 118/72 left arm seated in same standing with pulse 68 and irregular  PULMONARY: As above  CVS: As above  GASTROINTESTINAL: No acute GI distress  GENITOURINARY: No acute  distress  GYN: Not applicable  MUSCULOSKELETAL: No acute musculoskeletal symptoms  HEENT: Patient has macular degeneration right eye and gets shots.  Patient wears hearing aids bilaterally.  ENDOCRINE:  No acute endocrine symptoms other than his diabetes  PSYCHIATRIC: No acute psychiatric symptoms  HEMATOLOGY: Borderline anemic  SKIN: No acute skin changes  Family history reviewed and otherwise noncontributory except there is family history of stroke  Social history: Patient denies smoking or drug or alcohol use  PHYSICAL  EXAMINATION:    GENERAL: No acute distress  CRANIUM: Normocephalic/atraumatic  HEENT:       EYES: EOMs intact without nystagmus and fields full to confrontation in the left eye but difficult to assess in the right.  No acute fundic abnormalities.  Pupils equal round reactive to light.       EARS:  Tympanic membranes normal and hears tuning fork bilaterally with hearing aids in.       THROAT: No acute oropharynx abnormalities.Mallampati 2     NECK:  No bruits/no lymphadenopathy  CHEST: No acute cardiopulmonary abnormalities by auscultation  ABDOMEN: Nondistended  EXTREMITIES: Dorsalis pedis pulses symmetrical  NEURO: Patient alert and follows commands without difficulty  SPEECH: Normal    CRANIAL NERVES: Motor/sensory about the face normal and symmetric    MOTOR STRENGTH: Motor strength upper and lower extremities normal  STATION AND GAIT: Gait uses a cane/ slightly wide-based but no tendency to fall and Romberg negative  CEREBELLAR:  Finger-nose and heel-to-shin normal  SENSORY: Some decrease in pin and vibration distal proximal in upper extremities to mid palm bilaterally in the lower extremities to ankles bilaterally.    REFLEXES: Reflexes decreased to absent at the ankle jerk bilaterally and decreased but present knee jerks and biceps.  No clonus or Babinski      ASSESSMENT AND PLAN: Patient with history of CVA and memory difficulty stable.  Patient has the WENDY which is showing good response to the Pap device and oxygen and to get overnight continuous oximetry on Pap and oxygen.  I spent 25 minutes with this patient with 15 minutes counseling.Patient's Body mass index is 28.75 kg/m². BMI is above normal parameters. Recommendations include: referral to primary care.      Gonzalo was seen today for stroke and sleep apnea.    Diagnoses and all orders for this visit:    Cerebrovascular accident (CVA), unspecified mechanism (CMS/HCC)    Memory difficulty    Obstructive sleep apnea  -     Overnight Sleep Oximetry Study;  Future        Dictated utilizing Dragon voice recognition software

## 2020-02-17 DIAGNOSIS — G47.33 OBSTRUCTIVE SLEEP APNEA: ICD-10-CM

## 2020-02-26 ENCOUNTER — OFFICE VISIT (OUTPATIENT)
Dept: FAMILY MEDICINE CLINIC | Facility: CLINIC | Age: 85
End: 2020-02-26

## 2020-02-26 VITALS
DIASTOLIC BLOOD PRESSURE: 74 MMHG | SYSTOLIC BLOOD PRESSURE: 120 MMHG | OXYGEN SATURATION: 91 % | HEART RATE: 76 BPM | BODY MASS INDEX: 28.44 KG/M2 | TEMPERATURE: 97.5 F | WEIGHT: 210 LBS | RESPIRATION RATE: 18 BRPM | HEIGHT: 72 IN

## 2020-02-26 DIAGNOSIS — E11.9 CONTROLLED TYPE 2 DIABETES MELLITUS WITHOUT COMPLICATION, WITHOUT LONG-TERM CURRENT USE OF INSULIN (HCC): Chronic | ICD-10-CM

## 2020-02-26 DIAGNOSIS — I50.32 CHRONIC DIASTOLIC CONGESTIVE HEART FAILURE (HCC): ICD-10-CM

## 2020-02-26 DIAGNOSIS — I10 ESSENTIAL HYPERTENSION: Chronic | ICD-10-CM

## 2020-02-26 DIAGNOSIS — J44.9 STAGE 2 MODERATE COPD BY GOLD CLASSIFICATION (HCC): ICD-10-CM

## 2020-02-26 DIAGNOSIS — J98.4 CHRONIC LUNG DISEASE: ICD-10-CM

## 2020-02-26 DIAGNOSIS — N40.0 PROSTATISM: Chronic | ICD-10-CM

## 2020-02-26 DIAGNOSIS — J96.11 CHRONIC RESPIRATORY FAILURE WITH HYPOXIA (HCC): ICD-10-CM

## 2020-02-26 DIAGNOSIS — R05.9 COUGH: ICD-10-CM

## 2020-02-26 PROCEDURE — 99213 OFFICE O/P EST LOW 20 MIN: CPT | Performed by: FAMILY MEDICINE

## 2020-02-26 RX ORDER — LANCETS 28 GAUGE
EACH MISCELLANEOUS
Qty: 100 EACH | Refills: 2 | Status: SHIPPED | OUTPATIENT
Start: 2020-02-26 | End: 2021-04-08 | Stop reason: SDUPTHER

## 2020-02-26 RX ORDER — TERAZOSIN 10 MG/1
10 CAPSULE ORAL DAILY
Qty: 90 CAPSULE | Refills: 2 | Status: SHIPPED | OUTPATIENT
Start: 2020-02-26 | End: 2020-07-17 | Stop reason: SDUPTHER

## 2020-02-26 RX ORDER — BLOOD-GLUCOSE METER
1 KIT MISCELLANEOUS DAILY
Qty: 100 EACH | Refills: 2 | Status: SHIPPED | OUTPATIENT
Start: 2020-02-26 | End: 2020-07-17 | Stop reason: SDUPTHER

## 2020-02-26 RX ORDER — METFORMIN HYDROCHLORIDE 500 MG/1
500 TABLET, EXTENDED RELEASE ORAL NIGHTLY
Qty: 90 TABLET | Refills: 2 | Status: SHIPPED | OUTPATIENT
Start: 2020-02-26 | End: 2020-06-08 | Stop reason: SDUPTHER

## 2020-02-26 RX ORDER — FINASTERIDE 5 MG/1
5 TABLET, FILM COATED ORAL DAILY
Qty: 90 TABLET | Refills: 2 | Status: SHIPPED | OUTPATIENT
Start: 2020-02-26 | End: 2020-07-08 | Stop reason: SDUPTHER

## 2020-02-26 RX ORDER — CEFDINIR 300 MG/1
300 CAPSULE ORAL 2 TIMES DAILY
Qty: 20 CAPSULE | Refills: 0 | Status: SHIPPED | OUTPATIENT
Start: 2020-02-26 | End: 2020-03-09

## 2020-02-26 NOTE — PROGRESS NOTES
Subjective   Gonzalo Durham is a 86 y.o. male presenting with chief complaint of:   Chief Complaint   Patient presents with   • Cough     differently last 4 days, pulmonary test done yesterday and they told him he was problematic for pneumonia       History of Present Illness :  With daughter.  Here for primarily an acute issue today; increased cough with more productive component.  Had PFTs, CXR [place] [facility].    No fever, wheezing, hemoptysis, increased SOB.       Has multiple chronic problems to consider that might have a bearing on today's issues; not an interval appointment.       Chronic/acute problems reviewed today:   1. Controlled type 2 diabetes mellitus without complication, without long-term current use of insulin (CMS/[facility]): Chronic/stable.  No problem/pattern hypoglycemia/hyperglycemia manifest by poly- dypsia, phagia, uria, or sweats, diaphoretic episodes, syncope/near. Consideration of steroids needed     2. Prostatism-young available Chronic/stable.  Slower but tolerated stream with complete emptying.  Nocturia on occ; tolerated.  No desire to persure evaluations/surgery.      3. Chronic lung disease Chronic/stable mild occ cough, sob, wheeze.  Rx helps.   No smoking.       4. Essential hypertension Chronic/stable. Stable here/if home blood pressures.  No significant chest pain, SOB, LE edema, orthopnea, near syncope, dizziness/light headness.      5. Chronic diastolic congestive heart failure (CMS/HCC) Chronic/stable.  Denies significant sob, orthopnea, leg edema, weight gain.  Aware of influence diet/salt and watching weight at home.       6. Stage 2 moderate COPD by GOLD classification (CMS/HCC) : see above   7. Chronic respiratory failure with hypoxia (CMS/HCC): chronic/stable with hypoxemia; chronic need for oxygen.    8. Cough: acute/worse than usual.      Has an/another acute issue today: none.    The following portions of the patient's history were reviewed and  updated as appropriate: allergies, current medications, past family history, past medical history, past social history, past surgical history and problem list.      Current Outpatient Medications:   •  acetaminophen (TYLENOL) 325 MG tablet, Take 2 tablets by mouth 2 (Two) Times a Day., Disp: 360 tablet, Rfl: 2  •  albuterol sulfate HFA (PROAIR HFA) 108 (90 Base) MCG/ACT inhaler, Inhale 2 puffs 4 (Four) Times a Day., Disp: 1 inhaler, Rfl: 1  •  apixaban (ELIQUIS) 5 MG tablet tablet, Take 1 tablet by mouth Every 12 (Twelve) Hours., Disp: 180 tablet, Rfl: 3  •  Artificial Tear Solution (JUST TEARS EYE DROPS) solution, Apply 1-2 drops to eye(s) as directed by provider Daily As Needed (dry eyes)., Disp: 45 mL, Rfl: 2  •  aspirin 81 MG EC tablet, Take 1 tablet by mouth Daily., Disp: , Rfl:   •  calcium carbonate-vitamin d 600-400 MG-UNIT per tablet, Take 1 tablet by mouth 2 (Two) Times a Day., Disp: 180 tablet, Rfl: 2  •  finasteride (PROSCAR) 5 MG tablet, Take 1 tablet by mouth Daily., Disp: 90 tablet, Rfl: 2  •  FREESTYLE LITE test strip, 1 each by Other route Daily., Disp: 100 each, Rfl: 2  •  gabapentin (NEURONTIN) 100 MG capsule, TAKE 2 CAPSULES BY MOUTH TWICE DAILY, Disp: 360 capsule, Rfl: 1  •  glyBURIDE (DIAbeta) 5 MG tablet, Take 1 tablet by mouth Every Morning AND 0.5 tablets Daily Before Supper., Disp: , Rfl:   •  guaiFENesin (MUCINEX) 600 MG 12 hr tablet, Take 1 tablet by mouth 2 (Two) Times a Day., Disp: 180 tablet, Rfl: 2  •  isosorbide mononitrate (IMDUR) 30 MG 24 hr tablet, Take 1 tablet by mouth Daily., Disp: 90 tablet, Rfl: 3  •  Lancets (FREESTYLE) lancets, Use once daily, Disp: 100 each, Rfl: 2  •  loratadine (CLARITIN) 10 MG tablet, Take 1 tablet by mouth Daily., Disp: 90 tablet, Rfl: 2  •  metFORMIN ER (GLUCOPHAGE XR) 500 MG 24 hr tablet, Take 1 tablet by mouth Every Night., Disp: 90 tablet, Rfl: 2  •  metoprolol tartrate (LOPRESSOR) 25 MG tablet, Take 1 tablet by mouth 2 (Two) Times a Day., Disp:  180 tablet, Rfl: 3  •  Multiple Vitamins-Minerals (CENTRUM SILVER ADULT 50+) tablet, Take 50 mg by mouth Daily., Disp: 90 tablet, Rfl: 1  •  Multiple Vitamins-Minerals (PRESERVISION AREDS 2+MULTI VIT) capsule, Take 1 capsule by mouth Daily., Disp: 90 capsule, Rfl: 1  •  nitroglycerin (NITROSTAT) 0.4 MG SL tablet, Place 1 tablet under the tongue Every 5 (Five) Minutes As Needed for Chest Pain., Disp: 30 tablet, Rfl: 0  •  O2 (OXYGEN), Inhale 4 L/min Continuous. Per nasal cannula, Disp: , Rfl:   •  pantoprazole (PROTONIX) 40 MG EC tablet, Take 1 tablet by mouth Daily., Disp: 90 tablet, Rfl: 2  •  pravastatin (PRAVACHOL) 80 MG tablet, Take 1 tablet by mouth Daily., Disp: 90 tablet, Rfl: 2  •  terazosin (HYTRIN) 10 MG capsule, Take 1 capsule by mouth Daily., Disp: 90 capsule, Rfl: 2  •  tiotropium bromide monohydrate (SPIRIVA RESPIMAT) 2.5 MCG/ACT aerosol solution inhaler, Inhale 2 puffs Daily., Disp: 3 inhaler, Rfl: 2  •  vitamin C (ASCORBIC ACID) 500 MG tablet, Take 1 tablet by mouth Daily., Disp: 90 tablet, Rfl: 1    No problems with medications.  Refills if needed done    Allergies   Allergen Reactions   • Symbicort [Budesonide-Formoterol Fumarate] Dizziness     Patient passed out and fell   • Prozac [Fluoxetine Hcl] Mental Status Change     Bad dreams.       Review of Systems    GENERAL:  Active/slower with limits, speed, samni for age and SOB.  Sleep is ok.   SKIN:  Ongoing callous of feet; no problems with this/other skin today.   ENDO:  No syncope, near or diaphoretic sweaty spells.  BS Ok: without download noted last visit.   HEENT: No head injury, headache.  No vision change.  Same mild hearing loss.  Ears without pain/drainage.  No sore throat.  No significant nasal/sinus congestion/drainage. No epistaxis.  CHEST: No chest wall tenderness or mass. More cough/more productive without wheeze.  Mild/same SOB; no hemoptysis.  CV: No chest pain, palpatations, ankle edema.  GI: No heartburn but occ catching of foods  (without choking); dysphagia.  No abdominal pain, diarrhea, constipation, rectal bleeding, or melena.    :  Voids without dysuria, or incontience to completion.  ORTHO: No painful/swollen joints but various on /off sore.  No change occ sore neck or back.  No acute neck or back pain without recent injury.   NEURO: No dizziness; recent LUE weakness of extremities.  No change some LE numbness/parethesias.   PSYCH: No memory loss.  Mood good; not that anxious, depressed but/and not suicidal.  Tolerated stress.   Screening:  Mammogram: NA  Bone density: NA  Low dose CT chest: Tobacco-smoker/age 22/1 ppd/dc 1980: (22): NA (had CT angio)  GI: Colon-div/[facility]/6.15.17/prn  Prostate: [place]/confirmed 11.22.19  [place] in past   Usual lab order  6m CBC, BMP, A1c  12m CBC, CMP, A1c, TSH, LIPID, PSAs, Vit D    Lab Results:  Results for orders placed or performed during the hospital encounter of 12/24/19   Troponin   Result Value Ref Range    Troponin T <0.010 0.000 - 0.030 ng/mL   Comprehensive Metabolic Panel   Result Value Ref Range    Glucose 140 (H) 65 - 99 mg/dL    BUN 31 (H) 8 - 23 mg/dL    Creatinine 1.14 0.76 - 1.27 mg/dL    Sodium 140 136 - 145 mmol/L    Potassium 4.1 3.5 - 5.2 mmol/L    Chloride 98 98 - 107 mmol/L    CO2 29.0 22.0 - 29.0 mmol/L    Calcium 9.9 8.6 - 10.5 mg/dL    Total Protein 7.3 6.0 - 8.5 g/dL    Albumin 4.40 3.50 - 5.20 g/dL    ALT (SGPT) 14 1 - 41 U/L    AST (SGOT) 15 1 - 40 U/L    Alkaline Phosphatase 72 39 - 117 U/L    Total Bilirubin <0.2 (L) 0.2 - 1.2 mg/dL    eGFR Non African Amer 61 >60 mL/min/1.73    Globulin 2.9 gm/dL    A/G Ratio 1.5 g/dL    BUN/Creatinine Ratio 27.2 (H) 7.0 - 25.0    Anion Gap 13.0 5.0 - 15.0 mmol/L   Protime-INR   Result Value Ref Range    Protime 13.4 11.9 - 14.6 Seconds    INR 0.99 0.91 - 1.09   aPTT   Result Value Ref Range    PTT 34.2 24.1 - 35.0 seconds   CBC Auto Differential   Result Value Ref Range    WBC 8.88 3.40 - 10.80 10*3/mm3    RBC 4.40 4.14 - 5.80 10*6/mm3     Hemoglobin 12.9 (L) 13.0 - 17.7 g/dL    Hematocrit 38.5 37.5 - 51.0 %    MCV 87.5 79.0 - 97.0 fL    MCH 29.3 26.6 - 33.0 pg    MCHC 33.5 31.5 - 35.7 g/dL    RDW 13.0 12.3 - 15.4 %    RDW-SD 42.2 37.0 - 54.0 fl    MPV 9.7 6.0 - 12.0 fL    Platelets 230 140 - 450 10*3/mm3    Neutrophil % 59.5 42.7 - 76.0 %    Lymphocyte % 24.2 19.6 - 45.3 %    Monocyte % 7.5 5.0 - 12.0 %    Eosinophil % 7.9 (H) 0.3 - 6.2 %    Basophil % 0.7 0.0 - 1.5 %    Immature Grans % 0.2 0.0 - 0.5 %    Neutrophils, Absolute 5.28 1.70 - 7.00 10*3/mm3    Lymphocytes, Absolute 2.15 0.70 - 3.10 10*3/mm3    Monocytes, Absolute 0.67 0.10 - 0.90 10*3/mm3    Eosinophils, Absolute 0.70 (H) 0.00 - 0.40 10*3/mm3    Basophils, Absolute 0.06 0.00 - 0.20 10*3/mm3    Immature Grans, Absolute 0.02 0.00 - 0.05 10*3/mm3    nRBC 0.0 0.0 - 0.2 /100 WBC   Troponin   Result Value Ref Range    Troponin T <0.010 0.000 - 0.030 ng/mL   Troponin   Result Value Ref Range    Troponin T <0.010 0.000 - 0.030 ng/mL   D-dimer, Quantitative   Result Value Ref Range    D-Dimer, Quantitative 0.27 0.00 - 0.50 mg/L (FEU)   Basic Metabolic Panel   Result Value Ref Range    Glucose 124 (H) 65 - 99 mg/dL    BUN 25 (H) 8 - 23 mg/dL    Creatinine 1.02 0.76 - 1.27 mg/dL    Sodium 143 136 - 145 mmol/L    Potassium 4.5 3.5 - 5.2 mmol/L    Chloride 104 98 - 107 mmol/L    CO2 30.0 (H) 22.0 - 29.0 mmol/L    Calcium 8.8 8.6 - 10.5 mg/dL    eGFR Non African Amer 69 >60 mL/min/1.73    BUN/Creatinine Ratio 24.5 7.0 - 25.0    Anion Gap 9.0 5.0 - 15.0 mmol/L   CBC Auto Differential   Result Value Ref Range    WBC 7.63 3.40 - 10.80 10*3/mm3    RBC 4.09 (L) 4.14 - 5.80 10*6/mm3    Hemoglobin 11.9 (L) 13.0 - 17.7 g/dL    Hematocrit 36.9 (L) 37.5 - 51.0 %    MCV 90.2 79.0 - 97.0 fL    MCH 29.1 26.6 - 33.0 pg    MCHC 32.2 31.5 - 35.7 g/dL    RDW 13.2 12.3 - 15.4 %    RDW-SD 43.9 37.0 - 54.0 fl    MPV 10.2 6.0 - 12.0 fL    Platelets 175 140 - 450 10*3/mm3    Neutrophil % 55.4 42.7 - 76.0 %    Lymphocyte  % 22.3 19.6 - 45.3 %    Monocyte % 11.1 5.0 - 12.0 %    Eosinophil % 10.1 (H) 0.3 - 6.2 %    Basophil % 0.8 0.0 - 1.5 %    Immature Grans % 0.3 0.0 - 0.5 %    Neutrophils, Absolute 4.23 1.70 - 7.00 10*3/mm3    Lymphocytes, Absolute 1.70 0.70 - 3.10 10*3/mm3    Monocytes, Absolute 0.85 0.10 - 0.90 10*3/mm3    Eosinophils, Absolute 0.77 (H) 0.00 - 0.40 10*3/mm3    Basophils, Absolute 0.06 0.00 - 0.20 10*3/mm3    Immature Grans, Absolute 0.02 0.00 - 0.05 10*3/mm3    nRBC 0.0 0.0 - 0.2 /100 WBC   Stress Test With Myocardial Perfusion One Day   Result Value Ref Range    BH CV STRESS PROTOCOL 1 Pharmacologic     Stage 1 1     HR Stage 1 81     BP Stage 1 133/66     Duration Min Stage 1 0     Duration Sec Stage 1 10     Stress Dose Regadenoson Stage 1 0.4     Stress Comments Stage 1 10 sec bolus injection     Baseline HR 75 bpm    Baseline /77 mmHg    Peak HR 81 bpm    Percent Max Pred HR 60.00 %    Percent Target HR 71 %    Peak /66 mmHg    Recovery HR 75 bpm    Recovery /61 mmHg    Target HR (85%) 115 bpm    Max. Pred. HR (100%) 135 bpm    Exercise duration (sec) 10 sec    Nuc Stress EF 57 %   POC Glucose Once   Result Value Ref Range    Glucose 132 (H) 70 - 130 mg/dL   POC Glucose Once   Result Value Ref Range    Glucose 132 (H) 70 - 130 mg/dL   POC Glucose Once   Result Value Ref Range    Glucose 165 (H) 70 - 130 mg/dL   POC Glucose Once   Result Value Ref Range    Glucose 179 (H) 70 - 130 mg/dL   POC Glucose Once   Result Value Ref Range    Glucose 116 70 - 130 mg/dL   POC Glucose Once   Result Value Ref Range    Glucose 136 (H) 70 - 130 mg/dL   POC Glucose Once   Result Value Ref Range    Glucose 193 (H) 70 - 130 mg/dL   Adult Transthoracic Echo Limited W/ Cont if Necessary Per Protocol   Result Value Ref Range    BSA 2.2 m^2    IVSd 1.5 cm    LVIDd 4.1 cm    LVIDs 3.1 cm    LVPWd 1.2 cm    IVS/LVPW 1.7     FS 25.1 %    EDV(Teich) 74.7 ml    ESV(Teich) 37.3 ml    EF(Teich) 50.0 %    EDV(cubed) 69.4  ml    ESV(cubed) 29.2 ml    EF(cubed) 57.9 %    LV mass(C)d 176.1 grams    LV mass(C)dI 80.7 grams/m^2    SV(Teich) 37.3 ml    SI(Teich) 17.1 ml/m^2    SV(cubed) 40.2 ml    SI(cubed) 18.4 ml/m^2    Ao root diam 4.1 cm    Ao root area 13.2 cm^2    LA dimension 4.6 cm    LA/Ao 1.1     LVOT diam 2.5 cm    LVOT area 4.9 cm^2    LVOT area(traced) 4.9 cm^2    LVLd ap4 8.8 cm    EDV(MOD-sp4) 113.0 ml    LVLs ap4 7.8 cm    ESV(MOD-sp4) 49.5 ml    EF(MOD-sp4) 56.2 %    SV(MOD-sp4) 63.5 ml    SI(MOD-sp4) 29.1 ml/m^2    Ao root area (BSA corrected) 1.9     LV Shaikh Vol (BSA corrected) 51.8 ml/m^2    LV Sys Vol (BSA corrected) 22.7 ml/m^2    MV E max chandler 83.9 cm/sec    MV A max chandler 86.8 cm/sec    MV E/A 0.97     MV dec slope 370.0 cm/sec^2    MV dec time 0.23 sec    Ao pk chandler 123.0 cm/sec    Ao max PG 6.1 mmHg    Ao max PG (full) 4.2 mmHg    Ao V2 mean 86.3 cm/sec    Ao mean PG 3.0 mmHg    Ao mean PG (full) 2.0 mmHg    Ao V2 VTI 33.9 cm    TEMI(I,A) 2.3 cm^2    TEMI(I,D) 2.3 cm^2    TEMI(V,A) 2.7 cm^2    TEMI(V,D) 2.7 cm^2    LV V1 max PG 1.8 mmHg    LV V1 mean PG 1.0 mmHg    LV V1 max 67.2 cm/sec    LV V1 mean 54.2 cm/sec    LV V1 VTI 16.1 cm    SV(Ao) 447.6 ml    SI(Ao) 205.0 ml/m^2    SV(LVOT) 79.0 ml    SI(LVOT) 36.2 ml/m^2    TR max chandler 168.0 cm/sec    RVSP(TR) 16.3 mmHg    RAP systole 5.0 mmHg     CV ECHO JANAE - BZI_BMI 30.3 kilograms/m^2     CV ECHO JANAE - BSA(HAYCOCK) 2.2 m^2     CV ECHO JANAE - BZI_METRIC_WEIGHT 98.4 kg     CV ECHO JANAE - BZI_METRIC_HEIGHT 180.3 cm    Target HR (85%) 115 bpm    Max. Pred. HR (100%) 135 bpm    Avg E/e' ratio 10.80     Lat Peak E' Chandler 10.1 cm/sec    Med Peak E' Chandler 5.44 cm/sec    Echo EF Estimated 55 %   Light Blue Top   Result Value Ref Range    Extra Tube hold for add-on    Green Top (Gel)   Result Value Ref Range    Extra Tube Hold for add-ons.    Lavender Top   Result Value Ref Range    Extra Tube hold for add-on    Red Top   Result Value Ref Range    Extra Tube Hold for add-ons.   "      A1C:  Lab Results - Last 18 Months   Lab Units 11/19/19  0719 09/30/19  0716 05/23/19  0423 05/15/19  0708 11/08/18  1328   HEMOGLOBIN A1C % 6.40* 6.30* 6.4 6.30* 6.30     PSA:  Lab Results - Last 18 Months   Lab Units 05/15/19  0708 11/08/18  1328   PSA ng/mL 1.100 1.230     CBC:  Lab Results - Last 18 Months   Lab Units 12/26/19 0259 12/24/19 2246 11/19/19 0719 09/30/19  0716 05/23/19  0423 05/22/19  1211 05/15/19  0708 11/14/18  0845   WBC 10*3/mm3 7.63 8.88 8.11 8.18 8.12 6.98 6.72  --    HEMOGLOBIN g/dL 11.9* 12.9* 12.4* 12.9* 11.9* 13.4* 12.3*  --    HEMATOCRIT % 36.9* 38.5 38.5 39.5 38.1* 41.3 40.6  --    PLATELETS 10*3/mm3 175 230 220 251 229 253 234  --    IRON mcg/dL  --   --   --   --   --   --   --  81      BMP/CMP:  Lab Results - Last 18 Months   Lab Units 12/26/19 0259 12/24/19 2246 11/19/19 0719 09/30/19 0716 05/23/19  0423 05/22/19  1211 05/15/19  0708 11/08/18  1328   SODIUM mmol/L 143 140 147* 144 141 141 144 141   POTASSIUM mmol/L 4.5 4.1 4.6 5.0 4.5 4.7 4.9 4.6   CHLORIDE mmol/L 104 98 105 102 102 103 105 99   TOTAL CO2 mmol/L  --   --  30.5* 29.4*  --   --  27.8 33.0*   CO2 mmol/L 30.0* 29.0  --   --  31.0 32.0*  --   --    GLUCOSE mg/dL  --   --  122* 101*  --   --  128* 82   BUN mg/dL 25* 31* 22 32* 21 26* 23 27*   CREATININE mg/dL 1.02 1.14 1.11 1.11 1.25 1.11 1.12 1.06   EGFR IF NONAFRICN AM mL/min/1.73 69 61 63 63 55* 63 62 67   EGFR IF AFRICN AM mL/min/1.73  --   --  76 76  --   --  76 81   CALCIUM mg/dL 8.8 9.9 8.9 9.6 8.7 9.4 9.8 9.9     HEPATIC:  Lab Results - Last 18 Months   Lab Units 12/24/19  2246 09/30/19  0716 05/22/19  1211 05/15/19  0708   ALT (SGPT) U/L 14 15 17 11   AST (SGOT) U/L 15 14 33 15   ALK PHOS U/L 72 56 59 55     THYROID:  Lab Results - Last 18 Months   Lab Units 09/30/19  0716 05/15/19  0708   TSH uIU/mL 2.540 2.570       Objective   /74   Pulse 76   Temp 97.5 °F (36.4 °C)   Resp 18   Ht 182.9 cm (72\")   Wt 95.3 kg (210 lb)   SpO2 91%   BMI " 28.48 kg/m²   Body mass index is 28.48 kg/m².    Physical Exam  GENERAL:  Well nourished/developed in no acute distress.   SKIN: Turgor excellent, without wound, rash, lesion  HEENT: Normal cephalic without trauma.  Pupils equal round reactive to light. Extraocular motions full without nystagmus.    No thyroidmegaly, mass, tenderness, lymphadenopathy and supple.  CV: Regular rhythm.  No murmur, gallop,  edema. Posterior pulses intact.  No carotid bruits.  CHEST: No chest wall tenderness or mass.   LUNGS: Symmetric motion with clear/decreased to auscultation.   ABD: Soft, nontender without mass.   ORTHO: Symmetric extremities without swelling/point tenderness.  Full gross range of motion except hips reduced.  Hips sore with ROM.   NEURO: CN 2-12 grossly intact.  Symmetric facies. 1/4 x bicep knee equal reflexes.  UE/LE   3/5 strength throughout except 2/5  L.  Nonfocal use extremities. Speech clear.  Light touch decreased bilateral feet.   PSYCH: Oriented x 3.  Pleasant calm, well kept.  Purposeful/directed conservation with intact short/long gross memory.     Assessment/Plan     1. Controlled type 2 diabetes mellitus without complication, without long-term current use of insulin (CMS/[facility])    2. Prostatism-young available    3. Chronic lung disease    4. Essential hypertension    5. Chronic diastolic congestive heart failure (CMS/[facility])    6. Stage 2 moderate COPD by GOLD classification (CMS/[facility])    7. Chronic respiratory failure with hypoxia (CMS/[facility])    8. Cough        Rx: reviewed/changes:  New Medications Ordered This Visit   Medications   • Lancets (FREESTYLE) lancets     Sig: Use once daily     Dispense:  100 each     Refill:  2   • FREESTYLE LITE test strip     Si each by Other route Daily.     Dispense:  100 each     Refill:  2   • finasteride (PROSCAR) 5 MG tablet     Sig: Take 1 tablet by mouth Daily.     Dispense:  90 tablet     Refill:  2     90 day supply please   • terazosin (HYTRIN) 10 MG capsule      Sig: Take 1 capsule by mouth Daily.     Dispense:  90 capsule     Refill:  2   • metFORMIN ER (GLUCOPHAGE XR) 500 MG 24 hr tablet     Sig: Take 1 tablet by mouth Every Night.     Dispense:  90 tablet     Refill:  2   • tiotropium bromide monohydrate (SPIRIVA RESPIMAT) 2.5 MCG/ACT aerosol solution inhaler     Sig: Inhale 2 puffs Daily.     Dispense:  3 inhaler     Refill:  2     90 day supply please   • cefdinir (OMNICEF) 300 MG capsule     Sig: Take 1 capsule by mouth 2 (Two) Times a Day.     Dispense:  20 capsule     Refill:  0       LAB/Testing/Referrals: reviewed/orders:   Today:   No orders of the defined types were placed in this encounter.    Chronic/recurrent labs above or change to:   same    Discussions:   Try above  Body mass index is 28.48 kg/m².  Patient's Body mass index is 28.48 kg/m². BMI is within normal parameters. No follow-up required..    Tobacco use reviewed:    Non-smoker  Gonzalo Durham  reports that he quit smoking about 40 years ago. His smoking use included cigarettes. He started smoking about 66 years ago. He has a 26.00 pack-year smoking history. He has never used smokeless tobacco..   There are no Patient Instructions on file for this visit.    Follow up: Return for lab;, Dr Romero-, as planned;.  Future Appointments   Date Time Provider Department Center   2/27/2020  8:15 AM [facility]   3/26/2020  9:45 AM Bradley Carver MD [facility]   4/28/2020 11:15 AM Feliz Frye APRN [facility] None   5/6/2020 10:15 AM Juan Marcelino MD [facility]   5/19/2020  8:20 AM LAB [facility] None   5/21/2020  9:15 AM Abel Romero MD [facility] None

## 2020-02-27 ENCOUNTER — PATIENT OUTREACH (OUTPATIENT)
Dept: CASE MANAGEMENT | Facility: OTHER | Age: 85
End: 2020-02-27

## 2020-02-27 NOTE — OUTREACH NOTE
Care Coordination Assessment    Documented/Reviewed By:  Elizabet Rao RN Date/time:  2/27/2020 12:58 PM   Assessment completed with:  children  Enrolled in care management program:  No  Living arrangement:  spouse  Support system:  children  Type of residence:  private residence  Home care services:  No  Equipment used at home:   (Comment: glucometer)  Communication device:  Yes  Bed or wheelchair confined:  No  Inadequate nutrition:  No  Medication adherence problem:  No  Experiencing side effects from current medications:  No  Difficulty keeping appointments:  No  Family aware of the patient's advance care planning wishes:  Yes (Comment: has advance directive)

## 2020-02-27 NOTE — OUTREACH NOTE
"Care Plan Note      Responses   Annual Wellness Visit:   Patient Will Schedule [daughter will schedule]   Care Gaps Addressed  Colon Cancer Screening, Diabetic A1C, Diabetic Eye Exam, Flu Shot, Pneumonia Vaccine   Colon Cancer Screening Type  Exempt   HbA1c Status  Up to Date-within defined limits   HbA1c Completion at [facility] or Other  [facility]   HbA1c Comments  6.40 on 11-19-19   Diabetic Eye Exam Status  Up to Date   Diabetic Eye Exam Completion at [facility] or Other  Other   Other Location  completed at [facility] on 1-16-20   Flu Shot Status  Up to Date   Pneumonia Vaccine Status  Up to Date   Pneumonia Vaccine Comments  up to date per progress notes in 2019   Other Patient Education/Resources   -- [provided ACM contact information]   Does patient have depression diagnosis?  Yes   Advanced Directives:  Patient Has   Ed Visits past 12 months:  None   Hospitalizations past 12 months  1   Discharged From:  [facility]   Discharged to:  home   Admit Date:  12/24/19   Discharge Date:  12/26/19   Discharge destination:  Home   Medication Adherence  Medications understood [per daughter]        The main concerns and/or symptoms the patient would like to address are: Patient discharged from [facility] 12-26-19 for chest pain.    Education/instruction provided by Care Coordinator: ACM outreach with patient's daughter. Patient's daughter stated he saw his PCP yesterday due [facility] might be trying to get [facility]\". He was prescribed antibiotics;filled and educated to take until complete, increase fluids. He has a cough \"just sounds different\" but is non-productive. Daughter stated his is ambulatory and \"gets out and about\" of the home. He has assistance per a paid caregiver with household chores. Daughter provides transportation; see [facility] screen(spouse with dementia). Daughter lives next door and is very involved in his care. Daughter denies needs today;provided ACM contact information.     Follow " Up Outreach Due: as needed    Elizabet Rao RN  Ambulatory     2/27/2020, 1:08 PM

## 2020-02-28 ENCOUNTER — CLINICAL SUPPORT (OUTPATIENT)
Dept: CARDIOLOGY | Facility: CLINIC | Age: 85
End: 2020-02-28

## 2020-02-28 DIAGNOSIS — I63.9 CRYPTOGENIC STROKE (HCC): ICD-10-CM

## 2020-02-28 PROCEDURE — 93298 REM INTERROG DEV EVAL SCRMS: CPT | Performed by: PHYSICIAN ASSISTANT

## 2020-02-28 PROCEDURE — G2066 INTER DEVC REMOTE 30D: HCPCS | Performed by: PHYSICIAN ASSISTANT

## 2020-03-03 ENCOUNTER — EPISODE CHANGES (OUTPATIENT)
Dept: CASE MANAGEMENT | Facility: OTHER | Age: 85
End: 2020-03-03

## 2020-03-09 ENCOUNTER — OFFICE VISIT (OUTPATIENT)
Dept: FAMILY MEDICINE CLINIC | Facility: CLINIC | Age: 85
End: 2020-03-09

## 2020-03-09 VITALS
DIASTOLIC BLOOD PRESSURE: 70 MMHG | TEMPERATURE: 97.7 F | OXYGEN SATURATION: 92 % | HEART RATE: 64 BPM | SYSTOLIC BLOOD PRESSURE: 128 MMHG | BODY MASS INDEX: 29.26 KG/M2 | WEIGHT: 216 LBS | HEIGHT: 72 IN | RESPIRATION RATE: 18 BRPM

## 2020-03-09 DIAGNOSIS — Z71.84 TRAVEL ADVICE ENCOUNTER: ICD-10-CM

## 2020-03-09 DIAGNOSIS — J96.11 CHRONIC RESPIRATORY FAILURE WITH HYPOXIA (HCC): ICD-10-CM

## 2020-03-09 DIAGNOSIS — G62.9 NEUROPATHY: ICD-10-CM

## 2020-03-09 DIAGNOSIS — G62.9 PERIPHERAL POLYNEUROPATHY: ICD-10-CM

## 2020-03-09 DIAGNOSIS — G47.33 OBSTRUCTIVE SLEEP APNEA: ICD-10-CM

## 2020-03-09 PROCEDURE — 99213 OFFICE O/P EST LOW 20 MIN: CPT | Performed by: FAMILY MEDICINE

## 2020-03-09 RX ORDER — GABAPENTIN 100 MG/1
300 CAPSULE ORAL 2 TIMES DAILY
Qty: 360 CAPSULE | Refills: 1
Start: 2020-03-09 | End: 2020-03-26 | Stop reason: DRUGHIGH

## 2020-03-09 NOTE — PROGRESS NOTES
Subjective   Gonzalo Durham is a 86 y.o. male presenting with chief complaint of:   Chief Complaint   Patient presents with   • face to face     for my cpap       History of Present Illness :  With wife/daughter.  Here for primarily an acute issue today; face/face for cpap.       Has multiple chronic problems to consider that might have a bearing on today's issues; not an interval appointment.       Chronic/acute problems reviewed today:   1. Chronic respiratory failure with hypoxia (CMS/HCC): chronic/stable hypoxemia treated with oxygen.    2. WENDY: using: Chronic/stable.  Uses cpap compliantly without significant problems with equipment.  Finds sleeps better and less sleepy during the day.  Compliance report good/in media.    3. Peripheral polyneuropathy: chronic/stable and improved with neurotin 200 bid; ? Could he increase.    4. Neuropathy: same   5. Travel advice encounter: ? [place] to visit family April by road.       Has an/another acute issue today: none.    The following portions of the patient's history were reviewed and updated as appropriate: allergies, current medications, past family history, past medical history, past social history, past surgical history and problem list.      Current Outpatient Medications:   •  acetaminophen (TYLENOL) 325 MG tablet, Take 2 tablets by mouth 2 (Two) Times a Day., Disp: 360 tablet, Rfl: 2  •  albuterol sulfate HFA (PROAIR HFA) 108 (90 Base) MCG/ACT inhaler, Inhale 2 puffs 4 (Four) Times a Day., Disp: 1 inhaler, Rfl: 1  •  apixaban (ELIQUIS) 5 MG tablet tablet, Take 1 tablet by mouth Every 12 (Twelve) Hours., Disp: 180 tablet, Rfl: 3  •  Artificial Tear Solution (JUST TEARS EYE DROPS) solution, Apply 1-2 drops to eye(s) as directed by provider Daily As Needed (dry eyes)., Disp: 45 mL, Rfl: 2  •  aspirin 81 MG EC tablet, Take 1 tablet by mouth Daily., Disp: , Rfl:   •  calcium carbonate-vitamin d 600-400 MG-UNIT per tablet, Take 1 tablet by mouth 2 (Two) Times a Day., Disp:  180 tablet, Rfl: 2  •  finasteride (PROSCAR) 5 MG tablet, Take 1 tablet by mouth Daily., Disp: 90 tablet, Rfl: 2  •  FREESTYLE LITE test strip, 1 each by Other route Daily., Disp: 100 each, Rfl: 2  •  gabapentin (NEURONTIN) 100 MG capsule, TAKE 2 CAPSULES BY MOUTH TWICE DAILY, Disp: 360 capsule, Rfl: 1  •  glyBURIDE (DIAbeta) 5 MG tablet, Take 1 tablet by mouth Every Morning AND 0.5 tablets Daily Before Supper., Disp: , Rfl:   •  guaiFENesin (MUCINEX) 600 MG 12 hr tablet, Take 1 tablet by mouth 2 (Two) Times a Day., Disp: 180 tablet, Rfl: 2  •  isosorbide mononitrate (IMDUR) 30 MG 24 hr tablet, Take 1 tablet by mouth Daily., Disp: 90 tablet, Rfl: 3  •  Lancets (FREESTYLE) lancets, Use once daily, Disp: 100 each, Rfl: 2  •  loratadine (CLARITIN) 10 MG tablet, Take 1 tablet by mouth Daily., Disp: 90 tablet, Rfl: 2  •  metFORMIN ER (GLUCOPHAGE XR) 500 MG 24 hr tablet, Take 1 tablet by mouth Every Night., Disp: 90 tablet, Rfl: 2  •  metoprolol tartrate (LOPRESSOR) 25 MG tablet, Take 1 tablet by mouth 2 (Two) Times a Day., Disp: 180 tablet, Rfl: 3  •  Multiple Vitamins-Minerals (CENTRUM SILVER ADULT 50+) tablet, Take 50 mg by mouth Daily., Disp: 90 tablet, Rfl: 1  •  Multiple Vitamins-Minerals (PRESERVISION AREDS 2+MULTI VIT) capsule, Take 1 capsule by mouth Daily., Disp: 90 capsule, Rfl: 1  •  NON FORMULARY, CPAP per mask with 4L oxygen nightly, Disp: , Rfl:   •  O2 (OXYGEN), Inhale 4 L/min Continuous. Per nasal cannula, Disp: , Rfl:   •  pantoprazole (PROTONIX) 40 MG EC tablet, Take 1 tablet by mouth Daily., Disp: 90 tablet, Rfl: 2  •  pravastatin (PRAVACHOL) 80 MG tablet, Take 1 tablet by mouth Daily., Disp: 90 tablet, Rfl: 2  •  terazosin (HYTRIN) 10 MG capsule, Take 1 capsule by mouth Daily., Disp: 90 capsule, Rfl: 2  •  tiotropium bromide monohydrate (SPIRIVA RESPIMAT) 2.5 MCG/ACT aerosol solution inhaler, Inhale 2 puffs Daily., Disp: 3 inhaler, Rfl: 2  •  vitamin C (ASCORBIC ACID) 500 MG tablet, Take 1 tablet by  mouth Daily., Disp: 90 tablet, Rfl: 1  •  nitroglycerin (NITROSTAT) 0.4 MG SL tablet, Place 1 tablet under the tongue Every 5 (Five) Minutes As Needed for Chest Pain., Disp: 30 tablet, Rfl: 0    No problems with medications.  Refills if needed done    Allergies   Allergen Reactions   • Symbicort [Budesonide-Formoterol Fumarate] Dizziness     Patient passed out and fell   • Prozac [Fluoxetine Hcl] Mental Status Change     Bad dreams.       Review of Systems  GENERAL:  Active/slower with limits, speed, samni for age and SOB.  Sleep is ok; much better with cpap.   SKIN:  Ongoing callous of feet; no problems with this/other skin today.   ENDO:  No syncope, near or diaphoretic sweaty spells.  BS Ok: without download noted last visit.   HEENT: No head injury, headache.  No vision change.  Same mild hearing loss.  Ears without pain/drainage.  No sore throat.  No significant nasal/sinus congestion/drainage. No epistaxis.  CHEST: No chest wall tenderness or mass. More cough/more productive without wheeze.  Mild/same SOB; no hemoptysis.  CV: No chest pain, palpatations, ankle edema.  GI: No heartburn but occ catching of foods (without choking); dysphagia.  No abdominal pain, diarrhea, constipation, rectal bleeding, or melena.    :  Voids without dysuria, or incontience to completion.  ORTHO: No painful/swollen joints but various on /off sore.  No change occ sore neck or back.  No acute neck or back pain without recent injury.   NEURO: No dizziness; recent LUE weakness of extremities.  No change some LE numbness/parethesias.   PSYCH: No memory loss.  Mood good; not that anxious, depressed but/and not suicidal.  Tolerated stress.   Screening:  Mammogram: NA  Bone density: NA  Low dose CT chest: Tobacco-smoker/age 22/1 ppd/dc 1980: (22): NA (had CT angio)  GI: Colon-div/[facility]/6.15.17/prn  Prostate: [place]/confirmed 11.22.19  [place] in past   Usual lab order  6m CBC, BMP, A1c  12m CBC, CMP, A1c, TSH, LIPID, PSAs, Vit D    Lab  Results:  Results for orders placed or performed during the hospital encounter of 12/24/19   Troponin   Result Value Ref Range    Troponin T <0.010 0.000 - 0.030 ng/mL   Comprehensive Metabolic Panel   Result Value Ref Range    Glucose 140 (H) 65 - 99 mg/dL    BUN 31 (H) 8 - 23 mg/dL    Creatinine 1.14 0.76 - 1.27 mg/dL    Sodium 140 136 - 145 mmol/L    Potassium 4.1 3.5 - 5.2 mmol/L    Chloride 98 98 - 107 mmol/L    CO2 29.0 22.0 - 29.0 mmol/L    Calcium 9.9 8.6 - 10.5 mg/dL    Total Protein 7.3 6.0 - 8.5 g/dL    Albumin 4.40 3.50 - 5.20 g/dL    ALT (SGPT) 14 1 - 41 U/L    AST (SGOT) 15 1 - 40 U/L    Alkaline Phosphatase 72 39 - 117 U/L    Total Bilirubin <0.2 (L) 0.2 - 1.2 mg/dL    eGFR Non African Amer 61 >60 mL/min/1.73    Globulin 2.9 gm/dL    A/G Ratio 1.5 g/dL    BUN/Creatinine Ratio 27.2 (H) 7.0 - 25.0    Anion Gap 13.0 5.0 - 15.0 mmol/L   Protime-INR   Result Value Ref Range    Protime 13.4 11.9 - 14.6 Seconds    INR 0.99 0.91 - 1.09   aPTT   Result Value Ref Range    PTT 34.2 24.1 - 35.0 seconds   CBC Auto Differential   Result Value Ref Range    WBC 8.88 3.40 - 10.80 10*3/mm3    RBC 4.40 4.14 - 5.80 10*6/mm3    Hemoglobin 12.9 (L) 13.0 - 17.7 g/dL    Hematocrit 38.5 37.5 - 51.0 %    MCV 87.5 79.0 - 97.0 fL    MCH 29.3 26.6 - 33.0 pg    MCHC 33.5 31.5 - 35.7 g/dL    RDW 13.0 12.3 - 15.4 %    RDW-SD 42.2 37.0 - 54.0 fl    MPV 9.7 6.0 - 12.0 fL    Platelets 230 140 - 450 10*3/mm3    Neutrophil % 59.5 42.7 - 76.0 %    Lymphocyte % 24.2 19.6 - 45.3 %    Monocyte % 7.5 5.0 - 12.0 %    Eosinophil % 7.9 (H) 0.3 - 6.2 %    Basophil % 0.7 0.0 - 1.5 %    Immature Grans % 0.2 0.0 - 0.5 %    Neutrophils, Absolute 5.28 1.70 - 7.00 10*3/mm3    Lymphocytes, Absolute 2.15 0.70 - 3.10 10*3/mm3    Monocytes, Absolute 0.67 0.10 - 0.90 10*3/mm3    Eosinophils, Absolute 0.70 (H) 0.00 - 0.40 10*3/mm3    Basophils, Absolute 0.06 0.00 - 0.20 10*3/mm3    Immature Grans, Absolute 0.02 0.00 - 0.05 10*3/mm3    nRBC 0.0 0.0 - 0.2 /100  WBC   Troponin   Result Value Ref Range    Troponin T <0.010 0.000 - 0.030 ng/mL   Troponin   Result Value Ref Range    Troponin T <0.010 0.000 - 0.030 ng/mL   D-dimer, Quantitative   Result Value Ref Range    D-Dimer, Quantitative 0.27 0.00 - 0.50 mg/L (FEU)   Basic Metabolic Panel   Result Value Ref Range    Glucose 124 (H) 65 - 99 mg/dL    BUN 25 (H) 8 - 23 mg/dL    Creatinine 1.02 0.76 - 1.27 mg/dL    Sodium 143 136 - 145 mmol/L    Potassium 4.5 3.5 - 5.2 mmol/L    Chloride 104 98 - 107 mmol/L    CO2 30.0 (H) 22.0 - 29.0 mmol/L    Calcium 8.8 8.6 - 10.5 mg/dL    eGFR Non African Amer 69 >60 mL/min/1.73    BUN/Creatinine Ratio 24.5 7.0 - 25.0    Anion Gap 9.0 5.0 - 15.0 mmol/L   CBC Auto Differential   Result Value Ref Range    WBC 7.63 3.40 - 10.80 10*3/mm3    RBC 4.09 (L) 4.14 - 5.80 10*6/mm3    Hemoglobin 11.9 (L) 13.0 - 17.7 g/dL    Hematocrit 36.9 (L) 37.5 - 51.0 %    MCV 90.2 79.0 - 97.0 fL    MCH 29.1 26.6 - 33.0 pg    MCHC 32.2 31.5 - 35.7 g/dL    RDW 13.2 12.3 - 15.4 %    RDW-SD 43.9 37.0 - 54.0 fl    MPV 10.2 6.0 - 12.0 fL    Platelets 175 140 - 450 10*3/mm3    Neutrophil % 55.4 42.7 - 76.0 %    Lymphocyte % 22.3 19.6 - 45.3 %    Monocyte % 11.1 5.0 - 12.0 %    Eosinophil % 10.1 (H) 0.3 - 6.2 %    Basophil % 0.8 0.0 - 1.5 %    Immature Grans % 0.3 0.0 - 0.5 %    Neutrophils, Absolute 4.23 1.70 - 7.00 10*3/mm3    Lymphocytes, Absolute 1.70 0.70 - 3.10 10*3/mm3    Monocytes, Absolute 0.85 0.10 - 0.90 10*3/mm3    Eosinophils, Absolute 0.77 (H) 0.00 - 0.40 10*3/mm3    Basophils, Absolute 0.06 0.00 - 0.20 10*3/mm3    Immature Grans, Absolute 0.02 0.00 - 0.05 10*3/mm3    nRBC 0.0 0.0 - 0.2 /100 WBC   Stress Test With Myocardial Perfusion One Day   Result Value Ref Range    BH CV STRESS PROTOCOL 1 Pharmacologic     Stage 1 1     HR Stage 1 81     BP Stage 1 133/66     Duration Min Stage 1 0     Duration Sec Stage 1 10     Stress Dose Regadenoson Stage 1 0.4     Stress Comments Stage 1 10 sec bolus injection      Baseline HR 75 bpm    Baseline /77 mmHg    Peak HR 81 bpm    Percent Max Pred HR 60.00 %    Percent Target HR 71 %    Peak /66 mmHg    Recovery HR 75 bpm    Recovery /61 mmHg    Target HR (85%) 115 bpm    Max. Pred. HR (100%) 135 bpm    Exercise duration (sec) 10 sec    Nuc Stress EF 57 %   POC Glucose Once   Result Value Ref Range    Glucose 132 (H) 70 - 130 mg/dL   POC Glucose Once   Result Value Ref Range    Glucose 132 (H) 70 - 130 mg/dL   POC Glucose Once   Result Value Ref Range    Glucose 165 (H) 70 - 130 mg/dL   POC Glucose Once   Result Value Ref Range    Glucose 179 (H) 70 - 130 mg/dL   POC Glucose Once   Result Value Ref Range    Glucose 116 70 - 130 mg/dL   POC Glucose Once   Result Value Ref Range    Glucose 136 (H) 70 - 130 mg/dL   POC Glucose Once   Result Value Ref Range    Glucose 193 (H) 70 - 130 mg/dL   Adult Transthoracic Echo Limited W/ Cont if Necessary Per Protocol   Result Value Ref Range    BSA 2.2 m^2    IVSd 1.5 cm    LVIDd 4.1 cm    LVIDs 3.1 cm    LVPWd 1.2 cm    IVS/LVPW 1.7     FS 25.1 %    EDV(Teich) 74.7 ml    ESV(Teich) 37.3 ml    EF(Teich) 50.0 %    EDV(cubed) 69.4 ml    ESV(cubed) 29.2 ml    EF(cubed) 57.9 %    LV mass(C)d 176.1 grams    LV mass(C)dI 80.7 grams/m^2    SV(Teich) 37.3 ml    SI(Teich) 17.1 ml/m^2    SV(cubed) 40.2 ml    SI(cubed) 18.4 ml/m^2    Ao root diam 4.1 cm    Ao root area 13.2 cm^2    LA dimension 4.6 cm    LA/Ao 1.1     LVOT diam 2.5 cm    LVOT area 4.9 cm^2    LVOT area(traced) 4.9 cm^2    LVLd ap4 8.8 cm    EDV(MOD-sp4) 113.0 ml    LVLs ap4 7.8 cm    ESV(MOD-sp4) 49.5 ml    EF(MOD-sp4) 56.2 %    SV(MOD-sp4) 63.5 ml    SI(MOD-sp4) 29.1 ml/m^2    Ao root area (BSA corrected) 1.9     LV Shaikh Vol (BSA corrected) 51.8 ml/m^2    LV Sys Vol (BSA corrected) 22.7 ml/m^2    MV E max rayray 83.9 cm/sec    MV A max rayray 86.8 cm/sec    MV E/A 0.97     MV dec slope 370.0 cm/sec^2    MV dec time 0.23 sec    Ao pk rayray 123.0 cm/sec    Ao max PG 6.1 mmHg     Ao max PG (full) 4.2 mmHg    Ao V2 mean 86.3 cm/sec    Ao mean PG 3.0 mmHg    Ao mean PG (full) 2.0 mmHg    Ao V2 VTI 33.9 cm    TEMI(I,A) 2.3 cm^2    TEMI(I,D) 2.3 cm^2    TEMI(V,A) 2.7 cm^2    TEMI(V,D) 2.7 cm^2    LV V1 max PG 1.8 mmHg    LV V1 mean PG 1.0 mmHg    LV V1 max 67.2 cm/sec    LV V1 mean 54.2 cm/sec    LV V1 VTI 16.1 cm    SV(Ao) 447.6 ml    SI(Ao) 205.0 ml/m^2    SV(LVOT) 79.0 ml    SI(LVOT) 36.2 ml/m^2    TR max chandler 168.0 cm/sec    RVSP(TR) 16.3 mmHg    RAP systole 5.0 mmHg     CV ECHO JANAE - BZI_BMI 30.3 kilograms/m^2     CV ECHO JANAE - BSA(HAYCOCK) 2.2 m^2     CV ECHO JANAE - BZI_METRIC_WEIGHT 98.4 kg     CV ECHO JANAE - BZI_METRIC_HEIGHT 180.3 cm    Target HR (85%) 115 bpm    Max. Pred. HR (100%) 135 bpm    Avg E/e' ratio 10.80     Lat Peak E' Chandler 10.1 cm/sec    Med Peak E' Chandler 5.44 cm/sec    Echo EF Estimated 55 %   Light Blue Top   Result Value Ref Range    Extra Tube hold for add-on    Green Top (Gel)   Result Value Ref Range    Extra Tube Hold for add-ons.    Lavender Top   Result Value Ref Range    Extra Tube hold for add-on    Red Top   Result Value Ref Range    Extra Tube Hold for add-ons.        A1C:  Lab Results - Last 18 Months   Lab Units 11/19/19  0719 09/30/19  0716 05/23/19  0423 05/15/19  0708 11/08/18  1328   HEMOGLOBIN A1C % 6.40* 6.30* 6.4 6.30* 6.30     PSA:  Lab Results - Last 18 Months   Lab Units 05/15/19  0708 11/08/18  1328   PSA ng/mL 1.100 1.230     CBC:  Lab Results - Last 18 Months   Lab Units 12/26/19  0259 12/24/19  2246 11/19/19  0719 09/30/19  0716 05/23/19  0423 05/22/19  1211 05/15/19  0708 11/14/18  0845   WBC 10*3/mm3 7.63 8.88 8.11 8.18 8.12 6.98 6.72  --    HEMOGLOBIN g/dL 11.9* 12.9* 12.4* 12.9* 11.9* 13.4* 12.3*  --    HEMATOCRIT % 36.9* 38.5 38.5 39.5 38.1* 41.3 40.6  --    PLATELETS 10*3/mm3 175 230 220 251 229 253 234  --    IRON mcg/dL  --   --   --   --   --   --   --  81      BMP/CMP:  Lab Results - Last 18 Months   Lab Units 12/26/19  0259  "12/24/19 2246 11/19/19 0719 09/30/19  0716 05/23/19  0423 05/22/19  1211 05/15/19  0708 11/08/18  1328   SODIUM mmol/L 143 140 147* 144 141 141 144 141   POTASSIUM mmol/L 4.5 4.1 4.6 5.0 4.5 4.7 4.9 4.6   CHLORIDE mmol/L 104 98 105 102 102 103 105 99   TOTAL CO2 mmol/L  --   --  30.5* 29.4*  --   --  27.8 33.0*   CO2 mmol/L 30.0* 29.0  --   --  31.0 32.0*  --   --    GLUCOSE mg/dL  --   --  122* 101*  --   --  128* 82   BUN mg/dL 25* 31* 22 32* 21 26* 23 27*   CREATININE mg/dL 1.02 1.14 1.11 1.11 1.25 1.11 1.12 1.06   EGFR IF NONAFRICN AM mL/min/1.73 69 61 63 63 55* 63 62 67   EGFR IF AFRICN AM mL/min/1.73  --   --  76 76  --   --  76 81   CALCIUM mg/dL 8.8 9.9 8.9 9.6 8.7 9.4 9.8 9.9     HEPATIC:  Lab Results - Last 18 Months   Lab Units 12/24/19 2246 09/30/19  0716 05/22/19  1211 05/15/19  0708   ALT (SGPT) U/L 14 15 17 11   AST (SGOT) U/L 15 14 33 15   ALK PHOS U/L 72 56 59 55     THYROID:  Lab Results - Last 18 Months   Lab Units 09/30/19  0716 05/15/19  0708   TSH uIU/mL 2.540 2.570       Objective   /70 (BP Location: Right arm, Patient Position: Sitting, Cuff Size: Large Adult)   Pulse 64   Temp 97.7 °F (36.5 °C) (Oral)   Resp 18   Ht 182.9 cm (72\")   Wt 98 kg (216 lb)   SpO2 92% Comment: port concentrator at 3L per NC  BMI 29.29 kg/m²   Body mass index is 29.29 kg/m².    Physical Exam  GENERAL:  Well nourished/developed in no acute distress.   SKIN: Turgor excellent, without wound, rash, lesion  HEENT: Normal cephalic without trauma.  Pupils equal round reactive to light. Extraocular motions full without nystagmus.    No thyroidmegaly, mass, tenderness, lymphadenopathy and supple.  CV: Regular rhythm.  No murmur, gallop,  edema. Posterior pulses intact.  No carotid bruits.  CHEST: No chest wall tenderness or mass.   LUNGS: Symmetric motion with clear/decreased to auscultation.   ABD: Soft, nontender without mass.   ORTHO: Symmetric extremities without swelling/point tenderness.  Full gross " range of motion except hips reduced.    NEURO: CN 2-12 grossly intact.  Symmetric facies. 1/4 x bicep knee equal reflexes.  UE/LE   3/5 strength throughout except 2/5  L.  Nonfocal use extremities. Speech clear.  Light touch decreased bilateral feet.   PSYCH: Oriented x 3.  Pleasant calm, well kept.  Purposeful/directed conservation with intact short/long gross memory.      Assessment/Plan     1. Chronic respiratory failure with hypoxia (CMS/HCC)    2. WENDY: using    3. Peripheral polyneuropathy    4. Neuropathy    5. Travel advice encounter      Face to face for WENDY.  Clinical coarse/sleep improved with its use.  Great positive benefits from improved sleep.     Rx: reviewed/changes:  New Medications Ordered This Visit   Medications   • gabapentin (NEURONTIN) 100 MG capsule     Sig: Take 3 capsules by mouth 2 (Two) Times a Day.     Dispense:  360 capsule     Refill:  1       LAB/Testing/Referrals: reviewed/orders:   Today: none  No orders of the defined types were placed in this encounter.    Chronic/recurrent labs above or change to:   same     Discussions:   dont advise if [place] active with he/his wife's chronic illness to be out around public at all; including travel.   Body mass index is 29.29 kg/m².  Patient's Body mass index is 29.29 kg/m². BMI is within normal parameters. No follow-up required..      Tobacco use reviewed:    Non-smoker  Gonzalo Durham  reports that he quit smoking about 40 years ago. His smoking use included cigarettes. He started smoking about 66 years ago. He has a 26.00 pack-year smoking history. He has never used smokeless tobacco..  There are no Patient Instructions on file for this visit.    Follow up: Return for lab;, Dr Romero-, as planned;.  Future Appointments   Date Time Provider Department Center   3/26/2020  9:45 AM Bradley Carver MD [facility]   3/30/2020  8:15 AM [facility]EM[facility]ER [facility] [place] [facility]   4/28/2020 11:15 AM Feliz Frye,  APRN [facility]AD None   5/1/2020  9:15 AM [facility] [place] [facility]   5/6/2020 10:15 AM Juan Marcelino MD [facility] N [facility]   5/19/2020  8:20 AM LAB PC [place] [facility] None   5/21/2020  9:15 AM Abel Romero MD [facility] None

## 2020-03-26 ENCOUNTER — TELEPHONE (OUTPATIENT)
Dept: FAMILY MEDICINE CLINIC | Facility: CLINIC | Age: 85
End: 2020-03-26

## 2020-03-26 DIAGNOSIS — G62.9 PERIPHERAL POLYNEUROPATHY: Primary | ICD-10-CM

## 2020-03-26 DIAGNOSIS — G62.9 NEUROPATHY: ICD-10-CM

## 2020-03-26 RX ORDER — GABAPENTIN 300 MG/1
300 CAPSULE ORAL 2 TIMES DAILY
Qty: 180 CAPSULE | Refills: 1 | Status: SHIPPED | OUTPATIENT
Start: 2020-03-26 | End: 2020-06-08 | Stop reason: SDUPTHER

## 2020-03-26 NOTE — TELEPHONE ENCOUNTER
Spoke to  Pt dtr and pt is taking three 100 mg capsules twice daily, since comes in 300 mg capsules requested to change to one capsule twice daily and Rx faxed

## 2020-03-26 NOTE — TELEPHONE ENCOUNTER
Patients daughter called in requesting a refill on the most updated dose of gabapentin (NEURONTIN) 100 MG capsule for patient. She stated she is running out of the dose that provider increased to.     Please call at [phone]     Pharmacy confirmed.

## 2020-03-31 ENCOUNTER — CLINICAL SUPPORT (OUTPATIENT)
Dept: CARDIOLOGY | Facility: CLINIC | Age: 85
End: 2020-03-31

## 2020-03-31 DIAGNOSIS — I63.9 CRYPTOGENIC STROKE (HCC): ICD-10-CM

## 2020-03-31 PROCEDURE — G2066 INTER DEVC REMOTE 30D: HCPCS | Performed by: PHYSICIAN ASSISTANT

## 2020-03-31 PROCEDURE — 93298 REM INTERROG DEV EVAL SCRMS: CPT | Performed by: PHYSICIAN ASSISTANT

## 2020-03-31 NOTE — PROGRESS NOTES
[facility] Report- [facility]    March 31, 2020    Primary Cardiologist:  Wen  Reason for implant:  cryptogenic stroke  Battery:  ok  Events:  PAF noted.  V rates are controlled.  Pt is anticoagulated.  Few episodes of bradycardia.  Minimum rate was 38 bpm.  on anticoagulation     Changes:  n/a    Follow up:  1 month

## 2020-05-01 ENCOUNTER — CLINICAL SUPPORT (OUTPATIENT)
Dept: CARDIOLOGY | Facility: CLINIC | Age: 85
End: 2020-05-01

## 2020-05-01 DIAGNOSIS — I63.9 CRYPTOGENIC STROKE (HCC): ICD-10-CM

## 2020-05-01 PROCEDURE — 93298 REM INTERROG DEV EVAL SCRMS: CPT | Performed by: PHYSICIAN ASSISTANT

## 2020-05-01 PROCEDURE — G2066 INTER DEVC REMOTE 30D: HCPCS | Performed by: PHYSICIAN ASSISTANT

## 2020-05-01 NOTE — PROGRESS NOTES
[facility] Report- [facility]    May 1, 2020    Primary Cardiologist:  Wen  Reason for implant:  cryptogenic stroke  Battery:  ok  Events:  PAF noted.  Pt is anticoagulated.  Episodes of SVR noted.  Minimum rate is 37 bpm.    Changes:  n/a    Follow up:  1 month

## 2020-05-06 ENCOUNTER — TELEMEDICINE (OUTPATIENT)
Dept: NEUROLOGY | Facility: CLINIC | Age: 85
End: 2020-05-06

## 2020-05-06 DIAGNOSIS — G47.33 OBSTRUCTIVE SLEEP APNEA: ICD-10-CM

## 2020-05-06 DIAGNOSIS — I63.9 CEREBROVASCULAR ACCIDENT (CVA), UNSPECIFIED MECHANISM (HCC): Primary | ICD-10-CM

## 2020-05-06 PROCEDURE — 99441 PR PHYS/QHP TELEPHONE EVALUATION 5-10 MIN: CPT | Performed by: PSYCHIATRY & NEUROLOGY

## 2020-05-06 NOTE — PROGRESS NOTES
Patient and I had telephone teleconference/telemedicine for 8 minutes.  Patient has had no unusual headaches or stroke symptoms.  Patient said that he is using his CPAP at 9 cm water pressure with 4 L of oxygen.  Patient's compliance report looks excellent.  AHI 5.9.  [place] Sleepiness Scale 11.  Patient stop bang is 3 of 7.  Patient driving precautions and safety precautions again reviewed.  Patient continues Eliquis and aspirin.  Overnight continuous oximetry on the Pap device and oxygen looked excellent.  Patient however said that when he was using a 25 foot tube from his oxygen concentrator to his Pap device he did not feel as good in the morning on awakening and shortened to 7 feet and now is feeling much better in the morning.  Patient uses a full facemask.  Final diagnosis CVA and WENDY.  Patient to get to emergency room immediately if stroke symptoms occur

## 2020-05-06 NOTE — PATIENT INSTRUCTIONS
Patient to get to emergency room immediately if stroke symptoms occur.  Patient to get with PCP about weight and diet control.

## 2020-05-15 NOTE — PROGRESS NOTES
DOMINIQUE Yeung  [facility]   Respiratory Disease Clinic  [address] [place] [address]  Phone: [phone]  Fax: [phone]     Gonzalo Durham is a 86 y.o. male.   CC:   Chief Complaint   Patient presents with   • STAGE 2 MODERATE COPD        HPI: This visit has been rescheduled as a phone visit to comply with patient safety concerns in accordance with CDC recommendations. Total time of discussion was 11 minutes.  Since I last saw him in August he had a mild stroke and was seen by Dr. Marcelino and diagnosed with obstructive sleep apnea.  He is now utilizing a CPAP at night for sleep.  He is bleeding his home oxygen at 4 L into the CPAP and reports that he is doing fairly well with it.  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  He is a former smoker who quit 40 years ago.  He is on daily Spiriva and [facility]  He sees Dr. Romero for routine medical care and he stays up-to-date on flu and pneumonia vaccines.    The following portions of the patient's history were reviewed and updated as appropriate: allergies, current medications, past family history, past medical history, past social history, past surgical history and problem list.    Past Medical History:   Diagnosis Date   • A-fib (CMS/HCC)    • Arthritis    • BPH (benign prostatic hypertrophy)    • CAD (coronary artery disease)    • CKD (chronic kidney disease)    • Diabetes mellitus (CMS/HCC)     Type 2   • DM (diabetes mellitus screen)    • Hx of colonic polyp    • Hypercholesteremia    • Hypertension    • Myocardial infarction (CMS/HCC)        Family History   Problem Relation Age of Onset   • Heart disease Mother    • Stroke Mother    • Melanoma Mother    • Heart disease Father    • Diabetes Father    • Prostate cancer  Father    • Colon cancer Neg Hx    • Colon polyps Neg Hx        Social History     Socioeconomic History   • Marital status:      Spouse name: Not on file   • Number of children: Not on file   • Years of education: Not on file   • Highest education level: Not on file   Social Needs   • Financial resource strain: Not on file   • Food insecurity:     Worry: Never true     Inability: Never true   • Transportation needs:     Medical: No     Non-medical: No   Tobacco Use   • Smoking status: Former Smoker     Packs/day: 1.00     Years: 26.00     Pack years: 26.00     Types: Cigarettes     Start date:      Last attempt to quit: 1980     Years since quittin.4   • Smokeless tobacco: Never Used   Substance and Sexual Activity   • Alcohol use: No   • Drug use: No   • Sexual activity: Defer   Lifestyle   • Physical activity:     Days per week: 0 days     Minutes per session: 0 min   • Stress: To some extent   Relationships   • Social connections:     Talks on phone: More than three times a week     Gets together: More than three times a week     Attends [employer] service: Not on file     Active member of club or organization: Not on file     Attends meetings of clubs or organizations: Not on file     Relationship status:        Review of Systems   Constitutional: Negative for appetite change, chills, fatigue and fever.   HENT: Negative for swollen glands, trouble swallowing and voice change.    Eyes: Negative for visual disturbance.   Respiratory: Positive for shortness of breath. Negative for cough and wheezing.    Cardiovascular: Negative for chest pain and leg swelling.   Gastrointestinal: Negative for abdominal distention, abdominal pain, nausea and vomiting.   Genitourinary: Negative.    Musculoskeletal: Negative for gait problem and myalgias.   Skin: Negative.    Neurological: Negative for weakness.   Hematological: Negative.    Psychiatric/Behavioral: The patient is not nervous/anxious.         There were no vitals taken for this visit.    Physical Exam   Constitutional: No distress.   Neurological: He is alert.   Voice clear   Psychiatric: His behavior is normal.         Gonzalo was seen today for stage 2 moderate copd.    Diagnoses and all orders for this visit:    Stage 2 moderate COPD by GOLD classification (CMS/[facility])    Pulmonary emphysema, unspecified emphysema type (CMS/HCC)    WENDY on CPAP    Scarring of lung    Chronic respiratory failure with hypoxia and hypercapnia (CMS/[facility])    Asbestos exposure      Patient's There is no height or weight on file to calculate BMI. BMI Cannot be assessed during this visit.      Follow-up/Plan: Continue on oxygen, CPAP, Spiriva, Mucinex, flutter valve and pro-air.  Return to clinic in 10 to 11 months.  Utilize telehealth visit sooner if needed.    Feliz Frye, APRN  5/20/2020  13:38

## 2020-05-20 ENCOUNTER — OFFICE VISIT (OUTPATIENT)
Dept: PULMONOLOGY | Facility: CLINIC | Age: 85
End: 2020-05-20

## 2020-05-20 DIAGNOSIS — Z99.89 OSA ON CPAP: ICD-10-CM

## 2020-05-20 DIAGNOSIS — J98.4 SCARRING OF LUNG: ICD-10-CM

## 2020-05-20 DIAGNOSIS — J43.9 PULMONARY EMPHYSEMA, UNSPECIFIED EMPHYSEMA TYPE (HCC): ICD-10-CM

## 2020-05-20 DIAGNOSIS — J96.11 CHRONIC RESPIRATORY FAILURE WITH HYPOXIA AND HYPERCAPNIA (HCC): ICD-10-CM

## 2020-05-20 DIAGNOSIS — Z77.090 ASBESTOS EXPOSURE: ICD-10-CM

## 2020-05-20 DIAGNOSIS — G47.33 OSA ON CPAP: ICD-10-CM

## 2020-05-20 DIAGNOSIS — J44.9 STAGE 2 MODERATE COPD BY GOLD CLASSIFICATION (HCC): Primary | ICD-10-CM

## 2020-05-20 DIAGNOSIS — J96.12 CHRONIC RESPIRATORY FAILURE WITH HYPOXIA AND HYPERCAPNIA (HCC): ICD-10-CM

## 2020-05-20 PROCEDURE — 99442 PR PHYS/QHP TELEPHONE EVALUATION 11-20 MIN: CPT | Performed by: NURSE PRACTITIONER

## 2020-05-20 NOTE — PATIENT INSTRUCTIONS
Continue treatment with CPAP.  Continue on oxygen and Spiriva.  Use rescue inhaler (pro-air) Mucinex and flutter valve as needed.      Sleep Apnea  Sleep apnea affects breathing during sleep. It causes breathing to stop for a short time or to become shallow. It can also increase the risk of:  · Heart attack.  · Stroke.  · Being very overweight (obese).  · Diabetes.  · Heart failure.  · Irregular heartbeat.  The goal of treatment is to help you breathe normally again.  What are the causes?  There are three kinds of sleep apnea:  · Obstructive sleep apnea. This is caused by a blocked or collapsed airway.  · Central sleep apnea. This happens when the brain does not send the right signals to the muscles that control breathing.  · Mixed sleep apnea. This is a combination of obstructive and central sleep apnea.  The most common cause of this condition is a collapsed or blocked airway. This can happen if:  · Your throat muscles are too relaxed.  · Your tongue and tonsils are too large.  · You are overweight.  · Your airway is too small.  What increases the risk?  · Being overweight.  · Smoking.  · Having a small airway.  · Being older.  · Being male.  · Drinking alcohol.  · Taking medicines to calm yourself (sedatives or tranquilizers).  · Having family members with the condition.  What are the signs or symptoms?  · Trouble staying asleep.  · Being sleepy or tired during the day.  · Getting angry a lot.  · Loud snoring.  · Headaches in the morning.  · Not being able to focus your mind (concentrate).  · Forgetting things.  · Less interest in sex.  · Mood swings.  · Personality changes.  · Feelings of sadness (depression).  · Waking up a lot during the night to pee (urinate).  · Dry mouth.  · Sore throat.  How is this diagnosed?  · Your medical history.  · A physical exam.  · A test that is done when you are sleeping (sleep study). The test is most often done in a sleep lab but may also be done at home.  How is this  treated?    · Sleeping on your side.  · Using a medicine to get rid of mucus in your nose (decongestant).  · Avoiding the use of alcohol, medicines to help you relax, or certain pain medicines (narcotics).  · Losing weight, if needed.  · Changing your diet.  · Not smoking.  · Using a machine to open your airway while you sleep, such as:  ? An oral appliance. This is a mouthpiece that shifts your lower jaw forward.  ? A CPAP device. This device blows air through a mask when you breathe out (exhale).  ? An EPAP device. This has valves that you put in each nostril.  ? A BPAP device. This device blows air through a mask when you breathe in (inhale) and breathe out.  · Having surgery if other treatments do not work.  It is important to get treatment for sleep apnea. Without treatment, it can lead to:  · High blood pressure.  · Coronary artery disease.  · In men, not being able to have an erection (impotence).  · Reduced thinking ability.  Follow these instructions at home:  Lifestyle  · Make changes that your doctor recommends.  · Eat a healthy diet.  · Lose weight if needed.  · Avoid alcohol, medicines to help you relax, and some pain medicines.  · Do not use any products that contain nicotine or tobacco, such as cigarettes, e-cigarettes, and chewing tobacco. If you need help quitting, ask your doctor.  General instructions  · Take over-the-counter and prescription medicines only as told by your doctor.  · If you were given a machine to use while you sleep, use it only as told by your doctor.  · If you are having surgery, make sure to tell your doctor you have sleep apnea. You may need to bring your device with you.  · Keep all follow-up visits as told by your doctor. This is important.  Contact a doctor if:  · The machine that you were given to use during sleep bothers you or does not seem to be working.  · You do not get better.  · You get worse.  Get help right away if:  · Your chest hurts.  · You have trouble  breathing in enough air.  · You have an uncomfortable feeling in your back, arms, or stomach.  · You have trouble talking.  · One side of your body feels weak.  · A part of your face is hanging down.  These symptoms may be an emergency. Do not wait to see if the symptoms will go away. Get medical help right away. Call your local emergency services (911 in the [place] Do not drive yourself to the hospital.  Summary  · This condition affects breathing during sleep.  · The most common cause is a collapsed or blocked airway.  · The goal of treatment is to help you breathe normally while you sleep.  This information is not intended to replace advice given to you by your health care provider. Make sure you discuss any questions you have with your health care provider.  Document Released: 09/26/2009 Document Revised: 10/04/2019 Document Reviewed: 08/13/2019  Elsevier Patient Education © 2020 [employer]

## 2020-06-02 ENCOUNTER — CLINICAL SUPPORT (OUTPATIENT)
Dept: CARDIOLOGY | Facility: CLINIC | Age: 85
End: 2020-06-02

## 2020-06-02 DIAGNOSIS — I63.9 CRYPTOGENIC STROKE (HCC): ICD-10-CM

## 2020-06-02 PROCEDURE — 93298 REM INTERROG DEV EVAL SCRMS: CPT | Performed by: PHYSICIAN ASSISTANT

## 2020-06-02 PROCEDURE — G2066 INTER DEVC REMOTE 30D: HCPCS | Performed by: PHYSICIAN ASSISTANT

## 2020-06-05 NOTE — PROGRESS NOTES
[facility] Report- [facility]    June 5, 2020    Primary Cardiologist:  Wen  Reason for implant:  Cryptogenic stroke  Battery:  ok  Events:  Several episodes of AF noted.  Pt is anticoagulated.  Longest duration is 26 min.  V rates are controlled.  Several episodes of bradycardia.  Longest duration is 4:11 min.  Min rate is 38 bpm.  All occurred during sleeping hours.  Changes:  n/a    Follow up:  1 month

## 2020-06-08 ENCOUNTER — OFFICE VISIT (OUTPATIENT)
Dept: CARDIOLOGY | Facility: CLINIC | Age: 85
End: 2020-06-08

## 2020-06-08 VITALS
OXYGEN SATURATION: 94 % | WEIGHT: 208 LBS | DIASTOLIC BLOOD PRESSURE: 55 MMHG | HEIGHT: 71 IN | HEART RATE: 81 BPM | BODY MASS INDEX: 29.12 KG/M2 | SYSTOLIC BLOOD PRESSURE: 120 MMHG

## 2020-06-08 DIAGNOSIS — I50.32 CHRONIC DIASTOLIC CONGESTIVE HEART FAILURE (HCC): ICD-10-CM

## 2020-06-08 DIAGNOSIS — G62.9 NEUROPATHY: ICD-10-CM

## 2020-06-08 DIAGNOSIS — J44.9 STAGE 2 MODERATE COPD BY GOLD CLASSIFICATION (HCC): ICD-10-CM

## 2020-06-08 DIAGNOSIS — I71.40 ABDOMINAL AORTIC ANEURYSM (AAA) WITHOUT RUPTURE (HCC): Primary | ICD-10-CM

## 2020-06-08 DIAGNOSIS — I25.10 CORONARY ARTERY DISEASE INVOLVING NATIVE CORONARY ARTERY OF NATIVE HEART WITHOUT ANGINA PECTORIS: Chronic | ICD-10-CM

## 2020-06-08 DIAGNOSIS — I83.90 VARICOSE VEINS OF LOWER EXTREMITY, UNSPECIFIED LATERALITY, UNSPECIFIED WHETHER COMPLICATED: Chronic | ICD-10-CM

## 2020-06-08 DIAGNOSIS — N18.9 CHRONIC KIDNEY DISEASE, UNSPECIFIED CKD STAGE: Chronic | ICD-10-CM

## 2020-06-08 DIAGNOSIS — D64.9 ANEMIA, UNSPECIFIED TYPE: ICD-10-CM

## 2020-06-08 DIAGNOSIS — R94.39 ABNORMAL STRESS TEST: ICD-10-CM

## 2020-06-08 DIAGNOSIS — E11.21 CONTROLLED TYPE 2 DIABETES MELLITUS WITH DIABETIC NEPHROPATHY, WITHOUT LONG-TERM CURRENT USE OF INSULIN (HCC): Chronic | ICD-10-CM

## 2020-06-08 DIAGNOSIS — E78.2 MIXED HYPERLIPIDEMIA: Chronic | ICD-10-CM

## 2020-06-08 DIAGNOSIS — R06.02 SHORTNESS OF BREATH: ICD-10-CM

## 2020-06-08 DIAGNOSIS — I10 HYPERTENSION, UNSPECIFIED TYPE: Chronic | ICD-10-CM

## 2020-06-08 DIAGNOSIS — I10 ESSENTIAL HYPERTENSION: Chronic | ICD-10-CM

## 2020-06-08 DIAGNOSIS — Z79.01 ANTICOAGULATED: ICD-10-CM

## 2020-06-08 DIAGNOSIS — I25.10 CORONARY ARTERY DISEASE INVOLVING NATIVE CORONARY ARTERY WITHOUT ANGINA PECTORIS, UNSPECIFIED WHETHER NATIVE OR TRANSPLANTED HEART: Chronic | ICD-10-CM

## 2020-06-08 DIAGNOSIS — G62.9 PERIPHERAL POLYNEUROPATHY: ICD-10-CM

## 2020-06-08 DIAGNOSIS — E11.9 CONTROLLED TYPE 2 DIABETES MELLITUS WITHOUT COMPLICATION, WITHOUT LONG-TERM CURRENT USE OF INSULIN (HCC): Chronic | ICD-10-CM

## 2020-06-08 DIAGNOSIS — I63.9 CRYPTOGENIC STROKE (HCC): ICD-10-CM

## 2020-06-08 DIAGNOSIS — I70.90 ATHEROSCLEROSIS: ICD-10-CM

## 2020-06-08 PROCEDURE — 99214 OFFICE O/P EST MOD 30 MIN: CPT | Performed by: INTERNAL MEDICINE

## 2020-06-08 PROCEDURE — 93000 ELECTROCARDIOGRAM COMPLETE: CPT | Performed by: INTERNAL MEDICINE

## 2020-06-08 RX ORDER — GABAPENTIN 300 MG/1
300 CAPSULE ORAL 2 TIMES DAILY
Qty: 180 CAPSULE | Refills: 1 | Status: SHIPPED | OUTPATIENT
Start: 2020-06-08 | End: 2020-12-01

## 2020-06-08 RX ORDER — ISOSORBIDE MONONITRATE 30 MG/1
30 TABLET, EXTENDED RELEASE ORAL DAILY
Qty: 90 TABLET | Refills: 3 | Status: SHIPPED | OUTPATIENT
Start: 2020-06-08 | End: 2020-07-17 | Stop reason: SDUPTHER

## 2020-06-08 RX ORDER — METFORMIN HYDROCHLORIDE 500 MG/1
500 TABLET, EXTENDED RELEASE ORAL NIGHTLY
Qty: 90 TABLET | Refills: 2 | Status: SHIPPED | OUTPATIENT
Start: 2020-06-08 | End: 2020-07-17 | Stop reason: SDUPTHER

## 2020-06-08 NOTE — TELEPHONE ENCOUNTER
Patient's daughter Suyapa called and requested a refill on the following medications:    isosorbide mononitrate (IMDUR) 30 MG 24 hr tablet    apixaban (ELIQUIS) 5 MG tablet tablet    metFORMIN ER (GLUCOPHAGE XR) 500 MG 24 hr tablet    gabapentin (Neurontin) 300 MG capsule    She requested that these be sent to [facility] in [place] until they are able to get back to Donato Loda. She says that he is completely out of his apixaban.      Suyapa can be contacted at [phone].

## 2020-06-08 NOTE — PROGRESS NOTES
Gonzalo Durham  [id number]  1934  86 y.o.  male    Referring Provider: Abel Romero MD    Reason for Follow-up Visit:          Prior visit was called back as possible Paroxysmal atrial fibrillation on LINQ   Some are atrial premature contractions that are very frequent with often bigeminy    Prior to that visit for evaluation for  Shortness of breath   coronary artery disease [facility]  [facility] cardiac [facility]    Prior cerebrovascular accident with left arm weakness now resolved  Lasted for for over 1 week 7/27/19  CT head showed anterior small infarction  7/27/19  sinus rhythm in [place]  Labs reviewed   Cardiac workup test results as below         Subjective      Overall feels well    No new events or complaints since last visit   Overall the patient feels no major change from baseline symptoms   Similar symptoms as during last visit      Tolerating current medications well with no untoward side effects   Compliant with prescribed medication regimen. Tries to adhere to cardiac diet.   No further transient ischemic attacks or cerebrovascular accident     Mild chronic exertional shortness of breath on exertion relieved with rest  No significant cough or wheezing  Going on for several months or longer    No palpitations  No associated chest pain  No significant pedal edema    No fever or chills  No significant expectoration    No hemoptysis  No presyncope or syncope     No symptoms reminiscent of prior angina.    Using oxygen at home both during daytime and at night continously       History of present illness:  Gonzalo Durham is a 86 y.o. yo male with history of coronary artery disease Coronary artery bypass grafting who presents today for   Chief Complaint   Patient presents with   • Atrial Fibrillation     9 month follow up    .    History  Past Medical History:   Diagnosis Date   • A-fib (CMS/HCC)    • Arthritis    • BPH (benign prostatic hypertrophy)    • CAD (coronary  artery disease)    • CKD (chronic kidney disease)    • Diabetes mellitus (CMS/HCC)     Type 2   • DM (diabetes mellitus screen)    • Hx of colonic polyp    • Hypercholesteremia    • Hypertension    • Myocardial infarction (CMS/HCC)    ,   Past Surgical History:   Procedure Laterality Date   • APPENDECTOMY     • CARDIAC CATHETERIZATION     • CARDIAC CATHETERIZATION Left 2019    Procedure: Cardiac Catheterization/Vascular Study Positive occult blood in stools  ? BMS vs REGGIE Get cabg report  ;  Surgeon: Bradley Carver MD;  Location:  [facility];  Service: Cardiology   • COLONOSCOPY N/A 6/15/2017    Diverticulosis repeat exam prn   • COLONOSCOPY W/ POLYPECTOMY  10/21/2013    2 Tubular adenomatous polyps, Diverticulosis repeat exam in 5 years   • CORONARY ARTERY BYPASS GRAFT  1980    X 3   • ENDOSCOPY N/A 6/15/2017    LA Grade A reflux esophagitis   ,   Family History   Problem Relation Age of Onset   • Heart disease Mother    • Stroke Mother    • Melanoma Mother    • Heart disease Father    • Diabetes Father    • Prostate cancer Father    • Colon cancer Neg Hx    • Colon polyps Neg Hx    ,   Social History     Tobacco Use   • Smoking status: Former Smoker     Packs/day: 1.00     Years: 26.00     Pack years: 26.00     Types: Cigarettes     Start date:      Last attempt to quit: 1980     Years since quittin.4   • Smokeless tobacco: Never Used   Substance Use Topics   • Alcohol use: No   • Drug use: No   ,     Medications  Current Outpatient Medications   Medication Sig Dispense Refill   • acetaminophen (TYLENOL) 325 MG tablet Take 2 tablets by mouth 2 (Two) Times a Day. 360 tablet 2   • albuterol sulfate HFA (PROAIR HFA) 108 (90 Base) MCG/ACT inhaler Inhale 2 puffs 4 (Four) Times a Day. 1 inhaler 1   • apixaban (ELIQUIS) 5 MG tablet tablet Take 1 tablet by mouth Every 12 (Twelve) Hours. 180 tablet 3   • Artificial Tear Solution (JUST TEARS EYE DROPS) solution Apply 1-2 drops to eye(s) as  directed by provider Daily As Needed (dry eyes). 45 mL 2   • aspirin 81 MG EC tablet Take 1 tablet by mouth Daily.     • calcium carbonate-vitamin d 600-400 MG-UNIT per tablet Take 1 tablet by mouth 2 (Two) Times a Day. 180 tablet 2   • finasteride (PROSCAR) 5 MG tablet Take 1 tablet by mouth Daily. 90 tablet 2   • FREESTYLE LITE test strip 1 each by Other route Daily. 100 each 2   • gabapentin (Neurontin) 300 MG capsule Take 1 capsule by mouth 2 (Two) Times a Day. 180 capsule 1   • glyBURIDE (DIAbeta) 5 MG tablet Take 1 tablet by mouth Every Morning AND 0.5 tablets Daily Before Supper.     • guaiFENesin (MUCINEX) 600 MG 12 hr tablet Take 1 tablet by mouth 2 (Two) Times a Day. 180 tablet 2   • isosorbide mononitrate (IMDUR) 30 MG 24 hr tablet Take 1 tablet by mouth Daily. 90 tablet 3   • Lancets (FREESTYLE) lancets Use once daily 100 each 2   • loratadine (CLARITIN) 10 MG tablet Take 1 tablet by mouth Daily. 90 tablet 2   • metFORMIN ER (GLUCOPHAGE XR) 500 MG 24 hr tablet Take 1 tablet by mouth Every Night. 90 tablet 2   • metoprolol tartrate (LOPRESSOR) 25 MG tablet Take 1 tablet by mouth 2 (Two) Times a Day. 180 tablet 3   • Multiple Vitamins-Minerals (CENTRUM SILVER ADULT 50+) tablet Take 50 mg by mouth Daily. 90 tablet 1   • Multiple Vitamins-Minerals (PRESERVISION AREDS 2+MULTI VIT) capsule Take 1 capsule by mouth Daily. 90 capsule 1   • nitroglycerin (NITROSTAT) 0.4 MG SL tablet Place 1 tablet under the tongue Every 5 (Five) Minutes As Needed for Chest Pain. 30 tablet 0   • NON FORMULARY CPAP per mask with 4L oxygen nightly     • O2 (OXYGEN) Inhale 4 L/min Continuous. Per nasal cannula     • pantoprazole (PROTONIX) 40 MG EC tablet Take 1 tablet by mouth Daily. 90 tablet 2   • pravastatin (PRAVACHOL) 80 MG tablet Take 1 tablet by mouth Daily. 90 tablet 2   • terazosin (HYTRIN) 10 MG capsule Take 1 capsule by mouth Daily. 90 capsule 2   • tiotropium bromide monohydrate (SPIRIVA RESPIMAT) 2.5 MCG/ACT aerosol  "solution inhaler Inhale 2 puffs Daily. 3 inhaler 2   • vitamin C (ASCORBIC ACID) 500 MG tablet Take 1 tablet by mouth Daily. 90 tablet 1     No current facility-administered medications for this visit.        Allergies:  Symbicort [budesonide-formoterol fumarate] and Prozac [fluoxetine hcl]    Review of Systems  Review of Systems   Constitution: Positive for malaise/fatigue.   HENT: Negative.    Eyes: Negative.    Cardiovascular: Positive for dyspnea on exertion. Negative for chest pain, claudication, cyanosis, irregular heartbeat, leg swelling, near-syncope, orthopnea, palpitations, paroxysmal nocturnal dyspnea and syncope.   Respiratory: Negative.    Endocrine: Negative.    Hematologic/Lymphatic: Negative.    Skin: Negative.    Musculoskeletal: Positive for arthritis and joint pain.   Gastrointestinal: Negative for anorexia.   Genitourinary: Negative.    Psychiatric/Behavioral: Negative.        Objective     Physical Exam:  /55   Pulse 81   Ht 180.3 cm (71\")   Wt 94.3 kg (208 lb)   SpO2 94%   BMI 29.01 kg/m²     Physical Exam   Constitutional: He appears well-developed.   HENT:   Head: Normocephalic.   Neck: Normal carotid pulses and no JVD present. No tracheal tenderness present. Carotid bruit is not present. No tracheal deviation and no edema present.   Cardiovascular: Regular rhythm and normal pulses.   Murmur heard.   Systolic murmur is present with a grade of 2/6.  Pulmonary/Chest: Effort normal. No stridor.   Abdominal: Soft. He exhibits no distension. There is no hepatosplenomegaly. There is no tenderness.   Neurological: He is alert. He has normal strength. No cranial nerve deficit or sensory deficit.   Skin: Skin is warm.   Psychiatric: He has a normal mood and affect. His speech is normal and behavior is normal.       Results Review:    [facility]   Regadenoson Stress Test With Myocardial Perfusion SPECT (Multi Study)   Order# [id number]   Reading physician: Bradley Carver MD Ordering " physician: Bradley Carver MD Study date: 19   Patient Information     Patient Name  Gonzalo Durham MRN  [id number] Sex  Male  (Age)  1934 (86 y.o.)   Interpretation Summary        · Left ventricular ejection fraction is normal (Calculated EF = 57%).  · Diaphragmatic attenuation artifact is present.  · Raw images reviewed with the following abnormalities noted: vertical motion.  · Myocardial perfusion imaging indicates a normal myocardial perfusion study with no evidence of ischemia.  · Impressions are consistent with a low risk study.            Results for orders placed during the hospital encounter of 19   Adult Transthoracic Echo Limited W/ Cont if Necessary Per Protocol    Narrative · Left ventricular wall thickness is consistent with mild concentric   hypertrophy.  · Estimated EF = 55%.  · Left ventricular diastolic dysfunction.  · Mild dilation of the aortic root is present.  · No evidence of pulmonary hypertension is present.              Cardiac Cath        Conclusion of cardiac catheterization           Left main coronary artery has moderate atherosclerotic plaque without any high-grade lesions  Left anterior descending coronary artery is occluded after origin of a diagonal branch  Saphenous vein graft to the left anterior descending coronary artery has moderate atherosclerotic plaque without any high-grade lesions and patent  The first diagonal branch has a 70% tubular stenosis that extends from the left anterior descending coronary artery to the proximal aspect  Right coronary artery is occluded in the mid segment  Widely patent saphenous venous graft to the right coronary artery without any significant stenosis  Bilateral internal mammary arteries have not been used as a conduit  No other grafts identified     Left ventricular end-diastolic pressure moderately elevated to 90 mmHg with no gradient across aortic valve on pullback     Left ventricular ejection fraction approximately 45%  with basal inferior hypokinesis no significant mitral regurgitation  Aortogram performed to look for vein grafts with no dissection or significant dilatation  Atherosclerotic plaque buildup noted in the aortic root will admit patient has significant use of contrast and given his age high risk of contrast-induced nephropathy           ____________________________________________________________________________________________________________________________________________     Plan after cardiac catheterization        70% stenosis of diagonal branch without any grafting of this vessel  Patent saphenous venous graft to the left anterior descending coronary artery  Patent saphenous venous graft to the right coronary artery  Occluded mid left anterior stent coronary artery  Occluded proximal right coronary artery           Will hydrate patient  Monitor for any signs of bleeding around the right femoral arteriotomy site where he had oozing from around the 7 Hebrew sheath  Intensive risk factor modifications for both primary and secondary prevention if applicable  Hydration   Observation     If patient continues to be symptomatic consider coronary intervention with drug-eluting stent placement into the ostial and proximal portion of the diagonal branch.    Echo     · Right ventricular cavity is mild-to-moderately dilated.  · Left ventricular diastolic dysfunction (grade I) consistent with impaired relaxation.  · Left ventricular systolic function is normal.  · Left atrial cavity size is borderline dilated.     RIGHT HEART ENLARGEMENT - RVSP NOT ASSESSABLE  NORMAL LV AND RV SYSTOLIC FUNCTION  NO SIGNIFICANT VALVULAR DYSFUNCTION    Holter    · Average HR: 71. Min HR: 37. Max HR: 173.     · Monitored for approximately 1 day   · The predominant rhythm noted during the testing period was sinus rhythm.  · Bradycardia in usual sleeping hours.   · 1 premature ventricular contractions: PVCs:  <0.1%   · 45   atrial premature  contractions:   APCs: <0.1%             · No significant supraventricular  tachy or justina arrhythmia.  · No significant  ventricular tachy or justina arrhythmia.  · No correlated arrhythmia  · No significant pauses above 3 seconds        Results for orders placed during the hospital encounter of 12/24/19   Adult Transthoracic Echo Limited W/ Cont if Necessary Per Protocol    Narrative · Left ventricular wall thickness is consistent with mild concentric   hypertrophy.  · Estimated EF = 55%.  · Left ventricular diastolic dysfunction.  · Mild dilation of the aortic root is present.  · No evidence of pulmonary hypertension is present.        ·  Right ventricular cavity is mild-to-moderately dilated.  · Left ventricular diastolic dysfunction (grade I) consistent with impaired relaxation.  · Left ventricular systolic function is normal.  Left atrial cavity size is borderline dilated.    ECG 12 Lead  Date/Time: 6/8/2020 8:44 AM  Performed by: Bradley Carver MD  Authorized by: Bradley Carver MD   Comparison: compared with previous ECG from 12/24/2019  Similar to previous ECG  Rhythm: sinus bradycardia  Ectopy: atrial premature contractions  Rate: bradycardic  Conduction: conduction normal  ST Segments: ST segments normal  T Waves: T waves normal  Other: no other findings    Clinical impression: abnormal EKG            Assessment/Plan   Gonzalo was seen today for atrial fibrillation.    Diagnoses and all orders for this visit:    Abdominal aortic aneurysm (AAA) without rupture (CMS/HCC)    Abnormal stress test    Anemia, unspecified type    Anticoagulated-CAD, DM2, CVA/ASA 81+()+plavix     Atherosclerosis: CAD, AAA    Chronic diastolic congestive heart failure (CMS/HCC)    Chronic kidney disease, unspecified CKD stage    Controlled type 2 diabetes mellitus with diabetic nephropathy, without long-term current use of insulin (CMS/HCC)    Coronary artery disease involving native coronary artery of native heart without angina  pectoris    Cryptogenic stroke (CMS/HCC)    Mixed hyperlipidemia    Essential hypertension  -     ECG 12 Lead    Varicose veins of lower extremity, unspecified laterality, unspecified whether complicated    Stage 2 moderate COPD by GOLD classification (CMS/[facility])    SOB: copd,CAD,#, hypoxemia  -     ECG 12 Lead          Plan    ____________________________________________________________________________________________________________________________________________  Health maintenance and recommendations      Similar recommendations as last visit     Monitor for GIB bleeding    Offered to give patient  a copy      Questions were encouraged, asked and answered to the patient's  understanding and satisfaction. Questions if any regarding current medications and side effects, need for refills and importance of compliance to medications stressed.    Reviewed available prior notes, consults, prior visits, laboratory findings, radiology and cardiology relevant reports. Updated chart as applicable. I have reviewed the patient's medical history in detail and updated the computerized patient record as relevant.      Updated patient regarding any new or relevant abnormalities on review of records or any new findings on physical exam. Mentioned to patient about purpose of visit and desirable health short and long term goals and objectives.    Primary to monitor CBC CMP Lipid panel and TSH as applicable    ___________________________________________________________________________________________________________________________________________          Given prior CVA and high [id number]  Would recommend on anticoagulation     Continue  Eliquis     Prior hemoccult positive with negative colonoscopy and also UGIE  and no signs of bleeding  Monitor cardiac rhythm deviceLINQ  function regularly per established schedule or PRN        Keep follow up as scheduled or PRN sooner if any new or worsening problem.     No additional cardiac  testing required at this point in time.      Discussed with the patient and all questioned fully answered. He will call me if any problems arise.   Discussed results of prior testing with patient  Patient expressed understanding  Encouraged and answered all questions          Return in about 6 months (around 12/8/2020).

## 2020-06-16 DIAGNOSIS — E11.9 CONTROLLED TYPE 2 DIABETES MELLITUS WITHOUT COMPLICATION, WITHOUT LONG-TERM CURRENT USE OF INSULIN (HCC): ICD-10-CM

## 2020-06-16 DIAGNOSIS — I10 ESSENTIAL HYPERTENSION: ICD-10-CM

## 2020-06-16 DIAGNOSIS — I10 HYPERTENSION, UNSPECIFIED TYPE: Primary | ICD-10-CM

## 2020-06-16 DIAGNOSIS — N18.9 CHRONIC KIDNEY DISEASE, UNSPECIFIED CKD STAGE: ICD-10-CM

## 2020-06-16 DIAGNOSIS — Z79.01 ANTICOAGULATED: ICD-10-CM

## 2020-06-19 ENCOUNTER — LAB (OUTPATIENT)
Dept: FAMILY MEDICINE CLINIC | Facility: CLINIC | Age: 85
End: 2020-06-19

## 2020-06-19 ENCOUNTER — RESULTS ENCOUNTER (OUTPATIENT)
Dept: FAMILY MEDICINE CLINIC | Facility: CLINIC | Age: 85
End: 2020-06-19

## 2020-06-19 DIAGNOSIS — N18.9 CHRONIC KIDNEY DISEASE, UNSPECIFIED CKD STAGE: ICD-10-CM

## 2020-06-19 DIAGNOSIS — I10 ESSENTIAL HYPERTENSION: ICD-10-CM

## 2020-06-19 DIAGNOSIS — Z79.01 ANTICOAGULATED: ICD-10-CM

## 2020-06-19 DIAGNOSIS — I10 HYPERTENSION, UNSPECIFIED TYPE: ICD-10-CM

## 2020-06-19 DIAGNOSIS — E11.9 CONTROLLED TYPE 2 DIABETES MELLITUS WITHOUT COMPLICATION, WITHOUT LONG-TERM CURRENT USE OF INSULIN (HCC): ICD-10-CM

## 2020-06-23 ENCOUNTER — LAB (OUTPATIENT)
Dept: FAMILY MEDICINE CLINIC | Facility: CLINIC | Age: 85
End: 2020-06-23

## 2020-06-23 LAB — REQUEST PROBLEM: NORMAL

## 2020-06-24 ENCOUNTER — OFFICE VISIT (OUTPATIENT)
Dept: FAMILY MEDICINE CLINIC | Facility: CLINIC | Age: 85
End: 2020-06-24

## 2020-06-24 VITALS
OXYGEN SATURATION: 97 % | TEMPERATURE: 98.6 F | RESPIRATION RATE: 18 BRPM | WEIGHT: 208 LBS | BODY MASS INDEX: 29.12 KG/M2 | SYSTOLIC BLOOD PRESSURE: 136 MMHG | HEIGHT: 71 IN | HEART RATE: 77 BPM | DIASTOLIC BLOOD PRESSURE: 68 MMHG

## 2020-06-24 DIAGNOSIS — N18.9 CHRONIC KIDNEY DISEASE, UNSPECIFIED CKD STAGE: Chronic | ICD-10-CM

## 2020-06-24 DIAGNOSIS — R06.02 SHORTNESS OF BREATH: ICD-10-CM

## 2020-06-24 DIAGNOSIS — R13.10 DYSPHAGIA, UNSPECIFIED TYPE: ICD-10-CM

## 2020-06-24 DIAGNOSIS — I25.10 CORONARY ARTERY DISEASE INVOLVING NATIVE CORONARY ARTERY OF NATIVE HEART WITHOUT ANGINA PECTORIS: Chronic | ICD-10-CM

## 2020-06-24 DIAGNOSIS — I10 ESSENTIAL HYPERTENSION: Chronic | ICD-10-CM

## 2020-06-24 DIAGNOSIS — N40.0 PROSTATISM: Chronic | ICD-10-CM

## 2020-06-24 DIAGNOSIS — I63.9 CEREBROVASCULAR ACCIDENT (CVA), UNSPECIFIED MECHANISM (HCC): ICD-10-CM

## 2020-06-24 DIAGNOSIS — R09.02 HYPOXIA: ICD-10-CM

## 2020-06-24 DIAGNOSIS — I70.90 ATHEROSCLEROSIS: ICD-10-CM

## 2020-06-24 DIAGNOSIS — I71.40 ABDOMINAL AORTIC ANEURYSM (AAA) WITHOUT RUPTURE (HCC): ICD-10-CM

## 2020-06-24 DIAGNOSIS — Z79.01 ANTICOAGULATED: ICD-10-CM

## 2020-06-24 DIAGNOSIS — K21.9 GASTROESOPHAGEAL REFLUX DISEASE, ESOPHAGITIS PRESENCE NOT SPECIFIED: ICD-10-CM

## 2020-06-24 DIAGNOSIS — F32.A DEPRESSION, UNSPECIFIED DEPRESSION TYPE: ICD-10-CM

## 2020-06-24 DIAGNOSIS — G47.33 OBSTRUCTIVE SLEEP APNEA: ICD-10-CM

## 2020-06-24 DIAGNOSIS — J44.9 STAGE 2 MODERATE COPD BY GOLD CLASSIFICATION (HCC): ICD-10-CM

## 2020-06-24 DIAGNOSIS — R25.1 TREMOR: ICD-10-CM

## 2020-06-24 DIAGNOSIS — D64.9 ANEMIA, UNSPECIFIED TYPE: ICD-10-CM

## 2020-06-24 DIAGNOSIS — M54.6 ACUTE MIDLINE THORACIC BACK PAIN: Primary | ICD-10-CM

## 2020-06-24 DIAGNOSIS — J96.11 CHRONIC RESPIRATORY FAILURE WITH HYPOXIA (HCC): ICD-10-CM

## 2020-06-24 DIAGNOSIS — I50.32 CHRONIC DIASTOLIC CONGESTIVE HEART FAILURE (HCC): ICD-10-CM

## 2020-06-24 DIAGNOSIS — G62.9 NEUROPATHY: ICD-10-CM

## 2020-06-24 DIAGNOSIS — E11.21 CONTROLLED TYPE 2 DIABETES MELLITUS WITH DIABETIC NEPHROPATHY, WITHOUT LONG-TERM CURRENT USE OF INSULIN (HCC): Chronic | ICD-10-CM

## 2020-06-24 DIAGNOSIS — E78.2 MIXED HYPERLIPIDEMIA: Chronic | ICD-10-CM

## 2020-06-24 DIAGNOSIS — Z00.00 WELLNESS EXAMINATION: ICD-10-CM

## 2020-06-24 LAB
BASOPHILS # BLD AUTO: NORMAL 10*3/UL
BUN SERPL-MCNC: 29 MG/DL (ref 8–27)
BUN/CREAT SERPL: 24 (ref 10–24)
CALCIUM SERPL-MCNC: 9.7 MG/DL (ref 8.6–10.2)
CHLORIDE SERPL-SCNC: 102 MMOL/L (ref 96–106)
CO2 SERPL-SCNC: 26 MMOL/L (ref 20–29)
CREAT SERPL-MCNC: 1.2 MG/DL (ref 0.76–1.27)
EOSINOPHIL # BLD AUTO: NORMAL 10*3/UL
EOSINOPHIL NFR BLD AUTO: NORMAL %
GLUCOSE SERPL-MCNC: 121 MG/DL (ref 65–99)
HBA1C MFR BLD: NORMAL %
HCT VFR BLD AUTO: NORMAL %
HGB BLD-MCNC: NORMAL G/DL
LYMPHOCYTES # BLD AUTO: NORMAL 10*3/UL
LYMPHOCYTES NFR BLD AUTO: NORMAL %
MONOCYTES NFR BLD AUTO: NORMAL %
NEUTROPHILS NFR BLD AUTO: NORMAL %
PLATELET # BLD AUTO: NORMAL 10*3/UL
POTASSIUM SERPL-SCNC: 4.7 MMOL/L (ref 3.5–5.2)
RBC # BLD AUTO: NORMAL 10*6/UL
SODIUM SERPL-SCNC: 141 MMOL/L (ref 134–144)
SPECIMEN STATUS: NORMAL
WBC # BLD AUTO: NORMAL X10E3/UL

## 2020-06-24 PROCEDURE — G0439 PPPS, SUBSEQ VISIT: HCPCS | Performed by: FAMILY MEDICINE

## 2020-06-24 PROCEDURE — 99214 OFFICE O/P EST MOD 30 MIN: CPT | Performed by: FAMILY MEDICINE

## 2020-06-24 NOTE — PATIENT INSTRUCTIONS
"Medicare/insurances offer certain visits called \"wellness/annual\" that allows for time to deal with and  review the many aspects of \"being well\" that just might not get mentioned during other visits with your doctor through the year.  This includes things like reviews of health screenings (mammograms, various labs),  weight, exercise, vaccines for just a few examples.      In order to help you with this we wish to make you aware of a few things for you to consider:    1. Advanced directives.  These are documents used to help direct your care if your health/situation should reach a point that you cannot make your own decisions.  While it is likely you do not currently have a need for these documents now; it is something that we all might face at any time.   The hand outs you are being given today are simply for you to review and use to learn more about these documents and consider them as you wish.      2. Vaccines: Certain vaccines are important after age 50, 60, and 65 and some health situations (for example COPD), require even boosters beyond age 65.  We are happy to review with you your vaccine status and vaccines that might be needed for you at this point:      a. Tetanus.   Like anyone this needs to given every 10 years; sooner for/with lacerations/wounds.   Likely when getting this booster it needs to be a tetanus called Tdap (tetanus mixed with diptheria and pertussis).   Years ago you had this vaccine.  We now know we can lose our immunity to pertussis (a part of this vaccine) and run a risk of catching this.  Now only would this make us ill; but more importantly we can spread this to very young children (and for them it can be a much more dangerous illness).   We call this the grandparent vaccine for this reason.     b. Pneumonia (strept).   This comes now with two brands.   It is recommended to take pneumovax first; and a year later take the cousin prevnar.  Even if you have had these before; we need to " review when and your current health situation/s as you may need boosters and even recently the CDC has made recent/new recommendations for pneumovax.      c. Shingles.  You do not want to catch shingles.  Though you will recover from this; the pain associated with shingles can be severe.  Even if you have had the now older zostavax, or have had shingles; it is recommended you still get the Shingrix (the new vaccine just available early 2018 shingles vaccine).  A new shingles vaccine (a shot to lower your chance of catching shingles) is now available (shingrix).  This vaccine is the second vaccine created for this purpose; (we have had zostavax for years).  Shingrix provides a much better and longer immunity for shingles than zostavax.  For this and other reasons Shingrix can be started at age 50.  If you have had zostavax in the past; you can still take Shingrix.      This vaccine is not paid for in a doctor's office by medicare, medicaid and probably most insurances.  Like zostavax; this is covered in drug stores.  This is a vaccine that if you chose to get you need to get at a drug store that gives vaccines (like [facility], [facility] and [facility].      d. Yearly flu vaccine given from September through April each year (there is a special vaccine for those over 65).     e. Travel vaccines:  If you are one to do international travel; be sure and ask us for any particular unusual vaccines you may need.     f.  Miscellaneous:  If you have certain health situations/disease you may need specific/particular vaccines not give to the general public.     The vaccines we have on record for you include:   Immunization History   Administered Date(s) Administered   • FLUAD TRI 65YR+ 11/22/2019   • Flu Vaccine Quad PF >18YRS 10/21/2015, 01/20/2017, 01/29/2018   • Fluzone High Dose =>65 Years ([facility] ONLY) 10/09/2018   • Influenza Whole 10/21/2015   • Pneumococcal Conjugate 13-Valent (PCV13) 10/21/2015        If you have record of other vaccines and want them to show in your chart here; please talk to our nurses about having your vaccine record updated.     3. Exercise: regular cardio exercise something everyone should consider and try to do; even if health limitations (ie find that exercise UE/LE/cardio that they can tolerate).   Normal weight a goal for everyone (as we discussed)    4. Healthy diet helpful for weight management, illness prevention.     5. If over 50-screening exams include men PSA/rectal exam, women mammograms, and everyone colonoscopy screening for colon cancer.    6. If you use tobacco of any kind or e-products you should stop. We are providing you some information to consider that could make this process easier.      ##################################

## 2020-06-24 NOTE — PROGRESS NOTES
Subjective   Gonzalo Durham is a 86 y.o. male presenting with chief complaint of:   Chief Complaint   Patient presents with   • Medicare Wellness-subsequent   • Diabetes   • Back Pain       History of Present Illness :  Alone.       Has multiple chronic problems to consider that might have a bearing on today's issues;  an interval appointment.       Chronic/acute problems reviewed today:   1. Acute midline thoracic back pain: acute/onset 1-2 weeks mid thoracic back pain; worse with AM/getting out of bed.  No rash.  No increased SOB/hemoptysis.     2. Wellness examination: Chronic ongoing over time screening or advice for general health care/wellness.      3. Essential hypertension: Chronic/stable. Stable here past/no recent home blood pressures.  No significant chest pain, SOB, LE edema, orthopnea, near syncope, dizziness/light headness.   Recent Vitals       3/9/2020 6/8/2020 6/24/2020       BP:  128/70  120/55  136/68     Pulse:  64  81  77     Temp:  97.7 °F (36.5 °C)  --  98.6 °F (37 °C)     Weight:  98 kg (216 lb)  94.3 kg (208 lb)  94.3 kg (208 lb)     BMI (Calculated):  29.3  29  29            4. Mixed hyperlipidemia: Chronic/stable.  Tolerated use of Rx with labs showing improved lipid values and tolerant liver labs. No muscle aches unexpected.      5. Coronary artery disease involving native coronary artery of native heart without angina pectoris: chronic/stable as no chest pain, SOB, use of NTG      6. Chronic diastolic congestive heart failure (CMS/HCC): Chronic/stable.  Denies significant sob, orthopnea, leg edema, weight gain.  Aware of influence diet/salt and watching weight at home.       7. Atherosclerosis: CAD, AAA: chronic/stable various areas of disease.  Sees vascular regularly and no plans for upcoming interventions.  Denies development/change in chest pain, claudication, CVA-TIA symptoms such as (Manifest by slurred speech asymmetry of face dysfunction/weakness of extremities).  Blood thinners  noted.      8. Abdominal aortic aneurysm (AAA) without rupture (CMS/[facility]):  Chronic/stable  11.12.19 CT abdomen/kidney stone  RETROPERITONEUM: A 4.3 x 4.3 cm infrarenal abdominal aortic aneurysm is  present. No definite retroperitoneal hematoma or lymphadenopathy.     9. SOB: copd,CAD,#, hypoxemia: Chronic/stable:  SOB worse with exertion/ok at rest.  Contributing diagnosis: copd, CAD, respiratory failure, weight.     10. Stage 2 moderate COPD by GOLD classification (CMS/[facility]): Chronic/stable mild occ cough, sob, wheeze.  Rx helps.   No smoking.       11. WENDY: using: Chronic/stable.  Uses cpap compliantly without significant problems with equipment.  Finds sleeps better and less sleepy during the day.       12. Gastroesophageal reflux disease, esophagitis presence not specified: Chronic/stable.  Controlled heartburn, reflux without dysphagia, melena.  Rx used, needed-doing ok.      13. Controlled type 2 diabetes mellitus with diabetic nephropathy, without long-term current use of insulin (CMS/[facility]): Chronic/stable.  No problem/pattern hypoglycemia/hyperglycemia manifest by poly- dypsia, phagia, uria, or sweats, diaphoretic episodes, syncope/near.     14. Chronic kidney disease, unspecified CKD stage: Chronic/stable.   No nephrology.  No dysuria.  Previously warned/reminded:   To avoid further kidney function decline  A. Treat any time you think you have infection  B. Stay hydrated (dont get dehydrated); drink at least 60 oz fluid every 24 hr (1800 cc or nearly a 2L)  C. Do not allow any xrays with dye WITHOUT the doctor ordering checking your renal function  D. Do not get new medications without the doctor considering your renal condition  E. Do not use motrin/ibuprofen, alleve/naprosyn and these types of medications     15. Depression, unspecified depression type: chronic/improved without significant  mood swings, down moods, nervousness, difficulty with concentration to function home/work.  Others close have not been  concerned.  No suicide ideation/intent.  Rx not needed.      16. Anemia, unspecified type: Chronic or past history/stable: This has been present before.    There has been GI evaulation in the past. There is no current melena, hematochezia. It has been benign to date and stable/watching.  Contributing comorbidities to date: ASA 81,plavix,eliquis tx; gi issues 3/3+, div, cpolyps, esophagitis past.     17. Prostatism-young available: Chronic/stable.  Slower but tolerated stream with complete emptying.  Nocturia on occ; tolerated.  No desire to persure evaluations/surgery.      18. Anticoagulated-CAD, DM2, CVA/ASA 81+()+plavix+eliquist: Chronic/stable reason and use of.  Denies bleeding issues; especially epistaxis, melena, hematochezia.  Upper arms/others do bruise easily.  No significant bleeding or falls.        Has an/another acute issue today: none.    The following portions of the patient's history were reviewed and updated as appropriate: allergies, current medications, past family history, past medical history, past social history, past surgical history and problem list.      Current Outpatient Medications:   •  acetaminophen (TYLENOL) 325 MG tablet, Take 2 tablets by mouth 2 (Two) Times a Day., Disp: 360 tablet, Rfl: 2  •  albuterol sulfate HFA (PROAIR HFA) 108 (90 Base) MCG/ACT inhaler, Inhale 2 puffs 4 (Four) Times a Day., Disp: 1 inhaler, Rfl: 1  •  apixaban (ELIQUIS) 5 MG tablet tablet, Take 1 tablet by mouth Every 12 (Twelve) Hours., Disp: 180 tablet, Rfl: 3  •  Artificial Tear Solution (JUST TEARS EYE DROPS) solution, Apply 1-2 drops to eye(s) as directed by provider Daily As Needed (dry eyes)., Disp: 45 mL, Rfl: 2  •  aspirin 81 MG EC tablet, Take 1 tablet by mouth Daily., Disp: , Rfl:   •  calcium carbonate-vitamin d 600-400 MG-UNIT per tablet, Take 1 tablet by mouth 2 (Two) Times a Day., Disp: 180 tablet, Rfl: 2  •  finasteride (PROSCAR) 5 MG tablet, Take 1 tablet by mouth Daily., Disp: 90  tablet, Rfl: 2  •  FREESTYLE LITE test strip, 1 each by Other route Daily., Disp: 100 each, Rfl: 2  •  gabapentin (Neurontin) 300 MG capsule, Take 1 capsule by mouth 2 (Two) Times a Day., Disp: 180 capsule, Rfl: 1  •  glyBURIDE (DIAbeta) 5 MG tablet, Take 1 tablet by mouth Every Morning AND 0.5 tablets Daily Before Supper., Disp: , Rfl:   •  guaiFENesin (MUCINEX) 600 MG 12 hr tablet, Take 1 tablet by mouth 2 (Two) Times a Day., Disp: 180 tablet, Rfl: 2  •  isosorbide mononitrate (Imdur) 30 MG 24 hr tablet, Take 1 tablet by mouth Daily., Disp: 90 tablet, Rfl: 3  •  Lancets (FREESTYLE) lancets, Use once daily, Disp: 100 each, Rfl: 2  •  loratadine (CLARITIN) 10 MG tablet, Take 1 tablet by mouth Daily., Disp: 90 tablet, Rfl: 2  •  metFORMIN ER (Glucophage XR) 500 MG 24 hr tablet, Take 1 tablet by mouth Every Night., Disp: 90 tablet, Rfl: 2  •  metoprolol tartrate (LOPRESSOR) 25 MG tablet, Take 1 tablet by mouth 2 (Two) Times a Day., Disp: 180 tablet, Rfl: 3  •  Multiple Vitamins-Minerals (CENTRUM SILVER ADULT 50+) tablet, Take 50 mg by mouth Daily., Disp: 90 tablet, Rfl: 1  •  Multiple Vitamins-Minerals (PRESERVISION AREDS 2+MULTI VIT) capsule, Take 1 capsule by mouth Daily., Disp: 90 capsule, Rfl: 1  •  nitroglycerin (NITROSTAT) 0.4 MG SL tablet, Place 1 tablet under the tongue Every 5 (Five) Minutes As Needed for Chest Pain., Disp: 30 tablet, Rfl: 0  •  NON FORMULARY, CPAP per mask with 4L oxygen nightly, Disp: , Rfl:   •  O2 (OXYGEN), Inhale 4 L/min Continuous. Per nasal cannula, Disp: , Rfl:   •  pantoprazole (PROTONIX) 40 MG EC tablet, Take 1 tablet by mouth Daily., Disp: 90 tablet, Rfl: 2  •  pravastatin (PRAVACHOL) 80 MG tablet, Take 1 tablet by mouth Daily., Disp: 90 tablet, Rfl: 2  •  terazosin (HYTRIN) 10 MG capsule, Take 1 capsule by mouth Daily., Disp: 90 capsule, Rfl: 2  •  tiotropium bromide monohydrate (SPIRIVA RESPIMAT) 2.5 MCG/ACT aerosol solution inhaler, Inhale 2 puffs Daily., Disp: 3 inhaler, Rfl:  2  •  vitamin C (ASCORBIC ACID) 500 MG tablet, Take 1 tablet by mouth Daily., Disp: 90 tablet, Rfl: 1    No problems with medications.  Refills if needed done    Allergies   Allergen Reactions   • Symbicort [Budesonide-Formoterol Fumarate] Dizziness     Patient passed out and fell   • Prozac [Fluoxetine Hcl] Mental Status Change     Bad dreams.       Review of Systems  GENERAL:  Active/slower with limits, speed, samni for age and SOB.  Sleep is ok; much better with cpap.   SKIN:  Ongoing callous of feet; no problems with this/other skin today.   ENDO:  No syncope, near or diaphoretic sweaty spells.  BS Ok: without download noted last visit.   HEENT: No head injury, headache.  No vision change.  Same mild hearing loss.  Ears without pain/drainage.  No sore throat.  No significant nasal/sinus congestion/drainage. No epistaxis.  CHEST: No chest wall tenderness or mass. More cough/more productive without wheeze.  Mild/same SOB; no hemoptysis.  CV: No chest pain, palpatations, ankle edema.  GI: No heartburn significant dysphagia.  No abdominal pain, diarrhea, constipation, rectal bleeding, or melena.    :  Voids without dysuria, or incontience to completion.  ORTHO: No painful/swollen joints but various on /off sore.  No change occ/usual sore neck or back.  But no acute neck but above mid back pain without recent injury.   NEURO: No dizziness; recent LUE weakness of extremities.  No change some LE numbness/parethesias.   PSYCH: No memory loss.  Mood good; not that anxious, depressed but/and not suicidal.  Tolerated stress.   Screening:  Mammogram: NA  Bone density: NA  Low dose CT chest: Tobacco-smoker/age 22/1 ppd/dc 1980: (22): NA (had CT angio)  GI: Colon-div/[facility]/6.15.17/prn  Prostate: Cadena/confirmed 11.22.19  [facility] in past   Usual lab order  6m CBC, BMP, A1c  12m CBC, CMP, A1c, TSH, LIPID, PSAs, Vit D    Lab Results:  Results for orders placed or performed in visit on 06/19/20   Basic metabolic panel   Result  Value Ref Range    Glucose CANCELED mg/dL    BUN CANCELED     Creatinine CANCELED     Sodium CANCELED     Potassium CANCELED     Chloride CANCELED     Total CO2 CANCELED     Calcium CANCELED    Hemoglobin A1c   Result Value Ref Range    Hemoglobin A1C 5.80 (H) 4.80 - 5.60 %   Request Problem   Result Value Ref Range    Request Problem CANCELED    CBC & Differential   Result Value Ref Range    WBC 6.17 3.40 - 10.80 10*3/mm3    RBC 3.81 (L) 4.14 - 5.80 10*6/mm3    Hemoglobin 11.2 (L) 13.0 - 17.7 g/dL    Hematocrit 34.6 (L) 37.5 - 51.0 %    MCV 90.8 79.0 - 97.0 fL    MCH 29.4 26.6 - 33.0 pg    MCHC 32.4 31.5 - 35.7 g/dL    RDW 13.1 12.3 - 15.4 %    Platelets 280 140 - 450 10*3/mm3    Neutrophil Rel % 55.4 42.7 - 76.0 %    Lymphocyte Rel % 24.1 19.6 - 45.3 %    Monocyte Rel % 9.2 5.0 - 12.0 %    Eosinophil Rel % 10.5 (H) 0.3 - 6.2 %    Basophil Rel % 0.6 0.0 - 1.5 %    Neutrophils Absolute 3.41 1.70 - 7.00 10*3/mm3    Lymphocytes Absolute 1.49 0.70 - 3.10 10*3/mm3    Monocytes Absolute 0.57 0.10 - 0.90 10*3/mm3    Eosinophils Absolute 0.65 (H) 0.00 - 0.40 10*3/mm3    Basophils Absolute 0.04 0.00 - 0.20 10*3/mm3    Immature Granulocyte Rel % 0.2 0.0 - 0.5 %    Immature Grans Absolute 0.01 0.00 - 0.05 10*3/mm3    nRBC 0.0 0.0 - 0.2 /100 WBC       A1C:  Lab Results - Last 18 Months   Lab Units 06/19/20  0720 11/19/19  0719 09/30/19  0716 05/23/19  0423 05/15/19  0708   HEMOGLOBIN A1C % 5.80* 6.40* 6.30* 6.4 6.30*     PSA:  Lab Results - Last 18 Months   Lab Units 05/15/19  0708   PSA ng/mL 1.100     CBC:  Lab Results - Last 18 Months   Lab Units 06/19/20  0720 12/26/19  0259 12/24/19  2246 11/19/19  0719 09/30/19  0716 05/23/19  0423 05/22/19  1211   WBC 10*3/mm3 6.17 7.63 8.88 8.11 8.18 8.12 6.98   HEMOGLOBIN g/dL 11.2* 11.9* 12.9* 12.4* 12.9* 11.9* 13.4*   HEMATOCRIT % 34.6* 36.9* 38.5 38.5 39.5 38.1* 41.3   PLATELETS 10*3/mm3 280 175 230 220 251 229 253      BMP/CMP:  Lab Results - Last 18 Months   Lab Units  "06/19/20  0720 12/26/19  0259 12/24/19  2246 11/19/19  0719 09/30/19  0716 05/23/19  0423 05/22/19  1211 05/15/19  0708   SODIUM  CANCELED 143 140 147* 144 141 141 144   POTASSIUM  CANCELED 4.5 4.1 4.6 5.0 4.5 4.7 4.9   CHLORIDE  CANCELED 104 98 105 102 102 103 105   TOTAL CO2  CANCELED  --   --  30.5* 29.4*  --   --  27.8   CO2 mmol/L  --  30.0* 29.0  --   --  31.0 32.0*  --    GLUCOSE mg/dL CANCELED  --   --  122* 101*  --   --  128*   BUN  CANCELED 25* 31* 22 32* 21 26* 23   CREATININE  CANCELED 1.02 1.14 1.11 1.11 1.25 1.11 1.12   EGFR IF NONAFRICN AM mL/min/1.73  --  69 61 63 63 55* 63 62   EGFR IF AFRICN AM mL/min/1.73  --   --   --  76 76  --   --  76   CALCIUM  CANCELED 8.8 9.9 8.9 9.6 8.7 9.4 9.8     HEPATIC:  Lab Results - Last 18 Months   Lab Units 12/24/19 2246 09/30/19  0716 05/22/19  1211 05/15/19  0708   ALT (SGPT) U/L 14 15 17 11   AST (SGOT) U/L 15 14 33 15   ALK PHOS U/L 72 56 59 55     THYROID:  Lab Results - Last 18 Months   Lab Units 09/30/19  0716 05/15/19  0708   TSH uIU/mL 2.540 2.570       Objective   /68   Pulse 77   Temp 98.6 °F (37 °C)   Resp 18   Ht 180.3 cm (71\")   Wt 94.3 kg (208 lb)   SpO2 97%   BMI 29.01 kg/m²   Body mass index is 29.01 kg/m².     Recent Vitals       3/9/2020 6/8/2020 6/24/2020       BP:  128/70  120/55  136/68     Pulse:  64  81  77     Temp:  97.7 °F (36.5 °C)  --  98.6 °F (37 °C)     Weight:  98 kg (216 lb)  94.3 kg (208 lb)  94.3 kg (208 lb)     BMI (Calculated):  29.3  29  29             Physical Exam  GENERAL:  Well nourished/developed in no acute distress.   SKIN: Turgor excellent, without wound, rash, lesion  HEENT: Normal cephalic without trauma.  Pupils equal round reactive to light. Extraocular motions full without nystagmus.    No thyroidmegaly, mass, tenderness, lymphadenopathy and supple.  CV: Regular rhythm.  No murmur, gallop,  edema. Posterior pulses intact.  No carotid bruits.  CHEST: No chest wall tenderness or mass.   LUNGS: " Symmetric motion with clear/decreased to auscultation.   ABD: Soft, nontender without mass.   ORTHO: Symmetric extremities without swelling/point tenderness.  Full gross range of motion except hips reduced.  Symmetric LE.  Neg straight leg raising.  Neg Patricks maneuver.  Back is straight/mild lordosis.  Mid back sore touch.   NEURO: CN 2-12 grossly intact.  Symmetric facies. 1/4 x bicep knee equal reflexes.  UE/LE   3/5 strength throughout except 2/5  L.  Nonfocal use extremities. Speech clear.  Light touch decreased bilateral feet.   PSYCH: Oriented x 3.  Pleasant calm, well kept.  Purposeful/directed conservation with intact short/long gross memory.       Assessment/Plan     1. Acute midline thoracic back pain    2. Wellness examination    3. Essential hypertension    4. Mixed hyperlipidemia    5. Coronary artery disease involving native coronary artery of native heart without angina pectoris    6. Chronic diastolic congestive heart failure (CMS/[facility])    7. Atherosclerosis: CAD, AAA    8. Abdominal aortic aneurysm (AAA) without rupture (CMS/[facility])    9. SOB: copd,CAD,#, hypoxemia    10. Stage 2 moderate COPD by GOLD classification (CMS/[facility])    11. WENDY: using    12. Gastroesophageal reflux disease, esophagitis presence not specified    13. Controlled type 2 diabetes mellitus with diabetic nephropathy, without long-term current use of insulin (CMS/[facility])    14. Chronic kidney disease, unspecified CKD stage    15. Depression, unspecified depression type    16. Anemia, unspecified type    17. Prostatism-young available    18. Anticoagulated-CAD, DM2, CVA/ASA 81+()+plavix+eliquist        Rx: reviewed/changes:  No orders of the defined types were placed in this encounter.    LAB/Testing/Referrals: reviewed/orders:   Today:   No orders of the defined types were placed in this encounter.    Chronic/recurrent labs above or change to:   Extra CBC, iron, B12, folate around 9/2020  6m CBC, BMP, A1c, iron, B12,  "folate  12m CBC, CMP, A1c, TSH, LIPID, PSAs, Vit D, iron, B12, folate    Discussions:   Increase iron studies  ? Wen; plavix, ASA81 and eliquis  Same otherwise  Wellness reports    Body mass index is 29.01 kg/m².  Patient's Body mass index is 29.01 kg/m². BMI is within normal parameters. No follow-up required..  Tobacco use reviewed:    Non-smoker  Gonzalo Durham  reports that he quit smoking about 40 years ago. His smoking use included cigarettes. He started smoking about 66 years ago. He has a 26.00 pack-year smoking history. He has never used smokeless tobacco..  Annual/wellness visit also    Patient Instructions     Medicare/insurances offer certain visits called \"wellness/annual\" that allows for time to deal with and  review the many aspects of \"being well\" that just might not get mentioned during other visits with your doctor through the year.  This includes things like reviews of health screenings (mammograms, various labs),  weight, exercise, vaccines for just a few examples.      In order to help you with this we wish to make you aware of a few things for you to consider:    1. Advanced directives.  These are documents used to help direct your care if your health/situation should reach a point that you cannot make your own decisions.  While it is likely you do not currently have a need for these documents now; it is something that we all might face at any time.   The hand outs you are being given today are simply for you to review and use to learn more about these documents and consider them as you wish.      2. Vaccines: Certain vaccines are important after age 50, 60, and 65 and some health situations (for example COPD), require even boosters beyond age 65.  We are happy to review with you your vaccine status and vaccines that might be needed for you at this point:      a. Tetanus.   Like anyone this needs to given every 10 years; sooner for/with lacerations/wounds.   Likely when getting this booster it " needs to be a tetanus called Tdap (tetanus mixed with diptheria and pertussis).   Years ago you had this vaccine.  We now know we can lose our immunity to pertussis (a part of this vaccine) and run a risk of catching this.  Now only would this make us ill; but more importantly we can spread this to very young children (and for them it can be a much more dangerous illness).   We call this the grandparent vaccine for this reason.     b. Pneumonia (strept).   This comes now with two brands.   It is recommended to take pneumovax first; and a year later take the cousin prevnar.  Even if you have had these before; we need to review when and your current health situation/s as you may need boosters and even recently the CDC has made recent/new recommendations for pneumovax.      c. Shingles.  You do not want to catch shingles.  Though you will recover from this; the pain associated with shingles can be severe.  Even if you have had the now older zostavax, or have had shingles; it is recommended you still get the Shingrix (the new vaccine just available early 2018 shingles vaccine).  A new shingles vaccine (a shot to lower your chance of catching shingles) is now available (shingrix).  This vaccine is the second vaccine created for this purpose; (we have had zostavax for years).  Shingrix provides a much better and longer immunity for shingles than zostavax.  For this and other reasons Shingrix can be started at age 50.  If you have had zostavax in the past; you can still take Shingrix.      This vaccine is not paid for in a doctor's office by medicare, medicaid and probably most insurances.  Like zostavax; this is covered in drug stores.  This is a vaccine that if you chose to get you need to get at a drug store that gives vaccines (like [facility], [facility] and "[employer].      d. Yearly flu vaccine given from September through April each year (there is a special vaccine for those over 65).     e. Travel  vaccines:  If you are one to do international travel; be sure and ask us for any particular unusual vaccines you may need.     f.  Miscellaneous:  If you have certain health situations/disease you may need specific/particular vaccines not give to the general public.     The vaccines we have on record for you include:   Immunization History   Administered Date(s) Administered   • FLUAD TRI 65YR+ 11/22/2019   • Flu Vaccine Quad PF >18YRS 10/21/2015, 01/20/2017, 01/29/2018   • Fluzone High Dose =>65 Years ([facility] ONLY) 10/09/2018   • Influenza Whole 10/21/2015   • Pneumococcal Conjugate 13-Valent (PCV13) 10/21/2015       If you have record of other vaccines and want them to show in your chart here; please talk to our nurses about having your vaccine record updated.     3. Exercise: regular cardio exercise something everyone should consider and try to do; even if health limitations (ie find that exercise UE/LE/cardio that they can tolerate).   Normal weight a goal for everyone (as we discussed)    4. Healthy diet helpful for weight management, illness prevention.     5. If over 50-screening exams include men PSA/rectal exam, women mammograms, and everyone colonoscopy screening for colon cancer.    6. If you use tobacco of any kind or e-products you should stop. We are providing you some information to consider that could make this process easier.      ##################################              Follow up: Return for lab;, Dr Romero-, 6 m;.  Future Appointments   Date Time Provider Department Center   7/2/2020  8:15 AM [facility]   8/3/2020  8:45 AM [facility]   8/6/2020 10:30 AM Juan Marcelino MD [facility]   12/8/2020  9:00 AM Bradley Carver MD [facility]   3/24/2021  1:15 PM Feliz Frye APRN [facility] None

## 2020-06-25 NOTE — PROGRESS NOTES
The ABCs of the Annual Wellness Visit  Subsequent Medicare Wellness Visit    Chief Complaint   Patient presents with   • Medicare Wellness-subsequent   • Diabetes   • Back Pain       Subjective   History of Present Illness:  Gonzalo Durham is a 86 y.o. male who presents for a Subsequent Medicare Wellness Visit.    HEALTH RISK ASSESSMENT    Recent Hospitalizations:  No hospitalization(s) within the last year.    Current Medical Providers:  Patient Care Team:  Abel Romero MD as PCP - General (Family Medicine)  Aebl Romero MD as PCP - Claims Attributed  Dawson Claros MD (Inactive) as Consulting Physician (Urology)  Abel Romero MD as Referring Physician (Family Medicine)  Bradley Carver MD as Cardiologist (Cardiology)  Sly Ahn MD as Consulting Physician (Gastroenterology)  Abel Romero MD as Referring Physician (Family Medicine)  Juan Lane MD as Consulting Physician (Otolaryngology)  Feliz Frye APRN as Nurse Practitioner (Pulmonary Disease)  [facility])    Smoking Status:  Social History     Tobacco Use   Smoking Status Former Smoker   • Packs/day: 1.00   • Years: 26.00   • Pack years: 26.00   • Types: Cigarettes   • Start date:    • Last attempt to quit:    • Years since quittin.5   Smokeless Tobacco Never Used       Alcohol Consumption:  Social History     Substance and Sexual Activity   Alcohol Use No       Depression Screen:   PHQ-2/PHQ-9 Depression Screening 2020   Little interest or pleasure in doing things 0   Feeling down, depressed, or hopeless 0   Total Score 0       Fall Risk Screen:  STEADI Fall Risk Assessment was completed, and patient is at HIGH risk for falls. Assessment completed on:2020    Health Habits and Functional and Cognitive Screening:  No flowsheet data found.      Does the patient have evidence of cognitive impairment? No    Asprin use counseling:Taking ASA appropriately as  indicated    Age-appropriate Screening Schedule:  Refer to the list below for future screening recommendations based on patient's age, sex and/or medical conditions. Orders for these recommended tests are listed in the plan section. The patient has been provided with a written plan.    Health Maintenance   Topic Date Due   • TDAP/TD VACCINES (1 - Tdap) 01/27/1945   • ZOSTER VACCINE (1 of 2) 01/27/1984   • URINE MICROALBUMIN  05/15/2020   • INFLUENZA VACCINE  08/01/2020   • LIPID PANEL  09/30/2020   • HEMOGLOBIN A1C  12/19/2020   • DIABETIC EYE EXAM  03/12/2021          The following portions of the patient's history were reviewed and updated as appropriate: allergies, current medications, past family history, past medical history, past social history, past surgical history and problem list.    Outpatient Medications Prior to Visit   Medication Sig Dispense Refill   • acetaminophen (TYLENOL) 325 MG tablet Take 2 tablets by mouth 2 (Two) Times a Day. 360 tablet 2   • albuterol sulfate HFA (PROAIR HFA) 108 (90 Base) MCG/ACT inhaler Inhale 2 puffs 4 (Four) Times a Day. 1 inhaler 1   • apixaban (ELIQUIS) 5 MG tablet tablet Take 1 tablet by mouth Every 12 (Twelve) Hours. 180 tablet 3   • Artificial Tear Solution (JUST TEARS EYE DROPS) solution Apply 1-2 drops to eye(s) as directed by provider Daily As Needed (dry eyes). 45 mL 2   • aspirin 81 MG EC tablet Take 1 tablet by mouth Daily.     • calcium carbonate-vitamin d 600-400 MG-UNIT per tablet Take 1 tablet by mouth 2 (Two) Times a Day. 180 tablet 2   • finasteride (PROSCAR) 5 MG tablet Take 1 tablet by mouth Daily. 90 tablet 2   • FREESTYLE LITE test strip 1 each by Other route Daily. 100 each 2   • gabapentin (Neurontin) 300 MG capsule Take 1 capsule by mouth 2 (Two) Times a Day. 180 capsule 1   • glyBURIDE (DIAbeta) 5 MG tablet Take 1 tablet by mouth Every Morning AND 0.5 tablets Daily Before Supper.     • guaiFENesin (MUCINEX) 600 MG 12 hr tablet Take 1 tablet by  mouth 2 (Two) Times a Day. 180 tablet 2   • isosorbide mononitrate (Imdur) 30 MG 24 hr tablet Take 1 tablet by mouth Daily. 90 tablet 3   • Lancets (FREESTYLE) lancets Use once daily 100 each 2   • loratadine (CLARITIN) 10 MG tablet Take 1 tablet by mouth Daily. 90 tablet 2   • metFORMIN ER (Glucophage XR) 500 MG 24 hr tablet Take 1 tablet by mouth Every Night. 90 tablet 2   • metoprolol tartrate (LOPRESSOR) 25 MG tablet Take 1 tablet by mouth 2 (Two) Times a Day. 180 tablet 3   • Multiple Vitamins-Minerals (CENTRUM SILVER ADULT 50+) tablet Take 50 mg by mouth Daily. 90 tablet 1   • Multiple Vitamins-Minerals (PRESERVISION AREDS 2+MULTI VIT) capsule Take 1 capsule by mouth Daily. 90 capsule 1   • nitroglycerin (NITROSTAT) 0.4 MG SL tablet Place 1 tablet under the tongue Every 5 (Five) Minutes As Needed for Chest Pain. 30 tablet 0   • NON FORMULARY CPAP per mask with 4L oxygen nightly     • O2 (OXYGEN) Inhale 4 L/min Continuous. Per nasal cannula     • pantoprazole (PROTONIX) 40 MG EC tablet Take 1 tablet by mouth Daily. 90 tablet 2   • pravastatin (PRAVACHOL) 80 MG tablet Take 1 tablet by mouth Daily. 90 tablet 2   • terazosin (HYTRIN) 10 MG capsule Take 1 capsule by mouth Daily. 90 capsule 2   • tiotropium bromide monohydrate (SPIRIVA RESPIMAT) 2.5 MCG/ACT aerosol solution inhaler Inhale 2 puffs Daily. 3 inhaler 2   • vitamin C (ASCORBIC ACID) 500 MG tablet Take 1 tablet by mouth Daily. 90 tablet 1     No facility-administered medications prior to visit.        Patient Active Problem List   Diagnosis   • Wellness examination-done   • Obesity   • Diabetes type 2, controlled (CMS/HCC)   • Chronic kidney disease-3 ro   • Erectile dysfunction   • Coronary artery disease-CABG   • Hyperlipidemia-statin   • Hypertension   • Prostatism-young available   • Tremor   • Varicose vein of leg   • Plantar fasciitis   • Nocturnal leg movements   • Bad dreams   • Depression   • Peripheral neuropathy- clinical   • Hx of colonic  polyps   • Gastroesophageal reflux disease   • Left lower quadrant pain   • Laboratory test*   • Anticoagulated-CAD, DM2, CVA/ASA 81+()+plavix+eliquist   • SOB: copd,CAD,#, hypoxemia   • Hypoxia#to pulmonary   • Respiratory fhnijkq-mmypjyr-X3-continuous   • Corn or callus   • Anemia: ASA81,plavix,eliquis,gi(3/3+,div,cp,eitis   • Atherosclerosis: CAD, AAA ro   • AAA: 2019/12m ro   • Heme positive stool   • BMI 30.0-30.9,adult   • Stage 2 moderate COPD by GOLD classification (CMS/HCC)   • Abnormal stress test   • Tingling of both feet-to consider NCV   • Cryptogenic stroke (CMS/HCC)   • CHF (congestive heart failure) (CMS/HCC)-diastolic/chronic   • Gross hematuria   • BPH with obstruction/lower urinary tract symptoms   • Chest pain   • WENDY: using       Advanced Care Planning:  ACP discussion was held with the patient during this visit. Patient has an advance directive (not in EMR), copy requested.    Review of Systems  GENERAL:  Active/slower with limits, speed, samni for age and SOB.  Sleep is ok; much better with cpap.   SKIN:  Ongoing callous of feet; no problems with this/other skin today.   ENDO:  No syncope, near or diaphoretic sweaty spells.  BS Ok: without download noted last visit.   HEENT: No head injury, headache.  No vision change.  Same mild hearing loss.  Ears without pain/drainage.  No sore throat.  No significant nasal/sinus congestion/drainage. No epistaxis.  CHEST: No chest wall tenderness or mass. More cough/more productive without wheeze.  Mild/same SOB; no hemoptysis.  CV: No chest pain, palpatations, ankle edema.  GI: No heartburn significant dysphagia.  No abdominal pain, diarrhea, constipation, rectal bleeding, or melena.    :  Voids without dysuria, or incontience to completion.  ORTHO: No painful/swollen joints but various on /off sore.  No change occ/usual sore neck or back.  But no acute neck but above mid back pain without recent injury.   NEURO: No dizziness; recent LUE weakness  "of extremities.  No change some LE numbness/parethesias.   PSYCH: No memory loss.  Mood good; not that anxious, depressed but/and not suicidal.  Tolerated stress.   Screening:  Mammogram: NA  Bone density: NA  Low dose CT chest: Tobacco-smoker/age 22/1 ppd/dc 1980: (22): NA (had CT angio)  GI: Colon-div/[facility]/6.15.17/prn  Prostate: Cadena/confirmed 11.22.19  [place] in past   Usual lab order  6m CBC, BMP, A1c  12m CBC, CMP, A1c, TSH, LIPID, PSAs, Vit D    Compared to one year ago, the patient feels his physical health is the same.  Compared to one year ago, the patient feels his mental health is the same.    Reviewed chart for potential of high risk medication in the elderly: yes  Reviewed chart for potential of harmful drug interactions in the elderly:yes    Objective         Vitals:    06/24/20 1307   BP: 136/68   Pulse: 77   Resp: 18   Temp: 98.6 °F (37 °C)   SpO2: 97%   Weight: 94.3 kg (208 lb)   Height: 180.3 cm (71\")       Body mass index is 29.01 kg/m².  Discussed the patient's BMI with him. The BMI is in the acceptable range.    Physical Exam  GENERAL:  Well nourished/developed in no acute distress.   SKIN: Turgor excellent, without wound, rash, lesion  HEENT: Normal cephalic without trauma.  Pupils equal round reactive to light. Extraocular motions full without nystagmus.    No thyroidmegaly, mass, tenderness, lymphadenopathy and supple.  CV: Regular rhythm.  No murmur, gallop,  edema. Posterior pulses intact.  No carotid bruits.  CHEST: No chest wall tenderness or mass.   LUNGS: Symmetric motion with clear/decreased to auscultation.   ABD: Soft, nontender without mass.   ORTHO: Symmetric extremities without swelling/point tenderness.  Full gross range of motion except hips reduced.  Symmetric LE.  Neg straight leg raising.  Neg Patricks maneuver.  Back is straight/mild lordosis.  Mid back sore touch.   NEURO: CN 2-12 grossly intact.  Symmetric facies. 1/4 x bicep knee equal reflexes.  UE/LE   3/5 strength " throughout except 2/5  L.  Nonfocal use extremities. Speech clear.  Light touch decreased bilateral feet.   PSYCH: Oriented x 3.  Pleasant calm, well kept.  Purposeful/directed conservation with intact short/long gross memory.     Lab Results   Component Value Date    GLU CANCELED 06/19/2020    HGBA1C CANCELED 06/22/2020        Assessment/Plan   Medicare Risks and Personalized Health Plan  CMS Preventative Services Quick Reference  Advance Directive Discussion  Immunizations Discussed/Encouraged (specific immunizations; adacel Tdap, Influenza, Pneumococcal 23, Prevnar and Shingrix )    The above risks/problems have been discussed with the patient.  Pertinent information has been shared with the patient in the After Visit Summary.  Follow up plans and orders are seen below in the Assessment/Plan Section.    Diagnoses and all orders for this visit:    1. Acute midline thoracic back pain (Primary)  -     Ambulatory Referral to Physical Therapy    2. Wellness examination    3. Essential hypertension    4. Mixed hyperlipidemia    5. Coronary artery disease involving native coronary artery of native heart without angina pectoris    6. Chronic diastolic congestive heart failure (CMS/[facility])    7. Atherosclerosis: CAD, AAA    8. Abdominal aortic aneurysm (AAA) without rupture (CMS/[facility])    9. SOB: copd,CAD,#, hypoxemia    10. Stage 2 moderate COPD by GOLD classification (CMS/[facility])    11. WENDY: using    12. Gastroesophageal reflux disease, esophagitis presence not specified    13. Controlled type 2 diabetes mellitus with diabetic nephropathy, without long-term current use of insulin (CMS/[facility])    14. Chronic kidney disease, unspecified CKD stage    15. Depression, unspecified depression type    16. Anemia, unspecified type    17. Prostatism-young available    18. Anticoagulated-CAD, DM2, CVA/ASA 81+()+plavix+eliquist      Follow Up:  Return for extra lab 3m;  lab;, Dr Romero-, 6 m;.     An After Visit Summary and PPPS  were given to the patient.

## 2020-07-08 DIAGNOSIS — N40.0 PROSTATISM: Chronic | ICD-10-CM

## 2020-07-08 RX ORDER — FINASTERIDE 5 MG/1
5 TABLET, FILM COATED ORAL DAILY
Qty: 90 TABLET | Refills: 2 | Status: SHIPPED | OUTPATIENT
Start: 2020-07-08 | End: 2020-07-08

## 2020-07-08 RX ORDER — FINASTERIDE 5 MG/1
5 TABLET, FILM COATED ORAL DAILY
Qty: 90 TABLET | Refills: 2 | Status: SHIPPED | OUTPATIENT
Start: 2020-07-08 | End: 2020-07-17 | Stop reason: SDUPTHER

## 2020-07-08 NOTE — TELEPHONE ENCOUNTER
Caller: Suyapa Osborn    Relationship: Emergency Contact    Best call back number: [phone]    Medication needed:   Requested Prescriptions     Pending Prescriptions Disp Refills   • finasteride (PROSCAR) 5 MG tablet 90 tablet 2     Sig: Take 1 tablet by mouth Daily.       When do you need the refill by: ASAP    What details did the patient provide when requesting the medication: N/A    Does the patient have less than a 3 day supply:  [] Yes  [x] No    What is the patient's preferred pharmacy: [id number] - [place], [place] - [facility] [address]  AT [facility] [address] [facility] [phone] [facility] [phone] FX

## 2020-07-09 ENCOUNTER — TELEPHONE (OUTPATIENT)
Dept: FAMILY MEDICINE CLINIC | Facility: CLINIC | Age: 85
End: 2020-07-09

## 2020-07-17 DIAGNOSIS — E78.5 HYPERLIPIDEMIA, UNSPECIFIED HYPERLIPIDEMIA TYPE: Chronic | ICD-10-CM

## 2020-07-17 DIAGNOSIS — N40.0 PROSTATISM: Chronic | ICD-10-CM

## 2020-07-17 DIAGNOSIS — K21.9 GASTROESOPHAGEAL REFLUX DISEASE, ESOPHAGITIS PRESENCE NOT SPECIFIED: ICD-10-CM

## 2020-07-17 DIAGNOSIS — I25.10 CORONARY ARTERY DISEASE INVOLVING NATIVE CORONARY ARTERY WITHOUT ANGINA PECTORIS, UNSPECIFIED WHETHER NATIVE OR TRANSPLANTED HEART: Chronic | ICD-10-CM

## 2020-07-17 DIAGNOSIS — Z79.01 ANTICOAGULATED: ICD-10-CM

## 2020-07-17 DIAGNOSIS — J98.4 CHRONIC LUNG DISEASE: ICD-10-CM

## 2020-07-17 DIAGNOSIS — I10 HYPERTENSION, UNSPECIFIED TYPE: Chronic | ICD-10-CM

## 2020-07-17 DIAGNOSIS — E11.9 CONTROLLED TYPE 2 DIABETES MELLITUS WITHOUT COMPLICATION, WITHOUT LONG-TERM CURRENT USE OF INSULIN (HCC): Chronic | ICD-10-CM

## 2020-07-17 RX ORDER — METFORMIN HYDROCHLORIDE 500 MG/1
500 TABLET, EXTENDED RELEASE ORAL NIGHTLY
Qty: 90 TABLET | Refills: 2 | Status: SHIPPED | OUTPATIENT
Start: 2020-07-17 | End: 2020-07-17

## 2020-07-17 RX ORDER — GLYBURIDE 5 MG/1
TABLET ORAL
Qty: 135 TABLET | Refills: 2 | Status: SHIPPED | OUTPATIENT
Start: 2020-07-17 | End: 2020-07-17

## 2020-07-17 RX ORDER — PANTOPRAZOLE SODIUM 40 MG/1
40 TABLET, DELAYED RELEASE ORAL DAILY
Qty: 90 TABLET | Refills: 2 | Status: SHIPPED | OUTPATIENT
Start: 2020-07-17 | End: 2020-07-17

## 2020-07-17 RX ORDER — GLYBURIDE 5 MG/1
TABLET ORAL
Qty: 135 TABLET | Refills: 2 | Status: SHIPPED | OUTPATIENT
Start: 2020-07-17 | End: 2021-03-29 | Stop reason: SDUPTHER

## 2020-07-17 RX ORDER — LORATADINE 10 MG/1
10 TABLET ORAL DAILY
Qty: 90 TABLET | Refills: 2 | Status: SHIPPED | OUTPATIENT
Start: 2020-07-17 | End: 2020-07-17

## 2020-07-17 RX ORDER — TERAZOSIN 10 MG/1
10 CAPSULE ORAL DAILY
Qty: 90 CAPSULE | Refills: 2 | Status: SHIPPED | OUTPATIENT
Start: 2020-07-17 | End: 2021-03-29 | Stop reason: SDUPTHER

## 2020-07-17 RX ORDER — ISOSORBIDE MONONITRATE 30 MG/1
30 TABLET, EXTENDED RELEASE ORAL DAILY
Qty: 90 TABLET | Refills: 3 | Status: SHIPPED | OUTPATIENT
Start: 2020-07-17 | End: 2020-07-17

## 2020-07-17 RX ORDER — PRAVASTATIN SODIUM 80 MG/1
80 TABLET ORAL DAILY
Qty: 90 TABLET | Refills: 2 | Status: SHIPPED | OUTPATIENT
Start: 2020-07-17 | End: 2020-07-17

## 2020-07-17 RX ORDER — ASPIRIN 81 MG/1
81 TABLET ORAL DAILY
Start: 2020-07-17 | End: 2020-07-17

## 2020-07-17 RX ORDER — METFORMIN HYDROCHLORIDE 500 MG/1
500 TABLET, EXTENDED RELEASE ORAL NIGHTLY
Qty: 90 TABLET | Refills: 2 | Status: SHIPPED | OUTPATIENT
Start: 2020-07-17 | End: 2020-09-22 | Stop reason: SDUPTHER

## 2020-07-17 RX ORDER — ALBUTEROL SULFATE 90 UG/1
2 AEROSOL, METERED RESPIRATORY (INHALATION)
Qty: 1 INHALER | Refills: 1 | Status: SHIPPED | OUTPATIENT
Start: 2020-07-17 | End: 2020-07-17

## 2020-07-17 RX ORDER — LORATADINE 10 MG/1
10 TABLET ORAL DAILY
Qty: 90 TABLET | Refills: 2 | Status: SHIPPED | OUTPATIENT
Start: 2020-07-17 | End: 2021-03-29 | Stop reason: SDUPTHER

## 2020-07-17 RX ORDER — ACETAMINOPHEN 325 MG/1
650 TABLET ORAL 2 TIMES DAILY
Qty: 360 TABLET | Refills: 2 | Status: SHIPPED | OUTPATIENT
Start: 2020-07-17 | End: 2021-03-29 | Stop reason: SDUPTHER

## 2020-07-17 RX ORDER — PANTOPRAZOLE SODIUM 40 MG/1
40 TABLET, DELAYED RELEASE ORAL DAILY
Qty: 90 TABLET | Refills: 2 | Status: SHIPPED | OUTPATIENT
Start: 2020-07-17 | End: 2021-03-29 | Stop reason: SDUPTHER

## 2020-07-17 RX ORDER — BLOOD-GLUCOSE METER
1 KIT MISCELLANEOUS DAILY
Qty: 100 EACH | Refills: 2 | Status: SHIPPED | OUTPATIENT
Start: 2020-07-17 | End: 2020-07-17

## 2020-07-17 RX ORDER — ASPIRIN 81 MG/1
81 TABLET ORAL DAILY
Qty: 90 TABLET | Refills: 2 | Status: SHIPPED | OUTPATIENT
Start: 2020-07-17 | End: 2020-07-17

## 2020-07-17 RX ORDER — ASPIRIN 81 MG/1
81 TABLET ORAL DAILY
Qty: 90 TABLET | Refills: 2 | Status: SHIPPED | OUTPATIENT
Start: 2020-07-17 | End: 2021-03-29 | Stop reason: SDUPTHER

## 2020-07-17 RX ORDER — GLYBURIDE 5 MG/1
TABLET ORAL
Start: 2020-07-17 | End: 2020-07-17

## 2020-07-17 RX ORDER — ALBUTEROL SULFATE 90 UG/1
2 AEROSOL, METERED RESPIRATORY (INHALATION)
Qty: 1 INHALER | Refills: 1 | Status: SHIPPED | OUTPATIENT
Start: 2020-07-17 | End: 2021-04-08 | Stop reason: SDUPTHER

## 2020-07-17 RX ORDER — FINASTERIDE 5 MG/1
5 TABLET, FILM COATED ORAL DAILY
Qty: 90 TABLET | Refills: 2 | Status: SHIPPED | OUTPATIENT
Start: 2020-07-17 | End: 2020-07-17

## 2020-07-17 RX ORDER — PRAVASTATIN SODIUM 80 MG/1
80 TABLET ORAL DAILY
Qty: 90 TABLET | Refills: 2 | Status: SHIPPED | OUTPATIENT
Start: 2020-07-17 | End: 2021-03-29 | Stop reason: SDUPTHER

## 2020-07-17 RX ORDER — TERAZOSIN 10 MG/1
10 CAPSULE ORAL DAILY
Qty: 90 CAPSULE | Refills: 2 | Status: SHIPPED | OUTPATIENT
Start: 2020-07-17 | End: 2020-07-17

## 2020-07-17 RX ORDER — ISOSORBIDE MONONITRATE 30 MG/1
30 TABLET, EXTENDED RELEASE ORAL DAILY
Qty: 90 TABLET | Refills: 3 | Status: SHIPPED | OUTPATIENT
Start: 2020-07-17 | End: 2020-07-21

## 2020-07-17 RX ORDER — BLOOD-GLUCOSE METER
1 KIT MISCELLANEOUS DAILY
Qty: 100 EACH | Refills: 2 | Status: SHIPPED | OUTPATIENT
Start: 2020-07-17 | End: 2021-04-08 | Stop reason: SDUPTHER

## 2020-07-17 RX ORDER — FINASTERIDE 5 MG/1
5 TABLET, FILM COATED ORAL DAILY
Qty: 90 TABLET | Refills: 2 | Status: SHIPPED | OUTPATIENT
Start: 2020-07-17 | End: 2020-09-22 | Stop reason: SDUPTHER

## 2020-07-17 RX ORDER — ACETAMINOPHEN 325 MG/1
650 TABLET ORAL 2 TIMES DAILY
Qty: 360 TABLET | Refills: 2 | Status: SHIPPED | OUTPATIENT
Start: 2020-07-17 | End: 2020-07-17

## 2020-07-21 RX ORDER — ISOSORBIDE MONONITRATE 30 MG/1
30 TABLET, EXTENDED RELEASE ORAL DAILY
Qty: 90 TABLET | Refills: 2 | Status: SHIPPED | OUTPATIENT
Start: 2020-07-21 | End: 2021-03-29 | Stop reason: SDUPTHER

## 2020-07-30 ENCOUNTER — TELEPHONE (OUTPATIENT)
Dept: FAMILY MEDICINE CLINIC | Facility: CLINIC | Age: 85
End: 2020-07-30

## 2020-07-30 DIAGNOSIS — K21.9 GASTROESOPHAGEAL REFLUX DISEASE, ESOPHAGITIS PRESENCE NOT SPECIFIED: ICD-10-CM

## 2020-07-30 DIAGNOSIS — G62.9 PERIPHERAL POLYNEUROPATHY: ICD-10-CM

## 2020-07-30 DIAGNOSIS — R09.02 HYPOXIA: ICD-10-CM

## 2020-07-30 DIAGNOSIS — R06.02 SHORTNESS OF BREATH: ICD-10-CM

## 2020-07-30 DIAGNOSIS — I10 HYPERTENSION, UNSPECIFIED TYPE: ICD-10-CM

## 2020-07-30 DIAGNOSIS — J98.4 CHRONIC LUNG DISEASE: ICD-10-CM

## 2020-07-30 DIAGNOSIS — R25.1 TREMOR: ICD-10-CM

## 2020-07-30 DIAGNOSIS — I25.10 CORONARY ARTERY DISEASE INVOLVING NATIVE CORONARY ARTERY WITHOUT ANGINA PECTORIS, UNSPECIFIED WHETHER NATIVE OR TRANSPLANTED HEART: Primary | ICD-10-CM

## 2020-07-30 DIAGNOSIS — I70.90 ATHEROSCLEROSIS: ICD-10-CM

## 2020-07-30 DIAGNOSIS — J96.11 CHRONIC RESPIRATORY FAILURE WITH HYPOXIA (HCC): ICD-10-CM

## 2020-07-30 DIAGNOSIS — I71.40 ABDOMINAL AORTIC ANEURYSM (AAA) WITHOUT RUPTURE (HCC): ICD-10-CM

## 2020-08-05 DIAGNOSIS — J98.4 CHRONIC LUNG DISEASE: ICD-10-CM

## 2020-08-06 ENCOUNTER — OFFICE VISIT (OUTPATIENT)
Dept: NEUROLOGY | Facility: CLINIC | Age: 85
End: 2020-08-06

## 2020-08-06 VITALS
SYSTOLIC BLOOD PRESSURE: 110 MMHG | DIASTOLIC BLOOD PRESSURE: 70 MMHG | RESPIRATION RATE: 18 BRPM | HEIGHT: 71 IN | HEART RATE: 74 BPM | WEIGHT: 203.4 LBS | BODY MASS INDEX: 28.48 KG/M2

## 2020-08-06 DIAGNOSIS — G47.33 OBSTRUCTIVE SLEEP APNEA: ICD-10-CM

## 2020-08-06 DIAGNOSIS — R41.3 MEMORY DIFFICULTY: ICD-10-CM

## 2020-08-06 DIAGNOSIS — I63.9 CEREBROVASCULAR ACCIDENT (CVA), UNSPECIFIED MECHANISM (HCC): Primary | ICD-10-CM

## 2020-08-06 PROCEDURE — 99215 OFFICE O/P EST HI 40 MIN: CPT | Performed by: PSYCHIATRY & NEUROLOGY

## 2020-08-06 NOTE — PATIENT INSTRUCTIONS
Patient to continue driving precautions and safety precautions as previously discussed.  Patient to get to emergency room Malays stroke symptoms occur.

## 2020-08-06 NOTE — PROGRESS NOTES
Subjective   Gonzalo Durham, 1934, is a male who is being seen today for   Chief Complaint   Patient presents with   • Memory Loss   • Stroke   • Sleep Apnea       HISTORY OF PRESENT ILLNESS: Extended follow-up.  Patient history of stroke stable with no stroke symptoms at this time.  Patient still has occasional dreaming and kicking and memory issues forgetting things and his daughter has to remind him frequently about medicines.  Sometimes he has difficulty keeping his finances.  He still not having any trouble driving.  Patient WENDY stable with good compliance even though he has to get up frequently through the night to help his wife is been very ill.  Patient wants a hospital bed today and that is ordered.  Patient has side rails ordered to help him get in and out of the bed.  Patient using his oxygen with his Pap device at night and an overnight continuous oximetry has been appropriate.  Patient has stop bang of 4 and [place] Sleepiness Scale 8.  Patient has MMSE is 29 of 30.  Patient is going to be referred for further more formal memory evaluation.  Neck circumference 18 inches    REVIEW OF SYSTEMS:   GENERAL: Today's blood pressure 110/62 left arm seated 110/70 left arm standing with pulse 74  PULMONARY: As above  CVS: As above  GASTROINTESTINAL: No acute GI distress  GENITOURINARY: No acute  distress  GYN: Not applicable  MUSCULOSKELETAL: No acute musculoskeletal symptoms  HEENT: Patient has retinal degeneration right eye   ENDOCRINE: No acute endocrine symptoms  PSYCHIATRIC: No acute psychiatric symptoms  HEMATOLOGY: Blood work followed by PCP  SKIN: No acute skin changes  Family history reviewed and otherwise noncontributory to this problem  Social history: Patient denies smoking or drug or alcohol use      PHYSICAL EXAMINATION:    GENERAL: No acute distress  CRANIUM: Normal cephalic/atraumatic  HEENT:       EYES: EOMs intact without nystagmus as above patient grossly has full visual fields in each eye  individually.  No acute fundic abnormalities.  Pupils equal round reactive to light.       EARS: Patient wears hearing aids and hears tuning fork bilaterally.  Tympanic membranes normal       THROAT: No acute oropharynx abnormalities.Mallampati 2       NECK: No bruits/no lymphadenopathy  CHEST: No acute cardiopulmonary abnormalities by auscultation  ABDOMEN: Nondistended  EXTREMITIES: Dorsalis pedis pulses symmetrical  NEURO: Patient alert and follows commands without difficulty  SPEECH: Normal    CRANIAL NERVES: Motor/sensory about the face normal and symmetric    MOTOR STRENGTH: Motor strength grossly intact upper and lower extremities  STATION AND GAIT: Patient gait slightly wide-based and uses a cane but no tendency to fall and Romberg negative  CEREBELLAR: Finger-nose and heel-to-shin normal  SENSORY: Decreased pin and vibration distal proximal in upper extremities to mid palm and in the lower extremities to the ankles bilaterally  REFLEXES: Absent ankle jerks bilaterally but present knee jerks and biceps without clonus or Babinski      ASSESSMENT AND PLAN: Patient with history of CVA/WENDY/memory difficulty.  Patient to get memory evaluation as above.  Patient to get to emergency room immediately if stroke symptoms occur.  I spent 40 minutes with this patient with 30 minutes counseling.Patient's Body mass index is 28.37 kg/m². BMI is within normal parameters. No follow-up required..      Gonzalo was seen today for memory loss, stroke and sleep apnea.    Diagnoses and all orders for this visit:    Cerebrovascular accident (CVA), unspecified mechanism (CMS/HCC)  -     Hospital Bed    Memory difficulty  -     Ambulatory Referral to Speech Therapy    Obstructive sleep apnea        Dictated utilizing Dragon voice recognition software

## 2020-08-13 ENCOUNTER — TELEPHONE (OUTPATIENT)
Dept: NEUROLOGY | Facility: CLINIC | Age: 85
End: 2020-08-13

## 2020-08-13 NOTE — TELEPHONE ENCOUNTER
PT'S DAUGHTER CARMEN RAMÍREZ CALLING ABOUT THE SPEECH THERAPY THAT DR. GASTON MENTIONED AT THE APPT ON 8-6-20. THEY HAVE NOT HEARD NOTHING SO SHE WAS WONDERING IF THE OFFICE WAS GOING TO MAKE THE APPT OR WAS THEY. PLEASE CALL HER BACK [phone]

## 2020-08-21 ENCOUNTER — TELEPHONE (OUTPATIENT)
Dept: NEUROLOGY | Facility: CLINIC | Age: 85
End: 2020-08-21

## 2020-08-21 NOTE — TELEPHONE ENCOUNTER
PT'S DAUGHTER CARMEN CALLED BACK AGAIN IN REGARDS TO SPEECH THERAPY REFERRAL. SHE SAID SHE HAS STILL NOT HEARD ANYTHING IN REGARDS TO GETTING PT'S APPT SCHEDULED AND SHE WOULD LIKE THE NUMBER FOR HER TO CALL SO SHE CAN ATTEMPT TO GET PT SCHEDULED FOR HIS APPT.  PLEASE ADVISE      CALL BACK- [phone]

## 2020-08-24 NOTE — TELEPHONE ENCOUNTER
I called and spoke with Mary to provide [facility]'s telephone number. She did voice understanding and states she will call them to schedule today.

## 2020-08-25 ENCOUNTER — OFFICE VISIT (OUTPATIENT)
Dept: PHYSICAL THERAPY | Facility: CLINIC | Age: 85
End: 2020-08-25

## 2020-08-25 DIAGNOSIS — R41.89 OTHER SYMPTOMS AND SIGNS INVOLVING COGNITIVE FUNCTIONS AND AWARENESS: Primary | ICD-10-CM

## 2020-08-25 PROCEDURE — 96125 COGNITIVE TEST BY HC PRO: CPT | Performed by: SPEECH-LANGUAGE PATHOLOGIST

## 2020-08-25 NOTE — PROGRESS NOTES
Outpatient Speech Language Pathology   Adult Speech Language Cognitive Eval/Discharge       Patient Name: Gonzalo Durham  : 1934  MRN: [id number]  Today's Date: 2020         Visit Date: 2020    Patient Active Problem List   Diagnosis   • Wellness examination-done   • Obesity   • Diabetes type 2, controlled (CMS/HCC)   • Chronic kidney disease-3 ro   • Erectile dysfunction   • Coronary artery disease-CABG   • Hyperlipidemia-statin   • Hypertension   • Prostatism-young available   • Tremor   • Varicose vein of leg   • Plantar fasciitis   • Nocturnal leg movements   • Bad dreams   • Depression   • Peripheral neuropathy- clinical   • Hx of colonic polyps   • Gastroesophageal reflux disease   • Left lower quadrant pain   • Laboratory test*   • Anticoagulated-CAD, DM2, CVA/ASA 81+()+plavix+eliquist   • SOB: copd,CAD,#, hypoxemia   • Hypoxia#to pulmonary   • Respiratory sqghbng-fpuabtb-S3-continuous   • Corn or callus   • Anemia: ASA81,plavix,eliquis,gi(3/3+,div,cp,eitis   • Atherosclerosis: CAD, AAA ro   • AAA:  ro   • Heme positive stool   • BMI 30.0-30.9,adult   • Stage 2 moderate COPD by GOLD classification (CMS/HCC)   • Abnormal stress test   • Tingling of both feet-to consider NCV   • Cryptogenic stroke (CMS/HCC)   • CHF (congestive heart failure) (CMS/HCC)-diastolic/chronic   • Gross hematuria   • BPH with obstruction/lower urinary tract symptoms   • Chest pain   • WENDY: using        Past Medical History:   Diagnosis Date   • A-fib (CMS/HCC)    • Arthritis    • BPH (benign prostatic hypertrophy)    • CAD (coronary artery disease)    • CKD (chronic kidney disease)    • Diabetes mellitus (CMS/HCC)     Type 2   • DM (diabetes mellitus screen)    • Hx of colonic polyp    • Hypercholesteremia    • Hypertension    • Myocardial infarction (CMS/HCC)         Past Surgical History:   Procedure Laterality Date   • APPENDECTOMY     • CARDIAC CATHETERIZATION     • CARDIAC CATHETERIZATION Left  5/22/2019    Procedure: Cardiac Catheterization/Vascular Study Positive occult blood in stools  ? BMS vs REGGIE Get cabg report  ;  Surgeon: Bradley Carver MD;  Location:  [facility];  Service: Cardiology   • COLONOSCOPY N/A 6/15/2017    Diverticulosis repeat exam prn   • COLONOSCOPY W/ POLYPECTOMY  10/21/2013    2 Tubular adenomatous polyps, Diverticulosis repeat exam in 5 years   • CORONARY ARTERY BYPASS GRAFT  1980    X 3   • ENDOSCOPY N/A 6/15/2017    LA Grade A reflux esophagitis         Visit Dx:    ICD-10-CM ICD-9-CM   1. Other symptoms and signs involving cognitive functions and awareness R41.89 799.59        Patient was seen today for a memory evaluation. Patient was alert and cooperative. History is significant for hearing impairment. Patient complains of forgetfulness. He is under stress at home due to his wife not being well for some time. He at times forgets if he has done something and has to go back and check. He appeared to put forth good effort on testing tasks. He did state that he is having trouble with his hearing aids, that he does not feel they are working well right now. Patient was seen here for a memory evaluation in November 2019. See below for RBANS scores with comparison to previous.     RBANS: The Repeatable Battery for the Assessment of Neuropsychological Status (RBANS) assesses patient function in the areas of Immediate and Delayed memory, visuospatial/constructional skills, language and attention. It is used to detect and track neurocognitive deficits.   Index score Percentile Qualitative Description Previous Score/Description Comments:   Immediate Memory 87 19 Low Average 78/Borderline Improved   Visuospatial 112 79 High Average 81/Low average Markedly improved   Language 103 58 Average 107/Average No significant difference   Attention 94 34 Average 97/Average No significant difference   Delayed Memory 101 53 Average 104/Average No significant difference   Total Scale  99 47 Average 90/Average No significant difference   Comments:  (error in previous report on Immediate Memory Score, corrected in this report).       SLP SLC Evaluation - 08/25/20 0911        Communication Assessment/Intervention    Document Type  evaluation   -KG    Subjective Information  no complaints   -KG    Patient Observations  alert;cooperative;agree to therapy   -KG    Patient/Family Observations  Pt on O2. He c/o some forgetfulness.    -KG    Patient Effort  good   -KG    Symptoms Noted During/After Treatment  none   -KG       General Information    Patient Profile Reviewed  yes   -KG    Precautions/Limitations, Vision  WFL;WFL with corrective lenses   -KG    Precautions/Limitations, Hearing  hearing aid, bilaterally;hearing impairment, bilaterally;other (see comments)    c/o hearing aids not working well  -KG    Prior Level of Function-Communication  WFL   -KG    Plans/Goals Discussed with  patient   -KG    Barriers to Rehab  none identified   -KG    Patient's Goals for Discharge  return to all previous roles/activities   -KG    Standardized Assessment Used  RBANS   -KG       Cognitive Assessment Intervention- SLP    Cognitive Function (Cognition)  WFL   -KG    Orientation Status (Cognition)  WFL   -KG    Memory (Cognitive)  WFL   -KG    Attention (Cognitive)  WFL   -KG    Right Hemisphere Function  visuo-spatial;WFL   -KG    Cognition, Comment  All scores WNL.   -KG       Standardized Tests    Cognitive/Memory Tests  RBANS: Repeatable Battery for the Assessment of Neuropsychological Status   -KG       RBANS- Repeatable Battery for the Assessment of Neuropsychological Status    RBANS Comments  See report for scores.    -KG       SLP Clinical Impressions    SLP Diagnosis  Memory WFL.    -KG    Rehab Potential/Prognosis  good   -KG    SLC Criteria for Skilled Therapy Interventions Met  no problems identified which require skilled intervention   -KG    Functional Impact  no impact on function   -KG        Recommendations    Therapy Frequency (SLP SLC)  evaluation only   -KG    Anticipated Dischage Disposition (SLP)  home   -KG       SLP Discharge Summary    Discharge Destination  home   -KG      User Key  (r) = Recorded By, (t) = Taken By, (c) = Cosigned By    Initials Name Provider Type    Nola Barkley CCC-SLP Speech and Language Pathologist                        OP SLP Education     Row Name 08/25/20 0945       Education    Barriers to Learning  Hearing deficit Pt stated his hearing aids are not working well right now.   -KG    Action Taken to Address Barriers  SLP spoke with increased volume.   -KG    Education Provided  Described results of evaluation;Patient expressed understanding of evaluation  -KG    Assessed  Learning needs;Learning motivation;Learning preferences;Learning readiness  -KG    Learning Motivation  Strong  -KG    Learning Method  Explanation  -KG    Teaching Response  Verbalized understanding  -KG      User Key  (r) = Recorded By, (t) = Taken By, (c) = Cosigned By    Initials Name Effective Dates    Nola Barkley CCC-SLP 02/11/20 -               OP SLP Assessment/Plan - 08/25/20 0945        SLP Assessment    Functional Problems  Speech Language- Adult/Cognition   -KG    Clinical Impression: Speech Language-Adult/Congnition  Cognitive Communication WFL   -KG    Please refer to items scanned into chart for additional diagnostic informaiton and handouts as provided by clinician  Yes   -KG    SLP Diagnosis  Memory WFL, improved from previous evaluation 11/19   -KG    Prognosis  Good (comment)   -KG    Patient/caregiver participated in establishment of treatment plan and goals  Yes   -KG    Patient would benefit from skilled therapy intervention  No   -KG       SLP Plan    Plan Comments  Follow up with MD.    -KG      User Key  (r) = Recorded By, (t) = Taken By, (c) = Cosigned By    Initials Name Provider Type    Nola Barkley CCC-SLP Speech and Language Pathologist                  OP SLP Discharge Summary  Date of Discharge: 08/25/20  Reason for Discharge: all goals and outcomes met, no further needs identified  Progress Toward Achieving Short/long Term Goals: discharge on same date as initial evaluation  Discharge Destination: home  Discharge Instructions: Continue to use memory strategies. Follow up with MD.    Thank you for this referral.   Nola Hightower CCC-SLP  8/25/2020

## 2020-09-16 DIAGNOSIS — J98.4 CHRONIC LUNG DISEASE: ICD-10-CM

## 2020-09-16 RX ORDER — TIOTROPIUM BROMIDE INHALATION SPRAY 3.12 UG/1
2 SPRAY, METERED RESPIRATORY (INHALATION)
Qty: 1 INHALER | Refills: 0 | COMMUNITY
Start: 2020-09-16 | End: 2020-09-22 | Stop reason: SDUPTHER

## 2020-09-16 NOTE — TELEPHONE ENCOUNTER
Notified daughter sample is available. They are running short on Spiriva 2.5 because mail order has not arrived.

## 2020-09-22 DIAGNOSIS — J98.4 CHRONIC LUNG DISEASE: ICD-10-CM

## 2020-09-22 DIAGNOSIS — E11.9 CONTROLLED TYPE 2 DIABETES MELLITUS WITHOUT COMPLICATION, WITHOUT LONG-TERM CURRENT USE OF INSULIN (HCC): Chronic | ICD-10-CM

## 2020-09-22 DIAGNOSIS — I25.10 CORONARY ARTERY DISEASE INVOLVING NATIVE CORONARY ARTERY WITHOUT ANGINA PECTORIS, UNSPECIFIED WHETHER NATIVE OR TRANSPLANTED HEART: Chronic | ICD-10-CM

## 2020-09-22 DIAGNOSIS — I10 HYPERTENSION, UNSPECIFIED TYPE: Chronic | ICD-10-CM

## 2020-09-22 DIAGNOSIS — Z79.01 ANTICOAGULATED: ICD-10-CM

## 2020-09-22 DIAGNOSIS — N40.0 PROSTATISM: Chronic | ICD-10-CM

## 2020-09-22 RX ORDER — FINASTERIDE 5 MG/1
5 TABLET, FILM COATED ORAL DAILY
Qty: 90 TABLET | Refills: 2 | Status: SHIPPED | OUTPATIENT
Start: 2020-09-22 | End: 2021-03-29 | Stop reason: SDUPTHER

## 2020-09-22 RX ORDER — METFORMIN HYDROCHLORIDE 500 MG/1
500 TABLET, EXTENDED RELEASE ORAL NIGHTLY
Qty: 90 TABLET | Refills: 2 | Status: SHIPPED | OUTPATIENT
Start: 2020-09-22 | End: 2020-10-01 | Stop reason: CLARIF

## 2020-09-22 RX ORDER — TIOTROPIUM BROMIDE INHALATION SPRAY 3.12 UG/1
2 SPRAY, METERED RESPIRATORY (INHALATION)
Qty: 3 INHALER | Refills: 2 | Status: SHIPPED | OUTPATIENT
Start: 2020-09-22 | End: 2021-04-08 | Stop reason: SDUPTHER

## 2020-09-23 ENCOUNTER — TELEPHONE (OUTPATIENT)
Dept: CARDIOLOGY | Facility: CLINIC | Age: 85
End: 2020-09-23

## 2020-09-23 DIAGNOSIS — Z79.01 ANTICOAGULATED: Primary | ICD-10-CM

## 2020-09-23 DIAGNOSIS — D64.9 ANEMIA, UNSPECIFIED TYPE: ICD-10-CM

## 2020-09-23 DIAGNOSIS — E53.8 VITAMIN B12 DEFICIENCY: ICD-10-CM

## 2020-09-23 DIAGNOSIS — I50.32 CHRONIC DIASTOLIC CONGESTIVE HEART FAILURE (HCC): ICD-10-CM

## 2020-09-23 DIAGNOSIS — I10 HYPERTENSION, UNSPECIFIED TYPE: ICD-10-CM

## 2020-09-23 DIAGNOSIS — I25.10 CORONARY ARTERY DISEASE INVOLVING NATIVE CORONARY ARTERY WITHOUT ANGINA PECTORIS, UNSPECIFIED WHETHER NATIVE OR TRANSPLANTED HEART: ICD-10-CM

## 2020-09-24 ENCOUNTER — RESULTS ENCOUNTER (OUTPATIENT)
Dept: FAMILY MEDICINE CLINIC | Facility: CLINIC | Age: 85
End: 2020-09-24

## 2020-09-24 ENCOUNTER — LAB (OUTPATIENT)
Dept: FAMILY MEDICINE CLINIC | Facility: CLINIC | Age: 85
End: 2020-09-24

## 2020-09-24 DIAGNOSIS — I25.10 CORONARY ARTERY DISEASE INVOLVING NATIVE CORONARY ARTERY WITHOUT ANGINA PECTORIS, UNSPECIFIED WHETHER NATIVE OR TRANSPLANTED HEART: ICD-10-CM

## 2020-09-24 DIAGNOSIS — I50.32 CHRONIC DIASTOLIC CONGESTIVE HEART FAILURE (HCC): ICD-10-CM

## 2020-09-24 DIAGNOSIS — Z79.01 ANTICOAGULATED: ICD-10-CM

## 2020-09-24 DIAGNOSIS — I10 HYPERTENSION, UNSPECIFIED TYPE: ICD-10-CM

## 2020-09-24 DIAGNOSIS — D64.9 ANEMIA, UNSPECIFIED TYPE: ICD-10-CM

## 2020-09-24 DIAGNOSIS — E53.8 VITAMIN B12 DEFICIENCY: ICD-10-CM

## 2020-09-24 NOTE — TELEPHONE ENCOUNTER
Called daughter to let her know we do not have any samples right now, but to keep checking back with me.

## 2020-09-25 LAB
BASOPHILS # BLD AUTO: 0.05 10*3/MM3 (ref 0–0.2)
BASOPHILS NFR BLD AUTO: 0.8 % (ref 0–1.5)
EOSINOPHIL # BLD AUTO: 0.64 10*3/MM3 (ref 0–0.4)
EOSINOPHIL NFR BLD AUTO: 9.8 % (ref 0.3–6.2)
ERYTHROCYTE [DISTWIDTH] IN BLOOD BY AUTOMATED COUNT: 13.1 % (ref 12.3–15.4)
FOLATE SERPL-MCNC: 18.7 NG/ML (ref 4.78–24.2)
HCT VFR BLD AUTO: 38.3 % (ref 37.5–51)
HGB BLD-MCNC: 12.4 G/DL (ref 13–17.7)
IMM GRANULOCYTES # BLD AUTO: 0.01 10*3/MM3 (ref 0–0.05)
IMM GRANULOCYTES NFR BLD AUTO: 0.2 % (ref 0–0.5)
IRON SERPL-MCNC: 63 MCG/DL (ref 59–158)
LYMPHOCYTES # BLD AUTO: 1.44 10*3/MM3 (ref 0.7–3.1)
LYMPHOCYTES NFR BLD AUTO: 22.1 % (ref 19.6–45.3)
MCH RBC QN AUTO: 28.6 PG (ref 26.6–33)
MCHC RBC AUTO-ENTMCNC: 32.4 G/DL (ref 31.5–35.7)
MCV RBC AUTO: 88.2 FL (ref 79–97)
MONOCYTES # BLD AUTO: 0.55 10*3/MM3 (ref 0.1–0.9)
MONOCYTES NFR BLD AUTO: 8.4 % (ref 5–12)
NEUTROPHILS # BLD AUTO: 3.84 10*3/MM3 (ref 1.7–7)
NEUTROPHILS NFR BLD AUTO: 58.7 % (ref 42.7–76)
NRBC BLD AUTO-RTO: 0 /100 WBC (ref 0–0.2)
PLATELET # BLD AUTO: 219 10*3/MM3 (ref 140–450)
RBC # BLD AUTO: 4.34 10*6/MM3 (ref 4.14–5.8)
VIT B12 SERPL-MCNC: 558 PG/ML (ref 211–946)
WBC # BLD AUTO: 6.53 10*3/MM3 (ref 3.4–10.8)

## 2020-09-29 DIAGNOSIS — E11.9 CONTROLLED TYPE 2 DIABETES MELLITUS WITHOUT COMPLICATION, WITHOUT LONG-TERM CURRENT USE OF INSULIN (HCC): Chronic | ICD-10-CM

## 2020-10-01 NOTE — TELEPHONE ENCOUNTER
Spoke to Mary and advised to try the plain and was agreeable, two week supply 500 mg Q HS to [facility]

## 2020-10-06 ENCOUNTER — FLU SHOT (OUTPATIENT)
Dept: FAMILY MEDICINE CLINIC | Facility: CLINIC | Age: 85
End: 2020-10-06

## 2020-10-06 DIAGNOSIS — Z23 NEED FOR INFLUENZA VACCINATION: ICD-10-CM

## 2020-10-06 PROCEDURE — G0008 ADMIN INFLUENZA VIRUS VAC: HCPCS | Performed by: FAMILY MEDICINE

## 2020-10-06 PROCEDURE — 90694 VACC AIIV4 NO PRSRV 0.5ML IM: CPT | Performed by: FAMILY MEDICINE

## 2020-10-12 DIAGNOSIS — E11.9 CONTROLLED TYPE 2 DIABETES MELLITUS WITHOUT COMPLICATION, WITHOUT LONG-TERM CURRENT USE OF INSULIN (HCC): Chronic | ICD-10-CM

## 2020-10-12 NOTE — TELEPHONE ENCOUNTER
PATIENTS DAUGHTER IS REQUESTING A REFILL OF   metFORMIN (Glucophage) 500 MG tablet  500 mg, Nightly     GOOD CONTACT NUMBER   [phone] (H)    FYI SHE STATES HE IS TOLERATING THE METFORMIN       SHE IS WANTING TO  SCRIPT HAVING A HARD TIME GETTING SCRIPTS OVER TO PHARMACY     [facility] - [place], [place] - [address] - [phone]  - [phone]   [phone]

## 2020-10-12 NOTE — TELEPHONE ENCOUNTER
PATIENT WANTS PRINTED RX TO TAKE TO [place]    Requested Prescriptions     Pending Prescriptions Disp Refills   • metFORMIN (Glucophage) 500 MG tablet 90 tablet 1     Sig: Take 1 tablet by mouth Every Night.

## 2020-11-30 DIAGNOSIS — G62.9 PERIPHERAL POLYNEUROPATHY: ICD-10-CM

## 2020-11-30 DIAGNOSIS — G62.9 NEUROPATHY: ICD-10-CM

## 2020-12-01 RX ORDER — GABAPENTIN 300 MG/1
CAPSULE ORAL
Qty: 180 CAPSULE | Refills: 1 | Status: SHIPPED | OUTPATIENT
Start: 2020-12-01 | End: 2021-03-29 | Stop reason: SDUPTHER

## 2020-12-01 NOTE — TELEPHONE ENCOUNTER
Requested Prescriptions     Pending Prescriptions Disp Refills   • gabapentin (NEURONTIN) 300 MG capsule [Pharmacy Med Name: GABAPENTIN 300MG CAPSULES] 180 capsule 1     Sig: TAKE 1 CAPSULE BY MOUTH TWICE DAILY

## 2020-12-22 ENCOUNTER — OFFICE VISIT (OUTPATIENT)
Dept: CARDIOLOGY | Facility: CLINIC | Age: 85
End: 2020-12-22

## 2020-12-22 VITALS
DIASTOLIC BLOOD PRESSURE: 42 MMHG | OXYGEN SATURATION: 98 % | HEIGHT: 71 IN | HEART RATE: 50 BPM | BODY MASS INDEX: 28.14 KG/M2 | SYSTOLIC BLOOD PRESSURE: 94 MMHG | WEIGHT: 201 LBS

## 2020-12-22 DIAGNOSIS — G47.33 OBSTRUCTIVE SLEEP APNEA: ICD-10-CM

## 2020-12-22 DIAGNOSIS — I63.9 CRYPTOGENIC STROKE (HCC): ICD-10-CM

## 2020-12-22 DIAGNOSIS — I25.10 CORONARY ARTERY DISEASE INVOLVING NATIVE CORONARY ARTERY OF NATIVE HEART WITHOUT ANGINA PECTORIS: Primary | Chronic | ICD-10-CM

## 2020-12-22 DIAGNOSIS — Z79.01 ANTICOAGULATED: ICD-10-CM

## 2020-12-22 DIAGNOSIS — J96.11 CHRONIC RESPIRATORY FAILURE WITH HYPOXIA (HCC): ICD-10-CM

## 2020-12-22 DIAGNOSIS — R94.39 ABNORMAL STRESS TEST: ICD-10-CM

## 2020-12-22 DIAGNOSIS — J44.9 STAGE 2 MODERATE COPD BY GOLD CLASSIFICATION (HCC): ICD-10-CM

## 2020-12-22 DIAGNOSIS — I10 ESSENTIAL HYPERTENSION: Chronic | ICD-10-CM

## 2020-12-22 DIAGNOSIS — E78.2 MIXED HYPERLIPIDEMIA: Chronic | ICD-10-CM

## 2020-12-22 DIAGNOSIS — N18.9 CHRONIC KIDNEY DISEASE, UNSPECIFIED CKD STAGE: Chronic | ICD-10-CM

## 2020-12-22 DIAGNOSIS — D64.9 ANEMIA, UNSPECIFIED TYPE: ICD-10-CM

## 2020-12-22 DIAGNOSIS — I50.32 CHRONIC DIASTOLIC CONGESTIVE HEART FAILURE (HCC): ICD-10-CM

## 2020-12-22 PROCEDURE — 99214 OFFICE O/P EST MOD 30 MIN: CPT | Performed by: INTERNAL MEDICINE

## 2020-12-22 PROCEDURE — 93000 ELECTROCARDIOGRAM COMPLETE: CPT | Performed by: INTERNAL MEDICINE

## 2020-12-22 NOTE — PROGRESS NOTES
Gonzalo Durham  [id number]  1934  86 y.o.  male    Referring Provider: Abel Romero MD    Reason for Follow-up Visit:        Here for routine follow up     Prior to that visit for evaluation for  Shortness of breath   coronary artery disease Coronary artery bypass grafting 1980 x 3 [facility]  S/P cardiac cath    Prior cerebrovascular accident with left arm weakness now resolved  Lasted for for over 1 week 7/27/19  CT head showed anterior small infarction  7/27/19  sinus rhythm in [place]  Labs reviewed   Cardiac workup test results as below      Subjective    Tolerating current medications well with no untoward side effects   Compliant with prescribed medication regimen. Tries to adhere to cardiac diet.   No further transient ischemic attacks or cerebrovascular accident  on anticoagulation  No bleeding or bruising issues     Mild chronic exertional shortness of breath on exertion relieved with rest  No significant cough or wheezing  Intermittent dizzy spells, no presyncope or syncope   BP at times runs low with systolics in the 90 mm  Range     No palpitations  No associated chest pain  No significant pedal edema    No fever or chills  No significant expectoration    No hemoptysis  No presyncope or syncope     No symptoms reminiscent of prior angina.    Using oxygen at home both during daytime and at night continously       History of present illness:  Gonzalo Durham is a 86 y.o. yo male with history of coronary artery disease Coronary artery bypass grafting who presents today for   Chief Complaint   Patient presents with   • Coronary Artery Disease     6mo-    • Dizziness     Patient stated just about all the time.    .    History  Past Medical History:   Diagnosis Date   • A-fib (CMS/[facility])    • Arthritis    • BPH (benign prostatic hypertrophy)    • CAD (coronary artery disease)    • CKD (chronic kidney disease)    • Diabetes mellitus (CMS/[facility])     Type 2   • DM (diabetes mellitus screen)    • Hx of colonic  polyp    • Hypercholesteremia    • Hypertension    • Myocardial infarction (CMS/HCC)    ,   Past Surgical History:   Procedure Laterality Date   • APPENDECTOMY     • CARDIAC CATHETERIZATION     • CARDIAC CATHETERIZATION Left 2019    Procedure: Cardiac Catheterization/Vascular Study Positive occult blood in stools  ? BMS vs REGGIE Get cabg report  ;  Surgeon: Bradley Carver MD;  Location:  [facility] CATH [facility];  Service: Cardiology   • COLONOSCOPY N/A 6/15/2017    Diverticulosis repeat exam prn   • COLONOSCOPY W/ POLYPECTOMY  10/21/2013    2 Tubular adenomatous polyps, Diverticulosis repeat exam in 5 years   • CORONARY ARTERY BYPASS GRAFT  1980    X 3   • ENDOSCOPY N/A 6/15/2017    LA Grade A reflux esophagitis   ,   Family History   Problem Relation Age of Onset   • Heart disease Mother    • Stroke Mother    • Melanoma Mother    • Heart disease Father    • Diabetes Father    • Prostate cancer Father    • Colon cancer Neg Hx    • Colon polyps Neg Hx    ,   Social History     Tobacco Use   • Smoking status: Former Smoker     Packs/day: 1.00     Years: 26.00     Pack years: 26.00     Types: Cigarettes     Start date:      Quit date:      Years since quittin.0   • Smokeless tobacco: Never Used   Substance Use Topics   • Alcohol use: No   • Drug use: No   ,     Medications  Current Outpatient Medications   Medication Sig Dispense Refill   • acetaminophen (TYLENOL) 325 MG tablet Take 2 tablets by mouth 2 (Two) Times a Day. 360 tablet 2   • albuterol sulfate HFA (ProAir HFA) 108 (90 Base) MCG/ACT inhaler Inhale 2 puffs 4 (Four) Times a Day. 1 inhaler 1   • apixaban (ELIQUIS) 5 MG tablet tablet Take 1 tablet by mouth Every 12 (Twelve) Hours. 180 tablet 3   • Artificial Tear Solution (JUST TEARS EYE DROPS) solution Apply 1-2 drops to eye(s) as directed by provider Daily As Needed (dry eyes). 45 mL 2   • aspirin (aspirin) 81 MG EC tablet Take 1 tablet by mouth Daily. 90 tablet 2   • calcium  carbonate-vitamin d 600-400 MG-UNIT per tablet Take 1 tablet by mouth 2 (Two) Times a Day. 180 tablet 2   • finasteride (PROSCAR) 5 MG tablet Take 1 tablet by mouth Daily. 90 tablet 2   • FREESTYLE LITE test strip 1 each by Other route Daily. 100 each 2   • gabapentin (NEURONTIN) 300 MG capsule TAKE 1 CAPSULE BY MOUTH TWICE DAILY 180 capsule 1   • glyburide (DIAbeta) 5 MG tablet Take 1 tablet by mouth Every Morning AND 0.5 tablets Daily Before Supper. 135 tablet 2   • guaiFENesin (MUCINEX) 600 MG 12 hr tablet Take 1 tablet by mouth 2 (Two) Times a Day. 180 tablet 2   • isosorbide mononitrate (Imdur) 30 MG 24 hr tablet Take 1 tablet by mouth Daily. 90 tablet 2   • Lancets (FREESTYLE) lancets Use once daily 100 each 2   • loratadine (CLARITIN) 10 MG tablet Take 1 tablet by mouth Daily. 90 tablet 2   • metFORMIN (Glucophage) 500 MG tablet Take 1 tablet by mouth Every Night. 90 tablet 1   • metoprolol tartrate (LOPRESSOR) 25 MG tablet Take 1 tablet by mouth 2 (Two) Times a Day. 180 tablet 2   • Multiple Vitamins-Minerals (CENTRUM SILVER ADULT 50+) tablet Take 50 mg by mouth Daily. 90 tablet 1   • Multiple Vitamins-Minerals (PRESERVISION AREDS 2+MULTI VIT) capsule Take 1 capsule by mouth Daily. 90 capsule 1   • nitroglycerin (NITROSTAT) 0.4 MG SL tablet Place 1 tablet under the tongue Every 5 (Five) Minutes As Needed for Chest Pain. 30 tablet 0   • NON FORMULARY CPAP per mask with 4L oxygen nightly     • O2 (OXYGEN) Inhale 4 L/min Continuous. Per nasal cannula     • pantoprazole (PROTONIX) 40 MG EC tablet Take 1 tablet by mouth Daily. 90 tablet 2   • pravastatin (Pravachol) 80 MG tablet Take 1 tablet by mouth Daily. 90 tablet 2   • terazosin (HYTRIN) 10 MG capsule Take 1 capsule by mouth Daily. 90 capsule 2   • tiotropium bromide monohydrate (Spiriva Respimat) 2.5 MCG/ACT aerosol solution inhaler Inhale 2 puffs Daily. 3 inhaler 2   • vitamin C (ASCORBIC ACID) 500 MG tablet Take 1 tablet by mouth Daily. 90 tablet 1     No  "current facility-administered medications for this visit.        Allergies:  Symbicort [budesonide-formoterol fumarate] and Prozac [fluoxetine hcl]    Review of Systems  Review of Systems   Constitution: Positive for malaise/fatigue.   HENT: Negative.    Eyes: Negative.    Cardiovascular: Positive for dyspnea on exertion. Negative for chest pain, claudication, cyanosis, irregular heartbeat, leg swelling, near-syncope, orthopnea, palpitations, paroxysmal nocturnal dyspnea and syncope.   Respiratory: Negative.    Endocrine: Negative.    Hematologic/Lymphatic: Negative.    Skin: Negative.    Musculoskeletal: Positive for arthritis and joint pain.   Gastrointestinal: Negative for anorexia.   Genitourinary: Negative.    Psychiatric/Behavioral: Negative.        Objective     Physical Exam:  BP 94/42   Pulse 50   Ht 180.3 cm (71\")   Wt 91.2 kg (201 lb)   SpO2 98%   BMI 28.03 kg/m²     Physical Exam   Constitutional: He appears well-developed.   HENT:   Head: Normocephalic.   Neck: Normal carotid pulses and no JVD present. No tracheal tenderness present. Carotid bruit is not present. No tracheal deviation and no edema present.   Cardiovascular: Regular rhythm and normal pulses.   Murmur heard.   Systolic murmur is present with a grade of 2/6.  Pulmonary/Chest: Effort normal. No stridor.   Abdominal: Soft. He exhibits no distension. There is no abdominal tenderness.   Neurological: He is alert. No cranial nerve deficit or sensory deficit.   Skin: Skin is warm.   Psychiatric: His speech is normal and behavior is normal.       Results Review:    Gonzalo Durham   Regadenoson Stress Test With Myocardial Perfusion SPECT (Multi Study)   Order# [id number]   Reading physician: Bradley Carver MD Ordering physician: Bradley Carver MD Study date: 19   Patient Information     Patient Name  Gonzalo Durham MRN  [id number] Sex  Male  (Age)  1934 (86 y.o.)   Interpretation Summary        · Left ventricular ejection fraction is " normal (Calculated EF = 57%).  · Diaphragmatic attenuation artifact is present.  · Raw images reviewed with the following abnormalities noted: vertical motion.  · Myocardial perfusion imaging indicates a normal myocardial perfusion study with no evidence of ischemia.  · Impressions are consistent with a low risk study.            Results for orders placed during the hospital encounter of 12/24/19   Adult Transthoracic Echo Limited W/ Cont if Necessary Per Protocol    Narrative · Left ventricular wall thickness is consistent with mild concentric   hypertrophy.  · Estimated EF = 55%.  · Left ventricular diastolic dysfunction.  · Mild dilation of the aortic root is present.  · No evidence of pulmonary hypertension is present.             5/19 Cardiac Cath        Conclusion of cardiac catheterization           Left main coronary artery has moderate atherosclerotic plaque without any high-grade lesions  Left anterior descending coronary artery is occluded after origin of a diagonal branch  Saphenous vein graft to the left anterior descending coronary artery has moderate atherosclerotic plaque without any high-grade lesions and patent  The first diagonal branch has a 70% tubular stenosis that extends from the left anterior descending coronary artery to the proximal aspect  Right coronary artery is occluded in the mid segment  Widely patent saphenous venous graft to the right coronary artery without any significant stenosis  Bilateral internal mammary arteries have not been used as a conduit  No other grafts identified     Left ventricular end-diastolic pressure moderately elevated to 90 mmHg with no gradient across aortic valve on pullback     Left ventricular ejection fraction approximately 45% with basal inferior hypokinesis no significant mitral regurgitation  Aortogram performed to look for vein grafts with no dissection or significant dilatation  Atherosclerotic plaque buildup noted in the aortic root will admit  patient has significant use of contrast and given his age high risk of contrast-induced nephropathy           ____________________________________________________________________________________________________________________________________________     Plan after cardiac catheterization        70% stenosis of diagonal branch without any grafting of this vessel  Patent saphenous venous graft to the left anterior descending coronary artery  Patent saphenous venous graft to the right coronary artery  Occluded mid left anterior stent coronary artery  Occluded proximal right coronary artery           Will hydrate patient  Monitor for any signs of bleeding around the right femoral arteriotomy site where he had oozing from around the 7 Romanian sheath  Intensive risk factor modifications for both primary and secondary prevention if applicable  Hydration   Observation     If patient continues to be symptomatic consider coronary intervention with drug-eluting stent placement into the ostial and proximal portion of the diagonal branch.    Echo     · Right ventricular cavity is mild-to-moderately dilated.  · Left ventricular diastolic dysfunction (grade I) consistent with impaired relaxation.  · Left ventricular systolic function is normal.  · Left atrial cavity size is borderline dilated.     RIGHT HEART ENLARGEMENT - RVSP NOT ASSESSABLE  NORMAL LV AND RV SYSTOLIC FUNCTION  NO SIGNIFICANT VALVULAR DYSFUNCTION    Holter    · Average HR: 71. Min HR: 37. Max HR: 173.     · Monitored for approximately 1 day   · The predominant rhythm noted during the testing period was sinus rhythm.  · Bradycardia in usual sleeping hours.   · 1 premature ventricular contractions: PVCs:  <0.1%   · 45   atrial premature contractions:   APCs: <0.1%             · No significant supraventricular  tachy or justina arrhythmia.  · No significant  ventricular tachy or justina arrhythmia.  · No correlated arrhythmia  · No significant pauses above 3  seconds        Results for orders placed during the hospital encounter of 12/24/19   Adult Transthoracic Echo Limited W/ Cont if Necessary Per Protocol    Narrative · Left ventricular wall thickness is consistent with mild concentric   hypertrophy.  · Estimated EF = 55%.  · Left ventricular diastolic dysfunction.  · Mild dilation of the aortic root is present.  · No evidence of pulmonary hypertension is present.        ·  Right ventricular cavity is mild-to-moderately dilated.  · Left ventricular diastolic dysfunction (grade I) consistent with impaired relaxation.  · Left ventricular systolic function is normal.  Left atrial cavity size is borderline dilated.    ECG 12 Lead    Date/Time: 12/22/2020 10:35 AM  Performed by: Bradley Carver MD  Authorized by: Bradley Carver MD   Comparison: compared with previous ECG from 6/8/2020  Similar to previous ECG  Rhythm: sinus bradycardia  Ectopy: atrial premature contractions  Rate: bradycardic  Conduction: conduction normal  ST Segments: ST segments normal  T Waves: T waves normal  QRS axis: normal  Other: no other findings    Clinical impression: abnormal EKG            Assessment/Plan   Diagnoses and all orders for this visit:    1. Coronary artery disease involving native coronary artery of native heart without angina pectoris (Primary)    2. Mixed hyperlipidemia    3. Essential hypertension    4. Abnormal stress test    5. Cryptogenic stroke (CMS/HCC)    6. Chronic diastolic congestive heart failure (CMS/HCC)    7. Chronic respiratory failure with hypoxia (CMS/HCC)    8. Stage 2 moderate COPD by GOLD classification (CMS/HCC)    9. WENDY: using    10. Chronic kidney disease, unspecified CKD stage    11. Anemia, unspecified type    12. Anticoagulated-CAD, DM2, CVA/ASA 81+()+plavix+eliquist    Other orders  -     ECG 12 Lead          Plan      Discussed results of prior testing with patient: echo  Patient expressed understanding  Encouraged and answered all questions    Discussed with the patient and all questioned fully answered. He will call me if any problems arise.      Decrease beta blocker therapy by 50%   Decrease Metoprolol to 12.5 mg BID     Check BP and heart rates twice daily at least 3x / week at home and bring a recording for me to review next visit  If BP >130/85 or < 100/60 persistently over 3 reading 30 mins apart call sooner     Monitor for any signs of bleeding including red or dark stools as well as easy bruisabilty. Fall precautions.   Overall doing well no new cardiovascular symptoms and therefore no additional cardiac testing is required   If any interim issues arise will call me for further evaluation.     Monitor cardiac rhythm device function regularly per established schedule or PRN    Given prior CVA and high [id number]  Would recommend on anticoagulation     Continue  Eliquis   I support the patient's decision to take the Covid -19 vaccine           Return in about 6 months (around 6/22/2021).

## 2021-01-05 ENCOUNTER — LAB (OUTPATIENT)
Dept: FAMILY MEDICINE CLINIC | Facility: CLINIC | Age: 86
End: 2021-01-05

## 2021-01-05 DIAGNOSIS — E78.5 HYPERLIPIDEMIA, UNSPECIFIED HYPERLIPIDEMIA TYPE: ICD-10-CM

## 2021-01-05 DIAGNOSIS — E55.9 VITAMIN D DEFICIENCY: ICD-10-CM

## 2021-01-05 DIAGNOSIS — E11.21 CONTROLLED TYPE 2 DIABETES MELLITUS WITH DIABETIC NEPHROPATHY, WITHOUT LONG-TERM CURRENT USE OF INSULIN (HCC): Chronic | ICD-10-CM

## 2021-01-05 DIAGNOSIS — D64.9 ANEMIA, UNSPECIFIED TYPE: ICD-10-CM

## 2021-01-05 DIAGNOSIS — Z79.01 ANTICOAGULATED: ICD-10-CM

## 2021-01-05 DIAGNOSIS — N40.0 PROSTATISM: Chronic | ICD-10-CM

## 2021-01-05 DIAGNOSIS — I10 ESSENTIAL HYPERTENSION: Chronic | ICD-10-CM

## 2021-01-05 DIAGNOSIS — I10 HYPERTENSION, UNSPECIFIED TYPE: ICD-10-CM

## 2021-01-05 DIAGNOSIS — E78.2 MIXED HYPERLIPIDEMIA: Primary | Chronic | ICD-10-CM

## 2021-01-06 ENCOUNTER — OFFICE VISIT (OUTPATIENT)
Dept: FAMILY MEDICINE CLINIC | Facility: CLINIC | Age: 86
End: 2021-01-06

## 2021-01-06 VITALS
DIASTOLIC BLOOD PRESSURE: 72 MMHG | HEIGHT: 71 IN | SYSTOLIC BLOOD PRESSURE: 110 MMHG | RESPIRATION RATE: 20 BRPM | BODY MASS INDEX: 28.92 KG/M2 | WEIGHT: 206.6 LBS | OXYGEN SATURATION: 99 % | TEMPERATURE: 97.8 F | HEART RATE: 56 BPM

## 2021-01-06 DIAGNOSIS — J96.11 CHRONIC RESPIRATORY FAILURE WITH HYPOXIA (HCC): ICD-10-CM

## 2021-01-06 DIAGNOSIS — D64.9 ANEMIA, UNSPECIFIED TYPE: ICD-10-CM

## 2021-01-06 DIAGNOSIS — E11.21 CONTROLLED TYPE 2 DIABETES MELLITUS WITH DIABETIC NEPHROPATHY, WITHOUT LONG-TERM CURRENT USE OF INSULIN (HCC): Chronic | ICD-10-CM

## 2021-01-06 DIAGNOSIS — L84 CORN OR CALLUS: ICD-10-CM

## 2021-01-06 DIAGNOSIS — F32.A DEPRESSION, UNSPECIFIED DEPRESSION TYPE: ICD-10-CM

## 2021-01-06 DIAGNOSIS — I71.40 ABDOMINAL AORTIC ANEURYSM (AAA) WITHOUT RUPTURE (HCC): ICD-10-CM

## 2021-01-06 DIAGNOSIS — N18.9 CHRONIC KIDNEY DISEASE, UNSPECIFIED CKD STAGE: Chronic | ICD-10-CM

## 2021-01-06 DIAGNOSIS — R91.8 ABNORMAL CHEST X-RAY WITH MULTIPLE LUNG NODULES: ICD-10-CM

## 2021-01-06 DIAGNOSIS — H92.12 OTORRHEA OF LEFT EAR: ICD-10-CM

## 2021-01-06 DIAGNOSIS — G62.9 PERIPHERAL POLYNEUROPATHY: Primary | ICD-10-CM

## 2021-01-06 DIAGNOSIS — Z97.4 HEARING AID WORN: ICD-10-CM

## 2021-01-06 DIAGNOSIS — I50.32 CHRONIC DIASTOLIC CONGESTIVE HEART FAILURE (HCC): ICD-10-CM

## 2021-01-06 DIAGNOSIS — K21.9 GASTROESOPHAGEAL REFLUX DISEASE, UNSPECIFIED WHETHER ESOPHAGITIS PRESENT: ICD-10-CM

## 2021-01-06 DIAGNOSIS — I70.90 ATHEROSCLEROSIS: ICD-10-CM

## 2021-01-06 DIAGNOSIS — R06.02 SHORTNESS OF BREATH: ICD-10-CM

## 2021-01-06 DIAGNOSIS — Z79.01 ANTICOAGULATED: ICD-10-CM

## 2021-01-06 DIAGNOSIS — E78.2 MIXED HYPERLIPIDEMIA: Chronic | ICD-10-CM

## 2021-01-06 PROCEDURE — 99214 OFFICE O/P EST MOD 30 MIN: CPT | Performed by: FAMILY MEDICINE

## 2021-01-06 RX ORDER — ASPIRIN 81 MG/1
TABLET, CHEWABLE ORAL
COMMUNITY
Start: 2020-12-30 | End: 2021-01-06 | Stop reason: SDUPTHER

## 2021-01-06 RX ORDER — GABAPENTIN 400 MG/1
400 CAPSULE ORAL NIGHTLY
Qty: 30 CAPSULE | Refills: 1 | Status: SHIPPED | OUTPATIENT
Start: 2021-01-06 | End: 2021-02-17 | Stop reason: SDUPTHER

## 2021-01-06 RX ORDER — GABAPENTIN 250 MG/5ML
SOLUTION ORAL
COMMUNITY
Start: 2020-12-30 | End: 2021-02-05

## 2021-01-06 NOTE — PROGRESS NOTES
"Subjective   Gonzalo Durham is a 86 y.o. male presenting with chief complaint of:   Chief Complaint   Patient presents with   • Follow-up     6mos    • diabetic shoes   • Hypotension     \" it was very low at my other doctors office\"        History of Present Illness : With daughter.       Has multiple chronic problems to consider that might have a bearing on today's issues;  an interval appointment.       Chronic/acute problems reviewed today:   1. Peripheral polyneuropathy chronic in fact increasing discomfort in the feet particular at night.  Initially Neurontin seemed to help but not as much as the past   2. Hearing aid worn chronic use of hearing aids; the left ear seems occasionally have drainage and is little sore   3. Otorrhea of left ear see #2.  There is been no fever   4. Abdominal aortic aneurysm (AAA) without rupture (CMS/HCC) chronic up to 4.3 cm aortic aneurysm last 12 months check; no abdominal pain.   5. Abnormal chest x-ray with multiple lung nodules chronic stable benign-appearing serially followed abnormalities per CT   6. Mixed hyperlipidemia Chronic/stable.  Tolerated use of Rx with labs showing improved lipid values and tolerant liver labs. No muscle aches unexpected.      7. Atherosclerosis: CAD, AAA ro chronic/stable various areas of disease.  Sees cardiology regularly and no plans for upcoming interventions.  Denies development/change in chest pain, claudication, CVA-TIA symptoms such as (Manifest by slurred speech asymmetry of face dysfunction/weakness of extremities).  Blood thinners noted.      8. Chronic diastolic congestive heart failure (CMS/HCC) Chronic/stable.  Denies significant sob, orthopnea, leg edema, weight gain.  Aware of influence diet/salt and watching weight at home.       9. Anticoagulated-CAD, DM2, CVA/ASA 81+()+plavix+eliquist Chronic/stable reason and use of.  Denies bleeding issues; especially epistaxis, melena, hematochezia.  Upper arms/others do bruise easily.  " No significant bleeding or falls.      10. Controlled type 2 diabetes mellitus with diabetic nephropathy, without long-term current use of insulin (CMS/[facility]) Chronic/stable.  No problem/pattern hypoglycemia/hyperglycemia manifest by poly- dypsia, phagia, uria, or sweats, diaphoretic episodes, syncope/near.     11. Gastroesophageal reflux disease, unspecified whether esophagitis present Chronic/stable.  Controlled heartburn, reflux without dysphagia, melena.  Rx used, periods not used proven needed with symptoms -currently doing ok.      12. Chronic kidney disease, unspecified CKD stage Chronic/stable.  No nephrology.  No dysuria.  Previously warned/reminded:   To avoid further kidney function decline  A. Treat any time you think you have infection  B. Stay hydrated (dont get dehydrated); drink at least 60 oz fluid every 24 hr (1800 cc or nearly a 2L)  C. Do not allow any xrays with dye WITHOUT the doctor ordering checking your renal function  D. Do not get new medications without the doctor considering your renal condition  E. Do not use motrin/ibuprofen, alleve/naprosyn and these types of medications     13. Anemia, unspecified type Chronic or past history/stable: This has been present before.    There has been GI evaulation in the past. There is no current melena, hematochezia. It has been benign to date and stable/watching.  Contributing comorbidities to date: Aspirin therapy, Plavix, Eliquis, GI issues and Hemoccult positive diverticular disease colon polyps and esophagitis.  He is not sure he wants another colonoscopy.     14. Depression, unspecified depression type chronic/stable currently not significant  mood swings, down moods, nervousness, difficulty with concentration to function home/work.  Others close have not been concerned.  No suicide ideation/intent.  Rx helps.       15. SOB: copd,CAD,#, hypoxemia Chronic/variable:  SOB worse with exertion/ok at rest.  Contributing diagnosis: copd.     16. Chronic  respiratory failure with hypoxia (CMS/HCC) chronic hypoxemia replaced with oxygen; primarily driven by COPD that other insulins as noted above   17. Corn or callus chronic variable areas of hyperkeratosis on both feet currently without any source.  Diabetic shoes help     Has an/another acute issue today: none.    The following portions of the patient's history were reviewed and updated as appropriate: allergies, current medications, past family history, past medical history, past social history, past surgical history and problem list.      Current Outpatient Medications:   •  acetaminophen (TYLENOL) 325 MG tablet, Take 2 tablets by mouth 2 (Two) Times a Day., Disp: 360 tablet, Rfl: 2  •  albuterol sulfate HFA (ProAir HFA) 108 (90 Base) MCG/ACT inhaler, Inhale 2 puffs 4 (Four) Times a Day., Disp: 1 inhaler, Rfl: 1  •  apixaban (ELIQUIS) 5 MG tablet tablet, Take 1 tablet by mouth Every 12 (Twelve) Hours., Disp: 180 tablet, Rfl: 3  •  Artificial Tear Solution (JUST TEARS EYE DROPS) solution, Apply 1-2 drops to eye(s) as directed by provider Daily As Needed (dry eyes)., Disp: 45 mL, Rfl: 2  •  aspirin (aspirin) 81 MG EC tablet, Take 1 tablet by mouth Daily., Disp: 90 tablet, Rfl: 2  •  calcium carbonate-vitamin d 600-400 MG-UNIT per tablet, Take 1 tablet by mouth 2 (Two) Times a Day., Disp: 180 tablet, Rfl: 2  •  finasteride (PROSCAR) 5 MG tablet, Take 1 tablet by mouth Daily., Disp: 90 tablet, Rfl: 2  •  FREESTYLE LITE test strip, 1 each by Other route Daily., Disp: 100 each, Rfl: 2  •  gabapentin (NEURONTIN) 300 MG capsule, TAKE 1 CAPSULE BY MOUTH TWICE DAILY, Disp: 180 capsule, Rfl: 1  •  Gabapentin (NEURONTIN) 300 MG/6ML solution solution, , Disp: , Rfl:   •  glyburide (DIAbeta) 5 MG tablet, Take 1 tablet by mouth Every Morning AND 0.5 tablets Daily Before Supper., Disp: 135 tablet, Rfl: 2  •  guaiFENesin (MUCINEX) 600 MG 12 hr tablet, Take 1 tablet by mouth 2 (Two) Times a Day., Disp: 180 tablet, Rfl: 2  •   isosorbide mononitrate (Imdur) 30 MG 24 hr tablet, Take 1 tablet by mouth Daily., Disp: 90 tablet, Rfl: 2  •  Lancets (FREESTYLE) lancets, Use once daily, Disp: 100 each, Rfl: 2  •  loratadine (CLARITIN) 10 MG tablet, Take 1 tablet by mouth Daily., Disp: 90 tablet, Rfl: 2  •  metFORMIN (Glucophage) 500 MG tablet, Take 1 tablet by mouth Every Night., Disp: 90 tablet, Rfl: 1  •  metoprolol tartrate (LOPRESSOR) 25 MG tablet, Take 1 tablet by mouth 2 (Two) Times a Day., Disp: 180 tablet, Rfl: 2  •  Multiple Vitamins-Minerals (CENTRUM SILVER ADULT 50+) tablet, Take 50 mg by mouth Daily., Disp: 90 tablet, Rfl: 1  •  Multiple Vitamins-Minerals (PRESERVISION AREDS 2+MULTI VIT) capsule, Take 1 capsule by mouth Daily., Disp: 90 capsule, Rfl: 1  •  nitroglycerin (NITROSTAT) 0.4 MG SL tablet, Place 1 tablet under the tongue Every 5 (Five) Minutes As Needed for Chest Pain., Disp: 30 tablet, Rfl: 0  •  NON FORMULARY, CPAP per mask with 4L oxygen nightly, Disp: , Rfl:   •  O2 (OXYGEN), Inhale 4 L/min Continuous. Per nasal cannula, Disp: , Rfl:   •  pantoprazole (PROTONIX) 40 MG EC tablet, Take 1 tablet by mouth Daily., Disp: 90 tablet, Rfl: 2  •  pravastatin (Pravachol) 80 MG tablet, Take 1 tablet by mouth Daily., Disp: 90 tablet, Rfl: 2  •  terazosin (HYTRIN) 10 MG capsule, Take 1 capsule by mouth Daily., Disp: 90 capsule, Rfl: 2  •  tiotropium bromide monohydrate (Spiriva Respimat) 2.5 MCG/ACT aerosol solution inhaler, Inhale 2 puffs Daily., Disp: 3 inhaler, Rfl: 2  •  vitamin C (ASCORBIC ACID) 500 MG tablet, Take 1 tablet by mouth Daily., Disp: 90 tablet, Rfl: 1    No problems with medications.    Allergies   Allergen Reactions   • Symbicort [Budesonide-Formoterol Fumarate] Dizziness     Patient passed out and fell   • Prozac [Fluoxetine Hcl] Mental Status Change     Bad dreams.       Review of Systems  GENERAL:  Active/slower with limits, speed, samni for age and SOB.  Sleep is ok; much better with cpap.   SKIN:  Ongoing  callous of feet; no problems with this/other skin today.   ENDO:  No syncope, near or diaphoretic sweaty spells.  BS Ok: without download noted last visit.   HEENT: No head injury, headache.  No vision change.  Same mild hearing loss. Aides.   Ears without pain/drainage.  No sore throat.  No significant nasal/sinus congestion/drainage. No epistaxis.  CHEST: No chest wall tenderness or mass. No significant cough without wheeze.  Mild/same SOB; no hemoptysis.  CV: No chest pain, palpatations, ankle edema.  GI: No heartburn significant dysphagia.  No abdominal pain, diarrhea, constipation, rectal bleeding, or melena.    :  Voids without dysuria, or incontience to completion.  ORTHO: No painful/swollen joints but various on /off sore.  No change occ/usual sore neck or back.  But no acute neck but above mid back pain without recent injury.   NEURO: No dizziness; recent LUE weakness of extremities.  No change some LE numbness/parethesias.   PSYCH: No memory loss.  Mood good; not that anxious, depressed but/and not suicidal.  Tolerated stress.   Screening:  Mammogram: NA  Bone density: NA  Low dose CT chest: Tobacco-smoker/age 22/1 ppd/dc 1980: (22): NA (had CT angio)  GI: Colon-div/[facility]/6.15.17/prn-hemocult 1.6.21 advised  Prostate: neely prn-1.6.21  Neely/confirmed 11.22.19  [place] in past   Usual lab order  6m CBC, BMP, A1c  12m CBC, CMP, A1c, TSH, LIPID, PSAs, Vit D      Data reviewed:   Recent admit/ER/MD visits: cardiology  Last cardiac testing:   Results for orders placed during the hospital encounter of 12/24/19   Adult Transthoracic Echo Limited W/ Cont if Necessary Per Protocol    Narrative · Left ventricular wall thickness is consistent with mild concentric   hypertrophy.  · Estimated EF = 55%.  · Left ventricular diastolic dysfunction.  · Mild dilation of the aortic root is present.  · No evidence of pulmonary hypertension is present.        Lab Results:  Results for orders placed or performed in visit on  01/05/21   Comprehensive Metabolic Panel    Specimen: Blood   Result Value Ref Range    Glucose 88 65 - 99 mg/dL    BUN 26 (H) 8 - 23 mg/dL    Creatinine 1.09 0.76 - 1.27 mg/dL    eGFR Non African Am 64 >60 mL/min/1.73    eGFR African Am 78 >60 mL/min/1.73    BUN/Creatinine Ratio 23.9 7.0 - 25.0    Sodium 144 136 - 145 mmol/L    Potassium 4.4 3.5 - 5.2 mmol/L    Chloride 104 98 - 107 mmol/L    Total CO2 32.9 (H) 22.0 - 29.0 mmol/L    Calcium 9.5 8.6 - 10.5 mg/dL    Total Protein 6.5 6.0 - 8.5 g/dL    Albumin 4.30 3.50 - 5.20 g/dL    Globulin 2.2 gm/dL    A/G Ratio 2.0 g/dL    Total Bilirubin 0.2 0.0 - 1.2 mg/dL    Alkaline Phosphatase 54 39 - 117 U/L    AST (SGOT) 12 1 - 40 U/L    ALT (SGPT) 10 1 - 41 U/L   Hemoglobin A1c    Specimen: Blood   Result Value Ref Range    Hemoglobin A1C 6.00 (H) 4.80 - 5.60 %   TSH    Specimen: Blood   Result Value Ref Range    TSH 2.780 0.270 - 4.200 uIU/mL   Lipid Panel    Specimen: Blood   Result Value Ref Range    Total Cholesterol 172 0 - 200 mg/dL    Triglycerides 116 0 - 150 mg/dL    HDL Cholesterol 37 (L) 40 - 60 mg/dL    VLDL Cholesterol Kieran 21 5 - 40 mg/dL    LDL Chol Calc (NIH) 114 (H) 0 - 100 mg/dL   Vitamin D 25 Hydroxy    Specimen: Blood   Result Value Ref Range    25 Hydroxy, Vitamin D 45.4 30.0 - 100.0 ng/ml   PSA DIAGNOSTIC ONLY    Specimen: Blood   Result Value Ref Range    PSA 1.040 0.000 - 4.000 ng/mL   CBC & Differential    Specimen: Blood   Result Value Ref Range    WBC 7.05 3.40 - 10.80 10*3/mm3    RBC 4.20 4.14 - 5.80 10*6/mm3    Hemoglobin 12.2 (L) 13.0 - 17.7 g/dL    Hematocrit 37.5 37.5 - 51.0 %    MCV 89.3 79.0 - 97.0 fL    MCH 29.0 26.6 - 33.0 pg    MCHC 32.5 31.5 - 35.7 g/dL    RDW 13.2 12.3 - 15.4 %    Platelets 213 140 - 450 10*3/mm3    Neutrophil Rel % 55.2 42.7 - 76.0 %    Lymphocyte Rel % 25.8 19.6 - 45.3 %    Monocyte Rel % 7.9 5.0 - 12.0 %    Eosinophil Rel % 9.9 (H) 0.3 - 6.2 %    Basophil Rel % 0.9 0.0 - 1.5 %    Neutrophils Absolute 3.89 1.70 -  7.00 10*3/mm3    Lymphocytes Absolute 1.82 0.70 - 3.10 10*3/mm3    Monocytes Absolute 0.56 0.10 - 0.90 10*3/mm3    Eosinophils Absolute 0.70 (H) 0.00 - 0.40 10*3/mm3    Basophils Absolute 0.06 0.00 - 0.20 10*3/mm3    Immature Granulocyte Rel % 0.3 0.0 - 0.5 %    Immature Grans Absolute 0.02 0.00 - 0.05 10*3/mm3    nRBC 0.0 0.0 - 0.2 /100 WBC       A1C:  Lab Results - Last 18 Months   Lab Units 01/05/21  0732 06/22/20  2300 11/19/19 0719 09/30/19  0716   HEMOGLOBIN A1C % 6.00* CANCELED 6.40* 6.30*     PSA:  Lab Results - Last 18 Months   Lab Units 01/05/21  0732   PSA ng/mL 1.040     CBC:  Lab Results - Last 18 Months   Lab Units 01/05/21  0732 09/24/20  0716 06/22/20  2300 12/26/19 0259 12/24/19 2246 11/19/19 0719 09/30/19  0716   WBC 10*3/mm3 7.05 6.53 CANCELED 7.63 8.88 8.11 8.18   HEMOGLOBIN g/dL 12.2* 12.4* CANCELED 11.9* 12.9* 12.4* 12.9*   HEMATOCRIT % 37.5 38.3 CANCELED 36.9* 38.5 38.5 39.5   PLATELETS 10*3/mm3 213 219 CANCELED 175 230 220 251   IRON mcg/dL  --  63  --   --   --   --   --       BMP/CMP:  Lab Results - Last 18 Months   Lab Units 01/05/21  0732 06/22/20  2300 12/26/19 0259 12/24/19 2246 11/19/19 0719 09/30/19  0716   SODIUM mmol/L 144 141 143 140 147* 144   POTASSIUM mmol/L 4.4 4.7 4.5 4.1 4.6 5.0   CHLORIDE mmol/L 104 102 104 98 105 102   TOTAL CO2 mmol/L 32.9* 26  --   --  30.5* 29.4*   CO2 mmol/L  --   --  30.0* 29.0  --   --    GLUCOSE mg/dL 88 121*  --   --  122* 101*   BUN mg/dL 26* 29* 25* 31* 22 32*   CREATININE mg/dL 1.09 1.20 1.02 1.14 1.11 1.11   EGFR IF NONAFRICN AM mL/min/1.73 64 54* 69 61 63 63   EGFR IF AFRICN AM mL/min/1.73 78 63  --   --  76 76   CALCIUM mg/dL 9.5 9.7 8.8 9.9 8.9 9.6     HEPATIC:  Lab Results - Last 18 Months   Lab Units 01/05/21  0732 12/24/19  2246 09/30/19  0716   ALT (SGPT) U/L 10 14 15   AST (SGOT) U/L 12 15 14   ALK PHOS U/L 54 72 56     THYROID:  Lab Results - Last 18 Months   Lab Units 01/05/21  0732 09/30/19  0716   TSH uIU/mL 2.780 2.540   FREE  "T4 ng/dL  --  1.13       Objective   /72   Pulse 56   Temp 97.8 °F (36.6 °C) (Infrared)   Resp 20   Ht 180.3 cm (71\")   Wt 93.7 kg (206 lb 9.6 oz)   SpO2 99%   BMI 28.81 kg/m²   Body mass index is 28.81 kg/m².    Recent Vitals       8/6/2020 12/22/2020 1/6/2021       BP:  110/70  94/42  110/72     Pulse:  --  50  56     Temp:  --  --  97.8 °F (36.6 °C)     Weight:  --  91.2 kg (201 lb)  93.7 kg (206 lb 9.6 oz)     BMI (Calculated):  --  28  28.8           Physical Exam  GENERAL:  Well nourished/developed in no acute distress.   SKIN: Turgor excellent, without wound, rash, lesion  HEENT: Normal cephalic without trauma.  Pupils equal round reactive to light. Extraocular motions full without nystagmus.    No thyroidmegaly, mass, tenderness, lymphadenopathy and supple.  CV: Regular rhythm.  No murmur, gallop,  edema. Posterior pulses intact.  No carotid bruits.  CHEST: No chest wall tenderness or mass.   LUNGS: Symmetric motion with clear/decreased to auscultation.   ABD: Soft, nontender without mass.   ORTHO: Symmetric extremities without swelling/point tenderness.  Full gross range of motion except hips reduced.  Symmetric LE.  Neg straight leg raising.  Neg Patricks maneuver.  Back is straight/mild lordosis.  Mid back sore touch.   NEURO: CN 2-12 grossly intact.  Symmetric facies. 1/4 x bicep knee equal reflexes.  UE/LE   3/5 strength throughout except 2/5  L.  Nonfocal use extremities. Speech clear.  Light touch decreased bilateral feet.   PSYCH: Oriented x 3.  Pleasant calm, well kept.  Purposeful/directed conservation with intact short/long gross memory.     Diabetic foot exam:   Left: Filament test weak bilaterally   Pulses Dorsalis Pedis:  diminished   Reflexes absent    Vibratory sensation diminished   Proprioception diminished   Sharp/dull discrimination diminished       Right: Filament test weak   Pulses Dorsalis Pedis:  present   Reflexes 1+    Vibratory sensation diminished   Proprioception " absent   Sharp/dull discrimination diminished        Assessment/Plan     1. Peripheral polyneuropathy    2. Hearing aid worn    3. Otorrhea of left ear    4. Abdominal aortic aneurysm (AAA) without rupture (CMS/[facility])    5. Abnormal chest x-ray with multiple lung nodules    6. Mixed hyperlipidemia    7. Atherosclerosis: CAD, AAA ro    8. Chronic diastolic congestive heart failure (CMS/[facility])    9. Anticoagulated-CAD, DM2, CVA/ASA 81+()+plavix+eliquist    10. Controlled type 2 diabetes mellitus with diabetic nephropathy, without long-term current use of insulin (CMS/[facility])    11. Gastroesophageal reflux disease, unspecified whether esophagitis present    12. Chronic kidney disease, unspecified CKD stage    13. Anemia, unspecified type    14. Depression, unspecified depression type    15. SOB: copd,CAD,#, hypoxemia    16. Chronic respiratory failure with hypoxia (CMS/[facility])    17. Corn or callus        Face to face for DM shoes/supplies.  He needs to have these shoes to prevent diabetic foot complications. Using these shoes are part of a comprehensive foot care plan ongoing.   Diagnosis for this DM, peripheral neuropathy, callous/corns.     Discussion: I agree with increasing Neurontin primarily with a 400 mg at night and may be up to 600 mg if tolerated and helps.  He needs his AAA reexamined and issues found on his CT of his lung reexamined; it looks like his renal function will tolerate with contrast.  Stable other issues; the potential is for them to change at any point    Medical decision issues:   Data review above:   Rx: reviewed and adding  New Medications Ordered This Visit   Medications   • gabapentin (Neurontin) 400 MG capsule     Sig: Take 1 capsule by mouth Every Night.     Dispense:  30 capsule     Refill:  1       Order placed:   LAB/Testing/Referrals: reviewed/orders:   Today:   Orders Placed This Encounter   Procedures   • CT Chest With Contrast   • CT Abdomen Pelvis With Contrast     Chronic/recurrent  labs above or change to:   same    Minor surgery: colonoscopy pro/con discussed (at least hemocult x 3)    Health maintance:   Body mass index is 28.81 kg/m².  Patient's Body mass index is 28.81 kg/m². BMI is within normal parameters. No follow-up required..    Tobacco use reviewed:    Gonzalo Durham  reports that he quit smoking about 41 years ago. His smoking use included cigarettes. He started smoking about 67 years ago. He has a 26.00 pack-year smoking history. He has never used smokeless tobacco..    There are no Patient Instructions on file for this visit.    Follow up: Return for lab;, Dr Romero-, 6 m;.  Future Appointments   Date Time Provider Department Center   1/18/2021  8:00 AM PAD [facility] CT 1 BH PAD CT BI PAD   2/8/2021 10:30 AM Juan Marcelino MD [facility] PAD   3/24/2021  1:15 PM Feliz Frye APRN [facility] RD PAD None   6/22/2021  9:45 AM Bradley Carver MD [facility] PAD [facility]   7/6/2021  8:35 AM LAB [facility] [place] [facility] PAD   7/8/2021  1:30 PM Abel Romero MD [facility] PAD

## 2021-01-12 ENCOUNTER — CLINICAL SUPPORT (OUTPATIENT)
Dept: FAMILY MEDICINE CLINIC | Facility: CLINIC | Age: 86
End: 2021-01-12

## 2021-01-12 DIAGNOSIS — D64.9 ANEMIA, UNSPECIFIED TYPE: Primary | ICD-10-CM

## 2021-01-12 LAB
HEMOCCULT STL QL IA: NEGATIVE

## 2021-01-12 PROCEDURE — G0328 FECAL BLOOD SCRN IMMUNOASSAY: HCPCS | Performed by: FAMILY MEDICINE

## 2021-01-18 ENCOUNTER — HOSPITAL ENCOUNTER (OUTPATIENT)
Dept: CT IMAGING | Facility: HOSPITAL | Age: 86
Discharge: HOME | End: 2021-01-18
Admitting: FAMILY MEDICINE

## 2021-01-18 DIAGNOSIS — R91.8 ABNORMAL CHEST X-RAY WITH MULTIPLE LUNG NODULES: ICD-10-CM

## 2021-01-18 DIAGNOSIS — I71.40 ABDOMINAL AORTIC ANEURYSM (AAA) WITHOUT RUPTURE (HCC): ICD-10-CM

## 2021-01-18 DIAGNOSIS — R93.89 ABNORMAL X-RAY: Primary | ICD-10-CM

## 2021-01-18 LAB — CREAT BLDA-MCNC: 1.3 MG/DL (ref 0.6–1.3)

## 2021-01-18 PROCEDURE — 82565 ASSAY OF CREATININE: CPT

## 2021-01-18 PROCEDURE — 74177 CT ABD & PELVIS W/CONTRAST: CPT

## 2021-01-18 PROCEDURE — 25010000002 IOPAMIDOL 61 % SOLUTION: Performed by: FAMILY MEDICINE

## 2021-01-18 PROCEDURE — 71260 CT THORAX DX C+: CPT

## 2021-01-18 RX ADMIN — IOPAMIDOL 100 ML: 612 INJECTION, SOLUTION INTRAVENOUS at 08:29

## 2021-01-26 ENCOUNTER — PROCEDURE VISIT (OUTPATIENT)
Dept: UROLOGY | Facility: CLINIC | Age: 86
End: 2021-01-26

## 2021-01-26 DIAGNOSIS — C67.2 CANCER OF LATERAL WALL OF URINARY BLADDER (HCC): Primary | ICD-10-CM

## 2021-01-26 PROCEDURE — 52000 CYSTOURETHROSCOPY: CPT | Performed by: UROLOGY

## 2021-01-27 NOTE — PROGRESS NOTES
Pre- operative diagnosis:  Possible bladder tumor seen on CT abdomen pelvis with contrast 1/18/2021    Post operative diagnosis:  Bladder Tumor    Procedure:  The patient was prepped and draped in a normal sterile fashion.  The urethra was anesthetized with 2% lidocaine jelly.  A flexible cystoscope was introduced per urethra.      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  We discussed risks of the procedure include but not limited to infection, bleeding, need for additional procedures, injury to urethra and/or bladder, need for bladder catheter, cancer recurrence/persistence, complications of anesthesia.  Patient voiced understanding provide informed consent to proceed to the operating room next month.  He will hold his Eliquis and aspirin 5 days preoperatively.      This document has been signed by KY Cadena MD on January 26, 2021 20:33 CST

## 2021-02-01 ENCOUNTER — TRANSCRIBE ORDERS (OUTPATIENT)
Dept: ADMINISTRATIVE | Facility: HOSPITAL | Age: 86
End: 2021-02-01

## 2021-02-01 DIAGNOSIS — Z11.59 SCREENING FOR VIRAL DISEASE: Primary | ICD-10-CM

## 2021-02-02 ENCOUNTER — TELEPHONE (OUTPATIENT)
Dept: FAMILY MEDICINE CLINIC | Facility: CLINIC | Age: 86
End: 2021-02-02

## 2021-02-02 ENCOUNTER — OFFICE VISIT (OUTPATIENT)
Dept: NEUROLOGY | Facility: CLINIC | Age: 86
End: 2021-02-02

## 2021-02-02 ENCOUNTER — DOCUMENTATION (OUTPATIENT)
Dept: NEUROLOGY | Facility: CLINIC | Age: 86
End: 2021-02-02

## 2021-02-02 VITALS
RESPIRATION RATE: 20 BRPM | HEIGHT: 71 IN | HEART RATE: 63 BPM | SYSTOLIC BLOOD PRESSURE: 138 MMHG | WEIGHT: 206 LBS | DIASTOLIC BLOOD PRESSURE: 74 MMHG | BODY MASS INDEX: 28.84 KG/M2

## 2021-02-02 DIAGNOSIS — I63.9 CEREBROVASCULAR ACCIDENT (CVA), UNSPECIFIED MECHANISM (HCC): Primary | ICD-10-CM

## 2021-02-02 DIAGNOSIS — G47.33 OBSTRUCTIVE SLEEP APNEA: ICD-10-CM

## 2021-02-02 DIAGNOSIS — R41.3 MEMORY DIFFICULTY: ICD-10-CM

## 2021-02-02 PROCEDURE — 99215 OFFICE O/P EST HI 40 MIN: CPT | Performed by: PSYCHIATRY & NEUROLOGY

## 2021-02-02 NOTE — PROGRESS NOTES
Subjective   Gonzalo Durham, 1934, is a male who is being seen today for   Chief Complaint   Patient presents with   • Stroke   • Memory Loss   • Sleeping Problem       HISTORY OF PRESENT ILLNESS: Extended follow-up.  Patient seen for stroke/memory/WENDY.  Patient is to have a urological procedure this coming Monday, February 8 and has been taken off the Eliquis and aspirin.  I did discuss that this would increase his risk of stroke and that he needs to be on Lovenox injections for 5 days starting today with last injection this coming Saturday.  Patient's daughter with him that has given subcutaneous injections prior to this and is familiar and my nurse went over with her about this.  We are starting Lovenox 30 mg subcu daily for 5 injections starting today.  Patient not to get injection the Sunday prior to his surgery.  I have informed Dr. Cadena the urologist about this and Dr. Romero.  Patient's most recent blood work being followed by Dr. Romero.  Patient had memory evaluation show stabilization of memory MMSE today is 29 of 30.  Patient's WENDY is compliant.  His AHI is slightly elevated at 15.3.  Patient has CPAP at 9 cm water pressure from [facility].  Patient's stop bang is 5 and [place] Sleepiness Scale is 13.    REVIEW OF SYSTEMS:   GENERAL: Blood pressure today is 132/72 left arm seated 138/74 left arm standing pulse 63  PULMONARY: As above  CVS: No acute chest pain or palpitation  GASTROINTESTINAL: No acute GI distress  GENITOURINARY: As above  GYN: Not applicable  MUSCULOSKELETAL: No acute musculoskeletal symptoms  HEENT: Patient has macular degeneration right eye/ gets shots  ENDOCRINE: No acute endocrine symptoms other than his diabetes  PSYCHIATRIC: No acute psychiatric symptoms  HEMATOLOGY: As above  SKIN: No acute skin changes  Family history reviewed and otherwise noncontributory to this problem except for history of stroke  Social history: Patient denies smoking or drug or alcohol use    PHYSICAL  EXAMINATION:    GENERAL: No acute distress  CRANIUM: Normal cephalic/atraumatic  HEENT:       EYES: EOMs intact without nystagmus and fields full to confrontation.  No acute fundic abnormalities.  Pupils equal round reactive to light.       EARS: Patient wears hearing aids.  He is tuning fork bilaterally with hearing aids in and tympanic membranes normal       THROAT: No acute oropharynx abnormalities       NECK: Neck circumference 17.5 inches.  No bruits/no lymphadenopathy  CHEST: No acute cardiopulmonary abnormalities by auscultation  ABDOMEN: Nondistended  EXTREMITIES: Dorsalis pedis pulse symmetrical  NEURO: Patient alert and follows commands without difficulty  SPEECH: Normal    CRANIAL NERVES: Motor/sensory about the face normal and symmetric  EXTREMITIES: Dorsalis pedis pulse symmetrical  MOTOR STRENGTH: Motor strength upper and lower extremities normal  STATION AND GAIT: Slightly wide-based gait but no tendency to fall and no falls recently.  Romberg negative  CEREBELLAR: Finger-nose and heel-to-shin normal  SENSORY: Decreased pin vibration distal approximately upper extremities to mid palm bilaterally lower extremities ankles bilaterally  REFLEXES: Reflexes absent ankle jerk with decreased knee jerk and biceps without clonus or Babinski      ASSESSMENT AND PLAN: Patient with history of CVA and to get to emergency room immediately if stroke symptoms occur.  Starting short term Lovenox as above.  Patient with WENDY stable and memory stable.  I spent 40 minutes with this patient counseling exam and reviewing records pharmacy is to be contacted to dispense 5 days worth of the Lovenox.Patient's Body mass index is 28.73 kg/m². BMI is above normal parameters. Recommendations include: referral to primary care.      Diagnoses and all orders for this visit:    1. Cerebrovascular accident (CVA), unspecified mechanism (CMS/HCC) (Primary)    2. Obstructive sleep apnea    3. Memory difficulty    Other orders  -     Cancel:  CBC & Differential; Future  -     Cancel: Comprehensive Metabolic Panel; Future  -     enoxaparin (Lovenox) 30 MG/0.3ML solution syringe; Give  Injection 0.3 ml subcutaneous daily for 5 days and then discontinue. Start injection on Feb. 2nd 2021 and last injection should be 2/6/2021  Dispense: 0.3 mL; Refill: 0        Dictated utilizing Dragon voice recognition software

## 2021-02-02 NOTE — TELEPHONE ENCOUNTER
On eliquist and ASA; hx CVA  Seeing urology; to have procedure    Dr Marcelino recommending lovonex  (stroke prevention)  Wanted to know if he/I write    Told him to go ahead

## 2021-02-02 NOTE — PATIENT INSTRUCTIONS
Patient to get to emergency room immediately if stroke symptoms occur.  Patient to continue driving and safety precautions

## 2021-02-05 ENCOUNTER — LAB (OUTPATIENT)
Dept: LAB | Facility: HOSPITAL | Age: 86
End: 2021-02-05

## 2021-02-05 ENCOUNTER — APPOINTMENT (OUTPATIENT)
Dept: PREADMISSION TESTING | Facility: HOSPITAL | Age: 86
End: 2021-02-05

## 2021-02-05 VITALS
DIASTOLIC BLOOD PRESSURE: 43 MMHG | WEIGHT: 207.45 LBS | OXYGEN SATURATION: 97 % | RESPIRATION RATE: 16 BRPM | SYSTOLIC BLOOD PRESSURE: 147 MMHG | HEART RATE: 65 BPM | HEIGHT: 69 IN | BODY MASS INDEX: 30.73 KG/M2

## 2021-02-05 DIAGNOSIS — C67.2 CANCER OF LATERAL WALL OF URINARY BLADDER (HCC): ICD-10-CM

## 2021-02-05 PROCEDURE — 93010 ELECTROCARDIOGRAM REPORT: CPT | Performed by: INTERNAL MEDICINE

## 2021-02-05 PROCEDURE — 85025 COMPLETE CBC W/AUTO DIFF WBC: CPT | Performed by: UROLOGY

## 2021-02-05 PROCEDURE — 80048 BASIC METABOLIC PNL TOTAL CA: CPT | Performed by: UROLOGY

## 2021-02-05 PROCEDURE — C9803 HOPD COVID-19 SPEC COLLECT: HCPCS | Performed by: UROLOGY

## 2021-02-05 PROCEDURE — 93005 ELECTROCARDIOGRAM TRACING: CPT | Performed by: UROLOGY

## 2021-02-05 PROCEDURE — U0004 COV-19 TEST NON-CDC HGH THRU: HCPCS | Performed by: UROLOGY

## 2021-02-05 PROCEDURE — 81003 URINALYSIS AUTO W/O SCOPE: CPT | Performed by: UROLOGY

## 2021-02-05 NOTE — DISCHARGE INSTRUCTIONS
DAY OF SURGERY INSTRUCTIONS        YOUR SURGEON: ***chin     PROCEDURE: ***cystoscopy transurethral resection bladder tumor     DATE OF SURGERY: ***2/8/2021    ARRIVAL TIME: AS DIRECTED BY OFFICE    YOU MAY TAKE THE FOLLOWING MEDICATION(S) THE MORNING OF SURGERY WITH A SIP OF WATER: ***gabapentin, metoprolol  ALL OTHER HOME MEDICATIONS CHECK WITH YOUR DOCTOR    DO NOT TAKE ANY ERECTILE DYSFUNCTION MEDICATIONS (EX:  CIALIS, VIAGRA) 24 HOURS PRIOR TO SURGERY              MANAGING PAIN AFTER SURGERY    We know you are probably wondering what your pain will be like after surgery.  Following surgery it is unrealistic to expect you will not have pain.   Pain is how our bodies let us know that something is wrong or cautions us to be careful.  That said, our goal is to make your pain tolerable.    Methods we may use to treat your pain include (oral or IV medications, PCAs, epidurals, nerve blocks, etc.)   While some procedures require IV pain medications for a short time after surgery, transitioning to pain medications by mouth allows for better management of pain.   Your nurse will encourage you to take oral pain medications whenever possible.  IV medications work almost immediately, but only last a short while.  Taking medications by mouth allows for a more constant level of medication in your blood stream for a longer period of time.      Once your pain is out of control it is harder to get back under control.  It is important you are aware when your next dose of pain medication is due.  If you are admitted, your nurse may write the time of your next dose on the white board in your room to help you remember.      We are interested in your pain and encourage you to inform us about aggravating factors during your visit.   Many times a simple repositioning every few hours can make a big difference.    If your physician says it is okay, do not let your pain prevent you from getting out of bed. Be sure to call your nurse for  assistance prior to getting up so you do not fall.      Before surgery, please decide your tolerable pain goal.  These faces help describe the pain ratings we use on a 0-10 scale.   Be prepared to tell us your goal and whether or not you take pain or anxiety medications at home.      BEFORE YOU COME TO THE HOSPITAL  (Pre-op instructions)  • Do not eat, drink, smoke or chew gum after midnight the night before surgery.  This also includes no mints.  • Morning of surgery take only the medicines you have been instructed with a sip of water unless otherwise instructed  by your physician.  • Do not shave, wear makeup or dark nail polish.  • Remove all jewelry including rings.  • Leave anything you consider valuable at home.  • Leave your suitcase in the car until after your surgery.  • Bring the following with you if applicable:  o Picture ID and insurance, Medicare or Medicaid cards  o Co-pay/deductible required by insurance (cash, check, credit card)  o Copy of advance directive, living will or power-of- documents if not brought to PAT  o CPAP or BIPAP mask and tubing  o Relaxation aids ( book, magazine), etc.  o Hearing aids                                 ON THE DAY OF SURGERY  · On the day of surgery check in at registration located at the main entrance of the hospital.   ? You will be registered and given a beeper with instructions where to wait in the main lobby.  ? When your beeper lights up and vibrates a member of the Outpatient Surgery staff will meet you at the double doors under the stair steps and escort you to your preoperative room.   · You may have cloth compression devices placed on your legs. These help to prevent blood clots and reduce swelling in your legs.  · An IV may be inserted into one of your veins.  · In the operating room, you may be given one or more of the following:  ? A medicine to help you relax (sedative).  ? A medicine to numb the area (local anesthetic).  ? A medicine to make  "you fall asleep (general anesthetic).  ? A medicine that is injected into an area of your body to numb everything below the injection site (regional anesthetic).  · Your surgical site will be marked or identified.  · You may be given an antibiotic through your IV to help prevent infection.  Contact a health care provider if you:  · Develop a fever of more than 100.4°F (38°C) or other feelings of illness during the 48 hours before your surgery.  · Have symptoms that get worse.  Have questions or concerns about your surgery    General Anesthesia/Surgery, Adult  General anesthesia is the use of medicines to make a person \"go to sleep\" (unconscious) for a medical procedure. General anesthesia must be used for certain procedures, and is often recommended for procedures that:  · Last a long time.  · Require you to be still or in an unusual position.  · Are major and can cause blood loss.  The medicines used for general anesthesia are called general anesthetics. As well as making you unconscious for a certain amount of time, these medicines:  · Prevent pain.  · Control your blood pressure.  · Relax your muscles.  Tell a health care provider about:  · Any allergies you have.  · All medicines you are taking, including vitamins, herbs, eye drops, creams, and over-the-counter medicines.  · Any problems you or family members have had with anesthetic medicines.  · Types of anesthetics you have had in the past.  · Any blood disorders you have.  · Any surgeries you have had.  · Any medical conditions you have.  · Any recent upper respiratory, chest, or ear infections.  · Any history of:  ? Heart or lung conditions, such as heart failure, sleep apnea, asthma, or chronic obstructive pulmonary disease (COPD).  ?  service.  ? Depression or anxiety.  · Any tobacco or drug use, including marijuana or alcohol use.  · Whether you are pregnant or may be pregnant.  What are the risks?  Generally, this is a safe procedure. However, " problems may occur, including:  · Allergic reaction.  · Lung and heart problems.  · Inhaling food or liquid from the stomach into the lungs (aspiration).  · Nerve injury.  · Air in the bloodstream, which can lead to stroke.  · Extreme agitation or confusion (delirium) when you wake up from the anesthetic.  · Waking up during your procedure and being unable to move. This is rare.  These problems are more likely to develop if you are having a major surgery or if you have an advanced or serious medical condition. You can prevent some of these complications by answering all of your health care provider's questions thoroughly and by following all instructions before your procedure.  General anesthesia can cause side effects, including:  · Nausea or vomiting.  · A sore throat from the breathing tube.  · Hoarseness.  · Wheezing or coughing.  · Shaking chills.  · Tiredness.  · Body aches.  · Anxiety.  · Sleepiness or drowsiness.  · Confusion or agitation.  RISKS AND COMPLICATIONS OF SURGERY  Your health care provider will discuss possible risks and complications with you before surgery. Common risks and complications include:    · Problems due to the use of anesthetics.  · Blood loss and replacement (does not apply to minor surgical procedures).  · Temporary increase in pain due to surgery.  · Uncorrected pain or problems that the surgery was meant to correct.  · Infection.  · New damage.    What happens before the procedure?    Medicines  Ask your health care provider about:  · Changing or stopping your regular medicines. This is especially important if you are taking diabetes medicines or blood thinners.  · Taking medicines such as aspirin and ibuprofen. These medicines can thin your blood. Do not take these medicines unless your health care provider tells you to take them.  · Taking over-the-counter medicines, vitamins, herbs, and supplements. Do not take these during the week before your procedure unless your health  care provider approves them.  General instructions  · Starting 3-6 weeks before the procedure, do not use any products that contain nicotine or tobacco, such as cigarettes and e-cigarettes. If you need help quitting, ask your health care provider.  · If you brush your teeth on the morning of the procedure, make sure to spit out all of the toothpaste.  · Tell your health care provider if you become ill or develop a cold, cough, or fever.  · If instructed by your health care provider, bring your sleep apnea device with you on the day of your surgery (if applicable).  · Ask your health care provider if you will be going home the same day, the following day, or after a longer hospital stay.  ? Plan to have someone take you home from the hospital or clinic.  ? Plan to have a responsible adult care for you for at least 24 hours after you leave the hospital or clinic. This is important.  What happens during the procedure?  · You will be given anesthetics through both of the following:  ? A mask placed over your nose and mouth.  ? An IV in one of your veins.  · You may receive a medicine to help you relax (sedative).  · After you are unconscious, a breathing tube may be inserted down your throat to help you breathe. This will be removed before you wake up.  · An anesthesia specialist will stay with you throughout your procedure. He or she will:  ? Keep you comfortable and safe by continuing to give you medicines and adjusting the amount of medicine that you get.  ? Monitor your blood pressure, pulse, and oxygen levels to make sure that the anesthetics do not cause any problems.  The procedure may vary among health care providers and hospitals.  What happens after the procedure?  · Your blood pressure, temperature, heart rate, breathing rate, and blood oxygen level will be monitored until the medicines you were given have worn off.  · You will wake up in a recovery area. You may wake up slowly.  · If you feel anxious or  agitated, you may be given medicine to help you calm down.  · If you will be going home the same day, your health care provider may check to make sure you can walk, drink, and urinate.  · Your health care provider will treat any pain or side effects you have before you go home.  · Do not drive for 24 hours if you were given a sedative.  Summary  · General anesthesia is used to keep you still and prevent pain during a procedure.  · It is important to tell your healthcare provider about your medical history and any surgeries you have had, and previous experience with anesthesia.  · Follow your healthcare provider’s instructions about when to stop eating, drinking, or taking certain medicines before your procedure.  · Plan to have someone take you home from the hospital or clinic.  This information is not intended to replace advice given to you by your health care provider. Make sure you discuss any questions you have with your health care provider.  Document Released: 03/26/2009 Document Revised: 08/03/2018 Document Reviewed: 08/03/2018  [employer] Interactive Patient Education © 2019 [employer]      Fall Prevention in Hospitals, Adult  As a hospital patient, your condition and the treatments you receive can increase your risk for falls. Some additional risk factors for falls in a hospital include:  · Being in an unfamiliar environment.  · Being on bed rest.  · Your surgery.  · Taking certain medicines.  · Your tubing requirements, such as intravenous (IV) therapy or catheters.  It is important that you learn how to decrease fall risks while at the hospital. Below are important tips that can help prevent falls.  SAFETY TIPS FOR PREVENTING FALLS  Talk about your risk of falling.  · Ask your health care provider why you are at risk for falling. Is it your medicine, illness, tubing placement, or something else?  · Make a plan with your health care provider to keep you safe from falls.  · Ask your health care provider or  pharmacist about side effects of your medicines. Some medicines can make you dizzy or affect your coordination.  Ask for help.  · Ask for help before getting out of bed. You may need to press your call button.  · Ask for assistance in getting safely to the toilet.  · Ask for a walker or cane to be put at your bedside. Ask that most of the side rails on your bed be placed up before your health care provider leaves the room.  · Ask family or friends to sit with you.  · Ask for things that are out of your reach, such as your glasses, hearing aids, telephone, bedside table, or call button.  Follow these tips to avoid falling:  · Stay lying or seated, rather than standing, while waiting for help.  · Wear rubber-soled slippers or shoes whenever you walk in the hospital.  · Avoid quick, sudden movements.  ¨ Change positions slowly.  ¨ Sit on the side of your bed before standing.  ¨ Stand up slowly and wait before you start to walk.  · Let your health care provider know if there is a spill on the floor.  · Pay careful attention to the medical equipment, electrical cords, and tubes around you.  · When you need help, use your call button by your bed or in the bathroom. Wait for one of your health care providers to help you.  · If you feel dizzy or unsure of your footing, return to bed and wait for assistance.  · Avoid being distracted by the TV, telephone, or another person in your room.  · Do not lean or support yourself on rolling objects, such as IV poles or bedside tables.     This information is not intended to replace advice given to you by your health care provider. Make sure you discuss any questions you have with your health care provider.     Document Released: 12/15/2001 Document Revised: 01/08/2016 Document Reviewed: 08/25/2013  [employer] Patient Education ©2016 [employer]            PATIENT/FAMILY/RESPONSIBLE PARTY VERBALIZES UNDERSTANDING OF ABOVE EDUCATION.  COPY OF PAIN SCALE GIVEN AND REVIEWED  WITH VERBALIZED UNDERSTANDING.

## 2021-02-06 LAB — SARS-COV-2 ORF1AB RESP QL NAA+PROBE: NOT DETECTED

## 2021-02-08 ENCOUNTER — APPOINTMENT (OUTPATIENT)
Dept: GENERAL RADIOLOGY | Facility: HOSPITAL | Age: 86
End: 2021-02-08

## 2021-02-08 ENCOUNTER — ANESTHESIA (OUTPATIENT)
Dept: PERIOP | Facility: HOSPITAL | Age: 86
End: 2021-02-08

## 2021-02-08 ENCOUNTER — HOSPITAL ENCOUNTER (OUTPATIENT)
Facility: HOSPITAL | Age: 86
Setting detail: HOSPITAL OUTPATIENT SURGERY
Discharge: HOME | End: 2021-02-08
Attending: UROLOGY | Admitting: UROLOGY

## 2021-02-08 ENCOUNTER — ANESTHESIA EVENT (OUTPATIENT)
Dept: PERIOP | Facility: HOSPITAL | Age: 86
End: 2021-02-08

## 2021-02-08 ENCOUNTER — HOSPITAL ENCOUNTER (OUTPATIENT)
Dept: GENERAL RADIOLOGY | Facility: HOSPITAL | Age: 86
Setting detail: HOSPITAL OUTPATIENT SURGERY
Discharge: HOME | End: 2021-02-08

## 2021-02-08 VITALS
TEMPERATURE: 98 F | DIASTOLIC BLOOD PRESSURE: 87 MMHG | SYSTOLIC BLOOD PRESSURE: 149 MMHG | OXYGEN SATURATION: 100 % | HEART RATE: 50 BPM | RESPIRATION RATE: 14 BRPM

## 2021-02-08 DIAGNOSIS — C67.2 CANCER OF LATERAL WALL OF URINARY BLADDER (HCC): ICD-10-CM

## 2021-02-08 LAB
GLUCOSE BLDC GLUCOMTR-MCNC: 124 MG/DL (ref 70–130)
GLUCOSE BLDC GLUCOMTR-MCNC: 131 MG/DL (ref 70–130)
QT INTERVAL: 410 MS
QTC INTERVAL: 429 MS

## 2021-02-08 PROCEDURE — 88307 TISSUE EXAM BY PATHOLOGIST: CPT | Performed by: UROLOGY

## 2021-02-08 PROCEDURE — 25010000002 LEVOFLOXACIN PER 250 MG: Performed by: UROLOGY

## 2021-02-08 PROCEDURE — 71046 X-RAY EXAM CHEST 2 VIEWS: CPT

## 2021-02-08 PROCEDURE — 25010000002 PROPOFOL 10 MG/ML EMULSION: Performed by: NURSE ANESTHETIST, CERTIFIED REGISTERED

## 2021-02-08 PROCEDURE — 51720 TREATMENT OF BLADDER LESION: CPT | Performed by: UROLOGY

## 2021-02-08 PROCEDURE — C1769 GUIDE WIRE: HCPCS | Performed by: UROLOGY

## 2021-02-08 PROCEDURE — 74420 UROGRAPHY RTRGR +-KUB: CPT

## 2021-02-08 PROCEDURE — 52235 CYSTOSCOPY AND TREATMENT: CPT | Performed by: UROLOGY

## 2021-02-08 PROCEDURE — C1758 CATHETER, URETERAL: HCPCS | Performed by: UROLOGY

## 2021-02-08 PROCEDURE — 82962 GLUCOSE BLOOD TEST: CPT

## 2021-02-08 PROCEDURE — 25010000002 IOPAMIDOL 61 % SOLUTION: Performed by: UROLOGY

## 2021-02-08 PROCEDURE — 25010000002 GEMCITABINE RECONSTITUTED SOLUTION: Performed by: UROLOGY

## 2021-02-08 PROCEDURE — 52351 CYSTOURETERO & OR PYELOSCOPE: CPT | Performed by: UROLOGY

## 2021-02-08 PROCEDURE — 25010000002 ONDANSETRON PER 1 MG: Performed by: NURSE ANESTHETIST, CERTIFIED REGISTERED

## 2021-02-08 RX ORDER — ONDANSETRON 2 MG/ML
4 INJECTION INTRAMUSCULAR; INTRAVENOUS ONCE AS NEEDED
Status: DISCONTINUED | OUTPATIENT
Start: 2021-02-08 | End: 2021-02-08 | Stop reason: HOSPADM

## 2021-02-08 RX ORDER — NALOXONE HCL 0.4 MG/ML
0.4 VIAL (ML) INJECTION AS NEEDED
Status: DISCONTINUED | OUTPATIENT
Start: 2021-02-08 | End: 2021-02-08 | Stop reason: HOSPADM

## 2021-02-08 RX ORDER — LIDOCAINE HYDROCHLORIDE 40 MG/ML
SOLUTION TOPICAL AS NEEDED
Status: DISCONTINUED | OUTPATIENT
Start: 2021-02-08 | End: 2021-02-08 | Stop reason: SURG

## 2021-02-08 RX ORDER — SULFAMETHOXAZOLE AND TRIMETHOPRIM 800; 160 MG/1; MG/1
1 TABLET ORAL 2 TIMES DAILY
Qty: 6 TABLET | Refills: 0 | Status: SHIPPED | OUTPATIENT
Start: 2021-02-08 | End: 2021-02-11

## 2021-02-08 RX ORDER — PHENAZOPYRIDINE HYDROCHLORIDE 100 MG/1
100 TABLET, FILM COATED ORAL 3 TIMES DAILY PRN
Qty: 20 TABLET | Refills: 0 | Status: SHIPPED | OUTPATIENT
Start: 2021-02-08 | End: 2021-07-08

## 2021-02-08 RX ORDER — ONDANSETRON 4 MG/1
4 TABLET, ORALLY DISINTEGRATING ORAL EVERY 6 HOURS PRN
Qty: 6 TABLET | Refills: 1 | Status: SHIPPED | OUTPATIENT
Start: 2021-02-08 | End: 2021-07-08

## 2021-02-08 RX ORDER — PROPOFOL 10 MG/ML
VIAL (ML) INTRAVENOUS AS NEEDED
Status: DISCONTINUED | OUTPATIENT
Start: 2021-02-08 | End: 2021-02-08 | Stop reason: SURG

## 2021-02-08 RX ORDER — OXYBUTYNIN CHLORIDE 5 MG/1
5 TABLET ORAL EVERY 8 HOURS PRN
Qty: 30 TABLET | Refills: 1 | Status: SHIPPED | OUTPATIENT
Start: 2021-02-08 | End: 2021-03-29 | Stop reason: SDUPTHER

## 2021-02-08 RX ORDER — NEOSTIGMINE METHYLSULFATE 5 MG/5 ML
SYRINGE (ML) INTRAVENOUS AS NEEDED
Status: DISCONTINUED | OUTPATIENT
Start: 2021-02-08 | End: 2021-02-08 | Stop reason: SURG

## 2021-02-08 RX ORDER — LEVOFLOXACIN 5 MG/ML
500 INJECTION, SOLUTION INTRAVENOUS ONCE
Status: COMPLETED | OUTPATIENT
Start: 2021-02-08 | End: 2021-02-08

## 2021-02-08 RX ORDER — SODIUM CHLORIDE 0.9 % (FLUSH) 0.9 %
3 SYRINGE (ML) INJECTION EVERY 12 HOURS SCHEDULED
Status: DISCONTINUED | OUTPATIENT
Start: 2021-02-08 | End: 2021-02-08 | Stop reason: HOSPADM

## 2021-02-08 RX ORDER — SODIUM CHLORIDE 0.9 % (FLUSH) 0.9 %
3 SYRINGE (ML) INJECTION AS NEEDED
Status: DISCONTINUED | OUTPATIENT
Start: 2021-02-08 | End: 2021-02-08 | Stop reason: HOSPADM

## 2021-02-08 RX ORDER — ROCURONIUM BROMIDE 10 MG/ML
INJECTION, SOLUTION INTRAVENOUS AS NEEDED
Status: DISCONTINUED | OUTPATIENT
Start: 2021-02-08 | End: 2021-02-08 | Stop reason: SURG

## 2021-02-08 RX ORDER — TRAMADOL HYDROCHLORIDE 50 MG/1
50 TABLET ORAL ONCE AS NEEDED
Status: DISCONTINUED | OUTPATIENT
Start: 2021-02-08 | End: 2021-02-08 | Stop reason: HOSPADM

## 2021-02-08 RX ORDER — LIDOCAINE HYDROCHLORIDE 20 MG/ML
INJECTION, SOLUTION EPIDURAL; INFILTRATION; INTRACAUDAL; PERINEURAL AS NEEDED
Status: DISCONTINUED | OUTPATIENT
Start: 2021-02-08 | End: 2021-02-08 | Stop reason: SURG

## 2021-02-08 RX ORDER — FENTANYL CITRATE 50 UG/ML
25 INJECTION, SOLUTION INTRAMUSCULAR; INTRAVENOUS
Status: DISCONTINUED | OUTPATIENT
Start: 2021-02-08 | End: 2021-02-08 | Stop reason: HOSPADM

## 2021-02-08 RX ORDER — MAGNESIUM HYDROXIDE 1200 MG/15ML
LIQUID ORAL AS NEEDED
Status: DISCONTINUED | OUTPATIENT
Start: 2021-02-08 | End: 2021-02-08 | Stop reason: HOSPADM

## 2021-02-08 RX ORDER — TRAMADOL HYDROCHLORIDE 50 MG/1
50 TABLET ORAL EVERY 8 HOURS PRN
Qty: 6 TABLET | Refills: 0 | Status: SHIPPED | OUTPATIENT
Start: 2021-02-08 | End: 2021-03-29 | Stop reason: SDUPTHER

## 2021-02-08 RX ORDER — LABETALOL HYDROCHLORIDE 5 MG/ML
5 INJECTION, SOLUTION INTRAVENOUS
Status: DISCONTINUED | OUTPATIENT
Start: 2021-02-08 | End: 2021-02-08 | Stop reason: HOSPADM

## 2021-02-08 RX ORDER — ONDANSETRON 2 MG/ML
INJECTION INTRAMUSCULAR; INTRAVENOUS AS NEEDED
Status: DISCONTINUED | OUTPATIENT
Start: 2021-02-08 | End: 2021-02-08 | Stop reason: SURG

## 2021-02-08 RX ORDER — SODIUM CHLORIDE, SODIUM LACTATE, POTASSIUM CHLORIDE, CALCIUM CHLORIDE 600; 310; 30; 20 MG/100ML; MG/100ML; MG/100ML; MG/100ML
1000 INJECTION, SOLUTION INTRAVENOUS CONTINUOUS
Status: DISCONTINUED | OUTPATIENT
Start: 2021-02-08 | End: 2021-02-08 | Stop reason: HOSPADM

## 2021-02-08 RX ORDER — SODIUM CHLORIDE 0.9 % (FLUSH) 0.9 %
3-10 SYRINGE (ML) INJECTION AS NEEDED
Status: DISCONTINUED | OUTPATIENT
Start: 2021-02-08 | End: 2021-02-08 | Stop reason: HOSPADM

## 2021-02-08 RX ORDER — FLUMAZENIL 0.1 MG/ML
0.2 INJECTION INTRAVENOUS AS NEEDED
Status: DISCONTINUED | OUTPATIENT
Start: 2021-02-08 | End: 2021-02-08 | Stop reason: HOSPADM

## 2021-02-08 RX ORDER — LIDOCAINE HYDROCHLORIDE 10 MG/ML
0.5 INJECTION, SOLUTION EPIDURAL; INFILTRATION; INTRACAUDAL; PERINEURAL ONCE AS NEEDED
Status: DISCONTINUED | OUTPATIENT
Start: 2021-02-08 | End: 2021-02-08 | Stop reason: HOSPADM

## 2021-02-08 RX ORDER — OXYCODONE AND ACETAMINOPHEN 10; 325 MG/1; MG/1
1 TABLET ORAL ONCE AS NEEDED
Status: COMPLETED | OUTPATIENT
Start: 2021-02-08 | End: 2021-02-08

## 2021-02-08 RX ORDER — SODIUM CHLORIDE, SODIUM LACTATE, POTASSIUM CHLORIDE, CALCIUM CHLORIDE 600; 310; 30; 20 MG/100ML; MG/100ML; MG/100ML; MG/100ML
100 INJECTION, SOLUTION INTRAVENOUS CONTINUOUS
Status: DISCONTINUED | OUTPATIENT
Start: 2021-02-08 | End: 2021-02-08 | Stop reason: HOSPADM

## 2021-02-08 RX ADMIN — LIDOCAINE HYDROCHLORIDE 100 MG: 20 INJECTION, SOLUTION EPIDURAL; INFILTRATION; INTRACAUDAL; PERINEURAL at 12:06

## 2021-02-08 RX ADMIN — LEVOFLOXACIN 500 MG: 500 INJECTION, SOLUTION INTRAVENOUS at 11:37

## 2021-02-08 RX ADMIN — ONDANSETRON HYDROCHLORIDE 4 MG: 2 SOLUTION INTRAMUSCULAR; INTRAVENOUS at 12:42

## 2021-02-08 RX ADMIN — PROPOFOL 80 MG: 10 INJECTION, EMULSION INTRAVENOUS at 12:06

## 2021-02-08 RX ADMIN — SODIUM CHLORIDE, POTASSIUM CHLORIDE, SODIUM LACTATE AND CALCIUM CHLORIDE 1000 ML: 600; 310; 30; 20 INJECTION, SOLUTION INTRAVENOUS at 10:37

## 2021-02-08 RX ADMIN — Medication 5 MG: at 12:42

## 2021-02-08 RX ADMIN — LIDOCAINE HYDROCHLORIDE 1 EACH: 40 SOLUTION TOPICAL at 12:06

## 2021-02-08 RX ADMIN — ROCURONIUM BROMIDE 40 MG: 10 INJECTION INTRAVENOUS at 12:06

## 2021-02-08 RX ADMIN — OXYCODONE HYDROCHLORIDE AND ACETAMINOPHEN 1 TABLET: 10; 325 TABLET ORAL at 13:05

## 2021-02-08 RX ADMIN — GLYCOPYRROLATE 0.8 MG: 0.2 INJECTION, SOLUTION INTRAMUSCULAR; INTRAVENOUS at 12:42

## 2021-02-08 NOTE — DISCHARGE INSTRUCTIONS

## 2021-02-08 NOTE — OP NOTE
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 Cystoscopy: 2 cm papillary tumor along left lateral wall resected with cold loop and tumor base fulgurated  #2. Interpretation of retrograde pyelogram(s): The retrograde pyelogram(s) interpretation(s) is/are dictated per separate report under \"Brief op note\" document type.        Procedure details:  The patient is identified in the preoperative holding area.  The informed consent process had been completed In the office documented by my last progress note that included a discussion of the risks of this procedure, alternative management options, and the potential benefits of this procedure.  The patient voiced a good recollection of that discussion.  The patient voiced no additional questions.     The patient is taken to the cystoscopic suite and given effective general anesthesia. The patient is then placed in theBody position: dorsal lithotomy position with care being placed to appropriately pad the patient to avoid excessive compression with the Doni stirrups, especially laterally to the leg on the peroneal nerve.  The patient is then prepped and draped in the usual sterile fashion.  At this point " appropriate timeout protocol was observed.    A 22 Cymro cystoscope sheath with 30° lens is inserted into the bladder.  I also used a 70° lens to carry out a panendoscopic inspection of the urinary bladder.  Findings are described above.     Left retrograde ureteropyelogram was then carried out with a 5 Tajik cone tip catheter using diluted contrast with spot fluoroscopic images taken. Findings are described per separate report.     The previously described bladder tumor(s) as described is then addressed.  The cystoscope was removed and a resectoscope with visual obturator was placed.  The cold resecting loop was used to remove the tumor along the left lateral wall taking care to avoid perforation.  The tumor is passed off as a specimen.  Tumor base was fulgurated with the electrode.  No residual tumor was evident.  There was no evidence of bladder injury.  Prostate was noted to be markedly enlarged.  There were friable bleeding prostate vessels on the intravesical prostate lobe which were controlled using cautery.  Hemostasis appeared intact.  I removed the resectoscope and placed a 22 Cymro three-way Rachel catheter in the bladder with 10 cc of sterile water in the balloon.  Urine remained clear on CBI.  I then placed a solution of gemcitabine into the bladder for immediate intravesical chemotherapy and the catheter irrigation and drainage ports were plugged.  Patient was awakened from anesthesia and taken to the recovery room in stable condition.  .     Estimated Blood Loss: Minimal    Specimens:       Bladder tumor               Drains:   Continuous Bladder Irrigation Triple-lumen 22 Fr (Active)   Daily Indications Selected surgeries ( tract, abdomen) 02/08/21 1300   Site Assessment Clean;Skin intact 02/08/21 1244       Complications: none    Plan: Patient will be discharged with Rachel catheter in place given his very large prostate.  He will be seen in the office in 72 hours for cath removal.  I will call  him with his pathology results.    Danie Cadena MD     Date: 2/8/2021  Time: 13:02 CST

## 2021-02-08 NOTE — BRIEF OP NOTE
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]    Procedure/CPT® Codes:        Procedure(s):  CYSTOSCOPY TRANSURETHRAL RESECTION OF BLADDER TUMOR WITH GEMCITABINE INSTILLATION  CYSTOSCOPY LEFT RETROGRADE PYELOGRAM    Surgeon(s):  Danie Cadena MD    Anesthesia: General    Staff:   Circulator: Serena Yang RN  Scrub Person: Khloe Tee  Assistant: Henry Coker  Assistant: Henry Coker      Estimated Blood Loss: minimal    Urine Voided: * No values recorded between 2/8/2021 12:00 PM and 2/8/2021 12:43 PM *    Specimens:           Bladder tumor               Drains:   Continuous Bladder Irrigation Triple-lumen 22 Fr (Active)   Daily Indications Selected surgeries ( tract, abdomen) 02/08/21 1244   Site Assessment Clean;Skin intact 02/08/21 1244       Findings: 2 cm papillary tumor left lateral wall removed with cold resecting loop and base fulgurated.  Left retrograde pyelogram normal without filling defect or hydronephrosis.  Right retrograde pyelogram could not be performed due to severe J hooking of right distal ureter.  Ureteral catheter could not be placed as 0.035 sensor guidewire could not safely negotiate the severe distal J hooking of the right ureter.  Therefore, no heroic attempts were made for right retrograde today.  Immediate intraoperative intravesical placement of gemcitabine performed.    Complications: None        Danie Cadena MD     Date: 2/8/2021  Time: 12:59 CST

## 2021-02-08 NOTE — ANESTHESIA PROCEDURE NOTES
Airway  Date/Time: 2/8/2021 12:08 PM  Airway not difficult    General Information and Staff    Patient location during procedure: OR  CRNA: Dawson Wright CRNA    Indications and Patient Condition  Indications for airway management: airway protection    Preoxygenated: yes  Mask difficulty assessment: 0 - not attempted    Final Airway Details  Final airway type: endotracheal airway      Successful airway: ETT  Cuffed: yes   Successful intubation technique: direct laryngoscopy  Endotracheal tube insertion site: oral  Blade: Vanna  Blade size: 3  ETT size (mm): 7.5  Cormack-Lehane Classification: grade I - full view of glottis  Placement verified by: chest auscultation and capnometry   Cuff volume (mL): 6  Measured from: lips  ETT/EBT  to lips (cm): 21  Number of attempts at approach: 1  Assessment: lips, teeth, and gum same as pre-op and atraumatic intubation    Additional Comments  ATRAUMATIC INTUBATION

## 2021-02-08 NOTE — ANESTHESIA PREPROCEDURE EVALUATION
Anesthesia Evaluation     Patient summary reviewed   NPO Solid Status: > 8 hours  NPO Liquid Status: > 8 hours           Airway   Mallampati: I  TM distance: >3 FB  Neck ROM: full  No difficulty expected  Dental - normal exam     Pulmonary    (+) a smoker Former, COPD, asthma,home oxygen, sleep apnea,   Cardiovascular     ECG reviewed    (+) hypertension, past MI , CAD, CABG (1980, 3V), dysrhythmias Atrial Fib, CHF , PVD (AAA), hyperlipidemia,   (-) cardiac stents    ROS comment: LINQ recorder    Neuro/Psych  (+) CVA, tremors,     (-) seizures, TIA  GI/Hepatic/Renal/Endo    (+) obesity,  GERD,  renal disease CRI, diabetes mellitus,   (-) liver disease    Musculoskeletal     Abdominal    Substance History      OB/GYN          Other                      Anesthesia Plan    ASA 3     general     intravenous induction     Anesthetic plan, all risks, benefits, and alternatives have been provided, discussed and informed consent has been obtained with: patient.

## 2021-02-09 LAB
CYTO UR: NORMAL
LAB AP CASE REPORT: NORMAL
LAB AP SYNOPTIC CHECKLIST: NORMAL
PATH REPORT.FINAL DX SPEC: NORMAL
PATH REPORT.GROSS SPEC: NORMAL

## 2021-02-10 ENCOUNTER — APPOINTMENT (OUTPATIENT)
Dept: GENERAL RADIOLOGY | Facility: HOSPITAL | Age: 86
End: 2021-02-10

## 2021-02-10 ENCOUNTER — HOSPITAL ENCOUNTER (EMERGENCY)
Facility: HOSPITAL | Age: 86
Discharge: HOME | End: 2021-02-10
Attending: EMERGENCY MEDICINE | Admitting: EMERGENCY MEDICINE

## 2021-02-10 ENCOUNTER — TELEPHONE (OUTPATIENT)
Dept: PULMONOLOGY | Facility: CLINIC | Age: 86
End: 2021-02-10

## 2021-02-10 VITALS
DIASTOLIC BLOOD PRESSURE: 59 MMHG | WEIGHT: 205 LBS | TEMPERATURE: 97.8 F | HEART RATE: 84 BPM | OXYGEN SATURATION: 96 % | HEIGHT: 71 IN | RESPIRATION RATE: 18 BRPM | SYSTOLIC BLOOD PRESSURE: 126 MMHG | BODY MASS INDEX: 28.7 KG/M2

## 2021-02-10 DIAGNOSIS — R50.9 FEVER, UNSPECIFIED FEVER CAUSE: ICD-10-CM

## 2021-02-10 DIAGNOSIS — E86.0 DEHYDRATION: Primary | ICD-10-CM

## 2021-02-10 LAB
ALBUMIN SERPL-MCNC: 3.7 G/DL (ref 3.5–5.2)
ALBUMIN/GLOB SERPL: 1.3 G/DL
ALP SERPL-CCNC: 43 U/L (ref 39–117)
ALT SERPL W P-5'-P-CCNC: 12 U/L (ref 1–41)
ANION GAP SERPL CALCULATED.3IONS-SCNC: 8 MMOL/L (ref 5–15)
ANISOCYTOSIS BLD QL: ABNORMAL
AST SERPL-CCNC: 13 U/L (ref 1–40)
BACTERIA UR QL AUTO: ABNORMAL /HPF
BILIRUB SERPL-MCNC: 0.5 MG/DL (ref 0–1.2)
BILIRUB UR QL STRIP: NEGATIVE
BUN SERPL-MCNC: 25 MG/DL (ref 8–23)
BUN/CREAT SERPL: 19.1 (ref 7–25)
CALCIUM SPEC-SCNC: 9.3 MG/DL (ref 8.6–10.5)
CHLORIDE SERPL-SCNC: 96 MMOL/L (ref 98–107)
CLARITY UR: CLEAR
CLUMPED PLATELETS: PRESENT
CO2 SERPL-SCNC: 27 MMOL/L (ref 22–29)
COLOR UR: YELLOW
CREAT SERPL-MCNC: 1.31 MG/DL (ref 0.76–1.27)
D-LACTATE SERPL-SCNC: 1.1 MMOL/L (ref 0.5–2)
DEPRECATED RDW RBC AUTO: 41.9 FL (ref 37–54)
ERYTHROCYTE [DISTWIDTH] IN BLOOD BY AUTOMATED COUNT: 13.2 % (ref 12.3–15.4)
GFR SERPL CREATININE-BSD FRML MDRD: 52 ML/MIN/1.73
GLOBULIN UR ELPH-MCNC: 2.8 GM/DL
GLUCOSE SERPL-MCNC: 140 MG/DL (ref 65–99)
GLUCOSE UR STRIP-MCNC: NEGATIVE MG/DL
HCT VFR BLD AUTO: 30.5 % (ref 37.5–51)
HGB BLD-MCNC: 10.4 G/DL (ref 13–17.7)
HGB UR QL STRIP.AUTO: ABNORMAL
HYALINE CASTS UR QL AUTO: ABNORMAL /LPF
KETONES UR QL STRIP: NEGATIVE
LEUKOCYTE ESTERASE UR QL STRIP.AUTO: ABNORMAL
LYMPHOCYTES # BLD MANUAL: 0.14 10*3/MM3 (ref 0.7–3.1)
LYMPHOCYTES NFR BLD MANUAL: 1 % (ref 19.6–45.3)
LYMPHOCYTES NFR BLD MANUAL: 2 % (ref 5–12)
MCH RBC QN AUTO: 29.7 PG (ref 26.6–33)
MCHC RBC AUTO-ENTMCNC: 34.1 G/DL (ref 31.5–35.7)
MCV RBC AUTO: 87.1 FL (ref 79–97)
MONOCYTES # BLD AUTO: 0.29 10*3/MM3 (ref 0.1–0.9)
NEUTROPHILS # BLD AUTO: 13.73 10*3/MM3 (ref 1.7–7)
NEUTROPHILS NFR BLD MANUAL: 91 % (ref 42.7–76)
NEUTS BAND NFR BLD MANUAL: 5 % (ref 0–5)
NITRITE UR QL STRIP: NEGATIVE
NT-PROBNP SERPL-MCNC: 1674 PG/ML (ref 0–1800)
PH UR STRIP.AUTO: 6 [PH] (ref 5–8)
PLATELET # BLD AUTO: 152 10*3/MM3 (ref 140–450)
PMV BLD AUTO: 9.8 FL (ref 6–12)
POIKILOCYTOSIS BLD QL SMEAR: ABNORMAL
POTASSIUM SERPL-SCNC: 4 MMOL/L (ref 3.5–5.2)
PROCALCITONIN SERPL-MCNC: 1.27 NG/ML (ref 0–0.25)
PROT SERPL-MCNC: 6.5 G/DL (ref 6–8.5)
PROT UR QL STRIP: ABNORMAL
RBC # BLD AUTO: 3.5 10*6/MM3 (ref 4.14–5.8)
RBC # UR: ABNORMAL /HPF
REF LAB TEST METHOD: ABNORMAL
SARS-COV-2 RDRP RESP QL NAA+PROBE: NORMAL
SODIUM SERPL-SCNC: 131 MMOL/L (ref 136–145)
SP GR UR STRIP: 1.02 (ref 1–1.03)
SQUAMOUS #/AREA URNS HPF: ABNORMAL /HPF
UROBILINOGEN UR QL STRIP: ABNORMAL
VARIANT LYMPHS NFR BLD MANUAL: 1 % (ref 0–5)
WBC # BLD AUTO: 14.3 10*3/MM3 (ref 3.4–10.8)
WBC MORPH BLD: NORMAL
WBC UR QL AUTO: ABNORMAL /HPF

## 2021-02-10 PROCEDURE — 83880 ASSAY OF NATRIURETIC PEPTIDE: CPT | Performed by: EMERGENCY MEDICINE

## 2021-02-10 PROCEDURE — 80053 COMPREHEN METABOLIC PANEL: CPT | Performed by: EMERGENCY MEDICINE

## 2021-02-10 PROCEDURE — 87635 SARS-COV-2 COVID-19 AMP PRB: CPT | Performed by: EMERGENCY MEDICINE

## 2021-02-10 PROCEDURE — 94799 UNLISTED PULMONARY SVC/PX: CPT

## 2021-02-10 PROCEDURE — 99283 EMERGENCY DEPT VISIT LOW MDM: CPT

## 2021-02-10 PROCEDURE — 81001 URINALYSIS AUTO W/SCOPE: CPT | Performed by: EMERGENCY MEDICINE

## 2021-02-10 PROCEDURE — 96365 THER/PROPH/DIAG IV INF INIT: CPT

## 2021-02-10 PROCEDURE — 84145 PROCALCITONIN (PCT): CPT | Performed by: EMERGENCY MEDICINE

## 2021-02-10 PROCEDURE — 87086 URINE CULTURE/COLONY COUNT: CPT | Performed by: EMERGENCY MEDICINE

## 2021-02-10 PROCEDURE — 25010000002 CEFTRIAXONE PER 250 MG: Performed by: EMERGENCY MEDICINE

## 2021-02-10 PROCEDURE — 85025 COMPLETE CBC W/AUTO DIFF WBC: CPT | Performed by: EMERGENCY MEDICINE

## 2021-02-10 PROCEDURE — 85007 BL SMEAR W/DIFF WBC COUNT: CPT | Performed by: EMERGENCY MEDICINE

## 2021-02-10 PROCEDURE — 83605 ASSAY OF LACTIC ACID: CPT | Performed by: EMERGENCY MEDICINE

## 2021-02-10 PROCEDURE — 71045 X-RAY EXAM CHEST 1 VIEW: CPT

## 2021-02-10 PROCEDURE — 36415 COLL VENOUS BLD VENIPUNCTURE: CPT

## 2021-02-10 PROCEDURE — 87040 BLOOD CULTURE FOR BACTERIA: CPT | Performed by: EMERGENCY MEDICINE

## 2021-02-10 RX ADMIN — SODIUM CHLORIDE 500 ML: 9 INJECTION, SOLUTION INTRAVENOUS at 20:26

## 2021-02-10 RX ADMIN — CEFTRIAXONE SODIUM 2 G: 2 INJECTION, POWDER, FOR SOLUTION INTRAMUSCULAR; INTRAVENOUS at 21:50

## 2021-02-10 NOTE — TELEPHONE ENCOUNTER
Patient's daughter called to say she was concerned for the patient because his sats were staying between 88-92%.      Per Feliz FOX with patient having emphysema and being a C02 retainer that is okay for him. She doesn't want his sats over 94%.    Daughter notified and she voiced understanding.

## 2021-02-11 LAB — BACTERIA SPEC AEROBE CULT: NO GROWTH

## 2021-02-11 NOTE — ED PROVIDER NOTES
"Subjective   86 y/o male s/p cystoscopy transurethral resection of the bladder tumor on 2/8 by Dr. Cadena arrives for evaluation of low grade fever (100.5) and chills. He states he feels fine otherwise and denies any pain, nausea, vomiting or diarrhea. He does share he has a history of COPD on 4L and states at home his pulse ox was 88-92% (he denies any SOB) but tells me \"I don't get it, I changed the batteries, I moved it around, but here its been 97%.\" He denies any falls, trauma. He tells his lundberg is draining without issue and denies any pain to his abdomen or penis. He arrives in NAD.                   Review of Systems   All other systems reviewed and are negative.      Past Medical History:   Diagnosis Date   • A-fib (CMS/[facility])    • AAA (abdominal aortic aneurysm) without rupture (CMS/HCC)    • Arthritis    • BPH (benign prostatic hypertrophy)    • CAD (coronary artery disease)    • Cataract     bialteral   • CKD (chronic kidney disease)    • COPD (chronic obstructive pulmonary disease) (CMS/[facility])    • Diabetes mellitus (CMS/[facility])     Type 2   • DM (diabetes mellitus screen)    • GERD (gastroesophageal reflux disease)    • History of loop recorder     at current time   • Hx of colonic polyp    • Hypercholesteremia    • Hypertension    • Macular degeneration     treated with injections in right eye   • Myocardial infarction (CMS/HCC)    • Neuropathy    • Oxygen deficit     at 4liters   • Prostate cancer (CMS/HCC)    • Sleep apnea     cpap   • Stroke (CMS/[facility])     recovererd   • Varicose vein of leg     bialteral       Allergies   Allergen Reactions   • Symbicort [Budesonide-Formoterol Fumarate] Dizziness     Patient passed out and fell   • Prozac [Fluoxetine Hcl] Mental Status Change     Bad dreams.       Past Surgical History:   Procedure Laterality Date   • APPENDECTOMY     • CARDIAC CATHETERIZATION     • CARDIAC CATHETERIZATION Left 5/22/2019    Procedure: Cardiac Catheterization/Vascular Study Positive occult " blood in stools  ? BMS vs REGGIE Get cabg report  ;  Surgeon: Bradley Carver MD;  Location:  [facility] CATH INVASIVE LOCATION;  Service: Cardiology   • COLONOSCOPY N/A 6/15/2017    Diverticulosis repeat exam prn   • COLONOSCOPY W/ POLYPECTOMY  10/21/2013    2 Tubular adenomatous polyps, Diverticulosis repeat exam in 5 years   • CORONARY ARTERY BYPASS GRAFT  1980    X 3   • CYSTOSCOPY RETROGRADE PYELOGRAM Bilateral 2021    Procedure: CYSTOSCOPY RETROGRADE PYELOGRAM;  Surgeon: Danie Cadena MD;  Location:  [facility] OR;  Service: Urology;  Laterality: Bilateral;   • ENDOSCOPY N/A 6/15/2017    LA Grade A reflux esophagitis   • EYE SURGERY Bilateral    • HERNIA REPAIR     • TRANSURETHRAL RESECTION OF BLADDER TUMOR N/A 2021    Procedure: CYSTOSCOPY TRANSURETHRAL RESECTION OF BLADDER TUMOR WITH GEMCITABINE INSTILLATION;  Surgeon: Danie Cadena MD;  Location:  [facility] OR;  Service: Urology;  Laterality: N/A;       Family History   Problem Relation Age of Onset   • Heart disease Mother    • Stroke Mother    • Melanoma Mother    • Heart disease Father    • Diabetes Father    • Prostate cancer Father    • Colon cancer Neg Hx    • Colon polyps Neg Hx        Social History     Socioeconomic History   • Marital status:      Spouse name: Not on file   • Number of children: Not on file   • Years of education: Not on file   • Highest education level: Not on file   Social Needs   • Financial resource strain: Not on file   • Food insecurity     Worry: Never true     Inability: Never true   • Transportation needs     Medical: No     Non-medical: No   Tobacco Use   • Smoking status: Former Smoker     Packs/day: 1.00     Years: 26.00     Pack years: 26.00     Types: Cigarettes     Start date:      Quit date:      Years since quittin.1   • Smokeless tobacco: Never Used   Substance and Sexual Activity   • Alcohol use: No   • Drug use: No   • Sexual activity: Defer   Lifestyle   • Physical activity     Days per  week: 0 days     Minutes per session: 0 min   • Stress: To some extent   Relationships   • Social connections     Talks on phone: More than three times a week     Gets together: More than three times a week     Attends Shinto service: Not on file     Active member of club or organization: Not on file     Attends meetings of clubs or organizations: Not on file     Relationship status:            Objective   Physical Exam  Vitals signs and nursing note reviewed.   Constitutional:       Appearance: Normal appearance.   HENT:      Head: Normocephalic and atraumatic.      Right Ear: External ear normal.      Left Ear: External ear normal.      Nose: Nose normal.      Mouth/Throat:      Mouth: Mucous membranes are moist.      Pharynx: Oropharynx is clear.   Eyes:      Conjunctiva/sclera: Conjunctivae normal.      Pupils: Pupils are equal, round, and reactive to light.   Neck:      Musculoskeletal: Normal range of motion and neck supple.   Cardiovascular:      Rate and Rhythm: Normal rate and regular rhythm.      Pulses: Normal pulses.      Heart sounds: Normal heart sounds.   Pulmonary:      Effort: Pulmonary effort is normal. No respiratory distress.      Breath sounds: Normal breath sounds. No stridor. No wheezing or rhonchi.   Genitourinary:     Penis: Normal.    Musculoskeletal: Normal range of motion.         General: No swelling or tenderness.   Skin:     General: Skin is warm and dry.      Capillary Refill: Capillary refill takes less than 2 seconds.   Neurological:      General: No focal deficit present.      Mental Status: He is alert and oriented to person, place, and time.   Psychiatric:         Mood and Affect: Mood normal.         Behavior: Behavior normal.         Thought Content: Thought content normal.         Procedures           ED Course  ED Course as of Feb 10 2216   Wed Feb 10, 2021   2208 The patient has had no complaints since arrival. I do see the leukocytosis but he has no bacteria in  his urine. He is already on levaquiin. His lactic acid is normal. He has slight dehydration with slight elevation in his kidney function thus the 500 cc of saline. He is able to tolerate po here. I am going to walk him, provide IV abx and if he does well and feels well I think he can go home.     []   2211 Ambulatory pulse ox at 91% and patient without symptoms when walking. I do feel he is stable for dc. I will ask him to increase his fluid hydration and continue his levaquin at home.     []      ED Course User Index  [] Parrish Borrero MD            XR Chest 1 View   Final Result   1. No acute cardiopulmonary process.       This report was finalized on 02/10/2021 21:11 by Dr. Domingo Leslie MD.        Labs Reviewed   COMPREHENSIVE METABOLIC PANEL - Abnormal; Notable for the following components:       Result Value    Glucose 140 (*)     BUN 25 (*)     Creatinine 1.31 (*)     Sodium 131 (*)     Chloride 96 (*)     eGFR Non  Amer 52 (*)     All other components within normal limits    Narrative:     GFR Normal >60  Chronic Kidney Disease <60  Kidney Failure <15     URINALYSIS W/ CULTURE IF INDICATED - Abnormal; Notable for the following components:    Blood, UA Large (3+) (*)     Protein,  mg/dL (2+) (*)     Leuk Esterase, UA Moderate (2+) (*)     All other components within normal limits   PROCALCITONIN - Abnormal; Notable for the following components:    Procalcitonin 1.27 (*)     All other components within normal limits    Narrative:     As a Marker for Sepsis (Non-Neonates):   1. <0.5 ng/mL represents a low risk of severe sepsis and/or septic shock.  1. >2 ng/mL represents a high risk of severe sepsis and/or septic shock.    As a Marker for Lower Respiratory Tract Infections that require antibiotic therapy:  PCT on Admission     Antibiotic Therapy             6-12 Hrs later  > 0.5                Strongly Recommended            >0.25 - <0.5         Recommended  0.1 - 0.25            "Discouraged                   Remeasure/reassess PCT  <0.1                 Strongly Discouraged          Remeasure/reassess PCT      As 28 day mortality risk marker: \"Change in Procalcitonin Result\" (> 80 % or <=80 %) if Day 0 (or Day 1) and Day 4 values are available. Refer to http://www.Get.compct-calculator.com/   Change in PCT <=80 %   A decrease of PCT levels below or equal to 80 % defines a positive change in PCT test result representing a higher risk for 28-day all-cause mortality of patients diagnosed with severe sepsis or septic shock.  Change in PCT > 80 %   A decrease of PCT levels of more than 80 % defines a negative change in PCT result representing a lower risk for 28-day all-cause mortality of patients diagnosed with severe sepsis or septic shock.                Results may be falsely decreased if patient taking Biotin.    CBC WITH AUTO DIFFERENTIAL - Abnormal; Notable for the following components:    WBC 14.30 (*)     RBC 3.50 (*)     Hemoglobin 10.4 (*)     Hematocrit 30.5 (*)     All other components within normal limits   MANUAL DIFFERENTIAL - Abnormal; Notable for the following components:    Neutrophil % 91.0 (*)     Lymphocyte % 1.0 (*)     Monocyte % 2.0 (*)     Neutrophils Absolute 13.73 (*)     Lymphocytes Absolute 0.14 (*)     All other components within normal limits   URINALYSIS, MICROSCOPIC ONLY - Abnormal; Notable for the following components:    RBC, UA Too Numerous to Count (*)     WBC, UA 21-30 (*)     All other components within normal limits   COVID-19,ABBOTT IN-HOUSE,NASAL SWAB (NO TRANSPORT MEDIA) 2 HR TAT - Normal    Narrative:     Fact sheet for providers: https://www.fda.gov/media/410062/download     Fact sheet for patients: https://www.fda.gov/media/667032/download    Test performed by PCR.  If inconsistent with clinical signs and symptoms patient should be tested with different authorized molecular test.   LACTIC ACID, PLASMA - Normal   BNP (IN-HOUSE) - Normal    Narrative:  "    Among patients with dyspnea, NT-proBNP is highly sensitive for the detection of acute congestive heart failure. In addition NT-proBNP of <300 pg/ml effectively rules out acute congestive heart failure with 99% negative predictive value.    Results may be falsely decreased if patient taking Biotin.     BLOOD CULTURE   BLOOD CULTURE   URINE CULTURE   CBC AND DIFFERENTIAL    Narrative:     The following orders were created for panel order CBC & Differential.  Procedure                               Abnormality         Status                     ---------                               -----------         ------                     CBC Auto Differential[[id number]]        Abnormal            Final result                 Please view results for these tests on the individual orders.                                       MDM    Final diagnoses:   Dehydration   Fever, unspecified fever cause            Parrish Borrero MD  02/10/21 9770

## 2021-02-11 NOTE — DISCHARGE INSTRUCTIONS
Gonzalo,    Your labs did show that your white blood cell count was a little high (these are your fighter cells). This can sometimes point towards and infection but can also just be from your recent surgery. Your oxygen here never dropped below 91% when you were walking and stayed at 97% when you were resting (these are good numbers). I did provide you with some fluid as you did appear a little dehydrated and an IV antibiotic. I would like you to continue your levaquin and also to increase your fluid hydration at home. Please return to me for any worsening symptoms or any other issues.

## 2021-02-12 ENCOUNTER — PROCEDURE VISIT (OUTPATIENT)
Dept: UROLOGY | Facility: CLINIC | Age: 86
End: 2021-02-12

## 2021-02-12 DIAGNOSIS — C67.2 CANCER OF LATERAL WALL OF URINARY BLADDER (HCC): Primary | ICD-10-CM

## 2021-02-12 PROCEDURE — 99211 OFF/OP EST MAY X REQ PHY/QHP: CPT | Performed by: UROLOGY

## 2021-02-12 NOTE — PROGRESS NOTES
Patient of Dr. Cadena states he is here today to have his catheter removed. The patient denies any fever, chills or  N&V. Balloon was deflated with 10cc syringe x2. Catheter was removed successfully. Patient tolerated well with no complications. Patient advised to call the office with any questions or concerns. The patient verbalized understanding. Dr. Verdin was in the office at the time of procedure.       Patient was advised to drink clear fluids for the next couple hours and urinate. he was advised he may experience some blood in the urine and burning with urination for the next couple days. If the patient is unable to urinate or develops fever, chills, N&V or suprapubic pain he will call to return for an appt at clinic or seek medical treatment at [facility], PCP or Urgent Care after hours. Patient verbalized understanding and all questions were answered. ANANT Vega            Medical Assistant documentation is reviewed.  Agree with management as above.

## 2021-02-15 LAB
BACTERIA SPEC AEROBE CULT: NORMAL
BACTERIA SPEC AEROBE CULT: NORMAL

## 2021-02-17 DIAGNOSIS — G62.9 PERIPHERAL POLYNEUROPATHY: ICD-10-CM

## 2021-02-17 RX ORDER — GABAPENTIN 400 MG/1
400 CAPSULE ORAL 2 TIMES DAILY
Qty: 60 CAPSULE | Refills: 1 | Status: CANCELLED | OUTPATIENT
Start: 2021-02-17

## 2021-02-17 RX ORDER — GABAPENTIN 400 MG/1
400 CAPSULE ORAL 2 TIMES DAILY
Qty: 60 CAPSULE | Refills: 1 | Status: SHIPPED | OUTPATIENT
Start: 2021-02-17 | End: 2021-03-29 | Stop reason: SDUPTHER

## 2021-02-17 NOTE — TELEPHONE ENCOUNTER
Requested Prescriptions     Pending Prescriptions Disp Refills   • gabapentin (Neurontin) 400 MG capsule 60 capsule 1     Sig: Take 1 capsule by mouth 2 (two) times a day.

## 2021-02-17 NOTE — TELEPHONE ENCOUNTER
Gonzalo stopped by to report the 400mg gabapentin in the afternoon is doing well.  He wants to know if he can do the 400mg twice a day?

## 2021-03-15 PROCEDURE — 93298 REM INTERROG DEV EVAL SCRMS: CPT | Performed by: INTERNAL MEDICINE

## 2021-03-15 PROCEDURE — G2066 INTER DEVC REMOTE 30D: HCPCS | Performed by: INTERNAL MEDICINE

## 2021-03-24 ENCOUNTER — OFFICE VISIT (OUTPATIENT)
Dept: PULMONOLOGY | Facility: CLINIC | Age: 86
End: 2021-03-24

## 2021-03-24 VITALS
OXYGEN SATURATION: 98 % | HEART RATE: 68 BPM | HEIGHT: 71 IN | WEIGHT: 203 LBS | SYSTOLIC BLOOD PRESSURE: 122 MMHG | BODY MASS INDEX: 28.42 KG/M2 | DIASTOLIC BLOOD PRESSURE: 74 MMHG

## 2021-03-24 DIAGNOSIS — Z77.090 ASBESTOS EXPOSURE: ICD-10-CM

## 2021-03-24 DIAGNOSIS — J43.9 PULMONARY EMPHYSEMA, UNSPECIFIED EMPHYSEMA TYPE (HCC): Primary | Chronic | ICD-10-CM

## 2021-03-24 DIAGNOSIS — J98.4 SCARRING OF LUNG: ICD-10-CM

## 2021-03-24 DIAGNOSIS — Z87.891 FORMER SMOKER: ICD-10-CM

## 2021-03-24 DIAGNOSIS — J44.9 STAGE 2 MODERATE COPD BY GOLD CLASSIFICATION (HCC): Chronic | ICD-10-CM

## 2021-03-24 DIAGNOSIS — G47.33 OSA ON CPAP: ICD-10-CM

## 2021-03-24 DIAGNOSIS — Z99.89 OSA ON CPAP: ICD-10-CM

## 2021-03-24 PROCEDURE — 99214 OFFICE O/P EST MOD 30 MIN: CPT | Performed by: NURSE PRACTITIONER

## 2021-03-24 RX ORDER — TIOTROPIUM BROMIDE AND OLODATEROL 3.124; 2.736 UG/1; UG/1
2 SPRAY, METERED RESPIRATORY (INHALATION)
Qty: 3 EACH | Refills: 3 | Status: SHIPPED | OUTPATIENT
Start: 2021-03-24 | End: 2021-04-08 | Stop reason: SDUPTHER

## 2021-03-24 NOTE — PROGRESS NOTES
"Chief Complaint  COPD    Subjective    History of Present Illness      Gonzalo Durham presents to [facility] for:    Management of chronic respiratory failure with hypoxia and hypercapnia, COPD, emphysema, lung scarring, previous asbestos exposure and obstructive sleep apnea.  The patient reports excellent compliance wearing his CPAP nightly.  He is on oxygen all hours of the day.  He recently ran out of Spiriva but is due to [place] soon so he has been provided a prescription for a 90-day supply of Stiolto instead of Spiriva.  He takes Mucinex daily and uses his rescue inhaler on a daily basis.  He had a emergency room visit in February of this year for a urology problem for which a chest x-ray was done and that was reviewed showing stable findings.  He, his daughter and his wife are planning on going to [place] this spring after he gets his second Covid vaccine.       Objective   Vital Signs:   /74   Pulse 68   Ht 180.3 cm (71\")   Wt 92.1 kg (203 lb)   SpO2 98% Comment: 3L  BMI 28.31 kg/m²     Physical Exam  Vitals reviewed.   Constitutional:       General: He is not in acute distress.     Appearance: Normal appearance.      Interventions: Nasal cannula in place.   HENT:      Head: Normocephalic and atraumatic.      Comments: Wearing a mask  Cardiovascular:      Rate and Rhythm: Normal rate and regular rhythm.   Pulmonary:      Breath sounds: Decreased breath sounds (Throughout) present.   Musculoskeletal:      Cervical back: Normal range of motion.   Skin:     General: Skin is warm and dry.   Neurological:      Mental Status: He is alert and oriented to person, place, and time.   Psychiatric:         Mood and Affect: Mood normal.         Behavior: Behavior normal.        Result Review :   The following data was reviewed by: DOMINIQUE Yeung on 03/24/2021:  XR Chest 1 View (02/10/2021 20:24)      Results for orders placed in visit on " 02/25/19    Pulmonary Function Test             Assessment and Plan      Diagnoses and all orders for this visit:    1. Pulmonary emphysema, unspecified emphysema type (CMS/[facility]) (Primary)  Comments:  Continue oxygen.    2. Stage 2 moderate COPD by GOLD classification (CMS/[facility])  Comments:  Stable.  We will transition from Spiriva Respimat to Stiolto Respimat.  [facility] continue Mucinex, flutter and ProAir.  Orders:  -     tiotropium bromide-olodaterol (Stiolto Respimat) 2.5-2.5 MCG/ACT aerosol solution inhaler; Inhale 2 puffs Daily for 30 days.  Dispense: 3 each; Refill: 3    3. WENDY on CPAP  Comments:  Continue CPAP for all sleep.    4. Scarring of lung  Comments:  Stable.  Chest x-ray reviewed.    5. Asbestos exposure  Comments:  Chest scans as needed.    6. Former smoker  Comments:  Chest scans as needed.          Patient's Body mass index is 28.31 kg/m². BMI is above normal parameters. Recommendations include: referral to primary care.        Feliz Frye, DOMINIQUE  3/24/2021  16:56 CDT    Follow Up   Return in about 10 months (around 1/24/2022) for FVL.    Patient was given instructions and counseling regarding his condition or for health maintenance advice. Please see specific information pulled into the AVS if appropriate.

## 2021-03-29 DIAGNOSIS — G62.9 PERIPHERAL POLYNEUROPATHY: ICD-10-CM

## 2021-03-29 DIAGNOSIS — J34.89 SINUS DRAINAGE: ICD-10-CM

## 2021-03-29 DIAGNOSIS — C67.2 CANCER OF LATERAL WALL OF URINARY BLADDER (HCC): ICD-10-CM

## 2021-03-29 DIAGNOSIS — K21.9 GASTROESOPHAGEAL REFLUX DISEASE: ICD-10-CM

## 2021-03-29 DIAGNOSIS — G62.9 NEUROPATHY: ICD-10-CM

## 2021-03-29 DIAGNOSIS — I10 HYPERTENSION, UNSPECIFIED TYPE: Chronic | ICD-10-CM

## 2021-03-29 DIAGNOSIS — I25.10 CORONARY ARTERY DISEASE INVOLVING NATIVE CORONARY ARTERY WITHOUT ANGINA PECTORIS, UNSPECIFIED WHETHER NATIVE OR TRANSPLANTED HEART: Chronic | ICD-10-CM

## 2021-03-29 DIAGNOSIS — N40.0 PROSTATISM: Chronic | ICD-10-CM

## 2021-03-29 DIAGNOSIS — E11.9 CONTROLLED TYPE 2 DIABETES MELLITUS WITHOUT COMPLICATION, WITHOUT LONG-TERM CURRENT USE OF INSULIN (HCC): Chronic | ICD-10-CM

## 2021-03-29 DIAGNOSIS — E78.5 HYPERLIPIDEMIA, UNSPECIFIED HYPERLIPIDEMIA TYPE: Chronic | ICD-10-CM

## 2021-03-29 RX ORDER — GUAIFENESIN 600 MG/1
600 TABLET, EXTENDED RELEASE ORAL 2 TIMES DAILY
Qty: 180 TABLET | Refills: 2 | Status: SHIPPED | OUTPATIENT
Start: 2021-03-29 | End: 2021-04-08 | Stop reason: SDUPTHER

## 2021-03-29 RX ORDER — ISOSORBIDE MONONITRATE 30 MG/1
30 TABLET, EXTENDED RELEASE ORAL DAILY
Qty: 90 TABLET | Refills: 3 | Status: SHIPPED | OUTPATIENT
Start: 2021-03-29 | End: 2021-04-08 | Stop reason: SDUPTHER

## 2021-03-29 RX ORDER — TRAMADOL HYDROCHLORIDE 50 MG/1
50 TABLET ORAL EVERY 8 HOURS PRN
Qty: 6 TABLET | Refills: 0 | Status: SHIPPED | OUTPATIENT
Start: 2021-03-29 | End: 2021-07-08

## 2021-03-29 RX ORDER — GABAPENTIN 300 MG/1
300 CAPSULE ORAL TAKE AS DIRECTED
Qty: 180 CAPSULE | Refills: 3 | Status: SHIPPED | OUTPATIENT
Start: 2021-03-29 | End: 2021-04-08 | Stop reason: SDUPTHER

## 2021-03-29 RX ORDER — ASPIRIN 81 MG/1
81 TABLET ORAL DAILY
Qty: 90 TABLET | Refills: 3 | Status: SHIPPED | OUTPATIENT
Start: 2021-03-29 | End: 2021-04-08 | Stop reason: SDUPTHER

## 2021-03-29 RX ORDER — LORATADINE 10 MG/1
10 TABLET ORAL DAILY
Qty: 90 TABLET | Refills: 3 | Status: SHIPPED | OUTPATIENT
Start: 2021-03-29 | End: 2021-04-08 | Stop reason: SDUPTHER

## 2021-03-29 RX ORDER — NITROGLYCERIN 0.4 MG/1
0.4 TABLET SUBLINGUAL
Qty: 30 TABLET | Refills: 0 | Status: SHIPPED | OUTPATIENT
Start: 2021-03-29 | End: 2021-04-08 | Stop reason: SDUPTHER

## 2021-03-29 RX ORDER — OXYBUTYNIN CHLORIDE 5 MG/1
5 TABLET ORAL EVERY 8 HOURS PRN
Qty: 30 TABLET | Refills: 1 | Status: SHIPPED | OUTPATIENT
Start: 2021-03-29 | End: 2021-04-08 | Stop reason: SDUPTHER

## 2021-03-29 RX ORDER — ACETAMINOPHEN 325 MG/1
650 TABLET ORAL 2 TIMES DAILY
Qty: 360 TABLET | Refills: 2 | Status: SHIPPED | OUTPATIENT
Start: 2021-03-29 | End: 2021-04-08 | Stop reason: SDUPTHER

## 2021-03-29 RX ORDER — PRAVASTATIN SODIUM 80 MG/1
80 TABLET ORAL DAILY
Qty: 90 TABLET | Refills: 3 | Status: SHIPPED | OUTPATIENT
Start: 2021-03-29 | End: 2021-04-08 | Stop reason: SDUPTHER

## 2021-03-29 RX ORDER — GABAPENTIN 400 MG/1
400 CAPSULE ORAL 2 TIMES DAILY
Qty: 180 CAPSULE | Refills: 3 | Status: SHIPPED | OUTPATIENT
Start: 2021-03-29 | End: 2021-04-08 | Stop reason: SDUPTHER

## 2021-03-29 RX ORDER — GLYBURIDE 5 MG/1
TABLET ORAL
Qty: 135 TABLET | Refills: 3 | Status: SHIPPED | OUTPATIENT
Start: 2021-03-29 | End: 2021-04-08 | Stop reason: SDUPTHER

## 2021-03-29 RX ORDER — PANTOPRAZOLE SODIUM 40 MG/1
40 TABLET, DELAYED RELEASE ORAL DAILY
Qty: 90 TABLET | Refills: 3 | Status: SHIPPED | OUTPATIENT
Start: 2021-03-29 | End: 2021-04-08 | Stop reason: SDUPTHER

## 2021-03-29 RX ORDER — FINASTERIDE 5 MG/1
5 TABLET, FILM COATED ORAL DAILY
Qty: 90 TABLET | Refills: 3 | Status: SHIPPED | OUTPATIENT
Start: 2021-03-29 | End: 2021-04-08 | Stop reason: SDUPTHER

## 2021-03-29 RX ORDER — TERAZOSIN 10 MG/1
10 CAPSULE ORAL DAILY
Qty: 90 CAPSULE | Refills: 3 | Status: SHIPPED | OUTPATIENT
Start: 2021-03-29 | End: 2021-04-08 | Stop reason: SDUPTHER

## 2021-04-08 DIAGNOSIS — E11.9 CONTROLLED TYPE 2 DIABETES MELLITUS WITHOUT COMPLICATION, WITHOUT LONG-TERM CURRENT USE OF INSULIN (HCC): Chronic | ICD-10-CM

## 2021-04-08 DIAGNOSIS — N40.0 PROSTATISM: Chronic | ICD-10-CM

## 2021-04-08 DIAGNOSIS — E78.5 HYPERLIPIDEMIA, UNSPECIFIED HYPERLIPIDEMIA TYPE: Chronic | ICD-10-CM

## 2021-04-08 DIAGNOSIS — J98.4 CHRONIC LUNG DISEASE: ICD-10-CM

## 2021-04-08 DIAGNOSIS — Z79.01 ANTICOAGULATED: Primary | ICD-10-CM

## 2021-04-08 DIAGNOSIS — I25.10 CORONARY ARTERY DISEASE INVOLVING NATIVE CORONARY ARTERY WITHOUT ANGINA PECTORIS, UNSPECIFIED WHETHER NATIVE OR TRANSPLANTED HEART: Chronic | ICD-10-CM

## 2021-04-08 DIAGNOSIS — G62.9 PERIPHERAL POLYNEUROPATHY: ICD-10-CM

## 2021-04-08 DIAGNOSIS — K21.9 GASTROESOPHAGEAL REFLUX DISEASE: ICD-10-CM

## 2021-04-08 DIAGNOSIS — J34.89 SINUS DRAINAGE: ICD-10-CM

## 2021-04-08 DIAGNOSIS — I10 HYPERTENSION, UNSPECIFIED TYPE: Chronic | ICD-10-CM

## 2021-04-08 DIAGNOSIS — G62.9 NEUROPATHY: ICD-10-CM

## 2021-04-08 DIAGNOSIS — C67.2 CANCER OF LATERAL WALL OF URINARY BLADDER (HCC): ICD-10-CM

## 2021-04-08 DIAGNOSIS — J44.9 STAGE 2 MODERATE COPD BY GOLD CLASSIFICATION (HCC): Chronic | ICD-10-CM

## 2021-04-08 RX ORDER — GLYBURIDE 5 MG/1
TABLET ORAL
Qty: 135 TABLET | Refills: 3 | Status: SHIPPED | OUTPATIENT
Start: 2021-04-08 | End: 2021-08-03 | Stop reason: SDUPTHER

## 2021-04-08 RX ORDER — BLOOD-GLUCOSE METER
1 KIT MISCELLANEOUS DAILY
Qty: 100 EACH | Refills: 2 | Status: SHIPPED | OUTPATIENT
Start: 2021-04-08 | End: 2021-08-03 | Stop reason: SDUPTHER

## 2021-04-08 RX ORDER — NITROGLYCERIN 0.4 MG/1
0.4 TABLET SUBLINGUAL
Qty: 90 TABLET | Refills: 3 | Status: SHIPPED | OUTPATIENT
Start: 2021-04-08 | End: 2022-07-04 | Stop reason: SDUPTHER

## 2021-04-08 RX ORDER — LORATADINE 10 MG/1
10 TABLET ORAL DAILY
Qty: 90 TABLET | Refills: 3 | Status: SHIPPED | OUTPATIENT
Start: 2021-04-08 | End: 2021-06-01 | Stop reason: SDUPTHER

## 2021-04-08 RX ORDER — MULTIVIT WITH MINERALS/LUTEIN
1000 TABLET ORAL DAILY
Qty: 90 TABLET | Refills: 3 | Status: SHIPPED | OUTPATIENT
Start: 2021-04-08 | End: 2023-02-07

## 2021-04-08 RX ORDER — TIOTROPIUM BROMIDE INHALATION SPRAY 3.12 UG/1
2 SPRAY, METERED RESPIRATORY (INHALATION)
Qty: 16 G | Refills: 3 | Status: SHIPPED | OUTPATIENT
Start: 2021-04-08 | End: 2022-01-10

## 2021-04-08 RX ORDER — APIXABAN 5 MG/1
1 TABLET, FILM COATED ORAL 2 TIMES DAILY
COMMUNITY
Start: 2021-04-03 | End: 2021-04-08 | Stop reason: SDUPTHER

## 2021-04-08 RX ORDER — GUAIFENESIN 600 MG/1
600 TABLET, EXTENDED RELEASE ORAL 2 TIMES DAILY
Qty: 180 TABLET | Refills: 2 | Status: SHIPPED | OUTPATIENT
Start: 2021-04-08 | End: 2022-01-10

## 2021-04-08 RX ORDER — ALBUTEROL SULFATE 90 UG/1
2 AEROSOL, METERED RESPIRATORY (INHALATION)
Qty: 54 G | Refills: 3 | Status: SHIPPED | OUTPATIENT
Start: 2021-04-08 | End: 2021-06-01 | Stop reason: SDUPTHER

## 2021-04-08 RX ORDER — PANTOPRAZOLE SODIUM 40 MG/1
40 TABLET, DELAYED RELEASE ORAL DAILY
Qty: 90 TABLET | Refills: 3 | Status: SHIPPED | OUTPATIENT
Start: 2021-04-08 | End: 2021-08-03 | Stop reason: SDUPTHER

## 2021-04-08 RX ORDER — OXYBUTYNIN CHLORIDE 5 MG/1
5 TABLET ORAL EVERY 8 HOURS PRN
Qty: 30 TABLET | Refills: 1 | Status: SHIPPED | OUTPATIENT
Start: 2021-04-08 | End: 2021-07-08

## 2021-04-08 RX ORDER — DEXTRAN 70/HYPROMELLOSE
1-2 DROPS OPHTHALMIC (EYE) DAILY PRN
Qty: 45 ML | Refills: 2 | Status: SHIPPED | OUTPATIENT
Start: 2021-04-08 | End: 2023-02-17 | Stop reason: HOSPADM

## 2021-04-08 RX ORDER — MULTIPLE VITAMINS W/ MINERALS TAB 9MG-400MCG
1 TAB ORAL DAILY
Qty: 90 TABLET | Refills: 3 | Status: SHIPPED | OUTPATIENT
Start: 2021-04-08 | End: 2023-02-17 | Stop reason: HOSPADM

## 2021-04-08 RX ORDER — TIOTROPIUM BROMIDE AND OLODATEROL 3.124; 2.736 UG/1; UG/1
2 SPRAY, METERED RESPIRATORY (INHALATION)
Qty: 12 G | Refills: 3 | Status: SHIPPED | OUTPATIENT
Start: 2021-04-08 | End: 2021-05-08

## 2021-04-08 RX ORDER — ASPIRIN 81 MG/1
81 TABLET ORAL DAILY
Qty: 90 TABLET | Refills: 3 | Status: SHIPPED | OUTPATIENT
Start: 2021-04-08 | End: 2022-01-10

## 2021-04-08 RX ORDER — LANCETS 28 GAUGE
EACH MISCELLANEOUS
Qty: 100 EACH | Refills: 2 | Status: SHIPPED | OUTPATIENT
Start: 2021-04-08 | End: 2023-02-07

## 2021-04-08 RX ORDER — TERAZOSIN 10 MG/1
10 CAPSULE ORAL DAILY
Qty: 90 CAPSULE | Refills: 3 | Status: SHIPPED | OUTPATIENT
Start: 2021-04-08 | End: 2021-08-03 | Stop reason: SDUPTHER

## 2021-04-08 RX ORDER — ISOSORBIDE MONONITRATE 30 MG/1
30 TABLET, EXTENDED RELEASE ORAL DAILY
Qty: 90 TABLET | Refills: 3 | Status: SHIPPED | OUTPATIENT
Start: 2021-04-08 | End: 2021-08-02 | Stop reason: SDUPTHER

## 2021-04-08 RX ORDER — ACETAMINOPHEN 325 MG/1
650 TABLET ORAL 2 TIMES DAILY
Qty: 360 TABLET | Refills: 2 | Status: SHIPPED | OUTPATIENT
Start: 2021-04-08

## 2021-04-08 RX ORDER — MV-MIN/FA/VIT K/LUTEIN/ZEAXANT 200MCG-5MG
1 CAPSULE ORAL DAILY
Qty: 90 CAPSULE | Refills: 1 | Status: SHIPPED | OUTPATIENT
Start: 2021-04-08 | End: 2022-01-10

## 2021-04-08 RX ORDER — GABAPENTIN 400 MG/1
400 CAPSULE ORAL 2 TIMES DAILY
Qty: 180 CAPSULE | Refills: 3 | Status: SHIPPED | OUTPATIENT
Start: 2021-04-08 | End: 2021-08-03 | Stop reason: SDUPTHER

## 2021-04-08 RX ORDER — GABAPENTIN 400 MG/1
400 CAPSULE ORAL 2 TIMES DAILY
Qty: 180 CAPSULE | Refills: 3 | Status: SHIPPED | OUTPATIENT
Start: 2021-04-08 | End: 2021-04-08

## 2021-04-08 RX ORDER — GABAPENTIN 300 MG/1
300 CAPSULE ORAL TAKE AS DIRECTED
Qty: 180 CAPSULE | Refills: 3 | Status: SHIPPED | OUTPATIENT
Start: 2021-04-08 | End: 2021-07-08

## 2021-04-08 RX ORDER — GLYBURIDE 5 MG/1
TABLET ORAL
Qty: 135 TABLET | Refills: 3 | Status: SHIPPED | OUTPATIENT
Start: 2021-04-08 | End: 2021-04-08

## 2021-04-08 RX ORDER — PRAVASTATIN SODIUM 80 MG/1
80 TABLET ORAL DAILY
Qty: 90 TABLET | Refills: 3 | Status: SHIPPED | OUTPATIENT
Start: 2021-04-08 | End: 2021-08-03 | Stop reason: SDUPTHER

## 2021-04-08 RX ORDER — FINASTERIDE 5 MG/1
5 TABLET, FILM COATED ORAL DAILY
Qty: 90 TABLET | Refills: 3 | Status: SHIPPED | OUTPATIENT
Start: 2021-04-08 | End: 2021-08-03 | Stop reason: SDUPTHER

## 2021-04-08 NOTE — TELEPHONE ENCOUNTER
araceli Hernandez requested all meds to be e prescribed to [facility], did not get paper rx for eliquis or proventil inhaler

## 2021-04-14 ENCOUNTER — TELEPHONE (OUTPATIENT)
Dept: FAMILY MEDICINE CLINIC | Facility: CLINIC | Age: 86
End: 2021-04-14

## 2021-04-14 NOTE — TELEPHONE ENCOUNTER
Attempted to call dtr and vm left TRC, unsure what she needs for parents oxygen needs to travel to [place]? TRC

## 2021-04-14 NOTE — TELEPHONE ENCOUNTER
"Spoke to dtr and advised \"mark goins told Dad that he needed oxygen to go to [place] and got him all worked up. I dont think we will have any problems, we have the inogen, 6 pack small portable tanks, and the home concentrator\" dtr will call if needs anything else, leaving on Monday  "

## 2021-04-14 NOTE — TELEPHONE ENCOUNTER
NEEDING A WRITTEN SCRIPT FOR OXYGEN TANK TO TAKE WITH THEM TO TRAVEL TO [place] WILL  PLEASE ADVISE [phone]

## 2021-04-15 PROCEDURE — G2066 INTER DEVC REMOTE 30D: HCPCS | Performed by: INTERNAL MEDICINE

## 2021-04-15 PROCEDURE — 93298 REM INTERROG DEV EVAL SCRMS: CPT | Performed by: INTERNAL MEDICINE

## 2021-06-01 DIAGNOSIS — J44.9 STAGE 2 MODERATE COPD BY GOLD CLASSIFICATION (HCC): Chronic | ICD-10-CM

## 2021-06-01 DIAGNOSIS — J98.4 CHRONIC LUNG DISEASE: ICD-10-CM

## 2021-06-01 RX ORDER — ALBUTEROL SULFATE 90 UG/1
2 AEROSOL, METERED RESPIRATORY (INHALATION)
Qty: 54 G | Refills: 3 | Status: SHIPPED | OUTPATIENT
Start: 2021-06-01 | End: 2021-08-03 | Stop reason: SDUPTHER

## 2021-06-01 RX ORDER — LORATADINE 10 MG/1
10 TABLET ORAL DAILY
Qty: 90 TABLET | Refills: 3 | Status: SHIPPED | OUTPATIENT
Start: 2021-06-01 | End: 2021-08-03 | Stop reason: SDUPTHER

## 2021-06-02 ENCOUNTER — PROCEDURE VISIT (OUTPATIENT)
Dept: UROLOGY | Facility: CLINIC | Age: 86
End: 2021-06-02

## 2021-06-02 DIAGNOSIS — C67.2 CANCER OF LATERAL WALL OF URINARY BLADDER (HCC): Primary | ICD-10-CM

## 2021-06-02 PROCEDURE — 52000 CYSTOURETHROSCOPY: CPT | Performed by: UROLOGY

## 2021-06-02 PROCEDURE — 81003 URINALYSIS AUTO W/O SCOPE: CPT | Performed by: UROLOGY

## 2021-06-03 NOTE — PROGRESS NOTES
Pre- operative diagnosis:  Bladder cancer surveillance.  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  He remains on finasteride and Hytrin.    Post operative diagnosis:  No recurrent bladder tumor    Procedure:  The patient was prepped and draped in a normal sterile fashion.  The urethra was anesthetized with 2% lidocaine jelly.  A flexible cystoscope was introduced per urethra.      Urethra:  Normal    Bladder:  Normal mucosa, No bladder tumor, mild trabeculation and No bladder neck contracture    Ureteral orifices:  Normal position bilaterally and Clear efflux bilaterally    Prostate:  lateral lobe hypertrophy and Large intravesical lobe     Patient tolerated the procedure well    Complications: none    Blood loss: minimal    Follow up:    Routine follow up-surveillance cystoscopy in office in 6 months.

## 2021-07-06 ENCOUNTER — LAB (OUTPATIENT)
Dept: FAMILY MEDICINE CLINIC | Facility: CLINIC | Age: 86
End: 2021-07-06

## 2021-07-06 DIAGNOSIS — I10 ESSENTIAL HYPERTENSION: Primary | ICD-10-CM

## 2021-07-06 DIAGNOSIS — E53.8 B12 DEFICIENCY: ICD-10-CM

## 2021-07-06 DIAGNOSIS — E78.2 MIXED HYPERLIPIDEMIA: ICD-10-CM

## 2021-07-06 DIAGNOSIS — E11.21 CONTROLLED TYPE 2 DIABETES MELLITUS WITH DIABETIC NEPHROPATHY, WITHOUT LONG-TERM CURRENT USE OF INSULIN (HCC): ICD-10-CM

## 2021-07-06 DIAGNOSIS — E66.9 CLASS 1 OBESITY IN ADULT, UNSPECIFIED BMI, UNSPECIFIED OBESITY TYPE, UNSPECIFIED WHETHER SERIOUS COMORBIDITY PRESENT: ICD-10-CM

## 2021-07-07 ENCOUNTER — OFFICE VISIT (OUTPATIENT)
Dept: CARDIOLOGY | Facility: CLINIC | Age: 86
End: 2021-07-07

## 2021-07-07 VITALS
DIASTOLIC BLOOD PRESSURE: 60 MMHG | BODY MASS INDEX: 28 KG/M2 | OXYGEN SATURATION: 93 % | HEIGHT: 71 IN | HEART RATE: 58 BPM | SYSTOLIC BLOOD PRESSURE: 120 MMHG | WEIGHT: 200 LBS

## 2021-07-07 DIAGNOSIS — R06.02 SHORTNESS OF BREATH: ICD-10-CM

## 2021-07-07 DIAGNOSIS — Z99.81 OXYGEN DEPENDENT: ICD-10-CM

## 2021-07-07 DIAGNOSIS — J44.9 STAGE 2 MODERATE COPD BY GOLD CLASSIFICATION (HCC): Primary | ICD-10-CM

## 2021-07-07 DIAGNOSIS — R94.39 ABNORMAL STRESS TEST: ICD-10-CM

## 2021-07-07 DIAGNOSIS — I63.9 CRYPTOGENIC STROKE (HCC): ICD-10-CM

## 2021-07-07 DIAGNOSIS — R93.89 ABNORMAL CT OF THE CHEST: ICD-10-CM

## 2021-07-07 DIAGNOSIS — Z79.01 ANTICOAGULATED: ICD-10-CM

## 2021-07-07 DIAGNOSIS — I50.32 CHRONIC DIASTOLIC CONGESTIVE HEART FAILURE (HCC): ICD-10-CM

## 2021-07-07 PROCEDURE — 99214 OFFICE O/P EST MOD 30 MIN: CPT | Performed by: INTERNAL MEDICINE

## 2021-07-07 PROCEDURE — 93000 ELECTROCARDIOGRAM COMPLETE: CPT | Performed by: INTERNAL MEDICINE

## 2021-07-07 NOTE — PROGRESS NOTES
Gonzalo Durham  [id number]  1934  87 y.o.  male    Referring Provider: Abel Romero MD    Reason for Follow-up Visit:        Here for routine follow up     Prior to that visit for evaluation for  Shortness of breath   coronary artery disease Coronary artery bypass grafting 1980 x 3 [facility]  S/P cardiac cath    Prior cerebrovascular accident with left arm weakness now resolved  Lasted for for over 1 week 7/27/19  CT head showed anterior small infarction  7/27/19  sinus rhythm in [place]  CT chest 01/2021 pulmonary nodule   Seeing Dr Turk        Subjective      Overall feels the same   No new events or complaints since last visit   Overall the patient feels no major change from baseline symptoms   Similar symptoms as during last visit     Tolerating current medications well with no untoward side effects   Compliant with prescribed medication regimen. Tries to adhere to cardiac diet.      Dependent oxygen  No further transient ischemic attacks or cerebrovascular accident  on anticoagulation  No bleeding or bruising issues     Moderate chronic exertional shortness of breath on exertion relieved with rest  No significant cough or wheezing  Intermittent dizzy spells, no presyncope or syncope   BP at times runs low with systolics in the 90 mm  Range     No palpitations  No associated chest pain  No significant pedal edema    No fever or chills  No significant expectoration    No hemoptysis  No presyncope or syncope     No symptoms reminiscent of prior angina.    Using oxygen at home both during daytime and at night continously     No bleeding, excessive bruising, gait instability or fall risks        History of present illness:  Gonzalo Durham is a 87 y.o. yo male with history of coronary artery disease Coronary artery bypass grafting who presents today for   Chief Complaint   Patient presents with   • Coronary Artery Disease     6 month follow up    .    History  Past Medical History:   Diagnosis Date   • A-fib  (CMS/HCC)    • AAA (abdominal aortic aneurysm) without rupture (CMS/HCC)    • Arthritis    • BPH (benign prostatic hypertrophy)    • CAD (coronary artery disease)    • Cataract     bialteral   • CKD (chronic kidney disease)    • COPD (chronic obstructive pulmonary disease) (CMS/HCC)    • Diabetes mellitus (CMS/HCC)     Type 2   • DM (diabetes mellitus screen)    • GERD (gastroesophageal reflux disease)    • History of loop recorder     at current time   • Hx of colonic polyp    • Hypercholesteremia    • Hypertension    • Macular degeneration     treated with injections in right eye   • Myocardial infarction (CMS/HCC)    • Neuropathy    • Oxygen deficit     at 4liters   • Prostate cancer (CMS/HCC)    • Sleep apnea     cpap   • Stroke (CMS/HCC)     recovererd   • Varicose vein of leg     bialteral   ,   Past Surgical History:   Procedure Laterality Date   • APPENDECTOMY     • CARDIAC CATHETERIZATION     • CARDIAC CATHETERIZATION Left 5/22/2019    Procedure: Cardiac Catheterization/Vascular Study Positive occult blood in stools  ? BMS vs REGGIE Get cabg report  ;  Surgeon: Bradley Carver MD;  Location:  [facility];  Service: Cardiology   • COLONOSCOPY N/A 6/15/2017    Diverticulosis repeat exam prn   • COLONOSCOPY W/ POLYPECTOMY  10/21/2013    2 Tubular adenomatous polyps, Diverticulosis repeat exam in 5 years   • CORONARY ARTERY BYPASS GRAFT  1980    X 3   • CYSTOSCOPY RETROGRADE PYELOGRAM Bilateral 2/8/2021    Procedure: CYSTOSCOPY RETROGRADE PYELOGRAM;  Surgeon: Danie Cadena MD;  Location:  [facility] OR;  Service: Urology;  Laterality: Bilateral;   • ENDOSCOPY N/A 6/15/2017    LA Grade A reflux esophagitis   • EYE SURGERY Bilateral    • HERNIA REPAIR     • TRANSURETHRAL RESECTION OF BLADDER TUMOR N/A 2/8/2021    Procedure: CYSTOSCOPY TRANSURETHRAL RESECTION OF BLADDER TUMOR WITH GEMCITABINE INSTILLATION;  Surgeon: Danie Cadena MD;  Location:  [facility] OR;  Service: Urology;  Laterality: N/A;   ,    Family History   Problem Relation Age of Onset   • Heart disease Mother    • Stroke Mother    • Melanoma Mother    • Heart disease Father    • Diabetes Father    • Prostate cancer Father    • Colon cancer Neg Hx    • Colon polyps Neg Hx    ,   Social History     Tobacco Use   • Smoking status: Former Smoker     Packs/day: 1.00     Years: 26.00     Pack years: 26.00     Types: Cigarettes     Start date:      Quit date:      Years since quittin.5   • Smokeless tobacco: Never Used   Vaping Use   • Vaping Use: Never used   Substance Use Topics   • Alcohol use: No   • Drug use: No   ,     Medications  Current Outpatient Medications   Medication Sig Dispense Refill   • acetaminophen (TYLENOL) 325 MG tablet Take 2 tablets by mouth 2 (Two) Times a Day. 360 tablet 2   • albuterol sulfate HFA (ProAir HFA) 108 (90 Base) MCG/ACT inhaler Inhale 2 puffs 4 (Four) Times a Day. 54 g 3   • apixaban (Eliquis) 5 MG tablet tablet Take 1 tablet by mouth 2 (two) times a day. 180 tablet 3   • Artificial Tear Solution (Just Tears Eye Drops) solution Apply 1-2 drops to eye(s) as directed by provider Daily As Needed (dry eyes). 45 mL 2   • ascorbic acid (VITAMIN C) 1000 MG tablet Take 1 tablet by mouth Daily. 90 tablet 3   • aspirin (aspirin) 81 MG EC tablet Take 1 tablet by mouth Daily. 90 tablet 3   • calcium carbonate-vitamin d 600-400 MG-UNIT per tablet Take 1 tablet by mouth 2 (Two) Times a Day. 180 tablet 3   • finasteride (PROSCAR) 5 MG tablet Take 1 tablet by mouth Daily. 90 tablet 3   • FREESTYLE LITE test strip 1 each by Other route Daily. 100 each 2   • gabapentin (NEURONTIN) 300 MG capsule Take 1 capsule by mouth Take As Directed. Takes 300mg in am and 400mg at pm 180 capsule 3   • gabapentin (Neurontin) 400 MG capsule Take 1 capsule by mouth 2 (two) times a day. 180 capsule 3   • glucose blood test strip Use as instructed 200 each 12   • glyburide (DIAbeta) 5 MG tablet Take 1 tablet by mouth Every Morning AND 0.5  tablets Daily Before Supper. 135 tablet 3   • guaiFENesin (MUCINEX) 600 MG 12 hr tablet Take 1 tablet by mouth 2 (Two) Times a Day. 180 tablet 2   • isosorbide mononitrate (IMDUR) 30 MG 24 hr tablet Take 1 tablet by mouth Daily. 90 tablet 3   • Lancets (freestyle) lancets Use once daily 100 each 2   • loratadine (CLARITIN) 10 MG tablet Take 1 tablet by mouth Daily. 90 tablet 3   • metFORMIN (Glucophage) 500 MG tablet Take 1 tablet by mouth Every Night. 90 tablet 3   • metoprolol tartrate (LOPRESSOR) 25 MG tablet Take 1 tablet by mouth 2 (Two) Times a Day. (Patient taking differently: Take 12.5 mg by mouth 2 (Two) Times a Day.) 180 tablet 3   • Multiple Vitamins-Minerals (PreserVision AREDS 2+Multi Vit) capsule Take 1 capsule by mouth Daily. 90 capsule 1   • multivitamin with minerals (Centrum Silver Adult 50+) tablet tablet Take 1 tablet by mouth Daily. 90 tablet 3   • nitroglycerin (Nitrostat) 0.4 MG SL tablet Place 1 tablet under the tongue Every 5 (Five) Minutes As Needed for Chest Pain. 90 tablet 3   • NON FORMULARY CPAP per mask with 4L oxygen nightly     • O2 (OXYGEN) Inhale 4 L/min Continuous. Per nasal cannula     • ondansetron ODT (Zofran ODT) 4 MG disintegrating tablet Place 1 tablet on the tongue Every 6 (Six) Hours As Needed for Nausea. 6 tablet 1   • oxybutynin (DITROPAN) 5 MG tablet Take 1 tablet by mouth Every 8 (Eight) Hours As Needed (bladder spasms). 30 tablet 1   • pantoprazole (PROTONIX) 40 MG EC tablet Take 1 tablet by mouth Daily. 90 tablet 3   • phenazopyridine (PYRIDIUM) 100 MG tablet Take 1 tablet by mouth 3 (Three) Times a Day As Needed (urinary burning). 20 tablet 0   • pravastatin (Pravachol) 80 MG tablet Take 1 tablet by mouth Daily. 90 tablet 3   • terazosin (HYTRIN) 10 MG capsule Take 1 capsule by mouth Daily. 90 capsule 3   • tiotropium bromide monohydrate (Spiriva Respimat) 2.5 MCG/ACT aerosol solution inhaler Inhale 2 puffs Daily. 16 g 3   • traMADol (ULTRAM) 50 MG tablet Take 1  "tablet by mouth Every 8 (Eight) Hours As Needed for Moderate Pain  (postop pain). 6 tablet 0     No current facility-administered medications for this visit.       Allergies:  Symbicort [budesonide-formoterol fumarate] and Prozac [fluoxetine hcl]    Review of Systems  Review of Systems   Constitutional: Positive for malaise/fatigue.   HENT: Negative.    Eyes: Negative.    Cardiovascular: Positive for dyspnea on exertion. Negative for chest pain, claudication, cyanosis, irregular heartbeat, leg swelling, near-syncope, orthopnea, palpitations, paroxysmal nocturnal dyspnea and syncope.   Respiratory: Negative.    Endocrine: Negative.    Hematologic/Lymphatic: Negative.    Skin: Negative.    Musculoskeletal: Positive for arthritis and joint pain.   Gastrointestinal: Negative for anorexia.   Genitourinary: Negative.    Psychiatric/Behavioral: Negative.        Objective     Physical Exam:  /60   Pulse 58   Ht 180.3 cm (71\")   Wt 90.7 kg (200 lb)   SpO2 93%   BMI 27.89 kg/m²     Physical Exam   Constitutional: He appears well-developed.   HENT:   Head: Normocephalic.   Neck: Normal carotid pulses and no JVD present. No tracheal tenderness present. Carotid bruit is not present. No tracheal deviation present.   Cardiovascular: Regular rhythm and normal pulses.   Murmur heard.   Systolic murmur is present with a grade of 2/6.  Pulmonary/Chest: Effort normal. No stridor.   Abdominal: Soft. He exhibits no distension. There is no abdominal tenderness.   Neurological: He is alert. No cranial nerve deficit or sensory deficit.   Skin: Skin is warm.   Psychiatric: His speech is normal and behavior is normal.       Results Review:    Gonzalo Durham   Regadenoson Stress Test With Myocardial Perfusion SPECT (Multi Study)   Order# [id number]   Reading physician: Bradley Carver MD Ordering physician: Bradley Carver MD Study date: 19   Patient Information     Patient Name  Gonzalo Durham MRN  [id number] Sex  Male  " (Age)  1934 (86 y.o.)   Interpretation Summary        · Left ventricular ejection fraction is normal (Calculated EF = 57%).  · Diaphragmatic attenuation artifact is present.  · Raw images reviewed with the following abnormalities noted: vertical motion.  · Myocardial perfusion imaging indicates a normal myocardial perfusion study with no evidence of ischemia.  · Impressions are consistent with a low risk study.            Results for orders placed during the hospital encounter of 12/24/19    Adult Transthoracic Echo Limited W/ Cont if Necessary Per Protocol    Interpretation Summary  · Left ventricular wall thickness is consistent with mild concentric hypertrophy.  · Estimated EF = 55%.  · Left ventricular diastolic dysfunction.  · Mild dilation of the aortic root is present.  · No evidence of pulmonary hypertension is present.         5/19 Cardiac Cath        Conclusion of cardiac catheterization           Left main coronary artery has moderate atherosclerotic plaque without any high-grade lesions  Left anterior descending coronary artery is occluded after origin of a diagonal branch  Saphenous vein graft to the left anterior descending coronary artery has moderate atherosclerotic plaque without any high-grade lesions and patent  The first diagonal branch has a 70% tubular stenosis that extends from the left anterior descending coronary artery to the proximal aspect  Right coronary artery is occluded in the mid segment  Widely patent saphenous venous graft to the right coronary artery without any significant stenosis  Bilateral internal mammary arteries have not been used as a conduit  No other grafts identified     Left ventricular end-diastolic pressure moderately elevated to 90 mmHg with no gradient across aortic valve on pullback     Left ventricular ejection fraction approximately 45% with basal inferior hypokinesis no significant mitral regurgitation  Aortogram performed to look for vein grafts with no  dissection or significant dilatation  Atherosclerotic plaque buildup noted in the aortic root will admit patient has significant use of contrast and given his age high risk of contrast-induced nephropathy           ____________________________________________________________________________________________________________________________________________     Plan after cardiac catheterization        70% stenosis of diagonal branch without any grafting of this vessel  Patent saphenous venous graft to the left anterior descending coronary artery  Patent saphenous venous graft to the right coronary artery  Occluded mid left anterior stent coronary artery  Occluded proximal right coronary artery           Will hydrate patient  Monitor for any signs of bleeding around the right femoral arteriotomy site where he had oozing from around the 7 Thai sheath  Intensive risk factor modifications for both primary and secondary prevention if applicable  Hydration   Observation     If patient continues to be symptomatic consider coronary intervention with drug-eluting stent placement into the ostial and proximal portion of the diagonal branch.    Echo     · Right ventricular cavity is mild-to-moderately dilated.  · Left ventricular diastolic dysfunction (grade I) consistent with impaired relaxation.  · Left ventricular systolic function is normal.  · Left atrial cavity size is borderline dilated.     RIGHT HEART ENLARGEMENT - RVSP NOT ASSESSABLE  NORMAL LV AND RV SYSTOLIC FUNCTION  NO SIGNIFICANT VALVULAR DYSFUNCTION    Holter    · Average HR: 71. Min HR: 37. Max HR: 173.     · Monitored for approximately 1 day   · The predominant rhythm noted during the testing period was sinus rhythm.  · Bradycardia in usual sleeping hours.   · 1 premature ventricular contractions: PVCs:  <0.1%   · 45   atrial premature contractions:   APCs: <0.1%             · No significant supraventricular  tachy or justina arrhythmia.  · No significant   ventricular tachy or justina arrhythmia.  · No correlated arrhythmia  · No significant pauses above 3 seconds        Results for orders placed during the hospital encounter of 12/24/19    Adult Transthoracic Echo Limited W/ Cont if Necessary Per Protocol    Interpretation Summary  · Left ventricular wall thickness is consistent with mild concentric hypertrophy.  · Estimated EF = 55%.  · Left ventricular diastolic dysfunction.  · Mild dilation of the aortic root is present.  · No evidence of pulmonary hypertension is present.    ·  Right ventricular cavity is mild-to-moderately dilated.  · Left ventricular diastolic dysfunction (grade I) consistent with impaired relaxation.  · Left ventricular systolic function is normal.  Left atrial cavity size is borderline dilated.    ECG 12 Lead    Date/Time: 7/7/2021 9:56 AM  Performed by: Bradley Carver MD  Authorized by: Bradley Carver MD   Comparison: compared with previous ECG from 2/5/2021  Comparison to previous ECG: Ventricular rate changed from  66   to 58  beats per minute    Rhythm: sinus bradycardia  Rate: bradycardic  QRS axis: normal    Clinical impression: abnormal EKG            Assessment/Plan   Diagnoses and all orders for this visit:    1. Stage 2 moderate COPD by GOLD classification (CMS/HCC) (Primary)    2. SOB: copd,CAD,#, hypoxemia  -     Adult Transthoracic Echo Complete w/ Color, Spectral and Contrast if necessary per protocol; Future    3. Anticoagulated-CAD, DM2, CVA/ASA 81+()+plavix+eliquist    4. Abnormal stress test    5. Abnormal CT of the chest 1.2021/12m    6. Oxygen dependent    7. Chronic diastolic congestive heart failure (CMS/HCC)    8. Cryptogenic stroke (CMS/HCC)    Other orders  -     ECG 12 Lead          Plan      Orders Placed This Encounter   Procedures   • ECG 12 Lead     This order was created via procedure documentation     Order Specific Question:   Release to patient     Answer:   Immediate   • Adult Transthoracic Echo Complete  w/ Color, Spectral and Contrast if necessary per protocol     Myocardial strain to be performed during echocardiogram as long as technically feasible     Standing Status:   Future     Standing Expiration Date:   7/7/2022     Order Specific Question:   Reason for exam?     Answer:   Dyspnea     Order Specific Question:   Release to patient     Answer:   Immediate      The current medical regimen is effective;  continue present plan and medications.      Monitor for any signs of bleeding including red or dark stools as well as easy bruisabilty. Fall precautions.   Overall doing well no new cardiovascular symptoms and therefore no additional cardiac testing is required   If any interim issues arise will call me for further evaluation.     Monitor cardiac rhythm device function regularly per established schedule or PRN    Given prior CVA and high [id number]  Would recommend on anticoagulation     Continue  Eliquis  Monitor for any signs of bleeding including red or dark stools as well as easy bruisabilty. Fall precautions.         Check BP and heart rates twice daily at least 3x / week, week a month  at home and bring a recording for me to review next visit  If BP >130/85 or < 100/60 persistently over 3 reading 30 mins apart call sooner         Any mid level provider working with me to address interim issues              Return in about 6 months (around 1/7/2022).

## 2021-07-08 ENCOUNTER — OFFICE VISIT (OUTPATIENT)
Dept: FAMILY MEDICINE CLINIC | Facility: CLINIC | Age: 86
End: 2021-07-08

## 2021-07-08 VITALS
WEIGHT: 204 LBS | HEIGHT: 71 IN | BODY MASS INDEX: 28.56 KG/M2 | TEMPERATURE: 97.4 F | OXYGEN SATURATION: 92 % | RESPIRATION RATE: 18 BRPM | SYSTOLIC BLOOD PRESSURE: 122 MMHG | DIASTOLIC BLOOD PRESSURE: 62 MMHG | HEART RATE: 63 BPM

## 2021-07-08 DIAGNOSIS — I70.90 ATHEROSCLEROSIS: ICD-10-CM

## 2021-07-08 DIAGNOSIS — E11.21 CONTROLLED TYPE 2 DIABETES MELLITUS WITH DIABETIC NEPHROPATHY, WITHOUT LONG-TERM CURRENT USE OF INSULIN (HCC): Chronic | ICD-10-CM

## 2021-07-08 DIAGNOSIS — I10 ESSENTIAL HYPERTENSION: Chronic | ICD-10-CM

## 2021-07-08 DIAGNOSIS — N40.0 PROSTATISM: Chronic | ICD-10-CM

## 2021-07-08 DIAGNOSIS — N18.9 CHRONIC KIDNEY DISEASE, UNSPECIFIED CKD STAGE: Chronic | ICD-10-CM

## 2021-07-08 DIAGNOSIS — I50.32 CHRONIC DIASTOLIC CONGESTIVE HEART FAILURE (HCC): ICD-10-CM

## 2021-07-08 DIAGNOSIS — Z79.01 ANTICOAGULATED: ICD-10-CM

## 2021-07-08 DIAGNOSIS — D64.9 ANEMIA, UNSPECIFIED TYPE: ICD-10-CM

## 2021-07-08 DIAGNOSIS — G47.33 OBSTRUCTIVE SLEEP APNEA: ICD-10-CM

## 2021-07-08 DIAGNOSIS — E78.2 MIXED HYPERLIPIDEMIA: Chronic | ICD-10-CM

## 2021-07-08 DIAGNOSIS — G62.9 PERIPHERAL POLYNEUROPATHY: ICD-10-CM

## 2021-07-08 PROCEDURE — 99214 OFFICE O/P EST MOD 30 MIN: CPT | Performed by: FAMILY MEDICINE

## 2021-07-08 NOTE — PROGRESS NOTES
Subjective   Gonzalo Durham is a 87 y.o. male presenting with chief complaint of:   Chief Complaint   Patient presents with   • polyneuropathy     6mo follow up        History of Present Illness :  With daughter.       Has multiple chronic problems to consider that might have a bearing on today's issues;  an interval appointment.       Chronic/acute problems reviewed today:   1. Mixed hyperlipidemia Chronic/stable.  Tolerated use of Rx with labs showing improved lipid values and tolerant liver labs. No muscle aches unexpected.      2. Essential hypertension Chronic/stable. Stable here past/and home blood pressures.  No significant chest pain, SOB, LE edema, orthopnea, near syncope, dizziness/light headness.   Recent Vitals       3/24/2021 7/7/2021 7/8/2021       BP:  122/74  120/60  122/62     Pulse:  68  58  63     Temp:  --  --  97.4 °F (36.3 °C)     Weight:  92.1 kg (203 lb)  90.7 kg (200 lb)  92.5 kg (204 lb)     BMI (Calculated):  28.3  27.9  28.5            3. Chronic diastolic congestive heart failure (CMS/HCC) Chronic/stable.  Denies significant sob, orthopnea, leg edema, weight gain.  Aware of influence diet/salt and watching weight at home.       4. Atherosclerosis: CAD, AAA ro chronic/stable various areas of disease.  Sees vascular regularly and no plans for upcoming interventions.  Denies development/change in chest pain, claudication, CVA-TIA symptoms such as (Manifest by slurred speech asymmetry of face dysfunction/weakness of extremities).  Blood thinners noted.      5. Anticoagulated-CAD, DM2, CVA/ASA 81+()+plavix+eliquist Chronic/stable reason and use of.  Denies bleeding issues; especially epistaxis, melena, hematochezia.  Upper arms/others do bruise easily.  No significant bleeding or falls.      6. Controlled type 2 diabetes mellitus with diabetic nephropathy, without long-term current use of insulin (CMS/HCC) Chronic/stable  No problem/pattern hypoglycemia/hyperglycemia manifest by poly-  dypsia, phagia, uria, or sweats, diaphoretic episodes, syncope/near.     7. Prostatism-young available Chronic/stable.  Slower but tolerated stream with complete emptying.  Nocturia on occ; tolerated.  No desire to persure evaluations/surgery.      8. Chronic kidney disease, unspecified CKD stage Chronic/stable.  No nephrology.  No dysuria.  Previously warned/reminded:   To avoid further kidney function decline  A. Treat any time you think you have infection  B. Stay hydrated (dont get dehydrated); drink at least 60 oz fluid every 24 hr (1800 cc or nearly a 2L)  C. Do not allow any xrays with dye WITHOUT the doctor ordering checking your renal function  D. Do not get new medications without the doctor considering your renal condition  E. Do not use motrin/ibuprofen, alleve/naprosyn and these types of medications     9. Anemia, unspecified type Chronic or past history/variable: This has been present before.    There has been GI evaulation in the past. There is no current melena, hematochezia. It has been benign to date and stable/treating/watching.  Contributing comorbidities to date: benign.     10. Peripheral polyneuropathy chronic stable lower extremity numbness.  No current sores on his feet   11. WENDY: using Chronic/stable.  Uses cpap compliantly without significant problems with equipment.  Finds sleeps better and less sleepy during the day.         Has an/another acute issue today: none.    The following portions of the patient's history were reviewed and updated as appropriate: allergies, current medications, past family history, past medical history, past social history, past surgical history and problem list.      Current Outpatient Medications:   •  acetaminophen (TYLENOL) 325 MG tablet, Take 2 tablets by mouth 2 (Two) Times a Day., Disp: 360 tablet, Rfl: 2  •  albuterol sulfate HFA (ProAir HFA) 108 (90 Base) MCG/ACT inhaler, Inhale 2 puffs 4 (Four) Times a Day., Disp: 54 g, Rfl: 3  •  apixaban (Eliquis) 5  MG tablet tablet, Take 1 tablet by mouth 2 (two) times a day., Disp: 180 tablet, Rfl: 3  •  Artificial Tear Solution (Just Tears Eye Drops) solution, Apply 1-2 drops to eye(s) as directed by provider Daily As Needed (dry eyes)., Disp: 45 mL, Rfl: 2  •  ascorbic acid (VITAMIN C) 1000 MG tablet, Take 1 tablet by mouth Daily., Disp: 90 tablet, Rfl: 3  •  aspirin (aspirin) 81 MG EC tablet, Take 1 tablet by mouth Daily., Disp: 90 tablet, Rfl: 3  •  calcium carbonate-vitamin d 600-400 MG-UNIT per tablet, Take 1 tablet by mouth 2 (Two) Times a Day., Disp: 180 tablet, Rfl: 3  •  finasteride (PROSCAR) 5 MG tablet, Take 1 tablet by mouth Daily., Disp: 90 tablet, Rfl: 3  •  FREESTYLE LITE test strip, 1 each by Other route Daily., Disp: 100 each, Rfl: 2  •  gabapentin (Neurontin) 400 MG capsule, Take 1 capsule by mouth 2 (two) times a day., Disp: 180 capsule, Rfl: 3  •  glucose blood test strip, Use as instructed, Disp: 200 each, Rfl: 12  •  glyburide (DIAbeta) 5 MG tablet, Take 1 tablet by mouth Every Morning AND 0.5 tablets Daily Before Supper., Disp: 135 tablet, Rfl: 3  •  guaiFENesin (MUCINEX) 600 MG 12 hr tablet, Take 1 tablet by mouth 2 (Two) Times a Day., Disp: 180 tablet, Rfl: 2  •  isosorbide mononitrate (IMDUR) 30 MG 24 hr tablet, Take 1 tablet by mouth Daily., Disp: 90 tablet, Rfl: 3  •  Lancets (freestyle) lancets, Use once daily, Disp: 100 each, Rfl: 2  •  loratadine (CLARITIN) 10 MG tablet, Take 1 tablet by mouth Daily., Disp: 90 tablet, Rfl: 3  •  metFORMIN (Glucophage) 500 MG tablet, Take 1 tablet by mouth Every Night., Disp: 90 tablet, Rfl: 3  •  metoprolol tartrate (LOPRESSOR) 25 MG tablet, Take 1 tablet by mouth 2 (Two) Times a Day. (Patient taking differently: Take 12.5 mg by mouth 2 (Two) Times a Day.), Disp: 180 tablet, Rfl: 3  •  Multiple Vitamins-Minerals (PreserVision AREDS 2+Multi Vit) capsule, Take 1 capsule by mouth Daily., Disp: 90 capsule, Rfl: 1  •  multivitamin with minerals (Centrum Silver Adult  50+) tablet tablet, Take 1 tablet by mouth Daily., Disp: 90 tablet, Rfl: 3  •  nitroglycerin (Nitrostat) 0.4 MG SL tablet, Place 1 tablet under the tongue Every 5 (Five) Minutes As Needed for Chest Pain., Disp: 90 tablet, Rfl: 3  •  NON FORMULARY, CPAP per mask with 4L oxygen nightly, Disp: , Rfl:   •  O2 (OXYGEN), Inhale 4 L/min Continuous. Per nasal cannula, Disp: , Rfl:   •  pantoprazole (PROTONIX) 40 MG EC tablet, Take 1 tablet by mouth Daily., Disp: 90 tablet, Rfl: 3  •  pravastatin (Pravachol) 80 MG tablet, Take 1 tablet by mouth Daily., Disp: 90 tablet, Rfl: 3  •  terazosin (HYTRIN) 10 MG capsule, Take 1 capsule by mouth Daily., Disp: 90 capsule, Rfl: 3  •  tiotropium bromide monohydrate (Spiriva Respimat) 2.5 MCG/ACT aerosol solution inhaler, Inhale 2 puffs Daily., Disp: 16 g, Rfl: 3    No problems with medications.    Allergies   Allergen Reactions   • Symbicort [Budesonide-Formoterol Fumarate] Dizziness     Patient passed out and fell   • Prozac [Fluoxetine Hcl] Mental Status Change     Bad dreams.       Review of Systems  GENERAL:  Active/slower with limits, speed, samni for age and SOB.  Sleep is ok; much better with cpap.   SKIN:  Ongoing callous of feet; no problems with this/other skin today.   ENDO:  No syncope, near or diaphoretic sweaty spells.  BS Ok: without download noted last visit.   HEENT: No head injury, headache.  No vision change.  Same mild hearing loss.  Ears without pain/drainage.  No sore throat.  No significant nasal/sinus congestion/drainage. No epistaxis.  CHEST: No chest wall tenderness or mass. Stable occ cough without productive without wheeze.  Mild/same SOB; no hemoptysis.  Wears oxygen continous.   CV: No chest pain, palpatations, ankle edema.  GI: No heartburn significant dysphagia.  No abdominal pain, diarrhea, constipation, rectal bleeding, or melena.    :  Voids without dysuria, or incontience to completion.  ORTHO: No painful/swollen joints but various on /off sore.  No  change occ/usual sore neck or back.  But no acute neck but above mid back pain without recent injury.   NEURO: No dizziness; recent LUE weakness of extremities.  No change some LE numbness/parethesias.   PSYCH: No memory loss.  Mood good; not that anxious, depressed but/and not suicidal.  Tolerated stress.   Screening:  Mammogram: NA  Bone density: NA  Low dose CT chest: Tobacco-smoker/age 22/1 ppd/dc 1980: (22): NA (had CT angio)  GI: Colon-div///6.15.17/prn  Prostate: urology/confirmed 7.8.21  [place]/confirmed 11.22.19  [place] in past   Usual lab order  6m CBC, BMP, A1c  12m CBC, CMP, A1c, TSH, LIPID, PSAs, Vit D    Data reviewed:   Recent admit/ER/MD visits: urology visit/cysto    Last cardiac testing:   Echo:   Results for orders placed during the hospital encounter of 12/24/19    Adult Transthoracic Echo Limited W/ Cont if Necessary Per Protocol    Interpretation Summary  · Left ventricular wall thickness is consistent with mild concentric hypertrophy.  · Estimated EF = 55%.  · Left ventricular diastolic dysfunction.  · Mild dilation of the aortic root is present.  · No evidence of pulmonary hypertension is present.    Lab Results:  Results for orders placed or performed in visit on 07/06/21   Basic Metabolic Panel    Specimen: Blood   Result Value Ref Range    Glucose 90 65 - 99 mg/dL    BUN 23 8 - 23 mg/dL    Creatinine 1.15 0.76 - 1.27 mg/dL    eGFR Non African Am 60 (L) >60 mL/min/1.73    eGFR African Am 73 >60 mL/min/1.73    BUN/Creatinine Ratio 20.0 7.0 - 25.0    Sodium 145 136 - 145 mmol/L    Potassium 4.6 3.5 - 5.2 mmol/L    Chloride 107 98 - 107 mmol/L    Total CO2 28.6 22.0 - 29.0 mmol/L    Calcium 9.7 8.6 - 10.5 mg/dL   Hemoglobin A1c    Specimen: Blood   Result Value Ref Range    Hemoglobin A1C 6.20 (H) 4.80 - 5.60 %   Iron    Specimen: Blood   Result Value Ref Range    Iron 65 59 - 158 mcg/dL   Vitamin B12    Specimen: Blood   Result Value Ref Range    Vitamin B-12 618 211 - 946 pg/mL   Folate     Specimen: Blood   Result Value Ref Range    Folate >20.00 4.78 - 24.20 ng/mL   CBC & Differential    Specimen: Blood   Result Value Ref Range    WBC 6.61 3.40 - 10.80 10*3/mm3    RBC 4.25 4.14 - 5.80 10*6/mm3    Hemoglobin 12.2 (L) 13.0 - 17.7 g/dL    Hematocrit 38.1 37.5 - 51.0 %    MCV 89.6 79.0 - 97.0 fL    MCH 28.7 26.6 - 33.0 pg    MCHC 32.0 31.5 - 35.7 g/dL    RDW 12.8 12.3 - 15.4 %    Platelets 212 140 - 450 10*3/mm3    Neutrophil Rel % 56.6 42.7 - 76.0 %    Lymphocyte Rel % 25.1 19.6 - 45.3 %    Monocyte Rel % 7.6 5.0 - 12.0 %    Eosinophil Rel % 9.7 (H) 0.3 - 6.2 %    Basophil Rel % 0.8 0.0 - 1.5 %    Neutrophils Absolute 3.75 1.70 - 7.00 10*3/mm3    Lymphocytes Absolute 1.66 0.70 - 3.10 10*3/mm3    Monocytes Absolute 0.50 0.10 - 0.90 10*3/mm3    Eosinophils Absolute 0.64 (H) 0.00 - 0.40 10*3/mm3    Basophils Absolute 0.05 0.00 - 0.20 10*3/mm3    Immature Granulocyte Rel % 0.2 0.0 - 0.5 %    Immature Grans Absolute 0.01 0.00 - 0.05 10*3/mm3    nRBC 0.0 0.0 - 0.2 /100 WBC       A1C:  Lab Results - Last 18 Months   Lab Units 07/06/21  0731 01/05/21  0732 06/22/20  2300   HEMOGLOBIN A1C % 6.20* 6.00* CANCELED     LIPID:  Lab Results - Last 18 Months   Lab Units 01/05/21  0732   CHOLESTEROL mg/dL 172   LDL CHOL mg/dL 114*   HDL CHOL mg/dL 37*   TRIGLYCERIDES mg/dL 116     PSA:  Lab Results - Last 18 Months   Lab Units 01/05/21  0732   PSA ng/mL 1.040     CBC:  Lab Results - Last 18 Months   Lab Units 07/06/21  0731 02/10/21  2022 02/05/21  1230 01/05/21  0732 09/24/20  0716 06/22/20  2300   WBC 10*3/mm3 6.61 14.30* 6.51 7.05 6.53 CANCELED   HEMOGLOBIN g/dL 12.2* 10.4* 12.1* 12.2* 12.4* CANCELED   HEMATOCRIT % 38.1 30.5* 38.2 37.5 38.3 CANCELED   PLATELETS 10*3/mm3 212 152 207 213 219 CANCELED   IRON mcg/dL 65  --   --   --  63  --       BMP/CMP:  Lab Results - Last 18 Months   Lab Units 07/06/21  0731 02/10/21  2022 02/05/21  1230 01/18/21  0822 01/05/21  0732 06/22/20  2300   SODIUM mmol/L 145 131* 142  --   "144 141   POTASSIUM mmol/L 4.6 4.0 4.3  --  4.4 4.7   CHLORIDE mmol/L 107 96* 104  --  104 102   TOTAL CO2 mmol/L 28.6  --   --   --  32.9* 26   CO2 mmol/L  --  27.0 32.0*  --   --   --    GLUCOSE mg/dL 90  --   --   --  88 121*   BUN mg/dL 23 25* 25*  --  26* 29*   CREATININE mg/dL 1.15 1.31* 1.11 1.30 1.09 1.20   EGFR IF NONAFRICN AM mL/min/1.73 60* 52* 63  --  64 54*   EGFR IF AFRICN AM mL/min/1.73 73  --   --   --  78 63   CALCIUM mg/dL 9.7 9.3 9.4  --  9.5 9.7     HEPATIC:  Lab Results - Last 18 Months   Lab Units 02/10/21  2022 01/05/21  0732   ALT (SGPT) U/L 12 10   AST (SGOT) U/L 13 12   ALK PHOS U/L 43 54     THYROID:  Lab Results - Last 18 Months   Lab Units 01/05/21  0732   TSH uIU/mL 2.780       Objective   /62   Pulse 63   Temp 97.4 °F (36.3 °C) (Infrared)   Resp 18   Ht 180.3 cm (71\")   Wt 92.5 kg (204 lb)   SpO2 92%   BMI 28.45 kg/m²   Body mass index is 28.45 kg/m².    Recent Vitals       3/24/2021 7/7/2021 7/8/2021       BP:  122/74  120/60  122/62     Pulse:  68  58  63     Temp:  --  --  97.4 °F (36.3 °C)     Weight:  92.1 kg (203 lb)  90.7 kg (200 lb)  92.5 kg (204 lb)     BMI (Calculated):  28.3  27.9  28.5           Physical Exam  GENERAL:  Well nourished/developed in no acute distress.   SKIN: Turgor excellent, without wound, rash, lesion  HEENT: Normal cephalic without trauma.  Pupils equal round reactive to light. Extraocular motions full without nystagmus.    No thyroidmegaly, mass, tenderness, lymphadenopathy and supple.  CV: Regular rhythm.  No murmur, gallop,  edema. Posterior pulses intact.  No carotid bruits.  CHEST: No chest wall tenderness or mass.   LUNGS: Symmetric motion with clear/decreased to auscultation.   ABD: Soft, nontender without mass.   ORTHO: Symmetric extremities without swelling/point tenderness.  Full gross range of motion except hips reduced.  Symmetric LE.  Neg straight leg raising.  Neg Patricks maneuver.  Back is straight/mild lordosis.  Mid back " sore touch.   NEURO: CN 2-12 grossly intact.  Symmetric facies. 1/4 x bicep knee equal reflexes.  UE/LE   3/5 strength throughout except 2/5  L.  Nonfocal use extremities. Speech clear.  Light touch decreased bilateral feet.   PSYCH: Oriented x 3.  Pleasant calm, well kept.  Purposeful/directed conservation with intact short/long gross memory.       Assessment/Plan     1. Mixed hyperlipidemia    2. Essential hypertension    3. Chronic diastolic congestive heart failure (CMS/HCC)    4. Atherosclerosis: CAD, AAA ro    5. Anticoagulated-CAD, DM2, CVA/ASA 81+()+plavix+eliquist    6. Controlled type 2 diabetes mellitus with diabetic nephropathy, without long-term current use of insulin (CMS/[facility])    7. Prostatism-young available    8. Chronic kidney disease, unspecified CKD stage    9. Anemia, unspecified type    10. Peripheral polyneuropathy    11. WENDY: using      Discussion:  same    Medical decision issues:   Data review above:   Rx: reviewed and decisions:   Same Rx for now   Visit today involved many chronic/significant diseases and intensive drug monitoring: ie potential to cause serious morbidity or death:   No orders of the defined types were placed in this encounter.    Orders placed:   LAB/Testing/Referrals: reviewed/orders:   Today:   No orders of the defined types were placed in this encounter.    Chronic/recurrent labs above or change to:   same     Health maintenance:   Body mass index is 28.45 kg/m².  Patient's Body mass index is 28.45 kg/m². indicating that he is overweight (BMI 25-29.9). Obesity-related health conditions include the following: hypertension. Obesity is unchanged. BMI is is above average; BMI management plan is completed. We discussed portion control and increasing exercise..    Tobacco use reviewed:    Gonzalo Durham  reports that he quit smoking about 41 years ago. His smoking use included cigarettes. He started smoking about 67 years ago. He has a 26.00 pack-year smoking  history. He has never used smokeless tobacco..       There are no Patient Instructions on file for this visit.    Follow up: Return for lab;, Dr Romero-, 6 m;.  Future Appointments   Date Time Provider Department Center   8/2/2021 10:15 AM Juan Marcelino MD [facility] PAD   8/23/2021  1:00 PM PAD ECHO ROOM 1  PAD CARDI PAD   12/6/2021 10:40 AM Danie Cadena MD [facility] PAD   1/5/2022  8:20 AM LAB [facility] [place] [facility]AD   1/6/2022 10:45 AM Abel Romero MD [facility] METR PAD   1/10/2022  9:30 AM Myra Vee APRN [facility]  PAD   1/26/2022  1:15 PM Feliz Frye APRN [facility] RD PAD PAD

## 2021-07-17 PROCEDURE — 93298 REM INTERROG DEV EVAL SCRMS: CPT | Performed by: INTERNAL MEDICINE

## 2021-07-17 PROCEDURE — G2066 INTER DEVC REMOTE 30D: HCPCS | Performed by: INTERNAL MEDICINE

## 2021-08-02 DIAGNOSIS — I10 HYPERTENSION, UNSPECIFIED TYPE: Chronic | ICD-10-CM

## 2021-08-02 DIAGNOSIS — I25.10 CORONARY ARTERY DISEASE INVOLVING NATIVE CORONARY ARTERY WITHOUT ANGINA PECTORIS, UNSPECIFIED WHETHER NATIVE OR TRANSPLANTED HEART: Chronic | ICD-10-CM

## 2021-08-02 RX ORDER — ISOSORBIDE MONONITRATE 30 MG/1
30 TABLET, EXTENDED RELEASE ORAL DAILY
Qty: 90 TABLET | Refills: 3 | Status: SHIPPED | OUTPATIENT
Start: 2021-08-02 | End: 2021-08-03 | Stop reason: SDUPTHER

## 2021-08-02 NOTE — TELEPHONE ENCOUNTER
Caller: Suyapa Osborn    Relationship: Emergency Contact    Best call back number: [phone]    Medication needed:   Requested Prescriptions     Pending Prescriptions Disp Refills   • isosorbide mononitrate (IMDUR) 30 MG 24 hr tablet 90 tablet 3     Sig: Take 1 tablet by mouth Daily.       When do you need the refill by: ASAP    What additional details did the patient provide when requesting the medication:     Everything else was ordered [facility].    Does the patient have less than a 3 day supply:  [x] Yes  [] No    What is the patient's preferred pharmacy: [id number] - [facility] [address] AT [address] & [facility] - [phone] [facility] [phone] FX

## 2021-08-03 DIAGNOSIS — E11.9 CONTROLLED TYPE 2 DIABETES MELLITUS WITHOUT COMPLICATION, WITHOUT LONG-TERM CURRENT USE OF INSULIN (HCC): Chronic | ICD-10-CM

## 2021-08-03 DIAGNOSIS — E78.5 HYPERLIPIDEMIA, UNSPECIFIED HYPERLIPIDEMIA TYPE: Chronic | ICD-10-CM

## 2021-08-03 DIAGNOSIS — J98.4 CHRONIC LUNG DISEASE: ICD-10-CM

## 2021-08-03 DIAGNOSIS — K21.9 GASTROESOPHAGEAL REFLUX DISEASE: ICD-10-CM

## 2021-08-03 DIAGNOSIS — I10 HYPERTENSION, UNSPECIFIED TYPE: Chronic | ICD-10-CM

## 2021-08-03 DIAGNOSIS — I25.10 CORONARY ARTERY DISEASE INVOLVING NATIVE CORONARY ARTERY WITHOUT ANGINA PECTORIS, UNSPECIFIED WHETHER NATIVE OR TRANSPLANTED HEART: Chronic | ICD-10-CM

## 2021-08-03 DIAGNOSIS — J44.9 STAGE 2 MODERATE COPD BY GOLD CLASSIFICATION (HCC): Chronic | ICD-10-CM

## 2021-08-03 DIAGNOSIS — N40.0 PROSTATISM: Chronic | ICD-10-CM

## 2021-08-03 DIAGNOSIS — Z79.01 ANTICOAGULATED: ICD-10-CM

## 2021-08-03 DIAGNOSIS — G62.9 PERIPHERAL POLYNEUROPATHY: ICD-10-CM

## 2021-08-04 RX ORDER — GABAPENTIN 400 MG/1
400 CAPSULE ORAL 2 TIMES DAILY
Qty: 180 CAPSULE | Refills: 3 | Status: SHIPPED | OUTPATIENT
Start: 2021-08-04 | End: 2021-10-08 | Stop reason: SDUPTHER

## 2021-08-04 RX ORDER — GLYBURIDE 5 MG/1
TABLET ORAL
Qty: 135 TABLET | Refills: 3 | Status: SHIPPED | OUTPATIENT
Start: 2021-08-04 | End: 2022-07-12

## 2021-08-04 RX ORDER — BLOOD-GLUCOSE METER
1 KIT MISCELLANEOUS DAILY
Qty: 100 EACH | Refills: 2 | Status: SHIPPED | OUTPATIENT
Start: 2021-08-04 | End: 2022-01-10 | Stop reason: SDUPTHER

## 2021-08-04 RX ORDER — PRAVASTATIN SODIUM 80 MG/1
80 TABLET ORAL DAILY
Qty: 90 TABLET | Refills: 3 | Status: SHIPPED | OUTPATIENT
Start: 2021-08-04 | End: 2022-07-11 | Stop reason: SDUPTHER

## 2021-08-04 RX ORDER — TERAZOSIN 10 MG/1
10 CAPSULE ORAL DAILY
Qty: 90 CAPSULE | Refills: 3 | Status: SHIPPED | OUTPATIENT
Start: 2021-08-04 | End: 2022-07-12

## 2021-08-04 RX ORDER — ISOSORBIDE MONONITRATE 30 MG/1
30 TABLET, EXTENDED RELEASE ORAL DAILY
Qty: 90 TABLET | Refills: 3 | Status: SHIPPED | OUTPATIENT
Start: 2021-08-04 | End: 2022-09-15

## 2021-08-04 RX ORDER — FINASTERIDE 5 MG/1
5 TABLET, FILM COATED ORAL DAILY
Qty: 90 TABLET | Refills: 3 | Status: SHIPPED | OUTPATIENT
Start: 2021-08-04 | End: 2022-07-12

## 2021-08-04 RX ORDER — LORATADINE 10 MG/1
10 TABLET ORAL DAILY
Qty: 90 TABLET | Refills: 3 | Status: SHIPPED | OUTPATIENT
Start: 2021-08-04 | End: 2022-07-12

## 2021-08-04 RX ORDER — PANTOPRAZOLE SODIUM 40 MG/1
40 TABLET, DELAYED RELEASE ORAL DAILY
Qty: 90 TABLET | Refills: 3 | Status: SHIPPED | OUTPATIENT
Start: 2021-08-04 | End: 2022-07-06 | Stop reason: SDUPTHER

## 2021-08-04 RX ORDER — ALBUTEROL SULFATE 90 UG/1
2 AEROSOL, METERED RESPIRATORY (INHALATION)
Qty: 54 G | Refills: 3 | Status: SHIPPED | OUTPATIENT
Start: 2021-08-04 | End: 2022-07-12

## 2021-08-17 ENCOUNTER — OFFICE VISIT (OUTPATIENT)
Dept: NEUROLOGY | Facility: CLINIC | Age: 86
End: 2021-08-17

## 2021-08-17 VITALS
HEIGHT: 71 IN | BODY MASS INDEX: 27.86 KG/M2 | DIASTOLIC BLOOD PRESSURE: 70 MMHG | RESPIRATION RATE: 20 BRPM | OXYGEN SATURATION: 97 % | SYSTOLIC BLOOD PRESSURE: 110 MMHG | WEIGHT: 199 LBS | HEART RATE: 70 BPM

## 2021-08-17 DIAGNOSIS — G47.33 OBSTRUCTIVE SLEEP APNEA: ICD-10-CM

## 2021-08-17 DIAGNOSIS — I63.9 CEREBROVASCULAR ACCIDENT (CVA), UNSPECIFIED MECHANISM (HCC): Primary | ICD-10-CM

## 2021-08-17 DIAGNOSIS — R41.3 MEMORY DIFFICULTY: ICD-10-CM

## 2021-08-17 PROCEDURE — 99213 OFFICE O/P EST LOW 20 MIN: CPT | Performed by: NURSE PRACTITIONER

## 2021-08-17 RX ORDER — TIOTROPIUM BROMIDE AND OLODATEROL 3.124; 2.736 UG/1; UG/1
2 SPRAY, METERED RESPIRATORY (INHALATION) NIGHTLY
COMMUNITY
Start: 2021-08-04 | End: 2022-07-12 | Stop reason: SDUPTHER

## 2021-08-17 NOTE — PROGRESS NOTES
Neurology Progress Note      Chief Complaint:    Memory difficulties  WENDY  History of stroke    Subjective     Subjective:  Gonzalo Durham is a 87 y.o. male who presents today for follow-up of memory difficulties, WENDY, and history of stroke.  Patient has no new complaints for the office visit today.  Patient denies any was 25/30 today; however, the patient simply did not answer 1 group of questions which is falsely lowering his MMSE score.  Patient states there has been no worsening with his memory and that it is stable.  Patient continues on Eliquis 5 mg, aspirin 81 mg, and pravastatin 80 mg for stroke prevention.  Blood pressure in office today was normal.  Patient states he continues to use his CPAP therapy (with oxygen) for his WENDY and that he has no new complaints of daytime hypersomnolence or unrested sleep.  However, after questioning and review of compliance report, the patient does admit that he has not been using his CPAP machine related to needing to frequently get up and assess his wife who is medically fragile and needs consistent care during the night.  Otherwise, he has no complaints today and feels well.    Past Medical History:   Diagnosis Date   • A-fib (CMS/[facility])    • AAA (abdominal aortic aneurysm) without rupture (CMS/[facility])    • Arthritis    • BPH (benign prostatic hypertrophy)    • CAD (coronary artery disease)    • Cataract     bialteral   • CKD (chronic kidney disease)    • COPD (chronic obstructive pulmonary disease) (CMS/[facility])    • Diabetes mellitus (CMS/[facility])     Type 2   • DM (diabetes mellitus screen)    • GERD (gastroesophageal reflux disease)    • History of loop recorder     at current time   • Hx of colonic polyp    • Hypercholesteremia    • Hypertension    • Macular degeneration     treated with injections in right eye   • Myocardial infarction (CMS/[facility])    • Neuropathy    • Oxygen deficit     at 4liters   • Prostate cancer (CMS/[facility])    • Sleep apnea     cpap   • Stroke (CMS/[facility])      recovererd   • Varicose vein of leg     bialteral     Past Surgical History:   Procedure Laterality Date   • APPENDECTOMY     • CARDIAC CATHETERIZATION     • CARDIAC CATHETERIZATION Left 2019    Procedure: Cardiac Catheterization/Vascular Study Positive occult blood in stools  ? BMS vs REGGIE Get cabg report  ;  Surgeon: Bradley Carver MD;  Location:  [facility] CATH INVASIVE LOC[facility];  Service: Cardiology   • COLONOSCOPY N/A 6/15/2017    Diverticulosis repeat exam prn   • COLONOSCOPY W/ POLYPECTOMY  10/21/2013    2 Tubular adenomatous polyps, Diverticulosis repeat exam in 5 years   • CORONARY ARTERY BYPASS GRAFT  1980    X 3   • CYSTOSCOPY RETROGRADE PYELOGRAM Bilateral 2021    Procedure: CYSTOSCOPY RETROGRADE PYELOGRAM;  Surgeon: Danie Cadena MD;  Location:  [facility] OR;  Service: Urology;  Laterality: Bilateral;   • ENDOSCOPY N/A 6/15/2017    LA Grade A reflux esophagitis   • EYE SURGERY Bilateral    • HERNIA REPAIR     • TRANSURETHRAL RESECTION OF BLADDER TUMOR N/A 2021    Procedure: CYSTOSCOPY TRANSURETHRAL RESECTION OF BLADDER TUMOR WITH GEMCITABINE INSTILLATION;  Surgeon: Danie Cadena MD;  Location:  [facility] OR;  Service: Urology;  Laterality: N/A;     Family History   Problem Relation Age of Onset   • Heart disease Mother    • Stroke Mother    • Melanoma Mother    • Heart disease Father    • Diabetes Father    • Prostate cancer Father    • Colon cancer Neg Hx    • Colon polyps Neg Hx      Social History     Tobacco Use   • Smoking status: Former Smoker     Packs/day: 1.00     Years: 26.00     Pack years: 26.00     Types: Cigarettes     Start date:      Quit date:      Years since quittin.6   • Smokeless tobacco: Never Used   Vaping Use   • Vaping Use: Never used   Substance Use Topics   • Alcohol use: No   • Drug use: No     Medications:  Current Outpatient Medications   Medication Sig Dispense Refill   • acetaminophen (TYLENOL) 325 MG tablet Take 2 tablets by mouth 2 (Two) Times a  Day. 360 tablet 2   • albuterol sulfate HFA (ProAir HFA) 108 (90 Base) MCG/ACT inhaler Inhale 2 puffs 4 (Four) Times a Day. 54 g 3   • apixaban (Eliquis) 5 MG tablet tablet Take 1 tablet by mouth 2 (two) times a day. 180 tablet 3   • Artificial Tear Solution (Just Tears Eye Drops) solution Apply 1-2 drops to eye(s) as directed by provider Daily As Needed (dry eyes). 45 mL 2   • ascorbic acid (VITAMIN C) 1000 MG tablet Take 1 tablet by mouth Daily. 90 tablet 3   • aspirin (aspirin) 81 MG EC tablet Take 1 tablet by mouth Daily. 90 tablet 3   • calcium carbonate-vitamin d 600-400 MG-UNIT per tablet Take 1 tablet by mouth 2 (Two) Times a Day. 180 tablet 3   • finasteride (PROSCAR) 5 MG tablet Take 1 tablet by mouth Daily. 90 tablet 3   • FREESTYLE LITE test strip 1 each by Other route Daily. 100 each 2   • gabapentin (Neurontin) 400 MG capsule Take 1 capsule by mouth 2 (two) times a day. 180 capsule 3   • glucose blood test strip Use as instructed 200 each 12   • glyburide (DIAbeta) 5 MG tablet Take 1 tablet by mouth Every Morning AND 0.5 tablets Daily Before Supper. 135 tablet 3   • guaiFENesin (MUCINEX) 600 MG 12 hr tablet Take 1 tablet by mouth 2 (Two) Times a Day. 180 tablet 2   • isosorbide mononitrate (IMDUR) 30 MG 24 hr tablet Take 1 tablet by mouth Daily. 90 tablet 3   • Lancets (freestyle) lancets Use once daily 100 each 2   • loratadine (CLARITIN) 10 MG tablet Take 1 tablet by mouth Daily. 90 tablet 3   • metFORMIN (Glucophage) 500 MG tablet Take 1 tablet by mouth Every Night. 90 tablet 3   • metoprolol tartrate (LOPRESSOR) 25 MG tablet Take 0.5 tablets by mouth 2 (Two) Times a Day. 180 tablet 3   • Multiple Vitamins-Minerals (PreserVision AREDS 2+Multi Vit) capsule Take 1 capsule by mouth Daily. 90 capsule 1   • multivitamin with minerals (Centrum Silver Adult 50+) tablet tablet Take 1 tablet by mouth Daily. 90 tablet 3   • nitroglycerin (Nitrostat) 0.4 MG SL tablet Place 1 tablet under the tongue Every 5  (Five) Minutes As Needed for Chest Pain. 90 tablet 3   • NON FORMULARY CPAP per mask with 4L oxygen nightly     • O2 (OXYGEN) Inhale 4 L/min Continuous. Per nasal cannula     • pantoprazole (PROTONIX) 40 MG EC tablet Take 1 tablet by mouth Daily. 90 tablet 3   • pravastatin (Pravachol) 80 MG tablet Take 1 tablet by mouth Daily. 90 tablet 3   • Stiolto Respimat 2.5-2.5 MCG/ACT aerosol solution inhaler      • terazosin (HYTRIN) 10 MG capsule Take 1 capsule by mouth Daily. 90 capsule 3   • tiotropium bromide monohydrate (Spiriva Respimat) 2.5 MCG/ACT aerosol solution inhaler Inhale 2 puffs Daily. 16 g 3     No current facility-administered medications for this visit.     Current outpatient and discharge medications have been reconciled for the patient.  Reviewed by: DOMINIQUE Banda      Allergies:    Symbicort [budesonide-formoterol fumarate] and Prozac [fluoxetine hcl]    Review of Systems:   Review of Systems   Constitutional: Negative for activity change.   Neurological: Positive for memory problem. Negative for dizziness, facial asymmetry, speech difficulty, weakness, headache and confusion.   Psychiatric/Behavioral: Positive for sleep disturbance and stress.   All other systems reviewed and are negative.        Objective      Vital Signs  Heart Rate:  [70] 70  Resp:  [20] 20  BP: (110)/(70) 110/70    Physical Exam:  Physical Exam  Constitutional:       Appearance: Normal appearance.   HENT:      Head: Normocephalic.   Eyes:      Extraocular Movements: Extraocular movements intact.      Pupils: Pupils are equal, round, and reactive to light.   Cardiovascular:      Rate and Rhythm: Normal rate and regular rhythm.      Pulses: Normal pulses.   Pulmonary:      Effort: Pulmonary effort is normal.      Breath sounds: Normal breath sounds.      Comments: Wearing oxygen 4 L/min  Musculoskeletal:         General: Normal range of motion.      Cervical back: Normal range of motion and neck supple.   Skin:     General:  Skin is warm and dry.      Capillary Refill: Capillary refill takes less than 2 seconds.   Neurological:      General: No focal deficit present.      Mental Status: He is alert and oriented to person, place, and time. Mental status is at baseline.      Cranial Nerves: Cranial nerves are intact.      Sensory: Sensation is intact.      Motor: Motor function is intact.      Coordination: Coordination is intact.      Gait: Gait is intact.      Deep Tendon Reflexes: Reflexes are normal and symmetric.   Psychiatric:         Mood and Affect: Mood normal.       Neurologic Exam:  Mental Status:    -Awake. Alert. Oriented to person, place & time.  -No word finding difficulties.  -No aphasia.  -No dysarthria.  -Follows simple commands.     CN II:  Full visual fields with confrontation.  Pupils equally reactive to light.  CN III, IV, VI:  Extraocular muscles function intact with no nystagmus.  CN V:  Facial sensory is symmetric.  CN VII:  Facial motor symmetric.  CN VIII:  Gross hearing intact bilaterally.  CN IX/X:  Palate elevates symmetrically.  CN XI:  Shoulder shrug symmetric.  CN XII:  Tongue is midline on protrusion.     Motor: (strength out of 5:  1= minimal movement, 2 = movement in plane of gravity, 3 = movement against gravity, 4 = movement against some resistance, 5 = full strength)     -5/5 in bilateral biceps, triceps, brachioradialis, wrist extensors and intrinsic muscles of the hand.    -5/5 in bilateral hip flexors, quadriceps, hamstrings, gastrocsoleus complex, anterior tibialis and extensor hallucis longus.       Deep Tendon Reflexes:  -Right              Biceps: 2+         Triceps: 2+      Brachioradialis: 2+              Patella: 2+       Ankle: 2+         Babinski:  negative  -Left              Biceps: 2+         Triceps: 2+      Brachioradialis: 2+              Patella: 2+       Ankle: 2+         Babinski:  negative    Tone (Modified Zia Scale):  No appreciable increase in tone or rigidity noted.      Sensory:  -Intact to light touch, pinprick BUE (C5-T1) and BLE (L2-S1).     Coordination:  -Finger to nose intact BUEs  -Heel to shin intact BLEs  -No ataxia     Gait  -No signs of ataxia  -ambulates unassisted      [place] Sleepiness Scale: 5  STOP-BANG intermediate risk WENDY  Mallampati score: 2  Neck circumference 16.25 inches     Results Review:    Lab Results   Component Value Date    GLUCOSE 140 (H) 02/10/2021    BUN 23 07/06/2021    CREATININE 1.15 07/06/2021    EGFRIFNONA 60 (L) 07/06/2021    EGFRIFAFRI 73 07/06/2021    BCR 20.0 07/06/2021    K 4.6 07/06/2021    CO2 28.6 07/06/2021    CALCIUM 9.7 07/06/2021    PROTENTOTREF 6.5 01/05/2021    ALBUMIN 3.70 02/10/2021    LABIL2 2.0 01/05/2021    AST 13 02/10/2021    ALT 12 02/10/2021     Lab Results   Component Value Date    WBC 6.61 07/06/2021    HGB 12.2 (L) 07/06/2021    HCT 38.1 07/06/2021    MCV 89.6 07/06/2021     07/06/2021     Lab Results   Component Value Date    HGBA1C 6.20 (H) 07/06/2021     Lab Results   Component Value Date    CHOL 149 05/23/2019    CHLPL 172 01/05/2021    TRIG 116 01/05/2021    HDL 37 (L) 01/05/2021     (H) 01/05/2021     Compliance report:  This report is for the dates of 6/4/2021-8/2/2021.  Patient used the device for 2/60 days for 3% compliance.  Patient used vaginal greater than 4 hours for 1 day for a 2% compliance.  Average usage was 5 hours 28 minutes.  The patient is set on 9 cm H2O and current AHI is 11.6.    Assessment/Plan     Impression:  • History of CVA  • WENDY  • Memory difficulties    Plan:  At this time his CVA and memory difficulties are stable.  I do think his MMSE is falsely low due to him not answering an entire section of questions patient and his son-in-law who is present state that he has had no worsening or changes in his memory.  Patient continues on preventative therapy for his history of CVA from many years ago.  I did encourage the patient to continue to start using his CPAP therapy again  and I explained the risks and benefits of therapy with his.  Patient did state understanding and mentioned that he would be more consistent with using his CPAP at night.    The patient and I have discussed the plan of care and he is in full agreement at this time.     Follow-Up:  • Return in about 6 months (around 2/17/2022) for WENDY/CVA/Memory.      DOMINIQUE Banda  08/17/21  10:26 CDT

## 2021-08-17 NOTE — PATIENT INSTRUCTIONS
Sleep Apnea  Sleep apnea affects breathing during sleep. It causes breathing to stop for a short time or to become shallow. It can also increase the risk of:  · Heart attack.  · Stroke.  · Being very overweight (obese).  · Diabetes.  · Heart failure.  · Irregular heartbeat.  The goal of treatment is to help you breathe normally again.  What are the causes?  There are three kinds of sleep apnea:  · Obstructive sleep apnea. This is caused by a blocked or collapsed airway.  · Central sleep apnea. This happens when the brain does not send the right signals to the muscles that control breathing.  · Mixed sleep apnea. This is a combination of obstructive and central sleep apnea.  The most common cause of this condition is a collapsed or blocked airway. This can happen if:  · Your throat muscles are too relaxed.  · Your tongue and tonsils are too large.  · You are overweight.  · Your airway is too small.  What increases the risk?  · Being overweight.  · Smoking.  · Having a small airway.  · Being older.  · Being male.  · Drinking alcohol.  · Taking medicines to calm yourself (sedatives or tranquilizers).  · Having family members with the condition.  What are the signs or symptoms?  · Trouble staying asleep.  · Being sleepy or tired during the day.  · Getting angry a lot.  · Loud snoring.  · Headaches in the morning.  · Not being able to focus your mind (concentrate).  · Forgetting things.  · Less interest in sex.  · Mood swings.  · Personality changes.  · Feelings of sadness (depression).  · Waking up a lot during the night to pee (urinate).  · Dry mouth.  · Sore throat.  How is this diagnosed?  · Your medical history.  · A physical exam.  · A test that is done when you are sleeping (sleep study). The test is most often done in a sleep lab but may also be done at home.  How is this treated?    · Sleeping on your side.  · Using a medicine to get rid of mucus in your nose (decongestant).  · Avoiding the use of alcohol,  medicines to help you relax, or certain pain medicines (narcotics).  · Losing weight, if needed.  · Changing your diet.  · Not smoking.  · Using a machine to open your airway while you sleep, such as:  ? An oral appliance. This is a mouthpiece that shifts your lower jaw forward.  ? A CPAP device. This device blows air through a mask when you breathe out (exhale).  ? An EPAP device. This has valves that you put in each nostril.  ? A BPAP device. This device blows air through a mask when you breathe in (inhale) and breathe out.  · Having surgery if other treatments do not work.  It is important to get treatment for sleep apnea. Without treatment, it can lead to:  · High blood pressure.  · Coronary artery disease.  · In men, not being able to have an erection (impotence).  · Reduced thinking ability.  Follow these instructions at home:  Lifestyle  · Make changes that your doctor recommends.  · Eat a healthy diet.  · Lose weight if needed.  · Avoid alcohol, medicines to help you relax, and some pain medicines.  · Do not use any products that contain nicotine or tobacco, such as cigarettes, e-cigarettes, and chewing tobacco. If you need help quitting, ask your doctor.  General instructions  · Take over-the-counter and prescription medicines only as told by your doctor.  · If you were given a machine to use while you sleep, use it only as told by your doctor.  · If you are having surgery, make sure to tell your doctor you have sleep apnea. You may need to bring your device with you.  · Keep all follow-up visits as told by your doctor. This is important.  Contact a doctor if:  · The machine that you were given to use during sleep bothers you or does not seem to be working.  · You do not get better.  · You get worse.  Get help right away if:  · Your chest hurts.  · You have trouble breathing in enough air.  · You have an uncomfortable feeling in your back, arms, or stomach.  · You have trouble talking.  · One side of your  body feels weak.  · A part of your face is hanging down.  These symptoms may be an emergency. Do not wait to see if the symptoms will go away. Get medical help right away. Call your local emergency services (911 in the [place] Do not drive yourself to the hospital.  Summary  · This condition affects breathing during sleep.  · The most common cause is a collapsed or blocked airway.  · The goal of treatment is to help you breathe normally while you sleep.  This information is not intended to replace advice given to you by your health care provider. Make sure you discuss any questions you have with your health care provider.  Document Revised: 10/04/2019 Document Reviewed: 08/13/2019  [employer] Patient Education © 2021 [employer]

## 2021-08-23 ENCOUNTER — HOSPITAL ENCOUNTER (OUTPATIENT)
Dept: CARDIOLOGY | Facility: HOSPITAL | Age: 86
Discharge: HOME | End: 2021-08-23
Admitting: INTERNAL MEDICINE

## 2021-08-23 VITALS
WEIGHT: 199.08 LBS | BODY MASS INDEX: 27.87 KG/M2 | HEIGHT: 71 IN | DIASTOLIC BLOOD PRESSURE: 70 MMHG | SYSTOLIC BLOOD PRESSURE: 110 MMHG

## 2021-08-23 DIAGNOSIS — R06.02 SHORTNESS OF BREATH: ICD-10-CM

## 2021-08-23 PROCEDURE — 93306 TTE W/DOPPLER COMPLETE: CPT

## 2021-08-23 PROCEDURE — 93356 MYOCRD STRAIN IMG SPCKL TRCK: CPT | Performed by: INTERNAL MEDICINE

## 2021-08-23 PROCEDURE — 93306 TTE W/DOPPLER COMPLETE: CPT | Performed by: INTERNAL MEDICINE

## 2021-08-23 PROCEDURE — 93356 MYOCRD STRAIN IMG SPCKL TRCK: CPT

## 2021-08-25 LAB
BH CV ECHO MEAS - AO MAX PG (FULL): 4.3 MMHG
BH CV ECHO MEAS - AO MAX PG: 11 MMHG
BH CV ECHO MEAS - AO MEAN PG (FULL): 4 MMHG
BH CV ECHO MEAS - AO MEAN PG: 8 MMHG
BH CV ECHO MEAS - AO ROOT AREA (BSA CORRECTED): 1.8
BH CV ECHO MEAS - AO ROOT AREA: 11.3 CM^2
BH CV ECHO MEAS - AO ROOT DIAM: 3.8 CM
BH CV ECHO MEAS - AO V2 MAX: 166 CM/SEC
BH CV ECHO MEAS - AO V2 MEAN: 132 CM/SEC
BH CV ECHO MEAS - AO V2 VTI: 46.4 CM
BH CV ECHO MEAS - AVA(I,A): 2.5 CM^2
BH CV ECHO MEAS - AVA(I,D): 2.5 CM^2
BH CV ECHO MEAS - AVA(V,A): 2.5 CM^2
BH CV ECHO MEAS - AVA(V,D): 2.5 CM^2
BH CV ECHO MEAS - BSA(HAYCOCK): 2.1 M^2
BH CV ECHO MEAS - BSA: 2.1 M^2
BH CV ECHO MEAS - BZI_BMI: 28.6 KILOGRAMS/M^2
BH CV ECHO MEAS - BZI_METRIC_HEIGHT: 177.8 CM
BH CV ECHO MEAS - BZI_METRIC_WEIGHT: 90.3 KG
BH CV ECHO MEAS - EDV(CUBED): 69.4 ML
BH CV ECHO MEAS - EDV(MOD-SP4): 101 ML
BH CV ECHO MEAS - EDV(TEICH): 74.7 ML
BH CV ECHO MEAS - EF(CUBED): 65.6 %
BH CV ECHO MEAS - EF(MOD-SP4): 64.2 %
BH CV ECHO MEAS - EF(TEICH): 57.6 %
BH CV ECHO MEAS - ESV(CUBED): 23.9 ML
BH CV ECHO MEAS - ESV(MOD-SP4): 36.2 ML
BH CV ECHO MEAS - ESV(TEICH): 31.7 ML
BH CV ECHO MEAS - FS: 29.9 %
BH CV ECHO MEAS - IVS/LVPW: 0.96
BH CV ECHO MEAS - IVSD: 1.5 CM
BH CV ECHO MEAS - LA DIMENSION: 4.7 CM
BH CV ECHO MEAS - LA/AO: 1.2
BH CV ECHO MEAS - LAT PEAK E' VEL: 10.6 CM/SEC
BH CV ECHO MEAS - LV DIASTOLIC VOL/BSA (35-75): 48.5 ML/M^2
BH CV ECHO MEAS - LV MASS(C)D: 257.3 GRAMS
BH CV ECHO MEAS - LV MASS(C)DI: 123.5 GRAMS/M^2
BH CV ECHO MEAS - LV MAX PG: 6.8 MMHG
BH CV ECHO MEAS - LV MEAN PG: 4 MMHG
BH CV ECHO MEAS - LV SYSTOLIC VOL/BSA (12-30): 17.4 ML/M^2
BH CV ECHO MEAS - LV V1 MAX: 130 CM/SEC
BH CV ECHO MEAS - LV V1 MEAN: 93.2 CM/SEC
BH CV ECHO MEAS - LV V1 VTI: 36.5 CM
BH CV ECHO MEAS - LVIDD: 4.1 CM
BH CV ECHO MEAS - LVIDS: 2.9 CM
BH CV ECHO MEAS - LVLD AP4: 9.4 CM
BH CV ECHO MEAS - LVLS AP4: 7.3 CM
BH CV ECHO MEAS - LVOT AREA (M): 3.1 CM^2
BH CV ECHO MEAS - LVOT AREA: 3.1 CM^2
BH CV ECHO MEAS - LVOT DIAM: 2 CM
BH CV ECHO MEAS - LVPWD: 1.6 CM
BH CV ECHO MEAS - MED PEAK E' VEL: 5.8 CM/SEC
BH CV ECHO MEAS - MV A MAX VEL: 97.7 CM/SEC
BH CV ECHO MEAS - MV DEC SLOPE: 195 CM/SEC^2
BH CV ECHO MEAS - MV DEC TIME: 0.44 SEC
BH CV ECHO MEAS - MV E MAX VEL: 86.3 CM/SEC
BH CV ECHO MEAS - MV E/A: 0.88
BH CV ECHO MEAS - RAP SYSTOLE: 10 MMHG
BH CV ECHO MEAS - RVSP: 59 MMHG
BH CV ECHO MEAS - SI(AO): 252.6 ML/M^2
BH CV ECHO MEAS - SI(CUBED): 21.9 ML/M^2
BH CV ECHO MEAS - SI(LVOT): 55.1 ML/M^2
BH CV ECHO MEAS - SI(MOD-SP4): 31.1 ML/M^2
BH CV ECHO MEAS - SI(TEICH): 20.6 ML/M^2
BH CV ECHO MEAS - SV(AO): 526.2 ML
BH CV ECHO MEAS - SV(CUBED): 45.5 ML
BH CV ECHO MEAS - SV(LVOT): 114.7 ML
BH CV ECHO MEAS - SV(MOD-SP4): 64.8 ML
BH CV ECHO MEAS - SV(TEICH): 43 ML
BH CV ECHO MEAS - TR MAX VEL: 350 CM/SEC
BH CV ECHO MEASUREMENTS AVERAGE E/E' RATIO: 10.52
LEFT ATRIUM VOLUME INDEX: 17.6 ML/M2
LEFT ATRIUM VOLUME: 36.6 CM3
MAXIMAL PREDICTED HEART RATE: 133 BPM
STRESS TARGET HR: 113 BPM

## 2021-10-05 ENCOUNTER — TELEPHONE (OUTPATIENT)
Dept: FAMILY MEDICINE CLINIC | Facility: CLINIC | Age: 86
End: 2021-10-05

## 2021-10-05 ENCOUNTER — PATIENT MESSAGE (OUTPATIENT)
Dept: FAMILY MEDICINE CLINIC | Facility: CLINIC | Age: 86
End: 2021-10-05

## 2021-10-05 DIAGNOSIS — G62.9 PERIPHERAL POLYNEUROPATHY: ICD-10-CM

## 2021-10-05 NOTE — TELEPHONE ENCOUNTER
Sent my chart message to dtr to inquire? Gabapentin dose we have on the chart 400 mg bid, awaiting response

## 2021-10-05 NOTE — TELEPHONE ENCOUNTER
Confirm current dose    ----- Message from Lashaun Reyes LPN sent at 10/5/2021  8:24 AM CDT -----  Regarding: RE: Prescription Question  Contact: [phone]  My chart message     ----- Message -----  From: Gonzalo Durham  Sent: 10/5/2021   8:18 AM CDT  To: Miguel Angel [facility], [place]  Subject: Prescription Question                            This message is being sent by Suypaa Osborn on behalf of Gonzalo Durham    Last visit doctor Nick spoke about increasing gabapentin dosage. Dad thought he was fine but would now like to try an increase. Thank you Suyapa.

## 2021-10-08 RX ORDER — GABAPENTIN 400 MG/1
400 CAPSULE ORAL 3 TIMES DAILY
Qty: 270 CAPSULE | Refills: 0 | Status: SHIPPED | OUTPATIENT
Start: 2021-10-08 | End: 2021-10-20 | Stop reason: SDUPTHER

## 2021-10-08 NOTE — TELEPHONE ENCOUNTER
Yes, Neurontin 400 mg PO BID is ok and looks like Dr. Romero Authorized this dosage on 8/4/2021 already so he should have enough supply to do this.    Electronically signed by DOMINIQUE Resendiz, 10/08/21, 9:15 AM CDT.

## 2021-10-19 ENCOUNTER — PATIENT MESSAGE (OUTPATIENT)
Dept: FAMILY MEDICINE CLINIC | Facility: CLINIC | Age: 86
End: 2021-10-19

## 2021-10-19 DIAGNOSIS — G62.9 PERIPHERAL POLYNEUROPATHY: ICD-10-CM

## 2021-10-20 RX ORDER — GABAPENTIN 400 MG/1
400 CAPSULE ORAL 3 TIMES DAILY
Qty: 270 CAPSULE | Refills: 0 | Status: SHIPPED | OUTPATIENT
Start: 2021-10-20 | End: 2022-01-06 | Stop reason: DRUGHIGH

## 2021-10-20 NOTE — TELEPHONE ENCOUNTER
From: Gonzalo Durham  To: Abel Romero MD  Sent: 10/19/2021 4:46 PM CDT  Subject: Gabapentin     This message is being sent by Suyapa Osborn on behalf of Gonzalo Durham.    [facility] is needing you to call with new dosage script. Could you take care of that for me? Thanks Suyapa

## 2021-10-20 NOTE — TELEPHONE ENCOUNTER
Rx Refill Note  Requested Prescriptions     Signed Prescriptions Disp Refills   • gabapentin (Neurontin) 400 MG capsule 270 capsule 0     Sig: Take 1 capsule by mouth 3 (Three) Times a Day for 90 days.     Authorizing Provider: ELIZABETH KAUR     Ordering User: JOVANA DAVIS      Last office visit with prescribing clinician: 7/8/2021      Next office visit with prescribing clinician: 1/6/2022            Jovana Davis MA  10/20/21, 08:58 CDT

## 2021-12-06 ENCOUNTER — PROCEDURE VISIT (OUTPATIENT)
Dept: UROLOGY | Facility: CLINIC | Age: 86
End: 2021-12-06

## 2021-12-06 DIAGNOSIS — Z85.51 HISTORY OF BLADDER CANCER: Primary | ICD-10-CM

## 2021-12-06 LAB
BILIRUB BLD-MCNC: NEGATIVE MG/DL
CLARITY, POC: CLEAR
COLOR UR: YELLOW
GLUCOSE UR STRIP-MCNC: NEGATIVE MG/DL
KETONES UR QL: NEGATIVE
LEUKOCYTE EST, POC: NEGATIVE
NITRITE UR-MCNC: NEGATIVE MG/ML
PH UR: 5.5 [PH] (ref 5–8)
PROT UR STRIP-MCNC: NEGATIVE MG/DL
RBC # UR STRIP: NEGATIVE /UL
SP GR UR: 1.02 (ref 1–1.03)
UROBILINOGEN UR QL: NORMAL

## 2021-12-06 PROCEDURE — 52000 CYSTOURETHROSCOPY: CPT | Performed by: UROLOGY

## 2021-12-06 PROCEDURE — 81003 URINALYSIS AUTO W/O SCOPE: CPT | Performed by: UROLOGY

## 2021-12-06 NOTE — PROGRESS NOTES
Pre- operative diagnosis:  Bladder cancer surveillance.  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  He remains on finasteride and Hytrin.    Post operative diagnosis:  No recurrent bladder tumor    Procedure:  The patient was prepped and draped in a normal sterile fashion.  The urethra was anesthetized with 2% lidocaine jelly.  A flexible cystoscope was introduced per urethra.      Urethra:  Normal    Bladder:  Normal mucosa, No bladder tumor and mild trabeculation    Ureteral orifices:  Normal position bilaterally and Clear efflux bilaterally    Prostate:  lateral lobe hypertrophy and Large intravesical lobe -there is a tiny papillary lesion on the intravesical lobe of his prostate.  We discussed that this could be inflammatory versus very early TCC.    Patient tolerated the procedure well    Complications: none    Blood loss: minimal    Follow up:    Routine follow up-6 months for surveillance cystoscopy or sooner as needed any gross hematuria or worsening LUTS.      This document has been signed by KY Cadena MD on December 6, 2021 12:35 CST

## 2021-12-19 PROCEDURE — G2066 INTER DEVC REMOTE 30D: HCPCS | Performed by: INTERNAL MEDICINE

## 2021-12-19 PROCEDURE — 93298 REM INTERROG DEV EVAL SCRMS: CPT | Performed by: INTERNAL MEDICINE

## 2021-12-27 ENCOUNTER — CLINICAL SUPPORT (OUTPATIENT)
Dept: FAMILY MEDICINE CLINIC | Facility: CLINIC | Age: 86
End: 2021-12-27

## 2021-12-27 DIAGNOSIS — Z23 NEED FOR INFLUENZA VACCINATION: Primary | ICD-10-CM

## 2021-12-27 PROCEDURE — 90662 IIV NO PRSV INCREASED AG IM: CPT | Performed by: FAMILY MEDICINE

## 2021-12-27 PROCEDURE — G0008 ADMIN INFLUENZA VIRUS VAC: HCPCS | Performed by: FAMILY MEDICINE

## 2022-01-05 ENCOUNTER — LAB (OUTPATIENT)
Dept: FAMILY MEDICINE CLINIC | Facility: CLINIC | Age: 87
End: 2022-01-05

## 2022-01-05 DIAGNOSIS — E11.21 CONTROLLED TYPE 2 DIABETES MELLITUS WITH DIABETIC NEPHROPATHY, WITHOUT LONG-TERM CURRENT USE OF INSULIN: ICD-10-CM

## 2022-01-05 DIAGNOSIS — N18.9 CHRONIC KIDNEY DISEASE, UNSPECIFIED CKD STAGE: ICD-10-CM

## 2022-01-05 DIAGNOSIS — E78.2 MIXED HYPERLIPIDEMIA: ICD-10-CM

## 2022-01-05 DIAGNOSIS — E66.9 OBESITY, UNSPECIFIED CLASSIFICATION, UNSPECIFIED OBESITY TYPE, UNSPECIFIED WHETHER SERIOUS COMORBIDITY PRESENT: ICD-10-CM

## 2022-01-05 DIAGNOSIS — E55.9 VITAMIN D DEFICIENCY: ICD-10-CM

## 2022-01-05 DIAGNOSIS — I10 PRIMARY HYPERTENSION: Primary | ICD-10-CM

## 2022-01-05 DIAGNOSIS — Z00.00 WELLNESS EXAMINATION: ICD-10-CM

## 2022-01-05 DIAGNOSIS — Z12.5 PROSTATE CANCER SCREENING: ICD-10-CM

## 2022-01-05 DIAGNOSIS — R31.0 GROSS HEMATURIA: ICD-10-CM

## 2022-01-05 DIAGNOSIS — E53.8 B12 DEFICIENCY: ICD-10-CM

## 2022-01-06 ENCOUNTER — OFFICE VISIT (OUTPATIENT)
Dept: FAMILY MEDICINE CLINIC | Facility: CLINIC | Age: 87
End: 2022-01-06

## 2022-01-06 VITALS
HEART RATE: 115 BPM | OXYGEN SATURATION: 97 % | RESPIRATION RATE: 16 BRPM | SYSTOLIC BLOOD PRESSURE: 108 MMHG | HEIGHT: 71 IN | WEIGHT: 200 LBS | DIASTOLIC BLOOD PRESSURE: 72 MMHG | TEMPERATURE: 98.3 F | BODY MASS INDEX: 28 KG/M2

## 2022-01-06 DIAGNOSIS — I50.32 CHRONIC DIASTOLIC CONGESTIVE HEART FAILURE: ICD-10-CM

## 2022-01-06 DIAGNOSIS — E78.2 MIXED HYPERLIPIDEMIA: Chronic | ICD-10-CM

## 2022-01-06 DIAGNOSIS — I10 PRIMARY HYPERTENSION: Chronic | ICD-10-CM

## 2022-01-06 DIAGNOSIS — K21.9 GASTROESOPHAGEAL REFLUX DISEASE, UNSPECIFIED WHETHER ESOPHAGITIS PRESENT: ICD-10-CM

## 2022-01-06 DIAGNOSIS — I71.40 ABDOMINAL AORTIC ANEURYSM (AAA) WITHOUT RUPTURE: ICD-10-CM

## 2022-01-06 DIAGNOSIS — I70.90 ATHEROSCLEROSIS: ICD-10-CM

## 2022-01-06 DIAGNOSIS — R06.02 SHORTNESS OF BREATH: ICD-10-CM

## 2022-01-06 DIAGNOSIS — N18.9 CHRONIC KIDNEY DISEASE, UNSPECIFIED CKD STAGE: Chronic | ICD-10-CM

## 2022-01-06 DIAGNOSIS — G62.9 PERIPHERAL POLYNEUROPATHY: ICD-10-CM

## 2022-01-06 DIAGNOSIS — R93.89 ABNORMAL CT OF THE CHEST: ICD-10-CM

## 2022-01-06 DIAGNOSIS — D64.9 ANEMIA, UNSPECIFIED TYPE: ICD-10-CM

## 2022-01-06 DIAGNOSIS — E11.21 CONTROLLED TYPE 2 DIABETES MELLITUS WITH DIABETIC NEPHROPATHY, WITHOUT LONG-TERM CURRENT USE OF INSULIN: Chronic | ICD-10-CM

## 2022-01-06 DIAGNOSIS — Z79.01 ANTICOAGULATED: ICD-10-CM

## 2022-01-06 DIAGNOSIS — J96.11 CHRONIC RESPIRATORY FAILURE WITH HYPOXIA: ICD-10-CM

## 2022-01-06 PROCEDURE — 99214 OFFICE O/P EST MOD 30 MIN: CPT | Performed by: FAMILY MEDICINE

## 2022-01-06 RX ORDER — GABAPENTIN 600 MG/1
600 TABLET ORAL 3 TIMES DAILY
Qty: 270 TABLET | Refills: 2 | Status: SHIPPED | OUTPATIENT
Start: 2022-01-06 | End: 2022-06-12 | Stop reason: SDUPTHER

## 2022-01-06 NOTE — PROGRESS NOTES
Subjective   Gonzalo Durham is a 87 y.o. male presenting with chief complaint of:   Chief Complaint   Patient presents with   • Diabetes   • Hyperlipidemia       History of Present Illness :  With daughter.  Here for primarily f/u chronic problems including HTN and others.       Has multiple chronic problems to consider that might have a bearing on today's issues;  an interval appointment.       Chronic/acute problems reviewed today:   1. Primary hypertension Chronic/stable. Stable here past/and home blood pressures.  No significant chest pain, SOB, LE edema, orthopnea, near syncope, dizziness/light headness.   Recent Vitals       8/17/2021 8/23/2021 1/6/2022       BP: 110/70 110/70 108/72     Pulse: 70 -- 115     Temp: -- -- 98.3 °F (36.8 °C)     Weight: 90.3 kg (199 lb) 90.3 kg (199 lb 1.2 oz) 90.7 kg (200 lb)     BMI (Calculated): 27.8 27.8 27.9            2. Mixed hyperlipidemia Chronic/stable.  Tolerated use of Rx with labs showing improved lipid values and tolerant liver labs. No muscle aches unexpected.      3. Chronic diastolic congestive heart failure (HCC) Chronic/stable.  Denies significant sob, orthopnea, leg edema, weight gain.  Aware of influence diet/salt and watching weight at home.       4. Atherosclerosis: CAD, AAA ro chronic/stable various areas of disease.  Quit seeing vascular regularly and no plans for upcoming interventions.  Denies development/change in chest pain, claudication, CVA-TIA symptoms such as (Manifest by slurred speech asymmetry of face dysfunction/weakness of extremities).  Blood thinners noted.      5. Abdominal aortic aneurysm (AAA) without rupture (HCC) previous imaging of abdominal aneurysm in the 4 cm range; being clinically and radiographically followed.  Time to repeat again.   6. Anticoagulated-CAD, DM2, CVA/ASA 81+()+plavix+eliquist Chronic/stable reason for stopping or use of.  Denies bleeding issues; especially epistaxis, melena, hematochezia.  Upper arms/others  do not significantly bruise easily.  No significant bleeding or falls.      7. Controlled type 2 diabetes mellitus with diabetic nephropathy, without long-term current use of insulin (HCC) Chronic/stable.  No problem/pattern hypoglycemia/hyperglycemia manifest by poly- dypsia, phagia, uria, or sweats, diaphoretic episodes, syncope/near.     8. Gastroesophageal reflux disease, unspecified whether esophagitis present Chronic/stable.  Controlled heartburn, reflux without dysphagia, melena.  Rx used, periods not used proven needed with symptoms -currently doing ok.      9. Chronic kidney disease, unspecified CKD stage Chronic/stable.  No nephrology.  No dysuria.  Previously warned/reminded:   To avoid further kidney function decline  A. Treat any time you think you have infection  B. Stay hydrated (dont get dehydrated); drink at least 60 oz fluid every 24 hr (1800 cc or nearly a 2L)  C. Do not allow any xrays with dye WITHOUT the doctor ordering checking your renal function  D. Do not get new medications without the doctor considering your renal condition  E. Do not use motrin/ibuprofen, alleve/naprosyn and these types of medications     10. Anemia, unspecified type Chronic or past history/stable awhile: This has been present before.    There has been GI evaulation in the past. There is no current melena, hematochezia. It has been benign to date and stable/watching.  Contributing comorbidities to date: benign.     11. SOB: copd,CAD,#, hypoxemia Chronic/variable:  SOB worse with exertion/ok at rest.  Contributing diagnosis: those listed.     12. Chronic respiratory failure with hypoxia (HCC) chronic stable need for oxygen supplementation with previous diagnosis of hypokalemia   13. Abnormal CT of the chest 1.2021/12m chronic previous benign appearing lesions of the chest with a suggestion to repeat in 12 months.  No hemoptysis   14. Peripheral polyneuropathy chronic persistent lower extremity discomforts of numbness  stinging that has responded to current dose of gabapentin; clinically suggestive for neuropathy.  Improved but still having difficulties     Has an/another acute issue today: none.    The following portions of the patient's history were reviewed and updated as appropriate: allergies, current medications, past family history, past medical history, past social history, past surgical history and problem list.      Current Outpatient Medications:   •  acetaminophen (TYLENOL) 325 MG tablet, Take 2 tablets by mouth 2 (Two) Times a Day., Disp: 360 tablet, Rfl: 2  •  albuterol sulfate HFA (ProAir HFA) 108 (90 Base) MCG/ACT inhaler, Inhale 2 puffs 4 (Four) Times a Day., Disp: 54 g, Rfl: 3  •  apixaban (Eliquis) 5 MG tablet tablet, Take 1 tablet by mouth 2 (two) times a day., Disp: 180 tablet, Rfl: 3  •  Artificial Tear Solution (Just Tears Eye Drops) solution, Apply 1-2 drops to eye(s) as directed by provider Daily As Needed (dry eyes)., Disp: 45 mL, Rfl: 2  •  ascorbic acid (VITAMIN C) 1000 MG tablet, Take 1 tablet by mouth Daily., Disp: 90 tablet, Rfl: 3  •  aspirin (aspirin) 81 MG EC tablet, Take 1 tablet by mouth Daily., Disp: 90 tablet, Rfl: 3  •  calcium carbonate-vitamin d 600-400 MG-UNIT per tablet, Take 1 tablet by mouth 2 (Two) Times a Day., Disp: 180 tablet, Rfl: 3  •  finasteride (PROSCAR) 5 MG tablet, Take 1 tablet by mouth Daily., Disp: 90 tablet, Rfl: 3  •  FREESTYLE LITE test strip, 1 each by Other route Daily., Disp: 100 each, Rfl: 2  •  gabapentin (Neurontin) 400 MG capsule, Take 1 capsule by mouth 3 (Three) Times a Day for 90 days., Disp: 270 capsule, Rfl: 0  •  glucose blood test strip, Use as instructed, Disp: 200 each, Rfl: 12  •  glyburide (DIAbeta) 5 MG tablet, Take 1 tablet by mouth Every Morning AND 0.5 tablets Daily Before Supper., Disp: 135 tablet, Rfl: 3  •  guaiFENesin (MUCINEX) 600 MG 12 hr tablet, Take 1 tablet by mouth 2 (Two) Times a Day., Disp: 180 tablet, Rfl: 2  •  isosorbide mononitrate  (IMDUR) 30 MG 24 hr tablet, Take 1 tablet by mouth Daily., Disp: 90 tablet, Rfl: 3  •  Lancets (freestyle) lancets, Use once daily, Disp: 100 each, Rfl: 2  •  loratadine (CLARITIN) 10 MG tablet, Take 1 tablet by mouth Daily., Disp: 90 tablet, Rfl: 3  •  metFORMIN (Glucophage) 500 MG tablet, Take 1 tablet by mouth Every Night., Disp: 90 tablet, Rfl: 3  •  metoprolol tartrate (LOPRESSOR) 25 MG tablet, Take 0.5 tablets by mouth 2 (Two) Times a Day., Disp: 180 tablet, Rfl: 3  •  Multiple Vitamins-Minerals (PreserVision AREDS 2+Multi Vit) capsule, Take 1 capsule by mouth Daily., Disp: 90 capsule, Rfl: 1  •  multivitamin with minerals (Centrum Silver Adult 50+) tablet tablet, Take 1 tablet by mouth Daily., Disp: 90 tablet, Rfl: 3  •  nitroglycerin (Nitrostat) 0.4 MG SL tablet, Place 1 tablet under the tongue Every 5 (Five) Minutes As Needed for Chest Pain., Disp: 90 tablet, Rfl: 3  •  NON FORMULARY, CPAP per mask with 4L oxygen nightly, Disp: , Rfl:   •  O2 (OXYGEN), Inhale 4 L/min Continuous. Per nasal cannula, Disp: , Rfl:   •  pantoprazole (PROTONIX) 40 MG EC tablet, Take 1 tablet by mouth Daily., Disp: 90 tablet, Rfl: 3  •  pravastatin (Pravachol) 80 MG tablet, Take 1 tablet by mouth Daily., Disp: 90 tablet, Rfl: 3  •  Stiolto Respimat 2.5-2.5 MCG/ACT aerosol solution inhaler, , Disp: , Rfl:   •  terazosin (HYTRIN) 10 MG capsule, Take 1 capsule by mouth Daily., Disp: 90 capsule, Rfl: 3  •  tiotropium bromide monohydrate (Spiriva Respimat) 2.5 MCG/ACT aerosol solution inhaler, Inhale 2 puffs Daily., Disp: 16 g, Rfl: 3    No problems with medications.    Allergies   Allergen Reactions   • Symbicort [Budesonide-Formoterol Fumarate] Dizziness     Patient passed out and fell   • Prozac [Fluoxetine Hcl] Mental Status Change     Bad dreams.       Review of Systems  GENERAL:  Active/slower with limits, speed, samni for age and SOB.  Sleep is ok; much better with cpap but interrupted caring for wife.    SKIN:  Ongoing callous  of feet; no problems with this/other skin today.   ENDO:  No syncope, near or diaphoretic sweaty spells.  BS Ok: without download noted last visit.   HEENT: No head injury, headache.  No vision change.  Same mild hearing loss.  Ears without pain/drainage.  No sore throat.  No significant nasal/sinus congestion/drainage. No epistaxis.  CHEST: No chest wall tenderness or mass. Stable occ cough without productive without wheeze.  Mild/same SOB; no hemoptysis.  Wears oxygen continous.   CV: No chest pain, palpatations, ankle edema.  GI: No heartburn significant dysphagia.  No abdominal pain, diarrhea, constipation, rectal bleeding, or melena.    :  Voids without dysuria, or incontience to completion.  ORTHO: No painful/swollen joints but various on /off sore.  No change occ/usual sore neck or back.  But no acute neck but above mid back pain without recent injury.   NEURO: No dizziness; recent LUE weakness of extremities.  No change some LE numbness/parethesias.   PSYCH: No memory loss.  Mood good; not that anxious, depressed but/and not suicidal.  Tolerated stress.   Screening:  Mammogram: NA  Bone density: NA  Low dose CT chest: Tobacco-smoker/age 22/1 ppd/dc 1980: (22): NA (had CT angio)  1.18.21  IMPRESSION:  1. Stable 6 mm pulmonary nodule right lower lobe.  2. Pulmonary emphysema and chronic lung changes. Remote granulomatous  disease.  3. No acute cardiopulmonary process.    GI: Colon-div/[facility]/6.15.17/prn  Prostate: urology/confirmed 7.8.21  [place]/confirmed 11.22.19  [place] in past   Usual lab order  6m CBC, BMP, A1c  12m CBC, CMP, A1c, TSH, LIPID, PSAs, Vit D    CT abdomen 1.18.21  4.3 x 4.4 cm    Data reviewed:   Recent admit/ER/MD visits: cardiology  Last cardiac testing:   Echo:   Results for orders placed during the hospital encounter of 08/23/21    Adult Transthoracic Echo Complete w/ Color, Spectral and Contrast if necessary per protocol    Interpretation Summary  · Left ventricular wall thickness is  consistent with mild to moderate concentric hypertrophy.  · Left ventricular ejection fraction appears to be 61 - 65%. Left ventricular systolic function is normal.  · Normal global longitudinal LV strain (GLS) = -18%.  · Left ventricular diastolic function was indeterminate.  · Moderate pulmonary hypertension is present.        Lab Results:  Results for orders placed or performed in visit on 01/05/22   Comprehensive Metabolic Panel    Specimen: Blood   Result Value Ref Range    Glucose 73 65 - 99 mg/dL    BUN 27 (H) 8 - 23 mg/dL    Creatinine 1.28 (H) 0.76 - 1.27 mg/dL    eGFR Non African Am 53 (L) >60 mL/min/1.73    eGFR African Am 64 >60 mL/min/1.73    BUN/Creatinine Ratio 21.1 7.0 - 25.0    Sodium 145 136 - 145 mmol/L    Potassium 4.4 3.5 - 5.2 mmol/L    Chloride 106 98 - 107 mmol/L    Total CO2 32.7 (H) 22.0 - 29.0 mmol/L    Calcium 9.9 8.6 - 10.5 mg/dL    Total Protein 6.5 6.0 - 8.5 g/dL    Albumin 4.80 3.50 - 5.20 g/dL    Globulin 1.7 gm/dL    A/G Ratio 2.8 g/dL    Total Bilirubin 0.2 0.0 - 1.2 mg/dL    Alkaline Phosphatase 58 39 - 117 U/L    AST (SGOT) 14 1 - 40 U/L    ALT (SGPT) 11 1 - 41 U/L   Hemoglobin A1c    Specimen: Blood   Result Value Ref Range    Hemoglobin A1C 6.30 (H) 4.80 - 5.60 %   TSH    Specimen: Blood   Result Value Ref Range    TSH 2.620 0.270 - 4.200 uIU/mL   Lipid Panel    Specimen: Blood   Result Value Ref Range    Total Cholesterol 150 0 - 200 mg/dL    Triglycerides 103 0 - 150 mg/dL    HDL Cholesterol 37 (L) 40 - 60 mg/dL    VLDL Cholesterol Kieran 19 5 - 40 mg/dL    LDL Chol Calc (NIH) 94 0 - 100 mg/dL   PSA Screen    Specimen: Blood   Result Value Ref Range    PSA 0.983 0.000 - 4.000 ng/mL   Vitamin D 25 Hydroxy    Specimen: Blood   Result Value Ref Range    25 Hydroxy, Vitamin D 63.3 30.0 - 100.0 ng/ml   Iron    Specimen: Blood   Result Value Ref Range    Iron 60 59 - 158 mcg/dL   Vitamin B12    Specimen: Blood   Result Value Ref Range    Vitamin B-12 738 211 - 946 pg/mL   Folate     Specimen: Blood   Result Value Ref Range    Folate >20.00 4.78 - 24.20 ng/mL   CBC & Differential    Specimen: Blood   Result Value Ref Range    WBC 7.98 3.40 - 10.80 10*3/mm3    RBC 4.39 4.14 - 5.80 10*6/mm3    Hemoglobin 12.6 (L) 13.0 - 17.7 g/dL    Hematocrit 38.4 37.5 - 51.0 %    MCV 87.5 79.0 - 97.0 fL    MCH 28.7 26.6 - 33.0 pg    MCHC 32.8 31.5 - 35.7 g/dL    RDW 13.0 12.3 - 15.4 %    Platelets 220 140 - 450 10*3/mm3    Neutrophil Rel % 58.9 42.7 - 76.0 %    Lymphocyte Rel % 24.1 19.6 - 45.3 %    Monocyte Rel % 8.8 5.0 - 12.0 %    Eosinophil Rel % 7.3 (H) 0.3 - 6.2 %    Basophil Rel % 0.6 0.0 - 1.5 %    Neutrophils Absolute 4.71 1.70 - 7.00 10*3/mm3    Lymphocytes Absolute 1.92 0.70 - 3.10 10*3/mm3    Monocytes Absolute 0.70 0.10 - 0.90 10*3/mm3    Eosinophils Absolute 0.58 (H) 0.00 - 0.40 10*3/mm3    Basophils Absolute 0.05 0.00 - 0.20 10*3/mm3    Immature Granulocyte Rel % 0.3 0.0 - 0.5 %    Immature Grans Absolute 0.02 0.00 - 0.05 10*3/mm3    nRBC 0.0 0.0 - 0.2 /100 WBC       A1C:  Lab Results - Last 18 Months   Lab Units 01/05/22  0658 07/06/21  0731 01/05/21  0732   HEMOGLOBIN A1C % 6.30* 6.20* 6.00*     GLUCOSE:  Lab Results - Last 18 Months   Lab Units 01/05/22  0658 07/06/21  0731 02/10/21  2022 02/05/21  1230 01/05/21  0732   GLUCOSE mg/dL 73 90 140* 136* 88     LIPID:  Lab Results - Last 18 Months   Lab Units 01/05/22  0658 01/05/21  0732   CHOLESTEROL mg/dL 150 172   LDL CHOL mg/dL 94 114*   HDL CHOL mg/dL 37* 37*   TRIGLYCERIDES mg/dL 103 116     PSA:  Lab Results - Last 18 Months   Lab Units 01/05/22  0658 01/05/21  0732   PSA ng/mL 0.983 1.040     CBC:  Lab Results - Last 18 Months   Lab Units 01/05/22  0658 07/06/21  0731 02/10/21  2022 02/05/21  1230 01/05/21  0732 09/24/20  0716   WBC 10*3/mm3 7.98 6.61 14.30* 6.51 7.05 6.53   HEMOGLOBIN g/dL 12.6* 12.2* 10.4* 12.1* 12.2* 12.4*   HEMATOCRIT % 38.4 38.1 30.5* 38.2 37.5 38.3   PLATELETS 10*3/mm3 220 212 152 207 213 219   IRON mcg/dL 60 65  --    "--   --  63      BMP/CMP:  Lab Results - Last 18 Months   Lab Units 01/05/22  0658 07/06/21  0731 02/10/21  2022 02/05/21  1230 01/18/21  0822 01/05/21  0732   SODIUM mmol/L 145 145 131* 142  --  144   POTASSIUM mmol/L 4.4 4.6 4.0 4.3  --  4.4   CHLORIDE mmol/L 106 107 96* 104  --  104   TOTAL CO2 mmol/L 32.7* 28.6  --   --   --  32.9*   CO2 mmol/L  --   --  27.0 32.0*  --   --    GLUCOSE mg/dL 73 90 140* 136*  --  88   BUN mg/dL 27* 23 25* 25*  --  26*   CREATININE mg/dL 1.28* 1.15 1.31* 1.11 1.30 1.09   EGFR IF NONAFRICN AM mL/min/1.73 53* 60* 52* 63  --  64   EGFR IF AFRICN AM mL/min/1.73 64 73  --   --   --  78   CALCIUM mg/dL 9.9 9.7 9.3 9.4  --  9.5     HEPATIC:  Lab Results - Last 18 Months   Lab Units 01/05/22  0658 02/10/21  2022 01/05/21  0732   ALT (SGPT) U/L 11 12 10   AST (SGOT) U/L 14 13 12   ALK PHOS U/L 58 43 54     THYROID:  Lab Results - Last 18 Months   Lab Units 01/05/22  0658 01/05/21  0732   TSH uIU/mL 2.620 2.780       Objective   /72   Pulse 115   Temp 98.3 °F (36.8 °C)   Resp 16   Ht 180.3 cm (71\")   Wt 90.7 kg (200 lb)   SpO2 97%   BMI 27.89 kg/m²   Body mass index is 27.89 kg/m².      Recent Vitals       8/17/2021 8/23/2021 1/6/2022       BP: 110/70 110/70 108/72     Pulse: 70 -- 115     Temp: -- -- 98.3 °F (36.8 °C)     Weight: 90.3 kg (199 lb) 90.3 kg (199 lb 1.2 oz) 90.7 kg (200 lb)     BMI (Calculated): 27.8 27.8 27.9           Physical Exam  GENERAL:  Well nourished/developed in no acute distress.   SKIN: Turgor excellent, without wound, rash, lesion  HEENT: Normal cephalic without trauma.  Pupils equal round reactive to light. Extraocular motions full without nystagmus.    No thyroidmegaly, mass, tenderness, lymphadenopathy and supple.  CV: Regular rhythm.  No murmur, gallop,  edema. Posterior pulses intact.  No carotid bruits.  CHEST: No chest wall tenderness or mass.   LUNGS: Symmetric motion with clear/decreased to auscultation.   ABD: Soft, nontender without mass. "   ORTHO: Symmetric extremities without swelling/point tenderness.  Full gross range of motion except hips reduced.  Symmetric LE.  Neg straight leg raising.  Neg Patricks maneuver.  Back is straight/mild lordosis.  Mid back sore touch.   NEURO: CN 2-12 grossly intact.  Symmetric facies. 1/4 x bicep knee equal reflexes.  UE/LE   3/5 strength throughout except 2/5  L.  Nonfocal use extremities. Speech clear.  Light touch decreased bilateral feet.   PSYCH: Oriented x 3.  Pleasant calm, well kept.  Purposeful/directed conservation with intact short/long gross memory.        Assessment/Plan     1. Primary hypertension    2. Mixed hyperlipidemia    3. Chronic diastolic congestive heart failure (HCC)    4. Atherosclerosis: CAD, AAA ro    5. Abdominal aortic aneurysm (AAA) without rupture (HCC)    6. Anticoagulated-CAD, DM2, CVA/ASA 81+()+plavix+eliquist    7. Controlled type 2 diabetes mellitus with diabetic nephropathy, without long-term current use of insulin (HCC)    8. Gastroesophageal reflux disease, unspecified whether esophagitis present    9. Chronic kidney disease, unspecified CKD stage    10. Anemia, unspecified type    11. SOB: copd,CAD,#, hypoxemia    12. Chronic respiratory failure with hypoxia (HCC)    13. Abnormal CT of the chest 1.2021/12m    14. Peripheral polyneuropathy        Discussion:  BP ok  DM/BS ok  Thyroid ok  Liver ok  CBC ok; mildly stable anemia  Renal ok  Lipid ok  PSA ok last    Due CT chest  Due CT abdomen/AAA    Vaccine reviewed: today none; later as able covid booster and later Tdap, shingles  Screening reviewed/updated    Medical decision issues:   Data review above:   Rx: reviewed and decisions:   Same Rx for now except try increase neurotin    Visit today involved chronic significant medical problems or differentials and/or intensive drug monitoring: ie potential to cause serious morbidity or death:   New Medications Ordered This Visit   Medications   • gabapentin (NEURONTIN)  600 MG tablet     Sig: Take 1 tablet by mouth 3 (Three) Times a Day.     Dispense:  270 tablet     Refill:  2       Orders placed:   LAB/Testing/Referrals: reviewed/orders:   Today:   No orders of the defined types were placed in this encounter.    Chronic/recurrent labs above or change to:   same     Health maintenance:   Body mass index is 27.89 kg/m².  Patient's Body mass index is 27.89 kg/m². indicating that he is overweight (BMI 25-29.9). Obesity-related health conditions include the following: diabetes mellitus. Obesity is unchanged. BMI is is above average; BMI management plan is completed. We discussed portion control and increasing exercise..      Tobacco use reviewed:   Gonzalo Durham  reports that he quit smoking about 42 years ago. His smoking use included cigarettes. He started smoking about 68 years ago. He has a 26.00 pack-year smoking history. He has never used smokeless tobacco..    There are no Patient Instructions on file for this visit.    Follow up: Return for give him narcotic form; then lab/Dr Romero 6m.  Future Appointments   Date Time Provider Department Center   1/10/2022  9:30 AM Myra Vee APRN [facility]  [facility]   1/26/2022  1:15 PM Felzi Frye APRN [facility]   2/17/2022  9:00 AM Diallo Hightower APRN [facility]   6/9/2022 10:00 AM Danie Cadena MD [facility]   7/6/2022  8:25 AM [facility]   7/11/2022  9:30 AM Abel Romero MD [facility]

## 2022-01-07 PROBLEM — I50.32 CHRONIC DIASTOLIC CONGESTIVE HEART FAILURE: Status: ACTIVE | Noted: 2019-08-04

## 2022-01-07 PROBLEM — I48.0 PAF (PAROXYSMAL ATRIAL FIBRILLATION) (HCC): Chronic | Status: ACTIVE | Noted: 2022-01-07

## 2022-01-07 PROBLEM — Z95.1 S/P CABG X 3: Status: ACTIVE | Noted: 2022-01-07

## 2022-01-07 PROBLEM — N18.31 STAGE 3A CHRONIC KIDNEY DISEASE: Status: ACTIVE | Noted: 2017-01-20

## 2022-01-07 PROBLEM — I27.20 PULMONARY HYPERTENSION: Chronic | Status: ACTIVE | Noted: 2022-01-07

## 2022-01-07 PROBLEM — I48.0 PAF (PAROXYSMAL ATRIAL FIBRILLATION): Status: ACTIVE | Noted: 2022-01-07

## 2022-01-07 PROBLEM — E11.59 CONTROLLED TYPE 2 DIABETES MELLITUS WITH CIRCULATORY DISORDER, WITHOUT LONG-TERM CURRENT USE OF INSULIN (HCC): Chronic | Status: ACTIVE | Noted: 2017-01-20

## 2022-01-07 PROBLEM — Z95.1 S/P CABG X 3: Chronic | Status: ACTIVE | Noted: 2022-01-07

## 2022-01-07 PROBLEM — J44.9 STAGE 2 MODERATE COPD BY GOLD CLASSIFICATION: Chronic | Status: ACTIVE | Noted: 2019-02-20

## 2022-01-07 PROBLEM — I50.32 CHRONIC DIASTOLIC CONGESTIVE HEART FAILURE: Chronic | Status: ACTIVE | Noted: 2019-08-04

## 2022-01-07 PROBLEM — I27.20 PULMONARY HYPERTENSION: Status: ACTIVE | Noted: 2022-01-07

## 2022-01-07 PROBLEM — E78.5 HYPERLIPIDEMIA LDL GOAL <70: Status: ACTIVE | Noted: 2017-01-20

## 2022-01-07 PROBLEM — N18.31 STAGE 3A CHRONIC KIDNEY DISEASE (HCC): Chronic | Status: ACTIVE | Noted: 2017-01-20

## 2022-01-07 PROBLEM — G47.33 OBSTRUCTIVE SLEEP APNEA: Chronic | Status: ACTIVE | Noted: 2020-03-09

## 2022-01-07 PROBLEM — I25.10 CORONARY ARTERY DISEASE INVOLVING NATIVE CORONARY ARTERY OF NATIVE HEART WITHOUT ANGINA PECTORIS: Status: ACTIVE | Noted: 2017-01-20

## 2022-01-07 PROBLEM — Z79.01 CHRONIC ANTICOAGULATION: Chronic | Status: ACTIVE | Noted: 2017-09-08

## 2022-01-07 PROBLEM — E11.59 CONTROLLED TYPE 2 DIABETES MELLITUS WITH CIRCULATORY DISORDER, WITHOUT LONG-TERM CURRENT USE OF INSULIN: Status: ACTIVE | Noted: 2017-01-20

## 2022-01-07 NOTE — PROGRESS NOTES
Chief Complaint  Coronary Artery Disease (6mo F/U. Has been well. No chest pain, palpitations, or edema. ) and Shortness of Breath (Ongoing. Unchanged. )    Subjective          Gonzalo Durham presents to [facility] for routine follow-up.  He has chronic diastolic congestive heart failure, coronary artery disease status post CABG x3 in 1980 in [facility], paroxysmal atrial fibrillation on anticoagulation, COPD, chronic kidney disease, type 2 diabetes mellitus, pulmonary hypertension, hypertension, hyperlipidemia, stroke and sleep apnea. He reports chronic shortness of breath, which he relates to COPD.  Patient denies chest pain, palpitations, dizziness, syncope, orthopnea, PND, edema or decreased stamina.  Patient denies any signs of bleeding.    Congestive Heart Failure  Presents for follow-up visit. Associated symptoms include shortness of breath. Pertinent negatives include no abdominal pain, chest pain, chest pressure, claudication, edema, fatigue, muscle weakness, near-syncope, nocturia, orthopnea, palpitations, paroxysmal nocturnal dyspnea or unexpected weight change. The symptoms have been stable. His past medical history is significant for CAD. Compliance with total regimen is 51-75%. Compliance with diet is 51-75%. Compliance with exercise is 51-75%. Compliance with medications is %.   Coronary Artery Disease  Presents for follow-up visit. Symptoms include shortness of breath. Pertinent negatives include no chest pain, chest pressure, chest tightness, dizziness, leg swelling, muscle weakness, palpitations or weight gain. Risk factors include hyperlipidemia. Risk factors do not include hypertension. His past medical history is significant for CHF. The symptoms have been stable. Compliance with diet is variable. Compliance with exercise is variable. Compliance with medications is good.   Hypertension  This is a chronic problem. The current episode started more than 1 year  "ago. The problem is controlled. Associated symptoms include shortness of breath. Pertinent negatives include no anxiety, blurred vision, chest pain, headaches, malaise/fatigue, neck pain, orthopnea, palpitations, peripheral edema, PND or sweats. Risk factors for coronary artery disease include male gender, dyslipidemia and diabetes mellitus. Current antihypertension treatment includes beta blockers. The current treatment provides significant improvement. Hypertensive end-organ damage includes kidney disease, CAD/MI, CVA, heart failure and left ventricular hypertrophy. Identifiable causes of hypertension include chronic renal disease and sleep apnea.   Hyperlipidemia  This is a chronic problem. The current episode started more than 1 year ago. Exacerbating diseases include chronic renal disease and diabetes. Associated symptoms include shortness of breath. Pertinent negatives include no chest pain. Current antihyperlipidemic treatment includes statins. Risk factors for coronary artery disease include hypertension, male sex, dyslipidemia and diabetes mellitus.   Atrial Fibrillation  Presents for follow-up visit. Symptoms include shortness of breath. Symptoms are negative for an AICD problem, bradycardia, chest pain, dizziness, hemodynamic instability, hypertension, hypotension, pacemaker problem, palpitations, syncope, tachycardia and weakness. The symptoms have been stable. Past medical history includes atrial fibrillation, CAD, CHF and hyperlipidemia.       Objective   Vital Signs:   /84   Pulse 69   Ht 180.3 cm (71\")   Wt 93 kg (205 lb)   SpO2 98%   BMI 28.59 kg/m²     Vitals and nursing note reviewed.   Constitutional:       General: Awake.      Appearance: Normal and healthy appearance. Well-developed, normal weight and not in distress.      Interventions: Nasal cannula in place.   Eyes:      General: Lids are normal.      Conjunctiva/sclera: Conjunctivae normal.      Pupils: Pupils are equal, round, " and reactive to light.   HENT:      Head: Normocephalic and atraumatic.      Nose: Nose normal.   Neck:      Vascular: No JVR. JVD normal.   Pulmonary:      Effort: Pulmonary effort is normal.      Breath sounds: Examination of the right-upper field reveals decreased breath sounds. Examination of the left-upper field reveals decreased breath sounds. Examination of the right-middle field reveals decreased breath sounds. Examination of the left-middle field reveals decreased breath sounds. Examination of the right-lower field reveals decreased breath sounds. Examination of the left-lower field reveals decreased breath sounds. Decreased breath sounds present. No wheezing. No rhonchi. No rales.   Chest:      Chest wall: Not tender to palpatation.   Cardiovascular:      PMI at left midclavicular line. Normal rate. Regular rhythm. Normal S1. Normal S2.      Murmurs: There is no murmur.      No gallop. No click. No rub.   Pulses:     Intact distal pulses.   Edema:     Peripheral edema present.     Pretibial: bilateral 1+ pitting edema of the pretibial area.     Ankle: bilateral 1+ pitting edema of the ankle.     Feet: bilateral 1+ pitting edema of the feet.  Abdominal:      General: Bowel sounds are normal.      Palpations: Abdomen is soft.      Tenderness: There is no abdominal tenderness.   Musculoskeletal: Normal range of motion.         General: No tenderness.      Cervical back: Normal range of motion. Skin:     General: Skin is warm and dry.   Neurological:      General: No focal deficit present.      Mental Status: Alert, oriented to person, place, and time and oriented to person, place and time.   Psychiatric:         Attention and Perception: Attention and perception normal.         Mood and Affect: Mood and affect normal.         Speech: Speech normal.         Behavior: Behavior normal. Behavior is cooperative.         Thought Content: Thought content normal.         Cognition and Memory: Cognition and memory  normal.         Judgment: Judgment normal.        Result Review :   The following data was reviewed by: DOMINIQUE Schulz on 01/10/2022:  Common labs    Common Labsle 2/10/21 2/10/21 7/6/21 7/6/21 7/6/21 1/5/22 1/5/22 1/5/22 1/5/22 1/5/22    2022 2022 0731 [id number]   Glucose  140 (A)  90   73      BUN  25 (A)  23   27 (A)      Creatinine  1.31 (A)  1.15   1.28 (A)      eGFR Non African Am  52 (A)  60 (A)   53 (A)      eGFR  Am    73   64      Sodium  131 (A)  145   145      Potassium  4.0  4.6   4.4      Chloride  96 (A)  107   106      Calcium  9.3  9.7   9.9      Total Protein       6.5      Albumin  3.70     4.80      Total Bilirubin  0.5     0.2      Alkaline Phosphatase  43     58      AST (SGOT)  13     14      ALT (SGPT)  12     11      WBC 14.30 (A)  6.61   7.98       Hemoglobin 10.4 (A)  12.2 (A)   12.6 (A)       Hematocrit 30.5 (A)  38.1   38.4       Platelets 152  212   220       Total Cholesterol         150    Triglycerides         103    HDL Cholesterol         37 (A)    LDL Cholesterol          94    Hemoglobin A1C     6.20 (A)   6.30 (A)     PSA          0.983   (A) Abnormal value       Comments are available for some flowsheets but are not being displayed.           Data reviewed: Cardiology studies 2d echo 8/23/21     ECG 12 Lead    Date/Time: 1/10/2022 10:01 AM  Performed by: Myra Vee APRN  Authorized by: Myra Vee APRN   Comparison: compared with previous ECG from 7/7/2021  Rhythm: sinus rhythm  Ectopy: atrial premature contractions  Rate: normal  BPM: 69  Conduction: non-specific intraventricular conduction delay  Q waves: II, III and aVF    QRS axis: normal    Clinical impression: abnormal EKG              Assessment and Plan    Diagnoses and all orders for this visit:    1. Chronic diastolic congestive heart failure (HCC) (Primary)-NYHA class II.  Compensated.  Discussed initiation of Entresto, however pt seems to be minimally  symptomatic. Will defer for now given chronic kidney disease. Reviewed signs and symptoms of CHF and what to report with the patient. Patient instructed to restrict sodium and weigh daily. Report weight gain of greater than 2 lbs overnight or 5 lbs in 1 week. Pt verbalized understanding of instructions and plan of care.     2. Coronary artery disease involving native coronary artery of native heart without angina pectoris-no clinical signs of ischemia.  Continue Imdur.    3. S/P CABG x 3-in 1980 in [facility].  Patient continues on aspirin.  Denies bleeding.    4. PAF (paroxysmal atrial fibrillation) ([facility])-rate controlled and anticoagulated.  Continue metoprolol tartrate.    5. Chronic anticoagulation-continues on Eliquis.  Denies bleeding.    6. Stage 2 moderate COPD by GOLD classification ([facility])-patient follows with Dr. Jha with pulmonology. Stable on continuous oxygen at 4L.     7. Stage 3a chronic kidney disease ([facility])- pt does not follow with nephrology. Management per PCP at this time.     8. Controlled type 2 diabetes mellitus with other circulatory complication, without long-term current use of insulin ([facility])-management per PCP.    9. Pulmonary hypertension ([facility])-moderate on 2D echo 8/23/2021.  Patient follows with pulmonology.    10. Primary hypertension-blood pressures well controlled.  Continue metoprolol tartrate.    11. Hyperlipidemia LDL goal <70-management per PCP.  Continue pravastatin.    12. Obstructive sleep apnea-patient reports compliance with BiPAP.        Follow Up   Return in about 3 months (around 4/10/2022) for Next scheduled follow up.  Patient was given instructions and counseling regarding his condition or for health maintenance advice. Please see specific information pulled into the AVS if appropriate.

## 2022-01-10 ENCOUNTER — HOSPITAL ENCOUNTER (OUTPATIENT)
Dept: CT IMAGING | Facility: HOSPITAL | Age: 87
Discharge: HOME | End: 2022-01-10
Admitting: FAMILY MEDICINE

## 2022-01-10 ENCOUNTER — OFFICE VISIT (OUTPATIENT)
Dept: CARDIOLOGY | Facility: CLINIC | Age: 87
End: 2022-01-10

## 2022-01-10 VITALS
BODY MASS INDEX: 28.7 KG/M2 | WEIGHT: 205 LBS | OXYGEN SATURATION: 98 % | DIASTOLIC BLOOD PRESSURE: 84 MMHG | SYSTOLIC BLOOD PRESSURE: 128 MMHG | HEIGHT: 71 IN | HEART RATE: 69 BPM

## 2022-01-10 DIAGNOSIS — Z79.01 CHRONIC ANTICOAGULATION: Chronic | ICD-10-CM

## 2022-01-10 DIAGNOSIS — I10 PRIMARY HYPERTENSION: Chronic | ICD-10-CM

## 2022-01-10 DIAGNOSIS — J44.9 STAGE 2 MODERATE COPD BY GOLD CLASSIFICATION: Chronic | ICD-10-CM

## 2022-01-10 DIAGNOSIS — E11.59 CONTROLLED TYPE 2 DIABETES MELLITUS WITH OTHER CIRCULATORY COMPLICATION, WITHOUT LONG-TERM CURRENT USE OF INSULIN: Chronic | ICD-10-CM

## 2022-01-10 DIAGNOSIS — I48.0 PAF (PAROXYSMAL ATRIAL FIBRILLATION): Chronic | ICD-10-CM

## 2022-01-10 DIAGNOSIS — I71.40 ABDOMINAL AORTIC ANEURYSM (AAA) WITHOUT RUPTURE: Primary | ICD-10-CM

## 2022-01-10 DIAGNOSIS — I71.40 ABDOMINAL AORTIC ANEURYSM (AAA) WITHOUT RUPTURE: ICD-10-CM

## 2022-01-10 DIAGNOSIS — I50.32 CHRONIC DIASTOLIC CONGESTIVE HEART FAILURE: Primary | Chronic | ICD-10-CM

## 2022-01-10 DIAGNOSIS — I27.20 PULMONARY HYPERTENSION: ICD-10-CM

## 2022-01-10 DIAGNOSIS — Z95.1 S/P CABG X 3: Chronic | ICD-10-CM

## 2022-01-10 DIAGNOSIS — R93.89 ABNORMAL CT OF THE CHEST: ICD-10-CM

## 2022-01-10 DIAGNOSIS — E78.5 HYPERLIPIDEMIA LDL GOAL <70: Chronic | ICD-10-CM

## 2022-01-10 DIAGNOSIS — I25.10 CORONARY ARTERY DISEASE INVOLVING NATIVE CORONARY ARTERY OF NATIVE HEART WITHOUT ANGINA PECTORIS: Chronic | ICD-10-CM

## 2022-01-10 DIAGNOSIS — G47.33 OBSTRUCTIVE SLEEP APNEA: Chronic | ICD-10-CM

## 2022-01-10 DIAGNOSIS — N18.31 STAGE 3A CHRONIC KIDNEY DISEASE: Chronic | ICD-10-CM

## 2022-01-10 PROCEDURE — 99214 OFFICE O/P EST MOD 30 MIN: CPT | Performed by: NURSE PRACTITIONER

## 2022-01-10 PROCEDURE — 93000 ELECTROCARDIOGRAM COMPLETE: CPT | Performed by: NURSE PRACTITIONER

## 2022-01-10 PROCEDURE — 0 IOPAMIDOL PER 1 ML: Performed by: FAMILY MEDICINE

## 2022-01-10 PROCEDURE — 74174 CTA ABD&PLVS W/CONTRAST: CPT

## 2022-01-10 PROCEDURE — 71260 CT THORAX DX C+: CPT

## 2022-01-10 RX ADMIN — IOPAMIDOL 100 ML: 755 INJECTION, SOLUTION INTRAVENOUS at 10:53

## 2022-01-11 DIAGNOSIS — I71.40 ABDOMINAL AORTIC ANEURYSM (AAA) WITHOUT RUPTURE: ICD-10-CM

## 2022-01-11 DIAGNOSIS — I70.90 ATHEROSCLEROSIS: Primary | ICD-10-CM

## 2022-01-19 PROCEDURE — 93298 REM INTERROG DEV EVAL SCRMS: CPT | Performed by: INTERNAL MEDICINE

## 2022-01-19 PROCEDURE — G2066 INTER DEVC REMOTE 30D: HCPCS | Performed by: INTERNAL MEDICINE

## 2022-01-24 ENCOUNTER — LAB (OUTPATIENT)
Dept: LAB | Facility: HOSPITAL | Age: 87
End: 2022-01-24

## 2022-01-24 ENCOUNTER — TELEPHONE (OUTPATIENT)
Dept: VASCULAR SURGERY | Facility: CLINIC | Age: 87
End: 2022-01-24

## 2022-01-24 DIAGNOSIS — Z20.822 ENCOUNTER FOR PREOPERATIVE SCREENING LABORATORY TESTING FOR COVID-19 VIRUS: ICD-10-CM

## 2022-01-24 DIAGNOSIS — Z01.812 ENCOUNTER FOR PREOPERATIVE SCREENING LABORATORY TESTING FOR COVID-19 VIRUS: ICD-10-CM

## 2022-01-24 LAB — SARS-COV-2 ORF1AB RESP QL NAA+PROBE: NOT DETECTED

## 2022-01-24 PROCEDURE — U0004 COV-19 TEST NON-CDC HGH THRU: HCPCS

## 2022-01-24 PROCEDURE — C9803 HOPD COVID-19 SPEC COLLECT: HCPCS

## 2022-01-24 PROCEDURE — U0005 INFEC AGEN DETEC AMPLI PROBE: HCPCS

## 2022-01-24 NOTE — TELEPHONE ENCOUNTER
Spoke with Ms Osborn reminding her of Mr Durham's appointment for Tuesday, January 25th, 2022 at 915 am with Dr Gutirerez. Ms Osborn confirmed Mr durham would be here.

## 2022-01-25 ENCOUNTER — OFFICE VISIT (OUTPATIENT)
Dept: VASCULAR SURGERY | Facility: CLINIC | Age: 87
End: 2022-01-25

## 2022-01-25 VITALS
WEIGHT: 205 LBS | SYSTOLIC BLOOD PRESSURE: 118 MMHG | DIASTOLIC BLOOD PRESSURE: 78 MMHG | BODY MASS INDEX: 27.77 KG/M2 | HEIGHT: 72 IN

## 2022-01-25 DIAGNOSIS — I10 PRIMARY HYPERTENSION: ICD-10-CM

## 2022-01-25 DIAGNOSIS — I65.23 BILATERAL CAROTID ARTERY STENOSIS: ICD-10-CM

## 2022-01-25 DIAGNOSIS — E78.5 HYPERLIPIDEMIA LDL GOAL <70: ICD-10-CM

## 2022-01-25 DIAGNOSIS — I71.40 ABDOMINAL AORTIC ANEURYSM (AAA) 3.0 CM TO 5.5 CM IN DIAMETER IN MALE: Primary | ICD-10-CM

## 2022-01-25 PROCEDURE — 99214 OFFICE O/P EST MOD 30 MIN: CPT | Performed by: NURSE PRACTITIONER

## 2022-01-25 RX ORDER — ASPIRIN 81 MG/1
1 TABLET ORAL DAILY
COMMUNITY

## 2022-01-26 ENCOUNTER — OFFICE VISIT (OUTPATIENT)
Dept: PULMONOLOGY | Facility: CLINIC | Age: 87
End: 2022-01-26

## 2022-01-26 VITALS
HEIGHT: 72 IN | DIASTOLIC BLOOD PRESSURE: 84 MMHG | WEIGHT: 207 LBS | OXYGEN SATURATION: 94 % | HEART RATE: 52 BPM | SYSTOLIC BLOOD PRESSURE: 122 MMHG | BODY MASS INDEX: 28.04 KG/M2

## 2022-01-26 DIAGNOSIS — G47.33 OSA ON CPAP: ICD-10-CM

## 2022-01-26 DIAGNOSIS — J96.11 CHRONIC RESPIRATORY FAILURE WITH HYPOXIA AND HYPERCAPNIA: ICD-10-CM

## 2022-01-26 DIAGNOSIS — J96.12 CHRONIC RESPIRATORY FAILURE WITH HYPOXIA AND HYPERCAPNIA: ICD-10-CM

## 2022-01-26 DIAGNOSIS — J44.9 STAGE 2 MODERATE COPD BY GOLD CLASSIFICATION: Primary | Chronic | ICD-10-CM

## 2022-01-26 DIAGNOSIS — J43.9 PULMONARY EMPHYSEMA, UNSPECIFIED EMPHYSEMA TYPE: Chronic | ICD-10-CM

## 2022-01-26 DIAGNOSIS — Z01.812 ENCOUNTER FOR PREOPERATIVE SCREENING LABORATORY TESTING FOR COVID-19 VIRUS: ICD-10-CM

## 2022-01-26 DIAGNOSIS — Z99.89 OSA ON CPAP: ICD-10-CM

## 2022-01-26 DIAGNOSIS — Z20.822 ENCOUNTER FOR PREOPERATIVE SCREENING LABORATORY TESTING FOR COVID-19 VIRUS: ICD-10-CM

## 2022-01-26 PROCEDURE — 99213 OFFICE O/P EST LOW 20 MIN: CPT | Performed by: NURSE PRACTITIONER

## 2022-01-26 PROCEDURE — 94375 RESPIRATORY FLOW VOLUME LOOP: CPT | Performed by: NURSE PRACTITIONER

## 2022-01-26 NOTE — PROCEDURES
Pulmonary Function Test  Performed by: Cindy Ramires, RRT  Authorized by: Feliz Frye APRN      Pre Drug % Predicted    FVC: 87%   FEV1: 50%   FEF 25-75%: 15%   FEV1/FVC: 43.7%    Interpretation   Spirometry   Spirometry shows moderate obstruction. There is reduced midflow suggesting small airway/airflow obstruction.   Overall comments: Current FEV1 is 50% predicted compared to 66% predicted in 2019.  Reviewed the results of the drop in lung function with the patient and his daughter.  The patient states understanding but was not interested in changing his inhaler regimen.  He reports that he feels no more symptomatic than he did 2 or 3 years ago.

## 2022-01-26 NOTE — PROGRESS NOTES
"Chief Complaint  COPD    Subjective    History of Present Illness      Gonzalo Durham presents to [facility] for:    History of Present Illness   Management of moderate COPD, emphysema, chronic respiratory failure and lung scarring.  He has a history of previous asbestos exposure and obstructive sleep apnea.  He reports that he has not been getting a lot of consistency on his PAP device at night because he gets up a lot to care for his wife who is in very poor health.  He had a CT of the chest on January 10 that showed emphysema and a small stable right lower lobe lung nodule that has been present for more than 3 years as well as stable lung scarring.  He reports that he has \"some shortness of breath but no different than years past\" and occasional cough but denies any wheezing.  He is on Stiolto and uses his rescue inhaler very infrequently.  He uses Mucinex as needed and wears oxygen most hours of the day.  He has been getting his Stiolto now through [facility] and is not in need of a refill right now.  He had updated spirometry today that showed a drop in FEV1 compared to 3 years ago.  He has no new or worsening pulmonary complaints.  He does not want to make any changes to his pulmonary regimen.  He is up-to-date on his vaccines.  Objective   Vital Signs:   /84   Pulse 52   Ht 181.6 cm (71.5\")   Wt 93.9 kg (207 lb)   SpO2 94% Comment: 4L  BMI 28.47 kg/m²     Physical Exam  Vitals reviewed.   Constitutional:       General: He is not in acute distress.     Appearance: Normal appearance.      Interventions: Nasal cannula in place.   HENT:      Head: Normocephalic and atraumatic.      Comments: Wearing a mask  Cardiovascular:      Rate and Rhythm: Normal rate and regular rhythm.   Pulmonary:      Breath sounds: Decreased breath sounds (At bases only, otherwise clear) present.   Musculoskeletal:      Cervical back: Normal range of motion.   Skin:     " General: Skin is warm and dry.   Neurological:      Mental Status: He is alert and oriented to person, place, and time.   Psychiatric:         Mood and Affect: Mood normal.         Behavior: Behavior normal.        Result Review :   The following data was reviewed by: DOMINIQUE Yeung on 01/26/2022:  CT Chest With Contrast Diagnostic (01/10/2022 10:51)    Results for orders placed in visit on 01/26/22    Pulmonary Function Test    Narrative  Pulmonary Function Test  Performed by: Cindy Ramires, RRT  Authorized by: Feliz Frye APRN    Pre Drug % Predicted  FVC: 87%  FEV1: 50%  FEF 25-75%: 15%  FEV1/FVC: 43.7%    Interpretation  Spirometry  Spirometry shows moderate obstruction. There is reduced midflow suggesting small airway/airflow obstruction.  Overall comments: Current FEV1 is 50% predicted compared to 66% predicted in 2019.  Reviewed the results of the drop in lung function with the patient and his daughter.  The patient states understanding but was not interested in changing his inhaler regimen.  He reports that he feels no more symptomatic than he did 2 or 3 years ago.      Results for orders placed in visit on 02/25/19    Pulmonary Function Test               Assessment and Plan      Diagnoses and all orders for this visit:    1. Stage 2 moderate COPD by GOLD classification ([facility]) (Primary)  Comments:  With a drop in FEV1 but still in the moderate range.  Continue Stiolto.  Use Mucinex and albuterol as needed.  Orders:  -     Pulmonary Function Test    2. Encounter for preoperative screening laboratory testing for COVID-19 virus  Comments:  This was required prior to pulmonary function testing.  Orders:  -     COVID PRE-OP / PRE-PROCEDURE SCREENING ORDER (NO ISOLATION) - Swab, Nasal Cavity; Future    3. Pulmonary emphysema, unspecified emphysema type ([facility])  Comments:  Stable per CT scan on 1/10/2022.  Get chest x-ray prior to next follow-up visit.  Orders:  -     XR Chest 2  View; Future    4. WENDY on CPAP  Comments:  He has been encouraged to try and increase his usage of CPAP.  His wife's chronic health conditions keep him from getting consistent sleep.    5. Chronic respiratory failure with hypoxia and hypercapnia (HCC)  Comments:  Stable.  Continue oxygen to keep O2 sats 90 to 94%.        Feliz Frye, APRN  1/26/2022  15:02 CST    Follow Up   Return in about 1 year (around 1/26/2023) for FVL, CXR in 1 year .    Patient was given instructions and counseling regarding his condition or for health maintenance advice. Please see specific information pulled into the AVS if appropriate.

## 2022-02-04 NOTE — PROGRESS NOTES
01/25/2022      Abel Romero MD  [address] [place] [address]    Gonzalo Durham  1934    Chief Complaint   Patient presents with   • Establish Care     Referred over by Dr Romero for Abdominal Aortic Aneurysm without Rupture. Test [id number] CT PAD ABD PELVIS W CONTRAST. Patient denies any stroke like symptoms.    • Former Smoker     Patient is a Former Smoker - Quit 1980   • Med Management     Verified medications from list patient brought in        Dear Abel Romero MD:      HPI  I had the pleasure of seeing your patient Gonzalo Durham in the office today.  Thank you kindly for this consultation.  As you recall, Gonzalo Durham is a 88 y.o.  male who you are currently following for routine health maintenance.  He has a known abdominal aortic aneurysm.  He denies any abdominal pain.  He was previously a smoker but quit 30 years ago.  He denies family history of aneurysms.  He is maintained on aspirin, Eliquis, and Pravachol.  He did have noninvasive testing performed, which I did review in office.    Past Medical History:   Diagnosis Date   • A-fib ([facility])    • AAA (abdominal aortic aneurysm) without rupture ([facility])    • Arthritis    • BPH (benign prostatic hypertrophy)    • Cataract     bialteral   • CKD (chronic kidney disease)    • COPD (chronic obstructive pulmonary disease) ([facility])    • Coronary artery disease involving native coronary artery of native heart without angina pectoris 1/20/2017    CABG/Az/[place]/1981 No TCC echo  7.16.18 Right ventricular cavity is mild-to-moderately dilated. Left ventricular diastolic dysfunction (grade I) consistent with impaired relaxation. Left ventricular systolic function is normal. Left atrial cavity size is borderline dilated. RIGHT HEART ENLARGEMENT - RVSP NOT ASSESSABLE NORMAL LV AND RV SYSTOLIC FUNCTION NO SIGNIFICANT VALVULAR DYSFUNCTION  Seein   • Diabetes mellitus (HCC)     Type 2   • DM (diabetes mellitus screen)    • GERD (gastroesophageal reflux  disease)    • History of loop recorder     at current time   • Hx of colonic polyp    • Hypercholesteremia    • Hypertension    • Macular degeneration     treated with injections in right eye   • Myocardial infarction (HCC)    • Neuropathy    • Oxygen deficit     at 4liters   • Prostate cancer (HCC)    • Pulmonary hypertension (HCC) 1/7/2022   • S[facility] CABG x 3 [facility]/7/2022 [facility]   • Sleep apnea     cpap   • Stage 3a chronic kidney disease (HCC) 1/20/2017   • Stroke (HCC)     recovererd   • Varicose vein of leg     bialteral       Past Surgical History:   Procedure Laterality Date   • APPENDECTOMY     • CARDIAC CATHETERIZATION     • CARDIAC CATHETERIZATION Left 5/22/2019    Procedure: Cardiac Catheterization/Vascular Study Positive occult blood in stools  ? BMS vs REGGIE Get cabg report  ;  Surgeon: Bradley Carver MD;  Location:  [facility];  Service: Cardiology   • COLONOSCOPY N/A 6/15/2017    Diverticulosis repeat exam prn   • COLONOSCOPY W/ POLYPECTOMY  10/21/2013    2 Tubular adenomatous polyps, Diverticulosis repeat exam in 5 years   • CORONARY ARTERY BYPASS GRAFT  1980    X 3   • CYSTOSCOPY RETROGRADE PYELOGRAM Bilateral 2/8/2021    Procedure: CYSTOSCOPY RETROGRADE PYELOGRAM;  Surgeon: Danie Cadena MD;  Location:  [facility] OR;  Service: Urology;  Laterality: Bilateral;   • ENDOSCOPY N/A 6/15/2017    LA Grade A reflux esophagitis   • EYE SURGERY Bilateral    • HERNIA REPAIR     • TRANSURETHRAL RESECTION OF BLADDER TUMOR N/A 2/8/2021    Procedure: CYSTOSCOPY TRANSURETHRAL RESECTION OF BLADDER TUMOR WITH GEMCITABINE INSTILLATION;  Surgeon: Danie Cadena MD;  Location:  [facility] OR;  Service: Urology;  Laterality: N/A;       Family History   Problem Relation Age of Onset   • Heart disease Mother    • Stroke Mother    • Melanoma Mother    • Heart disease Father    • Diabetes Father    • Prostate cancer Father    • Colon cancer Neg Hx    • Colon polyps Neg Hx        Social History      Socioeconomic History   • Marital status:    Tobacco Use   • Smoking status: Former Smoker     Packs/day: 1.00     Years: 26.00     Pack years: 26.00     Types: Cigarettes     Start date:      Quit date:      Years since quittin.1   • Smokeless tobacco: Never Used   Vaping Use   • Vaping Use: Never used   Substance and Sexual Activity   • Alcohol use: No   • Drug use: No   • Sexual activity: Defer       Allergies   Allergen Reactions   • Symbicort [Budesonide-Formoterol Fumarate] Dizziness     Patient passed out and fell   • Prozac [Fluoxetine Hcl] Mental Status Change     Bad dreams.       Current Outpatient Medications   Medication Instructions   • acetaminophen (TYLENOL) 650 mg, Oral, 2 Times Daily   • albuterol sulfate HFA (ProAir HFA) 108 (90 Base) MCG/ACT inhaler 2 puffs, Inhalation, 4 Times Daily - RT   • apixaban (ELIQUIS) 5 mg, Oral, 2 times daily   • Artificial Tear Solution (Just Tears Eye Drops) solution 1-2 drops, Ophthalmic, Daily PRN   • ascorbic acid (VITAMIN C) 1,000 mg, Oral, Daily   • aspirin 81 mg, Oral, Daily   • calcium carbonate-vitamin d 600-400 MG-UNIT per tablet 1 tablet, Oral, 2 Times Daily   • finasteride (PROSCAR) 5 mg, Oral, Daily   • gabapentin (NEURONTIN) 600 mg, Oral, 3 Times Daily   • glucose blood test strip Use as instructed   • glyburide (DIAbeta) 5 MG tablet Take 1 tablet by mouth Every Morning AND 0.5 tablets Daily Before Supper.   • isosorbide mononitrate (IMDUR) 30 mg, Oral, Daily   • Lancets (freestyle) lancets Use once daily   • loratadine (CLARITIN) 10 mg, Oral, Daily   • metFORMIN (GLUCOPHAGE) 500 mg, Oral, Nightly   • metoprolol tartrate (LOPRESSOR) 12.5 mg, Oral, 2 Times Daily   • multivitamin with minerals (Centrum Silver Adult 50+) tablet tablet 1 tablet, Oral, Daily   • nitroglycerin (NITROSTAT) 0.4 mg, Sublingual, Every 5 Minutes PRN   • NON FORMULARY CPAP per mask with 4L oxygen nightly    • O2 (OXYGEN) 4 L/min, Inhalation, Continuous, Per  "nasal cannula    • pantoprazole (PROTONIX) 40 mg, Oral, Daily   • pravastatin (PRAVACHOL) 80 mg, Oral, Daily   • Stiolto Respimat 2.5-2.5 MCG/ACT aerosol solution inhaler No dose, route, or frequency recorded.   • terazosin (HYTRIN) 10 mg, Oral, Daily         Review of Systems   Constitutional: Negative.    HENT: Negative.    Eyes: Negative.    Respiratory: Negative.    Cardiovascular: Negative.    Gastrointestinal: Negative.    Endocrine: Negative.    Genitourinary: Negative.    Musculoskeletal: Negative.    Skin: Negative.    Allergic/Immunologic: Negative.    Neurological: Negative.    Hematological: Negative.    Psychiatric/Behavioral: Negative.    All other systems reviewed and are negative.      /78 (BP Location: Left arm, Patient Position: Sitting, Cuff Size: Adult)   Ht 181.6 cm (71.5\")   Wt 93 kg (205 lb)   BMI 28.19 kg/m²   Physical Exam  Vitals and nursing note reviewed.   Constitutional:       Appearance: He is well-developed.   HENT:      Head: Normocephalic and atraumatic.   Eyes:      General: No scleral icterus.     Pupils: Pupils are equal, round, and reactive to light.   Neck:      Thyroid: No thyromegaly.   Cardiovascular:      Rate and Rhythm: Normal rate and regular rhythm.      Heart sounds: Normal heart sounds.   Pulmonary:      Effort: Pulmonary effort is normal.      Breath sounds: Normal breath sounds.   Abdominal:      General: Bowel sounds are normal.      Palpations: Abdomen is soft. There is pulsatile mass.      Tenderness: There is no abdominal tenderness.   Musculoskeletal:         General: Normal range of motion.      Cervical back: Normal range of motion and neck supple.   Skin:     General: Skin is warm and dry.   Neurological:      Mental Status: He is alert and oriented to person, place, and time.   Psychiatric:         Behavior: Behavior normal.         Thought Content: Thought content normal.         Judgment: Judgment normal.       Diagnostic Data:  EXAMINATION: CT " ANGIOGRAM ABDOMEN PELVIS-      1/10/2022 10:40 AM CST     HISTORY: attention AAA; I71.4-Abdominal aortic aneurysm, without rupture     In order to have a CT radiation dose as low as reasonably achievable  Automated Exposure Control was utilized for adjustment of the mA and/or  KV according to patient size.     DLP in mGycm= 622.     CT angiogram abdomen.  CT angiography protocol.   CT imaging with bolus IV contrast injection.   Under concurrent supervision axial, sagittal, coronal, and  three-dimensional data sets were constructed.     Comparison is made with January 18, 2021.     Normal heart size.  Chronic change at the lung bases.     Normal appearance of the liver, gallbladder, pancreas, spleen, adrenal  glands, and kidneys.     No bowel dilation or free fluid.     Unchanged fat-containing umbilical hernia.     Diffuse prostate enlargement.     Arterial calcification.  Upper abdominal aorta = 29 x 29 mm.  Mid abdominal aorta = 27 x 28 mm.  Infrarenal aortic aneurysm with maximum diameter = 46 x 47 mm compared  with 45 x 45 mm (based on direct comparison of axial images and  remeasurement on the comparison study).     Summary:  1. 47 mm AAA compared with 45 mm last year.  2. No acute abnormality is seen within the abdomen or pelvis.     This report was finalized on 01/10/2022 16:02 by Dr. Jasper Grayson MD.       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, without long-term current use of insulin (HCC)   • Stage 3a chronic kidney disease (HCC)   • Erectile dysfunction   • Coronary artery disease involving native coronary artery of native heart without angina pectoris   • Hyperlipidemia LDL goal <70   • Hypertension   • Prostatism-(young) urology   • Tremor   • Varicose vein of leg   • Plantar fasciitis   • Nocturnal leg movements   • Bad dreams   • Depression   • Peripheral neuropathy- clinical   • Hx of  colonic polyps   • Gastroesophageal reflux disease   • Left lower quadrant pain   • Laboratory test*   • Chronic anticoagulation   • SOB: copd,CAD,#, hypoxemia   • Hypoxia#to pulmonary   • Respiratory ffdifgn-sytftsx-I4-continuous   • Corn or callus   • Anemia: ASA81,plavix,eliquis,gi(3/3+,div,cp,eitis   • Atherosclerosis: CAD, AAA ro   • AAA: 2022-(ro) steffen   • Heme positive stool   • BMI 30.0-30.9,adult   • Stage 2 moderate COPD by GOLD classification ([facility])   • Abnormal stress test   • Tingling of both feet-to consider NCV   • Cryptogenic stroke ([facility])   • Chronic diastolic congestive heart failure ([facility])   • Gross hematuria   • BPH with obstruction/lower urinary tract symptoms   • Chest pain   • Obstructive sleep apnea   • Abnormal CT of the chest 1.2022/12m   • 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 Abdominal aortic aneurysm (AAA) 3.0 cm to 5.5 cm in diameter in male ([facility])  I71.4 441.4   2. Bilateral carotid artery stenosis  I65.23 433.10     433.30   3. Primary hypertension  I10 401.9   4. Hyperlipidemia LDL goal <70  E78.5 272.4         Plan: After thoroughly evaluating Gonzalo Durham, I believe the best course of action is to remain conservative from vascular surgery standpoint.  I did review his testing and aneurysm measuring 4.6 cm.  We will monitor a little closer.  At this point.  He has no tenderness upon exam.  I will see him back in 6 months with repeat noninvasive testing for continued surveillance, including a CTA of the abdomen and pelvis.  I did discuss vascular risk factors as they pertain to the progression of vascular disease including controlling his hypertension and hyperlipidemia.  His blood pressure stable on his current medications.  He is maintained on Pravachol for his hyperlipidemia.  The patient can continue taking their current  medication regimen as previously planned.  This was all discussed in full with complete understanding.    Thank you for allowing me to participate in the care of your patient.  Please do not hesitate with any questions or concerns.  I will keep you aware of any further encounters with Gonzalo Durham.        Sincerely yours,         DOMINIQUE Henry

## 2022-02-15 ENCOUNTER — PATIENT MESSAGE (OUTPATIENT)
Dept: FAMILY MEDICINE CLINIC | Facility: CLINIC | Age: 87
End: 2022-02-15

## 2022-02-15 DIAGNOSIS — E11.59 CONTROLLED TYPE 2 DIABETES MELLITUS WITH OTHER CIRCULATORY COMPLICATION, WITHOUT LONG-TERM CURRENT USE OF INSULIN: Primary | ICD-10-CM

## 2022-02-15 NOTE — TELEPHONE ENCOUNTER
Rx Refill Note  Requested Prescriptions     Signed Prescriptions Disp Refills   • glucose blood test strip 100 each 3     Sig: Use as instructed to check glucose daily and as needed for highs and lows.     Authorizing Provider: ELIZABETH KAUR     Ordering User: JOVANA DAVIS      Last office visit with prescribing clinician: 1/6/2022      Next office visit with prescribing clinician: 7/11/2022            Jovana Davis MA  02/15/22, 10:15 CST

## 2022-02-15 NOTE — TELEPHONE ENCOUNTER
From: Gonzalo Durham  To: Abel Romero MD  Sent: 2/15/2022 10:05 AM CST  Subject: Freestyle test strips    This message is being sent by Suyapa Osborn on behalf of Gonzalo Durham.    Could you please send a new prescription to   [facility] for freestyle test Strips for dad.  Thanks, Suyapa

## 2022-02-17 ENCOUNTER — OFFICE VISIT (OUTPATIENT)
Dept: NEUROLOGY | Facility: CLINIC | Age: 87
End: 2022-02-17

## 2022-02-17 VITALS
RESPIRATION RATE: 17 BRPM | BODY MASS INDEX: 28.04 KG/M2 | HEART RATE: 58 BPM | DIASTOLIC BLOOD PRESSURE: 72 MMHG | HEIGHT: 72 IN | WEIGHT: 207 LBS | SYSTOLIC BLOOD PRESSURE: 128 MMHG | OXYGEN SATURATION: 98 %

## 2022-02-17 DIAGNOSIS — Z86.73 HISTORY OF CVA (CEREBROVASCULAR ACCIDENT): Primary | ICD-10-CM

## 2022-02-17 DIAGNOSIS — G47.33 OBSTRUCTIVE SLEEP APNEA: ICD-10-CM

## 2022-02-17 DIAGNOSIS — R41.3 MEMORY DIFFICULTY: ICD-10-CM

## 2022-02-17 PROCEDURE — 99213 OFFICE O/P EST LOW 20 MIN: CPT | Performed by: NURSE PRACTITIONER

## 2022-02-17 NOTE — PROGRESS NOTES
Neurology Progress Note      Chief Complaint:    Memory difficulties  WENDY  History of stroke    Subjective     Subjective:  Gonzalo Durham is a 88 y.o. male who presents today for follow-up of memory difficulties, WENDY, and history of stroke.  He is present with his daughter today.  He is routinely followed by Dr. Abel Romero MD for primary care.  Patient has no new complaints for the office visit today.  Patient states there has been no worsening with his memory and that it is stable.  MMSE today was 30/30.  Patient continues on Eliquis 5 mg, aspirin 81 mg, and pravastatin 80 mg for stroke prevention. Patient states he continues to use his CPAP therapy (with oxygen) for his WENDY and that he has no new complaints of daytime hypersomnolence or unrested sleep.  Review of compliance report does reveal the patient has slightly done better with compliance with his CPAP as he uses CPAP for 15/30 days which is better than previously reported.  He continues to indicate that he has difficulties using his CPAP related to needing to frequently get up and assess his wife who is medically fragile and needs consistent care during the night.  He does report some mild numbness and tingling in his fingers which is been ongoing for quite some time but seems to have worsened over the past few months.  He does complain of wrist pain and numbness only in his fingers and hand.  It does not extend to the proximal arm.  It is in a nondermatomal distribution.  Otherwise, he has no further complaints today and feels well.    Past Medical History:   Diagnosis Date   • A-fib ([facility])    • AAA (abdominal aortic aneurysm) without rupture ([facility])    • Arthritis    • BPH (benign prostatic hypertrophy)    • Cataract     bialteral   • CKD (chronic kidney disease)    • COPD (chronic obstructive pulmonary disease) ([facility])    • Coronary artery disease involving native coronary artery of native heart without angina pectoris 1/20/2017    CABG/Az/[place] No  TCC echo  7.16.18 Right ventricular cavity is mild-to-moderately dilated. Left ventricular diastolic dysfunction (grade I) consistent with impaired relaxation. Left ventricular systolic function is normal. Left atrial cavity size is borderline dilated. 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 BMS vs REGGIE Get cabg report  ;  Surgeon: Bradley Carver MD;  Location:  [facility];  Service: Cardiology   • COLONOSCOPY N/A 6/15/2017    Diverticulosis repeat exam prn   • COLONOSCOPY W/ POLYPECTOMY  10/21/2013    2 Tubular adenomatous polyps, Diverticulosis repeat exam in 5 years   • CORONARY ARTERY BYPASS GRAFT  1980    X 3   • CYSTOSCOPY RETROGRADE PYELOGRAM Bilateral 2/8/2021    Procedure: CYSTOSCOPY RETROGRADE PYELOGRAM;  Surgeon: Danie Cadena MD;  Location: [facility] OR;  Service: Urology;  Laterality: Bilateral;   • ENDOSCOPY N/A 6/15/2017    LA Grade A reflux esophagitis   • EYE SURGERY Bilateral    • HERNIA REPAIR     • TRANSURETHRAL RESECTION OF BLADDER  TUMOR N/A 2021    Procedure: CYSTOSCOPY TRANSURETHRAL RESECTION OF BLADDER TUMOR WITH GEMCITABINE INSTILLATION;  Surgeon: Danie Cadena MD;  Location: [facility] OR;  Service: Urology;  Laterality: N/A;     Family History   Problem Relation Age of Onset   • Heart disease Mother    • Stroke Mother    • Melanoma Mother    • Heart disease Father    • Diabetes Father    • Prostate cancer Father    • Colon cancer Neg Hx    • Colon polyps Neg Hx      Social History     Tobacco Use   • Smoking status: Former Smoker     Packs/day: 1.00     Years: 26.00     Pack years: 26.00     Types: Cigarettes     Start date:      Quit date:      Years since quittin.1   • Smokeless tobacco: Never Used   Vaping Use   • Vaping Use: Never used   Substance Use Topics   • Alcohol use: No   • Drug use: No     Medications:  Current Outpatient Medications   Medication Sig Dispense Refill   • acetaminophen (TYLENOL) 325 MG tablet Take 2 tablets by mouth 2 (Two) Times a Day. 360 tablet 2   • albuterol sulfate HFA (ProAir HFA) 108 (90 Base) MCG/ACT inhaler Inhale 2 puffs 4 (Four) Times a Day. 54 g 3   • apixaban (Eliquis) 5 MG tablet tablet Take 1 tablet by mouth 2 (two) times a day. 180 tablet 3   • Artificial Tear Solution (Just Tears Eye Drops) solution Apply 1-2 drops to eye(s) as directed by provider Daily As Needed (dry eyes). 45 mL 2   • ascorbic acid (VITAMIN C) 1000 MG tablet Take 1 tablet by mouth Daily. 90 tablet 3   • aspirin 81 MG EC tablet Take 81 mg by mouth Daily.     • calcium carbonate-vitamin d 600-400 MG-UNIT per tablet Take 1 tablet by mouth 2 (Two) Times a Day. 180 tablet 3   • finasteride (PROSCAR) 5 MG tablet Take 1 tablet by mouth Daily. 90 tablet 3   • gabapentin (NEURONTIN) 600 MG tablet Take 1 tablet by mouth 3 (Three) Times a Day. 270 tablet 2   • glucose blood test strip Use as instructed to check glucose daily and as needed for highs and lows. 100 each 3   • glyburide (DIAbeta) 5 MG tablet Take 1  tablet by mouth Every Morning AND 0.5 tablets Daily Before Supper. 135 tablet 3   • isosorbide mononitrate (IMDUR) 30 MG 24 hr tablet Take 1 tablet by mouth Daily. 90 tablet 3   • Lancets (freestyle) lancets Use once daily 100 each 2   • loratadine (CLARITIN) 10 MG tablet Take 1 tablet by mouth Daily. 90 tablet 3   • metFORMIN (Glucophage) 500 MG tablet Take 1 tablet by mouth Every Night. 90 tablet 3   • metoprolol tartrate (LOPRESSOR) 25 MG tablet Take 0.5 tablets by mouth 2 (Two) Times a Day. 180 tablet 3   • multivitamin with minerals (Centrum Silver Adult 50+) tablet tablet Take 1 tablet by mouth Daily. 90 tablet 3   • nitroglycerin (Nitrostat) 0.4 MG SL tablet Place 1 tablet under the tongue Every 5 (Five) Minutes As Needed for Chest Pain. 90 tablet 3   • NON FORMULARY CPAP per mask with 4L oxygen nightly     • O2 (OXYGEN) Inhale 4 L/min Continuous. Per nasal cannula     • pantoprazole (PROTONIX) 40 MG EC tablet Take 1 tablet by mouth Daily. 90 tablet 3   • pravastatin (Pravachol) 80 MG tablet Take 1 tablet by mouth Daily. 90 tablet 3   • Stiolto Respimat 2.5-2.5 MCG/ACT aerosol solution inhaler      • terazosin (HYTRIN) 10 MG capsule Take 1 capsule by mouth Daily. 90 capsule 3     No current facility-administered medications for this visit.     Current outpatient and discharge medications have been reconciled for the patient.  Reviewed by: DOMINIQUE Banda      Allergies:    Symbicort [budesonide-formoterol fumarate] and Prozac [fluoxetine hcl]    Review of Systems:   Review of Systems   Constitutional: Negative for activity change.   Neurological: Positive for memory problem. Negative for dizziness, facial asymmetry, speech difficulty, weakness, headache and confusion.   Psychiatric/Behavioral: Positive for sleep disturbance and stress.   All other systems reviewed and are negative.    Objective      Vital Signs  Heart Rate:  [58] 58  Resp:  [17] 17  BP: (128)/(72) 128/72    Physical Exam:  Physical  Exam  Constitutional:       Appearance: Normal appearance.   HENT:      Head: Normocephalic.   Eyes:      Extraocular Movements: Extraocular movements intact.      Pupils: Pupils are equal, round, and reactive to light.   Cardiovascular:      Rate and Rhythm: Normal rate and regular rhythm.      Pulses: Normal pulses.   Pulmonary:      Effort: Pulmonary effort is normal.      Breath sounds: Normal breath sounds.      Comments: Wearing oxygen 4 L/min  Musculoskeletal:         General: Normal range of motion.      Cervical back: Normal range of motion and neck supple.   Skin:     General: Skin is warm and dry.      Capillary Refill: Capillary refill takes less than 2 seconds.   Neurological:      General: No focal deficit present.      Mental Status: He is alert and oriented to person, place, and time. Mental status is at baseline.      Cranial Nerves: Cranial nerves are intact.      Sensory: Sensation is intact.      Motor: Motor function is intact.      Coordination: Coordination is intact.      Gait: Gait is intact.      Deep Tendon Reflexes: Reflexes are normal and symmetric.   Psychiatric:         Mood and Affect: Mood normal.       Neurologic Exam:  Mental Status:    -Awake. Alert. Oriented to person, place & time.  -No word finding difficulties.  -No aphasia.  -No dysarthria.  -Follows simple commands.     CN II:  Full visual fields with confrontation.  Pupils equally reactive to light.  CN III, IV, VI:  Extraocular muscles function intact with no nystagmus.  CN V:  Facial sensory is symmetric.  CN VII:  Facial motor symmetric.  CN VIII:  Gross hearing intact bilaterally.  CN IX/X:  Palate elevates symmetrically.  CN XI:  Shoulder shrug symmetric.  CN XII:  Tongue is midline on protrusion.     Motor: (strength out of 5:  1= minimal movement, 2 = movement in plane of gravity, 3 = movement against gravity, 4 = movement against some resistance, 5 = full strength)     -5/5 in bilateral biceps, triceps,  brachioradialis, wrist extensors and intrinsic muscles of the hand.    -5/5 in bilateral hip flexors, quadriceps, hamstrings, gastrocsoleus complex, anterior tibialis and extensor hallucis longus.       Deep Tendon Reflexes:  -Right              Biceps: 2+         Triceps: 2+      Brachioradialis: 2+              Patella: 2+       Ankle: 2+         Babinski:  negative  -Left              Biceps: 2+         Triceps: 2+      Brachioradialis: 2+              Patella: 2+       Ankle: 2+         Babinski:  negative    Tone (Modified Zia Scale):  No appreciable increase in tone or rigidity noted.     Sensory:  -Intact to light touch, pinprick BUE (C5-T1) and BLE (L2-S1).     Coordination:  -Finger to nose intact BUEs  -Heel to shin intact BLEs  -No ataxia     Gait  -No signs of ataxia  -ambulates unassisted      [place] Sleepiness Scale: 5  STOP-BANG high risk WENDY  Mallampati score: 2  Neck circumference 17 inches     Results Review:    Lab Results   Component Value Date    GLUCOSE 73 01/05/2022    BUN 27 (H) 01/05/2022    CREATININE 1.28 (H) 01/05/2022    EGFRIFNONA 53 (L) 01/05/2022    EGFRIFAFRI 64 01/05/2022    BCR 21.1 01/05/2022    K 4.4 01/05/2022    CO2 32.7 (H) 01/05/2022    CALCIUM 9.9 01/05/2022    PROTENTOTREF 6.5 01/05/2022    ALBUMIN 4.80 01/05/2022    LABIL2 2.8 01/05/2022    AST 14 01/05/2022    ALT 11 01/05/2022     Lab Results   Component Value Date    WBC 7.98 01/05/2022    HGB 12.6 (L) 01/05/2022    HCT 38.4 01/05/2022    MCV 87.5 01/05/2022     01/05/2022     Lab Results   Component Value Date    HGBA1C 6.30 (H) 01/05/2022     Lab Results   Component Value Date    CHOL 149 05/23/2019    CHLPL 150 01/05/2022    TRIG 103 01/05/2022    HDL 37 (L) 01/05/2022    LDL 94 01/05/2022     Compliance report:  This report is for the dates of 1/16/2022-2/14/2022.  Patient use the device for 15/30 days for 50% compliance.  Out of those dates patient use device for greater than 4 hours for 12 days for  40% compliance.  Patient is set on CPAP 9cm H2O.  Current AHI is 7.2.    Assessment/Plan     Impression:  • History of CVA  • WENDY  • Memory difficulties    Plan:  · Continue Eliquis 5 mg twice daily, aspirin 81 mg daily, and pravastatin 80 mg daily for stroke prevention.  Goal LDL less than 70.    · Other secondary stroke prevention methods such as BP management with goal BP less than 140/90, DM prevention with A1C less than 7%, no smoking, regular physical acitvity, and a balanced diet.  This is to be managed with PCP.    · I would recommend he continue CPAP therapy and I do recommend increase compliance with therapy.  I did discuss that although he might be having intermittent use throughout the night that this would still benefit him as when he is asleep he could potentially have apneic episodes.  I did explain the pathophysiology and treatment of sleep apnea.  Also discussed risks of untreated sleep apnea.  Patient states understanding.    · Discussed fall risk/prevention with the patient and his daughter.  They both state understanding.    · No further work-up needed for memory as is currently stable at this time.    · I discussed with him the likelihood that the numbness and tingling in his fingers could be related to multiple different things but most likely a carpal tunnel of some type.  At this time I have advised that they use cock-up splinting to determine if this helps alleviate some of his symptoms.  They did not wish to proceed with EMG nerve conduction study today.  He states understanding and is okay with this today.    The patient and I have discussed the plan of care and he is in full agreement at this time.     Follow-Up:  Return in about 6 months (around 8/17/2022) for Stroke follow-up, Obstructive sleep apnea follow-up.      Diallo Hightower, APRN  02/17/22  10:42 CST

## 2022-02-20 ENCOUNTER — HOSPITAL ENCOUNTER (EMERGENCY)
Facility: HOSPITAL | Age: 87
Discharge: HOME | End: 2022-02-20
Attending: FAMILY MEDICINE | Admitting: EMERGENCY MEDICINE

## 2022-02-20 ENCOUNTER — APPOINTMENT (OUTPATIENT)
Dept: CARDIOLOGY | Facility: HOSPITAL | Age: 87
End: 2022-02-20

## 2022-02-20 ENCOUNTER — APPOINTMENT (OUTPATIENT)
Dept: GENERAL RADIOLOGY | Facility: HOSPITAL | Age: 87
End: 2022-02-20

## 2022-02-20 VITALS
OXYGEN SATURATION: 100 % | HEART RATE: 60 BPM | WEIGHT: 214 LBS | DIASTOLIC BLOOD PRESSURE: 59 MMHG | TEMPERATURE: 97.8 F | HEIGHT: 71 IN | BODY MASS INDEX: 29.96 KG/M2 | RESPIRATION RATE: 20 BRPM | SYSTOLIC BLOOD PRESSURE: 118 MMHG

## 2022-02-20 DIAGNOSIS — U07.1 COVID-19: ICD-10-CM

## 2022-02-20 DIAGNOSIS — R07.9 CHEST PAIN, UNSPECIFIED TYPE: Primary | ICD-10-CM

## 2022-02-20 LAB
ALBUMIN SERPL-MCNC: 4.2 G/DL (ref 3.5–5.2)
ALBUMIN/GLOB SERPL: 1.7 G/DL
ALP SERPL-CCNC: 63 U/L (ref 39–117)
ALT SERPL W P-5'-P-CCNC: 14 U/L (ref 1–41)
ANION GAP SERPL CALCULATED.3IONS-SCNC: 9 MMOL/L (ref 5–15)
AST SERPL-CCNC: 15 U/L (ref 1–40)
BASOPHILS # BLD AUTO: 0.03 10*3/MM3 (ref 0–0.2)
BASOPHILS NFR BLD AUTO: 0.4 % (ref 0–1.5)
BH CV STRESS BP STAGE 1: NORMAL
BH CV STRESS BP STAGE 2: NORMAL
BH CV STRESS BP STAGE 3: NORMAL
BH CV STRESS DOB - ATROPINE STAGE 3: 0.3
BH CV STRESS DOSE DOBUTAMINE STAGE 1: 10
BH CV STRESS DOSE DOBUTAMINE STAGE 2: 20
BH CV STRESS DOSE DOBUTAMINE STAGE 3: 30
BH CV STRESS DURATION MIN STAGE 1: 3
BH CV STRESS DURATION MIN STAGE 2: 3
BH CV STRESS DURATION MIN STAGE 3: 2
BH CV STRESS DURATION SEC STAGE 1: 0
BH CV STRESS DURATION SEC STAGE 2: 0
BH CV STRESS DURATION SEC STAGE 3: 16
BH CV STRESS HR STAGE 1: 59
BH CV STRESS HR STAGE 2: 75
BH CV STRESS HR STAGE 3: 126
BH CV STRESS PROTOCOL 1: NORMAL
BH CV STRESS RECOVERY BP: NORMAL MMHG
BH CV STRESS RECOVERY HR: 87 BPM
BH CV STRESS STAGE 1: 1
BH CV STRESS STAGE 2: 2
BH CV STRESS STAGE 3: 3
BILIRUB SERPL-MCNC: <0.2 MG/DL (ref 0–1.2)
BUN SERPL-MCNC: 24 MG/DL (ref 8–23)
BUN/CREAT SERPL: 21.1 (ref 7–25)
CALCIUM SPEC-SCNC: 9.1 MG/DL (ref 8.6–10.5)
CHLORIDE SERPL-SCNC: 105 MMOL/L (ref 98–107)
CO2 SERPL-SCNC: 29 MMOL/L (ref 22–29)
CREAT SERPL-MCNC: 1.14 MG/DL (ref 0.76–1.27)
D DIMER PPP FEU-MCNC: 0.38 MG/L (FEU) (ref 0–0.5)
DEPRECATED RDW RBC AUTO: 47.3 FL (ref 37–54)
EOSINOPHIL # BLD AUTO: 0.7 10*3/MM3 (ref 0–0.4)
EOSINOPHIL NFR BLD AUTO: 10.2 % (ref 0.3–6.2)
ERYTHROCYTE [DISTWIDTH] IN BLOOD BY AUTOMATED COUNT: 14 % (ref 12.3–15.4)
GFR SERPL CREATININE-BSD FRML MDRD: 61 ML/MIN/1.73
GLOBULIN UR ELPH-MCNC: 2.5 GM/DL
GLUCOSE SERPL-MCNC: 112 MG/DL (ref 65–99)
HCT VFR BLD AUTO: 37.3 % (ref 37.5–51)
HGB BLD-MCNC: 11.6 G/DL (ref 13–17.7)
HOLD SPECIMEN: NORMAL
HOLD SPECIMEN: NORMAL
IMM GRANULOCYTES # BLD AUTO: 0.01 10*3/MM3 (ref 0–0.05)
IMM GRANULOCYTES NFR BLD AUTO: 0.1 % (ref 0–0.5)
LYMPHOCYTES # BLD AUTO: 1.74 10*3/MM3 (ref 0.7–3.1)
LYMPHOCYTES NFR BLD AUTO: 25.3 % (ref 19.6–45.3)
MCH RBC QN AUTO: 28.6 PG (ref 26.6–33)
MCHC RBC AUTO-ENTMCNC: 31.1 G/DL (ref 31.5–35.7)
MCV RBC AUTO: 91.9 FL (ref 79–97)
MONOCYTES # BLD AUTO: 0.66 10*3/MM3 (ref 0.1–0.9)
MONOCYTES NFR BLD AUTO: 9.6 % (ref 5–12)
NEUTROPHILS NFR BLD AUTO: 3.75 10*3/MM3 (ref 1.7–7)
NEUTROPHILS NFR BLD AUTO: 54.4 % (ref 42.7–76)
NRBC BLD AUTO-RTO: 0 /100 WBC (ref 0–0.2)
PERCENT MAX PREDICTED HR: 95.45 %
PLATELET # BLD AUTO: 213 10*3/MM3 (ref 140–450)
PMV BLD AUTO: 9.7 FL (ref 6–12)
POTASSIUM SERPL-SCNC: 4.3 MMOL/L (ref 3.5–5.2)
PROT SERPL-MCNC: 6.7 G/DL (ref 6–8.5)
QT INTERVAL: 430 MS
QT INTERVAL: 430 MS
QTC INTERVAL: 422 MS
QTC INTERVAL: 430 MS
RBC # BLD AUTO: 4.06 10*6/MM3 (ref 4.14–5.8)
SARS-COV-2 RNA PNL SPEC NAA+PROBE: DETECTED
SARS-COV-2 RNA PNL SPEC NAA+PROBE: NOT DETECTED
SODIUM SERPL-SCNC: 143 MMOL/L (ref 136–145)
STRESS BASELINE BP: NORMAL MMHG
STRESS BASELINE HR: 59 BPM
STRESS PERCENT HR: 112 %
STRESS POST EXERCISE DUR MIN: 8 MIN
STRESS POST EXERCISE DUR SEC: 16 SEC
STRESS POST PEAK BP: NORMAL MMHG
STRESS POST PEAK HR: 126 BPM
TROPONIN T SERPL-MCNC: <0.01 NG/ML (ref 0–0.03)
TROPONIN T SERPL-MCNC: <0.01 NG/ML (ref 0–0.03)
WBC NRBC COR # BLD: 6.89 10*3/MM3 (ref 3.4–10.8)
WHOLE BLOOD HOLD SPECIMEN: NORMAL
WHOLE BLOOD HOLD SPECIMEN: NORMAL

## 2022-02-20 PROCEDURE — 96375 TX/PRO/DX INJ NEW DRUG ADDON: CPT

## 2022-02-20 PROCEDURE — 93005 ELECTROCARDIOGRAM TRACING: CPT | Performed by: FAMILY MEDICINE

## 2022-02-20 PROCEDURE — 93350 STRESS TTE ONLY: CPT

## 2022-02-20 PROCEDURE — 93350 STRESS TTE ONLY: CPT | Performed by: INTERNAL MEDICINE

## 2022-02-20 PROCEDURE — 85025 COMPLETE CBC W/AUTO DIFF WBC: CPT | Performed by: FAMILY MEDICINE

## 2022-02-20 PROCEDURE — 93017 CV STRESS TEST TRACING ONLY: CPT

## 2022-02-20 PROCEDURE — 0 DOBUTAMINE PER 250 MG: Performed by: INTERNAL MEDICINE

## 2022-02-20 PROCEDURE — 80053 COMPREHEN METABOLIC PANEL: CPT | Performed by: FAMILY MEDICINE

## 2022-02-20 PROCEDURE — 87635 SARS-COV-2 COVID-19 AMP PRB: CPT | Performed by: EMERGENCY MEDICINE

## 2022-02-20 PROCEDURE — 96365 THER/PROPH/DIAG IV INF INIT: CPT

## 2022-02-20 PROCEDURE — 93010 ELECTROCARDIOGRAM REPORT: CPT | Performed by: INTERNAL MEDICINE

## 2022-02-20 PROCEDURE — 99284 EMERGENCY DEPT VISIT MOD MDM: CPT

## 2022-02-20 PROCEDURE — 87635 SARS-COV-2 COVID-19 AMP PRB: CPT | Performed by: FAMILY MEDICINE

## 2022-02-20 PROCEDURE — 93018 CV STRESS TEST I&R ONLY: CPT | Performed by: INTERNAL MEDICINE

## 2022-02-20 PROCEDURE — 93352 ADMIN ECG CONTRAST AGENT: CPT | Performed by: INTERNAL MEDICINE

## 2022-02-20 PROCEDURE — 85379 FIBRIN DEGRADATION QUANT: CPT | Performed by: FAMILY MEDICINE

## 2022-02-20 PROCEDURE — 84484 ASSAY OF TROPONIN QUANT: CPT | Performed by: FAMILY MEDICINE

## 2022-02-20 PROCEDURE — 71045 X-RAY EXAM CHEST 1 VIEW: CPT

## 2022-02-20 PROCEDURE — 25010000002 PERFLUTREN 6.52 MG/ML SUSPENSION: Performed by: INTERNAL MEDICINE

## 2022-02-20 RX ORDER — DOBUTAMINE HYDROCHLORIDE 100 MG/100ML
10-50 INJECTION INTRAVENOUS CONTINUOUS
Status: DISCONTINUED | OUTPATIENT
Start: 2022-02-20 | End: 2022-02-20

## 2022-02-20 RX ORDER — SODIUM CHLORIDE 0.9 % (FLUSH) 0.9 %
10 SYRINGE (ML) INJECTION AS NEEDED
Status: DISCONTINUED | OUTPATIENT
Start: 2022-02-20 | End: 2022-02-20 | Stop reason: HOSPADM

## 2022-02-20 RX ADMIN — ATROPINE SULFATE 0.3 MG: 0.1 INJECTION INTRAVENOUS at 10:05

## 2022-02-20 RX ADMIN — Medication 10 MCG/KG/MIN: at 09:23

## 2022-02-20 RX ADMIN — PERFLUTREN 8.48 MG: 6.52 INJECTION, SUSPENSION INTRAVENOUS at 09:23

## 2022-02-20 NOTE — ED PROVIDER NOTES
Subjective   Mr. Durham is an 88-year-old gentleman with a known history of coronary artery disease, he had a CABG back in 1980.  He has no stents or other interventions.  He woke this morning at 2:00 in the morning with substernal chest pain and then pain under his left nipple.  He had no diaphoresis in fact he says he was says it was freezing at the time as his house was cold.  He had no nausea or vomiting.  He had no shortness of breath.  He took 2 nitroglycerin and the pain seemed to ease.  By the time he reached the ED he was symptom-free.          Review of Systems   Cardiovascular: Positive for chest pain.   All other systems reviewed and are negative.      Past Medical History:   Diagnosis Date   • A-fib ([facility])    • AAA (abdominal aortic aneurysm) without rupture ([facility])    • Arthritis    • BPH (benign prostatic hypertrophy)    • Cataract     bialteral   • CKD (chronic kidney disease)    • COPD (chronic obstructive pulmonary disease) ([facility])    • Coronary artery disease involving native coronary artery of native heart without angina pectoris 1/20/2017    CABG/Az/[place] No TCC echo  7.16.18 Right ventricular cavity is mild-to-moderately dilated. Left ventricular diastolic dysfunction (grade I) consistent with impaired relaxation. Left ventricular systolic function is normal. Left atrial cavity size is borderline dilated. RIGHT HEART ENLARGEMENT - RVSP NOT ASSESSABLE NORMAL LV AND RV SYSTOLIC FUNCTION NO SIGNIFICANT VALVULAR DYSFUNCTION  Seein   • Diabetes mellitus ([facility])     Type 2   • DM (diabetes mellitus screen)    • GERD (gastroesophageal reflux disease)    • History of loop recorder     at current time   • Hx of colonic polyp    • Hypercholesteremia    • Hypertension    • Macular degeneration     treated with injections in right eye   • Myocardial infarction ([facility])    • Neuropathy    • Oxygen deficit     at 4liters   • Prostate cancer ([facility])    • Pulmonary hypertension ([facility]) 1/7/2022   • S/P CABG x 3  1/7/2022 1980 [facility]   • Sleep apnea     cpap   • Stage 3a chronic kidney disease (HCC) 1/20/2017   • Stroke (HCC)     recovererd   • Varicose vein of leg     bialteral       Allergies   Allergen Reactions   • Symbicort [Budesonide-Formoterol Fumarate] Dizziness     Patient passed out and fell   • Prozac [Fluoxetine Hcl] Mental Status Change     Bad dreams.       Past Surgical History:   Procedure Laterality Date   • APPENDECTOMY     • CARDIAC CATHETERIZATION     • CARDIAC CATHETERIZATION Left 5/22/2019    Procedure: Cardiac Catheterization/Vascular Study Positive occult blood in stools  ? BMS vs REGGIE Get cabg report  ;  Surgeon: Bradley Carver MD;  Location:  [facility] CATH INVASIVE LOCATION;  Service: Cardiology   • COLONOSCOPY N/A 6/15/2017    Diverticulosis repeat exam prn   • COLONOSCOPY W/ POLYPECTOMY  10/21/2013    2 Tubular adenomatous polyps, Diverticulosis repeat exam in 5 years   • CORONARY ARTERY BYPASS GRAFT  1980    X 3   • CYSTOSCOPY RETROGRADE PYELOGRAM Bilateral 2/8/2021    Procedure: CYSTOSCOPY RETROGRADE PYELOGRAM;  Surgeon: Danie Cadena MD;  Location:  [facility] OR;  Service: Urology;  Laterality: Bilateral;   • ENDOSCOPY N/A 6/15/2017    LA Grade A reflux esophagitis   • EYE SURGERY Bilateral    • HERNIA REPAIR     • TRANSURETHRAL RESECTION OF BLADDER TUMOR N/A 2/8/2021    Procedure: CYSTOSCOPY TRANSURETHRAL RESECTION OF BLADDER TUMOR WITH GEMCITABINE INSTILLATION;  Surgeon: Danie Cadena MD;  Location:  [facility] OR;  Service: Urology;  Laterality: N/A;       Family History   Problem Relation Age of Onset   • Heart disease Mother    • Stroke Mother    • Melanoma Mother    • Heart disease Father    • Diabetes Father    • Prostate cancer Father    • Colon cancer Neg Hx    • Colon polyps Neg Hx        Social History     Socioeconomic History   • Marital status:    Tobacco Use   • Smoking status: Former Smoker     Packs/day: 1.00     Years: 26.00     Pack years: 26.00     Types:  Cigarettes     Start date:      Quit date:      Years since quittin.1   • Smokeless tobacco: Never Used   Vaping Use   • Vaping Use: Never used   Substance and Sexual Activity   • Alcohol use: No   • Drug use: No   • Sexual activity: Defer           Objective   Physical Exam  Vitals and nursing note reviewed.   Constitutional:       Appearance: He is well-developed. He is obese.   HENT:      Head: Normocephalic and atraumatic.      Right Ear: External ear normal.      Left Ear: External ear normal.      Nose: Nose normal.   Eyes:      Conjunctiva/sclera: Conjunctivae normal.   Cardiovascular:      Rate and Rhythm: Normal rate and regular rhythm.      Heart sounds: Normal heart sounds.   Pulmonary:      Effort: Pulmonary effort is normal.      Breath sounds: Normal breath sounds.   Abdominal:      General: Bowel sounds are normal.      Palpations: Abdomen is soft.   Musculoskeletal:         General: Normal range of motion.      Cervical back: Normal range of motion and neck supple.   Skin:     General: Skin is warm and dry.      Capillary Refill: Capillary refill takes less than 2 seconds.   Neurological:      Mental Status: He is alert and oriented to person, place, and time.   Psychiatric:         Behavior: Behavior normal.         Thought Content: Thought content normal.         Judgment: Judgment normal.         Procedures           ED Course      Lab Results (last 24 hours)     Procedure Component Value Units Date/Time    CBC & Differential [[id number]]  (Abnormal) Collected: 22    Specimen: Blood Updated: 22    Narrative:      The following orders were created for panel order CBC & Differential.  Procedure                               Abnormality         Status                     ---------                               -----------         ------                     CBC Auto Differential[[id number]]        Abnormal            Final result                 Please view results for  these tests on the individual orders.    Comprehensive Metabolic Panel [[id number]]  (Abnormal) Collected: 02/20/22 0405    Specimen: Blood Updated: 02/20/22 0431     Glucose 112 mg/dL      BUN 24 mg/dL      Creatinine 1.14 mg/dL      Sodium 143 mmol/L      Potassium 4.3 mmol/L      Comment: Slight hemolysis detected by analyzer. Results may be affected.        Chloride 105 mmol/L      CO2 29.0 mmol/L      Calcium 9.1 mg/dL      Total Protein 6.7 g/dL      Albumin 4.20 g/dL      ALT (SGPT) 14 U/L      AST (SGOT) 15 U/L      Alkaline Phosphatase 63 U/L      Total Bilirubin <0.2 mg/dL      eGFR Non African Amer 61 mL/min/1.73      Globulin 2.5 gm/dL      A/G Ratio 1.7 g/dL      BUN/Creatinine Ratio 21.1     Anion Gap 9.0 mmol/L     Narrative:      GFR Normal >60  Chronic Kidney Disease <60  Kidney Failure <15      Troponin [[id number]]  (Normal) Collected: 02/20/22 0405    Specimen: Blood Updated: 02/20/22 0429     Troponin T <0.010 ng/mL     Narrative:      Troponin T Reference Range:  <= 0.03 ng/mL-   Negative for AMI  >0.03 ng/mL-     Abnormal for myocardial necrosis.  Clinicians would have to utilize clinical acumen, EKG, Troponin and serial changes to determine if it is an Acute Myocardial Infarction or myocardial injury due to an underlying chronic condition.       Results may be falsely decreased if patient taking Biotin.      D-dimer, Quantitative [[id number]]  (Normal) Collected: 02/20/22 0405    Specimen: Blood Updated: 02/20/22 0423     D-Dimer, Quantitative 0.38 mg/L (FEU)     Narrative:      Reference Range is 0-0.50 mg/L FEU. However, results <0.50 mg/L FEU tends to rule out DVT or PE. Results >0.50 mg/L FEU are not useful in predicting absence or presence of DVT or PE.      CBC Auto Differential [[id number]]  (Abnormal) Collected: 02/20/22 0405    Specimen: Blood Updated: 02/20/22 0413     WBC 6.89 10*3/mm3      RBC 4.06 10*6/mm3      Hemoglobin 11.6 g/dL      Hematocrit 37.3 %      MCV 91.9 fL      MCH  28.6 pg      MCHC 31.1 g/dL      RDW 14.0 %      RDW-SD 47.3 fl      MPV 9.7 fL      Platelets 213 10*3/mm3      Neutrophil % 54.4 %      Lymphocyte % 25.3 %      Monocyte % 9.6 %      Eosinophil % 10.2 %      Basophil % 0.4 %      Immature Grans % 0.1 %      Neutrophils, Absolute 3.75 10*3/mm3      Lymphocytes, Absolute 1.74 10*3/mm3      Monocytes, Absolute 0.66 10*3/mm3      Eosinophils, Absolute 0.70 10*3/mm3      Basophils, Absolute 0.03 10*3/mm3      Immature Grans, Absolute 0.01 10*3/mm3      nRBC 0.0 /100 WBC     COVID-19,[employer] IN-HOUSE,Nasal Swab No Transport Media 3-4 HR TAT - Swab, Nasal Cavity [[id number]]  (Abnormal) Collected: 02/20/22 0408    Specimen: Swab from Nasal Cavity Updated: 02/20/22 0504     COVID19 Detected    Narrative:      Fact sheet for providers: https://www.fda.gov/media/276756/download     Fact sheet for patients: https://www.fda.gov/media/657259/download    Test performed by PCR.    Consider negative results in combination with clinical observations, patient history, and epidemiological information.    Troponin [[id number]]  (Normal) Collected: 02/20/22 0625    Specimen: Blood Updated: 02/20/22 0658     Troponin T <0.010 ng/mL     Narrative:      Troponin T Reference Range:  <= 0.03 ng/mL-   Negative for AMI  >0.03 ng/mL-     Abnormal for myocardial necrosis.  Clinicians would have to utilize clinical acumen, EKG, Troponin and serial changes to determine if it is an Acute Myocardial Infarction or myocardial injury due to an underlying chronic condition.       Results may be falsely decreased if patient taking Biotin.      COVID-19,[employer] IN-HOUSE,Nasal Swab No Transport Media 3-4 HR TAT - Swab, Nasal Cavity [[id number]]  (Normal) Collected: 02/20/22 0902    Specimen: Swab from Nasal Cavity Updated: 02/20/22 0952     COVID19 Not Detected    Narrative:      Fact sheet for providers: https://www.fda.gov/media/298584/download     Fact sheet for patients:  https://www.fda.gov/media/466982/download    Test performed by PCR.    Consider negative results in combination with clinical observations, patient history, and epidemiological information.        XR Chest 1 View   Final Result   1. No acute appearing infiltrate.   2. Stable bronchial wall thickening.           This report was finalized on 02/20/2022 07:16 by Dr. Jaron Valles MD.        Labs are normal including troponin x2 and D-dimer.  Covid test was positive.  Patient is very adamant that he does not have Covid.  He demanded a second test and it was negative.  Had a long discussion with the patient and said that his odds of having a false negative are much higher than having a false positive.  I still recommended he quarantine and isolate from his wife and recommended he get retested in the next 1 to 2 days.  Chest x-ray showed no acute findings.  Stress test was performed and was low risk for ischemia.  I do not feel the patient is having acute coronary syndrome.  He is asymptomatic at this time.  Patient will be discharged home to follow-up with his PCP for repeat Covid test and his cardiologist for further cardiac evaluation.  He is to return to the ER for any worsening or new pain, shortness of breath, fever or other concerns.  Patient agreeable.                                         HEART Score (for prediction of 6-week risk of major adverse cardiac event) reviewed and/or performed as part of the patient evaluation and treatment planning process.  The result associated with this review/performance is: 5       MDM  Number of Diagnoses or Management Options     Amount and/or Complexity of Data Reviewed  Clinical lab tests: reviewed and ordered  Tests in the radiology section of CPT®: ordered and reviewed  Tests in the medicine section of CPT®: reviewed and ordered    Patient Progress  Patient progress: stable      Final diagnoses:   Chest pain, unspecified type   COVID-19       ED Disposition  ED  Disposition     ED Disposition Condition Comment    Discharge Stable           Abel Romero MD  [address] [facility] [address]  [phone]    In 1 day  repeat covid test    Bradley Carver MD  [address] [facility] [address]  [facility] [address]  [phone]    Schedule an appointment as soon as possible for a visit            Medication List      No changes were made to your prescriptions during this visit.       The patient's work-up in the ED was negative so far, we will repeat troponin and consider stress test.  The care of the patient was handed over to Dr. Randall at shift change.     Janneth Paz MD  02/20/22 0776

## 2022-02-21 ENCOUNTER — TELEPHONE (OUTPATIENT)
Dept: FAMILY MEDICINE CLINIC | Facility: CLINIC | Age: 87
End: 2022-02-21

## 2022-02-21 DIAGNOSIS — R05.9 COUGH: ICD-10-CM

## 2022-02-21 DIAGNOSIS — J44.9 STAGE 2 MODERATE COPD BY GOLD CLASSIFICATION: ICD-10-CM

## 2022-02-21 DIAGNOSIS — R07.9 CHEST PAIN, UNSPECIFIED TYPE: Primary | ICD-10-CM

## 2022-02-21 NOTE — TELEPHONE ENCOUNTER
Test and f/u  This week      Amanda Morales RegSched Rep     MICHELLE    2/21/22 8:04 AM  Note  PLEASE TAKE IN MIND WIFES CONDITION

## 2022-02-21 NOTE — TELEPHONE ENCOUNTER
Caller: Nicole Osborn    Relationship: Emergency Contact    Best call back number: [phone]        Who are you requesting to speak with     CLINICAL STAFF    Do you know the name of the person who called:     NICOLE    What was the call regarding:      WAS IN HOSPITAL ON Sunday 02.20.22 AND 1ST TEST FOR COVID WAS POSITIVE AND 2ND TEST WAS NEGATIVE. DOES HE NEED ANOTHER TEST?       Do you require a callback:     YES, PLEASE CALL BACK

## 2022-02-23 ENCOUNTER — TELEPHONE (OUTPATIENT)
Dept: FAMILY MEDICINE CLINIC | Facility: CLINIC | Age: 87
End: 2022-02-23

## 2022-02-23 NOTE — TELEPHONE ENCOUNTER
Caller: Suyapa Osborn    Relationship to patient: Emergency Contact    Best call back number: [phone]    Patient just received a negative covid result. He has no symptoms, but his daughter would like to know if he can come back into the house ? Please advise.

## 2022-03-14 DIAGNOSIS — E11.59 CONTROLLED TYPE 2 DIABETES MELLITUS WITH OTHER CIRCULATORY COMPLICATION, WITHOUT LONG-TERM CURRENT USE OF INSULIN: ICD-10-CM

## 2022-03-14 NOTE — TELEPHONE ENCOUNTER
Caller: Suyapa Osborn    Relationship: Emergency Contact    Best call back number:  [phone] (H)     Requested Prescriptions:   Requested Prescriptions     Pending Prescriptions Disp Refills   • glucose blood test strip 100 each 3     Sig: Use as instructed to check glucose daily and as needed for highs and lows.        Pharmacy where request should be sent: [facility] - [address] [phone] [facility] [phone] FX     Does the patient have less than a 3 day supply:  [] Yes  [x] No    Hernán Lozano Rep   03/14/22 08:16 CDT

## 2022-03-22 PROCEDURE — G2066 INTER DEVC REMOTE 30D: HCPCS | Performed by: INTERNAL MEDICINE

## 2022-03-22 PROCEDURE — 93298 REM INTERROG DEV EVAL SCRMS: CPT | Performed by: INTERNAL MEDICINE

## 2022-04-08 NOTE — PROGRESS NOTES
Chief Complaint  Congestive Heart Failure (3mo F/U.) and Coronary Artery Disease    Subjective          Gonzalo Durham presents to [facility] for routine follow-up.  He has chronic diastolic congestive heart failure, coronary artery disease status post CABG x3 in 1980 in [facility], paroxysmal atrial fibrillation on anticoagulation, COPD on continuous oxygen, chronic kidney disease, type 2 diabetes mellitus, pulmonary hypertension, hypertension, hyperlipidemia, stroke and sleep apnea. He reports chronic shortness of breath, which he relates to COPD.  He complains of generalized muscle weakness, which he relates to deconditioning. Patient denies chest pain, palpitations, dizziness, syncope, orthopnea, PND or edema.  Patient denies any signs of bleeding.    Congestive Heart Failure  Presents for follow-up visit. Associated symptoms include muscle weakness and shortness of breath. Pertinent negatives include no abdominal pain, chest pain, chest pressure, claudication, edema, fatigue, near-syncope, nocturia, orthopnea, palpitations, paroxysmal nocturnal dyspnea or unexpected weight change. The symptoms have been stable. His past medical history is significant for CAD. Compliance with total regimen is 51-75%. Compliance with diet is 51-75%. Compliance with exercise is 51-75%. Compliance with medications is %.   Coronary Artery Disease  Presents for follow-up visit. Symptoms include muscle weakness and shortness of breath. Pertinent negatives include no chest pain, chest pressure, chest tightness, dizziness, leg swelling, palpitations or weight gain. Risk factors include hyperlipidemia. Risk factors do not include hypertension. His past medical history is significant for CHF. The symptoms have been stable. Compliance with diet is variable. Compliance with exercise is variable. Compliance with medications is good.   Hypertension  This is a chronic problem. The current episode started  "more than 1 year ago. The problem is controlled. Associated symptoms include shortness of breath. Pertinent negatives include no anxiety, blurred vision, chest pain, headaches, malaise/fatigue, neck pain, orthopnea, palpitations, peripheral edema, PND or sweats. Risk factors for coronary artery disease include male gender, dyslipidemia and diabetes mellitus. Current antihypertension treatment includes beta blockers. The current treatment provides significant improvement. Hypertensive end-organ damage includes kidney disease, CAD/MI, CVA, heart failure and left ventricular hypertrophy. Identifiable causes of hypertension include chronic renal disease and sleep apnea.   Hyperlipidemia  This is a chronic problem. The current episode started more than 1 year ago. Exacerbating diseases include chronic renal disease and diabetes. Associated symptoms include shortness of breath. Pertinent negatives include no chest pain. Current antihyperlipidemic treatment includes statins. Risk factors for coronary artery disease include hypertension, male sex, dyslipidemia and diabetes mellitus.   Atrial Fibrillation  Presents for follow-up visit. Symptoms include shortness of breath. Symptoms are negative for an AICD problem, bradycardia, chest pain, dizziness, hemodynamic instability, hypertension, hypotension, pacemaker problem, palpitations, syncope, tachycardia and weakness. The symptoms have been stable. Past medical history includes atrial fibrillation, CAD, CHF and hyperlipidemia.     I have reviewed and confirmed the accuracy of the ROS  DOMINIQUE Schulz        Objective   Vital Signs:   /62   Pulse 54   Ht 180.3 cm (71\")   Wt 94.8 kg (209 lb)   SpO2 98%   BMI 29.15 kg/m²     Vitals and nursing note reviewed.   Constitutional:       General: Awake.      Appearance: Normal and healthy appearance. Well-developed, overweight and not in distress.      Interventions: Nasal cannula in place.   Eyes:      General: " Lids are normal.      Conjunctiva/sclera: Conjunctivae normal.      Pupils: Pupils are equal, round, and reactive to light.   HENT:      Head: Normocephalic and atraumatic.      Nose: Nose normal.   Neck:      Vascular: No JVR. JVD normal.   Pulmonary:      Effort: Pulmonary effort is normal.      Breath sounds: Examination of the right-upper field reveals decreased breath sounds. Examination of the left-upper field reveals decreased breath sounds. Examination of the right-middle field reveals decreased breath sounds. Examination of the left-middle field reveals decreased breath sounds. Examination of the right-lower field reveals decreased breath sounds. Examination of the left-lower field reveals decreased breath sounds. Decreased breath sounds present. No wheezing. No rhonchi. No rales.   Chest:      Chest wall: Not tender to palpatation.   Cardiovascular:      PMI at left midclavicular line. Bradycardia present. Regular rhythm. Normal S1. Normal S2.      Murmurs: There is no murmur.      No gallop. No click. No rub.   Pulses:     Intact distal pulses.   Edema:     Peripheral edema absent.   Abdominal:      General: Bowel sounds are normal.      Palpations: Abdomen is soft.      Tenderness: There is no abdominal tenderness.   Musculoskeletal: Normal range of motion.         General: No tenderness.      Cervical back: Normal range of motion. Skin:     General: Skin is warm and dry.   Neurological:      General: No focal deficit present.      Mental Status: Alert, oriented to person, place, and time and oriented to person, place and time.   Psychiatric:         Attention and Perception: Attention and perception normal.         Mood and Affect: Mood and affect normal.         Speech: Speech normal.         Behavior: Behavior normal. Behavior is cooperative.         Thought Content: Thought content normal.         Cognition and Memory: Cognition and memory normal.         Judgment: Judgment normal.        Result  Review :   The following data was reviewed by: DOMINIQUE Schulz on 4/11/2022:  Common labs    Common Labsle 7/6/21 7/6/21 7/6/21 1/5/22 1/5/22 1/5/22 1/5/22 1/5/22 2/20/22 2/20/22    0731 0731 0731 0658 0658 0658 0658 0658 0405 0405   Glucose  90   73     112 (A)   BUN  23   27 (A)     24 (A)   Creatinine  1.15   1.28 (A)     1.14   eGFR Non  Am  60 (A)   53 (A)     61   eGFR  Am  73   64        Sodium  145   145     143   Potassium  4.6   4.4     4.3   Chloride  107   106     105   Calcium  9.7   9.9     9.1   Total Protein     6.5        Albumin     4.80     4.20   Total Bilirubin     0.2     <0.2   Alkaline Phosphatase     58     63   AST (SGOT)     14     15   ALT (SGPT)     11     14   WBC 6.61   7.98     6.89    Hemoglobin 12.2 (A)   12.6 (A)     11.6 (A)    Hematocrit 38.1   38.4     37.3 (A)    Platelets 212   220     213    Total Cholesterol       150      Triglycerides       103      HDL Cholesterol       37 (A)      LDL Cholesterol        94      Hemoglobin A1C   6.20 (A)   6.30 (A)       PSA        0.983     (A) Abnormal value       Comments are available for some flowsheets but are not being displayed.           Data reviewed: Cardiology studies 2d echo 8/23/21          Assessment and Plan    Diagnoses and all orders for this visit:    1. Chronic diastolic congestive heart failure (HCC) (Primary)-NYHA class II.  Compensated.  Discussed initiation of Entresto at last office visit, however pt continues to be minimally symptomatic. Will continue to defer for now given chronic kidney disease. Reviewed signs and symptoms of CHF and what to report with the patient. Patient instructed to restrict sodium and weigh daily. Report weight gain of greater than 2 lbs overnight or 5 lbs in 1 week. Pt verbalized understanding of instructions and plan of care.     2. Coronary artery disease involving native coronary artery of native heart without angina pectoris-no clinical signs of ischemia.   Continue Imdur.  Stable.    3. S/P CABG x 3-in 1980 in [facility].  Patient continues on aspirin.  Denies bleeding.    4. PAF (paroxysmal atrial fibrillation) ([facility])-rate controlled and anticoagulated.  Continue metoprolol tartrate.  Stable.    5. Chronic anticoagulation-continues on Eliquis.  Denies bleeding.    6. Stage 2 moderate COPD by GOLD classification ([facility])-patient follows with Dr. Jha with pulmonology. Stable on continuous oxygen at 4L.     7. Stage 3a chronic kidney disease ([facility])- pt does not follow with nephrology. Management per PCP at this time.  Stable.    8. Controlled type 2 diabetes mellitus with other circulatory complication, without long-term current use of insulin ([facility])-management per PCP.  Stable.    9. Pulmonary hypertension ([facility])-moderate on 2D echo 8/23/2021.  Patient follows with pulmonology.  Stable.    10. Primary hypertension-blood pressures well controlled.  Continue metoprolol tartrate.  Monitor and record daily blood pressure. Report readings consistently higher than 130/90 or consistently lower than 100/60.     11. Hyperlipidemia LDL goal <70-management per PCP.  Continue pravastatin.    12. Obstructive sleep apnea-patient reports compliance with BiPAP.  Stable.    Advance Care Planning   ACP discussion was held with the patient during this visit. Patient has an advance directive in EMR which is still valid.        Follow Up   Return in about 6 months (around 10/11/2022) for Next scheduled follow up with Dr. Carver.  Patient was given instructions and counseling regarding his condition or for health maintenance advice. Please see specific information pulled into the AVS if appropriate.

## 2022-04-11 ENCOUNTER — OFFICE VISIT (OUTPATIENT)
Dept: CARDIOLOGY | Facility: CLINIC | Age: 87
End: 2022-04-11

## 2022-04-11 VITALS
DIASTOLIC BLOOD PRESSURE: 62 MMHG | WEIGHT: 209 LBS | HEIGHT: 71 IN | SYSTOLIC BLOOD PRESSURE: 112 MMHG | BODY MASS INDEX: 29.26 KG/M2 | HEART RATE: 54 BPM | OXYGEN SATURATION: 98 %

## 2022-04-11 DIAGNOSIS — J44.9 STAGE 2 MODERATE COPD BY GOLD CLASSIFICATION: Chronic | ICD-10-CM

## 2022-04-11 DIAGNOSIS — E11.59 CONTROLLED TYPE 2 DIABETES MELLITUS WITH OTHER CIRCULATORY COMPLICATION, WITHOUT LONG-TERM CURRENT USE OF INSULIN: Chronic | ICD-10-CM

## 2022-04-11 DIAGNOSIS — I27.20 PULMONARY HYPERTENSION: Chronic | ICD-10-CM

## 2022-04-11 DIAGNOSIS — I48.0 PAF (PAROXYSMAL ATRIAL FIBRILLATION): Chronic | ICD-10-CM

## 2022-04-11 DIAGNOSIS — Z79.01 CHRONIC ANTICOAGULATION: Chronic | ICD-10-CM

## 2022-04-11 DIAGNOSIS — G47.33 OBSTRUCTIVE SLEEP APNEA: Chronic | ICD-10-CM

## 2022-04-11 DIAGNOSIS — Z95.1 S/P CABG X 3: Chronic | ICD-10-CM

## 2022-04-11 DIAGNOSIS — I50.32 CHRONIC DIASTOLIC CONGESTIVE HEART FAILURE: Primary | Chronic | ICD-10-CM

## 2022-04-11 DIAGNOSIS — I25.10 CORONARY ARTERY DISEASE INVOLVING NATIVE CORONARY ARTERY OF NATIVE HEART WITHOUT ANGINA PECTORIS: Chronic | ICD-10-CM

## 2022-04-11 DIAGNOSIS — N18.31 STAGE 3A CHRONIC KIDNEY DISEASE: Chronic | ICD-10-CM

## 2022-04-11 DIAGNOSIS — E78.5 HYPERLIPIDEMIA LDL GOAL <70: Chronic | ICD-10-CM

## 2022-04-11 DIAGNOSIS — I10 PRIMARY HYPERTENSION: Chronic | ICD-10-CM

## 2022-04-11 PROCEDURE — 99214 OFFICE O/P EST MOD 30 MIN: CPT | Performed by: NURSE PRACTITIONER

## 2022-06-12 DIAGNOSIS — G62.9 PERIPHERAL POLYNEUROPATHY: ICD-10-CM

## 2022-06-14 ENCOUNTER — PROCEDURE VISIT (OUTPATIENT)
Dept: UROLOGY | Facility: CLINIC | Age: 87
End: 2022-06-14

## 2022-06-14 DIAGNOSIS — Z85.51 HISTORY OF BLADDER CANCER: Primary | ICD-10-CM

## 2022-06-14 LAB
BILIRUB BLD-MCNC: NEGATIVE MG/DL
CLARITY, POC: CLEAR
COLOR UR: YELLOW
GLUCOSE UR STRIP-MCNC: NEGATIVE MG/DL
KETONES UR QL: NEGATIVE
LEUKOCYTE EST, POC: ABNORMAL
NITRITE UR-MCNC: NEGATIVE MG/ML
PH UR: 5.5 [PH] (ref 5–8)
PROT UR STRIP-MCNC: NEGATIVE MG/DL
RBC # UR STRIP: NEGATIVE /UL
SP GR UR: 1.01 (ref 1–1.03)
UROBILINOGEN UR QL: NORMAL

## 2022-06-14 PROCEDURE — 81001 URINALYSIS AUTO W/SCOPE: CPT | Performed by: UROLOGY

## 2022-06-14 PROCEDURE — 52000 CYSTOURETHROSCOPY: CPT | Performed by: UROLOGY

## 2022-06-14 RX ORDER — GABAPENTIN 600 MG/1
600 TABLET ORAL 3 TIMES DAILY
Qty: 270 TABLET | Refills: 2 | Status: ON HOLD | OUTPATIENT
Start: 2022-06-14 | End: 2023-02-07

## 2022-06-14 NOTE — TELEPHONE ENCOUNTER
Rx Refill Note  Requested Prescriptions     Pending Prescriptions Disp Refills   • gabapentin (NEURONTIN) 600 MG tablet 270 tablet 2     Sig: Take 1 tablet by mouth 3 (Three) Times a Day.      Last office visit with prescribing clinician: 1/6/2022      Next office visit with prescribing clinician: 7/11/2022            Hilda Lancaster, PCT  06/14/22, 09:05 CDT

## 2022-06-15 NOTE — PROGRESS NOTES
Pre- operative diagnosis:  Bladder cancer surveillance. 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  He remains on finasteride and Hytrin.    Post operative diagnosis:  Bladder Tumor    Procedure:  The patient was prepped and draped in a normal sterile fashion.  The urethra was anesthetized with 2% lidocaine jelly.  A flexible cystoscope was introduced per urethra.      Urethra:  Normal    Bladder:  Normal mucosa, and mild trabeculation.  There are small little to be tiny papillary bladder tumors present on the intravesical prostate lobe urothelium    Ureteral orifices:  Normal position bilaterally and Clear efflux bilaterally    Prostate:  lateral lobe hypertrophy and Large intravesical lobe     Patient tolerated the procedure well    Complications: none    Blood loss: minimal    Follow up:    Routine follow up-6 months.  Given his advanced age and oxygen requirement along with the very small size of his papillary bladder tumors on his intravesical lobe, patient would like to observe for now.  He will follow-up with me for surveillance cystoscopy in December or sooner as needed.      This document has been signed by KY Cadena MD on Robyn 15, 2022 12:47 CDT

## 2022-07-04 DIAGNOSIS — E11.59 CONTROLLED TYPE 2 DIABETES MELLITUS WITH OTHER CIRCULATORY COMPLICATION, WITHOUT LONG-TERM CURRENT USE OF INSULIN: ICD-10-CM

## 2022-07-04 DIAGNOSIS — K21.9 GASTROESOPHAGEAL REFLUX DISEASE: ICD-10-CM

## 2022-07-04 RX ORDER — PANTOPRAZOLE SODIUM 40 MG/1
40 TABLET, DELAYED RELEASE ORAL DAILY
Qty: 90 TABLET | Refills: 3 | Status: CANCELLED | OUTPATIENT
Start: 2022-07-04

## 2022-07-06 ENCOUNTER — LAB (OUTPATIENT)
Dept: FAMILY MEDICINE CLINIC | Facility: CLINIC | Age: 87
End: 2022-07-06

## 2022-07-06 DIAGNOSIS — J44.9 STAGE 2 MODERATE COPD BY GOLD CLASSIFICATION: ICD-10-CM

## 2022-07-06 DIAGNOSIS — E78.2 MIXED HYPERLIPIDEMIA: ICD-10-CM

## 2022-07-06 DIAGNOSIS — E11.59 CONTROLLED TYPE 2 DIABETES MELLITUS WITH OTHER CIRCULATORY COMPLICATION, WITHOUT LONG-TERM CURRENT USE OF INSULIN: Primary | ICD-10-CM

## 2022-07-06 DIAGNOSIS — E53.8 B12 DEFICIENCY: ICD-10-CM

## 2022-07-06 DIAGNOSIS — D64.9 ANEMIA, UNSPECIFIED TYPE: ICD-10-CM

## 2022-07-06 DIAGNOSIS — K21.9 GASTROESOPHAGEAL REFLUX DISEASE: ICD-10-CM

## 2022-07-06 RX ORDER — PANTOPRAZOLE SODIUM 40 MG/1
40 TABLET, DELAYED RELEASE ORAL DAILY
Qty: 90 TABLET | Refills: 3 | Status: SHIPPED | OUTPATIENT
Start: 2022-07-06

## 2022-07-06 RX ORDER — PANTOPRAZOLE SODIUM 40 MG/1
40 TABLET, DELAYED RELEASE ORAL DAILY
Qty: 90 TABLET | Refills: 3 | Status: CANCELLED | OUTPATIENT
Start: 2022-07-06

## 2022-07-06 RX ORDER — NITROGLYCERIN 0.4 MG/1
0.4 TABLET SUBLINGUAL
Qty: 90 TABLET | Refills: 3 | Status: SHIPPED | OUTPATIENT
Start: 2022-07-06

## 2022-07-06 NOTE — TELEPHONE ENCOUNTER
Rx Refill Note  Requested Prescriptions     Pending Prescriptions Disp Refills   • glucose blood test strip 100 each 3     Sig: Use as instructed to check glucose daily and as needed for highs and lows.   • pantoprazole (PROTONIX) 40 MG EC tablet 90 tablet 3     Sig: Take 1 tablet by mouth Daily.      Last office visit with prescribing clinician: 1/6/2022      Next office visit with prescribing clinician: 7/4/2022            Lashaun Reyes LPN  07/06/22, 12:36 CDT

## 2022-07-06 NOTE — TELEPHONE ENCOUNTER
Rx Refill Note  Requested Prescriptions     Pending Prescriptions Disp Refills   • nitroglycerin (Nitrostat) 0.4 MG SL tablet 90 tablet 3     Sig: Place 1 tablet under the tongue Every 5 (Five) Minutes As Needed for Chest Pain.      Last office visit with prescribing clinician: 1/6/2022      Next office visit with prescribing clinician: 7/6/2022            Lashaun Reyes LPN  07/06/22, 12:38 CDT

## 2022-07-07 LAB
BASOPHILS # BLD AUTO: 0.07 10*3/MM3 (ref 0–0.2)
BASOPHILS NFR BLD AUTO: 1 % (ref 0–1.5)
BUN SERPL-MCNC: 22 MG/DL (ref 8–23)
BUN/CREAT SERPL: 17.5 (ref 7–25)
CALCIUM SERPL-MCNC: 9.7 MG/DL (ref 8.6–10.5)
CHLORIDE SERPL-SCNC: 102 MMOL/L (ref 98–107)
CO2 SERPL-SCNC: 29.7 MMOL/L (ref 22–29)
CREAT SERPL-MCNC: 1.26 MG/DL (ref 0.76–1.27)
EGFRCR SERPLBLD CKD-EPI 2021: 54.9 ML/MIN/1.73
EOSINOPHIL # BLD AUTO: 0.78 10*3/MM3 (ref 0–0.4)
EOSINOPHIL NFR BLD AUTO: 10.8 % (ref 0.3–6.2)
ERYTHROCYTE [DISTWIDTH] IN BLOOD BY AUTOMATED COUNT: 13.3 % (ref 12.3–15.4)
FOLATE SERPL-MCNC: >20 NG/ML (ref 4.78–24.2)
GLUCOSE SERPL-MCNC: 77 MG/DL (ref 65–99)
HBA1C MFR BLD: 6 % (ref 4.8–5.6)
HCT VFR BLD AUTO: 34.5 % (ref 37.5–51)
HGB BLD-MCNC: 11.5 G/DL (ref 13–17.7)
IMM GRANULOCYTES # BLD AUTO: 0.02 10*3/MM3 (ref 0–0.05)
IMM GRANULOCYTES NFR BLD AUTO: 0.3 % (ref 0–0.5)
IRON SERPL-MCNC: 112 MCG/DL (ref 59–158)
LYMPHOCYTES # BLD AUTO: 1.63 10*3/MM3 (ref 0.7–3.1)
LYMPHOCYTES NFR BLD AUTO: 22.6 % (ref 19.6–45.3)
MCH RBC QN AUTO: 29.6 PG (ref 26.6–33)
MCHC RBC AUTO-ENTMCNC: 33.3 G/DL (ref 31.5–35.7)
MCV RBC AUTO: 88.7 FL (ref 79–97)
MONOCYTES # BLD AUTO: 0.65 10*3/MM3 (ref 0.1–0.9)
MONOCYTES NFR BLD AUTO: 9 % (ref 5–12)
NEUTROPHILS # BLD AUTO: 4.05 10*3/MM3 (ref 1.7–7)
NEUTROPHILS NFR BLD AUTO: 56.3 % (ref 42.7–76)
NRBC BLD AUTO-RTO: 0 /100 WBC (ref 0–0.2)
PLATELET # BLD AUTO: 231 10*3/MM3 (ref 140–450)
POTASSIUM SERPL-SCNC: 4.7 MMOL/L (ref 3.5–5.2)
RBC # BLD AUTO: 3.89 10*6/MM3 (ref 4.14–5.8)
SODIUM SERPL-SCNC: 140 MMOL/L (ref 136–145)
VIT B12 SERPL-MCNC: 557 PG/ML (ref 211–946)
WBC # BLD AUTO: 7.2 10*3/MM3 (ref 3.4–10.8)

## 2022-07-11 ENCOUNTER — OFFICE VISIT (OUTPATIENT)
Dept: FAMILY MEDICINE CLINIC | Facility: CLINIC | Age: 87
End: 2022-07-11

## 2022-07-11 VITALS
DIASTOLIC BLOOD PRESSURE: 68 MMHG | HEART RATE: 46 BPM | WEIGHT: 213.4 LBS | OXYGEN SATURATION: 96 % | SYSTOLIC BLOOD PRESSURE: 134 MMHG | BODY MASS INDEX: 29.88 KG/M2 | RESPIRATION RATE: 18 BRPM | HEIGHT: 71 IN | TEMPERATURE: 97.3 F

## 2022-07-11 DIAGNOSIS — I50.32 CHRONIC DIASTOLIC CONGESTIVE HEART FAILURE: Chronic | ICD-10-CM

## 2022-07-11 DIAGNOSIS — E11.9 CONTROLLED TYPE 2 DIABETES MELLITUS WITHOUT COMPLICATION, WITHOUT LONG-TERM CURRENT USE OF INSULIN: Chronic | ICD-10-CM

## 2022-07-11 DIAGNOSIS — L21.9 SEBORRHEA: ICD-10-CM

## 2022-07-11 DIAGNOSIS — N18.31 STAGE 3A CHRONIC KIDNEY DISEASE: Chronic | ICD-10-CM

## 2022-07-11 DIAGNOSIS — E78.5 HYPERLIPIDEMIA, UNSPECIFIED HYPERLIPIDEMIA TYPE: Chronic | ICD-10-CM

## 2022-07-11 DIAGNOSIS — K21.9 GASTROESOPHAGEAL REFLUX DISEASE, UNSPECIFIED WHETHER ESOPHAGITIS PRESENT: ICD-10-CM

## 2022-07-11 DIAGNOSIS — R09.02 HYPOXIA: ICD-10-CM

## 2022-07-11 DIAGNOSIS — I70.90 ATHEROSCLEROSIS: ICD-10-CM

## 2022-07-11 DIAGNOSIS — L84 CORN OR CALLUS: ICD-10-CM

## 2022-07-11 DIAGNOSIS — E78.5 HYPERLIPIDEMIA LDL GOAL <70: Chronic | ICD-10-CM

## 2022-07-11 DIAGNOSIS — E11.59 CONTROLLED TYPE 2 DIABETES MELLITUS WITH OTHER CIRCULATORY COMPLICATION, WITHOUT LONG-TERM CURRENT USE OF INSULIN: ICD-10-CM

## 2022-07-11 DIAGNOSIS — I10 PRIMARY HYPERTENSION: Chronic | ICD-10-CM

## 2022-07-11 DIAGNOSIS — D64.9 ANEMIA, UNSPECIFIED TYPE: ICD-10-CM

## 2022-07-11 DIAGNOSIS — Z79.01 CHRONIC ANTICOAGULATION: Chronic | ICD-10-CM

## 2022-07-11 DIAGNOSIS — G62.9 PERIPHERAL POLYNEUROPATHY: ICD-10-CM

## 2022-07-11 PROCEDURE — 99214 OFFICE O/P EST MOD 30 MIN: CPT | Performed by: FAMILY MEDICINE

## 2022-07-11 PROCEDURE — 1170F FXNL STATUS ASSESSED: CPT | Performed by: FAMILY MEDICINE

## 2022-07-11 PROCEDURE — 1125F AMNT PAIN NOTED PAIN PRSNT: CPT | Performed by: FAMILY MEDICINE

## 2022-07-11 PROCEDURE — G0439 PPPS, SUBSEQ VISIT: HCPCS | Performed by: FAMILY MEDICINE

## 2022-07-11 PROCEDURE — 1160F RVW MEDS BY RX/DR IN RCRD: CPT | Performed by: FAMILY MEDICINE

## 2022-07-11 RX ORDER — FENOPROFEN CALCIUM 200 MG
CAPSULE ORAL 2 TIMES DAILY
Qty: 59 ML | Refills: 1 | Status: SHIPPED | OUTPATIENT
Start: 2022-07-11 | End: 2023-02-17 | Stop reason: HOSPADM

## 2022-07-11 RX ORDER — PRAVASTATIN SODIUM 80 MG/1
80 TABLET ORAL DAILY
Qty: 90 TABLET | Refills: 3 | Status: CANCELLED | OUTPATIENT
Start: 2022-07-11

## 2022-07-11 RX ORDER — PRAVASTATIN SODIUM 80 MG/1
80 TABLET ORAL DAILY
Qty: 90 TABLET | Refills: 3 | Status: SHIPPED | OUTPATIENT
Start: 2022-07-11 | End: 2022-07-14 | Stop reason: SDUPTHER

## 2022-07-11 RX ORDER — GUAIFENESIN 600 MG/1
1 TABLET, EXTENDED RELEASE ORAL DAILY
COMMUNITY

## 2022-07-11 RX ORDER — HYDROCODONE BITARTRATE AND ACETAMINOPHEN 5; 325 MG/1; MG/1
1 TABLET ORAL EVERY 6 HOURS PRN
Qty: 12 TABLET | Refills: 0 | Status: ON HOLD | OUTPATIENT
Start: 2022-07-11 | End: 2023-02-07

## 2022-07-11 NOTE — PROGRESS NOTES
QUICK REFERENCE INFORMATION:  The ABCs of the Annual Wellness Visit    Subsequent Medicare Wellness Visit     HEALTH RISK ASSESSMENT    : 1934    Recent Hospitalizations:  No hospitalization(s) within the last year..  ccc      Current Medical Providers:  Patient Care Team:  Abel Romero MD as PCP - General (Family Medicine)  Dawson Claros MD (Inactive) as Consulting Physician (Urology)  Abel Romero MD as Referring Physician (Family Medicine)  Bradley Carver MD as Cardiologist (Cardiology)  Sly Ahn MD as Consulting Physician (Gastroenterology)  Abel Romero MD as Referring Physician (Family Medicine)  Juan Lane MD as Consulting Physician (Otolaryngology)  Feliz Frye APRN as Nurse Practitioner (Pulmonary Disease)  [facility] (DME Services)  Danie Cadena MD as Consulting Physician (Urology)        Smoking Status:  Social History     Tobacco Use   Smoking Status Former Smoker   • Packs/day: 1.00   • Years: 26.00   • Pack years: 26.00   • Types: Cigarettes   • Start date:    • Quit date:    • Years since quittin.5   Smokeless Tobacco Never Used       Alcohol Consumption:  Social History     Substance and Sexual Activity   Alcohol Use No       Depression Screen:   PHQ-2/PHQ-9 Depression Screening 2022   Retired Total Score -   Little Interest or Pleasure in Doing Things 0-->not at all   Feeling Down, Depressed or Hopeless 0-->not at all   PHQ-9: Brief Depression Severity Measure Score 0   If You Checked Off Any Problems, How Difficult Have These Problems Made It For You to Do Your Work, Take Care of Things at Home, or Get Along with Other People? not difficult at all       Health Habits and Functional and Cognitive Screening:  Functional & Cognitive Status 2022   Do you have difficulty preparing food and eating? No   Do you have difficulty bathing yourself, getting dressed or grooming yourself? No   Do you have difficulty  using the toilet? No   Do you have difficulty moving around from place to place? Yes   Do you have trouble with steps or getting out of a bed or a chair? Yes   Current Diet Well Balanced Diet   Dental Exam Not up to date   Eye Exam Up to date   Exercise (times per week) 0 times per week   Current Exercises Include Gardening;House Cleaning   Do you need help using the phone?  No   Are you deaf or do you have serious difficulty hearing?  Yes   Do you need help with transportation? Yes   Do you need help shopping? No   Do you need help preparing meals?  No   Do you need help with housework?  Yes   Do you need help with laundry? No   Do you need help taking your medications? Yes   Do you need help managing money? No   Do you ever drive or ride in a car without wearing a seat belt? No   Have you felt unusual stress, anger or loneliness in the last month? No   Who do you live with? Spouse   If you need help, do you have trouble finding someone available to you? No   Have you been bothered in the last four weeks by sexual problems? No           Does the patient have evidence of cognitive impairment? No    Asiprin use counseling: Taking ASA appropriately as indicated      Recent Lab Results:       Lab Results   Component Value Date    HGBA1C 6.00 (H) 07/06/2022     Lab Results   Component Value Date    CHOL 149 05/23/2019    TRIG 103 01/05/2022    HDL 37 (L) 01/05/2022    VLDL 19 01/05/2022    LDLHDL 3.81 05/23/2019           Age-appropriate Screening Schedule:  Refer to the list below for future screening recommendations based on patient's age, sex and/or medical conditions. Orders for these recommended tests are listed in the plan section. The patient has been provided with a written plan.    Health Maintenance   Topic Date Due   • TDAP/TD VACCINES (1 - Tdap) Never done   • ZOSTER VACCINE (1 of 2) Never done   • URINE MICROALBUMIN  05/15/2020   • DIABETIC EYE EXAM  03/12/2021   • INFLUENZA VACCINE  10/01/2022   • LIPID  PANEL  01/05/2023   • HEMOGLOBIN A1C  01/06/2023        Subjective   History of Present Illness    Gonzalo Durham is a 88 y.o. male who presents for an Annual Wellness Visit.    The following portions of the patient's history were reviewed and updated as appropriate: allergies, current medications, past family history, past medical history, past social history, past surgical history and problem list.    Outpatient Medications Prior to Visit   Medication Sig Dispense Refill   • acetaminophen (TYLENOL) 325 MG tablet Take 2 tablets by mouth 2 (Two) Times a Day. 360 tablet 2   • albuterol sulfate HFA (ProAir HFA) 108 (90 Base) MCG/ACT inhaler Inhale 2 puffs 4 (Four) Times a Day. 54 g 3   • apixaban (Eliquis) 5 MG tablet tablet Take 1 tablet by mouth 2 (two) times a day. 180 tablet 3   • Artificial Tear Solution (Just Tears Eye Drops) solution Apply 1-2 drops to eye(s) as directed by provider Daily As Needed (dry eyes). 45 mL 2   • ascorbic acid (VITAMIN C) 1000 MG tablet Take 1 tablet by mouth Daily. 90 tablet 3   • aspirin 81 MG EC tablet Take 81 mg by mouth Daily.     • calcium carbonate-vitamin d 600-400 MG-UNIT per tablet Take 1 tablet by mouth 2 (Two) Times a Day. 180 tablet 3   • finasteride (PROSCAR) 5 MG tablet Take 1 tablet by mouth Daily. 90 tablet 3   • gabapentin (NEURONTIN) 600 MG tablet Take 1 tablet by mouth 3 (Three) Times a Day. 270 tablet 2   • glucose blood test strip Use as instructed to check glucose daily and as needed for highs and lows. 100 each 3   • glyburide (DIAbeta) 5 MG tablet Take 1 tablet by mouth Every Morning AND 0.5 tablets Daily Before Supper. 135 tablet 3   • guaiFENesin (Mucinex) 600 MG 12 hr tablet Take 600 mg by mouth Daily.     • isosorbide mononitrate (IMDUR) 30 MG 24 hr tablet Take 1 tablet by mouth Daily. 90 tablet 3   • Lancets (freestyle) lancets Use once daily 100 each 2   • loratadine (CLARITIN) 10 MG tablet Take 1 tablet by mouth Daily. 90 tablet 3   • metFORMIN  (Glucophage) 500 MG tablet Take 1 tablet by mouth Every Night. 90 tablet 3   • metoprolol tartrate (LOPRESSOR) 25 MG tablet Take 0.5 tablets by mouth 2 (Two) Times a Day. 180 tablet 3   • multivitamin with minerals (Centrum Silver Adult 50+) tablet tablet Take 1 tablet by mouth Daily. 90 tablet 3   • NON FORMULARY CPAP per mask with 4L oxygen nightly     • O2 (OXYGEN) Inhale 4 L/min Continuous. Per nasal cannula     • pantoprazole (PROTONIX) 40 MG EC tablet Take 1 tablet by mouth Daily. 90 tablet 3   • Stiolto Respimat 2.5-2.5 MCG/ACT aerosol solution inhaler Inhale 2 puffs Every Night.     • terazosin (HYTRIN) 10 MG capsule Take 1 capsule by mouth Daily. 90 capsule 3   • pravastatin (Pravachol) 80 MG tablet Take 1 tablet by mouth Daily. 90 tablet 3   • nitroglycerin (Nitrostat) 0.4 MG SL tablet Place 1 tablet under the tongue Every 5 (Five) Minutes As Needed for Chest Pain. 90 tablet 3     No facility-administered medications prior to visit.       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, without long-term current use of insulin (HCC)   • Stage 3a chronic kidney disease (HCC)   • Erectile dysfunction   • Coronary artery disease involving native coronary artery of native heart without angina pectoris   • Hyperlipidemia LDL goal <70   • Hypertension   • Prostatism-(young) urology   • Tremor   • Varicose vein of leg   • Plantar fasciitis   • Nocturnal leg movements   • Bad dreams   • Depression   • Peripheral neuropathy- clinical   • Hx of colonic polyps   • Gastroesophageal reflux disease   • Left lower quadrant pain   • Laboratory test*   • Chronic anticoagulation   • SOB: copd,CAD,#, hypoxemia   • Hypoxia#to pulmonary   • Respiratory tnagkof-zmgzyci-Y0-continuous   • Corn or callus   • Anemia: ASA81,plavix,eliquis,gi(3/3+,div,cp,eitis   • Atherosclerosis: CAD, AAA vascular   • AAA: 2022-(clara) steffen   • Heme  positive stool   • BMI 30.0-30.9,adult   • Stage 2 moderate COPD by GOLD classification ([facility])   • Abnormal stress test   • Tingling of both feet-to consider NCV   • Cryptogenic stroke ([facility])   • Chronic diastolic congestive heart failure ([facility])   • Gross hematuria   • BPH with obstruction/lower urinary tract symptoms   • Chest pain   • Obstructive sleep apnea   • Abnormal CT of the chest 1.2022/12m   • 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 ([facility])   • Oxygen dependent   • S/P CABG x 3   • Pulmonary hypertension ([facility])   • PAF (paroxysmal atrial fibrillation) ([facility])       Advance Care Planning:  ACP discussion was held with the patient during this visit. Patient has an advance directive in EMR which is still valid.     Identification of Risk Factors:  Risk factors include: Colon Cancer Screening  Fall Risk  Immunizations Discussed/Encouraged (specific immunizations; Tdap, Influenza, Prevnar 20 (Pneumococcal 20-valent conjugate), Shingrix and COVID19 ).    Review of Systems    Compared to one year ago, the patient feels his physical health is worse.  Compared to one year ago, the patient feels his mental health is the same.    GENERAL:  Active/slower with limits, speed, samni for age and SOB.  Sleep is ok; much better with cpap but interrupted caring for wife.    SKIN:  Ongoing callous of feet; no problems with this/other skin today unless above/below.    ENDO:  No syncope, near or diaphoretic sweaty spells.  BS Ok: without download noted last visit.   HEENT: No head injury, headache.  No vision change.  Same mild hearing loss.  Ears without pain/drainage.  No sore throat.  No significant nasal/sinus congestion/drainage. No epistaxis.  CHEST: No chest wall tenderness or mass. Stable occ cough without productive without wheeze.  Mild/same SOB; no hemoptysis.  Wears oxygen continous.   CV: No chest pain, palpatations, ankle edema.  GI: No heartburn significant dysphagia.  No abdominal pain, diarrhea, constipation, rectal  "bleeding, or melena.    :  Voids without dysuria, or incontience to completion.  ORTHO: No painful/swollen joints but various on /off sore.  No change occ/usual sore neck or back.  But no acute neck but above mid back pain without recent injury.   NEURO: No dizziness; recent LUE weakness of extremities.  No change some LE numbness/parethesias.   PSYCH: No memory loss.  Mood good; not that anxious, depressed but/and not suicidal.  Tolerated stress.   Screening:  Mammogram: NA  Bone density: NA  Low dose CT chest: Tobacco-smoker/age 22/1 ppd/dc 1980: (22): NA (had CT angio)  IMPRESSION:  1. Stable 6 mm subpleural right lower lobe nodule. Stable for 3.5 years.  No additional workup needed.  2. Linear atelectasis or scarring in both lower lobes. Calcified  granulomas. Centrilobular emphysema.  3. Linear secretions in the distal trachea.  4. 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  Dilated pulmonary arteries.  5. Fatty infiltration of the liver.       GI: Colon-div/[facility]/6.15.17/prn  Prostate: chin confirmed 7.11.22  urology/confirmed 7.8.21  Chin/confirmed 11.22.19  [place] in past   Usual lab order  6m CBC, BMP, A1c  12m CBC, CMP, A1c, TSH, LIPID, PSAs, Vit D      Objective     Physical Exam    Vitals:    07/11/22 0951   BP: 134/68   BP Location: Right arm   Patient Position: Sitting   Cuff Size: Large Adult   Pulse: (!) 46   Resp: 18   Temp: 97.3 °F (36.3 °C)   TempSrc: Infrared   SpO2: 96%   Weight: 96.8 kg (213 lb 6.4 oz)   Height: 180.3 cm (71\")   PainSc:   6   PainLoc: Generalized       BMI is >= 25 and <30. (Overweight) The following options were offered after discussion;: nutrition counseling/recommendations    GENERAL:  Well nourished/developed in no acute distress.   SKIN: Turgor excellent, without wound, rash, lesion  HEENT: Normal cephalic without trauma.  Pupils equal round reactive to light. Extraocular motions full without nystagmus.    No thyroidmegaly, mass, tenderness, " lymphadenopathy and supple.  CV: Regular rhythm.  No murmur, gallop,  edema. Posterior pulses intact.  No carotid bruits.  CHEST: No chest wall tenderness or mass.   LUNGS: Symmetric motion with clear/decreased to auscultation.   ABD: Soft, nontender without mass.   ORTHO: Symmetric extremities without swelling/point tenderness.  Full gross range of motion except hips reduced.  Symmetric LE.  Neg straight leg raising.  Neg Patricks maneuver.  Back is straight/mild lordosis.  Mid back sore touch.   NEURO: CN 2-12 grossly intact.  Symmetric facies. 1/4 x bicep knee equal reflexes.  UE/LE   3/5 strength throughout except 2/5  L.  Nonfocal use extremities. Speech clear.  Light touch decreased bilateral feet.   PSYCH: Oriented x 3.  Pleasant calm, well kept.  Purposeful/directed conservation with intact short/long gross memory.      Diabetic foot exam:   Left: Filament test reduced   Pulses Dorsalis Pedis:  diminished   Reflexes 1+    Vibratory sensation diminished   Proprioception diminished   Sharp/dull discrimination diminished       Right: Filament test reduced   Pulses Dorsalis Pedis:  diminished  Posterior Tibial:  present   Reflexes 1+    Vibratory sensation diminished   Proprioception diminished   Sharp/dull discrimination diminished    Assessment & Plan   Patient Self-Management and Personalized Health Advice  The patient has been provided with information about: fall prevention and preventive services including:   · Annual Wellness Visit (AWV).    Visit Diagnoses:    ICD-10-CM ICD-9-CM   1. Hyperlipidemia, unspecified hyperlipidemia type  E78.5 272.4   2. Hyperlipidemia LDL goal <70  E78.5 272.4   3. Primary hypertension  I10 401.9   4. Chronic diastolic congestive heart failure (HCC)  I50.32 428.32     428.0   5. Atherosclerosis: CAD, AAA ro  I70.90 440.9   6. Chronic anticoagulation  Z79.01 V58.61   7. Peripheral polyneuropathy  G62.9 356.9   8. Controlled type 2 diabetes mellitus with other circulatory  complication, without long-term current use of insulin ([facility])  E11.59 250.70   9. Seborrhea  L21.9 706.3   10. Stage 3a chronic kidney disease ([facility])  N18.31 585.3   11. Anemia, unspecified type  D64.9 285.9   12. Hypoxia#to pulmonary  R09.02 799.02   13. [place] or callus  L84 700   14. Gastroesophageal reflux disease, unspecified whether esophagitis present  K21.9 530.81       Orders Placed This Encounter   Procedures   • Footwear Diabetic     One pair diabetic shoes and Three sets of heat molded inserts     Order Specific Question:   Footwear type(s)     Answer:   Footwear Diabetics Modification     Order Specific Question:   Length of Need (99 Months = Lifetime)     Answer:   99 Months = Lifetime       Outpatient Encounter Medications as of 7/11/2022   Medication Sig Dispense Refill   • acetaminophen (TYLENOL) 325 MG tablet Take 2 tablets by mouth 2 (Two) Times a Day. 360 tablet 2   • albuterol sulfate HFA (ProAir HFA) 108 (90 Base) MCG/ACT inhaler Inhale 2 puffs 4 (Four) Times a Day. 54 g 3   • apixaban (Eliquis) 5 MG tablet tablet Take 1 tablet by mouth 2 (two) times a day. 180 tablet 3   • Artificial Tear Solution (Just Tears Eye Drops) solution Apply 1-2 drops to eye(s) as directed by provider Daily As Needed (dry eyes). 45 mL 2   • ascorbic acid (VITAMIN C) 1000 MG tablet Take 1 tablet by mouth Daily. 90 tablet 3   • aspirin 81 MG EC tablet Take 81 mg by mouth Daily.     • calcium carbonate-vitamin d 600-400 MG-UNIT per tablet Take 1 tablet by mouth 2 (Two) Times a Day. 180 tablet 3   • finasteride (PROSCAR) 5 MG tablet Take 1 tablet by mouth Daily. 90 tablet 3   • gabapentin (NEURONTIN) 600 MG tablet Take 1 tablet by mouth 3 (Three) Times a Day. 270 tablet 2   • glucose blood test strip Use as instructed to check glucose daily and as needed for highs and lows. 100 each 3   • glyburide (DIAbeta) 5 MG tablet Take 1 tablet by mouth Every Morning AND 0.5 tablets Daily Before Supper. 135 tablet 3   • guaiFENesin  (Mucinex) 600 MG 12 hr tablet Take 600 mg by mouth Daily.     • isosorbide mononitrate (IMDUR) 30 MG 24 hr tablet Take 1 tablet by mouth Daily. 90 tablet 3   • Lancets (freestyle) lancets Use once daily 100 each 2   • loratadine (CLARITIN) 10 MG tablet Take 1 tablet by mouth Daily. 90 tablet 3   • metFORMIN (Glucophage) 500 MG tablet Take 1 tablet by mouth Every Night. 90 tablet 3   • metoprolol tartrate (LOPRESSOR) 25 MG tablet Take 0.5 tablets by mouth 2 (Two) Times a Day. 180 tablet 3   • multivitamin with minerals (Centrum Silver Adult 50+) tablet tablet Take 1 tablet by mouth Daily. 90 tablet 3   • NON FORMULARY CPAP per mask with 4L oxygen nightly     • O2 (OXYGEN) Inhale 4 L/min Continuous. Per nasal cannula     • pantoprazole (PROTONIX) 40 MG EC tablet Take 1 tablet by mouth Daily. 90 tablet 3   • pravastatin (Pravachol) 80 MG tablet Take 1 tablet by mouth Daily. 90 tablet 3   • Stiolto Respimat 2.5-2.5 MCG/ACT aerosol solution inhaler Inhale 2 puffs Every Night.     • terazosin (HYTRIN) 10 MG capsule Take 1 capsule by mouth Daily. 90 capsule 3   • [DISCONTINUED] pravastatin (Pravachol) 80 MG tablet Take 1 tablet by mouth Daily. 90 tablet 3   • HYDROcodone-acetaminophen (Norco) 5-325 MG per tablet Take 1 tablet by mouth Every 6 (Six) Hours As Needed for Mild Pain  or Moderate Pain . 12 tablet 0   • hydrocortisone 1 % lotion Apply  topically to the appropriate area as directed 2 (Two) Times a Day. 59 mL 1   • nitroglycerin (Nitrostat) 0.4 MG SL tablet Place 1 tablet under the tongue Every 5 (Five) Minutes As Needed for Chest Pain. 90 tablet 3     No facility-administered encounter medications on file as of 7/11/2022.       Reviewed use of high risk medication in the elderly: yes  Reviewed for potential of harmful drug interactions in the elderly: yes    Follow Up:  Return for controlled substance form; lab/Dr Romero 6m.     An After Visit Summary and PPPS with all of these plans were given to the patient.

## 2022-07-11 NOTE — PATIENT INSTRUCTIONS
"Medicare/insurances offer certain visits called \"wellness/annual\" that allows for time to deal with and  review the many aspects of \"being well\" that just might not get mentioned during other visits with your doctor through the year.  This includes things like reviews of health screenings (mammograms, various labs),  weight, exercise, vaccines for just a few examples.      In order to help you with this we wish to make you aware of a few things for you to consider:    1. Advanced directives.  These are documents used to help direct your care if your health/situation should reach a point that you cannot make your own decisions.  While it is likely you do not currently have a need for these documents now; it is something that we all might face at any time.   The hand outs you are being given today are simply for you to review and use to learn more about these documents and consider them as you wish.      2. Vaccines: Certain vaccines are important after age 50, 60, and 65 and some health situations (for example COPD), require even boosters beyond age 65.  We are happy to review with you your vaccine status and vaccines that might be needed for you at this point:      a. Tetanus.   Like anyone this needs to given every 10 years; sooner for/with lacerations/wounds.   Likely when getting this booster it needs to be a tetanus called Tdap (tetanus mixed with diptheria and pertussis).   Years ago you had this vaccine.  We now know we can lose our immunity to pertussis (a part of this vaccine) and run a risk of catching this.  Now only would this make us ill; but more importantly we can spread this to very young children (and for them it can be a much more dangerous illness).   We call this the grandparent vaccine for this reason.     b. Pneumonia (strept).   This comes now with two brands.   It is recommended to take pneumovax first; and a year later take the cousin prevnar.  Even if you have had these before; we need to " review when and your current health situation/s as you may need boosters and even recently the CDC has made recent/new recommendations for pneumovax.      c. Shingles.  You do not want to catch shingles.  Though you will recover from this; the pain associated with shingles can be severe.  Even if you have had the now older zostavax, or have had shingles; it is recommended you still get the Shingrix (the new vaccine just available early 2018 shingles vaccine).  A new shingles vaccine (a shot to lower your chance of catching shingles) is now available (shingrix).  This vaccine is the second vaccine created for this purpose; (we have had zostavax for years).  Shingrix provides a much better and longer immunity for shingles than zostavax.  For this and other reasons Shingrix can be started at age 50.  If you have had zostavax in the past; you can still take Shingrix.      This vaccine is not paid for in a doctor's office by medicare, medicaid and probably most insurances.  Like zostavax; this is covered in drug stores.  This is a vaccine that if you chose to get you need to get at a drug store that gives vaccines (like [facility], [facility] and [facility]      d. Yearly flu vaccine given from September through April each year (there is a special vaccine for those over 65).     e. Travel vaccines:  If you are one to do international travel; be sure and ask us for any particular unusual vaccines you may need.     f.  Miscellaneous:  If you have certain health situations/disease you may need specific/particular vaccines not give to the general public.     g.  Covid: currently recommended everyone over 5.  The brands Pfizer/Moderna are for 3 total shots as immunity will wane from less than this.  Anuj/Anuj has a version that comes with recommendations for an initial vaccine and booster after.  I no longer recommend J&J as a first choice as Pfizer/Moderna are readily available.  If you have had an  initial J&J I recommend you booster with Moderna.   I strongly recommend covid vaccination; being unvaccinated or partially vaccinated carries real risk for disease and even death.     Because of many restrictions on this office always having all the above vaccines; you may be advised to work with your local health department to keep up with your individual vaccine needs.    The vaccines we have on record for you include:   Immunization History   Administered Date(s) Administered    COVID-19 (MODERNA) 1st, 2nd, 3rd Dose Only 03/03/2021, 03/31/2021    FLUAD TRI 65YR+ 11/22/2019    Fluad Quad 65+ 10/06/2020    Fluzone High Dose =>65 Years ([facility] ONLY) 10/09/2018    Fluzone High-Dose 65+yrs 12/27/2021    Fluzone Split Quad (Multi-dose) 10/21/2015, 01/20/2017, 01/29/2018    Influenza Whole 10/21/2015    Pneumococcal Conjugate 13-Valent (PCV13) 10/21/2015       If you have record of other vaccines and want them to show in your chart here; please talk to our nurses about having your vaccine record updated.     3. Exercise: regular cardio exercise something everyone should consider and try to do; even if health limitations (ie find that exercise UE/LE/cardio that they can tolerate).   Normal weight a goal for everyone (as we discussed)    4. Healthy diet helpful for weight management, illness prevention.     5. If over 50-screening exams include men PSA/rectal exam, women mammograms, and everyone colonoscopy screening for colon cancer.    6. If you use tobacco of any kind or e-products you should stop. We are providing you some information to consider that could make this process easier.      ##################################

## 2022-07-12 DIAGNOSIS — J98.4 CHRONIC LUNG DISEASE: ICD-10-CM

## 2022-07-12 DIAGNOSIS — E11.9 CONTROLLED TYPE 2 DIABETES MELLITUS WITHOUT COMPLICATION, WITHOUT LONG-TERM CURRENT USE OF INSULIN: Chronic | ICD-10-CM

## 2022-07-12 DIAGNOSIS — J44.9 STAGE 2 MODERATE COPD BY GOLD CLASSIFICATION: Chronic | ICD-10-CM

## 2022-07-12 DIAGNOSIS — N40.0 PROSTATISM: Chronic | ICD-10-CM

## 2022-07-12 DIAGNOSIS — Z79.01 ANTICOAGULATED: ICD-10-CM

## 2022-07-12 RX ORDER — TIOTROPIUM BROMIDE AND OLODATEROL 3.124; 2.736 UG/1; UG/1
2 SPRAY, METERED RESPIRATORY (INHALATION) NIGHTLY
Qty: 12 G | Refills: 3 | Status: SHIPPED | OUTPATIENT
Start: 2022-07-12 | End: 2023-01-26 | Stop reason: ALTCHOICE

## 2022-07-12 RX ORDER — GLYBURIDE 5 MG/1
TABLET ORAL
Qty: 135 TABLET | Refills: 3 | Status: SHIPPED | OUTPATIENT
Start: 2022-07-12 | End: 2023-02-07

## 2022-07-12 RX ORDER — FINASTERIDE 5 MG/1
TABLET, FILM COATED ORAL
Qty: 90 TABLET | Refills: 3 | Status: SHIPPED | OUTPATIENT
Start: 2022-07-12 | End: 2023-02-07

## 2022-07-12 RX ORDER — TERAZOSIN 10 MG/1
CAPSULE ORAL
Qty: 90 CAPSULE | Refills: 3 | Status: SHIPPED | OUTPATIENT
Start: 2022-07-12 | End: 2023-02-07

## 2022-07-12 RX ORDER — APIXABAN 5 MG/1
TABLET, FILM COATED ORAL
Qty: 180 TABLET | Refills: 3 | Status: SHIPPED | OUTPATIENT
Start: 2022-07-12 | End: 2023-02-07

## 2022-07-12 RX ORDER — LORATADINE 10 MG/1
TABLET ORAL
Qty: 90 TABLET | Refills: 3 | Status: SHIPPED | OUTPATIENT
Start: 2022-07-12 | End: 2023-02-07

## 2022-07-12 NOTE — TELEPHONE ENCOUNTER
Rx Refill Note  Requested Prescriptions     Pending Prescriptions Disp Refills   • metFORMIN (Glucophage) 500 MG tablet 90 tablet 3     Sig: Take 1 tablet by mouth Every Night.      Last office visit with prescribing clinician: 7/11/2022      Next office visit with prescribing clinician: 7/12/2022            Lashaun Reyes LPN  07/12/22, 12:48 CDT

## 2022-07-12 NOTE — TELEPHONE ENCOUNTER
Rx Refill Note  Requested Prescriptions     Pending Prescriptions Disp Refills   • loratadine (CLARITIN) 10 MG tablet [Pharmacy Med Name: LORATADINE TABS-OTC 10MG] 90 tablet 3     Sig: TAKE 1 TABLET DAILY   • glyburide (DIAbeta) 5 MG tablet [Pharmacy Med Name: GLYBURIDE TABS 5MG] 135 tablet 3     Sig: TAKE 1 TABLET EVERY MORNING AND ONE-HALF (1/2) TABLET BEFORE SUPPER   • Eliquis 5 MG tablet tablet [Pharmacy Med Name: ELIQUIS TABS 5MG] 180 tablet 3     Sig: TAKE 1 TABLET TWICE A DAY   • metFORMIN (GLUCOPHAGE) 500 MG tablet [Pharmacy Med Name: METFORMIN HCL TABS 500MG] 90 tablet 3     Sig: TAKE 1 TABLET EVERY NIGHT   • finasteride (PROSCAR) 5 MG tablet [Pharmacy Med Name: FINASTERIDE TABS 5MG] 90 tablet 3     Sig: TAKE 1 TABLET DAILY   • terazosin (HYTRIN) 10 MG capsule [Pharmacy Med Name: TERAZOSIN CAPS 10MG] 90 capsule 3     Sig: TAKE 1 CAPSULE DAILY   • ProAir  (90 Base) MCG/ACT inhaler [Pharmacy Med Name: PROAIR HFA INH 8.5GM W/COUNT 90MCG] 51 g 3     Sig: USE 2 INHALATIONS FOUR TIMES A DAY      Last office visit with prescribing clinician: 7/11/2022      Next office visit with prescribing clinician: 1/18/2023            Lashaun Reyes LPN  07/12/22, 13:02 CDT

## 2022-07-12 NOTE — TELEPHONE ENCOUNTER
Fax received to refill Stiolto through [facility].    Rx Refill Note  Requested Prescriptions     Pending Prescriptions Disp Refills   • Stiolto Respimat 2.5-2.5 MCG/ACT aerosol solution inhaler 12 g 3     Sig: Inhale 2 puffs Every Night.      Last office visit with prescribing clinician: 1/26/2022      Next office visit with prescribing clinician: 1/26/2023            Cindy Diaz CMA  07/12/22, 09:25 CDT

## 2022-07-13 ENCOUNTER — PATIENT MESSAGE (OUTPATIENT)
Dept: FAMILY MEDICINE CLINIC | Facility: CLINIC | Age: 87
End: 2022-07-13

## 2022-07-13 DIAGNOSIS — E78.5 HYPERLIPIDEMIA, UNSPECIFIED HYPERLIPIDEMIA TYPE: Chronic | ICD-10-CM

## 2022-07-14 RX ORDER — PRAVASTATIN SODIUM 80 MG/1
80 TABLET ORAL DAILY
Qty: 90 TABLET | Refills: 3 | Status: SHIPPED | OUTPATIENT
Start: 2022-07-14

## 2022-07-20 ENCOUNTER — TELEPHONE (OUTPATIENT)
Dept: VASCULAR SURGERY | Facility: CLINIC | Age: 87
End: 2022-07-20

## 2022-07-21 ENCOUNTER — HOSPITAL ENCOUNTER (OUTPATIENT)
Dept: ULTRASOUND IMAGING | Facility: HOSPITAL | Age: 87
Discharge: HOME | End: 2022-07-21

## 2022-07-21 ENCOUNTER — OFFICE VISIT (OUTPATIENT)
Dept: VASCULAR SURGERY | Facility: CLINIC | Age: 87
End: 2022-07-21

## 2022-07-21 ENCOUNTER — HOSPITAL ENCOUNTER (OUTPATIENT)
Dept: CT IMAGING | Facility: HOSPITAL | Age: 87
Discharge: HOME | End: 2022-07-21

## 2022-07-21 ENCOUNTER — PATIENT MESSAGE (OUTPATIENT)
Dept: FAMILY MEDICINE CLINIC | Facility: CLINIC | Age: 87
End: 2022-07-21

## 2022-07-21 VITALS
SYSTOLIC BLOOD PRESSURE: 134 MMHG | HEIGHT: 72 IN | WEIGHT: 212 LBS | OXYGEN SATURATION: 94 % | BODY MASS INDEX: 28.71 KG/M2 | HEART RATE: 62 BPM | DIASTOLIC BLOOD PRESSURE: 76 MMHG

## 2022-07-21 DIAGNOSIS — E78.5 HYPERLIPIDEMIA LDL GOAL <70: ICD-10-CM

## 2022-07-21 DIAGNOSIS — I65.23 BILATERAL CAROTID ARTERY STENOSIS: ICD-10-CM

## 2022-07-21 DIAGNOSIS — I71.40 ABDOMINAL AORTIC ANEURYSM (AAA) 3.0 CM TO 5.5 CM IN DIAMETER IN MALE: ICD-10-CM

## 2022-07-21 DIAGNOSIS — I71.40 ABDOMINAL AORTIC ANEURYSM (AAA) 3.0 CM TO 5.5 CM IN DIAMETER IN MALE: Primary | ICD-10-CM

## 2022-07-21 DIAGNOSIS — I10 PRIMARY HYPERTENSION: ICD-10-CM

## 2022-07-21 LAB — CREAT BLDA-MCNC: 1.1 MG/DL (ref 0.6–1.3)

## 2022-07-21 PROCEDURE — 82565 ASSAY OF CREATININE: CPT

## 2022-07-21 PROCEDURE — 74174 CTA ABD&PLVS W/CONTRAST: CPT

## 2022-07-21 PROCEDURE — 93880 EXTRACRANIAL BILAT STUDY: CPT

## 2022-07-21 PROCEDURE — 99214 OFFICE O/P EST MOD 30 MIN: CPT | Performed by: SURGERY

## 2022-07-21 PROCEDURE — 93880 EXTRACRANIAL BILAT STUDY: CPT | Performed by: SURGERY

## 2022-07-21 PROCEDURE — 0 IOPAMIDOL PER 1 ML: Performed by: NURSE PRACTITIONER

## 2022-07-21 RX ADMIN — IOPAMIDOL 100 ML: 755 INJECTION, SOLUTION INTRAVENOUS at 07:52

## 2022-07-21 NOTE — PROGRESS NOTES
"7/21/2022       Abel Romero MD  [address] [place] [id number]    Gonzalo Durham  1934    Chief Complaint   Patient presents with   • Aortic Aneurysm   • Carotid Artery Disease     6 month f/u with CTA of the abdomen/pelvis & carotid testing.  Last seen in the office on 01/25/2022.  Pt denies any stroke like symptoms.  Pt denies any new or worsening abdominal/back pain.         Dear Abel Romero MD       HPI  I had the pleasure of seeing your patient Gonzalo Durham in the office today.    As you recall, Gonzalo Durham is a 88 y.o.  male who we are following for a small abdominal aortic aneurysm.  He has a known abdominal aortic aneurysm.  He denies any abdominal pain.  He was previously a smoker but quit 30 years ago.  He denies family history of aneurysms.  He is maintained on aspirin, Eliquis, and Pravachol.  He did have noninvasive testing performed today, which I did personally review.      Review of Systems   Constitutional: Negative.    HENT: Negative.    Eyes: Negative.    Respiratory: Negative.    Cardiovascular: Negative.    Gastrointestinal: Negative.    Endocrine: Negative.    Genitourinary: Negative.    Musculoskeletal: Negative.         Pain to feet, neuropathy   Skin: Negative.    Allergic/Immunologic: Negative.    Neurological: Negative.    Hematological: Negative.    Psychiatric/Behavioral: Negative.    All other systems reviewed and are negative.      /76   Pulse 62   Ht 181.6 cm (71.5\")   Wt 96.2 kg (212 lb)   SpO2 94% Comment: On 4LPM  BMI 29.16 kg/m²   Physical Exam  Vitals and nursing note reviewed.   Constitutional:       Appearance: Normal appearance. He is well-developed.   HENT:      Head: Normocephalic and atraumatic.   Eyes:      General: No scleral icterus.     Pupils: Pupils are equal, round, and reactive to light.   Neck:      Thyroid: No thyromegaly.   Cardiovascular:      Rate and Rhythm: Normal rate and regular rhythm.      Heart sounds: Normal heart " sounds.      Comments: Varicose veins to BLE  Pulmonary:      Effort: Pulmonary effort is normal.      Breath sounds: Normal breath sounds.   Abdominal:      General: Bowel sounds are normal.      Palpations: Abdomen is soft. There is pulsatile mass.      Tenderness: There is no abdominal tenderness.   Musculoskeletal:         General: Normal range of motion.      Cervical back: Normal range of motion and neck supple.   Skin:     General: Skin is warm and dry.   Neurological:      General: No focal deficit present.      Mental Status: He is alert and oriented to person, place, and time.   Psychiatric:         Mood and Affect: Mood normal.         Behavior: Behavior normal.         Thought Content: Thought content normal.         Judgment: Judgment normal.       Diagnostic Data:  CT Angiogram Abdomen Pelvis    Result Date: 7/21/2022  Narrative: CT ANGIOGRAM ABDOMEN PELVIS- 7/21/2022 7:59 AM CDT  HISTORY: Aortic aneurysm (AAA), surveillance; I71.4-Abdominal aortic aneurysm, without rupture  COMPARISON: CT scan dated 1/10/2022  DOSE LENGTH PRODUCT: 1356 mGy cm. Automated exposure control was also utilized to decrease patient radiation dose.  TECHNIQUE: Helical tomographic images of the abdomen and pelvis utilizing angiographic protocol were obtained following the intravenous infusion of contrast. Multiplanar and 3 D reformatted images were provided for review.  FINDINGS:  Angiogram:  Infrarenal AAA with aneurysm sac measuring up to 4.6 cm in diameter. This is stable. Moderate calcified plaque identified along the abdominal aorta. No flow-limiting stenosis at the celiac or SMA origins. No flow-limiting stenosis at the renal artery origins. There is a small accessory left renal artery to the upper pole. SAVANNAH is patent at its origin. Moderate calcified plaque along both iliac systems without obvious flow-limiting stenosis. The common femoral arteries are patent as are the included portions of the deep and superficial  femoral arteries.  Other findings:  Mild fibrotic change versus mild atelectasis in the dependent portions of the lower lobes. Extensive colonic diverticulosis. Small fat-containing periumbilical hernia. Prostatomegaly.      Impression: 1. Stable fusiform aneurysm of the infrarenal abdominal aorta with aneurysm sac measuring up to 4.6 cm in diameter. No dissection flap.  This report was finalized on 07/21/2022 08:53 by Dr Juan Carlos Wilkinson, .       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, without long-term current use of insulin (HCC)   • Stage 3a chronic kidney disease (HCC)   • Erectile dysfunction   • Coronary artery disease involving native coronary artery of native heart without angina pectoris   • Hyperlipidemia LDL goal <70   • Hypertension   • Prostatism-(young) urology   • Tremor   • Varicose vein of leg   • Plantar fasciitis   • Nocturnal leg movements   • Bad dreams   • Depression   • Peripheral neuropathy- clinical   • Hx of colonic polyps   • Gastroesophageal reflux disease   • Left lower quadrant pain   • Laboratory test*   • Chronic anticoagulation   • SOB: copd,CAD,#, hypoxemia   • Hypoxia#to pulmonary   • Respiratory tfzneel-fxofoaj-F2-continuous   • Corn or callus   • Anemia: ASA81,plavix,eliquis,gi(3/3+,div,cp,eitis   • Atherosclerosis: CAD, AAA vascular   • AAA: 2022-(ro) steffen   • Heme positive stool   • BMI 30.0-30.9,adult   • Stage 2 moderate COPD by GOLD classification ([facility])   • Abnormal stress test   • Tingling of both feet-to consider NCV   • Cryptogenic stroke (HCC)   • Chronic diastolic congestive heart failure (HCC)   • Gross hematuria   • BPH with obstruction/lower urinary tract symptoms   • Chest pain   • Obstructive sleep apnea   • Abnormal CT of the chest 1.2022/12m   • 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 Abdominal aortic aneurysm (AAA) 3.0 cm to 5.5 cm in diameter in male ([facility])  I71.4 441.4   2. Bilateral carotid artery stenosis  I65.23 433.10     433.30   3. Primary hypertension  I10 401.9   4. Hyperlipidemia LDL goal <70  E78.5 272.4         Plan: After thoroughly evaluating Gonzalo Durham, I believe the best course of action is to remain conservative from vascular surgery standpoint.  I did personally review his testing showing his aneurysm not significantly changed measuring 4.7 cm.  I will see him back in 6 months with repeat noninvasive testing including a CTA of the abdomen and pelvis.  There is no mural thrombus noted on imaging.  I did discuss vascular risk factors as they pertain to the progression of vascular disease including controlling his hypertension and hyperlipidemia.  These risk factors are currently stable.  The patient can continue taking their current medication regimen as previously planned.  This was all discussed in full with complete understanding.    Thank you for allowing me to participate in the care of your patient.  Please do not hesitate with any questions or concerns.  I will keep you aware of any further encounters with Gonzalo Durham.        Sincerely yours,         Nasir Gutierrez, DO       Scribed for Dr. Nasir Gutierrez by Dianne FOX

## 2022-07-26 ENCOUNTER — PATIENT MESSAGE (OUTPATIENT)
Dept: FAMILY MEDICINE CLINIC | Facility: CLINIC | Age: 87
End: 2022-07-26

## 2022-07-31 ENCOUNTER — DOCUMENTATION (OUTPATIENT)
Dept: FAMILY MEDICINE CLINIC | Facility: CLINIC | Age: 87
End: 2022-07-31

## 2022-07-31 NOTE — PROGRESS NOTES
"Epic info  Patient ID: Gonzalo Durham  MRN: [id number]                                  [facility]  Discharge summary     Patient ID: Gonzalo Durham  MRN: [id number]                                    Acct:  [id number]  Admit Date: 7.28.22  Date of discharge: 7.30.22  Date of service: 7.30.22    Consults:    None    PATIENT PROFILE: The patient is a 87 y/o white  male resident of [place]; he was cooperative.      CHIEF COMPLAINT: \"SOB\"     HISTORY OF PRESENT ILLNESS: Several chronic illness that cause SOB: CHFd, COPD, chronic respiratory failure/hypoxemia with oxygen 4L.  2-3 days increasing cough, wheeze, sputum leading to hypoxemia/confusion.  Present to ED with home oxygen sats 70s.  With neb/steroids oxygen supplementation he improved jo ann and acute confusion resolve.  Admitted to continue care/improve status.      Initial Lab Results:  ABGs on 4 L showed pH 7.37 PCO2 46 PO2 74 with 92.6% sat; bicarb 26.6.  Sodium 136 potassium 4.6 chloride 101 bicarb 27 BUN 19 creatinine 1.18 given GFR 58.  Glucose 181 and AST 22.9 ALT 21.  Alkaline phos 58.8.  Troponin CK-MB is negative.  Lactic acid 1.37 and procalcitonin high at 0.17.  COVID testing negative.     Chest x-ray showed no acute cardiopulmonary disease.           Allergies   Allergen Reactions   • Symbicort [Budesonide-Formoterol Fumarate] Dizziness       Patient passed out and fell   • Prozac [Fluoxetine Hcl] Mental Status Change       Bad dreams.         HOME MEDICATIONS:          Prior to Admission medications    Medication Sig Start Date End Date Taking? Authorizing Provider   acetaminophen (TYLENOL) 325 MG tablet Take 2 tablets by mouth 2 (Two) Times a Day. 4/8/21     Abel Romero MD   Artificial Tear Solution (Just Tears Eye Drops) solution Apply 1-2 drops to eye(s) as directed by provider Daily As Needed (dry eyes). 4/8/21     Abel Romero MD   ascorbic acid (VITAMIN C) 1000 MG tablet Take 1 tablet by mouth Daily. 4/8/21   "   Abel Romero MD   aspirin 81 MG EC tablet Take 81 mg by mouth Daily.       ProviderGal MD   calcium carbonate-vitamin d 600-400 MG-UNIT per tablet Take 1 tablet by mouth 2 (Two) Times a Day. 4/8/21     Abel Romero MD   Eliquis 5 MG tablet tablet TAKE 1 TABLET TWICE A DAY 7/12/22     Abel Romero MD   finasteride (PROSCAR) 5 MG tablet TAKE 1 TABLET DAILY 7/12/22     Abel Romero MD   gabapentin (NEURONTIN) 600 MG tablet Take 1 tablet by mouth 3 (Three) Times a Day. 6/14/22     Abel Romero MD   glucose blood test strip Use as instructed to check glucose daily and as needed for highs and lows. 7/6/22     Abel Romero MD   glyburide (DIAbeta) 5 MG tablet TAKE 1 TABLET EVERY MORNING AND ONE-HALF (1/2) TABLET BEFORE SUPPER 7/12/22     Abel Romero MD   guaiFENesin (MUCINEX) 600 MG 12 hr tablet Take 600 mg by mouth Daily.       ProviderGal MD   HYDROcodone-acetaminophen (Norco) 5-325 MG per tablet Take 1 tablet by mouth Every 6 (Six) Hours As Needed for Mild Pain  or Moderate Pain . 7/11/22     Abel Roemro MD   hydrocortisone 1 % lotion Apply  topically to the appropriate area as directed 2 (Two) Times a Day. 7/11/22     Abel Romero MD   isosorbide mononitrate (IMDUR) 30 MG 24 hr tablet Take 1 tablet by mouth Daily. 8/4/21     Abel Romero MD   Lancets (freestyle) lancets Use once daily 4/8/21     Abel Romero MD   loratadine (CLARITIN) 10 MG tablet TAKE 1 TABLET DAILY 7/12/22     Abel Romero MD   metFORMIN (Glucophage) 500 MG tablet Take 1 tablet by mouth Every Night. 7/12/22     Abel Romero MD   metoprolol tartrate (LOPRESSOR) 25 MG tablet Take 0.5 tablets by mouth 2 (Two) Times a Day. 8/4/21     Abel Romero MD   multivitamin with minerals (Centrum Silver Adult 50+) tablet tablet Take 1 tablet by mouth Daily. 4/8/21     Abel Romero MD   nitroglycerin  (Nitrostat) 0.4 MG SL tablet Place 1 tablet under the tongue Every 5 (Five) Minutes As Needed for Chest Pain. 7/6/22     Abel Romero MD   NON FORMULARY CPAP per mask with 4L oxygen nightly       Provider, MD Gal   O2 (OXYGEN) Inhale 4 L/min Continuous. Per nasal cannula       Provider, MD Gal   pantoprazole (PROTONIX) 40 MG EC tablet Take 1 tablet by mouth Daily. 7/6/22     Abel Romero MD   pravastatin (Pravachol) 80 MG tablet Take 1 tablet by mouth Daily. 7/14/22     Abel Romero MD   ProAir  (90 Base) MCG/ACT inhaler USE 2 INHALATIONS FOUR TIMES A DAY 7/12/22     Abel Romero MD   Stiolto Respimat 2.5-2.5 MCG/ACT aerosol solution inhaler Inhale 2 puffs Every Night. 7/12/22     Feliz Frye APRN   terazosin (HYTRIN) 10 MG capsule TAKE 1 CAPSULE DAILY 7/12/22     Abel Romero MD         PAST HISTORY:  CHILDHOOD: unremarkable.      PROCEDURES:      SCANNED  Prostate/[facility]/confirmed/7.18.16     Colonoscopy+hem/[facility]//4-7-00  Colonoscopy+wzf-oaab-qnd/[facility]//11-17-08//  3-5y  Colonoscopy+polyp-div/[facility]//10.21.13/5y  EGD-itis///6.15.17  Colon-div///6.15.17/prn  Cath+45%/[facility]//5.22.19  Loop/[facility]//8.22.19     SURGERIES:  L 3rd distal finger amputaton-car door/1955  Hernia/1969  Appe/1977  Hernia/1979  CABG/Az/[facility]/1981     FAMILY HISTORY:  Heart/f,m  DM/f,m  CA-prostate/f  CA-colon/none  CA-other/none     HABITS:  Tobacco-smoker/age 22/1 ppd/dc 1980  Alcohol/no     SOCIAL HISTORY:  Children/3-1  /1953  Retired/[employer]/1995     ADVISED:  Pneumonia Vaccine/12-01-06  Shingles Vaccine/12-01-06  Colonoscopy?/9-15-08     HOSPITAL ADMITS:   5.24.19 [facility]  Cath     BH:   12.24.19-12.26.19  Atypical chest pain   Coronary artery disease  Diabetes 2-controlled  Chronic respiratory failure-hypoxic  copd     [facility]:   first     [facility]:   TERELL     Review of Systems  GENERAL:  Inactive/slower with limits, speed, samni for age and  SOB.  Sleep is ok; much better with cpap but interrupted caring for wife.    SKIN:  Ongoing callous of feet; no problems with this/other skin today unless above/below.    ENDO:  No syncope, near or diaphoretic sweaty spells.  BS Ok: without download noted last visit.   HEENT: No head injury, headache.  No vision change.  Same mild hearing loss.  Ears without pain/drainage.  No sore throat.  No significant nasal/sinus congestion/drainage. No epistaxis.  CHEST: No chest wall tenderness or mass. Increased cough minimal productive with wheeze. Increased SOB; no hemoptysis.  Wears oxygen continous.   CV: No chest pain, palpatations, ankle edema.  GI: No heartburn significant dysphagia.  No abdominal pain, diarrhea, constipation, rectal bleeding, or melena.    :  Voids without dysuria, or incontience to completion.  ORTHO: No painful/swollen joints but various on /off sore.  No change occ/usual sore neck or back.  But no acute neck but above mid back pain without recent injury.   NEURO: No dizziness; recent LUE weakness of extremities.  No change some LE numbness/parethesias.   PSYCH: No memory loss.  Mood good; not that anxious, depressed but/and not suicidal.  Tolerated stress.   Screening:  Mammogram: NA  Bone density: NA  Low dose CT chest: Tobacco-smoker/age 22/1 ppd/dc 1980: (22): NA (had CT angio)  CT chest  1.10.22  IMPRESSION:  1. Stable 6 mm subpleural right lower lobe nodule. Stable for 3.5 years.  No additional workup needed.  2. Linear atelectasis or scarring in both lower lobes. Calcified  granulomas. Centrilobular emphysema.  3. Linear secretions in the distal trachea.  4. 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  Dilated pulmonary arteries.  5. Fatty infiltration of the liver.       GI: Colon-div/[facility]//6.15.17/prn  Prostate: chin confirmed 7.11.22  urology/confirmed 7.8.21  Chin/confirmed 11.22.19  [facility] in past   Usual lab order  6m CBC, BMP, A1c  12m CBC, CMP, A1c, TSH,  LIPID, PSAs, Vit D        PHYSICAL EXAMINATION:  T: 98.8 F P: 72/min  RR: 14/min BP: 122/86 Wt: 215 lbs Ht: 5 11      Physical Exam  GENERAL:  Well nourished/developed in no acute distress.   SKIN: Turgor ok without wound, rash, lesion  HEENT: Normal cephalic without trauma.  Pupils equal round reactive to light. Extraocular motions full without nystagmus.    No thyroidmegaly, mass, tenderness, lymphadenopathy and supple.  CV: Regular rhythm.  No murmur, gallop,  edema. Posterior pulses intact.  No carotid bruits.  CHEST: No chest wall tenderness or mass.   LUNGS: Symmetric motion with clear/decreased to auscultation.   ABD: Soft, nontender without mass.   ORTHO: Symmetric extremities without swelling/point tenderness.  Full gross range of motion   NEURO: CN 2-12 grossly intact.  Symmetric facies. 1/4 x bicep knee equal reflexes.  UE 3/5 strength throughout except 2/5  L.  Nonfocal use extremities. Speech clear.    PSYCH: Oriented x 3.  Pleasant calm, well kept.  Purposeful/directed conservation with intact short/long gross memory.      ASSESSMENT/PROBLEM LIST:   89 y/o white male; advanced age   Allergy/intolerance: see above  Procedural history: see above  Family history: see above  History tobacco use  Hypertension  DM2  CKD3  Hyperlipidemia  Obesity  Coronary artery disease; post CABG  AAA  Varicose veins  History CVA  Gastroesophageal reflux disease  Anticoagulated: DM2,CAD,a fib,CVA/() ASA 81,(plavix), eliquist  History colon polyp  Diverticular disease  Hemorrhoids  Deg joint disease  Spinal deg disease  Tremor  Peripheral neuropathy  Copd   Chronic respiratory failure-hypoxic  Depression-chronic  Nocturnal leg movements  Insomnia  Anemia-chronic/recurrent: ASA 81,gi(3/3+,div,c polyp,esophagitis)  BPH  prostatism  nocturnia  ED  Bladder cancer     REASON FOR ADMISSION:    Shortness of breath  Acute on chronic respiratory failure; hypoxemia  Acute confusion; metabolic encephalopathy (related to  hypoxemia)  Copd excerbation (hypoxemia, cough, sputum, wheeze)  Cough  Elevated procalcitonin    HOSPITAL COURSE: Rather quick improvement in his hypoxemia and shortness of breath back to baseline of requiring 2 to 4 L nasal prong oxygen.  No significant cough; appetite remains good.  He never developed change in stools or diarrhea constipation and voiding okay.  He never had any chest pain; troponins were negative and CK-MB was slightly.  His blood sugar went up the steroids; which were quickly weaned but because elevation he was put on long-acting insulin along with his sliding scale.  This improved his blood sugar.  Wean to that they could be done at home we elected to follow him as an outpatient.    Labs included:   Initial mentioned above.  Drop in hemoglobin to 10 without signs of bleeding    Culture Results: No positive    DISCHARGE ASSESSMENT:  Reasons for admit/problems addressed while here:   Shortness of breath  Acute on chronic respiratory failure; hypoxemia  Acute confusion; metabolic encephalopathy (related to hypoxemia)  Copd excerbation (hypoxemia, cough, sputum, wheeze)  Cough  Elevated procalcitonin  Elevated CK MD  Anemia-10.4    Chronic problems affecting stay:  See above      PLAN:   See AVS    Discharge Disposition:  Home    Discharge Medications:     Discharge Medications          Accurate as of July 31, 2022  4:17 PM. If you have any questions, ask your nurse or doctor.            Continue These Medications      Instructions Start Date   acetaminophen 325 MG tablet  Commonly known as: TYLENOL   650 mg, Oral, 2 Times Daily      ascorbic acid 1000 MG tablet  Commonly known as: VITAMIN C   1,000 mg, Oral, Daily      aspirin 81 MG EC tablet   81 mg, Oral, Daily      calcium carbonate-vitamin d 600-400 MG-UNIT per tablet   1 tablet, Oral, 2 Times Daily      Eliquis 5 MG tablet tablet  Generic drug: apixaban   TAKE 1 TABLET TWICE A DAY      finasteride 5 MG tablet  Commonly known as: PROSCAR    TAKE 1 TABLET DAILY      freestyle lancets   Use once daily      gabapentin 600 MG tablet  Commonly known as: NEURONTIN   600 mg, Oral, 3 Times Daily      glucose blood test strip   Use as instructed to check glucose daily and as needed for highs and lows.      glyburide 5 MG tablet  Commonly known as: DIAbeta   TAKE 1 TABLET EVERY MORNING AND ONE-HALF (1/2) TABLET BEFORE SUPPER      guaiFENesin 600 MG 12 hr tablet  Commonly known as: MUCINEX   600 mg, Oral, Daily      HYDROcodone-acetaminophen 5-325 MG per tablet  Commonly known as: Norco   1 tablet, Oral, Every 6 Hours PRN      hydrocortisone 1 % lotion   Topical, 2 Times Daily      isosorbide mononitrate 30 MG 24 hr tablet  Commonly known as: IMDUR   30 mg, Oral, Daily      Just Tears Eye Drops solution   1-2 drops, Ophthalmic, Daily PRN      loratadine 10 MG tablet  Commonly known as: CLARITIN   TAKE 1 TABLET DAILY      metFORMIN 500 MG tablet  Commonly known as: Glucophage   500 mg, Oral, Nightly      metoprolol tartrate 25 MG tablet  Commonly known as: LOPRESSOR   12.5 mg, Oral, 2 Times Daily      multivitamin with minerals tablet tablet   1 tablet, Oral, Daily      nitroglycerin 0.4 MG SL tablet  Commonly known as: Nitrostat   0.4 mg, Sublingual, Every 5 Minutes PRN      NON FORMULARY   CPAP per mask with 4L oxygen nightly       O2  Commonly known as: OXYGEN   4 L/min, Inhalation, Continuous, Per nasal cannula       pantoprazole 40 MG EC tablet  Commonly known as: PROTONIX   40 mg, Oral, Daily      pravastatin 80 MG tablet  Commonly known as: Pravachol   80 mg, Oral, Daily      ProAir  (90 Base) MCG/ACT inhaler  Generic drug: albuterol sulfate HFA   USE 2 INHALATIONS FOUR TIMES A DAY      Stiolto Respimat 2.5-2.5 MCG/ACT aerosol solution inhaler  Generic drug: tiotropium bromide-olodaterol   2 puffs, Inhalation, Nightly      terazosin 10 MG capsule  Commonly known as: HYTRIN   TAKE 1 CAPSULE DAILY           Albuterol 2 puffs every 6 hours as needed #1  [facility]  Azithromycin 251 a day #3 [facility]  Long-acting insulin 15 units a day with pen and needles Lucie  House sliding scale pen short acting insulin Lucie  Prednisone 20 mg twice daily #100s 1 refill with plans to wean [facility]    Discharge Care Plan/Instructions:   ACTIVITY:  Gradually increase activity   No driving until seen  Safety device when up as needed  Elevate legs as much as can/avoid legs handing down   Suggest no smoking/exposure    DIET:  No added sweets  4 gm sodium  Additional type: none  Consistency:    Solids: general     Fluids thin  Suggest no alcohol   Avoid caffeine   Calories 9657-5790  Fluids at least 60 oz/24 hr-stay hydrated    MEDICATIONS:   Per AVS  Oxygen 4L    CALL:   If problems/questions    Follow-up Appointments:   Dr. Romero [facility] f/u extra 7-10 days (call for time)    Other appointments:   Future Appointments   Date Time Provider Department Center   8/8/2022 10:30 AM Abel Romero MD [facility]   8/11/2022 10:00 AM Diallo Hightower APRN [facility] N [facility]AD PAD   10/11/2022  9:15 AM Bradley Carver MD [facility]  PAD   12/12/2022  9:40 AM Danie Cadena MD [facility]AD PAD   1/16/2023  8:20 AM [facility] [place] [facility]   1/18/2023  9:00 AM Abel Romero MD [facility]   1/24/2023  9:00 AM PAD BIC CT 1 BH PAD CT BI PAD   1/24/2023 10:00 AM Nasir Gutierrez DO [facility] VS PAD PAD   1/26/2023  9:30 AM Feliz Frye APRN [facility] PAD PAD       CONDITION: stable/improved    PROGNOSIS: good    TIME: 31 minutes was spent reviewing diagnosis lists, Rx lists, labs, radiology reports; talking/messaging nursing, talking with patient/family, and ordering Rx, labs, diet and making other decisions for care plan.

## 2022-07-31 NOTE — PROGRESS NOTES
"Epic info  Patient ID: Gonzalo Durham  MRN: [id number]       [facility]  History and Physicial    Patient ID: Gonzalo Durham  MRN: [id number]     Acct:  [id number]  Admit Date: 7.28.22  Date of service: 7.28.22    SOURCE: The source of this information is prior knowledge of the patient, review of his office records, his current chart, as well as discussion with he and ER/nursing personal;  are considered reliable.      PATIENT PROFILE: The patient is a 89 y/o white  male resident of [place]; he was cooperative.      CHIEF COMPLAINT: \"SOB\"     HISTORY OF PRESENT ILLNESS: Several chronic illness that cause SOB: CHFd, COPD, chronic respiratory failure/hypoxemia with oxygen 4L.  2-3 days increasing cough, wheeze, sputum leading to hypoxemia/confusion.  Present to ED with home oxygen sats 70s.  With neb/steroids oxygen supplementation he improved jo ann and acute confusion resolve.  Admitted to continue care/improve status.     Initial Lab Results:  ABGs on 4 L showed pH 7.37 PCO2 46 PO2 74 with 92.6% sat; bicarb 26.6.  Sodium 136 potassium 4.6 chloride 101 bicarb 27 BUN 19 creatinine 1.18 given GFR 58.  Glucose 181 and AST 22.9 ALT 21.  Alkaline phos 58.8.  Troponin CK-MB is negative.  Lactic acid 1.37 and procalcitonin high at 0.17.  COVID testing negative.    Chest x-ray showed no acute cardiopulmonary disease.    Allergies   Allergen Reactions   • Symbicort [Budesonide-Formoterol Fumarate] Dizziness     Patient passed out and fell   • Prozac [Fluoxetine Hcl] Mental Status Change     Bad dreams.       HOME MEDICATIONS:  Prior to Admission medications    Medication Sig Start Date End Date Taking? Authorizing Provider   acetaminophen (TYLENOL) 325 MG tablet Take 2 tablets by mouth 2 (Two) Times a Day. 4/8/21   Abel Romero MD   Artificial Tear Solution (Just Tears Eye Drops) solution Apply 1-2 drops to eye(s) as directed by provider Daily As Needed (dry eyes). 4/8/21   Abel Romero MD "   ascorbic acid (VITAMIN C) 1000 MG tablet Take 1 tablet by mouth Daily. 4/8/21   Abel Romero MD   aspirin 81 MG EC tablet Take 81 mg by mouth Daily.    Provider, MD Gal   calcium carbonate-vitamin d 600-400 MG-UNIT per tablet Take 1 tablet by mouth 2 (Two) Times a Day. 4/8/21   Abel Romero MD   Eliquis 5 MG tablet tablet TAKE 1 TABLET TWICE A DAY 7/12/22   Abel Romero MD   finasteride (PROSCAR) 5 MG tablet TAKE 1 TABLET DAILY 7/12/22   Abel Romero MD   gabapentin (NEURONTIN) 600 MG tablet Take 1 tablet by mouth 3 (Three) Times a Day. 6/14/22   Abel Romero MD   glucose blood test strip Use as instructed to check glucose daily and as needed for highs and lows. 7/6/22   Abel Romero MD   glyburide (DIAbeta) 5 MG tablet TAKE 1 TABLET EVERY MORNING AND ONE-HALF (1/2) TABLET BEFORE SUPPER 7/12/22   Abel Romero MD   guaiFENesin (MUCINEX) 600 MG 12 hr tablet Take 600 mg by mouth Daily.    Provider, MD Gal   HYDROcodone-acetaminophen (Norco) 5-325 MG per tablet Take 1 tablet by mouth Every 6 (Six) Hours As Needed for Mild Pain  or Moderate Pain . 7/11/22   Abel Romero MD   hydrocortisone 1 % lotion Apply  topically to the appropriate area as directed 2 (Two) Times a Day. 7/11/22   Abel Romero MD   isosorbide mononitrate (IMDUR) 30 MG 24 hr tablet Take 1 tablet by mouth Daily. 8/4/21   Abel Romero MD   Lancets (freestyle) lancets Use once daily 4/8/21   Abel Romero MD   loratadine (CLARITIN) 10 MG tablet TAKE 1 TABLET DAILY 7/12/22   Abel Romero MD   metFORMIN (Glucophage) 500 MG tablet Take 1 tablet by mouth Every Night. 7/12/22   Abel Romero MD   metoprolol tartrate (LOPRESSOR) 25 MG tablet Take 0.5 tablets by mouth 2 (Two) Times a Day. 8/4/21   Abel Romero MD   multivitamin with minerals (Centrum Silver Adult 50+) tablet tablet Take 1 tablet by mouth Daily. 4/8/21    Abel Romero MD   nitroglycerin (Nitrostat) 0.4 MG SL tablet Place 1 tablet under the tongue Every 5 (Five) Minutes As Needed for Chest Pain. 7/6/22   Abel Romero MD   NON FORMULARY CPAP per mask with 4L oxygen nightly    Provider, MD Gal   O2 (OXYGEN) Inhale 4 L/min Continuous. Per nasal cannula    Provider, MD Gal   pantoprazole (PROTONIX) 40 MG EC tablet Take 1 tablet by mouth Daily. 7/6/22   Abel Romero MD   pravastatin (Pravachol) 80 MG tablet Take 1 tablet by mouth Daily. 7/14/22   Abel Romero MD   ProAir  (90 Base) MCG/ACT inhaler USE 2 INHALATIONS FOUR TIMES A DAY 7/12/22   Abel Romero MD   Stiolto Respimat 2.5-2.5 MCG/ACT aerosol solution inhaler Inhale 2 puffs Every Night. 7/12/22   Feliz Frye APRN   terazosin (HYTRIN) 10 MG capsule TAKE 1 CAPSULE DAILY 7/12/22   Abel Romero MD       PAST HISTORY:  CHILDHOOD: unremarkable.     PROCEDURES:      SCANNED  Prostate/[facility]/confirmed/7.18.16    Colonoscopy+hem/[facility]//4-7-00  Colonoscopy+vps-nkrp-ebq/[facility]//11-17-08//  3-5y  Colonoscopy+polyp-div/P//10.21.13/5y  EGD-itis///6.15.17  Colon-div/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  Hernia/1969  Appe/1977  Hernia/1979  CABG/Az/[place]/1981    FAMILY HISTORY:  Heart/f,m  DM/f,m  CA-prostate/f  CA-colon/none  CA-other/none    HABITS:  Tobacco-smoker/age 22/1 ppd/dc 1980  Alcohol/no    SOCIAL HISTORY:  Children/3-1  /1953  Retired/[employer]/1995    ADVISED:  Pneumonia Vaccine/12-01-06  Shingles Vaccine/12-01-06  Colonoscopy?/9-15-08    HOSPITAL ADMITS:   5.24.19 [facility]  Cath    BH:   12.24.19-12.26.19  Atypical chest pain   Coronary artery disease  Diabetes 2-controlled  Chronic respiratory failure-hypoxic  copd    [facility]:   first    [facility]:   TERELL    Review of Systems  GENERAL:  Inactive/slower with limits, speed, samni  for age and SOB.  Sleep is ok; much better with cpap but interrupted caring for wife.    SKIN:  Ongoing callous of feet; no problems with this/other skin today unless above/below.    ENDO:  No syncope, near or diaphoretic sweaty spells.  BS Ok: without download noted last visit.   HEENT: No head injury, headache.  No vision change.  Same mild hearing loss.  Ears without pain/drainage.  No sore throat.  No significant nasal/sinus congestion/drainage. No epistaxis.  CHEST: No chest wall tenderness or mass. Increased cough minimal productive with wheeze. Increased SOB; no hemoptysis.  Wears oxygen continous.   CV: No chest pain, palpatations, ankle edema.  GI: No heartburn significant dysphagia.  No abdominal pain, diarrhea, constipation, rectal bleeding, or melena.    :  Voids without dysuria, or incontience to completion.  ORTHO: No painful/swollen joints but various on /off sore.  No change occ/usual sore neck or back.  But no acute neck but above mid back pain without recent injury.   NEURO: No dizziness; recent LUE weakness of extremities.  No change some LE numbness/parethesias.   PSYCH: No memory loss.  Mood good; not that anxious, depressed but/and not suicidal.  Tolerated stress.   Screening:  Mammogram: NA  Bone density: NA  Low dose CT chest: Tobacco-smoker/age 22/1 ppd/dc 1980: (22): NA (had CT angio)  CT chest  1.10.22  IMPRESSION:  1. Stable 6 mm subpleural right lower lobe nodule. Stable for 3.5 years.  No additional workup needed.  2. Linear atelectasis or scarring in both lower lobes. Calcified  granulomas. Centrilobular emphysema.  3. Linear secretions in the distal trachea.  4. 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  Dilated pulmonary arteries.  5. Fatty infiltration of the liver.       GI: Colon-div/[facility]//6.15.17/prn  Prostate: chin confirmed 7.11.22  urology/confirmed 7.8.21  Chin/confirmed 11.22.19  [facility] in past   Usual lab order  6m CBC, BMP, A1c  12m CBC, CMP,  A1c, TSH, LIPID, PSAs, Vit D      PHYSICAL EXAMINATION:  T: 98.8 F P: 72/min  RR: 14/min BP: 122/86 Wt: 215 lbs Ht: 5 11     Physical Exam  GENERAL:  Well nourished/developed in no acute distress.   SKIN: Turgor ok without wound, rash, lesion  HEENT: Normal cephalic without trauma.  Pupils equal round reactive to light. Extraocular motions full without nystagmus.    No thyroidmegaly, mass, tenderness, lymphadenopathy and supple.  CV: Regular rhythm.  No murmur, gallop,  edema. Posterior pulses intact.  No carotid bruits.  CHEST: No chest wall tenderness or mass.   LUNGS: Symmetric motion with clear/decreased to auscultation.   ABD: Soft, nontender without mass.   ORTHO: Symmetric extremities without swelling/point tenderness.  Full gross range of motion   NEURO: CN 2-12 grossly intact.  Symmetric facies. 1/4 x bicep knee equal reflexes.  UE 3/5 strength throughout except 2/5  L.  Nonfocal use extremities. Speech clear.    PSYCH: Oriented x 3.  Pleasant calm, well kept.  Purposeful/directed conservation with intact short/long gross memory.      ASSESSMENT/PROBLEM LIST:   87 y/o white male; advanced age   Allergy/intolerance: see above  Procedural history: see above  Family history: see above  History tobacco use  Hypertension  DM2  CKD3  Hyperlipidemia  Obesity  Coronary artery disease; post CABG  AAA  Varicose veins  History CVA  Gastroesophageal reflux disease  Anticoagulated: DM2,CAD,a fib,CVA/() ASA 81,(plavix), eliquist  History colon polyp  Diverticular disease  Hemorrhoids  Deg joint disease  Spinal deg disease  Tremor  Peripheral neuropathy  Copd   Chronic respiratory failure-hypoxic  Depression-chronic  Nocturnal leg movements  Insomnia  Anemia-chronic/recurrent: ASA 81,gi(3/3+,div,c polyp,esophagitis)  BPH  prostatism  nocturnia  ED  Bladder cancer    REASON FOR ADMISSION:    Shortness of breath  Acute on chronic respiratory failure; hypoxemia  Acute confusion; metabolic encephalopathy (related  to hypoxemia)  Copd excerbation (hypoxemia, cough, sputum, wheeze)  Cough  Elevated procalcitonin    PLANS:   Rx-reviewed; ordered as needed and will review daily/as needed.   Includes anticoagulation for DVT prevention or antiembolic stockings as appropriate.  Others: rocephin/azithromycin, steroids, nebs  LAB-reviewed; ordered as needed and will review daily.  Particular:  daily chemistry and CBC  Imaging/cardiology testing-reviewed; ordered as needed and will review daily.  Particular:  No others planned  Consults none  Diet: low sweets; encouraged  Fluids: IV/oral  Special issues:   Discharge planning: he expects home  Code status: full

## 2022-07-31 NOTE — PROGRESS NOTES
"Epic info  Patient ID: Gonzalo Durham  MRN: [id number]                                  [facility]  Progress note     Patient ID: Gonzalo Durham  MRN: [id number]                                    Acct:  [id number]  Admit Date: 7.28.22  Date of service: 7.29.22    Chief Complaint:  \"SOB\"    HISTORY OF PRESENT ILLNESS: Several chronic illness that cause SOB: CHFd, COPD, chronic respiratory failure/hypoxemia with oxygen 4L.  2-3 days increasing cough, wheeze, sputum leading to hypoxemia/confusion.  Present to ED with home oxygen sats 70s.  With neb/steroids oxygen supplementation he improved jo ann and acute confusion resolve.  Admitted to continue care/improve status.      Initial Lab Results:  ABGs on 4 L showed pH 7.37 PCO2 46 PO2 74 with 92.6% sat; bicarb 26.6.  Sodium 136 potassium 4.6 chloride 101 bicarb 27 BUN 19 creatinine 1.18 given GFR 58.  Glucose 181 and AST 22.9 ALT 21.  Alkaline phos 58.8.  Troponin CK-MB is negative.  Lactic acid 1.37 and procalcitonin high at 0.17.  COVID testing negative.     Chest x-ray showed no acute cardiopulmonary disease.           Allergies   Allergen Reactions   • Symbicort [Budesonide-Formoterol Fumarate] Dizziness       Patient passed out and fell   • Prozac [Fluoxetine Hcl] Mental Status Change       Bad dreams.         HOME MEDICATIONS:          Prior to Admission medications    Medication Sig Start Date End Date Taking? Authorizing Provider   acetaminophen (TYLENOL) 325 MG tablet Take 2 tablets by mouth 2 (Two) Times a Day. 4/8/21     Abel Romero MD   Artificial Tear Solution (Just Tears Eye Drops) solution Apply 1-2 drops to eye(s) as directed by provider Daily As Needed (dry eyes). 4/8/21     Abel Romero MD   ascorbic acid (VITAMIN C) 1000 MG tablet Take 1 tablet by mouth Daily. 4/8/21     Abel Romero MD   aspirin 81 MG EC tablet Take 81 mg by mouth Daily.       Provider, MD Gal   calcium carbonate-vitamin d 600-400 MG-UNIT " per tablet Take 1 tablet by mouth 2 (Two) Times a Day. 4/8/21     Abel Romero MD   Eliquis 5 MG tablet tablet TAKE 1 TABLET TWICE A DAY 7/12/22     Abel Romero MD   finasteride (PROSCAR) 5 MG tablet TAKE 1 TABLET DAILY 7/12/22     Abel Romero MD   gabapentin (NEURONTIN) 600 MG tablet Take 1 tablet by mouth 3 (Three) Times a Day. 6/14/22     Abel Romero MD   glucose blood test strip Use as instructed to check glucose daily and as needed for highs and lows. 7/6/22     Abel Romero MD   glyburide (DIAbeta) 5 MG tablet TAKE 1 TABLET EVERY MORNING AND ONE-HALF (1/2) TABLET BEFORE SUPPER 7/12/22     Abel Romero MD   guaiFENesin (MUCINEX) 600 MG 12 hr tablet Take 600 mg by mouth Daily.       ProviderGal MD   HYDROcodone-acetaminophen (Norco) 5-325 MG per tablet Take 1 tablet by mouth Every 6 (Six) Hours As Needed for Mild Pain  or Moderate Pain . 7/11/22     Abel Romero MD   hydrocortisone 1 % lotion Apply  topically to the appropriate area as directed 2 (Two) Times a Day. 7/11/22     Abel Romero MD   isosorbide mononitrate (IMDUR) 30 MG 24 hr tablet Take 1 tablet by mouth Daily. 8/4/21     Abel Romero MD   Lancets (freestyle) lancets Use once daily 4/8/21     Abel Romero MD   loratadine (CLARITIN) 10 MG tablet TAKE 1 TABLET DAILY 7/12/22     Abel Romero MD   metFORMIN (Glucophage) 500 MG tablet Take 1 tablet by mouth Every Night. 7/12/22     Abel Romero MD   metoprolol tartrate (LOPRESSOR) 25 MG tablet Take 0.5 tablets by mouth 2 (Two) Times a Day. 8/4/21     Abel Romero MD   multivitamin with minerals (Centrum Silver Adult 50+) tablet tablet Take 1 tablet by mouth Daily. 4/8/21     Abel Romero MD   nitroglycerin (Nitrostat) 0.4 MG SL tablet Place 1 tablet under the tongue Every 5 (Five) Minutes As Needed for Chest Pain. 7/6/22     Abel Romero MD   NON FORMULARY  "CPAP per mask with 4L oxygen nightly       Provider, MD Gal   O2 (OXYGEN) Inhale 4 L/min Continuous. Per nasal cannula       Provider, MD Gla   pantoprazole (PROTONIX) 40 MG EC tablet Take 1 tablet by mouth Daily. 7/6/22     Abel Romero MD   pravastatin (Pravachol) 80 MG tablet Take 1 tablet by mouth Daily. 7/14/22     Abel Romero MD   ProAir  (90 Base) MCG/ACT inhaler USE 2 INHALATIONS FOUR TIMES A DAY 7/12/22     Abel Romero MD   Stiolto Respimat 2.5-2.5 MCG/ACT aerosol solution inhaler Inhale 2 puffs Every Night. 7/12/22     Feliz Frye APRN   terazosin (HYTRIN) 10 MG capsule TAKE 1 CAPSULE DAILY 7/12/22     Abel Romero MD      CURRENT Meds:   Tylenol 3 25 mg 650 twice daily as needed temp greater than 102  Albuterol sulfate 2 puffs every 4 hours as needed  DuoNeb 3 cc every 4 hours  Eliquis 5 mg twice daily  Aspirin 81 mg a day  Azithromycin 250 p.o. a day  Finasteride 5 mg 1 day  Gabapentin 300 mg 3 times daily  Glipizide 2.5 mg evening 5 mg a.m.  \"Naprosyn 600 today  Hydrocortisone apply twice daily as needed itching  Rocephin 1 g a day  Sliding scale insulin  Isordil mononitrate 30 a day  Claritin 10 a day  Metformin 500 a day  Methylprednisolone 80 mg a day  Metaproterenol 12.5 twice daily  Multivitamin 1 a day  Nitroglycerin 0.4 mg sublingual as needed  Zofran 4 mg IV every 6 hours as needed  Protonix 40 a day  Pravachol 40 a day  Terazosin 5 mg a day    Review of Systems   Constitutional: Positive for activity change and fatigue. Negative for appetite change, chills and fever.   HENT: Negative for ear discharge, ear pain, mouth sores, rhinorrhea, sore throat and trouble swallowing.    Respiratory: Positive for cough. Negative for shortness of breath (with 2L) and wheezing.    Cardiovascular: Negative for chest pain, palpitations and leg swelling.   Gastrointestinal: Negative.    Genitourinary: Negative for dysuria.   Neurological: " Positive for weakness. Negative for facial asymmetry.     Objective     Vital Signs  T:  98.7  Pulse: 72  RR: 12  BP: 118/72 Wb 215# Ht 5.11    Lab Results:  WBC 8.99 Hb 10.4   Na 138, K 4.23 Cl 101 CO2 26.1 BUN 28.6, Cr 1.01 GFR 70 Glu 260 Ca 9.07 CKMB elevated/troponin neg      Physical Exam  Vitals reviewed.   Constitutional:       General: He is not in acute distress.     Appearance: Normal appearance.   HENT:      Nose: Nose normal.      Mouth/Throat:      Mouth: Mucous membranes are moist.   Eyes:      Extraocular Movements: Extraocular movements intact.      Conjunctiva/sclera: Conjunctivae normal.      Pupils: Pupils are equal, round, and reactive to light.   Cardiovascular:      Rate and Rhythm: Normal rate and regular rhythm.      Heart sounds: Normal heart sounds.   Pulmonary:      Effort: Pulmonary effort is normal.      Comments: Bilateral mild decreased  Abdominal:      Palpations: Abdomen is soft.      Tenderness: There is no abdominal tenderness.   Musculoskeletal:      Right lower leg: No edema.      Left lower leg: No edema.   Skin:     Findings: No bruising.   Neurological:      Mental Status: He is alert and oriented to person, place, and time. Mental status is at baseline.       Results Review:    above    ASSESSMENT/PLAN:  Reason for admit/problems addressing acutely:  Shortness of breath-back to baseline  Acute on chronic respiratory failure; hypoxemia  Acute confusion; metabolic encephalopathy (related to hypoxemia)  Copd excerbation (hypoxemia, cough, sputum, wheeze)-improving  Cough  Elevated procalcitonin  Hyperglycemia-steroid excerbated  Anemia; from fluids     Chronic problems to review/consider during care:  89 y/o white male; advanced age   Allergy/intolerance: see above  Procedural history: see above  Family history: see above  History tobacco use  Hypertension  DM2  CKD3  Hyperlipidemia  Obesity  Coronary artery disease; post CABG  AAA  Varicose veins  History CVA  Gastroesophageal  reflux disease  Anticoagulated: DM2,CAD,a fib,CVA/() ASA 81,(plavix), eliquist  History colon polyp  Diverticular disease  Hemorrhoids  Deg joint disease  Spinal deg disease  Tremor  Peripheral neuropathy  Copd   Chronic respiratory failure-hypoxic  Depression-chronic  Nocturnal leg movements  Insomnia  Anemia-chronic/recurrent: ASA 81,gi(3/3+,div,c polyp,esophagitis)  BPH  prostatism  nocturnia  ED  Bladder cancer    Rx-reviewed, considered with changes as needed: wean steroids  Labs reviewed, considered and changes made as needed: no changes  Imaging reviewed, and need for considered: none planned  Consults needed: none  Diet reviewed, considered and changes made as needed: oral; encouraged  Fluids reviewed, considered and changes made as needed: lock IV  Increase activity: up with nurse/walker  Code status: full  Discussed possible/future discharge plans: patient expects home; needs tomorrow if at all possible  Case discussed with patient/son-in-law, nursing  Available to talk if needed with POA/caregiver and members care team.    Abel Romero MD

## 2022-08-08 ENCOUNTER — OFFICE VISIT (OUTPATIENT)
Dept: FAMILY MEDICINE CLINIC | Facility: CLINIC | Age: 87
End: 2022-08-08

## 2022-08-08 VITALS
RESPIRATION RATE: 18 BRPM | DIASTOLIC BLOOD PRESSURE: 58 MMHG | TEMPERATURE: 97.1 F | SYSTOLIC BLOOD PRESSURE: 132 MMHG | HEART RATE: 58 BPM | HEIGHT: 72 IN | WEIGHT: 209 LBS | BODY MASS INDEX: 28.31 KG/M2 | OXYGEN SATURATION: 97 %

## 2022-08-08 DIAGNOSIS — D64.9 ANEMIA, UNSPECIFIED TYPE: ICD-10-CM

## 2022-08-08 DIAGNOSIS — R05.9 COUGH: ICD-10-CM

## 2022-08-08 DIAGNOSIS — R06.02 SHORTNESS OF BREATH: ICD-10-CM

## 2022-08-08 DIAGNOSIS — J96.11 CHRONIC RESPIRATORY FAILURE WITH HYPOXIA: ICD-10-CM

## 2022-08-08 DIAGNOSIS — J44.1 COPD EXACERBATION: ICD-10-CM

## 2022-08-08 DIAGNOSIS — G93.41 METABOLIC ENCEPHALOPATHY: ICD-10-CM

## 2022-08-08 DIAGNOSIS — I71.40 ABDOMINAL AORTIC ANEURYSM (AAA) WITHOUT RUPTURE: ICD-10-CM

## 2022-08-08 DIAGNOSIS — K42.9 UMBILICAL HERNIA WITHOUT OBSTRUCTION AND WITHOUT GANGRENE: ICD-10-CM

## 2022-08-08 PROCEDURE — 99214 OFFICE O/P EST MOD 30 MIN: CPT | Performed by: FAMILY MEDICINE

## 2022-08-08 RX ORDER — PREDNISONE 10 MG/1
20 TABLET ORAL DAILY
COMMUNITY
Start: 2022-07-30 | End: 2022-10-17

## 2022-08-08 RX ORDER — PEN NEEDLE, DIABETIC 32GX 5/32"
NEEDLE, DISPOSABLE MISCELLANEOUS SEE ADMIN INSTRUCTIONS
COMMUNITY
Start: 2022-07-30 | End: 2023-02-07

## 2022-08-08 RX ORDER — INSULIN LISPRO 100 [IU]/ML
INJECTION, SOLUTION INTRAVENOUS; SUBCUTANEOUS
COMMUNITY
Start: 2022-07-30 | End: 2023-02-07

## 2022-08-08 RX ORDER — INSULIN GLARGINE 100 [IU]/ML
INJECTION, SOLUTION SUBCUTANEOUS
Status: ON HOLD | COMMUNITY
Start: 2022-07-30 | End: 2023-02-07

## 2022-08-08 RX ORDER — AZITHROMYCIN 250 MG/1
250 TABLET, FILM COATED ORAL DAILY
COMMUNITY
Start: 2022-07-30 | End: 2022-08-11

## 2022-08-08 NOTE — PROGRESS NOTES
Subjective   Gonzalo Durham is a 88 y.o. male presenting with chief complaint of:   Chief Complaint   Patient presents with   • Follow-up     Hospital follow up [facility]        History of Present Illness :  With son in law.  Here for primarily f/u [facility].   Here for review of chronic problems that includes chronic respiratory failure and others.      Admit Date: 7.28.22  Date of discharge: 7.30.22  Shortness of breath  Acute on chronic respiratory failure; hypoxemia  Acute confusion; metabolic encephalopathy (related to hypoxemia)  Copd excerbation (hypoxemia, cough, sputum, wheeze)  Cough  Elevated procalcitonin  Elevated CK MD  Anemia-10.4    Has multiple chronic problems to consider that might have a bearing on today's issues; somewhat still an interval appointment.       Chronic/acute problems reviewed today:   1. Metabolic encephalopathy acute recent confusion when he was hypoxic; since resolved   2. Shortness of breath Chronic/variable:  SOB worse with exertion/ok at rest.  Contributing diagnosis: copd/respiratory failure.     3. Cough see #4   4. COPD exacerbation (HCC) recent with brief admission to [place] to see above.  Cough is nearly resolved and production is nearly over with.  No hemoptysis.   5. Anemia, unspecified type Chronic or past history/variable through above. : This has been present before.    There has been GI evaulation in the past. There is no current melena, hematochezia. It has been benign to date and stable/treated/watching.  Contributing comorbidities to date: benign.     6. Chronic respiratory failure with hypoxia (HCC) chronic hypoxic respiratory failure with need for oxygen worsened by recent COPD exacerbation.  Still requiring 4 L but without any drop in O2 sats   7. Umbilical hernia without obstruction and without gangrene chronic persisting protuberance of the umbilicus for which he saw Rachael Leggett and at the time being nonbothersome and was suggested he delay surgery.  There have been  no instances of significant discomfort or incarceration.   8. Abdominal aortic aneurysm (AAA) without rupture (HCC) chronic/stable various areas of disease.  Sees vascular regularly and no plans for upcoming interventions.  Denies development/change in chest pain, claudication, CVA-TIA symptoms such as (Manifest by slurred speech asymmetry of face dysfunction/weakness of extremities).  Blood thinners noted.  AAA 4.6 cm last.       Has an/another acute issue today: none.    The following portions of the patient's history were reviewed and updated as appropriate: allergies, current medications, past family history, past medical history, past social history, past surgical history and problem list.      Current Outpatient Medications:   •  acetaminophen (TYLENOL) 325 MG tablet, Take 2 tablets by mouth 2 (Two) Times a Day., Disp: 360 tablet, Rfl: 2  •  Artificial Tear Solution (Just Tears Eye Drops) solution, Apply 1-2 drops to eye(s) as directed by provider Daily As Needed (dry eyes)., Disp: 45 mL, Rfl: 2  •  ascorbic acid (VITAMIN C) 1000 MG tablet, Take 1 tablet by mouth Daily., Disp: 90 tablet, Rfl: 3  •  aspirin 81 MG EC tablet, Take 81 mg by mouth Daily., Disp: , Rfl:   •  calcium carbonate-vitamin d 600-400 MG-UNIT per tablet, Take 1 tablet by mouth 2 (Two) Times a Day., Disp: 180 tablet, Rfl: 3  •  Eliquis 5 MG tablet tablet, TAKE 1 TABLET TWICE A DAY, Disp: 180 tablet, Rfl: 3  •  finasteride (PROSCAR) 5 MG tablet, TAKE 1 TABLET DAILY, Disp: 90 tablet, Rfl: 3  •  gabapentin (NEURONTIN) 600 MG tablet, Take 1 tablet by mouth 3 (Three) Times a Day., Disp: 270 tablet, Rfl: 2  •  glucose blood test strip, Use as instructed to check glucose daily and as needed for highs and lows., Disp: 100 each, Rfl: 3  •  glyburide (DIAbeta) 5 MG tablet, TAKE 1 TABLET EVERY MORNING AND ONE-HALF (1/2) TABLET BEFORE SUPPER, Disp: 135 tablet, Rfl: 3  •  guaiFENesin (MUCINEX) 600 MG 12 hr tablet, Take 600 mg by mouth Daily., Disp: , Rfl:    •  HYDROcodone-acetaminophen (Norco) 5-325 MG per tablet, Take 1 tablet by mouth Every 6 (Six) Hours As Needed for Mild Pain  or Moderate Pain ., Disp: 12 tablet, Rfl: 0  •  hydrocortisone 1 % lotion, Apply  topically to the appropriate area as directed 2 (Two) Times a Day., Disp: 59 mL, Rfl: 1  •  isosorbide mononitrate (IMDUR) 30 MG 24 hr tablet, Take 1 tablet by mouth Daily., Disp: 90 tablet, Rfl: 3  •  Lancets (freestyle) lancets, Use once daily, Disp: 100 each, Rfl: 2  •  loratadine (CLARITIN) 10 MG tablet, TAKE 1 TABLET DAILY, Disp: 90 tablet, Rfl: 3  •  metFORMIN (Glucophage) 500 MG tablet, Take 1 tablet by mouth Every Night., Disp: 90 tablet, Rfl: 3  •  metoprolol tartrate (LOPRESSOR) 25 MG tablet, Take 0.5 tablets by mouth 2 (Two) Times a Day., Disp: 180 tablet, Rfl: 3  •  multivitamin with minerals (Centrum Silver Adult 50+) tablet tablet, Take 1 tablet by mouth Daily., Disp: 90 tablet, Rfl: 3  •  nitroglycerin (Nitrostat) 0.4 MG SL tablet, Place 1 tablet under the tongue Every 5 (Five) Minutes As Needed for Chest Pain., Disp: 90 tablet, Rfl: 3  •  NON FORMULARY, CPAP per mask with 4L oxygen nightly, Disp: , Rfl:   •  O2 (OXYGEN), Inhale 4 L/min Continuous. Per nasal cannula, Disp: , Rfl:   •  pantoprazole (PROTONIX) 40 MG EC tablet, Take 1 tablet by mouth Daily., Disp: 90 tablet, Rfl: 3  •  pravastatin (Pravachol) 80 MG tablet, Take 1 tablet by mouth Daily., Disp: 90 tablet, Rfl: 3  •  ProAir  (90 Base) MCG/ACT inhaler, USE 2 INHALATIONS FOUR TIMES A DAY, Disp: 51 g, Rfl: 3  •  Stiolto Respimat 2.5-2.5 MCG/ACT aerosol solution inhaler, Inhale 2 puffs Every Night., Disp: 12 g, Rfl: 3  •  terazosin (HYTRIN) 10 MG capsule, TAKE 1 CAPSULE DAILY, Disp: 90 capsule, Rfl: 3    No problems with medications.    Allergies   Allergen Reactions   • Symbicort [Budesonide-Formoterol Fumarate] Dizziness     Patient passed out and fell   • Prozac [Fluoxetine Hcl] Mental Status Change     Bad dreams.       Review  of Systems  GENERAL:  Inactive/slower with limits, speed, samni for age and SOB.  Sleep is ok; much better with cpap but interrupted caring for wife.    SKIN:  Ongoing callous of feet; no problems with this/other skin today unless above/below.    ENDO:  No syncope, near or diaphoretic sweaty spells.  BS Ok: with download-see correspondence.  HEENT: No head injury, headache.  No vision change.  Same mild hearing loss.  Ears without pain/drainage.  No sore throat.  No significant nasal/sinus congestion/drainage. No epistaxis.  CHEST: No chest wall tenderness or mass. Stable occ cough without productive without wheeze.  Mild/same SOB; no hemoptysis.  Wears oxygen continous.   CV: No chest pain, palpatations, ankle edema.  GI: No heartburn significant dysphagia.  No abdominal pain, diarrhea, constipation, rectal bleeding, or melena.    :  Voids without dysuria, or incontience to completion.  ORTHO: No painful/swollen joints but various on /off sore.  No change occ/usual sore neck or back.  But no acute neck but above mid back pain without recent injury.   NEURO: No dizziness; diffuse weakness of extremities.  No change some LE numbness/parethesias.   PSYCH: No memory loss.  Mood good; not that anxious, depressed but/and not suicidal.  Tolerated stress.   Screening:  Mammogram: NA  Bone density: NA  Low dose CT chest: Tobacco-smoker/age 22/1 ppd/dc 1980: (22): NA (had CT angio)  IMPRESSION: CT chest with/BH/1.10.22  1. Stable 6 mm subpleural right lower lobe nodule. Stable for 3.5 years.  No additional workup needed.  2. Linear atelectasis or scarring in both lower lobes. Calcified  granulomas. Centrilobular emphysema.  3. Linear secretions in the distal trachea.  4. 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  Dilated pulmonary arteries.  5. Fatty infiltration of the liver.       GI: Colon-div/[facility]/6.15.17/prn  Prostate: chin confirmed 7.11.22  urology/confirmed 7.8.21  [place]/confirmed  11.22.19  Keisha in past   Usual lab order  6m CBC, BMP, A1c  12m CBC, CMP, A1c, TSH, LIPID, PSAs, Vit D    Copy/paste function used for ROS/exam AND each area of these were reviewed, updated, confirmed and supplemented as needed.   Data reviewed:   Recent admit/ER/MD visits: [facility] discharge summary  Last cardiac testing:   Echo:   Results for orders placed during the hospital encounter of 02/20/22    Adult Stress Echo W/ Cont or Stress Agent if Necessary Per Protocol    Interpretation Summary  · Low risk for ischemia    8.23.21  · Left ventricular wall thickness is consistent with mild to moderate concentric hypertrophy.  · Left ventricular ejection fraction appears to be 61 - 65%. Left ventricular systolic function is normal.  · Normal global longitudinal LV strain (GLS) = -18%.  · Left ventricular diastolic function was indeterminate.  · Moderate pulmonary hypertension is present.    Radiology considered:   [facility] CXR no acute process 7.28.22    Lab Results:  Results for orders placed or performed during the hospital encounter of 07/21/22   POC Creatinine    Specimen: Blood   Result Value Ref Range    Creatinine 1.10 0.60 - 1.30 mg/dL       A1C:  Lab Results - Last 18 Months   Lab Units 07/06/22  0715 01/05/22  0658 07/06/21  0731   HEMOGLOBIN A1C % 6.00* 6.30* 6.20*     GLUCOSE:  Lab Results - Last 18 Months   Lab Units 07/06/22  0715 02/20/22  0405 01/05/22  0658 07/06/21  0731 02/10/21  2022   GLUCOSE mg/dL 77 112* 73 90 140*     LIPID:  Lab Results - Last 18 Months   Lab Units 01/05/22  0658   CHOLESTEROL mg/dL 150   LDL CHOL mg/dL 94   HDL CHOL mg/dL 37*   TRIGLYCERIDES mg/dL 103     PSA:  Lab Results - Last 18 Months   Lab Units 01/05/22  0658   PSA ng/mL 0.983       CBC:  Lab Results - Last 18 Months   Lab Units 07/06/22  0715 02/20/22  0405 01/05/22  0658 07/06/21  0731 02/10/21  2022   WBC 10*3/mm3 7.20 6.89 7.98 6.61 14.30*   HEMOGLOBIN g/dL 11.5* 11.6* 12.6* 12.2* 10.4*   HEMATOCRIT % 34.5* 37.3* 38.4 38.1  "30.5*   PLATELETS 10*3/mm3 231 213 220 212 152   IRON mcg/dL 112  --  60 65  --       BMP/CMP:  Lab Results - Last 18 Months   Lab Units 07/21/22  0750 07/06/22  0715 02/20/22  0405 01/05/22 0658 07/06/21 0731 02/10/21  2022   SODIUM mmol/L  --  140 143 145 145 131*   POTASSIUM mmol/L  --  4.7 4.3 4.4 4.6 4.0   CHLORIDE mmol/L  --  102 105 106 107 96*   TOTAL CO2 mmol/L  --  29.7*  --  32.7* 28.6  --    CO2 mmol/L  --   --  29.0  --   --  27.0   GLUCOSE mg/dL  --  77 112* 73 90 140*   BUN mg/dL  --  22 24* 27* 23 25*   CREATININE mg/dL 1.10 1.26 1.14 1.28* 1.15 1.31*   EGFR IF NONAFRICN AM mL/min/1.73  --   --  61 53* 60* 52*   EGFR IF AFRICN AM mL/min/1.73  --   --   --  64 73  --    EGFR RESULT mL/min/1.73  --  54.9*  --   --   --   --    CALCIUM mg/dL  --  9.7 9.1 9.9 9.7 9.3     HEPATIC:  Lab Results - Last 18 Months   Lab Units 02/20/22  0405 01/05/22 0658 02/10/21  2022   ALT (SGPT) U/L 14 11 12   AST (SGOT) U/L 15 14 13   ALK PHOS U/L 63 58 43     Vit D:  Lab Results - Last 18 Months   Lab Units 01/05/22 0658   VIT D 25 HYDROXY ng/ml 63.3     THYROID:  Lab Results - Last 18 Months   Lab Units 01/05/22 0658   TSH uIU/mL 2.620       Objective   /58 (BP Location: Right arm, Patient Position: Sitting, Cuff Size: Adult)   Pulse 58   Temp 97.1 °F (36.2 °C) (Infrared)   Resp 18   Ht 181.6 cm (71.5\")   Wt 94.8 kg (209 lb)   SpO2 97% Comment: 4L of o2  BMI 28.74 kg/m²   Body mass index is 28.74 kg/m².    Recent Vitals       4/11/2022 7/11/2022 7/21/2022       BP: 112/62 134/68 134/76     Pulse: 54 46 62     Temp: -- 97.3 °F (36.3 °C) --     Weight: 94.8 kg (209 lb) 96.8 kg (213 lb 6.4 oz) 96.2 kg (212 lb)     BMI (Calculated): 29.2 29.8 29.2         Wt Readings from Last 15 Encounters:   08/08/22 1029 94.8 kg (209 lb)   07/21/22 0855 96.2 kg (212 lb)   07/11/22 0951 96.8 kg (213 lb 6.4 oz)   04/11/22 0926 94.8 kg (209 lb)   02/20/22 0918 97.1 kg (214 lb)   02/20/22 0357 97.1 kg (214 lb)   02/17/22 " 0841 93.9 kg (207 lb)   01/26/22 1326 93.9 kg (207 lb)   01/25/22 0910 93 kg (205 lb)   01/10/22 0926 93 kg (205 lb)   01/06/22 1113 90.7 kg (200 lb)   08/23/21 1241 90.3 kg (199 lb 1.2 oz)   08/17/21 0930 90.3 kg (199 lb)   07/08/21 1328 92.5 kg (204 lb)   07/07/21 0926 90.7 kg (200 lb)   03/24/21 1308 92.1 kg (203 lb)       Physical Exam  GENERAL:  Well nourished/developed in no acute distress.   SKIN: Turgor excellent, without wound, rash, lesion  HEENT: Normal cephalic without trauma.  Pupils equal round reactive to light. Extraocular motions full without nystagmus.    No thyroidmegaly, mass, tenderness, lymphadenopathy and supple.  CV: Regular rhythm.  No murmur, gallop,  edema. Posterior pulses intact.  No carotid bruits.  CHEST: No chest wall tenderness or mass.   LUNGS: Symmetric motion with clear/decreased to auscultation.   ABD: Soft, nontender without mass.   ORTHO: Symmetric extremities without swelling/point tenderness.  Full gross range of motion except hips reduced.  Symmetric LE.  Neg straight leg raising.  Neg Patricks maneuver.  Back is straight/mild lordosis.  Mid back sore touch.   NEURO: CN 2-12 grossly intact.  Symmetric facies. 1/4 x bicep knee equal reflexes.  UE/LE   3/5 strength throughout except 2/5  L.  Nonfocal use extremities. Speech clear.  Light touch decreased bilateral feet.   PSYCH: Oriented x 3.  Pleasant calm, well kept.  Purposeful/directed conservation with intact short/long gross memory.      Diabetic foot exam:   Left: Filament test reduced   Pulses Dorsalis Pedis:  diminished   Reflexes 1+    Vibratory sensation diminished   Proprioception diminished   Sharp/dull discrimination diminished       Right: Filament test reduced   Pulses Dorsalis Pedis:  diminished  Posterior Tibial:  present   Reflexes 1+    Vibratory sensation diminished   Proprioception diminished   Sharp/dull discrimination diminished      Assessment & Plan     1. Metabolic encephalopathy    2. Shortness  of breath    3. Cough    4. COPD exacerbation (HCC)    5. Anemia, unspecified type    6. Chronic respiratory failure with hypoxia (HCC)    7. Umbilical hernia without obstruction and without gangrene    8. Abdominal aortic aneurysm (AAA) without rupture (HCC)        Discussions/medical decisions/reviews:  BP ok  Other vitals ok  DM/BS 6.0 last  Lipid 94 last  PSA ok last  CBC 10s [facility]  Renal 54.9 last  Liver ok last  Vit D ok last  Thyroid ok last    Back to baseline; but with some level prednisone (he cannot recall what he is taking)  Wean steroids; report problems  Expect insulin needs to drop; call if needs direction on reduction    Medical decision issues:   Data review above:   Rx: reviewed and decisions:   Visit today involved chronic significant medical problems or differentials and/or intensive drug monitoring: ie potential to cause serious morbidity or death:   Rx changes: wean steroids  No orders of the defined types were placed in this encounter.    Orders placed:   LAB/Testing/Referrals: reviewed/orders:   Today:   Orders Placed This Encounter   Procedures   • Iron Profile   • Ferritin   • Basic Metabolic Panel   • CBC & Differential     Chronic/recurrent labs above or change to:   same     Screening reviewed/updated     Immunization History   Administered Date(s) Administered   • COVID-19 (MODERNA) 1st, 2nd, 3rd Dose Only 03/03/2021, 03/31/2021   • FLUAD TRI 65YR+ 11/22/2019   • Fluad Quad 65+ 10/06/2020   • Fluzone High Dose =>65 Years ([employer] ONLY) 10/09/2018   • Fluzone High-Dose 65+yrs 12/27/2021   • Fluzone Split Quad (Multi-dose) 10/21/2015, 01/20/2017, 01/29/2018   • Influenza Whole 10/21/2015   • Pneumococcal Conjugate 13-Valent (PCV13) 10/21/2015     Vaccine reviewed: today none; later we advised/reaffirmed our support/suggestion for staying complete with covid- covid boosters, seasonal flu/yearly and any missing vaccine from list we supplied; we suggest contact with local health department  office to review missing/needed vaccines and then bring nursing documentation for these vaccines to this office.     Health maintenance:   Body mass index is 28.74 kg/m².  BMI is >= 25 and <30. (Overweight) The following options were offered after discussion;: none (medical contraindication)    Tobacco use reviewed:   Gonzalo Durham  reports that he quit smoking about 42 years ago. His smoking use included cigarettes. He started smoking about 68 years ago. He has a 26.00 pack-year smoking history. He has never used smokeless tobacco..     Patient Instructions   Try to wean steroids/prednisone to 10 mg a day for 7 days then every other day for 7 days then off    Watch BS; your need for insulin will likely go away as the steroid dose comes down.     ########################        Follow up: Return for lab today then lab/Dr Romero as planned.  Future Appointments   Date Time Provider Department Center   8/11/2022 10:00 AM Diallo Hightower APRN [facility] N PAD PAD   10/11/2022  9:15 AM Bradley Carver MD [facility] CD  PAD   12/12/2022  9:40 AM Danie Cadena MD [facility] U PAD PAD   1/16/2023  8:20 AM [facility] METR PAD   1/18/2023  9:00 AM Abel Romero MD [facility] PC METR PAD   1/24/2023  9:00 AM PAD BIC CT 1 BH PAD CT BI PAD   1/24/2023 10:00 AM Nasir Gutierrez DO [facility] VS PAD PAD   1/26/2023  9:30 AM Feliz Frye APRN [facility] RD PAD PAD

## 2022-08-08 NOTE — PATIENT INSTRUCTIONS
Try to wean steroids/prednisone to 10 mg a day for 7 days then every other day for 7 days then off    Watch BS; your need for insulin will likely go away as the steroid dose comes down.     ########################

## 2022-08-09 DIAGNOSIS — D64.9 ANEMIA, UNSPECIFIED TYPE: Primary | ICD-10-CM

## 2022-08-09 LAB
BASOPHILS # BLD AUTO: 0.01 10*3/MM3 (ref 0–0.2)
BASOPHILS NFR BLD AUTO: 0.1 % (ref 0–1.5)
BUN SERPL-MCNC: 21 MG/DL (ref 8–23)
BUN/CREAT SERPL: 22.1 (ref 7–25)
CALCIUM SERPL-MCNC: 9.5 MG/DL (ref 8.6–10.5)
CHLORIDE SERPL-SCNC: 104 MMOL/L (ref 98–107)
CO2 SERPL-SCNC: 29.5 MMOL/L (ref 22–29)
CREAT SERPL-MCNC: 0.95 MG/DL (ref 0.76–1.27)
EGFRCR SERPLBLD CKD-EPI 2021: 77 ML/MIN/1.73
EOSINOPHIL # BLD AUTO: 0.01 10*3/MM3 (ref 0–0.4)
EOSINOPHIL NFR BLD AUTO: 0.1 % (ref 0.3–6.2)
ERYTHROCYTE [DISTWIDTH] IN BLOOD BY AUTOMATED COUNT: 13.5 % (ref 12.3–15.4)
FERRITIN SERPL-MCNC: 146 NG/ML (ref 30–400)
GLUCOSE SERPL-MCNC: 108 MG/DL (ref 65–99)
HCT VFR BLD AUTO: 33.9 % (ref 37.5–51)
HGB BLD-MCNC: 10.9 G/DL (ref 13–17.7)
IMM GRANULOCYTES # BLD AUTO: 0.12 10*3/MM3 (ref 0–0.05)
IMM GRANULOCYTES NFR BLD AUTO: 1.1 % (ref 0–0.5)
IRON SATN MFR SERPL: 17 % (ref 20–50)
IRON SERPL-MCNC: 64 MCG/DL (ref 59–158)
LYMPHOCYTES # BLD AUTO: 0.74 10*3/MM3 (ref 0.7–3.1)
LYMPHOCYTES NFR BLD AUTO: 6.6 % (ref 19.6–45.3)
MCH RBC QN AUTO: 29.8 PG (ref 26.6–33)
MCHC RBC AUTO-ENTMCNC: 32.2 G/DL (ref 31.5–35.7)
MCV RBC AUTO: 92.6 FL (ref 79–97)
MONOCYTES # BLD AUTO: 0.53 10*3/MM3 (ref 0.1–0.9)
MONOCYTES NFR BLD AUTO: 4.7 % (ref 5–12)
NEUTROPHILS # BLD AUTO: 9.87 10*3/MM3 (ref 1.7–7)
NEUTROPHILS NFR BLD AUTO: 87.4 % (ref 42.7–76)
NRBC BLD AUTO-RTO: 0 /100 WBC (ref 0–0.2)
PLATELET # BLD AUTO: 284 10*3/MM3 (ref 140–450)
POTASSIUM SERPL-SCNC: 4.3 MMOL/L (ref 3.5–5.2)
RBC # BLD AUTO: 3.66 10*6/MM3 (ref 4.14–5.8)
SODIUM SERPL-SCNC: 142 MMOL/L (ref 136–145)
TIBC SERPL-MCNC: 383 MCG/DL
UIBC SERPL-MCNC: 319 MCG/DL (ref 112–346)
WBC # BLD AUTO: 11.28 10*3/MM3 (ref 3.4–10.8)

## 2022-08-11 ENCOUNTER — OFFICE VISIT (OUTPATIENT)
Dept: NEUROLOGY | Facility: CLINIC | Age: 87
End: 2022-08-11

## 2022-08-11 VITALS
SYSTOLIC BLOOD PRESSURE: 136 MMHG | HEART RATE: 69 BPM | DIASTOLIC BLOOD PRESSURE: 70 MMHG | WEIGHT: 209 LBS | HEIGHT: 72 IN | RESPIRATION RATE: 20 BRPM | OXYGEN SATURATION: 98 % | BODY MASS INDEX: 28.31 KG/M2

## 2022-08-11 DIAGNOSIS — R41.3 MEMORY DIFFICULTY: ICD-10-CM

## 2022-08-11 DIAGNOSIS — Z86.73 HISTORY OF CVA (CEREBROVASCULAR ACCIDENT): Primary | ICD-10-CM

## 2022-08-11 DIAGNOSIS — G47.33 OBSTRUCTIVE SLEEP APNEA: ICD-10-CM

## 2022-08-11 PROCEDURE — 99214 OFFICE O/P EST MOD 30 MIN: CPT | Performed by: NURSE PRACTITIONER

## 2022-08-11 NOTE — PROGRESS NOTES
Neurology Progress Note      Chief Complaint:    WENDY  Stroke  Memory Changes    Subjective   History of Present Illness:  Gonzalo Durham is a 88 y.o. male who presents today for stroke, obstructive sleep apnea, and some memory changes.  He is accompanied by his son-in-law today.  He is routinely followed by Abel Romero MD for primary care.     Stroke  Continues with Eliquis 5 mg twice daily, pravastatin 80 mg daily, and aspirin 81 mg daily.  He denies any side effects or abnormal bleeding.  He denies any falls and states he has not fallen in over a year at this point.  He continues to do well with no signs and symptoms of stroke and no complaints.    Obstructive sleep apnea  Reports that he is doing well with his sleep apnea.  He states that he is sleeping well.  He continues to use his CPAP on a decently regular basis with oxygen bled into his CPAP at night.  He has increased compliance and does note that he feels some better as a result.  He has no new complaints and feels that he is continue to do well with his CPAP.    Memory changes  No memory new changes.  MMSE 30/30 today.  This is unchanged from his last visit.  He continues to report no significant changes in his memory but does have some forgetfulness every once in a while.  His family is concerned about his driving but otherwise there are no further concerns.    Allergies:    Symbicort [budesonide-formoterol fumarate] and Prozac [fluoxetine hcl]    Medications:  Current Outpatient Medications   Medication Sig Dispense Refill   • acetaminophen (TYLENOL) 325 MG tablet Take 2 tablets by mouth 2 (Two) Times a Day. 360 tablet 2   • Artificial Tear Solution (Just Tears Eye Drops) solution Apply 1-2 drops to eye(s) as directed by provider Daily As Needed (dry eyes). 45 mL 2   • ascorbic acid (VITAMIN C) 1000 MG tablet Take 1 tablet by mouth Daily. 90 tablet 3   • aspirin 81 MG EC tablet Take 81 mg by mouth Daily.     • calcium carbonate-vitamin d  600-400 MG-UNIT per tablet Take 1 tablet by mouth 2 (Two) Times a Day. 180 tablet 3   • Eliquis 5 MG tablet tablet TAKE 1 TABLET TWICE A  tablet 3   • finasteride (PROSCAR) 5 MG tablet TAKE 1 TABLET DAILY 90 tablet 3   • gabapentin (NEURONTIN) 600 MG tablet Take 1 tablet by mouth 3 (Three) Times a Day. 270 tablet 2   • glucose blood test strip Use as instructed to check glucose daily and as needed for highs and lows. 100 each 3   • glyburide (DIAbeta) 5 MG tablet TAKE 1 TABLET EVERY MORNING AND ONE-HALF (1/2) TABLET BEFORE SUPPER 135 tablet 3   • guaiFENesin (MUCINEX) 600 MG 12 hr tablet Take 600 mg by mouth Daily.     • HYDROcodone-acetaminophen (Norco) 5-325 MG per tablet Take 1 tablet by mouth Every 6 (Six) Hours As Needed for Mild Pain  or Moderate Pain . 12 tablet 0   • hydrocortisone 1 % lotion Apply  topically to the appropriate area as directed 2 (Two) Times a Day. 59 mL 1   • Insulin Lispro, 1 Unit Dial, (HUMALOG) 100 UNIT/ML solution pen-injector INJECT USING SLIDING SCALE PROVIDED BY HOSPITAL. MAX DAILY DOSE OF 30 UNITS     • isosorbide mononitrate (IMDUR) 30 MG 24 hr tablet Take 1 tablet by mouth Daily. 90 tablet 3   • Lancets (freestyle) lancets Use once daily 100 each 2   • Lantus SoloStar 100 UNIT/ML injection pen INJECT 15 UNITS UNDER THE SKIN ONCE DAILY AS DIRECTED     • loratadine (CLARITIN) 10 MG tablet TAKE 1 TABLET DAILY 90 tablet 3   • metFORMIN (Glucophage) 500 MG tablet Take 1 tablet by mouth Every Night. 90 tablet 3   • metoprolol tartrate (LOPRESSOR) 25 MG tablet Take 0.5 tablets by mouth 2 (Two) Times a Day. 180 tablet 3   • multivitamin with minerals (Centrum Silver Adult 50+) tablet tablet Take 1 tablet by mouth Daily. 90 tablet 3   • nitroglycerin (Nitrostat) 0.4 MG SL tablet Place 1 tablet under the tongue Every 5 (Five) Minutes As Needed for Chest Pain. 90 tablet 3   • NON FORMULARY CPAP per mask with 4L oxygen nightly     • O2 (OXYGEN) Inhale 4 L/min Continuous. Per nasal  cannula     • pantoprazole (PROTONIX) 40 MG EC tablet Take 1 tablet by mouth Daily. 90 tablet 3   • pravastatin (Pravachol) 80 MG tablet Take 1 tablet by mouth Daily. 90 tablet 3   • predniSONE (DELTASONE) 10 MG tablet Take 20 mg by mouth Daily.     • ProAir  (90 Base) MCG/ACT inhaler USE 2 INHALATIONS FOUR TIMES A DAY 51 g 3   • Stiolto Respimat 2.5-2.5 MCG/ACT aerosol solution inhaler Inhale 2 puffs Every Night. 12 g 3   • terazosin (HYTRIN) 10 MG capsule TAKE 1 CAPSULE DAILY 90 capsule 3   • TRUEplus 5-Bevel Pen Needles 32G X 4 MM misc See Admin Instructions.       No current facility-administered medications for this visit.     Current outpatient and discharge medications have been reconciled for the patient.  Reviewed by: DOMINIQUE Banda    Past Medical History:  Past Medical History:   Diagnosis Date   • A-fib ([facility])    • AAA (abdominal aortic aneurysm) without rupture ([facility])    • Arthritis    • BPH (benign prostatic hypertrophy)    • Cataract     bialteral   • CKD (chronic kidney disease)    • COPD (chronic obstructive pulmonary disease) ([facility])    • Coronary artery disease involving native coronary artery of native heart without angina pectoris 1/20/2017    CABG/Az/[place]/1981 No TCC echo  7.16.18 Right ventricular cavity is mild-to-moderately dilated. Left ventricular diastolic dysfunction (grade I) consistent with impaired relaxation. Left ventricular systolic function is normal. Left atrial cavity size is borderline dilated. RIGHT HEART ENLARGEMENT - RVSP NOT ASSESSABLE NORMAL LV AND RV SYSTOLIC FUNCTION NO SIGNIFICANT VALVULAR DYSFUNCTION  Seein   • Diabetes mellitus ([facility])     Type 2   • DM (diabetes mellitus screen)    • GERD (gastroesophageal reflux disease)    • History of loop recorder     at current time   • Hx of colonic polyp    • Hypercholesteremia    • Hypertension    • Macular degeneration     treated with injections in right eye   • Myocardial infarction ([facility])    • Neuropathy    •  Oxygen deficit     at 4liters   • Prostate cancer (HCC)    • Pulmonary hypertension (HCC) 2022   • S/P CABG x 3 2022 [facility]   • Sleep apnea     cpap   • Stage 3a chronic kidney disease (HCC) 2017   • Stroke (HCC)     recovererd   • Varicose vein of leg     bialteral     Past Surgical History:   Procedure Laterality Date   • APPENDECTOMY     • CARDIAC CATHETERIZATION     • CARDIAC CATHETERIZATION Left 2019    Procedure: Cardiac Catheterization/Vascular Study Positive occult blood in stools  ? BMS vs REGGIE Get cabg report  ;  Surgeon: Bradley Carver MD;  Location:  [facility] CATH INVASIVE LOCATION;  Service: Cardiology   • COLONOSCOPY N/A 6/15/2017    Diverticulosis repeat exam prn   • COLONOSCOPY W/ POLYPECTOMY  10/21/2013    2 Tubular adenomatous polyps, Diverticulosis repeat exam in 5 years   • CORONARY ARTERY BYPASS GRAFT  1980    X 3   • CYSTOSCOPY RETROGRADE PYELOGRAM Bilateral 2021    Procedure: CYSTOSCOPY RETROGRADE PYELOGRAM;  Surgeon: Danie Cadena MD;  Location:  [facility] OR;  Service: Urology;  Laterality: Bilateral;   • ENDOSCOPY N/A 6/15/2017    LA Grade A reflux esophagitis   • EYE SURGERY Bilateral    • HERNIA REPAIR     • TRANSURETHRAL RESECTION OF BLADDER TUMOR N/A 2021    Procedure: CYSTOSCOPY TRANSURETHRAL RESECTION OF BLADDER TUMOR WITH GEMCITABINE INSTILLATION;  Surgeon: Danie Cadena MD;  Location:  [facility] OR;  Service: Urology;  Laterality: N/A;     Family History   Problem Relation Age of Onset   • Heart disease Mother    • Stroke Mother    • Melanoma Mother    • Heart disease Father    • Diabetes Father    • Prostate cancer Father    • Colon cancer Neg Hx    • Colon polyps Neg Hx      Social History     Tobacco Use   • Smoking status: Former Smoker     Packs/day: 1.00     Years: 26.00     Pack years: 26.00     Types: Cigarettes     Start date:      Quit date:      Years since quittin.6   • Smokeless tobacco: Never Used   Vaping Use   • Vaping  "Use: Never used   Substance Use Topics   • Alcohol use: No   • Drug use: No     Review of Systems   Respiratory: Positive for shortness of breath.    Neurological: Positive for memory problem. Negative for syncope, facial asymmetry, speech difficulty, weakness and headache.   Psychiatric/Behavioral: Positive for sleep disturbance.   All other systems reviewed and are negative.        Objective   Vital Signs:  Heart Rate:  [69] 69  Resp:  [20] 20  BP: (136)/(70) 136/70      08/11/22  1006   Weight: 94.8 kg (209 lb)     181.6 cm (71.5\")  Body mass index is 28.74 kg/m².    Physical Exam  Constitutional:       Appearance: Normal appearance.   HENT:      Head: Normocephalic.   Eyes:      Extraocular Movements: Extraocular movements intact.      Pupils: Pupils are equal, round, and reactive to light.   Cardiovascular:      Rate and Rhythm: Normal rate and regular rhythm.      Pulses: Normal pulses.   Pulmonary:      Effort: Pulmonary effort is normal.      Comments: Oxygen in place  Musculoskeletal:         General: Normal range of motion.      Cervical back: Normal range of motion and neck supple.   Skin:     General: Skin is warm and dry.      Capillary Refill: Capillary refill takes less than 2 seconds.   Neurological:      Gait: Gait is intact.   Psychiatric:         Mood and Affect: Mood normal.       Neurologic Exam:  Mental Status:    -Awake. Alert. Oriented to person, place & time.  -No word finding difficulties.  -No aphasia.  -No dysarthria.  -Follows simple commands.     CN II:  Full visual fields with confrontation.  Pupils equally reactive to light.  CN III, IV, VI:  Extraocular muscles function intact with no nystagmus.  CN V:  Facial sensory is symmetric.  CN VII:  Facial motor symmetric.  CN VIII:  Gross hearing intact bilaterally.  CN IX/X:  Palate elevates symmetrically.  CN XI:  Shoulder shrug symmetric.  CN XII:  Tongue is midline on protrusion.     Motor: (strength out of 5:  1= minimal movement, 2 = " movement in plane of gravity, 3 = movement against gravity, 4 = movement against some resistance, 5 = full strength)     -5/5 in bilateral biceps, triceps, brachioradialis, wrist extensors and intrinsic muscles of the hand.    -5/5 in bilateral hip flexors, quadriceps, hamstrings, gastrocsoleus complex, anterior tibialis and extensor hallucis longus.       Deep Tendon Reflexes:  -Right              Biceps: 2+         Triceps: 2+      Brachioradialis: 2+              Patella: 2+       Ankle: 2+         Babinski:  negative  -Left              Biceps: 2+         Triceps: 2+      Brachioradialis: 2+              Patella: 2+       Ankle: 2+         Babinski:  negative    Tone (Modified Zia Scale):  No appreciable increase in tone or rigidity noted.     Sensory:  -Intact to light touch, pinprick BUE (C5-T1) and BLE (L2-S1).     Coordination:  -Finger to nose intact BUEs  -Heel to shin intact BLEs  -No ataxia     Gait  -No signs of ataxia  -ambulates unassisted     Results Review:    Lab Results   Component Value Date    GLUCOSE 108 (H) 08/08/2022    BUN 21 08/08/2022    CREATININE 0.95 08/08/2022    EGFRIFNONA 61 02/20/2022    EGFRIFAFRI 64 01/05/2022    BCR 22.1 08/08/2022    K 4.3 08/08/2022    CO2 29.5 (H) 08/08/2022    CALCIUM 9.5 08/08/2022    PROTENTOTREF 6.5 01/05/2022    ALBUMIN 4.20 02/20/2022    LABIL2 2.8 01/05/2022    AST 15 02/20/2022    ALT 14 02/20/2022     Lab Results   Component Value Date    WBC 11.28 (H) 08/08/2022    HGB 10.9 (L) 08/08/2022    HCT 33.9 (L) 08/08/2022    MCV 92.6 08/08/2022     08/08/2022     Lab Results   Component Value Date    CHOL 149 05/23/2019    CHLPL 150 01/05/2022    TRIG 103 01/05/2022    HDL 37 (L) 01/05/2022    LDL 94 01/05/2022     Lab Results   Component Value Date    TSH 2.620 01/05/2022     Lab Results   Component Value Date    HGBA1C 6.00 (H) 07/06/2022     Lab Results   Component Value Date    FOLATE >20.00 07/06/2022     Lab Results   Component Value Date     AEVRWUOK95 557 07/06/2022     Compliance report:  This report is for the dates of 7/11/2022-8/9/2022.  The patient use device for 22/30 days for 73% compliance.  Of those dates patient use device for greater than 4 hours for 21 days for 70% compliance.  Average time used was 8 hours 26 minutes per night.  He is currently set on CPAP 9 cm H2O.  Current AHI is slightly elevated at 12.4.     Plan .  Impression:  · History of CVA  · WENDY  · Memory difficulties    Plan:  • Continue CPAP with oxygen.  Encouraged to increase compliance  • Continue Eliquis 5 mg twice daily, aspirin 81 mg daily, and pravastatin 80 mg daily for stroke prevention  • Did discuss with the patient regarding safety while driving.  Encouraged him to take advice from his family whenever they have concerns.  • Fall risk/prevention discussed  • Recommend regular physical activity as tolerated and a balanced diet.  • He is present to the ED with any concerns for new stroke.    The patient and I have discussed the plan of care and he is in full agreement at this time.     Follow-Up:  Return in about 6 months (around 2/11/2023) for Stroke, Obstructive sleep apnea.       Diallo Hightower, DOMINIQUE  08/11/22  10:49 CDT

## 2022-08-24 PROCEDURE — G2066 INTER DEVC REMOTE 30D: HCPCS | Performed by: INTERNAL MEDICINE

## 2022-08-24 PROCEDURE — 93298 REM INTERROG DEV EVAL SCRMS: CPT | Performed by: INTERNAL MEDICINE

## 2022-09-15 DIAGNOSIS — I25.10 CORONARY ARTERY DISEASE INVOLVING NATIVE CORONARY ARTERY WITHOUT ANGINA PECTORIS, UNSPECIFIED WHETHER NATIVE OR TRANSPLANTED HEART: Chronic | ICD-10-CM

## 2022-09-15 DIAGNOSIS — I10 HYPERTENSION, UNSPECIFIED TYPE: Chronic | ICD-10-CM

## 2022-09-15 RX ORDER — ISOSORBIDE MONONITRATE 30 MG/1
TABLET, EXTENDED RELEASE ORAL
Qty: 90 TABLET | Refills: 3 | Status: SHIPPED | OUTPATIENT
Start: 2022-09-15 | End: 2023-02-07

## 2022-10-10 DIAGNOSIS — I10 HYPERTENSION, UNSPECIFIED TYPE: Chronic | ICD-10-CM

## 2022-10-10 DIAGNOSIS — I25.10 CORONARY ARTERY DISEASE INVOLVING NATIVE CORONARY ARTERY WITHOUT ANGINA PECTORIS, UNSPECIFIED WHETHER NATIVE OR TRANSPLANTED HEART: Chronic | ICD-10-CM

## 2022-10-11 ENCOUNTER — LAB (OUTPATIENT)
Dept: LAB | Facility: HOSPITAL | Age: 87
End: 2022-10-11

## 2022-10-11 ENCOUNTER — OFFICE VISIT (OUTPATIENT)
Dept: CARDIOLOGY | Facility: CLINIC | Age: 87
End: 2022-10-11

## 2022-10-11 ENCOUNTER — TELEPHONE (OUTPATIENT)
Dept: FAMILY MEDICINE CLINIC | Facility: CLINIC | Age: 87
End: 2022-10-11

## 2022-10-11 VITALS
WEIGHT: 214 LBS | DIASTOLIC BLOOD PRESSURE: 76 MMHG | SYSTOLIC BLOOD PRESSURE: 124 MMHG | HEART RATE: 63 BPM | HEIGHT: 71 IN | BODY MASS INDEX: 29.96 KG/M2 | OXYGEN SATURATION: 96 %

## 2022-10-11 DIAGNOSIS — I10 HYPERTENSION, UNSPECIFIED TYPE: ICD-10-CM

## 2022-10-11 DIAGNOSIS — I10 PRIMARY HYPERTENSION: Chronic | ICD-10-CM

## 2022-10-11 DIAGNOSIS — J96.11 CHRONIC RESPIRATORY FAILURE WITH HYPOXIA: ICD-10-CM

## 2022-10-11 DIAGNOSIS — G47.33 OBSTRUCTIVE SLEEP APNEA: Chronic | ICD-10-CM

## 2022-10-11 DIAGNOSIS — Z95.1 S/P CABG X 3: Primary | Chronic | ICD-10-CM

## 2022-10-11 DIAGNOSIS — D64.9 ANEMIA, UNSPECIFIED TYPE: Primary | ICD-10-CM

## 2022-10-11 DIAGNOSIS — I48.0 PAF (PAROXYSMAL ATRIAL FIBRILLATION): Chronic | ICD-10-CM

## 2022-10-11 DIAGNOSIS — E78.5 HYPERLIPIDEMIA LDL GOAL <70: Chronic | ICD-10-CM

## 2022-10-11 DIAGNOSIS — R06.09 DOE (DYSPNEA ON EXERTION): ICD-10-CM

## 2022-10-11 DIAGNOSIS — D64.9 ANEMIA, UNSPECIFIED TYPE: ICD-10-CM

## 2022-10-11 LAB
IRON 24H UR-MRATE: 65 MCG/DL (ref 59–158)
IRON SATN MFR SERPL: 18 % (ref 20–50)
TIBC SERPL-MCNC: 358 MCG/DL (ref 298–536)
TRANSFERRIN SERPL-MCNC: 240 MG/DL (ref 200–360)

## 2022-10-11 PROCEDURE — 99214 OFFICE O/P EST MOD 30 MIN: CPT | Performed by: INTERNAL MEDICINE

## 2022-10-11 PROCEDURE — 85025 COMPLETE CBC W/AUTO DIFF WBC: CPT | Performed by: FAMILY MEDICINE

## 2022-10-11 PROCEDURE — 93000 ELECTROCARDIOGRAM COMPLETE: CPT | Performed by: INTERNAL MEDICINE

## 2022-10-11 PROCEDURE — 80048 BASIC METABOLIC PNL TOTAL CA: CPT | Performed by: FAMILY MEDICINE

## 2022-10-11 PROCEDURE — 84466 ASSAY OF TRANSFERRIN: CPT

## 2022-10-11 PROCEDURE — 83540 ASSAY OF IRON: CPT

## 2022-10-11 PROCEDURE — 82728 ASSAY OF FERRITIN: CPT | Performed by: FAMILY MEDICINE

## 2022-10-11 RX ORDER — POTASSIUM CHLORIDE 750 MG/1
10 TABLET, FILM COATED, EXTENDED RELEASE ORAL DAILY
Qty: 90 TABLET | Refills: 3 | Status: SHIPPED | OUTPATIENT
Start: 2022-10-11 | End: 2023-02-17 | Stop reason: HOSPADM

## 2022-10-11 RX ORDER — HYDROCHLOROTHIAZIDE 12.5 MG/1
12.5 CAPSULE, GELATIN COATED ORAL DAILY
Qty: 90 CAPSULE | Refills: 3 | Status: SHIPPED | OUTPATIENT
Start: 2022-10-11

## 2022-10-11 NOTE — PROGRESS NOTES
Gonzalo Durham  [id number]  1934  88 y.o.  male    Referring Provider: Abel Romero MD    Reason for Follow-up Visit:        Here for routine follow up     Prior to that visit for evaluation for  Shortness of breath   coronary artery disease Coronary artery bypass grafting 1980 x 3 [facility]  S/P cardiac cath    Prior cerebrovascular accident with left arm weakness now resolved  Lasted for for over 1 week 7/27/19  CT head showed anterior small infarction  7/27/19  sinus rhythm in [place]  CT chest 01/2021 pulmonary nodule   Seeing Dr Turk        Subjective    No new events or complaints since last visit   Moderate pedal edema now   Tolerating current medications well with no untoward side effects   Compliant with prescribed medication regimen. Tries to adhere to cardiac diet.      Dependent oxygen  No further transient ischemic attacks or cerebrovascular accident  on anticoagulation  No bleeding or bruising issues     Moderate chronic exertional shortness of breath on exertion relieved with rest  No significant cough or wheezing  Intermittent dizzy spells, no presyncope or syncope   BP well controlled at home.       No palpitations  No associated chest pain     No fever or chills  No significant expectoration    No hemoptysis  No presyncope or syncope     No symptoms reminiscent of prior angina.    Using oxygen at home both during daytime and at night continously     No bleeding, excessive bruising, gait instability or fall risks        History of present illness:  Gonzalo Durham is a 88 y.o. yo male with  coronary artery disease Coronary artery bypass grafting who presents today for   Chief Complaint   Patient presents with   • Coronary Artery Disease     6MO- MORE TIRED THAN NORMAL. LOW ENERGY-WIFE IS ON HOSPICE    • Atrial Fibrillation   • Fatigue   .    History  Past Medical History:   Diagnosis Date   • A-fib (HCC)    • AAA (abdominal aortic aneurysm) without rupture    • Arthritis    • BPH (benign  prostatic hypertrophy)    • Cataract     bialteral   • CKD (chronic kidney disease)    • COPD (chronic obstructive pulmonary disease) (HCC)    • Coronary artery disease involving native coronary artery of native heart without angina pectoris 1/20/2017    CABG/[place]/1981 No [facility] echo  7.16.18 Right ventricular cavity is mild-to-moderately dilated. Left ventricular diastolic dysfunction (grade I) consistent with impaired relaxation. Left ventricular systolic function is normal. Left atrial cavity size is borderline dilated. RIGHT HEART ENLARGEMENT - RVSP NOT ASSESSABLE NORMAL LV AND RV SYSTOLIC FUNCTION NO SIGNIFICANT VALVULAR DYSFUNCTION  Seein   • Diabetes mellitus (HCC)     Type 2   • DM (diabetes mellitus screen)    • GERD (gastroesophageal reflux disease)    • History of loop recorder     at current time   • Hx of colonic polyp    • Hypercholesteremia    • Hypertension    • Macular degeneration     treated with injections in right eye   • Myocardial infarction (HCC)    • Neuropathy    • Oxygen deficit     at 4liters   • Prostate cancer (HCC)    • Pulmonary hypertension (HCC) 1/7/2022   • S/P CABG x 3 1/7/2022 1980 [facility]   • Sleep apnea     cpap   • Stage 3a chronic kidney disease (HCC) 1/20/2017   • Stroke ([facility])     recovererd   • Varicose vein of leg     bialteral   ,   Past Surgical History:   Procedure Laterality Date   • APPENDECTOMY     • CARDIAC CATHETERIZATION     • CARDIAC CATHETERIZATION Left 5/22/2019    Procedure: Cardiac Catheterization/Vascular Study Positive occult blood in stools  ? BMS vs REGGIE Get cabg report  ;  Surgeon: Bradley Carver MD;  Location: [facility] CATH INVASIVE LOCATION;  Service: Cardiology   • COLONOSCOPY N/A 6/15/2017    Diverticulosis repeat exam prn   • COLONOSCOPY W/ POLYPECTOMY  10/21/2013    2 Tubular adenomatous polyps, Diverticulosis repeat exam in 5 years   • CORONARY ARTERY BYPASS GRAFT  1980    X 3   • CYSTOSCOPY RETROGRADE PYELOGRAM Bilateral 2/8/2021     Procedure: CYSTOSCOPY RETROGRADE PYELOGRAM;  Surgeon: Danie Cadena MD;  Location:  [facility] OR;  Service: Urology;  Laterality: Bilateral;   • ENDOSCOPY N/A 6/15/2017    LA Grade A reflux esophagitis   • EYE SURGERY Bilateral    • HERNIA REPAIR     • TRANSURETHRAL RESECTION OF BLADDER TUMOR N/A 2021    Procedure: CYSTOSCOPY TRANSURETHRAL RESECTION OF BLADDER TUMOR WITH GEMCITABINE INSTILLATION;  Surgeon: Danie Cadena MD;  Location:  [facility] OR;  Service: Urology;  Laterality: N/A;   ,   Family History   Problem Relation Age of Onset   • Heart disease Mother    • Stroke Mother    • Melanoma Mother    • Heart disease Father    • Diabetes Father    • Prostate cancer Father    • Colon cancer Neg Hx    • Colon polyps Neg Hx    ,   Social History     Tobacco Use   • Smoking status: Former     Packs/day: 1.00     Years: 26.00     Pack years: 26.00     Types: Cigarettes     Start date:      Quit date:      Years since quittin.8   • Smokeless tobacco: Never   Vaping Use   • Vaping Use: Never used   Substance Use Topics   • Alcohol use: No   • Drug use: No   ,     Medications  Current Outpatient Medications   Medication Sig Dispense Refill   • acetaminophen (TYLENOL) 325 MG tablet Take 2 tablets by mouth 2 (Two) Times a Day. 360 tablet 2   • Artificial Tear Solution (Just Tears Eye Drops) solution Apply 1-2 drops to eye(s) as directed by provider Daily As Needed (dry eyes). 45 mL 2   • ascorbic acid (VITAMIN C) 1000 MG tablet Take 1 tablet by mouth Daily. 90 tablet 3   • aspirin 81 MG EC tablet Take 81 mg by mouth Daily.     • calcium carbonate-vitamin d 600-400 MG-UNIT per tablet Take 1 tablet by mouth 2 (Two) Times a Day. 180 tablet 3   • Eliquis 5 MG tablet tablet TAKE 1 TABLET TWICE A  tablet 3   • finasteride (PROSCAR) 5 MG tablet TAKE 1 TABLET DAILY 90 tablet 3   • gabapentin (NEURONTIN) 600 MG tablet Take 1 tablet by mouth 3 (Three) Times a Day. 270 tablet 2   • glucose blood test strip  Use as instructed to check glucose daily and as needed for highs and lows. 100 each 3   • glyburide (DIAbeta) 5 MG tablet TAKE 1 TABLET EVERY MORNING AND ONE-HALF (1/2) TABLET BEFORE SUPPER 135 tablet 3   • guaiFENesin (MUCINEX) 600 MG 12 hr tablet Take 600 mg by mouth Daily.     • HYDROcodone-acetaminophen (Norco) 5-325 MG per tablet Take 1 tablet by mouth Every 6 (Six) Hours As Needed for Mild Pain  or Moderate Pain . 12 tablet 0   • hydrocortisone 1 % lotion Apply  topically to the appropriate area as directed 2 (Two) Times a Day. 59 mL 1   • Insulin Lispro, 1 Unit Dial, (HUMALOG) 100 UNIT/ML solution pen-injector INJECT USING SLIDING SCALE PROVIDED BY HOSPITAL. MAX DAILY DOSE OF 30 UNITS     • isosorbide mononitrate (IMDUR) 30 MG 24 hr tablet TAKE 1 TABLET DAILY 90 tablet 3   • Lancets (freestyle) lancets Use once daily 100 each 2   • Lantus SoloStar 100 UNIT/ML injection pen INJECT 15 UNITS UNDER THE SKIN ONCE DAILY AS DIRECTED     • loratadine (CLARITIN) 10 MG tablet TAKE 1 TABLET DAILY 90 tablet 3   • metFORMIN (Glucophage) 500 MG tablet Take 1 tablet by mouth Every Night. 90 tablet 3   • metoprolol tartrate (LOPRESSOR) 25 MG tablet TAKE ONE-HALF (1/2) TABLET TWICE A  tablet 3   • multivitamin with minerals (Centrum Silver Adult 50+) tablet tablet Take 1 tablet by mouth Daily. 90 tablet 3   • nitroglycerin (Nitrostat) 0.4 MG SL tablet Place 1 tablet under the tongue Every 5 (Five) Minutes As Needed for Chest Pain. 90 tablet 3   • NON FORMULARY CPAP per mask with 4L oxygen nightly     • O2 (OXYGEN) Inhale 4 L/min Continuous. Per nasal cannula     • pantoprazole (PROTONIX) 40 MG EC tablet Take 1 tablet by mouth Daily. 90 tablet 3   • pravastatin (Pravachol) 80 MG tablet Take 1 tablet by mouth Daily. 90 tablet 3   • predniSONE (DELTASONE) 10 MG tablet Take 20 mg by mouth Daily.     • ProAir  (90 Base) MCG/ACT inhaler USE 2 INHALATIONS FOUR TIMES A DAY 51 g 3   • Stiolto Respimat 2.5-2.5 MCG/ACT  "aerosol solution inhaler Inhale 2 puffs Every Night. 12 g 3   • terazosin (HYTRIN) 10 MG capsule TAKE 1 CAPSULE DAILY 90 capsule 3   • TRUEplus 5-Bevel Pen Needles 32G X 4 MM misc See Admin Instructions.     • hydroCHLOROthiazide (MICROZIDE) 12.5 MG capsule Take 1 capsule by mouth Daily. 90 capsule 3   • potassium chloride 10 MEQ CR tablet Take 1 tablet by mouth Daily. 90 tablet 3     No current facility-administered medications for this visit.       Allergies:  Symbicort [budesonide-formoterol fumarate] and Prozac [fluoxetine hcl]    Review of Systems  Review of Systems   Constitutional: Positive for malaise/fatigue.   HENT: Negative.    Eyes: Negative.    Cardiovascular: Positive for dyspnea on exertion. Negative for chest pain, claudication, cyanosis, irregular heartbeat, leg swelling, near-syncope, orthopnea, palpitations, paroxysmal nocturnal dyspnea and syncope.   Respiratory: Negative.    Endocrine: Negative.    Hematologic/Lymphatic: Negative.    Skin: Negative.    Musculoskeletal: Positive for arthritis and joint pain.   Gastrointestinal: Negative for anorexia.   Genitourinary: Negative.    Psychiatric/Behavioral: Negative.        Objective     Physical Exam:  /76 (BP Location: Left arm, Patient Position: Sitting, Cuff Size: Large Adult)   Pulse 63   Ht 181.6 cm (71.5\")   Wt 97.1 kg (214 lb)   SpO2 96%   BMI 29.43 kg/m²     Physical Exam   Constitutional: He appears well-developed.   HENT:   Head: Normocephalic.   Neck: Normal carotid pulses and no JVD present. No tracheal tenderness present. Carotid bruit is not present. No tracheal deviation present.   Cardiovascular: Regular rhythm and normal pulses.   Murmur heard.   Systolic murmur is present with a grade of 2/6.  Pulmonary/Chest: Effort normal. No stridor.   Abdominal: Soft. He exhibits no distension. There is no abdominal tenderness.   Neurological: He is alert. No cranial nerve deficit or sensory deficit.   Skin: Skin is warm. " "  Psychiatric: His speech is normal and behavior is normal.       Results Review:     Gonzalo Durham  Complete Transthoracic Echocardiogram with Complete Doppler, Color Flow and Myocardial Strain Imaging  Order# [id number]  Reading physician: Bradley Carver MD Ordering physician: Bradley Carver MD Study date: 21     Patient Information    Patient Name   Gonzalo Durham MRN   [id number] Legal Sex   Male  (Age)   1934 (88 y.o.)     Patient Hx Of Height, Weight, and Vitals    Height Weight BSA (Calculated - sq m) BMI (Calculated) Retired BMI (kg/m2) Pulse BP   180.3 cm (70.98\") 90.3 kg (199 lb 1.2 oz) 2.1 sq meters 27.8 27.84  110/70     [employer] PACS Images     Show images for Adult Transthoracic Echo Complete w/ Color, Spectral and Contrast if necessary per protocol      Clinical Indication    Dyspnea   Dx: Shortness of breath [R06.02 (ICD-10-CM)]   Comments: Myocardial strain to be performed during echocardiogram as long as technically feasible     Interpretation Summary    • Left ventricular wall thickness is consistent with mild to moderate concentric hypertrophy.  • Left ventricular ejection fraction appears to be 61 - 65%. Left ventricular systolic function is normal.  • Normal global longitudinal LV strain (GLS) = -18%.  • Left ventricular diastolic function was indeterminate.  • Moderate pulmonary hypertension is present.             Gonzalo Durham   Regadenoson Stress Test With Myocardial Perfusion SPECT (Multi Study)   Order# [id number]   Reading physician: Bradley Carver MD Ordering physician: Bradley Carver MD Study date: 19   Patient Information     Patient Name  Gonzalo Durham MRN  [id number] Sex  Male  (Age)  1934 (86 y.o.)   Interpretation Summary        · Left ventricular ejection fraction is normal (Calculated EF = 57%).  · Diaphragmatic attenuation artifact is present.  · Raw images reviewed with the following abnormalities noted: vertical motion.  · Myocardial perfusion imaging indicates " a normal myocardial perfusion study with no evidence of ischemia.  · Impressions are consistent with a low risk study.            Results for orders placed during the hospital encounter of 02/20/22    Adult Stress Echo W/ Cont or Stress Agent if Necessary Per Protocol    Interpretation Summary  · Low risk for ischemia         5/19 Cardiac Cath        Conclusion of cardiac catheterization           Left main coronary artery has moderate atherosclerotic plaque without any high-grade lesions  Left anterior descending coronary artery is occluded after origin of a diagonal branch  Saphenous vein graft to the left anterior descending coronary artery has moderate atherosclerotic plaque without any high-grade lesions and patent  The first diagonal branch has a 70% tubular stenosis that extends from the left anterior descending coronary artery to the proximal aspect  Right coronary artery is occluded in the mid segment  Widely patent saphenous venous graft to the right coronary artery without any significant stenosis  Bilateral internal mammary arteries have not been used as a conduit  No other grafts identified     Left ventricular end-diastolic pressure moderately elevated to 90 mmHg with no gradient across aortic valve on pullback     Left ventricular ejection fraction approximately 45% with basal inferior hypokinesis no significant mitral regurgitation  Aortogram performed to look for vein grafts with no dissection or significant dilatation  Atherosclerotic plaque buildup noted in the aortic root will admit patient has significant use of contrast and given his age high risk of contrast-induced nephropathy           ____________________________________________________________________________________________________________________________________________     Plan after cardiac catheterization        70% stenosis of diagonal branch without any grafting of this vessel  Patent saphenous venous graft to the left anterior  descending coronary artery  Patent saphenous venous graft to the right coronary artery  Occluded mid left anterior stent coronary artery  Occluded proximal right coronary artery           Will hydrate patient  Monitor for any signs of bleeding around the right femoral arteriotomy site where he had oozing from around the 7 Spanish sheath  Intensive risk factor modifications for both primary and secondary prevention if applicable  Hydration   Observation     If patient continues to be symptomatic consider coronary intervention with drug-eluting stent placement into the ostial and proximal portion of the diagonal branch.    Echo     · Right ventricular cavity is mild-to-moderately dilated.  · Left ventricular diastolic dysfunction (grade I) consistent with impaired relaxation.  · Left ventricular systolic function is normal.  · Left atrial cavity size is borderline dilated.     RIGHT HEART ENLARGEMENT - RVSP NOT ASSESSABLE  NORMAL LV AND RV SYSTOLIC FUNCTION  NO SIGNIFICANT VALVULAR DYSFUNCTION    Holter    · Average HR: 71. Min HR: 37. Max HR: 173.     · Monitored for approximately 1 day   · The predominant rhythm noted during the testing period was sinus rhythm.  · Bradycardia in usual sleeping hours.   · 1 premature ventricular contractions: PVCs:  <0.1%   · 45   atrial premature contractions:   APCs: <0.1%             · No significant supraventricular  tachy or justina arrhythmia.  · No significant  ventricular tachy or justina arrhythmia.  · No correlated arrhythmia  · No significant pauses above 3 seconds        Results for orders placed during the hospital encounter of 02/20/22    Adult Stress Echo W/ Cont or Stress Agent if Necessary Per Protocol    Interpretation Summary  · Low risk for ischemia    ·  Right ventricular cavity is mild-to-moderately dilated.  · Left ventricular diastolic dysfunction (grade I) consistent with impaired relaxation.  · Left ventricular systolic function is normal.  Left atrial cavity  size is borderline dilated.    ECG 12 Lead    Date/Time: 10/11/2022 10:22 AM  Performed by: Bradley Carver MD  Authorized by: Bradley Carver MD   Comparison: compared with previous ECG from 2/20/2022  Comparison to previous ECG: atrial premature contractions   Rhythm: sinus rhythm  Ectopy: atrial premature contractions  Rate: normal  Conduction: conduction normal  ST Segments: ST segments normal  T Waves: T waves normal  Other: no other findings    Clinical impression: abnormal EKG            Assessment & Plan   Diagnoses and all orders for this visit:    1. S/P CABG x 3 (Primary)    2. Chronic respiratory failure with hypoxia (HCC)    3. PAF (paroxysmal atrial fibrillation) (HCC)    4. Obstructive sleep apnea    5. Hyperlipidemia LDL goal <70    6. Primary hypertension    7. DAMON (dyspnea on exertion)  -     Adult Transthoracic Echo Complete w/ Color, Spectral and Contrast if necessary per protocol; Future    Other orders  -     ECG 12 Lead  -     hydroCHLOROthiazide (MICROZIDE) 12.5 MG capsule; Take 1 capsule by mouth Daily.  Dispense: 90 capsule; Refill: 3  -     potassium chloride 10 MEQ CR tablet; Take 1 tablet by mouth Daily.  Dispense: 90 tablet; Refill: 3          Plan      Start low dose diuretic therapy   Requested Prescriptions     Signed Prescriptions Disp Refills   • hydroCHLOROthiazide (MICROZIDE) 12.5 MG capsule 90 capsule 3     Sig: Take 1 capsule by mouth Daily.   • potassium chloride 10 MEQ CR tablet 90 tablet 3     Sig: Take 1 tablet by mouth Daily.      Weigh yourself frequently, at least weekly, preferably daily, call me if more than 2 pounds a day or 5 pounds a week weight gain.  Flexible diuretic dosing    Check BP and heart rates twice daily initially till blood pressures and heart rates under good control and then at least 3x / week,   If blood pressures continue to be well-controlled then can check week a month  at home and bring a recording for review next visit  If BP >130/85 or < 100/60  persistently over 3 reading 30 mins apart or if heart rates persistently above 100 bpm or less than 55 bpm call sooner for evaluation and advise     Patient expressed understanding  Encouraged and answered all questions   Discussed with the patient and all questioned fully answered. He will call me if any problems arise.   Discussed results of prior testing with patient : echo and stress echo  as well electrocardiogram from today         Orders Placed This Encounter   Procedures   • ECG 12 Lead     This order was created via procedure documentation     Order Specific Question:   Release to patient     Answer:   Routine Release   • Adult Transthoracic Echo Complete w/ Color, Spectral and Contrast if necessary per protocol     Myocardial strain to be performed during echocardiogram as long as technically feasible     Standing Status:   Future     Standing Expiration Date:   10/11/2023     Order Specific Question:   Reason for exam?     Answer:   Dyspnea     Order Specific Question:   Release to patient     Answer:   Routine Release         Monitor for any signs of bleeding including red or dark stools as well as easy bruisabilty. Fall precautions.   Overall doing well no new cardiovascular symptoms and therefore no additional cardiac testing is required   If any interim issues arise will call me for further evaluation.     Monitor cardiac rhythm device function regularly per established schedule or PRN    Given prior CVA and high [id number]  Would recommend on anticoagulation     Continue  Eliquis  Monitor for any signs of bleeding including red or dark stools as well as easy bruisabilty. Fall precautions.      Follow up with Myra FOX           Return in about 3 months (around 1/11/2023).

## 2022-10-17 ENCOUNTER — OFFICE VISIT (OUTPATIENT)
Dept: FAMILY MEDICINE CLINIC | Facility: CLINIC | Age: 87
End: 2022-10-17

## 2022-10-17 VITALS
OXYGEN SATURATION: 91 % | BODY MASS INDEX: 28.58 KG/M2 | WEIGHT: 211 LBS | RESPIRATION RATE: 14 BRPM | HEART RATE: 78 BPM | DIASTOLIC BLOOD PRESSURE: 76 MMHG | HEIGHT: 72 IN | SYSTOLIC BLOOD PRESSURE: 126 MMHG

## 2022-10-17 DIAGNOSIS — Z20.822 SUSPECTED COVID-19 VIRUS INFECTION: ICD-10-CM

## 2022-10-17 DIAGNOSIS — J44.1 COPD EXACERBATION: Primary | ICD-10-CM

## 2022-10-17 LAB
EXPIRATION DATE: NORMAL
INTERNAL CONTROL: NORMAL
Lab: NORMAL
SARS-COV-2 AG UPPER RESP QL IA.RAPID: NOT DETECTED

## 2022-10-17 PROCEDURE — 87426 SARSCOV CORONAVIRUS AG IA: CPT | Performed by: NURSE PRACTITIONER

## 2022-10-17 PROCEDURE — 99213 OFFICE O/P EST LOW 20 MIN: CPT | Performed by: NURSE PRACTITIONER

## 2022-10-17 RX ORDER — CEFDINIR 300 MG/1
300 CAPSULE ORAL 2 TIMES DAILY
Qty: 20 CAPSULE | Refills: 0 | Status: SHIPPED | OUTPATIENT
Start: 2022-10-17 | End: 2022-10-27

## 2022-10-17 RX ORDER — PREDNISONE 10 MG/1
10 TABLET ORAL 2 TIMES DAILY
Qty: 14 TABLET | Refills: 0 | Status: SHIPPED | OUTPATIENT
Start: 2022-10-17 | End: 2022-10-24

## 2022-10-17 NOTE — PROGRESS NOTES
Subjective   Chief Complaint:  Cough.     History of Present Illness:  This 88 y.o. male was seen in the office today for evaluation of a cough.     He reports utilizing a CPAP machine at night and adds when asleep, there is drainage in his throat and chest, which onsets coughing in the middle of the night. He notes he has been experiencing symptoms for approximately 4 days that have are gradually progressing. He states he utilizes 4 liters of oxygen daily. The patient believes his symptoms are secondary to his COPD. He acknowledges he does use a nasal saline solution occasionally. He denies taking a COVID-19 test within the last 3 days.       Additionally, the patient reports he was diagnosed with pneumonia and was admitted into the hospital 4 to 5 months ago. He notes he changed the tubes from his CPAP machine and inquires if that may be secondary to a viral infection.     Allergies   Allergen Reactions   • Symbicort [Budesonide-Formoterol Fumarate] Dizziness     Patient passed out and fell   • Prozac [Fluoxetine Hcl] Mental Status Change     Bad dreams.      Current Outpatient Medications on File Prior to Visit   Medication Sig   • acetaminophen (TYLENOL) 325 MG tablet Take 2 tablets by mouth 2 (Two) Times a Day.   • Artificial Tear Solution (Just Tears Eye Drops) solution Apply 1-2 drops to eye(s) as directed by provider Daily As Needed (dry eyes).   • ascorbic acid (VITAMIN C) 1000 MG tablet Take 1 tablet by mouth Daily.   • aspirin 81 MG EC tablet Take 81 mg by mouth Daily.   • calcium carbonate-vitamin d 600-400 MG-UNIT per tablet Take 1 tablet by mouth 2 (Two) Times a Day.   • Eliquis 5 MG tablet tablet TAKE 1 TABLET TWICE A DAY   • finasteride (PROSCAR) 5 MG tablet TAKE 1 TABLET DAILY   • gabapentin (NEURONTIN) 600 MG tablet Take 1 tablet by mouth 3 (Three) Times a Day.   • glucose blood test strip Use as instructed to check glucose daily and as needed for highs and lows.   • glyburide (DIAbeta) 5 MG tablet  TAKE 1 TABLET EVERY MORNING AND ONE-HALF (1/2) TABLET BEFORE SUPPER   • guaiFENesin (MUCINEX) 600 MG 12 hr tablet Take 600 mg by mouth Daily.   • hydroCHLOROthiazide (MICROZIDE) 12.5 MG capsule Take 1 capsule by mouth Daily.   • HYDROcodone-acetaminophen (Norco) 5-325 MG per tablet Take 1 tablet by mouth Every 6 (Six) Hours As Needed for Mild Pain  or Moderate Pain .   • hydrocortisone 1 % lotion Apply  topically to the appropriate area as directed 2 (Two) Times a Day.   • Insulin Lispro, 1 Unit Dial, (HUMALOG) 100 UNIT/ML solution pen-injector INJECT USING SLIDING SCALE PROVIDED BY HOSPITAL. MAX DAILY DOSE OF 30 UNITS   • isosorbide mononitrate (IMDUR) 30 MG 24 hr tablet TAKE 1 TABLET DAILY   • Lancets (freestyle) lancets Use once daily   • Lantus SoloStar 100 UNIT/ML injection pen INJECT 15 UNITS UNDER THE SKIN ONCE DAILY AS DIRECTED   • loratadine (CLARITIN) 10 MG tablet TAKE 1 TABLET DAILY   • metFORMIN (Glucophage) 500 MG tablet Take 1 tablet by mouth Every Night.   • metoprolol tartrate (LOPRESSOR) 25 MG tablet TAKE ONE-HALF (1/2) TABLET TWICE A DAY   • multivitamin with minerals (Centrum Silver Adult 50+) tablet tablet Take 1 tablet by mouth Daily.   • nitroglycerin (Nitrostat) 0.4 MG SL tablet Place 1 tablet under the tongue Every 5 (Five) Minutes As Needed for Chest Pain.   • NON FORMULARY CPAP per mask with 4L oxygen nightly   • O2 (OXYGEN) Inhale 4 L/min Continuous. Per nasal cannula   • pantoprazole (PROTONIX) 40 MG EC tablet Take 1 tablet by mouth Daily.   • potassium chloride 10 MEQ CR tablet Take 1 tablet by mouth Daily.   • pravastatin (Pravachol) 80 MG tablet Take 1 tablet by mouth Daily.   • ProAir  (90 Base) MCG/ACT inhaler USE 2 INHALATIONS FOUR TIMES A DAY   • Stiolto Respimat 2.5-2.5 MCG/ACT aerosol solution inhaler Inhale 2 puffs Every Night.   • terazosin (HYTRIN) 10 MG capsule TAKE 1 CAPSULE DAILY   • TRUEplus 5-Bevel Pen Needles 32G X 4 MM misc See Admin Instructions.   •  [DISCONTINUED] predniSONE (DELTASONE) 10 MG tablet Take 20 mg by mouth Daily.     No current facility-administered medications on file prior to visit.      Past Medical, Surgical, Social, and Family History:  Past Medical History:   Diagnosis Date   • A-fib (Formerly [facility])    • AAA (abdominal aortic aneurysm) without rupture    • Arthritis    • BPH (benign prostatic hypertrophy)    • Cataract     bialteral   • CKD (chronic kidney disease)    • COPD (chronic obstructive pulmonary disease) (Formerly [facility])    • Coronary artery disease involving native coronary artery of native heart without angina pectoris 1/20/2017    CABG/Az/[place]/1981 No TCC echo  7.16.18 Right ventricular cavity is mild-to-moderately dilated. Left ventricular diastolic dysfunction (grade I) consistent with impaired relaxation. Left ventricular systolic function is normal. Left atrial cavity size is borderline dilated. RIGHT HEART ENLARGEMENT - RVSP NOT ASSESSABLE NORMAL LV AND RV SYSTOLIC FUNCTION NO SIGNIFICANT VALVULAR DYSFUNCTION  Seein   • Diabetes mellitus (Formerly [facility])     Type 2   • DM (diabetes mellitus screen)    • GERD (gastroesophageal reflux disease)    • History of loop recorder     at current time   • Hx of colonic polyp    • Hypercholesteremia    • Hypertension    • Macular degeneration     treated with injections in right eye   • Myocardial infarction ([facility])    • Neuropathy    • Oxygen deficit     at 4liters   • Prostate cancer ([facility]ly [facility])    • Pulmonary hypertension ([facility]) 1/7/2022   • S/P CABG x 3 1/7/2022 1980 [facility]   • Sleep apnea     cpap   • Stage 3a chronic kidney disease ([facility]) 1/20/2017   • Stroke (Formerly [facility])     recovererd   • Varicose vein of leg     bialteral     Past Surgical History:   Procedure Laterality Date   • APPENDECTOMY     • CARDIAC CATHETERIZATION     • CARDIAC CATHETERIZATION Left 5/22/2019    Procedure: Cardiac Catheterization/Vascular Study Positive occult blood in stools  ? BMS vs REGGIE Get cabg report  ;  Surgeon: Bradley Carver MD;   Location:  [facility] CATH INVASIVE LOCATION;  Service: Cardiology   • COLONOSCOPY N/A 6/15/2017    Diverticulosis repeat exam prn   • COLONOSCOPY W/ POLYPECTOMY  10/21/2013    2 Tubular adenomatous polyps, Diverticulosis repeat exam in 5 years   • CORONARY ARTERY BYPASS GRAFT  1980    X 3   • CYSTOSCOPY RETROGRADE PYELOGRAM Bilateral 2021    Procedure: CYSTOSCOPY RETROGRADE PYELOGRAM;  Surgeon: Danie Cadena MD;  Location:  [facility] OR;  Service: Urology;  Laterality: Bilateral;   • ENDOSCOPY N/A 6/15/2017    LA Grade A reflux esophagitis   • EYE SURGERY Bilateral    • HERNIA REPAIR     • TRANSURETHRAL RESECTION OF BLADDER TUMOR N/A 2021    Procedure: CYSTOSCOPY TRANSURETHRAL RESECTION OF BLADDER TUMOR WITH GEMCITABINE INSTILLATION;  Surgeon: Danie Cadena MD;  Location:  [facility] OR;  Service: Urology;  Laterality: N/A;     Social History     Socioeconomic History   • Marital status:    Tobacco Use   • Smoking status: Former     Packs/day: 1.00     Years: 26.00     Pack years: 26.00     Types: Cigarettes     Start date:      Quit date:      Years since quittin.8   • Smokeless tobacco: Never   Vaping Use   • Vaping Use: Never used   Substance and Sexual Activity   • Alcohol use: No   • Drug use: No   • Sexual activity: Defer     Family History   Problem Relation Age of Onset   • Heart disease Mother    • Stroke Mother    • Melanoma Mother    • Heart disease Father    • Diabetes Father    • Prostate cancer Father    • Colon cancer Neg Hx    • Colon polyps Neg Hx        Prior Visit Notes/Records, Lab, Imaging, and Diagnostic Results Reviewed:  A1C:  Lab Results - Last 18 Months   Lab Units 22  0715 22  0658 21  0731   HEMOGLOBIN A1C % 6.00* 6.30* 6.20*     GLUCOSE:  Lab Results - Last 18 Months   Lab Units 10/11/22  0849 22  1010 22  0715 22  0405 22  0658 21  0731   GLUCOSE mg/dL 114* 108* 77 112* 73 90     LIPID:  Lab Results - Last 18  Months   Lab Units 01/05/22  0658   CHOLESTEROL mg/dL 150   LDL CHOL mg/dL 94   HDL CHOL mg/dL 37*   TRIGLYCERIDES mg/dL 103     PSA:  Lab Results - Last 18 Months   Lab Units 01/05/22  0658   PSA ng/mL 0.983     CBC:  Lab Results - Last 18 Months   Lab Units 10/11/22  0849 08/08/22  1010 07/06/22  0715 02/20/22  0405 01/05/22  0658 07/06/21  0731   WBC 10*3/mm3 9.59 11.28* 7.20 6.89 7.98 6.61   HEMOGLOBIN g/dL 11.6* 10.9* 11.5* 11.6* 12.6* 12.2*   HEMATOCRIT % 36.3* 33.9* 34.5* 37.3* 38.4 38.1   PLATELETS 10*3/mm3 239 284 231 213 220 212   IRON mcg/dL 65 64 112  --  60 65      BMP/CMP:  Lab Results - Last 18 Months   Lab Units 10/11/22  0849 08/08/22  1010 07/21/22  0750 07/06/22  0715 02/20/22  0405 01/05/22  0658 07/06/21  0731   SODIUM mmol/L 143 142  --  140 143 145 145   POTASSIUM mmol/L 5.0 4.3  --  4.7 4.3 4.4 4.6   CHLORIDE mmol/L 106 104  --  102 105 106 107   TOTAL CO2 mmol/L  --  29.5*  --  29.7*  --  32.7* 28.6   CO2 mmol/L 28.0  --   --   --  29.0  --   --    GLUCOSE mg/dL 114* 108*  --  77 112* 73 90   BUN mg/dL 22 21  --  22 24* 27* 23   CREATININE mg/dL 1.22 0.95 1.10 1.26 1.14 1.28* 1.15   EGFR IF NONAFRICN AM mL/min/1.73  --   --   --   --  61 53* 60*   EGFR IF AFRICN AM mL/min/1.73  --   --   --   --   --  64 73   EGFR RESULT mL/min/1.73  --  77.0  --  54.9*  --   --   --    CALCIUM mg/dL 9.8 9.5  --  9.7 9.1 9.9 9.7     HEPATIC:  Lab Results - Last 18 Months   Lab Units 02/20/22  0405 01/05/22  0658   ALT (SGPT) U/L 14 11   AST (SGOT) U/L 15 14   ALK PHOS U/L 63 58     Vit D:  Lab Results - Last 18 Months   Lab Units 01/05/22  0658   VIT D 25 HYDROXY ng/ml 63.3     THYROID:  Lab Results - Last 18 Months   Lab Units 01/05/22  0658   TSH uIU/mL 2.620       Objective   Physical Exam  Constitutional:       General: He is not in acute distress.  Cardiovascular:      Rate and Rhythm: Normal rate and regular rhythm.      Pulses: Normal pulses.      Heart sounds: No murmur heard.    No friction rub. No  "gallop.   Pulmonary:      Effort: Pulmonary effort is normal. No respiratory distress.      Breath sounds: Wheezing present. No rhonchi.      Comments: Wheezes present in the right bronchial zone anteriorly.  Neurological:      Mental Status: He is alert.     /76   Pulse 78   Resp 14   Ht 181.6 cm (71.5\")   Wt 95.7 kg (211 lb)   SpO2 91%   BMI 29.02 kg/m²     Assessment & Plan   Diagnoses and all orders for this visit:    1. COPD exacerbation (HCC) (Primary)    2. Suspected COVID-19 virus infection  -     POCT VERITOR SARS-CoV-2 Antigen    Other orders  -     cefdinir (OMNICEF) 300 MG capsule; Take 1 capsule by mouth 2 (Two) Times a Day for 10 days.  Dispense: 20 capsule; Refill: 0  -     predniSONE (DELTASONE) 10 MG tablet; Take 1 tablet by mouth 2 (Two) Times a Day for 7 days.  Dispense: 14 tablet; Refill: 0    Discussion:  Advised and educated plan of care.  A COVID-19 test was administered and it was negative. Orders for Omnicef and prednisone were sent to [facility]'s pharmacy. The patient will return to the clinic on an as-needed basis.     Follow-up:  Return if symptoms worsen or fail to improve.    Transcribed from ambient dictation for DOMINIQUE Resendiz by Lali Pugh.  10/17/22   14:53 CDT            "

## 2022-10-26 ENCOUNTER — HOSPITAL ENCOUNTER (OUTPATIENT)
Dept: CARDIOLOGY | Facility: HOSPITAL | Age: 87
Discharge: HOME | End: 2022-10-26
Admitting: INTERNAL MEDICINE

## 2022-10-26 VITALS
WEIGHT: 211 LBS | DIASTOLIC BLOOD PRESSURE: 59 MMHG | BODY MASS INDEX: 29.54 KG/M2 | SYSTOLIC BLOOD PRESSURE: 127 MMHG | HEIGHT: 71 IN

## 2022-10-26 DIAGNOSIS — R06.09 DOE (DYSPNEA ON EXERTION): ICD-10-CM

## 2022-10-26 PROCEDURE — 93306 TTE W/DOPPLER COMPLETE: CPT | Performed by: INTERNAL MEDICINE

## 2022-10-26 PROCEDURE — 93356 MYOCRD STRAIN IMG SPCKL TRCK: CPT | Performed by: INTERNAL MEDICINE

## 2022-10-26 PROCEDURE — 93356 MYOCRD STRAIN IMG SPCKL TRCK: CPT

## 2022-10-26 PROCEDURE — 93306 TTE W/DOPPLER COMPLETE: CPT

## 2022-10-27 LAB
BH CV ECHO LEFT VENTRICLE GLOBAL LONGITUDINAL STRAIN: -11 %
BH CV ECHO MEAS - AO MAX PG: 3.9 MMHG
BH CV ECHO MEAS - AO MEAN PG: 2 MMHG
BH CV ECHO MEAS - AO ROOT DIAM: 3.8 CM
BH CV ECHO MEAS - AO V2 MAX: 99.1 CM/SEC
BH CV ECHO MEAS - AO V2 VTI: 20.9 CM
BH CV ECHO MEAS - AVA(I,D): 4.1 CM2
BH CV ECHO MEAS - EDV(CUBED): 49.4 ML
BH CV ECHO MEAS - EDV(MOD-SP4): 63.9 ML
BH CV ECHO MEAS - EF(MOD-SP4): 52 %
BH CV ECHO MEAS - ESV(CUBED): 16 ML
BH CV ECHO MEAS - ESV(MOD-SP4): 30.7 ML
BH CV ECHO MEAS - FS: 31.3 %
BH CV ECHO MEAS - IVS/LVPW: 1.31 CM
BH CV ECHO MEAS - IVSD: 1.11 CM
BH CV ECHO MEAS - LA DIMENSION: 4.6 CM
BH CV ECHO MEAS - LAT PEAK E' VEL: 6.7 CM/SEC
BH CV ECHO MEAS - LV DIASTOLIC VOL/BSA (35-75): 29.6 CM2
BH CV ECHO MEAS - LV MASS(C)D: 107.6 GRAMS
BH CV ECHO MEAS - LV MAX PG: 4 MMHG
BH CV ECHO MEAS - LV MEAN PG: 2 MMHG
BH CV ECHO MEAS - LV SYSTOLIC VOL/BSA (12-30): 14.2 CM2
BH CV ECHO MEAS - LV V1 MAX: 99.8 CM/SEC
BH CV ECHO MEAS - LV V1 VTI: 24.9 CM
BH CV ECHO MEAS - LVIDD: 3.7 CM
BH CV ECHO MEAS - LVIDS: 2.5 CM
BH CV ECHO MEAS - LVOT AREA: 3.5 CM2
BH CV ECHO MEAS - LVOT DIAM: 2.1 CM
BH CV ECHO MEAS - LVPWD: 0.85 CM
BH CV ECHO MEAS - MED PEAK E' VEL: 3.2 CM/SEC
BH CV ECHO MEAS - MV A MAX VEL: 75.7 CM/SEC
BH CV ECHO MEAS - MV DEC TIME: 0.63 MSEC
BH CV ECHO MEAS - MV E MAX VEL: 34.9 CM/SEC
BH CV ECHO MEAS - MV E/A: 0.46
BH CV ECHO MEAS - PA V2 MAX: 53.4 CM/SEC
BH CV ECHO MEAS - RAP SYSTOLE: 5 MMHG
BH CV ECHO MEAS - RVSP: 17.5 MMHG
BH CV ECHO MEAS - SI(MOD-SP4): 15.4 ML/M2
BH CV ECHO MEAS - SV(LVOT): 86.2 ML
BH CV ECHO MEAS - SV(MOD-SP4): 33.2 ML
BH CV ECHO MEAS - TR MAX PG: 12.5 MMHG
BH CV ECHO MEAS - TR MAX VEL: 177 CM/SEC
BH CV ECHO MEASUREMENTS AVERAGE E/E' RATIO: 7.05
LEFT ATRIUM VOLUME INDEX: 24.6 ML/M2
LEFT ATRIUM VOLUME: 53.1 ML
MAXIMAL PREDICTED HEART RATE: 132 BPM
STRESS TARGET HR: 112 BPM

## 2022-10-28 NOTE — PROGRESS NOTES
Ordered by Dr. Carver - forward to Dr. Romero to review.    Electronically signed by DOMINIQUE Resendiz, 10/28/22, 8:17 AM CDT.

## 2022-11-15 ENCOUNTER — OFFICE VISIT (OUTPATIENT)
Dept: FAMILY MEDICINE CLINIC | Facility: CLINIC | Age: 87
End: 2022-11-15

## 2022-11-15 VITALS
OXYGEN SATURATION: 95 % | WEIGHT: 211 LBS | HEART RATE: 88 BPM | SYSTOLIC BLOOD PRESSURE: 132 MMHG | BODY MASS INDEX: 28.58 KG/M2 | HEIGHT: 72 IN | DIASTOLIC BLOOD PRESSURE: 84 MMHG | RESPIRATION RATE: 14 BRPM

## 2022-11-15 DIAGNOSIS — J44.1 COPD EXACERBATION: Primary | ICD-10-CM

## 2022-11-15 PROCEDURE — 99213 OFFICE O/P EST LOW 20 MIN: CPT | Performed by: NURSE PRACTITIONER

## 2022-11-15 RX ORDER — PNV NO.95/FERROUS FUM/FOLIC AC 28MG-0.8MG
1 TABLET ORAL
COMMUNITY

## 2022-11-15 RX ORDER — PREDNISONE 10 MG/1
TABLET ORAL
Qty: 30 TABLET | Refills: 0 | Status: SHIPPED | OUTPATIENT
Start: 2022-11-15 | End: 2022-11-27

## 2022-11-15 RX ORDER — BUDESONIDE, GLYCOPYRROLATE, AND FORMOTEROL FUMARATE 160; 9; 4.8 UG/1; UG/1; UG/1
2 AEROSOL, METERED RESPIRATORY (INHALATION) 2 TIMES DAILY
Qty: 10.7 G | Refills: 5 | Status: SHIPPED | OUTPATIENT
Start: 2022-11-15 | End: 2023-01-02 | Stop reason: SDUPTHER

## 2022-11-15 NOTE — PROGRESS NOTES
Subjective   Chief Complaint:  Re-evaluation of COPD.    History of Present Illness:  This 88 y.o. male was seen in the office today. The patient completed an antibiotic steroid approximately 1 month ago for COPD exacerbation. He reports he improved, but then he acutely worsened over the past 1 week with difficulty breathing. He has been having to use Duoneb obtained from a family member. The patient continues to use Stiolto. He reports increased snoring and uses his CPAP at night.     Allergies   Allergen Reactions   • Symbicort [Budesonide-Formoterol Fumarate] Dizziness     Patient passed out and fell   • Prozac [Fluoxetine Hcl] Mental Status Change     Bad dreams.      Current Outpatient Medications on File Prior to Visit   Medication Sig   • acetaminophen (TYLENOL) 325 MG tablet Take 2 tablets by mouth 2 (Two) Times a Day.   • Artificial Tear Solution (Just Tears Eye Drops) solution Apply 1-2 drops to eye(s) as directed by provider Daily As Needed (dry eyes).   • ascorbic acid (VITAMIN C) 1000 MG tablet Take 1 tablet by mouth Daily.   • aspirin 81 MG EC tablet Take 81 mg by mouth Daily.   • calcium carbonate-vitamin d 600-400 MG-UNIT per tablet Take 1 tablet by mouth 2 (Two) Times a Day.   • Eliquis 5 MG tablet tablet TAKE 1 TABLET TWICE A DAY   • ferrous sulfate 325 (65 Fe) MG tablet Take  by mouth.   • finasteride (PROSCAR) 5 MG tablet TAKE 1 TABLET DAILY   • gabapentin (NEURONTIN) 600 MG tablet Take 1 tablet by mouth 3 (Three) Times a Day.   • glucose blood test strip Use as instructed to check glucose daily and as needed for highs and lows.   • glyburide (DIAbeta) 5 MG tablet TAKE 1 TABLET EVERY MORNING AND ONE-HALF (1/2) TABLET BEFORE SUPPER   • guaiFENesin (MUCINEX) 600 MG 12 hr tablet Take 600 mg by mouth Daily.   • hydroCHLOROthiazide (MICROZIDE) 12.5 MG capsule Take 1 capsule by mouth Daily.   • HYDROcodone-acetaminophen (Norco) 5-325 MG per tablet Take 1 tablet by mouth Every 6 (Six) Hours As Needed  for Mild Pain  or Moderate Pain .   • hydrocortisone 1 % lotion Apply  topically to the appropriate area as directed 2 (Two) Times a Day.   • Insulin Lispro, 1 Unit Dial, (HUMALOG) 100 UNIT/ML solution pen-injector INJECT USING SLIDING SCALE PROVIDED BY HOSPITAL. MAX DAILY DOSE OF 30 UNITS   • isosorbide mononitrate (IMDUR) 30 MG 24 hr tablet TAKE 1 TABLET DAILY   • Lancets (freestyle) lancets Use once daily   • Lantus SoloStar 100 UNIT/ML injection pen INJECT 15 UNITS UNDER THE SKIN ONCE DAILY AS DIRECTED   • loratadine (CLARITIN) 10 MG tablet TAKE 1 TABLET DAILY   • metFORMIN (Glucophage) 500 MG tablet Take 1 tablet by mouth Every Night.   • metoprolol tartrate (LOPRESSOR) 25 MG tablet TAKE ONE-HALF (1/2) TABLET TWICE A DAY   • multivitamin with minerals (Centrum Silver Adult 50+) tablet tablet Take 1 tablet by mouth Daily.   • nitroglycerin (Nitrostat) 0.4 MG SL tablet Place 1 tablet under the tongue Every 5 (Five) Minutes As Needed for Chest Pain.   • NON FORMULARY CPAP per mask with 4L oxygen nightly   • O2 (OXYGEN) Inhale 4 L/min Continuous. Per nasal cannula   • pantoprazole (PROTONIX) 40 MG EC tablet Take 1 tablet by mouth Daily.   • potassium chloride 10 MEQ CR tablet Take 1 tablet by mouth Daily.   • pravastatin (Pravachol) 80 MG tablet Take 1 tablet by mouth Daily.   • ProAir  (90 Base) MCG/ACT inhaler USE 2 INHALATIONS FOUR TIMES A DAY   • Stiolto Respimat 2.5-2.5 MCG/ACT aerosol solution inhaler Inhale 2 puffs Every Night.   • terazosin (HYTRIN) 10 MG capsule TAKE 1 CAPSULE DAILY   • TRUEplus 5-Bevel Pen Needles 32G X 4 MM misc See Admin Instructions.     No current facility-administered medications on file prior to visit.      Past Medical, Surgical, Social, and Family History:  Past Medical History:   Diagnosis Date   • A-fib (HCC)    • AAA (abdominal aortic aneurysm) without rupture    • Arthritis    • BPH (benign prostatic hypertrophy)    • Cataract     bialteral   • CKD (chronic kidney  disease)    • COPD (chronic obstructive pulmonary disease) ([facility])    • Coronary artery disease involving native coronary artery of native heart without angina pectoris 1/20/2017    CABG/Az/[place]/1981 No TCC echo  7.16.18 Right ventricular cavity is mild-to-moderately dilated. Left ventricular diastolic dysfunction (grade I) consistent with impaired relaxation. Left ventricular systolic function is normal. Left atrial cavity size is borderline dilated. RIGHT HEART ENLARGEMENT - RVSP NOT ASSESSABLE NORMAL LV AND RV SYSTOLIC FUNCTION NO SIGNIFICANT VALVULAR DYSFUNCTION  Seein   • Diabetes mellitus (HCC)     Type 2   • DM (diabetes mellitus screen)    • GERD (gastroesophageal reflux disease)    • History of loop recorder     at current time   • Hx of colonic polyp    • Hypercholesteremia    • Hypertension    • Macular degeneration     treated with injections in right eye   • Myocardial infarction ([facility])    • Neuropathy    • Oxygen deficit     at 4liters   • Prostate cancer ([facility])    • Pulmonary hypertension (HCC) 1/7/2022   • S/P CABG x 3 1/7/2022 1980 [facility]   • Sleep apnea     cpap   • Stage 3a chronic kidney disease (HCC) 1/20/2017   • Stroke ([facility])     recovererd   • Varicose vein of leg     bialteral     Past Surgical History:   Procedure Laterality Date   • APPENDECTOMY     • CARDIAC CATHETERIZATION     • CARDIAC CATHETERIZATION Left 5/22/2019    Procedure: Cardiac Catheterization/Vascular Study Positive occult blood in stools  ? BMS vs REGGIE Get cabg report  ;  Surgeon: Bradley Carver MD;  Location: [facility] CATH INVASIVE LOCATION;  Service: Cardiology   • COLONOSCOPY N/A 6/15/2017    Diverticulosis repeat exam prn   • COLONOSCOPY W/ POLYPECTOMY  10/21/2013    2 Tubular adenomatous polyps, Diverticulosis repeat exam in 5 years   • CORONARY ARTERY BYPASS GRAFT  1980    X 3   • CYSTOSCOPY RETROGRADE PYELOGRAM Bilateral 2/8/2021    Procedure: CYSTOSCOPY RETROGRADE PYELOGRAM;  Surgeon: Danie Cadena MD;   Location:  [facility] OR;  Service: Urology;  Laterality: Bilateral;   • ENDOSCOPY N/A 6/15/2017    LA Grade A reflux esophagitis   • EYE SURGERY Bilateral    • HERNIA REPAIR     • TRANSURETHRAL RESECTION OF BLADDER TUMOR N/A 2021    Procedure: CYSTOSCOPY TRANSURETHRAL RESECTION OF BLADDER TUMOR WITH GEMCITABINE INSTILLATION;  Surgeon: Danie Cadena MD;  Location:  [facility] OR;  Service: Urology;  Laterality: N/A;     Social History     Socioeconomic History   • Marital status:    Tobacco Use   • Smoking status: Former     Packs/day: 1.00     Years: 26.00     Pack years: 26.00     Types: Cigarettes     Start date:      Quit date:      Years since quittin.9   • Smokeless tobacco: Never   Vaping Use   • Vaping Use: Never used   Substance and Sexual Activity   • Alcohol use: No   • Drug use: No   • Sexual activity: Defer     Family History   Problem Relation Age of Onset   • Heart disease Mother    • Stroke Mother    • Melanoma Mother    • Heart disease Father    • Diabetes Father    • Prostate cancer Father    • Colon cancer Neg Hx    • Colon polyps Neg Hx        Prior Visit Notes/Records, Lab, Imaging, and Diagnostic Results Reviewed:  A1C:  Lab Results - Last 18 Months   Lab Units 22  0715 22  0658 21  0731   HEMOGLOBIN A1C % 6.00* 6.30* 6.20*     GLUCOSE:  Lab Results - Last 18 Months   Lab Units 10/11/22  0849 22  1010 22  0715 22  0405 22  0658 21  0731   GLUCOSE mg/dL 114* 108* 77 112* 73 90     LIPID:  Lab Results - Last 18 Months   Lab Units 22  0658   CHOLESTEROL mg/dL 150   LDL CHOL mg/dL 94   HDL CHOL mg/dL 37*   TRIGLYCERIDES mg/dL 103     PSA:  Lab Results - Last 18 Months   Lab Units 22  0658   PSA ng/mL 0.983     CBC:  Lab Results - Last 18 Months   Lab Units 10/11/22  0849 22  1010 22  0715 22  0405 22  0658 21  0731   WBC 10*3/mm3 9.59 11.28* 7.20 6.89 7.98 6.61   HEMOGLOBIN g/dL 11.6* 10.9*  "11.5* 11.6* 12.6* 12.2*   HEMATOCRIT % 36.3* 33.9* 34.5* 37.3* 38.4 38.1   PLATELETS 10*3/mm3 239 284 231 213 220 212   IRON mcg/dL 65 64 112  --  60 65      BMP/CMP:  Lab Results - Last 18 Months   Lab Units 10/11/22  0849 08/08/22  1010 07/21/22  0750 07/06/22  0715 02/20/22  0405 01/05/22  0658 07/06/21  0731   SODIUM mmol/L 143 142  --  140 143 145 145   POTASSIUM mmol/L 5.0 4.3  --  4.7 4.3 4.4 4.6   CHLORIDE mmol/L 106 104  --  102 105 106 107   TOTAL CO2 mmol/L  --  29.5*  --  29.7*  --  32.7* 28.6   CO2 mmol/L 28.0  --   --   --  29.0  --   --    GLUCOSE mg/dL 114* 108*  --  77 112* 73 90   BUN mg/dL 22 21  --  22 24* 27* 23   CREATININE mg/dL 1.22 0.95 1.10 1.26 1.14 1.28* 1.15   EGFR IF NONAFRICN AM mL/min/1.73  --   --   --   --  61 53* 60*   EGFR IF AFRICN AM mL/min/1.73  --   --   --   --   --  64 73   EGFR RESULT mL/min/1.73  --  77.0  --  54.9*  --   --   --    CALCIUM mg/dL 9.8 9.5  --  9.7 9.1 9.9 9.7     HEPATIC:  Lab Results - Last 18 Months   Lab Units 02/20/22  0405 01/05/22  0658   ALT (SGPT) U/L 14 11   AST (SGOT) U/L 15 14   ALK PHOS U/L 63 58     Vit D:  Lab Results - Last 18 Months   Lab Units 01/05/22  0658   VIT D 25 HYDROXY ng/ml 63.3     THYROID:  Lab Results - Last 18 Months   Lab Units 01/05/22  0658   TSH uIU/mL 2.620       Objective   Physical Exam  Constitutional:       General: He is not in acute distress.  Cardiovascular:      Rate and Rhythm: Normal rate and regular rhythm.      Pulses: Normal pulses.      Heart sounds: No murmur heard.    No friction rub. No gallop.   Pulmonary:      Effort: Pulmonary effort is normal. No respiratory distress.      Breath sounds: Normal breath sounds. No rhonchi.      Comments: Bilateral respiratory wheezes. Supplemental oxygen use noted.  Neurological:      Mental Status: He is alert.     /84   Pulse 88   Resp 14   Ht 181.6 cm (71.5\")   Wt 95.7 kg (211 lb)   SpO2 95%   BMI 29.02 kg/m²     Assessment & Plan   Diagnoses and all " orders for this visit:    1. COPD exacerbation (HCC) (Primary)  -     XR Chest PA & Lateral; Future    Other orders  -     Budeson-Glycopyrrol-Formoterol (Breztri Aerosphere) 160-9-4.8 MCG/ACT aerosol inhaler; Inhale 2 puffs 2 (Two) Times a Day.  Dispense: 10.7 g; Refill: 5  -     predniSONE (DELTASONE) 10 MG tablet; Take 4 tablets by mouth Daily for 3 days, THEN 3 tablets Daily for 3 days, THEN 2 tablets Daily for 3 days, THEN 1 tablet Daily for 3 days.  Dispense: 30 tablet; Refill: 0    Discussion:  Advised and educated plan of care. Stiolto will be discontinued today and he will begin using Breztri. Advised the patient to rinse his mouth out after each use. He does in fact have Symbicort on his allergy list which was discussed, and he reports symptoms of dizziness with use. He was advised to begin with 1 puff of Breztri twice daily for a few days then titrate to the full dosage. Advised him to discontinue Duoneb and use the rescue inhaler only as-needed. Chest x-rays are ordered as well.     Follow-up:  Return in about 1 month (around 12/15/2022) for Follow-Up.    Electronically signed by Ross Cameron, 11/15/22, 11:34 AM CST.

## 2022-11-16 DIAGNOSIS — J44.1 COPD EXACERBATION: ICD-10-CM

## 2022-11-25 PROCEDURE — 93298 REM INTERROG DEV EVAL SCRMS: CPT | Performed by: INTERNAL MEDICINE

## 2022-11-25 PROCEDURE — G2066 INTER DEVC REMOTE 30D: HCPCS | Performed by: INTERNAL MEDICINE

## 2022-12-12 ENCOUNTER — PROCEDURE VISIT (OUTPATIENT)
Dept: UROLOGY | Facility: CLINIC | Age: 87
End: 2022-12-12

## 2022-12-12 DIAGNOSIS — Z85.51 HISTORY OF BLADDER CANCER: Primary | ICD-10-CM

## 2022-12-12 PROCEDURE — 81001 URINALYSIS AUTO W/SCOPE: CPT | Performed by: UROLOGY

## 2022-12-12 PROCEDURE — 52000 CYSTOURETHROSCOPY: CPT | Performed by: UROLOGY

## 2022-12-12 NOTE — PROGRESS NOTES
Pre- operative diagnosis:  Bladder cancer surveillance. 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  He remains on finasteride and Hytrin.  Cystoscopy last visit June 2022 demonstrated small papillary tumors present on his intravesical prostate lobe.  He denies hematuria or bothersome LUTS.    Post operative diagnosis:  Bladder Tumor    Procedure:  The patient was prepped and draped in a normal sterile fashion.  The urethra was anesthetized with 2% lidocaine jelly.  A flexible cystoscope was introduced per urethra.      Urethra:  Normal    Bladder:  Normal mucosa and mild trabeculation.  There are small papillary bladder tumors present on his intravesical prostate lobe urothelium.  These appear to be stable from 6 months ago    Ureteral orifices:  Normal position bilaterally and Clear efflux bilaterally    Prostate:  lateral lobe hypertrophy-with large intravesical lobe    Patient tolerated the procedure well    Complications: none    Blood loss: minimal    Follow up:    Routine follow up-surveillance cystoscopy in 6 months.  Given  his advanced age and oxygen requirement along with the very small size and stability of his papillary bladder tumors on his intravesical lobe, patient would like to observe for now.  He will follow-up with me for surveillance cystoscopy in 6-month or sooner as needed.      This document has been signed by KY Cadena MD on December 12, 2022 15:53 CST

## 2022-12-15 ENCOUNTER — OFFICE VISIT (OUTPATIENT)
Dept: FAMILY MEDICINE CLINIC | Facility: CLINIC | Age: 87
End: 2022-12-15

## 2022-12-15 VITALS
HEART RATE: 66 BPM | WEIGHT: 202 LBS | SYSTOLIC BLOOD PRESSURE: 112 MMHG | BODY MASS INDEX: 27.36 KG/M2 | HEIGHT: 72 IN | OXYGEN SATURATION: 91 % | TEMPERATURE: 97.5 F | DIASTOLIC BLOOD PRESSURE: 60 MMHG | RESPIRATION RATE: 18 BRPM

## 2022-12-15 DIAGNOSIS — Z99.81 OXYGEN DEPENDENT: ICD-10-CM

## 2022-12-15 DIAGNOSIS — J42 CHRONIC BRONCHITIS, UNSPECIFIED CHRONIC BRONCHITIS TYPE: Primary | ICD-10-CM

## 2022-12-15 PROCEDURE — 99213 OFFICE O/P EST LOW 20 MIN: CPT | Performed by: NURSE PRACTITIONER

## 2022-12-15 NOTE — PROGRESS NOTES
Subjective   Chief Complaint:  The patient presents today for a reevaluation of his breathing after starting the Breztri inhaler and steroids.    History of Present Illness:  This 88 y.o. male was seen in the office today. The patient states he cannot tell a difference with the Breztri inhaler and inquires if he is supposed to take 2 puffs twice daily. He reports he has a pulmonologist at [facility], which he is supposed to see yearly. He is unsure when his next appointment is, but notes he sees Dr. Turk. The patient states he is still having some endurance issues. He reports every morning when he wakes up, he coughs up a lot of yellow phlegm. Later, the color of his mucus clears up some and becomes a lighter color. The patient states he is using a cpap at night and is unsure if this may be the cause. He reports he is on 4 liters of oxygen and a CPAP at night. Additionally, he reports is on cortisone as well. The patient states he does not have any energy. He reports he has an appointment with his cardiologist in 01/2023.    Allergies   Allergen Reactions   • Symbicort [Budesonide-Formoterol Fumarate] Dizziness     Patient passed out and fell   • Prozac [Fluoxetine Hcl] Mental Status Change     Bad dreams.      Current Outpatient Medications on File Prior to Visit   Medication Sig   • acetaminophen (TYLENOL) 325 MG tablet Take 2 tablets by mouth 2 (Two) Times a Day.   • Artificial Tear Solution (Just Tears Eye Drops) solution Apply 1-2 drops to eye(s) as directed by provider Daily As Needed (dry eyes).   • ascorbic acid (VITAMIN C) 1000 MG tablet Take 1 tablet by mouth Daily.   • aspirin 81 MG EC tablet Take 81 mg by mouth Daily.   • Budeson-Glycopyrrol-Formoterol (Breztri Aerosphere) 160-9-4.8 MCG/ACT aerosol inhaler Inhale 2 puffs 2 (Two) Times a Day.   • calcium carbonate-vitamin d 600-400 MG-UNIT per tablet Take 1 tablet by mouth 2 (Two) Times a Day.   • Eliquis 5 MG tablet tablet TAKE 1 TABLET  TWICE A DAY   • ferrous sulfate 325 (65 Fe) MG tablet Take  by mouth.   • finasteride (PROSCAR) 5 MG tablet TAKE 1 TABLET DAILY   • gabapentin (NEURONTIN) 600 MG tablet Take 1 tablet by mouth 3 (Three) Times a Day.   • glucose blood test strip Use as instructed to check glucose daily and as needed for highs and lows.   • glyburide (DIAbeta) 5 MG tablet TAKE 1 TABLET EVERY MORNING AND ONE-HALF (1/2) TABLET BEFORE SUPPER   • guaiFENesin (MUCINEX) 600 MG 12 hr tablet Take 600 mg by mouth Daily.   • hydroCHLOROthiazide (MICROZIDE) 12.5 MG capsule Take 1 capsule by mouth Daily.   • HYDROcodone-acetaminophen (Norco) 5-325 MG per tablet Take 1 tablet by mouth Every 6 (Six) Hours As Needed for Mild Pain  or Moderate Pain .   • hydrocortisone 1 % lotion Apply  topically to the appropriate area as directed 2 (Two) Times a Day.   • Insulin Lispro, 1 Unit Dial, (HUMALOG) 100 UNIT/ML solution pen-injector INJECT USING SLIDING SCALE PROVIDED BY HOSPITAL. MAX DAILY DOSE OF 30 UNITS   • isosorbide mononitrate (IMDUR) 30 MG 24 hr tablet TAKE 1 TABLET DAILY   • Lancets (freestyle) lancets Use once daily   • Lantus SoloStar 100 UNIT/ML injection pen INJECT 15 UNITS UNDER THE SKIN ONCE DAILY AS DIRECTED   • loratadine (CLARITIN) 10 MG tablet TAKE 1 TABLET DAILY   • metFORMIN (Glucophage) 500 MG tablet Take 1 tablet by mouth Every Night.   • metoprolol tartrate (LOPRESSOR) 25 MG tablet TAKE ONE-HALF (1/2) TABLET TWICE A DAY   • multivitamin with minerals (Centrum Silver Adult 50+) tablet tablet Take 1 tablet by mouth Daily.   • nitroglycerin (Nitrostat) 0.4 MG SL tablet Place 1 tablet under the tongue Every 5 (Five) Minutes As Needed for Chest Pain.   • NON FORMULARY CPAP per mask with 4L oxygen nightly   • O2 (OXYGEN) Inhale 4 L/min Continuous. Per nasal cannula   • pantoprazole (PROTONIX) 40 MG EC tablet Take 1 tablet by mouth Daily.   • potassium chloride 10 MEQ CR tablet Take 1 tablet by mouth Daily.   • pravastatin (Pravachol) 80  MG tablet Take 1 tablet by mouth Daily.   • ProAir  (90 Base) MCG/ACT inhaler USE 2 INHALATIONS FOUR TIMES A DAY   • Stiolto Respimat 2.5-2.5 MCG/ACT aerosol solution inhaler Inhale 2 puffs Every Night.   • terazosin (HYTRIN) 10 MG capsule TAKE 1 CAPSULE DAILY   • TRUEplus 5-Bevel Pen Needles 32G X 4 MM misc See Admin Instructions.     No current facility-administered medications on file prior to visit.      Past Medical, Surgical, Social, and Family History:  Past Medical History:   Diagnosis Date   • A-fib ([facility])    • AAA (abdominal aortic aneurysm) without rupture    • Arthritis    • BPH (benign prostatic hypertrophy)    • Cataract     bialteral   • CKD (chronic kidney disease)    • COPD (chronic obstructive pulmonary disease) ([facility])    • Coronary artery disease involving native coronary artery of native heart without angina pectoris 1/20/2017    [facility]  7.16.18 Right ventricular cavity is mild-to-moderately dilated. Left ventricular diastolic dysfunction (grade I) consistent with impaired relaxation. Left ventricular systolic function is normal. Left atrial cavity size is borderline dilated. RIGHT HEART ENLARGEMENT - RVSP NOT ASSESSABLE NORMAL LV AND RV SYSTOLIC FUNCTION NO SIGNIFICANT VALVULAR DYSFUNCTION  Seein   • Diabetes mellitus ([facility])     Type 2   • DM (diabetes mellitus screen)    • GERD (gastroesophageal reflux disease)    • History of loop recorder     at current time   • Hx of colonic polyp    • Hypercholesteremia    • Hypertension    • Macular degeneration     treated with injections in right eye   • Myocardial infarction ([facility])    • Neuropathy    • Oxygen deficit     at 4liters   • Prostate cancer ([facility])    • Pulmonary hypertension ([facility]) 1/7/2022   • 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 BMS vs REGGIE Get cabg report  ;  Surgeon: Bradley Carver MD;  Location:  [facility] CATH INVASIVE LOCATION;  Service: Cardiology   • COLONOSCOPY N/A 6/15/2017    Diverticulosis repeat exam prn   • COLONOSCOPY W/ POLYPECTOMY  10/21/2013    2 Tubular adenomatous polyps, Diverticulosis repeat exam in 5 years   • CORONARY ARTERY BYPASS GRAFT  1980    X 3   • CYSTOSCOPY RETROGRADE PYELOGRAM Bilateral 2021    Procedure: CYSTOSCOPY RETROGRADE PYELOGRAM;  Surgeon: Danie Cadena MD;  Location:  [facility] OR;  Service: Urology;  Laterality: Bilateral;   • ENDOSCOPY N/A 6/15/2017    LA Grade A reflux esophagitis   • EYE SURGERY Bilateral    • HERNIA REPAIR     • TRANSURETHRAL RESECTION OF BLADDER TUMOR N/A 2021    Procedure: CYSTOSCOPY TRANSURETHRAL RESECTION OF BLADDER TUMOR WITH GEMCITABINE INSTILLATION;  Surgeon: Danie Cadena MD;  Location:  [facility] OR;  Service: Urology;  Laterality: N/A;     Social History     Socioeconomic History   • Marital status:    Tobacco Use   • Smoking status: Former     Packs/day: 1.00     Years: 26.00     Pack years: 26.00     Types: Cigarettes     Start date:      Quit date:      Years since quittin.9   • Smokeless tobacco: Never   Vaping Use   • Vaping Use: Never used   Substance and Sexual Activity   • Alcohol use: No   • Drug use: No   • Sexual activity: Defer     Family History   Problem Relation Age of Onset   • Heart disease Mother    • Stroke Mother    • Melanoma Mother    • Heart disease Father    • Diabetes Father    • Prostate cancer Father    • Colon cancer Neg Hx    • Colon polyps Neg Hx        Prior Visit Notes/Records, Lab, Imaging, and Diagnostic Results Reviewed:  A1C:  Lab Results - Last 18 Months   Lab Units 22  0715 22  0658  07/06/21  0731   HEMOGLOBIN A1C % 6.00* 6.30* 6.20*     GLUCOSE:  Lab Results - Last 18 Months   Lab Units 10/11/22  0849 08/08/22  1010 07/06/22  0715 02/20/22  0405 01/05/22  0658 07/06/21  0731   GLUCOSE mg/dL 114* 108* 77 112* 73 90     LIPID:  Lab Results - Last 18 Months   Lab Units 01/05/22  0658   CHOLESTEROL mg/dL 150   LDL CHOL mg/dL 94   HDL CHOL mg/dL 37*   TRIGLYCERIDES mg/dL 103     PSA:  Lab Results - Last 18 Months   Lab Units 01/05/22  0658   PSA ng/mL 0.983     CBC:  Lab Results - Last 18 Months   Lab Units 10/11/22  0849 08/08/22  1010 07/06/22  0715 02/20/22  0405 01/05/22  0658 07/06/21  0731   WBC 10*3/mm3 9.59 11.28* 7.20 6.89 7.98 6.61   HEMOGLOBIN g/dL 11.6* 10.9* 11.5* 11.6* 12.6* 12.2*   HEMATOCRIT % 36.3* 33.9* 34.5* 37.3* 38.4 38.1   PLATELETS 10*3/mm3 239 284 231 213 220 212   IRON mcg/dL 65 64 112  --  60 65      BMP/CMP:  Lab Results - Last 18 Months   Lab Units 10/11/22  0849 08/08/22  1010 07/21/22  0750 07/06/22  0715 02/20/22  0405 01/05/22  0658 07/06/21  0731   SODIUM mmol/L 143 142  --  140 143 145 145   POTASSIUM mmol/L 5.0 4.3  --  4.7 4.3 4.4 4.6   CHLORIDE mmol/L 106 104  --  102 105 106 107   TOTAL CO2 mmol/L  --  29.5*  --  29.7*  --  32.7* 28.6   CO2 mmol/L 28.0  --   --   --  29.0  --   --    GLUCOSE mg/dL 114* 108*  --  77 112* 73 90   BUN mg/dL 22 21  --  22 24* 27* 23   CREATININE mg/dL 1.22 0.95 1.10 1.26 1.14 1.28* 1.15   EGFR IF NONAFRICN AM mL/min/1.73  --   --   --   --  61 53* 60*   EGFR IF AFRICN AM mL/min/1.73  --   --   --   --   --  64 73   EGFR RESULT mL/min/1.73  --  77.0  --  54.9*  --   --   --    CALCIUM mg/dL 9.8 9.5  --  9.7 9.1 9.9 9.7     HEPATIC:  Lab Results - Last 18 Months   Lab Units 02/20/22  0405 01/05/22 0658   ALT (SGPT) U/L 14 11   AST (SGOT) U/L 15 14   ALK PHOS U/L 63 58     Vit D:  Lab Results - Last 18 Months   Lab Units 01/05/22 0658   VIT D 25 HYDROXY ng/ml 63.3     THYROID:  Lab Results - Last 18 Months   Lab Units 01/05/22 0658  "  TSH uIU/mL 2.620       Objective   Physical Exam  Constitutional:       General: He is not in acute distress.  Cardiovascular:      Rate and Rhythm: Normal rate and regular rhythm.      Pulses: Normal pulses.      Heart sounds: No murmur heard.    No friction rub. No gallop.   Pulmonary:      Effort: Pulmonary effort is normal. No respiratory distress.      Breath sounds: Normal breath sounds. No wheezing or rhonchi.      Comments: Supplemental oxygen at 4 L in use. He is in no acute distress.  Neurological:      Mental Status: He is alert.     /60 (BP Location: Left arm, Patient Position: Sitting, Cuff Size: Adult)   Pulse 66   Temp 97.5 °F (36.4 °C) (Infrared)   Resp 18   Ht 181.6 cm (71.5\")   Wt 91.6 kg (202 lb)   SpO2 91% Comment: 4L o2  BMI 27.78 kg/m²     Assessment & Plan   Diagnoses and all orders for this visit:    1. Chronic bronchitis, unspecified chronic bronchitis type (HCC) (Primary)    2. Oxygen dependent    Discussion:  Advised and educated plan of care.  Advised same plan for now-advised to get back with his pulmonologist for follow-up.    Follow-up:  Return for Next scheduled follow up as already scheduled..    Electronically signed by Ross Cameron, 12/15/22, 11:24 AM CST.    "

## 2023-01-03 RX ORDER — BUDESONIDE, GLYCOPYRROLATE, AND FORMOTEROL FUMARATE 160; 9; 4.8 UG/1; UG/1; UG/1
2 AEROSOL, METERED RESPIRATORY (INHALATION) 2 TIMES DAILY
Qty: 10.7 G | Refills: 5 | Status: SHIPPED | OUTPATIENT
Start: 2023-01-03 | End: 2023-02-17 | Stop reason: HOSPADM

## 2023-01-03 NOTE — TELEPHONE ENCOUNTER
Rx Refill Note  Requested Prescriptions     Pending Prescriptions Disp Refills   • Budeson-Glycopyrrol-Formoterol (Breztri Aerosphere) 160-9-4.8 MCG/ACT aerosol inhaler 10.7 g 5     Sig: Inhale 2 puffs 2 (Two) Times a Day.      Last office visit with prescribing clinician: 12/15/2022      Next office visit with prescribing clinician: Visit date not found            Amando Valentine MA  01/03/23, 08:44 CST

## 2023-01-06 ENCOUNTER — TELEPHONE (OUTPATIENT)
Dept: CARDIOLOGY | Facility: CLINIC | Age: 88
End: 2023-01-06
Payer: MEDICARE

## 2023-01-06 NOTE — TELEPHONE ENCOUNTER
RN notified EC that device battery is EOS and no longer functioning.  Return kit for home monitor mailed to patient from [employer].  Patient will discuss plan moving forward with Dr. Carver at appointment on 1/31/23.

## 2023-01-06 NOTE — TELEPHONE ENCOUNTER
Caller: Nicole Osborn    Relationship: Emergency Contact    Best call back number: [phone]    What is the best time to reach you: ANYTIME    Who are you requesting to speak with (clinical staff, provider,  specific staff member): CLINICAL STAFF    Do you know the name of the person who called: NICOLEDANIELA OSBORN    What was the call regarding: PT'S EC CALLED IN. THE PT WAS TRYING TO SEND THE TRANSMISSION FOR HIS LOOP RECORDER ON THE MORNING OF 01.06.23. THE PT'S CAREGIVER CALLED THE SUPPORT LINE FOR THE MACHINE[employer]S COMPANY. THEY TOLD THE PT AND HIS CAREGIVER THAT THERE IS AN ERROR IN THE CONNECTION BETWEEN THE MACHINE AND THE OFFICE.    THE PT'S EC IS WANTING A CALL BACK WITH ADVICE ON WHAT TO DO ABOUT THIS.    Do you require a callback: YES

## 2023-01-16 ENCOUNTER — LAB (OUTPATIENT)
Dept: FAMILY MEDICINE CLINIC | Facility: CLINIC | Age: 88
End: 2023-01-16
Payer: MEDICARE

## 2023-01-16 DIAGNOSIS — E78.5 HYPERLIPIDEMIA LDL GOAL <70: Chronic | ICD-10-CM

## 2023-01-16 DIAGNOSIS — N13.8 BPH WITH OBSTRUCTION/LOWER URINARY TRACT SYMPTOMS: ICD-10-CM

## 2023-01-16 DIAGNOSIS — Z12.5 SCREENING FOR PROSTATE CANCER: ICD-10-CM

## 2023-01-16 DIAGNOSIS — I48.0 PAF (PAROXYSMAL ATRIAL FIBRILLATION): Primary | Chronic | ICD-10-CM

## 2023-01-16 DIAGNOSIS — E11.59 CONTROLLED TYPE 2 DIABETES MELLITUS WITH OTHER CIRCULATORY COMPLICATION, WITHOUT LONG-TERM CURRENT USE OF INSULIN: Chronic | ICD-10-CM

## 2023-01-16 DIAGNOSIS — E55.9 VITAMIN D DEFICIENCY: ICD-10-CM

## 2023-01-16 DIAGNOSIS — I10 PRIMARY HYPERTENSION: Chronic | ICD-10-CM

## 2023-01-16 DIAGNOSIS — K21.9 GASTROESOPHAGEAL REFLUX DISEASE, UNSPECIFIED WHETHER ESOPHAGITIS PRESENT: ICD-10-CM

## 2023-01-16 DIAGNOSIS — E66.9 OBESITY, UNSPECIFIED CLASSIFICATION, UNSPECIFIED OBESITY TYPE, UNSPECIFIED WHETHER SERIOUS COMORBIDITY PRESENT: Chronic | ICD-10-CM

## 2023-01-16 DIAGNOSIS — I25.10 CORONARY ARTERY DISEASE INVOLVING NATIVE CORONARY ARTERY OF NATIVE HEART WITHOUT ANGINA PECTORIS: Chronic | ICD-10-CM

## 2023-01-16 DIAGNOSIS — J44.9 STAGE 2 MODERATE COPD BY GOLD CLASSIFICATION: Chronic | ICD-10-CM

## 2023-01-16 DIAGNOSIS — N40.1 BPH WITH OBSTRUCTION/LOWER URINARY TRACT SYMPTOMS: ICD-10-CM

## 2023-01-17 LAB
25(OH)D3+25(OH)D2 SERPL-MCNC: 54.3 NG/ML (ref 30–100)
ALBUMIN SERPL-MCNC: 4.1 G/DL (ref 3.5–5.2)
ALBUMIN/GLOB SERPL: 1.8 G/DL
ALP SERPL-CCNC: 56 U/L (ref 39–117)
ALT SERPL-CCNC: 9 U/L (ref 1–41)
AST SERPL-CCNC: 13 U/L (ref 1–40)
BASOPHILS # BLD AUTO: 0.07 10*3/MM3 (ref 0–0.2)
BASOPHILS NFR BLD AUTO: 0.9 % (ref 0–1.5)
BILIRUB SERPL-MCNC: 0.3 MG/DL (ref 0–1.2)
BUN SERPL-MCNC: 21 MG/DL (ref 8–23)
BUN/CREAT SERPL: 17.5 (ref 7–25)
CALCIUM SERPL-MCNC: 9.7 MG/DL (ref 8.6–10.5)
CHLORIDE SERPL-SCNC: 101 MMOL/L (ref 98–107)
CHOLEST SERPL-MCNC: 176 MG/DL (ref 0–200)
CHOLEST/HDLC SERPL: 4.89 {RATIO}
CO2 SERPL-SCNC: 30.2 MMOL/L (ref 22–29)
CREAT SERPL-MCNC: 1.2 MG/DL (ref 0.76–1.27)
EGFRCR SERPLBLD CKD-EPI 2021: 58.2 ML/MIN/1.73
EOSINOPHIL # BLD AUTO: 0.62 10*3/MM3 (ref 0–0.4)
EOSINOPHIL NFR BLD AUTO: 7.6 % (ref 0.3–6.2)
ERYTHROCYTE [DISTWIDTH] IN BLOOD BY AUTOMATED COUNT: 13.9 % (ref 12.3–15.4)
FOLATE SERPL-MCNC: >20 NG/ML (ref 4.78–24.2)
GLOBULIN SER CALC-MCNC: 2.3 GM/DL
GLUCOSE SERPL-MCNC: 119 MG/DL (ref 65–99)
HBA1C MFR BLD: 6.7 % (ref 4.8–5.6)
HCT VFR BLD AUTO: 37.2 % (ref 37.5–51)
HDLC SERPL-MCNC: 36 MG/DL (ref 40–60)
HGB BLD-MCNC: 11.6 G/DL (ref 13–17.7)
IMM GRANULOCYTES # BLD AUTO: 0.02 10*3/MM3 (ref 0–0.05)
IMM GRANULOCYTES NFR BLD AUTO: 0.2 % (ref 0–0.5)
IRON SERPL-MCNC: 75 MCG/DL (ref 59–158)
LDLC SERPL CALC-MCNC: 114 MG/DL (ref 0–100)
LYMPHOCYTES # BLD AUTO: 1.98 10*3/MM3 (ref 0.7–3.1)
LYMPHOCYTES NFR BLD AUTO: 24.3 % (ref 19.6–45.3)
MCH RBC QN AUTO: 28 PG (ref 26.6–33)
MCHC RBC AUTO-ENTMCNC: 31.2 G/DL (ref 31.5–35.7)
MCV RBC AUTO: 89.6 FL (ref 79–97)
MONOCYTES # BLD AUTO: 0.61 10*3/MM3 (ref 0.1–0.9)
MONOCYTES NFR BLD AUTO: 7.5 % (ref 5–12)
NEUTROPHILS # BLD AUTO: 4.86 10*3/MM3 (ref 1.7–7)
NEUTROPHILS NFR BLD AUTO: 59.5 % (ref 42.7–76)
NRBC BLD AUTO-RTO: 0 /100 WBC (ref 0–0.2)
PLATELET # BLD AUTO: 226 10*3/MM3 (ref 140–450)
POTASSIUM SERPL-SCNC: 4.5 MMOL/L (ref 3.5–5.2)
PROT SERPL-MCNC: 6.4 G/DL (ref 6–8.5)
PSA SERPL-MCNC: 1.72 NG/ML (ref 0–4)
RBC # BLD AUTO: 4.15 10*6/MM3 (ref 4.14–5.8)
SODIUM SERPL-SCNC: 139 MMOL/L (ref 136–145)
TRIGL SERPL-MCNC: 145 MG/DL (ref 0–150)
TSH SERPL DL<=0.005 MIU/L-ACNC: 2.78 UIU/ML (ref 0.27–4.2)
VIT B12 SERPL-MCNC: 601 PG/ML (ref 211–946)
VLDLC SERPL CALC-MCNC: 26 MG/DL (ref 5–40)
WBC # BLD AUTO: 8.16 10*3/MM3 (ref 3.4–10.8)

## 2023-01-18 ENCOUNTER — OFFICE VISIT (OUTPATIENT)
Dept: FAMILY MEDICINE CLINIC | Facility: CLINIC | Age: 88
End: 2023-01-18
Payer: MEDICARE

## 2023-01-18 VITALS
HEART RATE: 70 BPM | SYSTOLIC BLOOD PRESSURE: 122 MMHG | HEIGHT: 72 IN | TEMPERATURE: 97.7 F | WEIGHT: 199 LBS | BODY MASS INDEX: 26.95 KG/M2 | DIASTOLIC BLOOD PRESSURE: 70 MMHG | OXYGEN SATURATION: 97 %

## 2023-01-18 DIAGNOSIS — I70.90 ATHEROSCLEROSIS: ICD-10-CM

## 2023-01-18 DIAGNOSIS — E11.59 CONTROLLED TYPE 2 DIABETES MELLITUS WITH OTHER CIRCULATORY COMPLICATION, WITHOUT LONG-TERM CURRENT USE OF INSULIN: Chronic | ICD-10-CM

## 2023-01-18 DIAGNOSIS — R93.89 ABNORMAL CT OF THE CHEST: ICD-10-CM

## 2023-01-18 DIAGNOSIS — Z79.01 CHRONIC ANTICOAGULATION: Chronic | ICD-10-CM

## 2023-01-18 DIAGNOSIS — I48.0 PAF (PAROXYSMAL ATRIAL FIBRILLATION): Chronic | ICD-10-CM

## 2023-01-18 DIAGNOSIS — R09.89 CHEST CONGESTION: ICD-10-CM

## 2023-01-18 DIAGNOSIS — Z99.81 OXYGEN DEPENDENT: ICD-10-CM

## 2023-01-18 DIAGNOSIS — E78.5 HYPERLIPIDEMIA LDL GOAL <70: Chronic | ICD-10-CM

## 2023-01-18 DIAGNOSIS — N18.31 STAGE 3A CHRONIC KIDNEY DISEASE: Chronic | ICD-10-CM

## 2023-01-18 DIAGNOSIS — J44.9 STAGE 2 MODERATE COPD BY GOLD CLASSIFICATION: Chronic | ICD-10-CM

## 2023-01-18 DIAGNOSIS — I10 PRIMARY HYPERTENSION: Chronic | ICD-10-CM

## 2023-01-18 DIAGNOSIS — R26.89 POOR BALANCE: ICD-10-CM

## 2023-01-18 DIAGNOSIS — F32.A DEPRESSION, UNSPECIFIED DEPRESSION TYPE: ICD-10-CM

## 2023-01-18 DIAGNOSIS — K21.9 GASTROESOPHAGEAL REFLUX DISEASE, UNSPECIFIED WHETHER ESOPHAGITIS PRESENT: ICD-10-CM

## 2023-01-18 PROCEDURE — 99214 OFFICE O/P EST MOD 30 MIN: CPT | Performed by: FAMILY MEDICINE

## 2023-01-18 NOTE — PROGRESS NOTES
Subjective   Gonzalo Durham is a 88 y.o. male presenting with chief complaint of:   Chief Complaint   Patient presents with   • Pain     Pt here for 6 month F/U for controlled substance form   • Nasal Congestion     Having nasal congestion after using c-pap machine        AWV 7.11.22    History of Present Illness :  Alone.   Here for review of chronic problems that includes HTN and others.      Has multiple chronic problems to consider that might have a bearing on today's issues;  an interval appointment.       Chronic/acute problems reviewed today:   1. Hyperlipidemia LDL goal <70-statin Chronic/stable.  Tolerated use of Rx with labs showing improved lipid values and tolerant liver labs. No muscle aches unexpected.      2. Primary hypertension Chronic/stable. Stable here past/and occ home blood pressures.  No significant chest pain, SOB, LE edema, orthopnea, near syncope, dizziness/light headness.   Recent Vitals       11/15/2022 12/15/2022 1/18/2023       BP: 132/84 112/60 122/70     Pulse: 88 66 70     Temp: -- 97.5 °F (36.4 °C) 97.7 °F (36.5 °C)     Weight: 95.7 kg (211 lb) 91.6 kg (202 lb) 90.3 kg (199 lb)     BMI (Calculated): 29 27.8 27.4            3. Atherosclerosis: CAD, AAA vascular chronic/stable various areas of disease.  Sees vascular regularly and no plans for upcoming interventions.  Denies development/change in chest pain, claudication, CVA-TIA symptoms such as (Manifest by slurred speech asymmetry of face dysfunction/weakness of extremities).  Blood thinners noted.      4. Anticoagulated-CAD, DM2, CVA/ASA 81+()+plavix » Anticoagulated-CAD, DM2, CVA/ASA 81+()+plavix+eliquist Chronic/stable reason for stopping or use of.  Denies bleeding issues; especially epistaxis, melena, hematochezia.  Upper arms/others do not significantly bruise easily.  No significant bleeding or falls.      5. PAF (paroxysmal atrial fibrillation) (HCC) Chronic/stable.   History of pafib.  Rhythm currently seems  controlled.  Medications seem tolerated.  No syncope near syncope.     6. Controlled type 2 diabetes mellitus with other circulatory complication, without long-term current use of insulin (HCC) Chronic/stable.  No problem/pattern hypoglycemia/hyperglycemia manifest by poly- dypsia, phagia, uria, or sweats, diaphoretic episodes, syncope/near.     7. Gastroesophageal reflux disease, unspecified whether esophagitis present Chronic/stable.  Controlled heartburn, reflux without dysphagia, melena.  Rx used, periods not used proven currently needed with symptoms -currently doing ok.      8. Stage 3a chronic kidney disease (HCC) Chronic/stable.  No nephrology.  No dysuria.  Previously warned/reminded:   To avoid further kidney function decline  A. Treat any time you think you have infection  B. Stay hydrated (dont get dehydrated); drink at least 60 oz fluid every 24 hr (1800 cc or nearly a 2L)  C. Do not allow any xrays with dye WITHOUT the doctor ordering checking your renal function  D. Do not get new medications without the doctor considering your renal condition  E. Do not use motrin/ibuprofen, alleve/naprosyn and these types of medications     9. Depression, unspecified depression type chronic/stable past significant  mood swings, down moods, nervousness, difficulty with concentration to function home/work.  Others close have not been concerned.  No suicide ideation/intent.  Rx helps.  Stress day/day.       10. Abnormal CT of the chest 1.2022/12m yearly gets a CT for abnormalities other than benign radiographically.  Denies hemoptysis   11. Poor balance chronic worsening; he cannot stand without holding on.  Fortunately no falls   12. Chest congestion when he uses his CPAP; each morning after that he will have 3 to 4 hours of increased cough chest productive congestion which he does not notice when he only wears nocturnal oxygen.  He cleans his machine as instructed; its the same settings he has had for approximately 2  years.   13. Stage 2 moderate COPD by GOLD classification (HCC) Chronic/stable mild occ cough, sob, wheeze.  Rx helps.   No smoking.       14. Oxygen dependent chronically requires oxygen replacement; primarily driven by pulmonary but has many cardiac issues.  Acceptable shortness of breath with activity     Has an/another acute issue today: none.    The following portions of the patient's history were reviewed and updated as appropriate: allergies, current medications, past family history, past medical history, past social history, past surgical history and problem list.      Current Outpatient Medications:   •  acetaminophen (TYLENOL) 325 MG tablet, Take 2 tablets by mouth 2 (Two) Times a Day., Disp: 360 tablet, Rfl: 2  •  Artificial Tear Solution (Just Tears Eye Drops) solution, Apply 1-2 drops to eye(s) as directed by provider Daily As Needed (dry eyes)., Disp: 45 mL, Rfl: 2  •  ascorbic acid (VITAMIN C) 1000 MG tablet, Take 1 tablet by mouth Daily., Disp: 90 tablet, Rfl: 3  •  aspirin 81 MG EC tablet, Take 81 mg by mouth Daily., Disp: , Rfl:   •  Budeson-Glycopyrrol-Formoterol (Breztri Aerosphere) 160-9-4.8 MCG/ACT aerosol inhaler, Inhale 2 puffs 2 (Two) Times a Day., Disp: 10.7 g, Rfl: 5  •  calcium carbonate-vitamin d 600-400 MG-UNIT per tablet, Take 1 tablet by mouth 2 (Two) Times a Day., Disp: 180 tablet, Rfl: 3  •  Eliquis 5 MG tablet tablet, TAKE 1 TABLET TWICE A DAY, Disp: 180 tablet, Rfl: 3  •  ferrous sulfate 325 (65 Fe) MG tablet, Take  by mouth., Disp: , Rfl:   •  finasteride (PROSCAR) 5 MG tablet, TAKE 1 TABLET DAILY, Disp: 90 tablet, Rfl: 3  •  gabapentin (NEURONTIN) 600 MG tablet, Take 1 tablet by mouth 3 (Three) Times a Day., Disp: 270 tablet, Rfl: 2  •  glucose blood test strip, Use as instructed to check glucose daily and as needed for highs and lows., Disp: 100 each, Rfl: 3  •  glyburide (DIAbeta) 5 MG tablet, TAKE 1 TABLET EVERY MORNING AND ONE-HALF (1/2) TABLET BEFORE SUPPER, Disp: 135  tablet, Rfl: 3  •  hydroCHLOROthiazide (MICROZIDE) 12.5 MG capsule, Take 1 capsule by mouth Daily., Disp: 90 capsule, Rfl: 3  •  HYDROcodone-acetaminophen (Norco) 5-325 MG per tablet, Take 1 tablet by mouth Every 6 (Six) Hours As Needed for Mild Pain  or Moderate Pain ., Disp: 12 tablet, Rfl: 0  •  hydrocortisone 1 % lotion, Apply  topically to the appropriate area as directed 2 (Two) Times a Day., Disp: 59 mL, Rfl: 1  •  Insulin Lispro, 1 Unit Dial, (HUMALOG) 100 UNIT/ML solution pen-injector, INJECT USING SLIDING SCALE PROVIDED BY HOSPITAL. MAX DAILY DOSE OF 30 UNITS, Disp: , Rfl:   •  isosorbide mononitrate (IMDUR) 30 MG 24 hr tablet, TAKE 1 TABLET DAILY, Disp: 90 tablet, Rfl: 3  •  Lancets (freestyle) lancets, Use once daily, Disp: 100 each, Rfl: 2  •  Lantus SoloStar 100 UNIT/ML injection pen, INJECT 15 UNITS UNDER THE SKIN ONCE DAILY AS DIRECTED, Disp: , Rfl:   •  loratadine (CLARITIN) 10 MG tablet, TAKE 1 TABLET DAILY, Disp: 90 tablet, Rfl: 3  •  metFORMIN (Glucophage) 500 MG tablet, Take 1 tablet by mouth Every Night., Disp: 90 tablet, Rfl: 3  •  metoprolol tartrate (LOPRESSOR) 25 MG tablet, TAKE ONE-HALF (1/2) TABLET TWICE A DAY, Disp: 180 tablet, Rfl: 3  •  multivitamin with minerals (Centrum Silver Adult 50+) tablet tablet, Take 1 tablet by mouth Daily., Disp: 90 tablet, Rfl: 3  •  nitroglycerin (Nitrostat) 0.4 MG SL tablet, Place 1 tablet under the tongue Every 5 (Five) Minutes As Needed for Chest Pain., Disp: 90 tablet, Rfl: 3  •  NON FORMULARY, CPAP per mask with 4L oxygen nightly, Disp: , Rfl:   •  O2 (OXYGEN), Inhale 4 L/min Continuous. Per nasal cannula, Disp: , Rfl:   •  pantoprazole (PROTONIX) 40 MG EC tablet, Take 1 tablet by mouth Daily., Disp: 90 tablet, Rfl: 3  •  potassium chloride 10 MEQ CR tablet, Take 1 tablet by mouth Daily., Disp: 90 tablet, Rfl: 3  •  pravastatin (Pravachol) 80 MG tablet, Take 1 tablet by mouth Daily., Disp: 90 tablet, Rfl: 3  •  ProAir  (90 Base) MCG/ACT  inhaler, USE 2 INHALATIONS FOUR TIMES A DAY, Disp: 51 g, Rfl: 3  •  Stiolto Respimat 2.5-2.5 MCG/ACT aerosol solution inhaler, Inhale 2 puffs Every Night., Disp: 12 g, Rfl: 3  •  terazosin (HYTRIN) 10 MG capsule, TAKE 1 CAPSULE DAILY, Disp: 90 capsule, Rfl: 3  •  TRUEplus 5-Bevel Pen Needles 32G X 4 MM misc, See Admin Instructions., Disp: , Rfl:   •  guaiFENesin (MUCINEX) 600 MG 12 hr tablet, Take 600 mg by mouth Daily., Disp: , Rfl:     No problems with medications.    Allergies   Allergen Reactions   • Symbicort [Budesonide-Formoterol Fumarate] Dizziness     Patient passed out and fell   • Prozac [Fluoxetine Hcl] Mental Status Change     Bad dreams.       Review of Systems   GENERAL:  Inactive/slower with limits, speed, samni for age and SOB.  Sleep is ok; but occ interrupted caring for wife.    SKIN:  Ongoing callous of feet; no problems with this/other skin today unless above/below.    ENDO:  No syncope, near or diaphoretic sweaty spells.  BS Ok: with download-see correspondence.  HEENT: No head injury, headache.  No vision change.  Same mild hearing loss.  Ears without pain/drainage.  No sore throat.  No significant nasal/sinus congestion/drainage unless uses CPAP; then for couple hours. No epistaxis.  CHEST: No chest wall tenderness or mass. Stable occ cough without productive without wheeze except briefly after CPAP use.   Mild/same SOB; no hemoptysis.  Wears oxygen continous.   CV: No chest pain, palpatations, ankle edema.  GI: No heartburn significant dysphagia.  No abdominal pain, diarrhea, constipation, rectal bleeding, or melena.    :  Voids without dysuria, or incontience to completion.  ORTHO: No painful/swollen joints but various on /off sore.  No change occ/usual sore neck or back.  But no acute neck but above mid back pain without recent injury.   NEURO: No dizziness; diffuse weakness of extremities.  No change some LE numbness/parethesias. Walking often has to hold on; balance problems.   PSYCH:  No memory loss.  Mood good; not that anxious, depressed but/and not suicidal.  Tolerated stress.   Screening:  Mammogram: NA  Bone density: NA  Low dose CT chest: Tobacco-smoker/age 22/1 ppd/dc 1980: (22): NA (had CT angio)  IMPRESSION: CT chest with/BH/1.10.22  1. Stable 6 mm subpleural right lower lobe nodule. Stable for 3.5 years.  No additional workup needed.  2. Linear atelectasis or scarring in both lower lobes. Calcified  granulomas. Centrilobular emphysema.  3. Linear secretions in the distal trachea.  4. 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  Dilated pulmonary arteries.  5. Fatty infiltration of the liver.       GI: Colon-div/[facility]/6.15.17/prn  Prostate: chin confirmed 7.11.22  urology/confirmed 7.8.21  Chin/confirmed 11.22.19  Keisha in past   Usual lab order  6m CBC, BMP, A1c  12m CBC, CMP, A1c, TSH, LIPID, PSAs, Vit D    Copy/paste function used for ROS/exam AND each area of these were reviewed, updated, confirmed and supplemented as needed.  Data reviewed:   Recent admit/ER/MD visits: 8.8.22    1. Metabolic encephalopathy    2. Shortness of breath    3. Cough    4. COPD exacerbation (HCC)    5. Anemia, unspecified type    6. Chronic respiratory failure with hypoxia (HCC)    7. Umbilical hernia without obstruction and without gangrene    8. Abdominal aortic aneurysm (AAA) without rupture (HCC)          Discussions/medical decisions/reviews:  BP ok  Other vitals ok  DM/BS 6.0 last  Lipid 94 last  PSA ok last  CBC 10s [facility]  Renal 54.9 last  Liver ok last  Vit D ok last  Thyroid ok last     Back to baseline; but with some level prednisone (he cannot recall what he is taking)  Wean steroids; report problems  Expect insulin needs to drop; call if needs direction on reduction      Last cardiac testing:   Echo:   Results for orders placed during the hospital encounter of 10/26/22    Adult Transthoracic Echo Complete w/ Color, Spectral and Contrast if necessary per  protocol    Interpretation Summary  •  Left ventricular systolic function is normal. Left ventricular ejection fraction appears to be 56 - 60%.  •  Left ventricular diastolic function was normal.  •  Abnormal global longitudinal LV strain (GLS) = -11.0%  •  Estimated right ventricular systolic pressure from tricuspid regurgitation is normal (<35 mmHg).      Radiology considered:   No radiology results for the last 90 days.  [facility] CXR 11.15.22; hyperinflated copd with linear fibrosis vs atelectasis    Lab Results:  Results for orders placed or performed in visit on 01/16/23   Folate    Specimen: Blood   Result Value Ref Range    Folate >20.00 4.78 - 24.20 ng/mL   Vitamin B12    Specimen: Blood   Result Value Ref Range    Vitamin B-12 601 211 - 946 pg/mL   Iron    Specimen: Blood   Result Value Ref Range    Iron 75 59 - 158 mcg/dL   Vitamin D 25 hydroxy    Specimen: Blood   Result Value Ref Range    25 Hydroxy, Vitamin D 54.3 30.0 - 100.0 ng/ml   PSA SCREENING    Specimen: Blood   Result Value Ref Range    PSA 1.720 0.000 - 4.000 ng/mL   Lipid Panel w/ Chol/HDL Ratio    Specimen: Blood   Result Value Ref Range    Total Cholesterol 176 0 - 200 mg/dL    Triglycerides 145 0 - 150 mg/dL    HDL Cholesterol 36 (L) 40 - 60 mg/dL    VLDL Cholesterol Kieran 26 5 - 40 mg/dL    LDL Chol Calc (NIH) 114 (H) 0 - 100 mg/dL    Chol/HDL Ratio 4.89    TSH    Specimen: Blood   Result Value Ref Range    TSH 2.780 0.270 - 4.200 uIU/mL   Hemoglobin A1c    Specimen: Blood   Result Value Ref Range    Hemoglobin A1C 6.70 (H) 4.80 - 5.60 %   Comprehensive metabolic panel    Specimen: Blood   Result Value Ref Range    Glucose 119 (H) 65 - 99 mg/dL    BUN 21 8 - 23 mg/dL    Creatinine 1.20 0.76 - 1.27 mg/dL    EGFR Result 58.2 (L) >60.0 mL/min/1.73    BUN/Creatinine Ratio 17.5 7.0 - 25.0    Sodium 139 136 - 145 mmol/L    Potassium 4.5 3.5 - 5.2 mmol/L    Chloride 101 98 - 107 mmol/L    Total CO2 30.2 (H) 22.0 - 29.0 mmol/L    Calcium 9.7 8.6 - 10.5  mg/dL    Total Protein 6.4 6.0 - 8.5 g/dL    Albumin 4.1 3.5 - 5.2 g/dL    Globulin 2.3 gm/dL    A/G Ratio 1.8 g/dL    Total Bilirubin 0.3 0.0 - 1.2 mg/dL    Alkaline Phosphatase 56 39 - 117 U/L    AST (SGOT) 13 1 - 40 U/L    ALT (SGPT) 9 1 - 41 U/L   CBC w AUTO Differential    Specimen: Blood   Result Value Ref Range    WBC 8.16 3.40 - 10.80 10*3/mm3    RBC 4.15 4.14 - 5.80 10*6/mm3    Hemoglobin 11.6 (L) 13.0 - 17.7 g/dL    Hematocrit 37.2 (L) 37.5 - 51.0 %    MCV 89.6 79.0 - 97.0 fL    MCH 28.0 26.6 - 33.0 pg    MCHC 31.2 (L) 31.5 - 35.7 g/dL    RDW 13.9 12.3 - 15.4 %    Platelets 226 140 - 450 10*3/mm3    Neutrophil Rel % 59.5 42.7 - 76.0 %    Lymphocyte Rel % 24.3 19.6 - 45.3 %    Monocyte Rel % 7.5 5.0 - 12.0 %    Eosinophil Rel % 7.6 (H) 0.3 - 6.2 %    Basophil Rel % 0.9 0.0 - 1.5 %    Neutrophils Absolute 4.86 1.70 - 7.00 10*3/mm3    Lymphocytes Absolute 1.98 0.70 - 3.10 10*3/mm3    Monocytes Absolute 0.61 0.10 - 0.90 10*3/mm3    Eosinophils Absolute 0.62 (H) 0.00 - 0.40 10*3/mm3    Basophils Absolute 0.07 0.00 - 0.20 10*3/mm3    Immature Granulocyte Rel % 0.2 0.0 - 0.5 %    Immature Grans Absolute 0.02 0.00 - 0.05 10*3/mm3    nRBC 0.0 0.0 - 0.2 /100 WBC       A1C:  Lab Results - Last 18 Months   Lab Units 01/16/23  1019 07/06/22  0715 01/05/22  0658   HEMOGLOBIN A1C % 6.70* 6.00* 6.30*     GLUCOSE:  Lab Results - Last 18 Months   Lab Units 01/16/23  1019 10/11/22  0849 08/08/22  1010 07/06/22  0715 02/20/22  0405 01/05/22  0658   GLUCOSE mg/dL 119* 114* 108* 77 112* 73     LIPID:  Lab Results - Last 18 Months   Lab Units 01/16/23  1019 01/05/22  0658   CHOLESTEROL mg/dL 176 150   LDL CHOL mg/dL 114* 94   HDL CHOL mg/dL 36* 37*   TRIGLYCERIDES mg/dL 145 103     PSA:  Lab Results - Last 18 Months   Lab Units 01/16/23  1019 01/05/22  0658   PSA ng/mL 1.720 0.983       CBC:  Lab Results - Last 18 Months   Lab Units 01/16/23  1019 10/11/22  0849 08/08/22  1010 07/06/22  0715 02/20/22  0405 01/05/22  0658   WBC  "10*3/mm3 8.16 9.59 11.28* 7.20 6.89 7.98   HEMOGLOBIN g/dL 11.6* 11.6* 10.9* 11.5* 11.6* 12.6*   HEMATOCRIT % 37.2* 36.3* 33.9* 34.5* 37.3* 38.4   PLATELETS 10*3/mm3 226 239 284 231 213 220   IRON mcg/dL 75 65 64 112  --  60      BMP/CMP:  Lab Results - Last 18 Months   Lab Units 01/16/23  1019 10/11/22  0849 08/08/22  1010 07/21/22  0750 07/06/22  0715 02/20/22  0405 01/05/22  0658   SODIUM mmol/L 139 143 142  --  140 143 145   POTASSIUM mmol/L 4.5 5.0 4.3  --  4.7 4.3 4.4   CHLORIDE mmol/L 101 106 104  --  102 105 106   TOTAL CO2 mmol/L 30.2*  --  29.5*  --  29.7*  --  32.7*   CO2 mmol/L  --  28.0  --   --   --  29.0  --    GLUCOSE mg/dL 119* 114* 108*  --  77 112* 73   BUN mg/dL 21 22 21  --  22 24* 27*   CREATININE mg/dL 1.20 1.22 0.95 1.10 1.26 1.14 1.28*   EGFR IF NONAFRICN AM mL/min/1.73  --   --   --   --   --  61 53*   EGFR IF AFRICN AM mL/min/1.73  --   --   --   --   --   --  64   EGFR RESULT mL/min/1.73 58.2*  --  77.0  --  54.9*  --   --    CALCIUM mg/dL 9.7 9.8 9.5  --  9.7 9.1 9.9     HEPATIC:  Lab Results - Last 18 Months   Lab Units 01/16/23  1019 02/20/22  0405 01/05/22  0658   ALT (SGPT) U/L 9 14 11   AST (SGOT) U/L 13 15 14   ALK PHOS U/L 56 63 58     Vit D:  Lab Results - Last 18 Months   Lab Units 01/16/23  1019 01/05/22  0658   VIT D 25 HYDROXY ng/ml 54.3 63.3     THYROID:  Lab Results - Last 18 Months   Lab Units 01/16/23  1019 01/05/22  0658   TSH uIU/mL 2.780 2.620       Objective   /70   Pulse 70   Temp 97.7 °F (36.5 °C)   Ht 181.6 cm (71.5\")   Wt 90.3 kg (199 lb)   SpO2 97%   BMI 27.37 kg/m²   Body mass index is 27.37 kg/m².    Recent Vitals       10/26/2022 11/15/2022 12/15/2022       BP: 127/59 132/84 112/60     Pulse: -- 88 66     Temp: -- -- 97.5 °F (36.4 °C)     Weight: 95.7 kg (211 lb) 95.7 kg (211 lb) 91.6 kg (202 lb)     BMI (Calculated): 29 29 27.8         Wt Readings from Last 15 Encounters:   01/18/23 0852 90.3 kg (199 lb)   12/15/22 0823 91.6 kg (202 lb)   11/15/22 " 0931 95.7 kg (211 lb)   10/26/22 1048 95.7 kg (211 lb)   10/17/22 1310 95.7 kg (211 lb)   10/11/22 0936 97.1 kg (214 lb)   08/11/22 1006 94.8 kg (209 lb)   08/08/22 1029 94.8 kg (209 lb)   07/21/22 0855 96.2 kg (212 lb)   07/11/22 0951 96.8 kg (213 lb 6.4 oz)   04/11/22 0926 94.8 kg (209 lb)   02/20/22 0918 97.1 kg (214 lb)   02/20/22 0357 97.1 kg (214 lb)   02/17/22 0841 93.9 kg (207 lb)   01/26/22 1326 93.9 kg (207 lb)   01/25/22 0910 93 kg (205 lb)       Physical Exam  GENERAL:  Well nourished/developed in no acute distress.   SKIN: Turgor excellent, without wound, rash, lesion  HEENT: Normal cephalic without trauma.  Pupils equal round reactive to light. Extraocular motions full without nystagmus.    No thyroidmegaly, mass, tenderness, lymphadenopathy and supple.  CV: Regular rhythm.  No murmur, gallop,  edema. Posterior pulses intact.  No carotid bruits.  CHEST: No chest wall tenderness or mass.   LUNGS: Symmetric motion with clear/decreased to auscultation.   ABD: Soft, nontender without mass.   ORTHO: Symmetric extremities without swelling/point tenderness.  Full gross range of motion except hips reduced.  Symmetric LE.  Neg straight leg raising.  Neg Patricks maneuver.  Back is straight/mild lordosis.    NEURO: CN 2-12 grossly intact.  Symmetric facies. 1/4 x bicep knee equal reflexes.  UE/LE   3/5 strength throughout except 2/5  L.  Nonfocal use extremities. Speech clear.  Light touch decreased bilateral feet.  Finger/nose ok.  Not able tandem walk.   PSYCH: Oriented x 3.  Pleasant calm, well kept.  Purposeful/directed conservation with intact short/long gross memory.      Diabetic foot exam:   Left: Filament test reduced   Pulses Dorsalis Pedis:  diminished   Reflexes 1+    Vibratory sensation diminished   Proprioception diminished   Sharp/dull discrimination diminished       Right: Filament test reduced   Pulses Dorsalis Pedis:  diminished  Posterior Tibial:  present   Reflexes 1+    Vibratory  sensation diminished   Proprioception diminished   Sharp/dull discrimination diminished      Assessment & Plan     1. Hyperlipidemia LDL goal <70-statin    2. Primary hypertension    3. Atherosclerosis: CAD, AAA vascular    4. Anticoagulated-CAD, DM2, CVA/ASA 81+()+plavix » Anticoagulated-CAD, DM2, CVA/ASA 81+()+plavix+eliquist    5. PAF (paroxysmal atrial fibrillation) ([facility])    6. Controlled type 2 diabetes mellitus with other circulatory complication, without long-term current use of insulin ([facility])    7. Gastroesophageal reflux disease, unspecified whether esophagitis present    8. Stage 3a chronic kidney disease ([facility])    9. Depression, unspecified depression type    10. Abnormal CT of the chest 1.2022/12m    11. Poor balance    12. Chest congestion    13. Stage 2 moderate COPD by GOLD classification ([facility])    14. Oxygen dependent        Data review above:   Discussions/medical decisions/reviews:  BP ok  Other vitals ok  DM/BS 6.7 1.16.23  Lipid  1.16.23  PSA ok 1.16.23  CBC 11.6 1.16.23  Iron 75 1.16.23  Renal 58 1.16.23  Liver ok 1.16.23  Vit D 54 1.16.23  Thyroid TSH ok 1.16.23    Trial PT if will work with his schedule for balance improvement; no desire for detailed testing as to why  Pro/con using CPAP; will inquire with pulmonary if they agree reasonable acceptable to just go with home oxygen -note sent to [facility]  ? [facility]  Controlled substance form discussed/agreed    Anemia 1.18.23  EGD-itis///6.15.17  Colon-div///6.15.17/prn  CKD  ASA 81  Eliquis  B12 normal past/1.16.23  Folate normal past/1.16.23  Iron normal past/1.16.23 with 325 mg  hemocult +11.20.18; neg since    Data review above:   Rx: reviewed and decisions:   Rx new/changes: none  No orders of the defined types were placed in this encounter.      Orders placed:   LAB/Testing/Referrals: reviewed/orders:   Today:   Orders Placed This Encounter   Procedures   • Ambulatory Referral to Physical Therapy      Chronic/recurrent labs above or change to:   Same     Screening reviewed/updated     Immunization History   Administered Date(s) Administered   • COVID-19 (MODERNA) 1st, 2nd, 3rd Dose Only 03/03/2021, 03/31/2021   • FLUAD TRI 65YR+ 11/22/2019   • Fluad Quad 65+ 10/06/2020   • Fluzone High Dose =>65 Years ([employer] ONLY) 10/09/2018   • Fluzone High-Dose 65+yrs 12/27/2021   • Fluzone Quad >6mos (Multi-dose) 10/21/2015, 01/20/2017, 01/29/2018   • Influenza Whole 10/21/2015   • Pneumococcal Conjugate 13-Valent (PCV13) 10/21/2015     Vaccine reviewed: today none; later we advised/reaffirmed our support/suggestion for staying complete with covid- covid boosters, seasonal flu/yearly and any missing vaccine from list we supplied; we suggest contact with local health department office to review missing/needed vaccines and then bring nursing documentation for these vaccines to this office.     Health maintenance:   Body mass index is 27.37 kg/m².  BMI is >= 25 and <30. (Overweight) The following options were offered after discussion;: exercise counseling/recommendations and nutrition counseling/recommendations      Tobacco use reviewed:   Gonzalo Durham  reports that he quit smoking about 43 years ago. His smoking use included cigarettes. He started smoking about 69 years ago. He has a 26.00 pack-year smoking history. He has never used smokeless tobacco..  There are no Patient Instructions on file for this visit.    Visit today involved chronic significant medical problems or differentials and/or intensive drug monitoring: ie potential to cause serious morbidity or death:     Follow up: Return for after 7.11.22.  Future Appointments   Date Time Provider Department Center   1/24/2023  9:00 AM PAD BIC CT 1 BH PAD CT BI PAD   1/24/2023 10:00 AM Nasir Gutierrez DO [facility] PAD PAD   1/26/2023  9:30 AM Feliz Frye APRN [facility] RD PAD PAD   1/31/2023 11:30 AM Bradley Carver MD [facility] CD  PAD   2/9/2023 11:00 AM  Diallo Hightower APRN [facility] N PAD PAD   6/12/2023  9:40 AM Danie Cadena MD [facility] U PAD PAD   7/27/2023  1:45 PM Abel Romero MD [facility] PC METR PAD

## 2023-01-24 ENCOUNTER — HOSPITAL ENCOUNTER (OUTPATIENT)
Dept: CT IMAGING | Facility: HOSPITAL | Age: 88
Discharge: HOME | End: 2023-01-24
Admitting: SURGERY
Payer: MEDICARE

## 2023-01-24 ENCOUNTER — OFFICE VISIT (OUTPATIENT)
Dept: VASCULAR SURGERY | Facility: CLINIC | Age: 88
End: 2023-01-24
Payer: MEDICARE

## 2023-01-24 VITALS
DIASTOLIC BLOOD PRESSURE: 76 MMHG | SYSTOLIC BLOOD PRESSURE: 126 MMHG | OXYGEN SATURATION: 93 % | WEIGHT: 195 LBS | BODY MASS INDEX: 27.3 KG/M2 | HEIGHT: 71 IN | HEART RATE: 56 BPM

## 2023-01-24 DIAGNOSIS — I71.40 ABDOMINAL AORTIC ANEURYSM (AAA) 3.0 CM TO 5.5 CM IN DIAMETER IN MALE: Primary | ICD-10-CM

## 2023-01-24 DIAGNOSIS — I71.40 ABDOMINAL AORTIC ANEURYSM (AAA) 3.0 CM TO 5.5 CM IN DIAMETER IN MALE: ICD-10-CM

## 2023-01-24 DIAGNOSIS — I10 PRIMARY HYPERTENSION: ICD-10-CM

## 2023-01-24 DIAGNOSIS — I65.23 BILATERAL CAROTID ARTERY STENOSIS: ICD-10-CM

## 2023-01-24 DIAGNOSIS — E78.5 HYPERLIPIDEMIA LDL GOAL <70: ICD-10-CM

## 2023-01-24 LAB — CREAT BLDA-MCNC: 1.3 MG/DL (ref 0.6–1.3)

## 2023-01-24 PROCEDURE — 0 IOPAMIDOL PER 1 ML: Performed by: SURGERY

## 2023-01-24 PROCEDURE — 82565 ASSAY OF CREATININE: CPT

## 2023-01-24 PROCEDURE — 99214 OFFICE O/P EST MOD 30 MIN: CPT | Performed by: SURGERY

## 2023-01-24 PROCEDURE — 74174 CTA ABD&PLVS W/CONTRAST: CPT

## 2023-01-24 RX ADMIN — IOPAMIDOL 100 ML: 755 INJECTION, SOLUTION INTRAVENOUS at 09:12

## 2023-01-24 NOTE — LETTER
January 24, 2023     Abel Romero MD  [address] [place] [id number]    Patient: Gonzalo Durham   YOB: 1934   Date of Visit: 1/24/2023       Dear Dr. Nick MD:    Thank you for referring Gonzalo Durham to me for evaluation. Below are the relevant portions of my assessment and plan of care.    If you have questions, please do not hesitate to call me. I look forward to following Gonzalo along with you.         Sincerely,        Nasir Gutierrez DO        CC: No Recipients  Nasir Gutierrez DO  01/24/23 1310  Signed  1/24/2023       Abel Romero MD  [address] [place] [id number]    Gonzalo Durham  1934    Chief Complaint   Patient presents with   • Follow-up     6 month follow up w/ testing. Last seen 7/21/22. Patient denies any new or worsening back or abdominal pains. States that he does a lot of pulling and tugging on wife who is on hospice to reposition her in bed.        Dear Abel Romero MD     I had the pleasure of seeing your patient Gonzalo Durham in the office today.    As you recall, Gonzalo Durham is a 88 y.o.  male who we are following for a small abdominal aortic aneurysm.   He denies any abdominal pain.  He was previously a smoker but quit 30 years ago.  He denies family history of aneurysms.  He is maintained on aspirin, Eliquis, and Pravachol.  He did have noninvasive testing performed today, which I did personally review.      Review of Systems   Constitutional: Negative.    HENT: Negative.    Eyes: Negative.    Respiratory: Positive for shortness of breath (wears O2).    Cardiovascular: Negative.    Gastrointestinal: Negative.    Endocrine: Negative.    Genitourinary: Negative.    Musculoskeletal: Negative.         Pain to feet, neuropathy   Skin: Negative.    Allergic/Immunologic: Negative.    Neurological: Negative.    Hematological: Negative.    Psychiatric/Behavioral: Negative.    All other systems reviewed and are negative.      /76    "Pulse 56   Ht 180.3 cm (71\")   Wt 88.5 kg (195 lb)   SpO2 93%   BMI 27.20 kg/m²   Physical Exam  Vitals and nursing note reviewed.   Constitutional:       Appearance: Normal appearance. He is well-developed.   HENT:      Head: Normocephalic and atraumatic.   Eyes:      General: No scleral icterus.     Pupils: Pupils are equal, round, and reactive to light.   Neck:      Thyroid: No thyromegaly.      Vascular: No carotid bruit or JVD.   Cardiovascular:      Rate and Rhythm: Normal rate and regular rhythm.      Pulses:           Carotid pulses are 2+ on the right side and 2+ on the left side.       Femoral pulses are 2+ on the right side and 2+ on the left side.       Popliteal pulses are 2+ on the right side and 2+ on the left side.        Dorsalis pedis pulses are 2+ on the right side and 2+ on the left side.        Posterior tibial pulses are 2+ on the right side and 2+ on the left side.      Heart sounds: Normal heart sounds.      Comments: Varicose veins to BLE, oxygen therapy  Pulmonary:      Effort: Pulmonary effort is normal.      Breath sounds: Normal breath sounds.   Abdominal:      General: Bowel sounds are normal. There is no distension or abdominal bruit.      Palpations: Abdomen is soft. There is pulsatile mass. There is no mass.      Tenderness: There is no abdominal tenderness.   Musculoskeletal:         General: Normal range of motion.      Cervical back: Normal range of motion and neck supple.   Lymphadenopathy:      Cervical: No cervical adenopathy.   Skin:     General: Skin is warm and dry.   Neurological:      General: No focal deficit present.      Mental Status: He is alert and oriented to person, place, and time.      Cranial Nerves: No cranial nerve deficit.      Sensory: No sensory deficit.   Psychiatric:         Mood and Affect: Mood normal.         Behavior: Behavior normal.         Thought Content: Thought content normal.         Judgment: Judgment normal.       Diagnostic Data:  CT " Angiogram Abdomen Pelvis    Result Date: 1/24/2023  Narrative: CT ANGIOGRAM ABDOMEN PELVIS- 1/24/2023 8:40 AM CST  HISTORY: Aortic aneurysm (AAA), surveillance; I71.40-Abdominal aortic aneurysm, without rupture, unspecified  COMPARISON: CT scan dated 07/21/2022, CT scan dated 07/21/2022  DOSE LENGTH PRODUCT: 761 mGy cm. Automated exposure control was also utilized to decrease patient radiation dose.  TECHNIQUE: Helical tomographic images of the abdomen and pelvis utilizing angiographic protocol were obtained following the intravenous infusion of contrast. Multiplanar and 3 D reformatted images were provided for review.  FINDINGS:  Angiogram:  Included portion of the descending thoracic aorta is normal in caliber. Suprarenal abdominal aorta is normal in caliber. There is no flow-limiting stenosis at the celiac, SMA or renal artery origins. 2 left-sided renal arteries. Fusiform aneurysm of the infrarenal aorta measuring up to 4.3 cm diameter. This is stable. No flow-limiting stenosis along the iliac systems or common femoral arteries. Included portions of the deep and superficial femoral arteries are patent.  Other findings: There is a 6 mm right lower lobe pulmonary nodule on axial image 21 which is stable from the earlier chest CT in July 2022. Lung emphysema. Colonic diverticulosis. Small fat-containing periumbilical hernia. Prostatomegaly. No pelvic adenopathy.      Impression: 1. 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  This report was finalized on 01/24/2023 10:13 by Dr Juan Carlos Wilkinson, .       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, without long-term current use of insulin (HCC)   • Stage 3a chronic kidney disease (HCC)   • Erectile dysfunction   • Coronary artery disease involving native coronary artery of native heart without angina pectoris   • Hyperlipidemia  LDL goal <70-statin   • Hypertension   • Prostatism-(young) urology   • Tremor   • Varicose vein of leg   • Plantar fasciitis   • Nocturnal leg movements   • Bad dreams   • Depression   • Peripheral neuropathy- clinical   • Hx of colonic polyps   • Gastroesophageal reflux disease   • Left lower quadrant pain   • Laboratory test*   • Anticoagulated-CAD, DM2, CVA/ASA 81+()+plavix » Anticoagulated-CAD, DM2, CVA/ASA 81+()+plavix+eliquist   • SOB: copd,CAD,#, hypoxemia   • Hypoxia#to pulmonary   • Respiratory rudatpy-aosfcqr-F9-continuous   • Corn or callus   • Anemia: ASA81,plavix,eliquis,gi(3/3+,div,cp,eitis   • Atherosclerosis: CAD, AAA vascular   • AAA: 2022-(ro) steffen   • Heme positive stool   • BMI 30.0-30.9,adult   • Stage 2 moderate COPD by GOLD classification (HCC)   • Abnormal stress test   • Tingling of both feet-to consider NCV   • Cryptogenic stroke (HCC)   • Chronic diastolic congestive heart failure (HCC)   • Gross hematuria   • BPH with obstruction/lower urinary tract symptoms   • Chest pain   • Obstructive sleep apnea   • Abnormal CT of the chest 1.2022/done   • 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 Abdominal aortic aneurysm (AAA) 3.0 cm to 5.5 cm in diameter in male  I71.40 441.4   2. Bilateral carotid artery stenosis  I65.23 433.10     433.30   3. Primary hypertension  I10 401.9   4. Hyperlipidemia LDL goal <70  E78.5 272.4         Plan: After thoroughly evaluating Gonzalo Durham, I believe the best course of action is to remain conservative from vascular surgery standpoint. I did review his testing which measures 4.4 cm.  At this time, I will continue to monitor.  I will see him back in 1 year with repeat noninvasive testing including a CTA of the abdomen and pelvis and carotid duplex.  I did discuss  vascular risk factors as they pertain to the progression of vascular disease including controlling his hypertension and hyperlipidemia.  His blood pressure is stable on his current medications.  He is maintained on Pravachol for his hyperlipidemia.  The patient can continue taking their current medication regimen as previously planned.  This was all discussed in full with complete understanding.    Thank you for allowing me to participate in the care of your patient.  Please do not hesitate with any questions or concerns.  I will keep you aware of any further encounters with Gonzalo Durham.        Sincerely yours,         Nasir Gutierrez, DO       Scribed for Dr. Nasir Gutierrez by Dianne FOX

## 2023-01-24 NOTE — PROGRESS NOTES
"1/24/2023       Abel Romero MD  [address] [facility] [id number]    Gonzalo Durham  1934    Chief Complaint   Patient presents with   • Follow-up     6 month follow up w/ testing. Last seen 7/21/22. Patient denies any new or worsening back or abdominal pains. States that he does a lot of pulling and tugging on wife who is on hospice to reposition her in bed.        Dear Abel Romero MD     I had the pleasure of seeing your patient Gonzalo Durham in the office today.    As you recall, Gonzalo Durham is a 88 y.o.  male who we are following for a small abdominal aortic aneurysm.   He denies any abdominal pain.  He was previously a smoker but quit 30 years ago.  He denies family history of aneurysms.  He is maintained on aspirin, Eliquis, and Pravachol.  He did have noninvasive testing performed today, which I did personally review.      Review of Systems   Constitutional: Negative.    HENT: Negative.    Eyes: Negative.    Respiratory: Positive for shortness of breath (wears O2).    Cardiovascular: Negative.    Gastrointestinal: Negative.    Endocrine: Negative.    Genitourinary: Negative.    Musculoskeletal: Negative.         Pain to feet, neuropathy   Skin: Negative.    Allergic/Immunologic: Negative.    Neurological: Negative.    Hematological: Negative.    Psychiatric/Behavioral: Negative.    All other systems reviewed and are negative.      /76   Pulse 56   Ht 180.3 cm (71\")   Wt 88.5 kg (195 lb)   SpO2 93%   BMI 27.20 kg/m²   Physical Exam  Vitals and nursing note reviewed.   Constitutional:       Appearance: Normal appearance. He is well-developed.   HENT:      Head: Normocephalic and atraumatic.   Eyes:      General: No scleral icterus.     Pupils: Pupils are equal, round, and reactive to light.   Neck:      Thyroid: No thyromegaly.      Vascular: No carotid bruit or JVD.   Cardiovascular:      Rate and Rhythm: Normal rate and regular rhythm.      Pulses:           Carotid pulses " are 2+ on the right side and 2+ on the left side.       Femoral pulses are 2+ on the right side and 2+ on the left side.       Popliteal pulses are 2+ on the right side and 2+ on the left side.        Dorsalis pedis pulses are 2+ on the right side and 2+ on the left side.        Posterior tibial pulses are 2+ on the right side and 2+ on the left side.      Heart sounds: Normal heart sounds.      Comments: Varicose veins to BLE, oxygen therapy  Pulmonary:      Effort: Pulmonary effort is normal.      Breath sounds: Normal breath sounds.   Abdominal:      General: Bowel sounds are normal. There is no distension or abdominal bruit.      Palpations: Abdomen is soft. There is pulsatile mass. There is no mass.      Tenderness: There is no abdominal tenderness.   Musculoskeletal:         General: Normal range of motion.      Cervical back: Normal range of motion and neck supple.   Lymphadenopathy:      Cervical: No cervical adenopathy.   Skin:     General: Skin is warm and dry.   Neurological:      General: No focal deficit present.      Mental Status: He is alert and oriented to person, place, and time.      Cranial Nerves: No cranial nerve deficit.      Sensory: No sensory deficit.   Psychiatric:         Mood and Affect: Mood normal.         Behavior: Behavior normal.         Thought Content: Thought content normal.         Judgment: Judgment normal.       Diagnostic Data:  CT Angiogram Abdomen Pelvis    Result Date: 1/24/2023  Narrative: CT ANGIOGRAM ABDOMEN PELVIS- 1/24/2023 8:40 AM CST  HISTORY: Aortic aneurysm (AAA), surveillance; I71.40-Abdominal aortic aneurysm, without rupture, unspecified  COMPARISON: CT scan dated 07/21/2022, CT scan dated 07/21/2022  DOSE LENGTH PRODUCT: 761 mGy cm. Automated exposure control was also utilized to decrease patient radiation dose.  TECHNIQUE: Helical tomographic images of the abdomen and pelvis utilizing angiographic protocol were obtained following the intravenous infusion of  contrast. Multiplanar and 3 D reformatted images were provided for review.  FINDINGS:  Angiogram:  Included portion of the descending thoracic aorta is normal in caliber. Suprarenal abdominal aorta is normal in caliber. There is no flow-limiting stenosis at the celiac, SMA or renal artery origins. 2 left-sided renal arteries. Fusiform aneurysm of the infrarenal aorta measuring up to 4.3 cm diameter. This is stable. No flow-limiting stenosis along the iliac systems or common femoral arteries. Included portions of the deep and superficial femoral arteries are patent.  Other findings: There is a 6 mm right lower lobe pulmonary nodule on axial image 21 which is stable from the earlier chest CT in July 2022. Lung emphysema. Colonic diverticulosis. Small fat-containing periumbilical hernia. Prostatomegaly. No pelvic adenopathy.      Impression: 1. 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  This report was finalized on 01/24/2023 10:13 by Dr Juan Carlos Wilkinson, .       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, without long-term current use of insulin (HCC)   • Stage 3a chronic kidney disease (HCC)   • Erectile dysfunction   • Coronary artery disease involving native coronary artery of native heart without angina pectoris   • Hyperlipidemia LDL goal <70-statin   • Hypertension   • Prostatism-(young) urology   • Tremor   • Varicose vein of leg   • Plantar fasciitis   • Nocturnal leg movements   • Bad dreams   • Depression   • Peripheral neuropathy- clinical   • Hx of colonic polyps   • Gastroesophageal reflux disease   • Left lower quadrant pain   • Laboratory test*   • Anticoagulated-CAD, DM2, CVA/ASA 81+()+plavix » Anticoagulated-CAD, DM2, CVA/ASA 81+()+plavix+eliquist   • SOB: copd,CAD,#, hypoxemia   • Hypoxia#to pulmonary   • Respiratory vqxryib-kizlmiu-X1-continuous    • Corn or callus   • Anemia: ASA81,plavix,eliquis,gi(3/3+,div,cp,eitis   • Atherosclerosis: CAD, AAA vascular   • AAA: 2022-(ro) steffen   • Heme positive stool   • BMI 30.0-30.9,adult   • Stage 2 moderate COPD by GOLD classification (HCC)   • Abnormal stress test   • Tingling of both feet-to consider NCV   • Cryptogenic stroke (HCC)   • Chronic diastolic congestive heart failure (HCC)   • Gross hematuria   • BPH with obstruction/lower urinary tract symptoms   • Chest pain   • Obstructive sleep apnea   • Abnormal CT of the chest 1.2022/done   • 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 Abdominal aortic aneurysm (AAA) 3.0 cm to 5.5 cm in diameter in male  I71.40 441.4   2. Bilateral carotid artery stenosis  I65.23 433.10     433.30   3. Primary hypertension  I10 401.9   4. Hyperlipidemia LDL goal <70  E78.5 272.4         Plan: After thoroughly evaluating Gonzalo Durham, I believe the best course of action is to remain conservative from vascular surgery standpoint. I did review his testing which measures 4.4 cm.  At this time, I will continue to monitor.  I will see him back in 1 year with repeat noninvasive testing including a CTA of the abdomen and pelvis and carotid duplex.  I did discuss vascular risk factors as they pertain to the progression of vascular disease including controlling his hypertension and hyperlipidemia.  His blood pressure is stable on his current medications.  He is maintained on Pravachol for his hyperlipidemia.  The patient can continue taking their current medication regimen as previously planned.  This was all discussed in full with complete understanding.    Thank you for allowing me to participate in the care of your patient.  Please do not hesitate with any questions or concerns.  I will keep you aware of  any further encounters with Gonzalo Durham.        Sincerely yours,         Nasir Gutierrez, DO       Scribed for Dr. Nasir Gutierrez by Dianne FOX

## 2023-01-26 ENCOUNTER — OFFICE VISIT (OUTPATIENT)
Dept: PULMONOLOGY | Facility: CLINIC | Age: 88
End: 2023-01-26
Payer: MEDICARE

## 2023-01-26 VITALS
HEART RATE: 62 BPM | BODY MASS INDEX: 28 KG/M2 | OXYGEN SATURATION: 93 % | HEIGHT: 71 IN | SYSTOLIC BLOOD PRESSURE: 136 MMHG | DIASTOLIC BLOOD PRESSURE: 70 MMHG | WEIGHT: 200 LBS

## 2023-01-26 DIAGNOSIS — Z99.89 OSA ON CPAP: ICD-10-CM

## 2023-01-26 DIAGNOSIS — J01.00 ACUTE MAXILLARY SINUSITIS, RECURRENCE NOT SPECIFIED: ICD-10-CM

## 2023-01-26 DIAGNOSIS — J44.9 STAGE 2 MODERATE COPD BY GOLD CLASSIFICATION: Primary | Chronic | ICD-10-CM

## 2023-01-26 DIAGNOSIS — Z23 NEED FOR IMMUNIZATION AGAINST INFLUENZA: ICD-10-CM

## 2023-01-26 DIAGNOSIS — G47.33 OSA ON CPAP: ICD-10-CM

## 2023-01-26 DIAGNOSIS — J96.12 CHRONIC RESPIRATORY FAILURE WITH HYPOXIA AND HYPERCAPNIA: Chronic | ICD-10-CM

## 2023-01-26 DIAGNOSIS — J96.11 CHRONIC RESPIRATORY FAILURE WITH HYPOXIA AND HYPERCAPNIA: Chronic | ICD-10-CM

## 2023-01-26 PROBLEM — J96.90 RESPIRATORY FAILURE (HCC): Status: RESOLVED | Noted: 2018-08-01 | Resolved: 2023-01-26

## 2023-01-26 PROCEDURE — G0008 ADMIN INFLUENZA VIRUS VAC: HCPCS | Performed by: NURSE PRACTITIONER

## 2023-01-26 PROCEDURE — 90662 IIV NO PRSV INCREASED AG IM: CPT | Performed by: NURSE PRACTITIONER

## 2023-01-26 PROCEDURE — 94010 BREATHING CAPACITY TEST: CPT | Performed by: NURSE PRACTITIONER

## 2023-01-26 PROCEDURE — 99214 OFFICE O/P EST MOD 30 MIN: CPT | Performed by: NURSE PRACTITIONER

## 2023-01-26 RX ORDER — AMOXICILLIN AND CLAVULANATE POTASSIUM 875; 125 MG/1; MG/1
1 TABLET, FILM COATED ORAL 2 TIMES DAILY
Qty: 20 TABLET | Refills: 0 | Status: ON HOLD | OUTPATIENT
Start: 2023-01-26 | End: 2023-02-07

## 2023-01-26 NOTE — PROCEDURES
Walking Oximetry  Performed by: Cindy Diaz CMA  Authorized by: Feliz Frye APRN     Rest room air SAT %:  92  Exercise room air SAT %:  79  Exercise on O2 @ Liters:  2  SAT %:  89  2nd Exercise on O2 @ Liters:  3  SAT %: 90

## 2023-01-26 NOTE — PROGRESS NOTES
"Chief Complaint  COPD    Subjective    History of Present Illness      Gonzalo Durham presents to [facility] for:    History of Present Illness   Management of moderate COPD, emphysema, chronic respiratory failure and lung scarring.  He has previous asbestosis exposure.  He also has sleep apnea and has been having a lot of issues with compliance due to cough and congestion.  I received a note from his PCP Dr. Romero last week alerting me of these issues.  He reports that he wakes up with a lot of sinus dryness and chest congestion.  He feels that the ProAir rescue inhaler helps loosen up the congestion and then he is able to cough.  He reports that he sometimes produces yellow and/or gray-tinged mucus.  I have provided him with a Coco pot and nasal gel spray to use daily.  And I recommend that he have an appointment with Dr. Jha to discuss possible CPAP issues as the patient feels that his pressures are possibly too high.  From the pulmonary standpoint, in addition to his sinus issues he had a respiratory infection in November and had a chest x-ray done at [facility] on 11/15/2022 that showed hyperinflation and some linear opacities that are likely from lung scarring.  His PCP changed him from Stiolto to Breztri and he feels that he is doing well on the Breztri.  He is on chronic oxygen therapy.  He had rest and exercise sats done today and I told him he can turn his oxygen from 4 L to 3 L for exertion and sleep with his CPAP.  I recommend he continue on daily Mucinex.  He had updated spirometry today that was stable.  He received an updated flu shot today.   Objective   Vital Signs:   /70   Pulse 62   Ht 180.3 cm (71\")   Wt 90.7 kg (200 lb)   SpO2 93% Comment: 4L  BMI 27.89 kg/m²     Physical Exam  Vitals reviewed.   Constitutional:       General: He is not in acute distress.     Appearance: Normal appearance.      Interventions: Nasal cannula " in place.   HENT:      Head: Normocephalic and atraumatic.      Comments: Wearing a mask     Right Ear: Tympanic membrane and ear canal normal. Decreased hearing noted.      Left Ear: Tympanic membrane and ear canal normal. Decreased hearing noted.      Nose: Mucosal edema present.      Right Turbinates: Swollen.      Left Turbinates: Swollen.      Right Sinus: Maxillary sinus tenderness present.      Left Sinus: Maxillary sinus tenderness present.      Mouth/Throat:      Comments: Postnasal drainage  Cardiovascular:      Rate and Rhythm: Normal rate and regular rhythm.   Pulmonary:      Breath sounds: Decreased breath sounds (At bases only, otherwise clear) present.   Musculoskeletal:      Cervical back: Normal range of motion.   Skin:     General: Skin is warm and dry.   Neurological:      Mental Status: He is alert and oriented to person, place, and time.   Psychiatric:         Mood and Affect: Mood normal.         Behavior: Behavior normal.        Result Review :   The following data was reviewed by: DOMINIQUE Yeung on 01/26/2023:  SCANNED - IMAGING (11/15/2022 00:00)    Results for orders placed in visit on 01/26/23    Pulmonary Function Test    Narrative  Pulmonary Function Test  Performed by: Cindy Ramires, RRT  Authorized by: Feliz Frye APRN    Pre Drug % Predicted  FVC: 124%  FEV1: 64%  FEF 25-75%: 24%  FEV1/FVC: 38%    Interpretation  Spirometry  Spirometry shows moderate obstruction. There is reduced midflow suggesting small airway/airflow obstruction.      Results for orders placed in visit on 01/26/22    Pulmonary Function Test    Narrative  Pulmonary Function Test  Performed by: Cindy Ramires, RRT  Authorized by: Feliz Frye APRN    Pre Drug % Predicted  FVC: 87%  FEV1: 50%  FEF 25-75%: 15%  FEV1/FVC: 43.7%    Interpretation  Spirometry  Spirometry shows moderate obstruction. There is reduced midflow suggesting small airway/airflow  obstruction.  Overall comments: Current FEV1 is 50% predicted compared to 66% predicted in 2019.  Reviewed the results of the drop in lung function with the patient and his daughter.  The patient states understanding but was not interested in changing his inhaler regimen.  He reports that he feels no more symptomatic than he did 2 or 3 years ago.      Results for orders placed in visit on 02/25/19    Pulmonary Function Test    Rest/Exercise Pulse Ox Values        Some values may be hidden. Unless noted otherwise, only the newest values recorded on each date are displayed.         Rest/Exercise Pulse Ox Results 1/26/23   Rest room air SAT % 92   Exercise room air SAT % 79   Exercise on O2 @ Liters 2   Exercise on O2 SAT % 89                    Assessment and Plan      Diagnoses and all orders for this visit:    1. Stage 2 moderate COPD by GOLD classification ([facility]) (Primary)  Comments:  Stable.  He has had some improvement in current FEV1 on today's spirometry.  Continue Breztri, ProAir and oxygen.  Orders:  -     Pulmonary Function Test    2. Acute maxillary sinusitis, recurrence not specified  Comments:  Utilize nasal rinses and nasal gel spray.  Take Augmentin 875 mg twice daily x10 days.  Orders:  -     amoxicillin-clavulanate (Augmentin) 875-125 MG per tablet; Take 1 tablet by mouth 2 (Two) Times a Day.  Dispense: 20 tablet; Refill: 0    3. Chronic respiratory failure with hypoxia and hypercapnia ([facility])  Comments:  He had updated rest and exercise sats today.  Recommended that he turn his oxygen from 4 L to 3 L with exertion and during sleep.  Orders:  -     Walking Oximetry; Future  -     Walking Oximetry    4. WENDY on CPAP  Comments:  Having trouble with compliance. This may be related to CPAP settings or ongoing sinus issues? F/u with Dr. Jha for further evaluation regarding sleep issues.    5. Need for immunization against influenza  Comments:  He received an updated flu shot today.  Orders:  -     Fluzone  High-Dose 65+yrs        Feliz Frye, APRN  1/26/2023  11:41 CST    Follow Up   Return in about 5 weeks (around 3/2/2023) for CPAP issues .    Patient was given instructions and counseling regarding his condition or for health maintenance advice. Please see specific information pulled into the AVS if appropriate.

## 2023-01-26 NOTE — PROCEDURES
Pulmonary Function Test  Performed by: Cindy Rmaires, RRT  Authorized by: Feliz Frye APRN      Pre Drug % Predicted    FVC: 124%   FEV1: 64%   FEF 25-75%: 24%   FEV1/FVC: 38%    Interpretation   Spirometry   Spirometry shows moderate obstruction. There is reduced midflow suggesting small airway/airflow obstruction.

## 2023-02-07 ENCOUNTER — APPOINTMENT (OUTPATIENT)
Dept: GENERAL RADIOLOGY | Facility: HOSPITAL | Age: 88
DRG: 177 | End: 2023-02-07
Payer: MEDICARE

## 2023-02-07 ENCOUNTER — HOSPITAL ENCOUNTER (INPATIENT)
Facility: HOSPITAL | Age: 88
LOS: 10 days | Discharge: NURSING, REHAB OR LONG-TERM CARE | DRG: 177 | End: 2023-02-17
Attending: STUDENT IN AN ORGANIZED HEALTH CARE EDUCATION/TRAINING PROGRAM | Admitting: FAMILY MEDICINE
Payer: MEDICARE

## 2023-02-07 DIAGNOSIS — U07.1 COVID-19: Primary | ICD-10-CM

## 2023-02-07 DIAGNOSIS — Z74.09 IMPAIRED MOBILITY: ICD-10-CM

## 2023-02-07 DIAGNOSIS — Z78.9 DECREASED ACTIVITIES OF DAILY LIVING (ADL): ICD-10-CM

## 2023-02-07 LAB
ALBUMIN SERPL-MCNC: 3.6 G/DL (ref 3.5–5.2)
ALP SERPL-CCNC: 46 U/L (ref 39–117)
ALT SERPL W P-5'-P-CCNC: 14 U/L (ref 1–41)
ANION GAP SERPL CALCULATED.3IONS-SCNC: 11 MMOL/L (ref 5–15)
ARTERIAL PATENCY WRIST A: POSITIVE
AST SERPL-CCNC: 20 U/L (ref 1–40)
ATMOSPHERIC PRESS: 753 MMHG
BASE EXCESS BLDA CALC-SCNC: 3.1 MMOL/L (ref 0–2)
BASOPHILS # BLD AUTO: 0.02 10*3/MM3 (ref 0–0.2)
BASOPHILS NFR BLD AUTO: 0.2 % (ref 0–1.5)
BDY SITE: ABNORMAL
BILIRUB CONJ SERPL-MCNC: <0.2 MG/DL (ref 0–0.3)
BILIRUB INDIRECT SERPL-MCNC: NORMAL MG/DL
BILIRUB SERPL-MCNC: 0.2 MG/DL (ref 0–1.2)
BODY TEMPERATURE: 37 C
BUN SERPL-MCNC: 27 MG/DL (ref 8–23)
BUN/CREAT SERPL: 24.8 (ref 7–25)
CALCIUM SPEC-SCNC: 9.3 MG/DL (ref 8.6–10.5)
CHLORIDE SERPL-SCNC: 97 MMOL/L (ref 98–107)
CO2 SERPL-SCNC: 28 MMOL/L (ref 22–29)
CREAT SERPL-MCNC: 1.09 MG/DL (ref 0.76–1.27)
D-LACTATE SERPL-SCNC: 1.3 MMOL/L (ref 0.5–2)
D-LACTATE SERPL-SCNC: 4.5 MMOL/L (ref 0.5–2)
DEPRECATED RDW RBC AUTO: 46.8 FL (ref 37–54)
EGFRCR SERPLBLD CKD-EPI 2021: 64.9 ML/MIN/1.73
EOSINOPHIL # BLD AUTO: 0 10*3/MM3 (ref 0–0.4)
EOSINOPHIL NFR BLD AUTO: 0 % (ref 0.3–6.2)
ERYTHROCYTE [DISTWIDTH] IN BLOOD BY AUTOMATED COUNT: 14.2 % (ref 12.3–15.4)
FLUAV RNA RESP QL NAA+PROBE: NOT DETECTED
FLUBV RNA RESP QL NAA+PROBE: NOT DETECTED
GAS FLOW AIRWAY: 4 LPM
GLUCOSE BLDC GLUCOMTR-MCNC: 257 MG/DL (ref 70–130)
GLUCOSE SERPL-MCNC: 181 MG/DL (ref 65–99)
HCO3 BLDA-SCNC: 28.1 MMOL/L (ref 20–26)
HCT VFR BLD AUTO: 34.1 % (ref 37.5–51)
HGB BLD-MCNC: 10.9 G/DL (ref 13–17.7)
IMM GRANULOCYTES # BLD AUTO: 0.09 10*3/MM3 (ref 0–0.05)
IMM GRANULOCYTES NFR BLD AUTO: 0.7 % (ref 0–0.5)
LYMPHOCYTES # BLD AUTO: 0.81 10*3/MM3 (ref 0.7–3.1)
LYMPHOCYTES NFR BLD AUTO: 6.6 % (ref 19.6–45.3)
Lab: ABNORMAL
MAGNESIUM SERPL-MCNC: 1.9 MG/DL (ref 1.6–2.4)
MCH RBC QN AUTO: 28.5 PG (ref 26.6–33)
MCHC RBC AUTO-ENTMCNC: 32 G/DL (ref 31.5–35.7)
MCV RBC AUTO: 89.3 FL (ref 79–97)
MODALITY: ABNORMAL
MONOCYTES # BLD AUTO: 1 10*3/MM3 (ref 0.1–0.9)
MONOCYTES NFR BLD AUTO: 8.1 % (ref 5–12)
NEUTROPHILS NFR BLD AUTO: 10.43 10*3/MM3 (ref 1.7–7)
NEUTROPHILS NFR BLD AUTO: 84.4 % (ref 42.7–76)
NRBC BLD AUTO-RTO: 0 /100 WBC (ref 0–0.2)
PCO2 BLDA: 43.8 MM HG (ref 35–45)
PCO2 TEMP ADJ BLD: 43.8 MM HG (ref 35–45)
PH BLDA: 7.42 PH UNITS (ref 7.35–7.45)
PH, TEMP CORRECTED: 7.42 PH UNITS (ref 7.35–7.45)
PLATELET # BLD AUTO: 251 10*3/MM3 (ref 140–450)
PMV BLD AUTO: 9.3 FL (ref 6–12)
PO2 BLDA: 66.8 MM HG (ref 83–108)
PO2 TEMP ADJ BLD: 66.8 MM HG (ref 83–108)
POTASSIUM SERPL-SCNC: 4.1 MMOL/L (ref 3.5–5.2)
PROCALCITONIN SERPL-MCNC: 0.21 NG/ML (ref 0–0.25)
PROT SERPL-MCNC: 7 G/DL (ref 6–8.5)
RBC # BLD AUTO: 3.82 10*6/MM3 (ref 4.14–5.8)
SAO2 % BLDCOA: 94.7 % (ref 94–99)
SARS-COV-2 RNA RESP QL NAA+PROBE: DETECTED
SODIUM SERPL-SCNC: 136 MMOL/L (ref 136–145)
VENTILATOR MODE: ABNORMAL
WBC NRBC COR # BLD: 12.35 10*3/MM3 (ref 3.4–10.8)

## 2023-02-07 PROCEDURE — 93005 ELECTROCARDIOGRAM TRACING: CPT | Performed by: STUDENT IN AN ORGANIZED HEALTH CARE EDUCATION/TRAINING PROGRAM

## 2023-02-07 PROCEDURE — 85025 COMPLETE CBC W/AUTO DIFF WBC: CPT | Performed by: STUDENT IN AN ORGANIZED HEALTH CARE EDUCATION/TRAINING PROGRAM

## 2023-02-07 PROCEDURE — 83605 ASSAY OF LACTIC ACID: CPT | Performed by: FAMILY MEDICINE

## 2023-02-07 PROCEDURE — 83735 ASSAY OF MAGNESIUM: CPT | Performed by: STUDENT IN AN ORGANIZED HEALTH CARE EDUCATION/TRAINING PROGRAM

## 2023-02-07 PROCEDURE — 87040 BLOOD CULTURE FOR BACTERIA: CPT | Performed by: FAMILY MEDICINE

## 2023-02-07 PROCEDURE — 36600 WITHDRAWAL OF ARTERIAL BLOOD: CPT

## 2023-02-07 PROCEDURE — XW033E5 INTRODUCTION OF REMDESIVIR ANTI-INFECTIVE INTO PERIPHERAL VEIN, PERCUTANEOUS APPROACH, NEW TECHNOLOGY GROUP 5: ICD-10-PCS | Performed by: FAMILY MEDICINE

## 2023-02-07 PROCEDURE — 93010 ELECTROCARDIOGRAM REPORT: CPT | Performed by: HOSPITALIST

## 2023-02-07 PROCEDURE — 99284 EMERGENCY DEPT VISIT MOD MDM: CPT

## 2023-02-07 PROCEDURE — 82962 GLUCOSE BLOOD TEST: CPT

## 2023-02-07 PROCEDURE — 25010000002 DEXAMETHASONE PER 1 MG: Performed by: STUDENT IN AN ORGANIZED HEALTH CARE EDUCATION/TRAINING PROGRAM

## 2023-02-07 PROCEDURE — 84145 PROCALCITONIN (PCT): CPT | Performed by: FAMILY MEDICINE

## 2023-02-07 PROCEDURE — 71045 X-RAY EXAM CHEST 1 VIEW: CPT

## 2023-02-07 PROCEDURE — 80048 BASIC METABOLIC PNL TOTAL CA: CPT | Performed by: STUDENT IN AN ORGANIZED HEALTH CARE EDUCATION/TRAINING PROGRAM

## 2023-02-07 PROCEDURE — 87636 SARSCOV2 & INF A&B AMP PRB: CPT | Performed by: STUDENT IN AN ORGANIZED HEALTH CARE EDUCATION/TRAINING PROGRAM

## 2023-02-07 PROCEDURE — 80076 HEPATIC FUNCTION PANEL: CPT | Performed by: STUDENT IN AN ORGANIZED HEALTH CARE EDUCATION/TRAINING PROGRAM

## 2023-02-07 PROCEDURE — 82803 BLOOD GASES ANY COMBINATION: CPT

## 2023-02-07 PROCEDURE — 25010000002 REMDESIVIR 100 MG/20ML SOLUTION 1 EACH VIAL: Performed by: FAMILY MEDICINE

## 2023-02-07 PROCEDURE — 25010000002 CEFTRIAXONE PER 250 MG: Performed by: FAMILY MEDICINE

## 2023-02-07 PROCEDURE — 63710000001 INSULIN LISPRO (HUMAN) PER 5 UNITS: Performed by: FAMILY MEDICINE

## 2023-02-07 RX ORDER — ONDANSETRON 2 MG/ML
4 INJECTION INTRAMUSCULAR; INTRAVENOUS EVERY 6 HOURS PRN
Status: DISCONTINUED | OUTPATIENT
Start: 2023-02-07 | End: 2023-02-17 | Stop reason: HOSPADM

## 2023-02-07 RX ORDER — PANTOPRAZOLE SODIUM 40 MG/1
40 TABLET, DELAYED RELEASE ORAL
Status: DISCONTINUED | OUTPATIENT
Start: 2023-02-07 | End: 2023-02-17 | Stop reason: HOSPADM

## 2023-02-07 RX ORDER — TERAZOSIN 5 MG/1
10 CAPSULE ORAL NIGHTLY
Status: DISCONTINUED | OUTPATIENT
Start: 2023-02-07 | End: 2023-02-17 | Stop reason: HOSPADM

## 2023-02-07 RX ORDER — NITROGLYCERIN 0.4 MG/1
0.4 TABLET SUBLINGUAL
Status: DISCONTINUED | OUTPATIENT
Start: 2023-02-07 | End: 2023-02-17 | Stop reason: HOSPADM

## 2023-02-07 RX ORDER — ISOSORBIDE MONONITRATE 30 MG/1
1 TABLET, EXTENDED RELEASE ORAL DAILY
COMMUNITY

## 2023-02-07 RX ORDER — LORATADINE 10 MG/1
10 TABLET ORAL DAILY
COMMUNITY
End: 2023-02-17 | Stop reason: HOSPADM

## 2023-02-07 RX ORDER — TERAZOSIN 10 MG/1
1 CAPSULE ORAL NIGHTLY
COMMUNITY

## 2023-02-07 RX ORDER — ASCORBIC ACID 500 MG
1 TABLET ORAL DAILY
COMMUNITY

## 2023-02-07 RX ORDER — ISOSORBIDE MONONITRATE 30 MG/1
30 TABLET, EXTENDED RELEASE ORAL DAILY
Status: DISCONTINUED | OUTPATIENT
Start: 2023-02-07 | End: 2023-02-17 | Stop reason: HOSPADM

## 2023-02-07 RX ORDER — FERROUS SULFATE 325(65) MG
325 TABLET ORAL
Status: DISCONTINUED | OUTPATIENT
Start: 2023-02-08 | End: 2023-02-17 | Stop reason: HOSPADM

## 2023-02-07 RX ORDER — FINASTERIDE 5 MG/1
1 TABLET, FILM COATED ORAL DAILY
COMMUNITY

## 2023-02-07 RX ORDER — GUAIFENESIN 600 MG/1
600 TABLET, EXTENDED RELEASE ORAL DAILY
Status: DISCONTINUED | OUTPATIENT
Start: 2023-02-07 | End: 2023-02-17 | Stop reason: HOSPADM

## 2023-02-07 RX ORDER — PRAVASTATIN SODIUM 20 MG
80 TABLET ORAL DAILY
Status: DISCONTINUED | OUTPATIENT
Start: 2023-02-07 | End: 2023-02-17 | Stop reason: HOSPADM

## 2023-02-07 RX ORDER — NICOTINE POLACRILEX 4 MG
15 LOZENGE BUCCAL
Status: DISCONTINUED | OUTPATIENT
Start: 2023-02-07 | End: 2023-02-17 | Stop reason: HOSPADM

## 2023-02-07 RX ORDER — ALBUTEROL SULFATE 90 UG/1
2 AEROSOL, METERED RESPIRATORY (INHALATION) EVERY 6 HOURS PRN
COMMUNITY

## 2023-02-07 RX ORDER — INSULIN LISPRO 100 [IU]/ML
0-24 INJECTION, SOLUTION INTRAVENOUS; SUBCUTANEOUS
Status: DISCONTINUED | OUTPATIENT
Start: 2023-02-07 | End: 2023-02-12

## 2023-02-07 RX ORDER — ASCORBIC ACID 500 MG
500 TABLET ORAL DAILY
Status: DISCONTINUED | OUTPATIENT
Start: 2023-02-07 | End: 2023-02-17 | Stop reason: HOSPADM

## 2023-02-07 RX ORDER — ACETAMINOPHEN 325 MG/1
650 TABLET ORAL EVERY 6 HOURS PRN
Status: DISCONTINUED | OUTPATIENT
Start: 2023-02-07 | End: 2023-02-17 | Stop reason: HOSPADM

## 2023-02-07 RX ORDER — HYDROCHLOROTHIAZIDE 25 MG/1
12.5 TABLET ORAL DAILY
Status: DISCONTINUED | OUTPATIENT
Start: 2023-02-07 | End: 2023-02-17 | Stop reason: HOSPADM

## 2023-02-07 RX ORDER — ENOXAPARIN SODIUM 100 MG/ML
40 INJECTION SUBCUTANEOUS NIGHTLY
Status: DISCONTINUED | OUTPATIENT
Start: 2023-02-07 | End: 2023-02-07

## 2023-02-07 RX ORDER — ALBUTEROL SULFATE 90 UG/1
2 AEROSOL, METERED RESPIRATORY (INHALATION) EVERY 4 HOURS PRN
Status: DISCONTINUED | OUTPATIENT
Start: 2023-02-07 | End: 2023-02-17 | Stop reason: HOSPADM

## 2023-02-07 RX ORDER — METHYLPREDNISOLONE 4 MG/1
TABLET ORAL 2 TIMES DAILY
COMMUNITY
End: 2023-02-17 | Stop reason: HOSPADM

## 2023-02-07 RX ORDER — DEXAMETHASONE SODIUM PHOSPHATE 10 MG/ML
10 INJECTION INTRAMUSCULAR; INTRAVENOUS ONCE
Status: COMPLETED | OUTPATIENT
Start: 2023-02-07 | End: 2023-02-07

## 2023-02-07 RX ORDER — FINASTERIDE 5 MG/1
5 TABLET, FILM COATED ORAL DAILY
Status: DISCONTINUED | OUTPATIENT
Start: 2023-02-07 | End: 2023-02-17 | Stop reason: HOSPADM

## 2023-02-07 RX ORDER — INSULIN LISPRO 100 [IU]/ML
INJECTION, SOLUTION INTRAVENOUS; SUBCUTANEOUS
COMMUNITY

## 2023-02-07 RX ORDER — DEXTROSE MONOHYDRATE 25 G/50ML
25 INJECTION, SOLUTION INTRAVENOUS
Status: DISCONTINUED | OUTPATIENT
Start: 2023-02-07 | End: 2023-02-17 | Stop reason: HOSPADM

## 2023-02-07 RX ORDER — LANOLIN ALCOHOL/MO/W.PET/CERES
3 CREAM (GRAM) TOPICAL NIGHTLY PRN
Status: DISCONTINUED | OUTPATIENT
Start: 2023-02-07 | End: 2023-02-17 | Stop reason: HOSPADM

## 2023-02-07 RX ORDER — ASPIRIN 81 MG/1
81 TABLET ORAL DAILY
Status: DISCONTINUED | OUTPATIENT
Start: 2023-02-07 | End: 2023-02-17 | Stop reason: HOSPADM

## 2023-02-07 RX ORDER — GLYBURIDE 5 MG/1
5 TABLET ORAL
COMMUNITY
End: 2023-02-17 | Stop reason: HOSPADM

## 2023-02-07 RX ADMIN — APIXABAN 5 MG: 5 TABLET, FILM COATED ORAL at 20:58

## 2023-02-07 RX ADMIN — ASPIRIN 81 MG: 81 TABLET, COATED ORAL at 15:52

## 2023-02-07 RX ADMIN — TERAZOSIN HYDROCHLORIDE 10 MG: 5 CAPSULE ORAL at 20:59

## 2023-02-07 RX ADMIN — FINASTERIDE 5 MG: 5 TABLET, FILM COATED ORAL at 15:52

## 2023-02-07 RX ADMIN — CEFTRIAXONE 1 G: 1 INJECTION, POWDER, FOR SOLUTION INTRAMUSCULAR; INTRAVENOUS at 21:10

## 2023-02-07 RX ADMIN — PRAVASTATIN SODIUM 80 MG: 20 TABLET ORAL at 15:53

## 2023-02-07 RX ADMIN — DEXAMETHASONE SODIUM PHOSPHATE 10 MG: 10 INJECTION INTRAMUSCULAR; INTRAVENOUS at 11:34

## 2023-02-07 RX ADMIN — METFORMIN HYDROCHLORIDE 500 MG: 500 TABLET ORAL at 20:58

## 2023-02-07 RX ADMIN — GUAIFENESIN 600 MG: 600 TABLET, EXTENDED RELEASE ORAL at 15:52

## 2023-02-07 RX ADMIN — HYDROCHLOROTHIAZIDE 12.5 MG: 25 TABLET ORAL at 15:52

## 2023-02-07 RX ADMIN — ISOSORBIDE MONONITRATE 30 MG: 30 TABLET, EXTENDED RELEASE ORAL at 15:53

## 2023-02-07 RX ADMIN — REMDESIVIR 200 MG: 100 INJECTION, POWDER, LYOPHILIZED, FOR SOLUTION INTRAVENOUS at 15:18

## 2023-02-07 RX ADMIN — INSULIN LISPRO 12 UNITS: 100 INJECTION, SOLUTION INTRAVENOUS; SUBCUTANEOUS at 17:03

## 2023-02-07 RX ADMIN — OXYCODONE HYDROCHLORIDE AND ACETAMINOPHEN 500 MG: 500 TABLET ORAL at 15:53

## 2023-02-07 RX ADMIN — METOPROLOL TARTRATE 12.5 MG: 25 TABLET, FILM COATED ORAL at 20:59

## 2023-02-07 NOTE — ED PROVIDER NOTES
Subjective   History of Present Illness   Patient presents due to trouble breathing.  Has gradually worsened over the past 3 to 4 days.  4 days ago he developed cough and trouble breathing and was diagnosed with COVID-19.  He received a shot in the back and was sent home on steroids but was not sent home on Paxlovid.  Had a fever to 102 this morning.  Not wheezing on my evaluation although he thinks it could be a COPD.  Coughing up thicker mucus.  No leg swelling or leg pain.  No chest pain.  No lightheadedness or syncope.  Drinking enough fluids, not eating well.    Review of Systems   Constitutional: Positive for fever (102 this AM). Negative for chills.   Respiratory: Positive for cough and shortness of breath.    Cardiovascular: Negative for chest pain, palpitations and leg swelling.   Gastrointestinal: Negative for abdominal pain and vomiting.   Genitourinary: Negative for difficulty urinating and dysuria.   Neurological: Negative for syncope and light-headedness.       Past Medical History:   Diagnosis Date   • A-fib ([facility])    • AAA (abdominal aortic aneurysm) without rupture    • Arthritis    • BPH (benign prostatic hypertrophy)    • Cataract     bialteral   • CKD (chronic kidney disease)    • COPD (chronic obstructive pulmonary disease) ([facility])    • Coronary artery disease involving native coronary artery of native heart without angina pectoris 1/20/2017    CABG/[place]/1981 No TCC echo  7.16.18 Right ventricular cavity is mild-to-moderately dilated. Left ventricular diastolic dysfunction (grade I) consistent with impaired relaxation. Left ventricular systolic function is normal. Left atrial cavity size is borderline dilated. RIGHT HEART ENLARGEMENT - RVSP NOT ASSESSABLE NORMAL LV AND RV SYSTOLIC FUNCTION NO SIGNIFICANT VALVULAR DYSFUNCTION  Seein   • Diabetes mellitus ([facility])     Type 2   • DM (diabetes mellitus screen)    • GERD (gastroesophageal reflux disease)    • History of loop recorder     at current  time   • Hx of colonic polyp    • Hypercholesteremia    • Hypertension    • Macular degeneration     treated with injections in right eye   • Myocardial infarction (HCC)    • Neuropathy    • Oxygen deficit     at 4liters   • Prostate cancer (HCC)    • Pulmonary hypertension (HCC) 1/7/2022   • 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       Past Surgical History:   Procedure Laterality Date   • APPENDECTOMY     • CARDIAC CATHETERIZATION     • CARDIAC CATHETERIZATION Left 5/22/2019    Procedure: Cardiac Catheterization/Vascular Study Positive occult blood in stools  ? BMS vs REGGIE Get cabg report  ;  Surgeon: Bradley Carver MD;  Location:  [facility] CATH INVASIVE LOCATION;  Service: Cardiology   • COLONOSCOPY N/A 6/15/2017    Diverticulosis repeat exam prn   • COLONOSCOPY W/ POLYPECTOMY  10/21/2013    2 Tubular adenomatous polyps, Diverticulosis repeat exam in 5 years   • CORONARY ARTERY BYPASS GRAFT  1980    X 3   • CYSTOSCOPY RETROGRADE PYELOGRAM Bilateral 2/8/2021    Procedure: CYSTOSCOPY RETROGRADE PYELOGRAM;  Surgeon: Danie Cadena MD;  Location: [facility] OR;  Service: Urology;  Laterality: Bilateral;   • ENDOSCOPY N/A 6/15/2017    LA Grade A reflux esophagitis   • EYE SURGERY Bilateral    • HERNIA REPAIR     • TRANSURETHRAL RESECTION OF BLADDER TUMOR N/A 2/8/2021    Procedure: CYSTOSCOPY TRANSURETHRAL RESECTION OF BLADDER TUMOR WITH GEMCITABINE INSTILLATION;  Surgeon: Danie Cadena MD;  Location:  [facility] OR;  Service: Urology;  Laterality: N/A;       Family History   Problem Relation Age of Onset   • Heart disease Mother    • Stroke Mother    • Melanoma Mother    • Heart disease  Father    • Diabetes Father    • Prostate cancer Father    • Colon cancer Neg Hx    • Colon polyps Neg Hx        Social History     Socioeconomic History   • Marital status:    Tobacco Use   • Smoking status: Former     Packs/day: 1.00     Years: 26.00     Pack years: 26.00     Types: Cigarettes     Start date:      Quit date:      Years since quittin.1   • Smokeless tobacco: Never   Vaping Use   • Vaping Use: Never used   Substance and Sexual Activity   • Alcohol use: No   • Drug use: No   • Sexual activity: Defer           Objective   Physical Exam  Vitals reviewed.   Constitutional:       General: He is not in acute distress.  HENT:      Head: Normocephalic and atraumatic.   Eyes:      Extraocular Movements: Extraocular movements intact.      Conjunctiva/sclera: Conjunctivae normal.   Cardiovascular:      Pulses: Normal pulses.      Heart sounds: Normal heart sounds.   Pulmonary:      Effort: Pulmonary effort is normal. No respiratory distress.      Breath sounds: No wheezing.   Abdominal:      General: Abdomen is flat. There is no distension.      Tenderness: There is no abdominal tenderness.   Musculoskeletal:      Cervical back: Normal range of motion and neck supple.      Right lower leg: No tenderness. No edema.      Left lower leg: No tenderness. No edema.   Skin:     General: Skin is warm and dry.   Neurological:      General: No focal deficit present.      Mental Status: He is alert. Mental status is at baseline.   Psychiatric:         Behavior: Behavior normal.         Thought Content: Thought content normal.         Procedures           ED Course                                           DARIAN Durham is a 89 y.o. male with PMH above who presents to the Emergency Department with COVID-19 sx. Reported hypoxia at home; I was able to turn him down to 4L on my first evaluation and he dropped to 88% briefly after about 10 minutes, held around 90%; will check ABG to correlate. PE less  likely; no signs/sx DVT on H&P, no chest pain. ACS also unlikely with no CP. Febrile; bacterial infection possible but sepsis unlikely based on overall vitals--afebrile here, not tachycardic, and COVID would explain fever. Will obtain CXR to look for pneumonia.      ED Course:   -Chest x-ray shows edema versus pneumonitis.  EKG shows sinus rhythm, no focal ischemic changes.  ABG shows hypoxemia on 4 L of oxygen.  COVID-19 positive.  Mild leukocytosis.  Normal procalcitonin overall, COVID currently explains the patient's history and physical and pneumonia is less likely based on his work-up.  He would benefit from remdesivir and Decadron so these were ordered and patient was admitted to hospitalist for further management.    Final diagnosis: COVID-19 hypoxia    All questions answered. Patient/family was understanding and in agreement with today's assessment and plan. The patient was monitored during their stay in the ED and dispositioned without acute event.    Electronically signed by:  Matt Berry MD 2/10/2023 08:57 CST      Note: Dragon medical dictation software was used in the creation of this note.        Final diagnoses:   COVID-19       ED Disposition  ED Disposition     ED Disposition   Decision to Admit    Condition   --    Comment   Level of Care: Med/Surg [1]   Diagnosis: COVID-19 [[id number]]   Admitting Physician: KAEL KEEN [[id number]]   Attending Physician: KAEL KEEN [[id number]]   Certification: I Certify That Inpatient Hospital Services Are Medically Necessary For Greater Than 2 Midnights               No follow-up provider specified.       Medication List      ASK your doctor about these medications    albuterol sulfate  (90 Base) MCG/ACT inhaler  Commonly known as: PROVENTIL HFA;VENTOLIN HFA;PROAIR HFA  Ask about: Which instructions should I use?     apixaban 5 MG tablet tablet  Commonly known as: ELIQUIS  Ask about: Which instructions should I use?     ascorbic acid  500 MG tablet  Commonly known as: VITAMIN C  Ask about: Which instructions should I use?     finasteride 5 MG tablet  Commonly known as: PROSCAR  Ask about: Which instructions should I use?     glyburide 5 MG tablet  Commonly known as: DIAbeta  Ask about: Which instructions should I use?     Insulin Lispro 100 UNIT/ML injection  Commonly known as: humaLOG  Ask about: Which instructions should I use?     isosorbide mononitrate 30 MG 24 hr tablet  Commonly known as: IMDUR  Ask about: Which instructions should I use?     loratadine 10 MG tablet  Commonly known as: CLARITIN  Ask about: Which instructions should I use?     metoprolol tartrate 25 MG tablet  Commonly known as: LOPRESSOR  Ask about: Which instructions should I use?     terazosin 10 MG capsule  Commonly known as: HYTRIN  Ask about: Which instructions should I use?             Matt Berry MD  02/10/23 [id number]

## 2023-02-07 NOTE — PROGRESS NOTES
"Pharmacy Monitoring / Remdesivir Therapy    Gonzalo Durham is a 89 y.o. male  [Ht: 180.3 cm (71\"); Wt: 83.9 kg (185 lb)] on Remdesivir day 1 of 5.    LIVER FUNCTION TESTS:      Lab 02/07/23  0936   TOTAL PROTEIN 7.0   ALBUMIN 3.6   ALT (SGPT) 14   AST (SGOT) 20   BILIRUBIN 0.2   BILIRUBIN DIRECT <0.2   ALK PHOS 46       Lab Results   Component Value Date    INR 0.99 12/24/2019    INR 0.93 05/22/2019       Estimated Creatinine Clearance: 54.5 mL/min (by C-G formula based on SCr of 1.09 mg/dL).   Lab Results   Component Value Date    CREATININE 1.09 02/07/2023    CREATININE 1.30 01/24/2023    CREATININE 1.20 01/16/2023       Assessment/ Recommendations:  Patient currently on > 4 L of oxygen.  Attempted to turn oxygen down to 4 L and oxygen dropped to 90%.  Patient is symptomatic and has high-risk criteria putting him at high risk of progression to a severe illness.  He is within 7 days of symptom onset and was diagnosed with Covid-19 4 days ago.  High risk criteria include:  > 65 years of age, hypertension, cancer, coronary artery disease, and chronic kidney disease.  Initiated Remdesivir 200 mg IVPB today, followed by Remdesivir 100 mg IVPB once daily for 4 more days.  Pharmacy will continue to follow and monitor.      Leann Quintanilla, PharmD  02/07/23 13:56 CST    "

## 2023-02-08 LAB
ALBUMIN SERPL-MCNC: 3.5 G/DL (ref 3.5–5.2)
ALBUMIN/GLOB SERPL: 1.1 G/DL
ALP SERPL-CCNC: 54 U/L (ref 39–117)
ALT SERPL W P-5'-P-CCNC: 17 U/L (ref 1–41)
ANION GAP SERPL CALCULATED.3IONS-SCNC: 13 MMOL/L (ref 5–15)
AST SERPL-CCNC: 24 U/L (ref 1–40)
BILIRUB SERPL-MCNC: 0.6 MG/DL (ref 0–1.2)
BUN SERPL-MCNC: 33 MG/DL (ref 8–23)
BUN/CREAT SERPL: 41.8 (ref 7–25)
CALCIUM SPEC-SCNC: 8.9 MG/DL (ref 8.6–10.5)
CHLORIDE SERPL-SCNC: 99 MMOL/L (ref 98–107)
CO2 SERPL-SCNC: 26 MMOL/L (ref 22–29)
CREAT SERPL-MCNC: 0.79 MG/DL (ref 0.76–1.27)
CRP SERPL-MCNC: 21.21 MG/DL (ref 0–0.5)
EGFRCR SERPLBLD CKD-EPI 2021: 84.9 ML/MIN/1.73
GLOBULIN UR ELPH-MCNC: 3.1 GM/DL
GLUCOSE BLDC GLUCOMTR-MCNC: 147 MG/DL (ref 70–130)
GLUCOSE BLDC GLUCOMTR-MCNC: 207 MG/DL (ref 70–130)
GLUCOSE BLDC GLUCOMTR-MCNC: 221 MG/DL (ref 70–130)
GLUCOSE SERPL-MCNC: 168 MG/DL (ref 65–99)
NT-PROBNP SERPL-MCNC: 1106 PG/ML (ref 0–1800)
POTASSIUM SERPL-SCNC: 3.9 MMOL/L (ref 3.5–5.2)
PROT SERPL-MCNC: 6.6 G/DL (ref 6–8.5)
QT INTERVAL: 404 MS
QTC INTERVAL: 451 MS
SODIUM SERPL-SCNC: 138 MMOL/L (ref 136–145)

## 2023-02-08 PROCEDURE — 83880 ASSAY OF NATRIURETIC PEPTIDE: CPT | Performed by: INTERNAL MEDICINE

## 2023-02-08 PROCEDURE — 63710000001 INSULIN LISPRO (HUMAN) PER 5 UNITS: Performed by: FAMILY MEDICINE

## 2023-02-08 PROCEDURE — 97165 OT EVAL LOW COMPLEX 30 MIN: CPT | Performed by: OCCUPATIONAL THERAPIST

## 2023-02-08 PROCEDURE — 99223 1ST HOSP IP/OBS HIGH 75: CPT | Performed by: INTERNAL MEDICINE

## 2023-02-08 PROCEDURE — 80053 COMPREHEN METABOLIC PANEL: CPT | Performed by: FAMILY MEDICINE

## 2023-02-08 PROCEDURE — 86140 C-REACTIVE PROTEIN: CPT | Performed by: NURSE PRACTITIONER

## 2023-02-08 PROCEDURE — 94799 UNLISTED PULMONARY SVC/PX: CPT

## 2023-02-08 PROCEDURE — 25010000002 CEFTRIAXONE PER 250 MG: Performed by: FAMILY MEDICINE

## 2023-02-08 PROCEDURE — 25010000002 DEXAMETHASONE SODIUM PHOSPHATE 10 MG/ML SOLUTION: Performed by: FAMILY MEDICINE

## 2023-02-08 PROCEDURE — 94640 AIRWAY INHALATION TREATMENT: CPT

## 2023-02-08 PROCEDURE — 94664 DEMO&/EVAL PT USE INHALER: CPT

## 2023-02-08 PROCEDURE — 97162 PT EVAL MOD COMPLEX 30 MIN: CPT

## 2023-02-08 PROCEDURE — 25010000002 REMDESIVIR 100 MG/20ML SOLUTION 1 EACH VIAL: Performed by: FAMILY MEDICINE

## 2023-02-08 PROCEDURE — 82962 GLUCOSE BLOOD TEST: CPT

## 2023-02-08 RX ORDER — FUROSEMIDE 10 MG/ML
60 INJECTION INTRAMUSCULAR; INTRAVENOUS ONCE
Status: COMPLETED | OUTPATIENT
Start: 2023-02-09 | End: 2023-02-09

## 2023-02-08 RX ORDER — DEXAMETHASONE SODIUM PHOSPHATE 10 MG/ML
6 INJECTION, SOLUTION INTRAMUSCULAR; INTRAVENOUS DAILY
Status: DISCONTINUED | OUTPATIENT
Start: 2023-02-08 | End: 2023-02-08

## 2023-02-08 RX ORDER — BUDESONIDE AND FORMOTEROL FUMARATE DIHYDRATE 160; 4.5 UG/1; UG/1
2 AEROSOL RESPIRATORY (INHALATION)
Status: DISCONTINUED | OUTPATIENT
Start: 2023-02-08 | End: 2023-02-17 | Stop reason: HOSPADM

## 2023-02-08 RX ORDER — ALBUTEROL SULFATE 90 UG/1
2 AEROSOL, METERED RESPIRATORY (INHALATION)
Status: DISCONTINUED | OUTPATIENT
Start: 2023-02-08 | End: 2023-02-17 | Stop reason: HOSPADM

## 2023-02-08 RX ORDER — METHYLPREDNISOLONE SODIUM SUCCINATE 125 MG/2ML
60 INJECTION, POWDER, LYOPHILIZED, FOR SOLUTION INTRAMUSCULAR; INTRAVENOUS EVERY 8 HOURS SCHEDULED
Status: DISCONTINUED | OUTPATIENT
Start: 2023-02-08 | End: 2023-02-09

## 2023-02-08 RX ADMIN — GUAIFENESIN 600 MG: 600 TABLET, EXTENDED RELEASE ORAL at 09:58

## 2023-02-08 RX ADMIN — FINASTERIDE 5 MG: 5 TABLET, FILM COATED ORAL at 09:58

## 2023-02-08 RX ADMIN — REMDESIVIR 100 MG: 100 INJECTION, POWDER, LYOPHILIZED, FOR SOLUTION INTRAVENOUS at 09:58

## 2023-02-08 RX ADMIN — PRAVASTATIN SODIUM 80 MG: 20 TABLET ORAL at 09:58

## 2023-02-08 RX ADMIN — BUDESONIDE AND FORMOTEROL FUMARATE DIHYDRATE 2 PUFF: 160; 4.5 AEROSOL RESPIRATORY (INHALATION) at 18:51

## 2023-02-08 RX ADMIN — INSULIN LISPRO 8 UNITS: 100 INJECTION, SOLUTION INTRAVENOUS; SUBCUTANEOUS at 12:27

## 2023-02-08 RX ADMIN — ALBUTEROL SULFATE 2 PUFF: 90 AEROSOL, METERED RESPIRATORY (INHALATION) at 14:52

## 2023-02-08 RX ADMIN — CEFTRIAXONE 1 G: 1 INJECTION, POWDER, FOR SOLUTION INTRAMUSCULAR; INTRAVENOUS at 20:49

## 2023-02-08 RX ADMIN — PANTOPRAZOLE SODIUM 40 MG: 40 TABLET, DELAYED RELEASE ORAL at 05:07

## 2023-02-08 RX ADMIN — METFORMIN HYDROCHLORIDE 500 MG: 500 TABLET ORAL at 20:40

## 2023-02-08 RX ADMIN — METOPROLOL TARTRATE 12.5 MG: 25 TABLET, FILM COATED ORAL at 09:58

## 2023-02-08 RX ADMIN — TERAZOSIN HYDROCHLORIDE 10 MG: 5 CAPSULE ORAL at 20:40

## 2023-02-08 RX ADMIN — ASPIRIN 81 MG: 81 TABLET, COATED ORAL at 09:58

## 2023-02-08 RX ADMIN — ISOSORBIDE MONONITRATE 30 MG: 30 TABLET, EXTENDED RELEASE ORAL at 09:58

## 2023-02-08 RX ADMIN — HYDROCHLOROTHIAZIDE 12.5 MG: 25 TABLET ORAL at 09:58

## 2023-02-08 RX ADMIN — DEXAMETHASONE SODIUM PHOSPHATE 6 MG: 10 INJECTION, SOLUTION INTRAMUSCULAR; INTRAVENOUS at 09:58

## 2023-02-08 RX ADMIN — APIXABAN 5 MG: 5 TABLET, FILM COATED ORAL at 20:40

## 2023-02-08 RX ADMIN — ALBUTEROL SULFATE 2 PUFF: 90 AEROSOL, METERED RESPIRATORY (INHALATION) at 06:31

## 2023-02-08 RX ADMIN — APIXABAN 5 MG: 5 TABLET, FILM COATED ORAL at 09:58

## 2023-02-08 RX ADMIN — FERROUS SULFATE TAB 325 MG (65 MG ELEMENTAL FE) 325 MG: 325 (65 FE) TAB at 09:58

## 2023-02-08 RX ADMIN — INSULIN LISPRO 8 UNITS: 100 INJECTION, SOLUTION INTRAVENOUS; SUBCUTANEOUS at 17:54

## 2023-02-08 RX ADMIN — OXYCODONE HYDROCHLORIDE AND ACETAMINOPHEN 500 MG: 500 TABLET ORAL at 09:58

## 2023-02-08 RX ADMIN — ALBUTEROL SULFATE 2 PUFF: 90 AEROSOL, METERED RESPIRATORY (INHALATION) at 10:41

## 2023-02-08 RX ADMIN — ALBUTEROL SULFATE 2 PUFF: 90 AEROSOL, METERED RESPIRATORY (INHALATION) at 18:51

## 2023-02-08 RX ADMIN — ALBUTEROL SULFATE 2 PUFF: 90 AEROSOL, METERED RESPIRATORY (INHALATION) at 22:46

## 2023-02-08 RX ADMIN — ALBUTEROL SULFATE 2 PUFF: 90 AEROSOL, METERED RESPIRATORY (INHALATION) at 02:25

## 2023-02-08 NOTE — H&P
[facility] Medicine Services  HISTORY AND PHYSICAL    Date of Admission: 2/7/2023  Primary Care Physician: Abel Romero MD    Subjective   Primary Historian: Patient    Chief Complaint: SOA    History of Present Illness  Mr. Durham is an 89 year old gentleman who resides in [place], [place].  He has a history of A-fib, BPH, and T2DM.  These three chronic conditions are stable.  He also has chronic hypoxic respiratory failure secondary to COPD.  He wears 4L of Oxygen via nasal cannula.  He follows with Dr. Romero for primary care support.      He presents to the ER with 6 days of SOA.  He developed cough and SOA last Wedensday.  He saw his Doctor and was tested for Covid-19 and the flu.  He was positive for Covid-19.  He was started on antibiotics.  He has not improved.      He has continued to have cough.  He has developed fever as high as 102.  He has thick mucus.  He is being admitted for further evaluation and management.              Review of Systems   Constitutional: Positive for fatigue. Negative for fever.   HENT: Negative for congestion and ear pain.    Eyes: Negative for redness and visual disturbance.   Respiratory: Positive for cough, shortness of breath and wheezing.    Cardiovascular: Negative for chest pain and palpitations.   Gastrointestinal: Negative for abdominal pain, diarrhea, nausea and vomiting.   Endocrine: Negative for cold intolerance and heat intolerance.   Genitourinary: Negative for dysuria and frequency.   Musculoskeletal: Negative for arthralgias and back pain.   Skin: Negative for rash and wound.   Neurological: Positive for weakness. Negative for dizziness and headaches.   Psychiatric/Behavioral: Negative for confusion. The patient is not nervous/anxious.           Otherwise complete ROS reviewed and negative except as mentioned in the HPI.    Past Medical History:   Past Medical History:   Diagnosis Date   • A-fib (HCC)    • AAA (abdominal  aortic aneurysm) without rupture    • Arthritis    • BPH (benign prostatic hypertrophy)    • Cataract     bialteral   • CKD (chronic kidney disease)    • COPD (chronic obstructive pulmonary disease) ([facility])    • Coronary artery disease involving native coronary artery of native heart without angina pectoris 1/20/2017    CABG/Az/[place]/1981 No TCC echo  7.16.18 Right ventricular cavity is mild-to-moderately dilated. Left ventricular diastolic dysfunction (grade I) consistent with impaired relaxation. Left ventricular systolic function is normal. Left atrial cavity size is borderline dilated. 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 BMS vs REGGIE Get cabg report  ;  Surgeon: Bradley Carver MD;  Location: [facility] CATH INVASIVE LOCATION;  Service: Cardiology   • COLONOSCOPY N/A 6/15/2017    Diverticulosis repeat exam prn   • COLONOSCOPY W/ POLYPECTOMY  10/21/2013    2 Tubular adenomatous polyps, Diverticulosis repeat exam in 5 years   • CORONARY ARTERY BYPASS  GRAFT  1980    X 3   • CYSTOSCOPY RETROGRADE PYELOGRAM Bilateral 2/8/2021    Procedure: CYSTOSCOPY RETROGRADE PYELOGRAM;  Surgeon: Danie Cadena MD;  Location:  [facility] OR;  Service: Urology;  Laterality: Bilateral;   • ENDOSCOPY N/A 6/15/2017    LA Grade A reflux esophagitis   • EYE SURGERY Bilateral    • HERNIA REPAIR     • TRANSURETHRAL RESECTION OF BLADDER TUMOR N/A 2/8/2021    Procedure: CYSTOSCOPY TRANSURETHRAL RESECTION OF BLADDER TUMOR WITH GEMCITABINE INSTILLATION;  Surgeon: Danie Cadena MD;  Location:  [facility] OR;  Service: Urology;  Laterality: N/A;     Social History:  reports that he quit smoking about 43 years ago. His smoking use included cigarettes. He started smoking about 69 years ago. He has a 26.00 pack-year smoking history. He has never used smokeless tobacco. He reports that he does not drink alcohol and does not use drugs.    Family History: family history includes Diabetes in his father; Heart disease in his father and mother; Melanoma in his mother; Prostate cancer in his father; Stroke in his mother.       Allergies:  Allergies   Allergen Reactions   • Symbicort [Budesonide-Formoterol Fumarate] Dizziness     Patient passed out and fell   • Prozac [Fluoxetine Hcl] Mental Status Change     Bad dreams.       Medications:  Prior to Admission medications    Medication Sig Start Date End Date Taking? Authorizing Provider   acetaminophen (TYLENOL) 325 MG tablet Take 2 tablets by mouth 2 (Two) Times a Day. 4/8/21  Yes Abel Romero MD   albuterol sulfate  (90 Base) MCG/ACT inhaler Inhale 2 puffs Every 6 (Six) Hours As Needed for Wheezing.   Yes Gal Fischer MD   apixaban (ELIQUIS) 5 MG tablet tablet Take 5 mg by mouth 2 (Two) Times a Day.   Yes Gal Fischer MD   Artificial Tear Solution (Just Tears Eye Drops) solution Apply 1-2 drops to eye(s) as directed by provider Daily As Needed (dry eyes). 4/8/21  Yes Abel Romero MD   ascorbic acid (VITAMIN C)  500 MG tablet Take 500 mg by mouth Daily.   Yes Gal Fischer MD   aspirin 81 MG EC tablet Take 81 mg by mouth Daily.   Yes Gal Fischer MD   Budeson-Glycopyrrol-Formoterol (Breztri Aerosphere) 160-9-4.8 MCG/ACT aerosol inhaler Inhale 2 puffs 2 (Two) Times a Day. 1/3/23  Yes Ross Cameron APRN   calcium carbonate-vitamin d 600-400 MG-UNIT per tablet Take 1 tablet by mouth 2 (Two) Times a Day. 4/8/21  Yes Abel Romero MD   ferrous sulfate 325 (65 Fe) MG tablet Take 325 mg by mouth Daily With Breakfast.   Yes Gal Fischer MD   finasteride (PROSCAR) 5 MG tablet Take 5 mg by mouth Daily.   Yes Gal Fischer MD   glyburide (DIAbeta) 5 MG tablet Take 5 mg by mouth Daily With Breakfast. And 2.5 mg by mouth every night   Yes Gal Fischer MD   guaiFENesin (MUCINEX) 600 MG 12 hr tablet Take 600 mg by mouth Daily.   Yes Gal Fischer MD   hydroCHLOROthiazide (MICROZIDE) 12.5 MG capsule Take 1 capsule by mouth Daily. 10/11/22  Yes Bradley Carver MD   hydrocortisone 1 % lotion Apply  topically to the appropriate area as directed 2 (Two) Times a Day. 7/11/22  Yes Abel Romero MD   isosorbide mononitrate (IMDUR) 30 MG 24 hr tablet Take 30 mg by mouth Daily.   Yes Gal Fischer MD   loratadine (CLARITIN) 10 MG tablet Take 10 mg by mouth Daily.   Yes Gal Fischer MD   metFORMIN (Glucophage) 500 MG tablet Take 1 tablet by mouth Every Night. 7/12/22  Yes Abel Romero MD   methylPREDNISolone (MEDROL) 4 MG dose pack Take  by mouth 2 (Two) Times a Day. Take as directed on package instructions.   Yes Gal Fischer MD   metoprolol tartrate (LOPRESSOR) 25 MG tablet Take 12.5 mg by mouth 2 (Two) Times a Day.   Yes Gal Fischer MD   multivitamin with minerals (Centrum Silver Adult 50+) tablet tablet Take 1 tablet by mouth Daily. 4/8/21  Yes Abel Romero MD   pantoprazole (PROTONIX) 40 MG EC tablet Take 1 tablet by mouth  Daily. 7/6/22  Yes Abel Romero MD   potassium chloride 10 MEQ CR tablet Take 1 tablet by mouth Daily. 10/11/22  Yes Bradley Carver MD   pravastatin (Pravachol) 80 MG tablet Take 1 tablet by mouth Daily. 7/14/22  Yes Abel Romero MD   terazosin (HYTRIN) 10 MG capsule Take 10 mg by mouth Every Night.   Yes ProviderGal MD   Insulin Lispro (humaLOG) 100 UNIT/ML injection Inject  under the skin into the appropriate area as directed. Using sliding scale. Not to exceed 30 units daily    ProviderGal MD   nitroglycerin (Nitrostat) 0.4 MG SL tablet Place 1 tablet under the tongue Every 5 (Five) Minutes As Needed for Chest Pain. 7/6/22   Abel Romero MD   amoxicillin-clavulanate (Augmentin) 875-125 MG per tablet Take 1 tablet by mouth 2 (Two) Times a Day.  Patient not taking: Reported on 2/7/2023 1/26/23 2/7/23  Feliz Frye APRN   ascorbic acid (VITAMIN C) 1000 MG tablet Take 1 tablet by mouth Daily.  Patient taking differently: Take 500 mg by mouth Daily. 4/8/21 2/7/23  Abel Romero MD   Eliquis 5 MG tablet tablet TAKE 1 TABLET TWICE A DAY  Patient taking differently: 5 mg. 7/12/22 2/7/23  Abel Romero MD   finasteride (PROSCAR) 5 MG tablet TAKE 1 TABLET DAILY  Patient taking differently: 5 mg. 7/12/22 2/7/23  Abel Romero MD   gabapentin (NEURONTIN) 600 MG tablet Take 1 tablet by mouth 3 (Three) Times a Day.  Patient not taking: Reported on 2/7/2023 6/14/22 2/7/23  Abel Romero MD   glucose blood test strip Use as instructed to check glucose daily and as needed for highs and lows. 7/6/22 2/7/23  Abel Romero MD   glyburide (DIAbeta) 5 MG tablet TAKE 1 TABLET EVERY MORNING AND ONE-HALF (1/2) TABLET BEFORE SUPPER  Patient taking differently: Take 1 tablet by mouth every morning and 1/2 tablet by mouth every night 7/12/22 2/7/23  Abel Romero MD   HYDROcodone-acetaminophen (Norco) 5-325 MG per tablet Take 1 tablet  by mouth Every 6 (Six) Hours As Needed for Mild Pain  or Moderate Pain .  Patient not taking: Reported on 2/7/2023 7/11/22 2/7/23  Abel Romero MD   Insulin Lispro, 1 Unit Dial, (HUMALOG) 100 UNIT/ML solution pen-injector INJECT USING SLIDING SCALE PROVIDED BY HOSPITAL. MAX DAILY DOSE OF 30 UNITS 7/30/22 2/7/23  Gal Fischer MD   isosorbide mononitrate (IMDUR) 30 MG 24 hr tablet TAKE 1 TABLET DAILY  Patient taking differently: 30 mg. 9/15/22 2/7/23  Abel Romero MD   Lancets (freestyle) lancets Use once daily 4/8/21 2/7/23  Abel Romero MD   Lantus SoloStar 100 UNIT/ML injection pen INJECT 15 UNITS UNDER THE SKIN ONCE DAILY AS DIRECTED  Patient not taking: Reported on 2/7/2023 7/30/22 2/7/23  Gal Fischer MD   loratadine (CLARITIN) 10 MG tablet TAKE 1 TABLET DAILY  Patient taking differently: 10 mg. 7/12/22 2/7/23  Abel Romero MD   metoprolol tartrate (LOPRESSOR) 25 MG tablet TAKE ONE-HALF (1/2) TABLET TWICE A DAY 10/11/22 2/7/23  Abel Romero MD   NON FORMULARY CPAP per mask with 4L oxygen nightly  2/7/23  Gal Fischer MD   O2 (OXYGEN) Inhale 4 L/min Continuous. Per nasal cannula  2/7/23  Gal Fischer MD   ProAir  (90 Base) MCG/ACT inhaler USE 2 INHALATIONS FOUR TIMES A DAY 7/12/22 2/7/23  Abel Romero MD   terazosin (HYTRIN) 10 MG capsule TAKE 1 CAPSULE DAILY 7/12/22 2/7/23  Abel Romero MD   TRUEplus 5-Bevel Pen Needles 32G X 4 MM misc See Admin Instructions. 7/30/22 2/7/23  Gal Fischer MD     I have utilized all available immediate resources to obtain, update, or review the patient's current medications (including all prescriptions, over-the-counter products, herbals, cannabis/cannabidiol products, and vitamin/mineral/dietary (nutritional) supplements).    Objective     Vital Signs: /76 (BP Location: Right arm, Patient Position: Sitting)   Pulse 79   Temp 97.9 °F (36.6 °C) (Oral)   Resp  "20   Ht 180.3 cm (71\")   Wt 83.9 kg (185 lb)   SpO2 92%   BMI 25.80 kg/m²   Physical Exam  Vitals reviewed.   Constitutional:       Appearance: He is well-developed.   HENT:      Head: Normocephalic and atraumatic.      Right Ear: External ear normal.      Left Ear: External ear normal.      Nose: Nose normal.   Eyes:      General: No scleral icterus.        Right eye: No discharge.         Left eye: No discharge.      Conjunctiva/sclera: Conjunctivae normal.      Pupils: Pupils are equal, round, and reactive to light.   Neck:      Thyroid: No thyromegaly.      Trachea: No tracheal deviation.   Cardiovascular:      Rate and Rhythm: Normal rate and regular rhythm.      Heart sounds: Normal heart sounds. No murmur heard.    No friction rub. No gallop.   Pulmonary:      Effort: Pulmonary effort is normal. No respiratory distress.      Breath sounds: No stridor. Decreased breath sounds, wheezing and rales present.   Chest:      Chest wall: No tenderness.   Abdominal:      General: Bowel sounds are normal. There is no distension.      Palpations: Abdomen is soft. There is no mass.      Tenderness: There is no abdominal tenderness. There is no guarding or rebound.      Hernia: No hernia is present.   Musculoskeletal:         General: No deformity. Normal range of motion.      Cervical back: Normal range of motion and neck supple.   Lymphadenopathy:      Cervical: No cervical adenopathy.   Skin:     General: Skin is warm and dry.      Coloration: Skin is not pale.      Findings: No erythema or rash.   Neurological:      Mental Status: He is alert and oriented to person, place, and time.      Cranial Nerves: No cranial nerve deficit.      Motor: No abnormal muscle tone.      Coordination: Coordination normal.      Deep Tendon Reflexes: Reflexes are normal and symmetric. Reflexes normal.   Psychiatric:         Behavior: Behavior normal.         Thought Content: Thought content normal.         Judgment: Judgment normal. "            Results Reviewed:  Lab Results (last 24 hours)     Procedure Component Value Units Date/Time    POC Glucose Once [[id number]]  (Abnormal) Collected: 02/07/23 1633    Specimen: Blood Updated: 02/07/23 1644     Glucose 257 mg/dL      Comment: : [id number] Alexys Avila ID: [id number]       Hepatic Function Panel [[id number]] Collected: 02/07/23 0936    Specimen: Blood Updated: 02/07/23 1136     Total Protein 7.0 g/dL      Albumin 3.6 g/dL      ALT (SGPT) 14 U/L      AST (SGOT) 20 U/L      Alkaline Phosphatase 46 U/L      Total Bilirubin 0.2 mg/dL      Bilirubin, Direct <0.2 mg/dL      Bilirubin, Indirect --     Comment: Unable to calculate       Basic Metabolic Panel [[id number]]  (Abnormal) Collected: 02/07/23 0936    Specimen: Blood Updated: 02/07/23 1044     Glucose 181 mg/dL      BUN 27 mg/dL      Creatinine 1.09 mg/dL      Sodium 136 mmol/L      Potassium 4.1 mmol/L      Chloride 97 mmol/L      CO2 28.0 mmol/L      Calcium 9.3 mg/dL      BUN/Creatinine Ratio 24.8     Anion Gap 11.0 mmol/L      eGFR 64.9 mL/min/1.73     Narrative:      GFR Normal >60  Chronic Kidney Disease <60  Kidney Failure <15    The GFR formula is only valid for adults with stable renal function between ages 18 and 70.    Magnesium [[id number]]  (Normal) Collected: 02/07/23 0936    Specimen: Blood Updated: 02/07/23 1038     Magnesium 1.9 mg/dL     COVID-19 and FLU A/B PCR - Swab, Nasopharynx [[id number]]  (Abnormal) Collected: 02/07/23 0928    Specimen: Swab from Nasopharynx Updated: 02/07/23 1010     COVID19 Detected     Influenza A PCR Not Detected     Influenza B PCR Not Detected    Narrative:      Fact sheet for providers: https://www.fda.gov/media/724812/download    Fact sheet for patients: https://www.fda.gov/media/267749/download    Test performed by PCR.  Influenza A and Influenza B negative results should be considered presumptive in samples that have a positive SARS-CoV-2 result.    Competitive inhibition studies showed  that SARS-CoV-2 virus, when present at concentrations above 3.6E+04 copies/mL, can inhibit the detection and amplification of influenza A and influenza B virus RNA if present at or below 1.8E+02 copies/mL or 4.9E+02 copies/mL, respectively, and may lead to false negative influenza virus results. If co-infection with influenza A or influenza B virus is suspected in samples with a positive SARS-CoV-2 result, the sample should be re-tested with another FDA cleared, approved, or authorized influenza test, if influenza virus detection would change clinical management.    CBC & Differential [[id number]]  (Abnormal) Collected: 02/07/23 0936    Specimen: Blood Updated: 02/07/23 0946    Narrative:      The following orders were created for panel order CBC & Differential.  Procedure                               Abnormality         Status                     ---------                               -----------         ------                     CBC Auto Differential[[id number]]        Abnormal            Final result                 Please view results for these tests on the individual orders.    CBC Auto Differential [[id number]]  (Abnormal) Collected: 02/07/23 0936    Specimen: Blood Updated: 02/07/23 0946     WBC 12.35 10*3/mm3      RBC 3.82 10*6/mm3      Hemoglobin 10.9 g/dL      Hematocrit 34.1 %      MCV 89.3 fL      MCH 28.5 pg      MCHC 32.0 g/dL      RDW 14.2 %      RDW-SD 46.8 fl      MPV 9.3 fL      Platelets 251 10*3/mm3      Neutrophil % 84.4 %      Lymphocyte % 6.6 %      Monocyte % 8.1 %      Eosinophil % 0.0 %      Basophil % 0.2 %      Immature Grans % 0.7 %      Neutrophils, Absolute 10.43 10*3/mm3      Lymphocytes, Absolute 0.81 10*3/mm3      Monocytes, Absolute 1.00 10*3/mm3      Eosinophils, Absolute 0.00 10*3/mm3      Basophils, Absolute 0.02 10*3/mm3      Immature Grans, Absolute 0.09 10*3/mm3      nRBC 0.0 /100 WBC     Blood Gas, Arterial - [[id number]]  (Abnormal) Collected: 02/07/23 0943    Specimen:  Arterial Blood Updated: 02/07/23 0943     Site Right Radial     Doni's Test Positive     pH, Arterial 7.416 pH units      pCO2, Arterial 43.8 mm Hg      pO2, Arterial 66.8 mm Hg      Comment: 84 Value below reference range        HCO3, Arterial 28.1 mmol/L      Comment: 83 Value above reference range        Base Excess, Arterial 3.1 mmol/L      Comment: 83 Value above reference range        O2 Saturation, Arterial 94.7 %      Temperature 37.0 C      Barometric Pressure for Blood Gas 753 mmHg      Modality Simple Mask     Flow Rate 4.0 lpm      Ventilator Mode NA     Collected by [id number]     Comment: Meter: [id number]     :  [id number]        pCO2, Temperature Corrected 43.8 mm Hg      pH, Temp Corrected 7.416 pH Units      pO2, Temperature Corrected 66.8 mm Hg         Imaging Results (Last 24 Hours)     Procedure Component Value Units Date/Time    XR Chest 1 View [[id number]] Collected: 02/07/23 1001     Updated: 02/07/23 1006    Narrative:      HISTORY: Shortness of breath     CXR: A frontal view the chest obtained     COMPARISON: 02/20/2022     FINDINGS: Prior mediastinal surgery with mechanical device implanted in  left chest wall. Advanced emphysema. Increasing interstitial densities  in the lower lobes may relate to pneumonitis versus edema. Questionable  trace pleural effusions. No pneumothorax. Stable mediastinal contours.  Thoracic aortic calcification.       Impression:      1. Increasing interstitial densities in the mid and lower lobes may  relate to edema versus pneumonitis.  2. Prior mediastinal surgery. Device implanted within the anterior left  chest wall.  3. Emphysema. No pneumothorax.  This report was finalized on 02/07/2023 10:03 by Dr. Erika Cobos MD.        I have personally reviewed and interpreted the radiology studies and ECG obtained at time of admission.     Assessment / Plan   Assessment:   Active Hospital Problems    Diagnosis    • **COVID-19        Treatment Plan  The  patient will be admitted to my service here at [facility].     1.  Acute on chronic hypoxic respiratory failure  -Decadron  -Albuterol    2.  COPD AE  -Decadron  -Albuterol    3.  Covid-19 pneumonia  -Decadron  -Albuterol    4.  A-fib  -Eliquis  -Metoprolol    5.  CAD  -ASA  -Pravastatin  -Metoprolol    6.  T2DM  -Metformin  -SSI  -Imdur    7.  HTN  -Metoprolol  -HCTZ    8.  HLD  -Pravastatin    9.  GERD  -Protonix    10.  BPH  -Hytrin  -Proscar    Medical Decision Making  Number and Complexity of problems: 10 problems, high complexity:  Respiratory failure presents threat to life and organ function    Differential Diagnosis: COPD, Covid-19, pneumonia    Conditions and Status        Condition is worsening.     [facility] Data  External documents reviewed: [facility] Clinical summary  Cardiac tracing (EKG, telemetry) interpretation: NSR  Radiology interpretation: CXR:  Bilateral infiltrates  Labs reviewed: Leukocytosis, hyperglycemia      Discussed with: Patient, nursing     Care Planning  Shared decision making: Patient agreeable to treatment plan  Code status and discussions: full code    Disposition  Social Determinants of Health that impact treatment or disposition: n/a  Estimated length of stay is 3-4 days    I confirmed that the patient's advanced care plan is present, code status is documented, and a surrogate decision maker is listed in the patient's medical record.     The patient's surrogate decision maker is his daughter.     The patient was seen and examined by me on 2/7/23 at 1715    Electronically signed by Trenton Cespedes MD, 02/07/23, 18:30 CST.

## 2023-02-08 NOTE — PLAN OF CARE
Goal Outcome Evaluation:  Plan of Care Reviewed With: patient           Outcome Evaluation: Pt. resting in bed. Up in chair around lunch today. Vapotherm 35L 75%  O2. Pulmology consulted today. Alert x4. No complaints of pain. SCD's on pt. Diet tolerated well. Safety maintained.

## 2023-02-08 NOTE — PROGRESS NOTES
[facility] Medicine Services  INPATIENT PROGRESS NOTE    Patient Name: Gonzalo Durham  Date of Admission: 2/7/2023  Today's Date: 02/08/23  Length of Stay: 1  Primary Care Physician: Abel Romero MD    Subjective   Chief Complaint: SOA  HPI   Got more SOA through the night, he was requiring up to 40L Vapotherm.  He is still requiring 35L Vapotherm.  He is afebrile        Review of Systems   Constitutional: Positive for fatigue. Negative for fever.   HENT: Negative for congestion and ear pain.    Eyes: Negative for redness and visual disturbance.   Respiratory: Positive for cough, shortness of breath and wheezing.    Cardiovascular: Negative for chest pain and palpitations.   Gastrointestinal: Negative for abdominal pain, diarrhea, nausea and vomiting.   Endocrine: Negative for cold intolerance and heat intolerance.   Genitourinary: Negative for dysuria and frequency.   Musculoskeletal: Negative for arthralgias and back pain.   Skin: Negative for rash and wound.   Neurological: Positive for weakness. Negative for dizziness and headaches.   Psychiatric/Behavioral: Negative for confusion. The patient is not nervous/anxious.           All pertinent negatives and positives are as above. All other systems have been reviewed and are negative unless otherwise stated.     Objective    Temp:  [97.4 °F (36.3 °C)-98.1 °F (36.7 °C)] 97.4 °F (36.3 °C)  Heart Rate:  [62-78] 72  Resp:  [18-24] 20  BP: (115-146)/(52-79) 126/52  Physical Exam  Vitals reviewed.   Constitutional:       Appearance: He is well-developed.   HENT:      Head: Normocephalic and atraumatic.      Right Ear: External ear normal.      Left Ear: External ear normal.      Nose: Nose normal.   Eyes:      General: No scleral icterus.        Right eye: No discharge.         Left eye: No discharge.      Conjunctiva/sclera: Conjunctivae normal.      Pupils: Pupils are equal, round, and reactive to light.   Neck:      Thyroid:  No thyromegaly.      Trachea: No tracheal deviation.   Cardiovascular:      Rate and Rhythm: Normal rate and regular rhythm.      Heart sounds: Normal heart sounds. No murmur heard.    No friction rub. No gallop.   Pulmonary:      Effort: Pulmonary effort is normal. No respiratory distress.      Breath sounds: No stridor. Decreased breath sounds, wheezing and rhonchi present. No rales.   Chest:      Chest wall: No tenderness.   Abdominal:      General: Bowel sounds are normal. There is no distension.      Palpations: Abdomen is soft. There is no mass.      Tenderness: There is no abdominal tenderness. There is no guarding or rebound.      Hernia: No hernia is present.   Musculoskeletal:         General: No deformity. Normal range of motion.      Cervical back: Normal range of motion and neck supple.   Lymphadenopathy:      Cervical: No cervical adenopathy.   Skin:     General: Skin is warm and dry.      Coloration: Skin is not pale.      Findings: No erythema or rash.   Neurological:      Mental Status: He is alert and oriented to person, place, and time.      Cranial Nerves: No cranial nerve deficit.      Motor: No abnormal muscle tone.      Coordination: Coordination normal.      Deep Tendon Reflexes: Reflexes are normal and symmetric. Reflexes normal.   Psychiatric:         Behavior: Behavior normal.         Thought Content: Thought content normal.         Judgment: Judgment normal.           Results Review:  I have reviewed the labs, radiology results, and diagnostic studies.    Laboratory Data:   Results from last 7 days   Lab Units 02/07/23  0936   WBC 10*3/mm3 12.35*   HEMOGLOBIN g/dL 10.9*   HEMATOCRIT % 34.1*   PLATELETS 10*3/mm3 251        Results from last 7 days   Lab Units 02/08/23  0447 02/07/23  0936   SODIUM mmol/L 138 136   POTASSIUM mmol/L 3.9 4.1   CHLORIDE mmol/L 99 97*   CO2 mmol/L 26.0 28.0   BUN mg/dL 33* 27*   CREATININE mg/dL 0.79 1.09   CALCIUM mg/dL 8.9 9.3   BILIRUBIN mg/dL 0.6 0.2   ALK  PHOS U/L 54 46   ALT (SGPT) U/L 17 14   AST (SGOT) U/L 24 20   GLUCOSE mg/dL 168* 181*       Culture Data:   No results found for: BLOODCX, URINECX, WOUNDCX, MRSACX, RESPCX, STOOLCX    Radiology Data:   Imaging Results (Last 24 Hours)     ** No results found for the last 24 hours. **          I have reviewed the patient's current medications.     Assessment/Plan   Assessment  Active Hospital Problems    Diagnosis    • **COVID-19        Treatment Plan  1.  Acute on chronic hypoxic respiratory failure  -Decadron  -Albuterol     2.  COPD AE  -Decadron  -Albuterol     3.  Covid-19 pneumonia  -Decadron  -Albuterol     4.  A-fib  -Eliquis  -Metoprolol     5.  CAD  -ASA  -Pravastatin  -Metoprolol     6.  T2DM  -Metformin  -SSI  -Imdur     7.  HTN  -Metoprolol  -HCTZ     8.  HLD  -Pravastatin     9.  GERD  -Protonix     10.  BPH  -Hytrin  -Proscar       Medical Decision Making  Number and Complexity of problems: 10 problems, high complexity:  Covid-19 pneumonia and respiratory failure represent threat to life    Risk:  High:  Requiring high flow oxygen    Differential Diagnosis: Pneumonia, Viral pneumonia, COPD, CHF    Conditions and Status        Condition is worsening.     MDM Data  Cardiac tracing (EKG, telemetry) interpretation: NSR  Labs reviewed: Normal renal function     Discussed with: patient, nursing     Care Planning  Shared decision making: Patient agreeable to treatment plan  Code status and discussions: full code    Disposition  Social Determinants of Health that impact treatment or disposition: n/a  I expect the patient to be discharged to home in 5-6 days.     Electronically signed by Trenton Cespedes MD, 02/08/23, 15:56 CST.

## 2023-02-08 NOTE — THERAPY EVALUATION
Patient Name: Gonzalo Durham  : 1934    MRN: [id number]                              Today's Date: 2023       Admit Date: 2023    Visit Dx:     ICD-10-CM ICD-9-CM   1. Impaired mobility  Z74.09 799.89     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, without long-term current use of insulin (HCC)   • Stage 3a chronic kidney disease (HCC)   • Erectile dysfunction   • Coronary artery disease involving native coronary artery of native heart without angina pectoris   • Hyperlipidemia LDL goal <70-statin   • Hypertension   • Prostatism-(young) urology   • Tremor   • Varicose vein of leg   • Plantar fasciitis   • Nocturnal leg movements   • Bad dreams   • Depression   • Peripheral neuropathy- clinical   • Hx of colonic polyps   • Gastroesophageal reflux disease   • Left lower quadrant pain   • Laboratory test*   • Anticoagulated-CAD, DM2, CVA/ASA 81+()+plavix » Anticoagulated-CAD, DM2, CVA/ASA 81+()+plavix+eliquist   • SOB: copd,CAD,#, hypoxemia   • Hypoxia#to pulmonary   • Corn or callus   • Anemia: ASA81,plavix,eliquis,gi(3/3+,div,cp,eitis   • Atherosclerosis: CAD, AAA vascular   • AAA: -(ro) steffen   • Heme positive stool   • Stage 2 moderate COPD by GOLD classification ([facility])   • Abnormal stress test   • Tingling of both feet-to consider NCV   • Cryptogenic stroke (HCC)   • Chronic diastolic congestive heart failure (HCC)   • Gross hematuria   • BPH with obstruction/lower urinary tract symptoms   • Chest pain   • Obstructive sleep apnea   • Abnormal CT of the chest /done   • 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)   • Oxygen dependent   • S/P CABG x 3   • Pulmonary hypertension (HCC)   • PAF (paroxysmal atrial fibrillation) (HCC)   • Umbilical hernia-a waqar   • COVID-19     Past Medical History:   Diagnosis Date   • A-fib (HCC)    • AAA (abdominal aortic aneurysm)  without rupture    • Arthritis    • BPH (benign prostatic hypertrophy)    • Cataract     bialteral   • CKD (chronic kidney disease)    • COPD (chronic obstructive pulmonary disease) ([facility])    • Coronary artery disease involving native coronary artery of native heart without angina pectoris 1/20/2017    CABG/Az/[place]/1981 No TCC echo  7.16.18 Right ventricular cavity is mild-to-moderately dilated. Left ventricular diastolic dysfunction (grade I) consistent with impaired relaxation. Left ventricular systolic function is normal. Left atrial cavity size is borderline dilated. RIGHT HEART ENLARGEMENT - RVSP NOT ASSESSABLE NORMAL LV AND RV SYSTOLIC FUNCTION NO SIGNIFICANT VALVULAR DYSFUNCTION  Seein   • Diabetes mellitus (HCC)     Type 2   • DM (diabetes mellitus screen)    • GERD (gastroesophageal reflux disease)    • History of loop recorder     at current time   • Hx of colonic polyp    • Hypercholesteremia    • Hypertension    • Macular degeneration     treated with injections in right eye   • Myocardial infarction (HCC)    • Neuropathy    • Oxygen deficit     at 4liters   • Prostate cancer (HCC)    • Pulmonary hypertension (HCC) 1/7/2022   • S/P CABG x 3 1/7/2022 1980 [facility]   • Sleep apnea     cpap   • Stage 3a chronic kidney disease (HCC) 1/20/2017   • Stroke ([facility])     recovererd   • Varicose vein of leg     bialteral     Past Surgical History:   Procedure Laterality Date   • APPENDECTOMY     • CARDIAC CATHETERIZATION     • CARDIAC CATHETERIZATION Left 5/22/2019    Procedure: Cardiac Catheterization/Vascular Study Positive occult blood in stools  ? BMS vs REGGIE Get cabg report  ;  Surgeon: Bradley Carver MD;  Location: [facility] CATH INVASIVE LOCATION;  Service: Cardiology   • COLONOSCOPY N/A 6/15/2017    Diverticulosis repeat exam prn   • COLONOSCOPY W/ POLYPECTOMY  10/21/2013    2 Tubular adenomatous polyps, Diverticulosis repeat exam in 5 years   • CORONARY ARTERY BYPASS GRAFT  1980    X 3   • CYSTOSCOPY  RETROGRADE PYELOGRAM Bilateral 2/8/2021    Procedure: CYSTOSCOPY RETROGRADE PYELOGRAM;  Surgeon: Danie Cadena MD;  Location:  [facility] OR;  Service: Urology;  Laterality: Bilateral;   • ENDOSCOPY N/A 6/15/2017    LA Grade A reflux esophagitis   • EYE SURGERY Bilateral    • HERNIA REPAIR     • TRANSURETHRAL RESECTION OF BLADDER TUMOR N/A 2/8/2021    Procedure: CYSTOSCOPY TRANSURETHRAL RESECTION OF BLADDER TUMOR WITH GEMCITABINE INSTILLATION;  Surgeon: Danie Cadena MD;  Location:  [facility] OR;  Service: Urology;  Laterality: N/A;      General Information     Row Name 02/08/23 0905          Physical Therapy Time and Intention    Document Type evaluation  SOA, cough, recent COVID diagnosis. Dx: A/c resp failure, AE COPD, COVID  -CHEYENNE     Mode of Treatment physical therapy  -CHEYENNE     Row Name 02/08/23 0905          General Information    Patient Profile Reviewed yes  -CHEYENNE     Prior Level of Function independent:;all household mobility;ADL's  -CHEYENNE     Existing Precautions/Restrictions fall;oxygen therapy device and L/min  COVID precautions, vapotherm  -CHEYENNE     Barriers to Rehab medically complex  -CHEYENNE     Row Name 02/08/23 0905          Living Environment    People in Home spouse  -CHEYENNE     Row Name 02/08/23 0905          Home Main Entrance    Number of Stairs, Main Entrance none  ramp  -CHEYENNE     Row Name 02/08/23 0905          Stairs Within Home, Primary    Number of Stairs, Within Home, Primary none  -CHEYENNE     Row Name 02/08/23 0905          Cognition    Orientation Status (Cognition) oriented x 4  -CHEYENNE     Row Name 02/08/23 0905          Safety Issues, Functional Mobility    Impairments Affecting Function (Mobility) endurance/activity tolerance;strength;shortness of breath  -CHEYENNE           User Key  (r) = Recorded By, (t) = Taken By, (c) = Cosigned By    Initials Name Provider Type    Rafi Payne, PT DPT Physical Therapist               Mobility     Row Name 02/08/23 0905          Bed Mobility    Comment, (Bed Mobility)  Sitting EOB  -CHEYENNE     Row Name 02/08/23 0905          Sit-Stand Transfer    Sit-Stand [place] (Transfers) standby assist  -CHEYENNE     Assistive Device (Sit-Stand Transfers) cane, straight  -CHEYENNE     Row Name 02/08/23 0905          Gait/Stairs (Locomotion)    [place] Level (Gait) standby assist  -CHEYENNE     Assistive Device (Gait) cane, straight  -CHEYENNE     Distance in Feet (Gait) 30 ft on vapotherm  -CHEYENNE     Deviations/Abnormal Patterns (Gait) gait speed decreased  -CHEYENNE           User Key  (r) = Recorded By, (t) = Taken By, (c) = Cosigned By    Initials Name Provider Type    Rafi Payne PT DPT Physical Therapist               Obj/Interventions     Row Name 02/08/23 0905          Range of Motion Comprehensive    General Range of Motion bilateral lower extremity ROM WFL  -CHEYENNE     Row Name 02/08/23 0905          Strength Comprehensive (MMT)    Comment, General Manual Muscle Testing (MMT) Assessment B LE grossly 4/5  -CHEYENNE     Row Name 02/08/23 0905          Balance    Balance Assessment sitting dynamic balance;standing dynamic balance  -CHEYENNE     Dynamic Sitting Balance independent  -CHEYENNE     Position, Sitting Balance unsupported;sitting edge of bed  -CHEYENNE     Dynamic Standing Balance standby assist  -CHEYENNE     Position/Device Used, Standing Balance unsupported;cane, straight  -CHEYENNE           User Key  (r) = Recorded By, (t) = Taken By, (c) = Cosigned By    Initials Name Provider Type    Rafi Payne PT DPT Physical Therapist               Goals/Plan     Row Name 02/08/23 0905          Transfer Goal 1 (PT)    Activity/Assistive Device (Transfer Goal 1, PT) sit-to-stand/stand-to-sit;bed-to-chair/chair-to-bed;cane, straight  -CHEYENNE     [place] Level/Cues Needed (Transfer Goal 1, PT) modified independence  -CHEYENNE     Time Frame (Transfer Goal 1, PT) long term goal (LTG);10 days  -CHEYENNE     Progress/Outcome (Transfer Goal 1, PT) new goal  -CHEYENNE     Row Name 02/08/23 0905          Gait Training Goal 1 (PT)    Activity/Assistive  Device (Gait Training Goal 1, PT) gait (walking locomotion);assistive device use;decrease fall risk;improve balance and speed;increase endurance/gait distance;increase energy conservation  -CHEYENNE     [place] Level (Gait Training Goal 1, PT) modified independence  -CHEYENNE     Distance (Gait Training Goal 1, PT) 60 ft  -CHEYENNE     Time Frame (Gait Training Goal 1, PT) long term goal (LTG);10 days  -CHEYENNE     Progress/Outcome (Gait Training Goal 1, PT) new goal  -CHEYENNE     Row Name 02/08/23 0905          Therapy Assessment/Plan (PT)    Planned Therapy Interventions (PT) bed mobility training;transfer training;gait training;balance training;home exercise program;patient/family education;postural re-education;strengthening  -CHEYENNE           User Key  (r) = Recorded By, (t) = Taken By, (c) = Cosigned By    Initials Name Provider Type    Rafi Payne, PT DPT Physical Therapist               Clinical Impression     Row Name 02/08/23 0905          Pain    Pretreatment Pain Rating 0/10 - no pain  -CHEYENNE     Row Name 02/08/23 0905          Plan of Care Review    Plan of Care Reviewed With patient  -CHEYENNE     Outcome Evaluation PT eval complete. he is alert and oriented. Remains on the vapotherm so we moved within his room as tubing allowed for vapotherm. Some SOB with activity but O2 sat WNL on vapotherm. he ambulates short distances within his room with his straight cane with SBA/CGA. PT will cont with mobiilty and strenghtening in preparation to d.c home wtih spouse, consider HH if needed at d.c.  -CHEYENNE     Row Name 02/08/23 0905          Therapy Assessment/Plan (PT)    Patient/Family Therapy Goals Statement (PT) go home  -CHEYENNE     Rehab Potential (PT) good, to achieve stated therapy goals  -CHEYENNE     Criteria for Skilled Interventions Met (PT) yes;meets criteria;skilled treatment is necessary  -CHEYENNE     Therapy Frequency (PT) 2 times/day  -CHEYENNE     Predicted Duration of Therapy Intervention (PT) ariel craig  -CHEYENNE     Row Name 02/08/23 0905           Positioning and Restraints    Pre-Treatment Position in bed  -CHEYENNE     Post Treatment Position chair  -CHEYENNE     In Chair notified nsg;sitting;call light within reach;encouraged to call for assist  video monitoring in room  -CHEYENNE           User Key  (r) = Recorded By, (t) = Taken By, (c) = Cosigned By    Initials Name Provider Type    Rafi Payne, PT DPT Physical Therapist               Outcome Measures     Row Name 02/08/23 1000 02/08/23 0905       How much help from another person do you currently need...    Turning from your back to your side while in flat bed without using bedrails? 3  -AM 3  -CHEYENNE    Moving from lying on back to sitting on the side of a flat bed without bedrails? 3  -AM 3  -CHEYENNE    Moving to and from a bed to a chair (including a wheelchair)? 3  -AM 3  -CHEYENNE    Standing up from a chair using your arms (e.g., wheelchair, bedside chair)? 3  -AM 3  -CHEYENNE    Climbing 3-5 steps with a railing? 3  -AM 2  -CHEYENNE    To walk in hospital room? 3  -AM 3  -CHEYENNE    AM-PAC 6 Clicks Score (PT) 18  -AM 17  -CHEYENNE    Highest level of mobility 6 --> Walked 10 steps or more  -AM 5 --> Static standing  -CHEYENNE    Row Name 02/08/23 0905          Functional Assessment    Outcome Measure Options AM-PAC 6 Clicks Basic Mobility (PT)  -CHEYENNE           User Key  (r) = Recorded By, (t) = Taken By, (c) = Cosigned By    Initials Name Provider Type    Rafi Payne, PT DPT Physical Therapist    Briana Richter, RN Registered Nurse                             Physical Therapy Education     Title: PT OT SLP Therapies (In Progress)     Topic: Physical Therapy (In Progress)     Point: Mobility training (Done)     Learning Progress Summary           Patient Acceptance, E, BERNARDA,DU,NR by CHEYENNE at 2/8/2023 0905    Comment: benefits of acitvity, progression of PT                   Point: Home exercise program (Not Started)     Learner Progress:  Not documented in this visit.          Point: Body mechanics (Not Started)     Learner Progress:  Not  documented in this visit.          Point: Precautions (Not Started)     Learner Progress:  Not documented in this visit.                      User Key     Initials Effective Dates Name Provider Type Discipline    CHEYENNE 02/03/23 -  Rafi Browning PT DPT Physical Therapist PT              PT Recommendation and Plan  Planned Therapy Interventions (PT): bed mobility training, transfer training, gait training, balance training, home exercise program, patient/family education, postural re-education, strengthening  Plan of Care Reviewed With: patient  Outcome Evaluation: PT eval complete. he is alert and oriented. Remains on the vapotherm so we moved within his room as tubing allowed for vapotherm. Some SOB with activity but O2 sat WNL on vapotherm. he ambulates short distances within his room with his straight cane with SBA/CGA. PT will cont with mobiilty and strenghtening in preparation to d.c home wtih spouse, consider HH if needed at d.c.     Time Calculation:    PT Charges     Row Name 02/08/23 1346             Time Calculation    Start Time 0905  -CHEYENNE      Stop Time 1000  -CHEYENNE      Time Calculation (min) 55 min  -CHEYENNE      PT Received On 02/08/23  -CHEYENNE      PT Goal Re-Cert Due Date 02/18/23  -CHEYENNE            User Key  (r) = Recorded By, (t) = Taken By, (c) = Cosigned By    Initials Name Provider Type    Rafi Payne PT DPT Physical Therapist              Therapy Charges for Today     Code Description Service Date Service Provider Modifiers Qty    [id number] HC PT EVAL MOD COMPLEXITY 4 2/8/2023 Rafi Browning PT DPT GP 1          PT G-Codes  Outcome Measure Options: AM-PAC 6 Clicks Basic Mobility (PT)  AM-PAC 6 Clicks Score (PT): 18  PT Discharge Summary  Anticipated Discharge Disposition (PT): home with assist, home with 24/7 care, home with home health, sub acute care setting    Rafi Browning, PT DPT  2/8/2023

## 2023-02-08 NOTE — NURSING NOTE
"Patient educated on Tocilizumab and was given education sheet. Patient states \"after reading the information, I don't want to take this medication.\" Yana notified of patient's decision.   "

## 2023-02-08 NOTE — THERAPY DISCHARGE NOTE
Acute Care - Occupational Therapy Discharge  [facility]    Patient Name: Gonzalo Durham  : 1934    MRN: [id number]                              Today's Date: 2023       Admit Date: 2023    Visit Dx:     ICD-10-CM ICD-9-CM   1. Impaired mobility  Z74.09 799.89   2. Decreased activities of daily living (ADL)  Z78.9 V49.89     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, without long-term current use of insulin ([facility])   • Stage 3a chronic kidney disease ([facility])   • Erectile dysfunction   • Coronary artery disease involving native coronary artery of native heart without angina pectoris   • Hyperlipidemia LDL goal <70-statin   • Hypertension   • Prostatism-(young) urology   • Tremor   • Varicose vein of leg   • Plantar fasciitis   • Nocturnal leg movements   • Bad dreams   • Depression   • Peripheral neuropathy- clinical   • Hx of colonic polyps   • Gastroesophageal reflux disease   • Left lower quadrant pain   • Laboratory test*   • Anticoagulated-CAD, DM2, CVA/ASA 81+()+plavix » Anticoagulated-CAD, DM2, CVA/ASA 81+()+plavix+eliquist   • SOB: copd,CAD,#, hypoxemia   • Hypoxia#to pulmonary   • Corn or callus   • Anemia: ASA81,plavix,eliquis,gi(3/3+,div,cp,eitis   • Atherosclerosis: CAD, AAA vascular   • AAA: -(ro) steffen   • Heme positive stool   • Stage 2 moderate COPD by GOLD classification ([facility])   • Abnormal stress test   • Tingling of both feet-to consider NCV   • Cryptogenic stroke ([facility])   • Chronic diastolic congestive heart failure (HCC)   • Gross hematuria   • BPH with obstruction/lower urinary tract symptoms   • Chest pain   • Obstructive sleep apnea   • Abnormal CT of the chest /done   • 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 ([facility])   • Oxygen dependent   • S/P CABG x 3   • Pulmonary hypertension (HCC)   • PAF (paroxysmal atrial fibrillation) ([facility])   • Umbilical hernia-a  waqar   • COVID-19     Past Medical History:   Diagnosis Date   • A-fib (HCC)    • AAA (abdominal aortic aneurysm) without rupture    • Arthritis    • BPH (benign prostatic hypertrophy)    • Cataract     bialteral   • CKD (chronic kidney disease)    • COPD (chronic obstructive pulmonary disease) (HCC)    • Coronary artery disease involving native coronary artery of native heart without angina pectoris 1/20/2017    CABG/[place]/1981 No TCC echo  7.16.18 Right ventricular cavity is mild-to-moderately dilated. Left ventricular diastolic dysfunction (grade I) consistent with impaired relaxation. Left ventricular systolic function is normal. Left atrial cavity size is borderline dilated. RIGHT HEART ENLARGEMENT - RVSP NOT ASSESSABLE NORMAL LV AND RV SYSTOLIC FUNCTION NO SIGNIFICANT VALVULAR DYSFUNCTION  Seein   • Diabetes mellitus ([facility])     Type 2   • DM (diabetes mellitus screen)    • GERD (gastroesophageal reflux disease)    • History of loop recorder     at current time   • Hx of colonic polyp    • Hypercholesteremia    • Hypertension    • Macular degeneration     treated with injections in right eye   • Myocardial infarction ([facility])    • Neuropathy    • Oxygen deficit     at 4liters   • Prostate cancer (HCC)    • Pulmonary hypertension (HCC) 1/7/2022   • S/P CABG x 3 1/7/2022 1980 [facility]   • Sleep apnea     cpap   • Stage 3a chronic kidney disease (HCC) 1/20/2017   • Stroke ([facility])     recovererd   • Varicose vein of leg     bialteral     Past Surgical History:   Procedure Laterality Date   • APPENDECTOMY     • CARDIAC CATHETERIZATION     • CARDIAC CATHETERIZATION Left 5/22/2019    Procedure: Cardiac Catheterization/Vascular Study Positive occult blood in stools  ? BMS vs REGGIE Get cabg report  ;  Surgeon: Bradley Carver MD;  Location:  [facility] CATH INVASIVE LOCATION;  Service: Cardiology   • COLONOSCOPY N/A 6/15/2017    Diverticulosis repeat exam prn   • COLONOSCOPY W/ POLYPECTOMY  10/21/2013    2 Tubular  adenomatous polyps, Diverticulosis repeat exam in 5 years   • CORONARY ARTERY BYPASS GRAFT  1980    X 3   • CYSTOSCOPY RETROGRADE PYELOGRAM Bilateral 2/8/2021    Procedure: CYSTOSCOPY RETROGRADE PYELOGRAM;  Surgeon: Danie Cadena MD;  Location:  [facility] OR;  Service: Urology;  Laterality: Bilateral;   • ENDOSCOPY N/A 6/15/2017    LA Grade A reflux esophagitis   • EYE SURGERY Bilateral    • HERNIA REPAIR     • TRANSURETHRAL RESECTION OF BLADDER TUMOR N/A 2/8/2021    Procedure: CYSTOSCOPY TRANSURETHRAL RESECTION OF BLADDER TUMOR WITH GEMCITABINE INSTILLATION;  Surgeon: Danie Cadena MD;  Location:  [facility] OR;  Service: Urology;  Laterality: N/A;      General Information     Row Name 02/08/23 0912          OT Time and Intention    Document Type evaluation  SOA, cough, recent COVID diagnosis. Dx: A/c resp failure, AE COPD, COVID  -J     Mode of Treatment occupational therapy  -     Row Name 02/08/23 0912          General Information    Patient Profile Reviewed yes  -J     Prior Level of Function independent:;all household mobility;transfer;bed mobility;ADL's  -     Existing Precautions/Restrictions fall;oxygen therapy device and L/min  vapotherm 40L at 85%  -     Barriers to Rehab medically complex  -     Row Name 02/08/23 0912          Living Environment    People in Home spouse  -     Row Name 02/08/23 0912          Home Main Entrance    Number of Stairs, Main Entrance none  -J     Row Name 02/08/23 0912          Stairs Within Home, Primary    Number of Stairs, Within Home, Primary none  -     Row Name 02/08/23 0912          Cognition    Orientation Status (Cognition) oriented x 4  -     Row Name 02/08/23 0912          Safety Issues, Functional Mobility    Impairments Affecting Function (Mobility) endurance/activity tolerance;strength;shortness of breath  -J           User Key  (r) = Recorded By, (t) = Taken By, (c) = Cosigned By    Initials Name Provider Type    Erika Gutierrez, TITUS/BONNIE,  [facility] Occup[facility]               Mobility/ADL's     Row Name 02/08/23 0912          Bed Mobility    Comment, (Bed Mobility) sitting EOB upon entry to room  -     Row Name 02/08/23 0912          Transfers    Transfers sit-stand transfer;stand-sit transfer  -     Row Name 02/08/23 0912          Sit-Stand Transfer    Sit-Stand Golden (Transfers) standby assist  -     Assistive Device (Sit-Stand Transfers) cane, straight  -     Row Name 02/08/23 0912          Stand-Sit Transfer    Stand-Sit Golden (Transfers) standby assist  -     Assistive Device (Stand-Sit Transfers) cane, straight  -[facility] Name 02/08/23 0912          Functional Mobility    Functional Mobility- Ind. Level supervision required  -     Functional Mobility- Device cane, straight  [facility]     Functional Mobility- Comment in room  -[facility] Name 02/08/23 0912          Activities of Daily Living    BADL Assessment/Intervention lower body dressing  -[facility] Name 02/08/23 0912          Lower Body Dressing Assessment/Training    Golden Level (Lower Body Dressing) don;socks;independent  -     Position (Lower Body Dressing) edge of bed sitting  -           User Key  (r) = Recorded By, (t) = Taken By, (c) = Cosigned By    Initials Name Provider Type    Erika Gutierrez OTR/L, [facility] Occupational Therapist               Obj/Interventions     Row Name 02/08/23 0912          Sensory Assessment (Somatosensory)    Sensory Assessment (Somatosensory) UE sensation intact  -[facility] Name 02/08/23 0912          Vision Assessment/Intervention    Visual Impairment/Limitations L  [facility]     Row Name 02/08/23 0912          Range of Motion Comprehensive    General Range of Motion bilateral lower extremity ROM L  [facility]     Row Name 02/08/23 0912          Strength Comprehensive (MMT)    Comment, General Manual Muscle Testing (MMT) Assessment B UE strength grossly 4+/5  -[facility] Name 02/08/23 0912          Balance    Balance  Assessment sitting static balance;standing dynamic balance;sitting dynamic balance  -JJ     Static Sitting Balance independent  -JJ     Dynamic Sitting Balance independent  -JJ     Position, Sitting Balance unsupported;sitting edge of bed  -JJ     Dynamic Standing Balance standby assist  -JJ     Position/Device Used, Standing Balance supported;cane, straight  -JJ           User Key  (r) = Recorded By, (t) = Taken By, (c) = Cosigned By    Initials Name Provider Type    Erika Gutierrez, OTR/L, [facility] Occupational Therapist               Goals/Plan    No documentation.                Clinical Impression     Row Name 02/08/23 0912          Pain Assessment    Pretreatment Pain Rating 0/10 - no pain  -JJ     Posttreatment Pain Rating 0/10 - no pain  -JJ     Row Name 02/08/23 0912          Plan of Care Review    Plan of Care Reviewed With patient  -     Outcome Evaluation OT eval completed. Pt presents alert and oriented x4, sititng up EOB upon entry to room. He reports at baseline being independent with all adls and mobility. Today he was able to complete LBD independently, sit <> stand t/f and all in room mobility with SBA and use of SC. He remained on vapotherm throughout session with O2 sats WFL during activity. At this time will defer continued endurance training to PT. OT to sign off. Anticipate d/c home with spouse and HH if needed.  -     Row Name 02/08/23 0912          Therapy Assessment/Plan (OT)    Criteria for Skilled Therapeutic Interventions Met (OT) no;does not meet criteria for skilled intervention  -     Therapy Frequency (OT) evaluation only  -     Row Name 02/08/23 0912          Therapy Plan Review/Discharge Plan (OT)    Anticipated Discharge Disposition (OT) home with assist;home with home health  -     Row Name 02/08/23 0912          Positioning and Restraints    Pre-Treatment Position in bed  -JJ     Post Treatment Position chair  -JJ     In Chair notified nsg;sitting;call light within  reach;encouraged to call for assist  video monitoring in room  -           User Key  (r) = Recorded By, (t) = Taken By, (c) = Cosigned By    Initials Name Provider Type    Erika Gutierrez OTR/L, JULIO Occupational Therapist               Outcome Measures     Row Name 02/08/23 0912          How much help from another is currently needed...    Putting on and taking off regular lower body clothing? 4  -JJ     Bathing (including washing, rinsing, and drying) 3  -JJ     Toileting (which includes using toilet bed pan or urinal) 4  -JJ     Putting on and taking off regular upper body clothing 4  -JJ     Taking care of personal grooming (such as brushing teeth) 4  -JJ     Eating meals 4  -     AM-PAC 6 Clicks Score (OT) 23  -     Row Name 02/08/23 1000 02/08/23 0905       How much help from another person do you currently need...    Turning from your back to your side while in flat bed without using bedrails? 3  -AM 3  -CHEYENNE    Moving from lying on back to sitting on the side of a flat bed without bedrails? 3  -AM 3  -CHEYENNE    Moving to and from a bed to a chair (including a wheelchair)? 3  -AM 3  -CHEYENNE    Standing up from a chair using your arms (e.g., wheelchair, bedside chair)? 3  -AM 3  -CHEYENNE    Climbing 3-5 steps with a railing? 3  -AM 2  -CHEYENNE    To walk in hospital room? 3  -AM 3  -CHEYENNE    AM-PAC 6 Clicks Score (PT) 18  -AM 17  -CHEYENNE    Highest level of mobility 6 --> Walked 10 steps or more  -AM 5 --> Static standing  -CHEYENNE    Row Name 02/08/23 0912 02/08/23 0905       Functional Assessment    Outcome Measure Options AM-PAC 6 Clicks Daily Activity (OT)  -JJ AM-PAC 6 Clicks Basic Mobility (PT)  -CHEYENNE          User Key  (r) = Recorded By, (t) = Taken By, (c) = Cosigned By    Initials Name Provider Type    Rafi Payne, PT DPT Physical Therapist    Erika Gutierrez OTR/L, JULIO Occupational Therapist    Briana Richter, RN Registered Nurse              Occupational Therapy Education     Title: PT OT SLP  Therapies (In Progress)     Topic: Occupational Therapy (In Progress)     Point: ADL training (Done)     Description:   Instruct learner(s) on proper safety adaptation and remediation techniques during self care or transfers.   Instruct in proper use of assistive devices.              Learning Progress Summary           Patient Acceptance, E, VU by OREN at 2/8/2023 1351                   Point: Home exercise program (Not Started)     Description:   Instruct learner(s) on appropriate technique for monitoring, assisting and/or progressing therapeutic exercises/activities.              Learner Progress:  Not documented in this visit.          Point: Precautions (Done)     Description:   Instruct learner(s) on prescribed precautions during self-care and functional transfers.              Learning Progress Summary           Patient Acceptance, E, VU by MEKA at 2/8/2023 1351                   Point: Body mechanics (Not Started)     Description:   Instruct learner(s) on proper positioning and spine alignment during self-care, functional mobility activities and/or exercises.              Learner Progress:  Not documented in this visit.                      User Key     Initials Effective Dates Name Provider Type Discipline    OREN 11/10/21 -  Erika Chao, TITUS/L, CSRS Occupational Therapist OT              OT Recommendation and Plan  Therapy Frequency (OT): evaluation only  Plan of Care Review  Plan of Care Reviewed With: patient  Outcome Evaluation: OT eval completed. Pt presents alert and oriented x4, sititng up EOB upon entry to room. He reports at baseline being independent with all adls and mobility. Today he was able to complete LBD independently, sit <> stand t/f and all in room mobility with SBA and use of SC. He remained on vapotherm throughout session with O2 sats WFL during activity. At this time will defer continued endurance training to PT. OT to sign off. Anticipate d/c home with spouse and HH if needed.  Plan  of Care Reviewed With: patient  Outcome Evaluation: OT eval completed. Pt presents alert and oriented x4, sititng up EOB upon entry to room. He reports at baseline being independent with all adls and mobility. Today he was able to complete LBD independently, sit <> stand t/f and all in room mobility with SBA and use of SC. He remained on vapotherm throughout session with O2 sats WFL during activity. At this time will defer continued endurance training to PT. OT to sign off. Anticipate d/c home with spouse and HH if needed.     Time Calculation:    Time Calculation- OT     Row Name 02/08/23 0912             Time Calculation- OT    OT Start Time 0912  add 15 minutes for chart review  -      OT Stop Time 0955  -      OT Time Calculation (min) 43 min  -      OT Received On 02/08/23  -            User Key  (r) = Recorded By, (t) = Taken By, (c) = Cosigned By    Initials Name Provider Type    Erika Gutierrez OTR/L, JULIO Occupational Therapist              Therapy Charges for Today     Code Description Service Date Service Provider Modifiers Qty    [id number]  OT EVAL LOW COMPLEXITY 4 2/8/2023 Erika Chao OTR/L, CSRS GO 1             OT Discharge Summary  Anticipated Discharge Disposition (OT): home with assist, home with home health  Reason for Discharge: At baseline function  Outcomes Achieved: Other (eval x1)  Discharge Destination: Home with assist, Home with home health    TITUS Joseph/L, MONIQUES  2/8/2023

## 2023-02-08 NOTE — PLAN OF CARE
Goal Outcome Evaluation:  Plan of Care Reviewed With: patient           Outcome Evaluation: Pt denies pain. No c/o nausea. Pt now on Vapotherm at 40 LPM and 100% O2, on call MD noitified. Continuous pulse ox in use. Pt running 92-96% O2. Pt A&O x4. Pt in covid isolation. Pt on Eliquis. IV abx given. On-call MD and patient's daughter aware of current status. VSS.

## 2023-02-08 NOTE — PLAN OF CARE
Goal Outcome Evaluation:  Plan of Care Reviewed With: patient           Outcome Evaluation: PT eval complete. he is alert and oriented. Remains on the vapotherm so we moved within his room as tubing allowed for vapotherm. Some SOB with activity but O2 sat WNL on vapotherm. he ambulates short distances within his room with his straight cane with SBA/CGA. PT will cont with mobiilty and strenghtening in preparation to d.c home wtih spouse, consider HH if needed at d.c.

## 2023-02-08 NOTE — PLAN OF CARE
Goal Outcome Evaluation:  Plan of Care Reviewed With: patient           Outcome Evaluation: OT eval completed. Pt presents alert and oriented x4, sititng up EOB upon entry to room. He reports at baseline being independent with all adls and mobility. Today he was able to complete LBD independently, sit <> stand t/f and all in room mobility with SBA and use of SC. He remained on vapotherm throughout session with O2 sats WFL during activity. At this time will defer continued endurance training to PT. OT to sign off. Anticipate d/c home with spouse and HH if needed.

## 2023-02-09 LAB
ALBUMIN SERPL-MCNC: 3.8 G/DL (ref 3.5–5.2)
ALBUMIN/GLOB SERPL: 1.1 G/DL
ALP SERPL-CCNC: 79 U/L (ref 39–117)
ALT SERPL W P-5'-P-CCNC: 27 U/L (ref 1–41)
ANION GAP SERPL CALCULATED.3IONS-SCNC: 13 MMOL/L (ref 5–15)
AST SERPL-CCNC: 27 U/L (ref 1–40)
BILIRUB SERPL-MCNC: 0.3 MG/DL (ref 0–1.2)
BUN SERPL-MCNC: 34 MG/DL (ref 8–23)
BUN/CREAT SERPL: 41.5 (ref 7–25)
CALCIUM SPEC-SCNC: 9.6 MG/DL (ref 8.6–10.5)
CHLORIDE SERPL-SCNC: 96 MMOL/L (ref 98–107)
CO2 SERPL-SCNC: 28 MMOL/L (ref 22–29)
CREAT SERPL-MCNC: 0.82 MG/DL (ref 0.76–1.27)
EGFRCR SERPLBLD CKD-EPI 2021: 84 ML/MIN/1.73
GLOBULIN UR ELPH-MCNC: 3.5 GM/DL
GLUCOSE BLDC GLUCOMTR-MCNC: 254 MG/DL (ref 70–130)
GLUCOSE BLDC GLUCOMTR-MCNC: 260 MG/DL (ref 70–130)
GLUCOSE BLDC GLUCOMTR-MCNC: 314 MG/DL (ref 70–130)
GLUCOSE SERPL-MCNC: 224 MG/DL (ref 65–99)
POTASSIUM SERPL-SCNC: 3.9 MMOL/L (ref 3.5–5.2)
PROT SERPL-MCNC: 7.3 G/DL (ref 6–8.5)
SODIUM SERPL-SCNC: 137 MMOL/L (ref 136–145)

## 2023-02-09 PROCEDURE — 82962 GLUCOSE BLOOD TEST: CPT

## 2023-02-09 PROCEDURE — 80053 COMPREHEN METABOLIC PANEL: CPT | Performed by: FAMILY MEDICINE

## 2023-02-09 PROCEDURE — 25010000002 FUROSEMIDE PER 20 MG: Performed by: INTERNAL MEDICINE

## 2023-02-09 PROCEDURE — 25010000002 METHYLPREDNISOLONE PER 125 MG: Performed by: INTERNAL MEDICINE

## 2023-02-09 PROCEDURE — 97530 THERAPEUTIC ACTIVITIES: CPT

## 2023-02-09 PROCEDURE — 99233 SBSQ HOSP IP/OBS HIGH 50: CPT | Performed by: INTERNAL MEDICINE

## 2023-02-09 PROCEDURE — 25010000002 REMDESIVIR 100 MG/20ML SOLUTION 1 EACH VIAL: Performed by: FAMILY MEDICINE

## 2023-02-09 PROCEDURE — 94761 N-INVAS EAR/PLS OXIMETRY MLT: CPT

## 2023-02-09 PROCEDURE — 25010000002 DEXAMETHASONE SODIUM PHOSPHATE 10 MG/ML SOLUTION: Performed by: NURSE PRACTITIONER

## 2023-02-09 PROCEDURE — 94799 UNLISTED PULMONARY SVC/PX: CPT

## 2023-02-09 PROCEDURE — 25010000002 CEFTRIAXONE PER 250 MG: Performed by: FAMILY MEDICINE

## 2023-02-09 PROCEDURE — 63710000001 INSULIN LISPRO (HUMAN) PER 5 UNITS: Performed by: FAMILY MEDICINE

## 2023-02-09 RX ORDER — DEXAMETHASONE SODIUM PHOSPHATE 10 MG/ML
6 INJECTION, SOLUTION INTRAMUSCULAR; INTRAVENOUS DAILY
Status: DISCONTINUED | OUTPATIENT
Start: 2023-02-09 | End: 2023-02-11

## 2023-02-09 RX ADMIN — INSULIN LISPRO 12 UNITS: 100 INJECTION, SOLUTION INTRAVENOUS; SUBCUTANEOUS at 12:09

## 2023-02-09 RX ADMIN — FERROUS SULFATE TAB 325 MG (65 MG ELEMENTAL FE) 325 MG: 325 (65 FE) TAB at 08:59

## 2023-02-09 RX ADMIN — HYDROCHLOROTHIAZIDE 12.5 MG: 25 TABLET ORAL at 08:59

## 2023-02-09 RX ADMIN — METOPROLOL TARTRATE 12.5 MG: 25 TABLET, FILM COATED ORAL at 20:31

## 2023-02-09 RX ADMIN — METOPROLOL TARTRATE 12.5 MG: 25 TABLET, FILM COATED ORAL at 08:59

## 2023-02-09 RX ADMIN — ALBUTEROL SULFATE 2 PUFF: 90 AEROSOL, METERED RESPIRATORY (INHALATION) at 18:58

## 2023-02-09 RX ADMIN — GUAIFENESIN 600 MG: 600 TABLET, EXTENDED RELEASE ORAL at 08:59

## 2023-02-09 RX ADMIN — APIXABAN 5 MG: 5 TABLET, FILM COATED ORAL at 20:31

## 2023-02-09 RX ADMIN — PANTOPRAZOLE SODIUM 40 MG: 40 TABLET, DELAYED RELEASE ORAL at 06:02

## 2023-02-09 RX ADMIN — BUDESONIDE AND FORMOTEROL FUMARATE DIHYDRATE 2 PUFF: 160; 4.5 AEROSOL RESPIRATORY (INHALATION) at 18:56

## 2023-02-09 RX ADMIN — BUDESONIDE AND FORMOTEROL FUMARATE DIHYDRATE 2 PUFF: 160; 4.5 AEROSOL RESPIRATORY (INHALATION) at 06:21

## 2023-02-09 RX ADMIN — ALBUTEROL SULFATE 2 PUFF: 90 AEROSOL, METERED RESPIRATORY (INHALATION) at 15:21

## 2023-02-09 RX ADMIN — METHYLPREDNISOLONE SODIUM SUCCINATE 60 MG: 125 INJECTION, POWDER, FOR SOLUTION INTRAMUSCULAR; INTRAVENOUS at 00:07

## 2023-02-09 RX ADMIN — CEFTRIAXONE 1 G: 1 INJECTION, POWDER, FOR SOLUTION INTRAMUSCULAR; INTRAVENOUS at 19:17

## 2023-02-09 RX ADMIN — ALBUTEROL SULFATE 2 PUFF: 90 AEROSOL, METERED RESPIRATORY (INHALATION) at 03:18

## 2023-02-09 RX ADMIN — ALBUTEROL SULFATE 2 PUFF: 90 AEROSOL, METERED RESPIRATORY (INHALATION) at 23:56

## 2023-02-09 RX ADMIN — ALBUTEROL SULFATE 2 PUFF: 90 AEROSOL, METERED RESPIRATORY (INHALATION) at 10:40

## 2023-02-09 RX ADMIN — METHYLPREDNISOLONE SODIUM SUCCINATE 60 MG: 125 INJECTION, POWDER, FOR SOLUTION INTRAMUSCULAR; INTRAVENOUS at 06:02

## 2023-02-09 RX ADMIN — INSULIN LISPRO 12 UNITS: 100 INJECTION, SOLUTION INTRAVENOUS; SUBCUTANEOUS at 08:58

## 2023-02-09 RX ADMIN — APIXABAN 5 MG: 5 TABLET, FILM COATED ORAL at 08:59

## 2023-02-09 RX ADMIN — INSULIN LISPRO 16 UNITS: 100 INJECTION, SOLUTION INTRAVENOUS; SUBCUTANEOUS at 16:52

## 2023-02-09 RX ADMIN — TERAZOSIN HYDROCHLORIDE 10 MG: 5 CAPSULE ORAL at 20:31

## 2023-02-09 RX ADMIN — PRAVASTATIN SODIUM 80 MG: 20 TABLET ORAL at 08:59

## 2023-02-09 RX ADMIN — OXYCODONE HYDROCHLORIDE AND ACETAMINOPHEN 500 MG: 500 TABLET ORAL at 08:59

## 2023-02-09 RX ADMIN — ISOSORBIDE MONONITRATE 30 MG: 30 TABLET, EXTENDED RELEASE ORAL at 08:59

## 2023-02-09 RX ADMIN — DEXAMETHASONE SODIUM PHOSPHATE 6 MG: 10 INJECTION, SOLUTION INTRAMUSCULAR; INTRAVENOUS at 09:00

## 2023-02-09 RX ADMIN — ASPIRIN 81 MG: 81 TABLET, COATED ORAL at 08:59

## 2023-02-09 RX ADMIN — FUROSEMIDE 60 MG: 10 INJECTION, SOLUTION INTRAMUSCULAR; INTRAVENOUS at 00:07

## 2023-02-09 RX ADMIN — ALBUTEROL SULFATE 2 PUFF: 90 AEROSOL, METERED RESPIRATORY (INHALATION) at 06:21

## 2023-02-09 RX ADMIN — FINASTERIDE 5 MG: 5 TABLET, FILM COATED ORAL at 08:59

## 2023-02-09 RX ADMIN — REMDESIVIR 100 MG: 100 INJECTION, POWDER, LYOPHILIZED, FOR SOLUTION INTRAVENOUS at 09:00

## 2023-02-09 NOTE — PROGRESS NOTES
RT EQUIPMENT DEVICE RELATED - SKIN ASSESSMENT    RT Medical Equipment/Device:     High Flow Nasal Cannula:   Vapotherm    Skin Assessment:      Ears:  Intact    Device Skin Pressure Protection:  Pressure points protected

## 2023-02-09 NOTE — CASE MANAGEMENT/SOCIAL WORK
Discharge Planning Assessment   [place]     Patient Name: Gonzalo Durham  MRN: [id number]  Today's Date: 2/9/2023    Admit Date: 2/7/2023    Plan: Unable to reach pt for discharge planning.  Call placed into pts room d/t isolation precautions, with no answer.  Call placed to pts daughter and caregiver with no answer's received.  Will attempt D/C needs assessment at a later time.   Discharge Needs Assessment     Row Name 02/09/23 1015       Living Environment    People in Home spouse    Current Living Arrangements home    Primary Care Provided by self;child(felicita);other (see comments)  Caregiver - Chanel Carlakarel    Provides Primary Care For no one, unable/limited ability to care for self    Family Caregiver if Needed child(felicita), adult    Family Caregiver Names Daughter - Suyapa Osborn    Quality of Family Relationships supportive;helpful;involved    Able to Return to Prior Arrangements yes       Resource/Environmental Concerns    Resource/Environmental Concerns none    Transportation Concerns none       Food Insecurity    Within the past 12 months, you worried that your food would run out before you got the money to buy more. Never true    Within the past 12 months, the food you bought just didn't last and you didn't have money to get more. Never true       Transition Planning    Patient/Family Anticipates Transition to home with family;home with help/services    Patient/Family Anticipated Services at Transition     Transportation Anticipated family or friend will provide       Discharge Needs Assessment    Readmission Within the Last 30 Days no previous admission in last 30 days    Equipment Currently Used at Home cane, straight;grab bar;hospital bed;oxygen;shower chair    Concerns to be Addressed discharge planning    Equipment Needed After Discharge oxygen    Current Discharge Risk chronically ill    Discharge Coordination/Progress IRENE spoke with patient's caregiver, Chanel Raul, to complete dc  planning asssessment.  Ms. Carter advised patient resides at home with his spouse and his spouse is currently on hospice.  Ms. Carter states patient and his spouse always have someone in the home, either herself or patient's daughter, who lives right next door.  Patient has a PCP and RX coverage.  Ms. Carter states patient has needed DME, including home O2, supplied by [employer].  She states if patient were to need more than 5L of O2 at discharge he would need a different concentrator stating his only goes up to 5L.  Patient worked with therapy yesterday and did well.  Will need to follow for discharge planning needs should patient need short term rehab placement vs HH.               Discharge Plan    No documentation.               Continued Care and Services - Admitted Since 2/7/2023    Coordination has not been started for this encounter.       Expected Discharge Date and Time     Expected Discharge Date Expected Discharge Time    Feb 10, 2023          Demographic Summary    No documentation.                Functional Status    No documentation.                Psychosocial    No documentation.                Abuse/Neglect    No documentation.                Legal    No documentation.                Substance Abuse    No documentation.                Patient Forms    No documentation.                   CARRILLO Moon

## 2023-02-09 NOTE — PLAN OF CARE
Goal Outcome Evaluation:      Pt up with PT today and sat in the chair most of the day. Pt states feeling better.

## 2023-02-09 NOTE — PROGRESS NOTES
[facility] – [place] PULMONARY AND CRITICAL CARE PROGRESS NOTE - [facility]    Patient: Gonzalo Durham  1934   MR# [id number]   Acct# [id number]  02/09/23   07:00 CST  Referring Provider: Trenton Cespedes MD    Chief Complaint: Covid-19     Interval history: Moved from the floor to ICU overnight.  Afebrile.  Saturation 97 on 40 L and FiO2 1.0 plus nonrebreather.  He was given Lasix.  He is net -2635.  Creatinine 0.8.  Potassium 3.9.  No x-ray for review.  No obvious distress.    Meds:  albuterol sulfate HFA, 2 puff, Inhalation, 4x Daily - RT  apixaban, 5 mg, Oral, BID  ascorbic acid, 500 mg, Oral, Daily  aspirin, 81 mg, Oral, Daily  budesonide-formoterol, 2 puff, Inhalation, BID - RT  cefTRIAXone, 1 g, Intravenous, Q24H  ferrous sulfate, 325 mg, Oral, Daily With Breakfast  finasteride, 5 mg, Oral, Daily  guaiFENesin, 600 mg, Oral, Daily  hydroCHLOROthiazide, 12.5 mg, Oral, Daily  insulin lispro, 0-24 Units, Subcutaneous, TID AC  isosorbide mononitrate, 30 mg, Oral, Daily  methylPREDNISolone sodium succinate, 60 mg, Intravenous, Q8H  metoprolol tartrate, 12.5 mg, Oral, BID  pantoprazole, 40 mg, Oral, Q AM  pravastatin, 80 mg, Oral, Daily  remdesivir, 100 mg, Intravenous, Daily  terazosin, 10 mg, Oral, Nightly      Pharmacy Consult - Remdesivir,       Physical Exam:  SpO2 Percentage    02/09/23 0600 02/09/23 0621 02/09/23 0624   SpO2: 95% 92% 97%     Temp:  [96.5 °F (35.8 °C)-97.8 °F (36.6 °C)] 96.5 °F (35.8 °C)  Heart Rate:  [] 114  Resp:  [15-29] 20  BP: (116-146)/(52-79) 116/57    Intake/Output Summary (Last 24 hours) at 2/9/2023 0700  Last data filed at 2/9/2023 0415  Gross per 24 hour   Intake 240 ml   Output 2875 ml   Net -2635 ml     Body mass index is 26.63 kg/m².   Physical Exam   Constitutional:       General: He is not in acute distress.     Appearance: He is ill-appearing.      Interventions: Nasal cannula in place.      Comments: Vapo 40/100  HENT:      Head: Normocephalic.      Ears:       Comments: Hopi     Nose: Nose normal.      Mouth/Throat:      Mouth: Mucous membranes are moist.   Eyes:      General: No scleral icterus.  Cardiovascular:      Rate and Rhythm: Normal rate.   Pulmonary:      Effort: No respiratory distress.      Breath sounds: Decreased breath sounds present.   Abdominal:      General: There is no distension.   Musculoskeletal:      Right lower leg: Edema present.      Left lower leg: Edema present.   Neurological:      Mental Status: He is alert and oriented to person, place, and time.   Psychiatric:         Mood and Affect: Mood normal.         Behavior: Behavior is cooperative.     Electronically signed by DOMINIQUE Devine, 2/9/2023, 07:00 CST      Physician substantive portion: medical decision making    Result Review    Laboratory Data:  Results from last 7 days   Lab Units 02/07/23  0936   WBC 10*3/mm3 12.35*   HEMOGLOBIN g/dL 10.9*   PLATELETS 10*3/mm3 251     Results from last 7 days   Lab Units 02/09/23  0313 02/08/23  0447 02/07/23  2232 02/07/23  1909 02/07/23  0936   SODIUM mmol/L 137 138  --   --  136   POTASSIUM mmol/L 3.9 3.9  --   --  4.1   CO2 mmol/L 28.0 26.0  --   --  28.0   BUN mg/dL 34* 33*  --   --  27*   CREATININE mg/dL 0.82 0.79  --   --  1.09   LACTATE mmol/L  --   --  1.3   < >  --    CRP mg/dL  --  21.21*  --   --   --     < > = values in this interval not displayed.     Results from last 7 days   Lab Units 02/07/23  0943   PH, ARTERIAL pH units 7.416   PCO2, ARTERIAL mm Hg 43.8   PO2 ART mm Hg 66.8*     Microbiology Results (last 10 days)     Procedure Component Value - Date/Time    Blood Culture - Blood, Hand, Right [[id number]]  (Normal) Collected: 02/07/23 1909    Lab Status: Preliminary result Specimen: Blood from Hand, Right Updated: 02/08/23 1945     Blood Culture No growth at 24 hours    Blood Culture - Blood, Arm, Left [[id number]]  (Normal) Collected: 02/07/23 1909    Lab Status: Preliminary result Specimen: Blood from Arm, Left Updated:  02/08/23 1945     Blood Culture No growth at 24 hours    COVID-19 and FLU A/B PCR - Swab, Nasopharynx [[id number]]  (Abnormal) Collected: 02/07/23 0928    Lab Status: Final result Specimen: Swab from Nasopharynx Updated: 02/07/23 1010     COVID19 Detected     Influenza A PCR Not Detected     Influenza B PCR Not Detected    Narrative:      Fact sheet for providers: https://www.fda.gov/media/063361/download    Fact sheet for patients: https://www.fda.gov/media/489993/download    Test performed by PCR.  Influenza A and Influenza B negative results should be considered presumptive in samples that have a positive SARS-CoV-2 result.    Competitive inhibition studies showed that SARS-CoV-2 virus, when present at concentrations above 3.6E+04 copies/mL, can inhibit the detection and amplification of influenza A and influenza B virus RNA if present at or below 1.8E+02 copies/mL or 4.9E+02 copies/mL, respectively, and may lead to false negative influenza virus results. If co-infection with influenza A or influenza B virus is suspected in samples with a positive SARS-CoV-2 result, the sample should be re-tested with another FDA cleared, approved, or authorized influenza test, if influenza virus detection would change clinical management.         Recent films:  No radiology results from the last 24 hrs       Pulmonary Assessment:  1. Acute resp failure with hypoxia  2. covid  3. Anemia  4. Met criteria for tocilizumab, but pt declined    Recommend/plan:   · Continue oxygen for sat 90, on maximal vapotherm  · Dexamethasone 6 mg per day  · Continue empiric abx    This visit was performed by both a physician and an [facility] RN.  I personally evaluated and examined the patient.  I performed all aspects of the medical decision making as documented.  Electronically signed by Parrish Cardona MD, 2/9/2023, 10:44 CST

## 2023-02-09 NOTE — NURSING NOTE
Patient on Vapotherm at 40 LPM and 100% O2 with non-rebreather at 15 LPM with O2 sat 85-90%. Dr. Vee notified of patient condition. Report called to ICU and patient transferred per order. Patient's family contacted and notified of situation.

## 2023-02-09 NOTE — PLAN OF CARE
Goal Outcome Evaluation:   Patient required min assist for supine to sit. Actively worked thru LE ex's. Instructed on breathing exercises. Stood with SBA and Marched in place at bedside. SATs on vapotherm at 40LPM/100% and nonrebreather at 10 lpm  began at 96, 87 during activity recovered to 94 in about 30 seconds. CGA to transfer to reciliner. Will continue to benefit from strengthening activities.

## 2023-02-09 NOTE — PLAN OF CARE
Pt transferred to ICU d/t increased respiratory requirements. 1x dose of lasix given and solumedrol started. He initially required Vapotherm 40L 100% with 15L nonrebreather additionally.  The nonrebreather has been able to be weaned to 10L this morning, with O2 sat 91-95%. He remains alert and oriented. He diuresed 2.2L post lasix, voiding per urinal. Pt has still been in afib anywhere from 70s-low 100s with stable blood pressure.

## 2023-02-09 NOTE — PROGRESS NOTES
[facility]  INPATIENT PROGRESS NOTE    Patient Name: Gonzalo Durham  Date of Admission: 2/7/2023  Today's Date: 02/09/23  Length of Stay: 2  Primary Care Physician: Abel Romero MD    Subjective   Chief Complaint: SOA  Shortness of Breath  Associated symptoms include wheezing. Pertinent negatives include no abdominal pain, chest pain, ear pain, fever, headaches, rash or vomiting.      Got worse again last night  He became more SOA  Now on NRB mask  Afebrile        Review of Systems   Constitutional: Positive for fatigue. Negative for fever.   HENT: Negative for congestion and ear pain.    Eyes: Negative for redness and visual disturbance.   Respiratory: Positive for cough, shortness of breath and wheezing.    Cardiovascular: Negative for chest pain and palpitations.   Gastrointestinal: Negative for abdominal pain, diarrhea, nausea and vomiting.   Endocrine: Negative for cold intolerance and heat intolerance.   Genitourinary: Negative for dysuria and frequency.   Musculoskeletal: Negative for arthralgias and back pain.   Skin: Negative for rash and wound.   Neurological: Positive for weakness. Negative for dizziness and headaches.   Psychiatric/Behavioral: Negative for confusion. The patient is not nervous/anxious.           All pertinent negatives and positives are as above. All other systems have been reviewed and are negative unless otherwise stated.     Objective    Temp:  [96.5 °F (35.8 °C)-97.8 °F (36.6 °C)] 96.5 °F (35.8 °C)  Heart Rate:  [] 114  Resp:  [15-29] 20  BP: (116-140)/(52-75) 116/57  Physical Exam  Vitals reviewed.   Constitutional:       Appearance: He is well-developed.   HENT:      Head: Normocephalic and atraumatic.      Right Ear: External ear normal.      Left Ear: External ear normal.      Nose: Nose normal.   Eyes:      General: No scleral icterus.        Right eye: No discharge.         Left eye: No discharge.       Conjunctiva/sclera: Conjunctivae normal.      Pupils: Pupils are equal, round, and reactive to light.   Neck:      Thyroid: No thyromegaly.      Trachea: No tracheal deviation.   Cardiovascular:      Rate and Rhythm: Normal rate and regular rhythm.      Heart sounds: Normal heart sounds. No murmur heard.    No friction rub. No gallop.   Pulmonary:      Effort: Pulmonary effort is normal. No respiratory distress.      Breath sounds: No stridor. Decreased breath sounds, wheezing and rhonchi present. No rales.   Chest:      Chest wall: No tenderness.   Abdominal:      General: Bowel sounds are normal. There is no distension.      Palpations: Abdomen is soft. There is no mass.      Tenderness: There is no abdominal tenderness. There is no guarding or rebound.      Hernia: No hernia is present.   Musculoskeletal:         General: No deformity. Normal range of motion.      Cervical back: Normal range of motion and neck supple.   Lymphadenopathy:      Cervical: No cervical adenopathy.   Skin:     General: Skin is warm and dry.      Coloration: Skin is not pale.      Findings: No erythema or rash.   Neurological:      Mental Status: He is alert and oriented to person, place, and time.      Cranial Nerves: No cranial nerve deficit.      Motor: No abnormal muscle tone.      Coordination: Coordination normal.      Deep Tendon Reflexes: Reflexes are normal and symmetric. Reflexes normal.   Psychiatric:         Behavior: Behavior normal.         Thought Content: Thought content normal.         Judgment: Judgment normal.           Results Review:  I have reviewed the labs, radiology results, and diagnostic studies.    Laboratory Data:   Results from last 7 days   Lab Units 02/07/23  0936   WBC 10*3/mm3 12.35*   HEMOGLOBIN g/dL 10.9*   HEMATOCRIT % 34.1*   PLATELETS 10*3/mm3 251        Results from last 7 days   Lab Units 02/09/23  0313 02/08/23  0447 02/07/23  0936   SODIUM mmol/L 137 138 136   POTASSIUM mmol/L 3.9 3.9 4.1    CHLORIDE mmol/L 96* 99 97*   CO2 mmol/L 28.0 26.0 28.0   BUN mg/dL 34* 33* 27*   CREATININE mg/dL 0.82 0.79 1.09   CALCIUM mg/dL 9.6 8.9 9.3   BILIRUBIN mg/dL 0.3 0.6 0.2   ALK PHOS U/L 79 54 46   ALT (SGPT) U/L 27 17 14   AST (SGOT) U/L 27 24 20   GLUCOSE mg/dL 224* 168* 181*       Culture Data:   No results found for: BLOODCX, URINECX, WOUNDCX, MRSACX, RESPCX, STOOLCX    Radiology Data:   Imaging Results (Last 24 Hours)     ** No results found for the last 24 hours. **          I have reviewed the patient's current medications.     Assessment/Plan   Assessment  Active Hospital Problems    Diagnosis    • **COVID-19        Treatment Plan  1.  Acute on chronic hypoxic respiratory failure  -Decadron  -Albuterol     2.  COPD AE  -Decadron  -Albuterol     3.  Covid-19 pneumonia  -Decadron  -Albuterol     4.  A-fib  -Eliquis  -Metoprolol     5.  CAD  -ASA  -Pravastatin  -Metoprolol     6.  T2DM  -Metformin  -SSI  -Imdur     7.  HTN  -Metoprolol  -HCTZ     8.  HLD  -Pravastatin     9.  GERD  -Protonix     10.  BPH  -Hytrin  -Proscar       Medical Decision Making  Number and Complexity of problems: 10 problems, high complexity:  Covid-19 pneumonia and respiratory failure represent threat to life    Risk:  High:  Requiring high flow oxygen    Differential Diagnosis: Pneumonia, Viral pneumonia, COPD, CHF    Conditions and Status        Condition is worsening.     MDM Data  Cardiac tracing (EKG, telemetry) interpretation: NSR  Labs reviewed: Normal renal function     Discussed with: patient, nursing     Care Planning  Shared decision making: Patient agreeable to treatment plan  Code status and discussions: full code    Disposition  Social Determinants of Health that impact treatment or disposition: n/a  D/c planning uncertain    Electronically signed by Trenton Cespedes MD, 02/09/23, 09:17 CST.

## 2023-02-09 NOTE — THERAPY TREATMENT NOTE
Acute Care - Physical Therapy Treatment Note  [facility]     Patient Name: Gonzalo Durham  : 1934  MRN: [id number]  Today's Date: 2023      Visit Dx:     ICD-10-CM ICD-9-CM   1. Impaired mobility  Z74.09 799.89   2. Decreased activities of daily living (ADL)  Z78.9 V49.89     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, without long-term current use of insulin (HCC)   • Stage 3a chronic kidney disease (HCC)   • Erectile dysfunction   • Coronary artery disease involving native coronary artery of native heart without angina pectoris   • Hyperlipidemia LDL goal <70-statin   • Hypertension   • Prostatism-(young) urology   • Tremor   • Varicose vein of leg   • Plantar fasciitis   • Nocturnal leg movements   • Bad dreams   • Depression   • Peripheral neuropathy- clinical   • Hx of colonic polyps   • Gastroesophageal reflux disease   • Left lower quadrant pain   • Laboratory test*   • Anticoagulated-CAD, DM2, CVA/ASA 81+()+plavix » Anticoagulated-CAD, DM2, CVA/ASA 81+()+plavix+eliquist   • SOB: copd,CAD,#, hypoxemia   • Hypoxia#to pulmonary   • Corn or callus   • Anemia: ASA81,plavix,eliquis,gi(3/3+,div,cp,eitis   • Atherosclerosis: CAD, AAA vascular   • AAA: -(ro) steffen   • Heme positive stool   • Stage 2 moderate COPD by GOLD classification ([facility])   • Abnormal stress test   • Tingling of both feet-to consider NCV   • Cryptogenic stroke (HCC)   • Chronic diastolic congestive heart failure (HCC)   • Gross hematuria   • BPH with obstruction/lower urinary tract symptoms   • Chest pain   • Obstructive sleep apnea   • Abnormal CT of the chest /done   • 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)   • Oxygen dependent   • S/P CABG x 3   • Pulmonary hypertension (HCC)   • PAF (paroxysmal atrial fibrillation) (HCC)   • Umbilical hernia-a waqar   • COVID-19     Past Medical History:    Diagnosis Date   • A-fib (HCC)    • AAA (abdominal aortic aneurysm) without rupture    • Arthritis    • BPH (benign prostatic hypertrophy)    • Cataract     bialteral   • CKD (chronic kidney disease)    • COPD (chronic obstructive pulmonary disease) (HCC)    • Coronary artery disease involving native coronary artery of native heart without angina pectoris 1/20/2017    CABG/Az/[place]/1981 No TCC echo  7.16.18 Right ventricular cavity is mild-to-moderately dilated. Left ventricular diastolic dysfunction (grade I) consistent with impaired relaxation. Left ventricular systolic function is normal. Left atrial cavity size is borderline dilated. RIGHT HEART ENLARGEMENT - RVSP NOT ASSESSABLE NORMAL LV AND RV SYSTOLIC FUNCTION NO SIGNIFICANT VALVULAR DYSFUNCTION  Seein   • Diabetes mellitus (HCC)     Type 2   • DM (diabetes mellitus screen)    • GERD (gastroesophageal reflux disease)    • History of loop recorder     at current time   • Hx of colonic polyp    • Hypercholesteremia    • Hypertension    • Macular degeneration     treated with injections in right eye   • Myocardial infarction ([facility])    • Neuropathy    • Oxygen deficit     at 4liters   • Prostate cancer ([facility])    • Pulmonary hypertension ([facility]) 1/7/2022   • S/P CABG x 3 1/7/2022 [facility]   • Sleep apnea     cpap   • Stage 3a chronic kidney disease (HCC) 1/20/2017   • Stroke ([facility])     recovererd   • Varicose vein of leg     bialteral     Past Surgical History:   Procedure Laterality Date   • APPENDECTOMY     • CARDIAC CATHETERIZATION     • CARDIAC CATHETERIZATION Left 5/22/2019    Procedure: Cardiac Catheterization/Vascular Study Positive occult blood in stools  ? BMS vs REGGIE Get cabg report  ;  Surgeon: Bradley Carver MD;  Location: [facility] CATH INVASIVE LOCATION;  Service: Cardiology   • COLONOSCOPY N/A 6/15/2017    Diverticulosis repeat exam prn   • COLONOSCOPY W/ POLYPECTOMY  10/21/2013    2 Tubular adenomatous polyps, Diverticulosis repeat exam in 5  years   • CORONARY ARTERY BYPASS GRAFT  1980    X 3   • CYSTOSCOPY RETROGRADE PYELOGRAM Bilateral 2/8/2021    Procedure: CYSTOSCOPY RETROGRADE PYELOGRAM;  Surgeon: Danie Cadena MD;  Location: [facility] OR;  Service: Urology;  Laterality: Bilateral;   • ENDOSCOPY N/A 6/15/2017    LA Grade A reflux esophagitis   • EYE SURGERY Bilateral    • HERNIA REPAIR     • TRANSURETHRAL RESECTION OF BLADDER TUMOR N/A 2/8/2021    Procedure: CYSTOSCOPY TRANSURETHRAL RESECTION OF BLADDER TUMOR WITH GEMCITABINE INSTILLATION;  Surgeon: Danie Cadena MD;  Location:  [facility] OR;  Service: Urology;  Laterality: N/A;     PT Assessment (last 12 hours)     PT Evaluation and Treatment     Row Name 02/09/23 0829          Physical Therapy Time and Intention    Subjective Information complains of;weakness;fatigue  -     Document Type therapy note (daily note)  -     Mode of Treatment physical therapy  -     Row Name 02/09/23 0829          General Information    Existing Precautions/Restrictions fall;oxygen therapy device and L/min  vapo 40lpm / 100% and non rebreather at 10lpm  -     Limitations/Impairments hearing  -     Row Name 02/09/23 0829          Pain    Pretreatment Pain Rating 0/10 - no pain  -     Posttreatment Pain Rating 0/10 - no pain  -     Row Name 02/09/23 0829          Bed Mobility    Supine-Sit [place] (Bed Mobility) verbal cues;minimum assist (75% patient effort)  -     Assistive Device (Bed Mobility) head of bed elevated  -     Row Name 02/09/23 0829          Bed-Chair Transfer    Bed-Chair [place] (Transfers) verbal cues;minimum assist (75% patient effort)  -     Assistive Device (Bed-Chair Transfers) cane, straight  -     Row Name 02/09/23 0829          Sit-Stand Transfer    Sit-Stand [place] (Transfers) standby assist  -     Row Name 02/09/23 0829          Stand-Sit Transfer    Stand-Sit [place] (Transfers) standby assist  -     Row Name 02/09/23 0829          Gait/Stairs  (Locomotion)    [place] Level (Gait) contact guard  -     Assistive Device (Gait) cane, straight  -     Distance in Feet (Gait) Marched in place 30 steps.  -     Comment, (Gait/Stairs) vapo & non rebreather limits movement  -     Row Name 02/09/23 0829          Motor Skills    Therapeutic Exercise hip;knee;ankle  -     Row Name 02/09/23 0829          Shoulder (Therapeutic Exercise)    Shoulder (Therapeutic Exercise) AROM (active range of motion)  -     Shoulder AROM (Therapeutic Exercise) bilateral;scapular elevation;scapular retraction  along with breathing instruction  -     Row Name 02/09/23 0829          Hip (Therapeutic Exercise)    Hip (Therapeutic Exercise) AROM (active range of motion)  -     Hip AROM (Therapeutic Exercise) bilateral;flexion  -     Row Name 02/09/23 0829          Knee (Therapeutic Exercise)    Knee (Therapeutic Exercise) AROM (active range of motion)  -     Knee AROM (Therapeutic Exercise) bilateral;LAQ (long arc quad)  -     Row Name 02/09/23 0829          Ankle (Therapeutic Exercise)    Ankle (Therapeutic Exercise) AROM (active range of motion)  -     Ankle AROM (Therapeutic Exercise) right;dorsiflexion;plantarflexion  -     Row Name 02/09/23 0829          Vital Signs    Pre SpO2 (%) 96  -     O2 Delivery Pre Treatment --  10 lpm non rebreather and vapo at 40lpm/100%  -     Intra SpO2 (%) 87  -     Post SpO2 (%) 94  -     Row Name 02/09/23 0829          Positioning and Restraints    Pre-Treatment Position in bed  -     Post Treatment Position chair  -     In Chair call light within reach;encouraged to call for assist;sitting;with nsg  breakfast  -           User Key  (r) = Recorded By, (t) = Taken By, (c) = Cosigned By    Initials Name Provider Type     Franca Fuchs PTA Physical Therapist Assistant                Physical Therapy Education     Title: PT OT SLP Therapies (In Progress)     Topic: Physical Therapy (In Progress)     Point:  Mobility training (Done)     Learning Progress Summary           Patient Acceptance, E, VU,DU,NR by CHEYENNE at 2/8/2023 0905    Comment: benefits of acitvity, progression of PT                   Point: Home exercise program (Done)     Learning Progress Summary           Patient Acceptance, MAXX,JESSENIA, DU by ERICA at 2/9/2023 0903    Comment: breathing exercises                   Point: Body mechanics (Not Started)     Learner Progress:  Not documented in this visit.          Point: Precautions (Not Started)     Learner Progress:  Not documented in this visit.                      User Key     Initials Effective Dates Name Provider Type Discipline    CHEYENNE 02/03/23 -  Rafi Browning, PT DPT Physical Therapist PT    ERICA 02/03/23 -  Franca Fuchs PTA Physical Therapist Assistant PT              PT Recommendation and Plan         Outcome Measures     Row Name 02/09/23 0900             How much help from another person do you currently need...    Turning from your back to your side while in flat bed without using bedrails? 3  -ERICA      Moving from lying on back to sitting on the side of a flat bed without bedrails? 3  -ERICA      Moving to and from a bed to a chair (including a wheelchair)? 3  -ERICA      Standing up from a chair using your arms (e.g., wheelchair, bedside chair)? 3  -ERICA      Climbing 3-5 steps with a railing? 2  -ERICA      To walk in hospital room? 3  -ERICA      AM-PAC 6 Clicks Score (PT) 17  -ERICA            User Key  (r) = Recorded By, (t) = Taken By, (c) = Cosigned By    Initials Name Provider Type    ERICA Franca Fuchs, EMMA Physical Therapist Assistant                 Time Calculation:    PT Charges     Row Name 02/09/23 0907             Time Calculation    Start Time 0829  -ERICA      Stop Time 0854  -ERICA      Time Calculation (min) 25 min  -ERICA         Timed Charges    84226 - PT Therapeutic Activity Minutes 25  -ERICA         Total Minutes    Timed Charges Total Minutes 25  -ERICA       Total Minutes 25  -ERICA            User Key   (r) = Recorded By, (t) = Taken By, (c) = Cosigned By    Initials Name Provider Type    ERICA Franca Fuchs PTA Physical Therapist Assistant              Therapy Charges for Today     Code Description Service Date Service Provider Modifiers Qty    [id number]  PT THERAPEUTIC ACT EA 15 MIN 2/9/2023 Franca Fuchs PTA GP 2          PT G-Codes  Outcome Measure Options: AM-PAC 6 Clicks Daily Activity (OT)  AM-PAC 6 Clicks Score (PT): 17  AM-PAC 6 Clicks Score (OT): 23    Franca Fuchs PTA  2/9/2023

## 2023-02-10 ENCOUNTER — APPOINTMENT (OUTPATIENT)
Dept: GENERAL RADIOLOGY | Facility: HOSPITAL | Age: 88
DRG: 177 | End: 2023-02-10
Payer: MEDICARE

## 2023-02-10 LAB
ALBUMIN SERPL-MCNC: 3.3 G/DL (ref 3.5–5.2)
ALBUMIN/GLOB SERPL: 0.9 G/DL
ALP SERPL-CCNC: 58 U/L (ref 39–117)
ALT SERPL W P-5'-P-CCNC: 22 U/L (ref 1–41)
ANION GAP SERPL CALCULATED.3IONS-SCNC: 14 MMOL/L (ref 5–15)
ARTERIAL PATENCY WRIST A: POSITIVE
AST SERPL-CCNC: 19 U/L (ref 1–40)
ATMOSPHERIC PRESS: 755 MMHG
BASE EXCESS BLDA CALC-SCNC: 5.2 MMOL/L (ref 0–2)
BDY SITE: ABNORMAL
BILIRUB SERPL-MCNC: 0.3 MG/DL (ref 0–1.2)
BODY TEMPERATURE: 37 C
BUN SERPL-MCNC: 40 MG/DL (ref 8–23)
BUN/CREAT SERPL: 54.1 (ref 7–25)
CALCIUM SPEC-SCNC: 9.2 MG/DL (ref 8.6–10.5)
CHLORIDE SERPL-SCNC: 98 MMOL/L (ref 98–107)
CO2 SERPL-SCNC: 25 MMOL/L (ref 22–29)
CREAT SERPL-MCNC: 0.74 MG/DL (ref 0.76–1.27)
EGFRCR SERPLBLD CKD-EPI 2021: 86.6 ML/MIN/1.73
GAS FLOW AIRWAY: 55 LPM
GLOBULIN UR ELPH-MCNC: 3.5 GM/DL
GLUCOSE BLDC GLUCOMTR-MCNC: 199 MG/DL (ref 70–130)
GLUCOSE BLDC GLUCOMTR-MCNC: 200 MG/DL (ref 70–130)
GLUCOSE BLDC GLUCOMTR-MCNC: 209 MG/DL (ref 70–130)
GLUCOSE BLDC GLUCOMTR-MCNC: 249 MG/DL (ref 70–130)
GLUCOSE BLDC GLUCOMTR-MCNC: 283 MG/DL (ref 70–130)
GLUCOSE SERPL-MCNC: 220 MG/DL (ref 65–99)
HCO3 BLDA-SCNC: 29.5 MMOL/L (ref 20–26)
INHALED O2 CONCENTRATION: 100 %
Lab: ABNORMAL
MODALITY: ABNORMAL
NOTIFIED BY: ABNORMAL
NOTIFIED WHO: ABNORMAL
PCO2 BLDA: 41.6 MM HG (ref 35–45)
PCO2 TEMP ADJ BLD: 41.6 MM HG (ref 35–45)
PH BLDA: 7.46 PH UNITS (ref 7.35–7.45)
PH, TEMP CORRECTED: 7.46 PH UNITS (ref 7.35–7.45)
PO2 BLDA: 45 MM HG (ref 83–108)
PO2 TEMP ADJ BLD: 45 MM HG (ref 83–108)
POTASSIUM SERPL-SCNC: 4 MMOL/L (ref 3.5–5.2)
PROT SERPL-MCNC: 6.8 G/DL (ref 6–8.5)
SAO2 % BLDCOA: 81.4 % (ref 94–99)
SODIUM SERPL-SCNC: 137 MMOL/L (ref 136–145)
VENTILATOR MODE: ABNORMAL

## 2023-02-10 PROCEDURE — 94660 CPAP INITIATION&MGMT: CPT

## 2023-02-10 PROCEDURE — 36600 WITHDRAWAL OF ARTERIAL BLOOD: CPT

## 2023-02-10 PROCEDURE — 82962 GLUCOSE BLOOD TEST: CPT

## 2023-02-10 PROCEDURE — 25010000002 FUROSEMIDE PER 20 MG: Performed by: FAMILY MEDICINE

## 2023-02-10 PROCEDURE — 94761 N-INVAS EAR/PLS OXIMETRY MLT: CPT

## 2023-02-10 PROCEDURE — 25010000002 REMDESIVIR 100 MG/20ML SOLUTION 1 EACH VIAL: Performed by: FAMILY MEDICINE

## 2023-02-10 PROCEDURE — 82803 BLOOD GASES ANY COMBINATION: CPT

## 2023-02-10 PROCEDURE — 97530 THERAPEUTIC ACTIVITIES: CPT

## 2023-02-10 PROCEDURE — 94799 UNLISTED PULMONARY SVC/PX: CPT

## 2023-02-10 PROCEDURE — 99233 SBSQ HOSP IP/OBS HIGH 50: CPT | Performed by: INTERNAL MEDICINE

## 2023-02-10 PROCEDURE — 63710000001 INSULIN LISPRO (HUMAN) PER 5 UNITS: Performed by: FAMILY MEDICINE

## 2023-02-10 PROCEDURE — 25010000002 CEFTRIAXONE PER 250 MG: Performed by: FAMILY MEDICINE

## 2023-02-10 PROCEDURE — 80053 COMPREHEN METABOLIC PANEL: CPT | Performed by: FAMILY MEDICINE

## 2023-02-10 PROCEDURE — 25010000002 DEXAMETHASONE SODIUM PHOSPHATE 10 MG/ML SOLUTION: Performed by: NURSE PRACTITIONER

## 2023-02-10 PROCEDURE — 71045 X-RAY EXAM CHEST 1 VIEW: CPT

## 2023-02-10 RX ORDER — CHLORHEXIDINE GLUCONATE 500 MG/1
1 CLOTH TOPICAL ONCE
Status: COMPLETED | OUTPATIENT
Start: 2023-02-10 | End: 2023-02-10

## 2023-02-10 RX ORDER — FUROSEMIDE 10 MG/ML
20 INJECTION INTRAMUSCULAR; INTRAVENOUS ONCE
Status: COMPLETED | OUTPATIENT
Start: 2023-02-10 | End: 2023-02-10

## 2023-02-10 RX ORDER — CHLORHEXIDINE GLUCONATE 500 MG/1
1 CLOTH TOPICAL EVERY 24 HOURS
Status: DISCONTINUED | OUTPATIENT
Start: 2023-02-11 | End: 2023-02-17 | Stop reason: HOSPADM

## 2023-02-10 RX ORDER — FUROSEMIDE 10 MG/ML
20 INJECTION INTRAMUSCULAR; INTRAVENOUS EVERY 12 HOURS
Status: DISCONTINUED | OUTPATIENT
Start: 2023-02-10 | End: 2023-02-10

## 2023-02-10 RX ADMIN — ASPIRIN 81 MG: 81 TABLET, COATED ORAL at 08:09

## 2023-02-10 RX ADMIN — PANTOPRAZOLE SODIUM 40 MG: 40 TABLET, DELAYED RELEASE ORAL at 05:13

## 2023-02-10 RX ADMIN — BUDESONIDE AND FORMOTEROL FUMARATE DIHYDRATE 2 PUFF: 160; 4.5 AEROSOL RESPIRATORY (INHALATION) at 06:32

## 2023-02-10 RX ADMIN — ISOSORBIDE MONONITRATE 30 MG: 30 TABLET, EXTENDED RELEASE ORAL at 08:09

## 2023-02-10 RX ADMIN — METOPROLOL TARTRATE 12.5 MG: 25 TABLET, FILM COATED ORAL at 08:09

## 2023-02-10 RX ADMIN — TERAZOSIN HYDROCHLORIDE 10 MG: 5 CAPSULE ORAL at 20:43

## 2023-02-10 RX ADMIN — FERROUS SULFATE TAB 325 MG (65 MG ELEMENTAL FE) 325 MG: 325 (65 FE) TAB at 08:09

## 2023-02-10 RX ADMIN — APIXABAN 5 MG: 5 TABLET, FILM COATED ORAL at 08:10

## 2023-02-10 RX ADMIN — APIXABAN 5 MG: 5 TABLET, FILM COATED ORAL at 20:43

## 2023-02-10 RX ADMIN — OXYCODONE HYDROCHLORIDE AND ACETAMINOPHEN 500 MG: 500 TABLET ORAL at 08:10

## 2023-02-10 RX ADMIN — REMDESIVIR 100 MG: 100 INJECTION, POWDER, LYOPHILIZED, FOR SOLUTION INTRAVENOUS at 08:10

## 2023-02-10 RX ADMIN — ALBUTEROL SULFATE 2 PUFF: 90 AEROSOL, METERED RESPIRATORY (INHALATION) at 03:20

## 2023-02-10 RX ADMIN — ALBUTEROL SULFATE 2 PUFF: 90 AEROSOL, METERED RESPIRATORY (INHALATION) at 06:32

## 2023-02-10 RX ADMIN — INSULIN LISPRO 4 UNITS: 100 INJECTION, SOLUTION INTRAVENOUS; SUBCUTANEOUS at 12:13

## 2023-02-10 RX ADMIN — Medication 1 APPLICATION: at 12:13

## 2023-02-10 RX ADMIN — Medication 1 APPLICATION: at 20:43

## 2023-02-10 RX ADMIN — ALBUTEROL SULFATE 2 PUFF: 90 AEROSOL, METERED RESPIRATORY (INHALATION) at 14:41

## 2023-02-10 RX ADMIN — FINASTERIDE 5 MG: 5 TABLET, FILM COATED ORAL at 08:09

## 2023-02-10 RX ADMIN — CEFTRIAXONE 1 G: 1 INJECTION, POWDER, FOR SOLUTION INTRAMUSCULAR; INTRAVENOUS at 18:31

## 2023-02-10 RX ADMIN — INSULIN LISPRO 12 UNITS: 100 INJECTION, SOLUTION INTRAVENOUS; SUBCUTANEOUS at 17:04

## 2023-02-10 RX ADMIN — ALBUTEROL SULFATE 2 PUFF: 90 AEROSOL, METERED RESPIRATORY (INHALATION) at 10:17

## 2023-02-10 RX ADMIN — CHLORHEXIDINE GLUCONATE 1 APPLICATION: 500 CLOTH TOPICAL at 12:12

## 2023-02-10 RX ADMIN — BUDESONIDE AND FORMOTEROL FUMARATE DIHYDRATE 2 PUFF: 160; 4.5 AEROSOL RESPIRATORY (INHALATION) at 21:00

## 2023-02-10 RX ADMIN — PRAVASTATIN SODIUM 80 MG: 20 TABLET ORAL at 08:09

## 2023-02-10 RX ADMIN — ALBUTEROL SULFATE 2 PUFF: 90 AEROSOL, METERED RESPIRATORY (INHALATION) at 21:00

## 2023-02-10 RX ADMIN — METOPROLOL TARTRATE 12.5 MG: 25 TABLET, FILM COATED ORAL at 20:43

## 2023-02-10 RX ADMIN — HYDROCHLOROTHIAZIDE 12.5 MG: 25 TABLET ORAL at 08:10

## 2023-02-10 RX ADMIN — FUROSEMIDE 20 MG: 10 INJECTION, SOLUTION INTRAMUSCULAR; INTRAVENOUS at 17:00

## 2023-02-10 RX ADMIN — DEXAMETHASONE SODIUM PHOSPHATE 6 MG: 10 INJECTION, SOLUTION INTRAMUSCULAR; INTRAVENOUS at 08:10

## 2023-02-10 RX ADMIN — INSULIN LISPRO 8 UNITS: 100 INJECTION, SOLUTION INTRAVENOUS; SUBCUTANEOUS at 06:54

## 2023-02-10 RX ADMIN — GUAIFENESIN 600 MG: 600 TABLET, EXTENDED RELEASE ORAL at 08:09

## 2023-02-10 NOTE — PLAN OF CARE
Goal Outcome Evaluation:   Pt rested well until around 0000, after breathing tx pt had coughing episode and was unable to recover his O2 sat. Pt then slightly confused and pulled oxygen off, ABGs obtained and po2 was 45. Pt was placed on bipap and pulm was called and updated on events.   CXR obtained this morning, pending result.   HR afib . BP stable.   Good uop.   Bipap now in cpap mode, tolerating well. A&Ox4.

## 2023-02-10 NOTE — PROGRESS NOTES
[facility] [place], [place] PULMONARY AND CRITICAL CARE PROGRESS NOTE - [facility]    Patient: Gonzalo Durham  1934   MR# [id number]   Acct# [id number]  02/10/23   07:33 CST  Referring Provider: Trenton Cespedes MD    Chief Complaint: Covid-19     Interval history: The patient is Covid positive and has been examined through the glass doors to prevent possible cross-contamination, unnecessary exposure and unnecessary use of PPE.  ABGs were obtained overnight and PO2 was 45.  He was placed on CPAP and is doing better this morning.  Current sat is 93%.  Vapotherm is on standby.  He remains in A-fib.  Chest x-ray shows bilateral infiltrates.  Nursing is at bedside providing care. No obvious distress.    Meds:  albuterol sulfate HFA, 2 puff, Inhalation, 4x Daily - RT  apixaban, 5 mg, Oral, BID  ascorbic acid, 500 mg, Oral, Daily  aspirin, 81 mg, Oral, Daily  budesonide-formoterol, 2 puff, Inhalation, BID - RT  cefTRIAXone, 1 g, Intravenous, Q24H  dexamethasone, 6 mg, Intravenous, Daily  ferrous sulfate, 325 mg, Oral, Daily With Breakfast  finasteride, 5 mg, Oral, Daily  guaiFENesin, 600 mg, Oral, Daily  hydroCHLOROthiazide, 12.5 mg, Oral, Daily  insulin lispro, 0-24 Units, Subcutaneous, TID AC  isosorbide mononitrate, 30 mg, Oral, Daily  metoprolol tartrate, 12.5 mg, Oral, BID  pantoprazole, 40 mg, Oral, Q AM  pravastatin, 80 mg, Oral, Daily  remdesivir, 100 mg, Intravenous, Daily  terazosin, 10 mg, Oral, Nightly      Pharmacy Consult - Remdesivir,       Physical Exam:  SpO2 Percentage    02/10/23 0500 02/10/23 0632 02/10/23 0636   SpO2: 93% 93% 93%     Temp:  [96.1 °F (35.6 °C)-98.6 °F (37 °C)] 96.4 °F (35.8 °C)  Heart Rate:  [] 80  Resp:  [18-27] 27  BP: (105-152)/(48-84) 129/84    Intake/Output Summary (Last 24 hours) at 2/10/2023 0733  Last data filed at 2/10/2023 0000  Gross per 24 hour   Intake 350 ml   Output 1800 ml   Net -1450 ml     Body mass index is 26.75 kg/m².   Physical Exam  Vitals and nursing  note reviewed.   Constitutional:       Comments: CPAP mask   HENT:      Head: Normocephalic.      Nose: Nose normal.   Cardiovascular:      Rate and Rhythm: Tachycardia present. Rhythm irregular.   Pulmonary:      Effort: No respiratory distress.   Musculoskeletal:      Cervical back: Normal range of motion.   Skin:     Coloration: Skin is not pale.   Neurological:      Mental Status: He is alert.   Psychiatric:         Behavior: Behavior normal.        Electronically signed by DOMINIQUE Yeung, 2/10/2023, 07:33 CST      Physician substantive portion: medical decision making    Result Review    Laboratory Data:  Results from last 7 days   Lab Units 02/07/23  0936   WBC 10*3/mm3 12.35*   HEMOGLOBIN g/dL 10.9*   PLATELETS 10*3/mm3 251     Results from last 7 days   Lab Units 02/10/23  0420 02/09/23  0313 02/08/23  0447 02/07/23  2232   SODIUM mmol/L 137 137 138  --    POTASSIUM mmol/L 4.0 3.9 3.9  --    CO2 mmol/L 25.0 28.0 26.0  --    BUN mg/dL 40* 34* 33*  --    CREATININE mg/dL 0.74* 0.82 0.79  --    LACTATE mmol/L  --   --   --  1.3   CRP mg/dL  --   --  21.21*  --      Results from last 7 days   Lab Units 02/10/23  0028 02/07/23  0943   PH, ARTERIAL pH units 7.460*  7.460*  7.460*  7.460* 7.416   PCO2, ARTERIAL mm Hg 41.6  41.6  41.6  41.6 43.8   PO2 ART mm Hg 45.0*  45.0*  45.0*  45.0* 66.8*   FIO2 % 100  100  100  100  --      Microbiology Results (last 10 days)     Procedure Component Value - Date/Time    Blood Culture - Blood, Hand, Right [[id number]]  (Normal) Collected: 02/07/23 1909    Lab Status: Preliminary result Specimen: Blood from Hand, Right Updated: 02/09/23 1945     Blood Culture No growth at 2 days    Blood Culture - Blood, Arm, Left [[id number]]  (Normal) Collected: 02/07/23 1909    Lab Status: Preliminary result Specimen: Blood from Arm, Left Updated: 02/09/23 1945     Blood Culture No growth at 2 days    COVID-19 and FLU A/B PCR - Swab, Nasopharynx [[id number]]   (Abnormal) Collected: 02/07/23 0928    Lab Status: Final result Specimen: Swab from Nasopharynx Updated: 02/07/23 1010     COVID19 Detected     Influenza A PCR Not Detected     Influenza B PCR Not Detected    Narrative:      Fact sheet for providers: https://www.fda.gov/media/994833/download    Fact sheet for patients: https://www.fda.gov/media/581306/download    Test performed by PCR.  Influenza A and Influenza B negative results should be considered presumptive in samples that have a positive SARS-CoV-2 result.    Competitive inhibition studies showed that SARS-CoV-2 virus, when present at concentrations above 3.6E+04 copies/mL, can inhibit the detection and amplification of influenza A and influenza B virus RNA if present at or below 1.8E+02 copies/mL or 4.9E+02 copies/mL, respectively, and may lead to false negative influenza virus results. If co-infection with influenza A or influenza B virus is suspected in samples with a positive SARS-CoV-2 result, the sample should be re-tested with another FDA cleared, approved, or authorized influenza test, if influenza virus detection would change clinical management.         Recent films:  XR Chest 1 View    Result Date: 2/10/2023  EXAMINATION: XR CHEST 1 VW-  2/10/2023 1:07 AM CST  HISTORY: dyspnea; Z74.09-Other reduced mobility; Z78.9-Other specified health status  A frontal projection of the chest is compared with the previous study dated 02/07/2023.  There are extensive coarse interstitial changes in the lower lungs bilaterally similar to the previous study.  Chronic emphysematous changes are seen in the upper lungs bilaterally, right more than the left, unchanged.  There is no pulmonary congestion or pneumothorax.  The heart size is not evaluated due to obliteration of the cardiac border by the adjacent infiltrate. Atheromatous changes thoracic aorta noted. There is evidence of prior cardiac surgery.  The cardiac 2 monitors in place.  No acute bony abnormality.       Impression: 1. Persistent and unchanged chronic inflammatory and obstructive lung changes bilaterally.   This report was finalized on 02/10/2023 06:40 by Dr. Bayron Wagner MD.     Personal review of imaging: CXR shows severe bilateral lower field infitlrates    Pulmonary Assessment:  1. Acute on chronic resp failure with hypoxia requiring high flow oxygen, briefly nppv overnight, threat to life and bodily function  2. Covid  3. Copd with moderate airflow obstruction  4. anemia    Recommend/plan:   · Continue dexamethasone 6 mg per day  · Continue high flow oxygen, titrate for sat 90, high risk of morbidity from additional diagnostic testing or treatment   · Declined tocilizumab    This visit was performed by both a physician and an Advanced Practice RN.  I personally evaluated and examined the patient.  I performed all aspects of the medical decision making as documented.  Electronically signed by Parrish Cardona MD, 2/10/2023, 12:28 CST        Yes

## 2023-02-10 NOTE — THERAPY TREATMENT NOTE
Acute Care - Physical Therapy Treatment Note  [facility]     Patient Name: Gonzalo Durham  : 1934  MRN: [id number]  Today's Date: 2/10/2023      Visit Dx:     ICD-10-CM ICD-9-CM   1. COVID-19  U07.1 079.89   2. Impaired mobility  Z74.09 799.89   3. Decreased activities of daily living (ADL)  Z78.9 V49.89     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, without long-term current use of insulin ([facility])   • Stage 3a chronic kidney disease (HCC)   • Erectile dysfunction   • Coronary artery disease involving native coronary artery of native heart without angina pectoris   • Hyperlipidemia LDL goal <70-statin   • Hypertension   • Prostatism-(young) urology   • Tremor   • Varicose vein of leg   • Plantar fasciitis   • Nocturnal leg movements   • Bad dreams   • Depression   • Peripheral neuropathy- clinical   • Hx of colonic polyps   • Gastroesophageal reflux disease   • Left lower quadrant pain   • Laboratory test*   • Anticoagulated-CAD, DM2, CVA/ASA 81+()+plavix » Anticoagulated-CAD, DM2, CVA/ASA 81+()+plavix+eliquist   • SOB: copd,CAD,#, hypoxemia   • Hypoxia#to pulmonary   • Corn or callus   • Anemia: ASA81,plavix,eliquis,gi(3/3+,div,cp,eitis   • Atherosclerosis: CAD, AAA vascular   • AAA: -(ro) steffen   • Heme positive stool   • Stage 2 moderate COPD by GOLD classification (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)    • Coronary artery disease involving native coronary artery of native heart without angina pectoris 1/20/2017    CABG/Az/[place]/1981 No TCC echo  7.16.18 Right ventricular cavity is mild-to-moderately dilated. Left ventricular diastolic dysfunction (grade I) consistent with impaired relaxation. Left ventricular systolic function is normal. Left atrial cavity size is borderline dilated. RIGHT HEART ENLARGEMENT - RVSP NOT ASSESSABLE NORMAL LV AND RV SYSTOLIC FUNCTION NO SIGNIFICANT VALVULAR DYSFUNCTION  Seein   • Diabetes mellitus ([facility])     Type 2   • DM (diabetes mellitus screen)    • GERD (gastroesophageal reflux disease)    • History of loop recorder     at current time   • Hx of colonic polyp    • Hypercholesteremia    • Hypertension    • Macular degeneration     treated with injections in right eye   • Myocardial infarction ([facility])    • Neuropathy    • Oxygen deficit     at 4liters   • Prostate cancer ([facility])    • Pulmonary hypertension ([facility]) 1/7/2022   • S/P CABG x 3 1/7/2022 1980 [facility]   • Sleep apnea     cpap   • Stage 3a chronic kidney disease (HCC) 1/20/2017   • Stroke ([facility])     recovererd   • Varicose vein of leg     bialteral     Past Surgical History:   Procedure Laterality Date   • APPENDECTOMY     • CARDIAC CATHETERIZATION     • CARDIAC CATHETERIZATION Left 5/22/2019    Procedure: Cardiac Catheterization/Vascular Study Positive occult blood in stools  ? BMS vs REGGIE Get cabg report  ;  Surgeon: Bradley Carver MD;  Location: [facility] CATH INVASIVE LOCATION;  Service: Cardiology   • COLONOSCOPY N/A 6/15/2017    Diverticulosis repeat exam prn   • COLONOSCOPY W/ POLYPECTOMY  10/21/2013    2 Tubular adenomatous polyps,  Diverticulosis repeat exam in 5 years   • CORONARY ARTERY BYPASS GRAFT  1980    X 3   • CYSTOSCOPY RETROGRADE PYELOGRAM Bilateral 2/8/2021    Procedure: CYSTOSCOPY RETROGRADE PYELOGRAM;  Surgeon: Danie Cadena MD;  Location:  [facility] OR;  Service: Urology;  Laterality: Bilateral;   • ENDOSCOPY N/A 6/15/2017    LA Grade A reflux esophagitis   • EYE SURGERY Bilateral    • HERNIA REPAIR     • TRANSURETHRAL RESECTION OF BLADDER TUMOR N/A 2/8/2021    Procedure: CYSTOSCOPY TRANSURETHRAL RESECTION OF BLADDER TUMOR WITH GEMCITABINE INSTILLATION;  Surgeon: Danie Cadena MD;  Location:  [facility] OR;  Service: Urology;  Laterality: N/A;     PT Assessment (last 12 hours)     PT Evaluation and Treatment     Row Name 02/10/23 1400          Physical Therapy Time and Intention    Subjective Information complains of;weakness  -WK     Document Type therapy note (daily note)  -WK     Mode of Treatment physical therapy  -WK     Comment very motivated, limited by vapotherm  -WK     Row Name 02/10/23 1400          General Information    Existing Precautions/Restrictions fall;oxygen therapy device and L/min  vapotherm 40L/100%, non rebreather 15L  -WK     Row Name 02/10/23 1400          Bed Mobility    Comment, (Bed Mobility) sitting OEB  -WK     Row Name 02/10/23 1400          Bed-Chair Transfer    Bed-Chair [place] (Transfers) verbal cues;minimum assist (75% patient effort)  -WK     Row Name 02/10/23 1400          Sit-Stand Transfer    Sit-Stand [place] (Transfers) verbal cues;contact guard  -WK     Assistive Device (Sit-Stand Transfers) cane, straight  -WK     Row Name 02/10/23 1400          Stand-Sit Transfer    Stand-Sit [place] (Transfers) verbal cues;contact guard  -WK     Assistive Device (Stand-Sit Transfers) cane, straight  -WK     Row Name 02/10/23 1400          Gait/Stairs (Locomotion)    [place] Level (Gait) contact guard  -WK     Assistive Device (Gait) cane, straight  -WK     Distance in Feet (Gait)  marching in place 1 minutesx3 sitting rest  -WK     Row Name 02/10/23 1400          Plan of Care Review    Plan of Care Reviewed With patient  -WK     Outcome Evaluation PT transferred to chair min A with straight cane. Pt performed marching in place 1 minute and static standing for 20 seconds and then performed sitting rest. Pt performed this 3x. Pt o2 was 88%-94% during treatment  -WK     Row Name 02/10/23 1400          Positioning and Restraints    Pre-Treatment Position in bed  -WK     Post Treatment Position chair  -WK     In Chair sitting;call light within reach;encouraged to call for assist  -WK           User Key  (r) = Recorded By, (t) = Taken By, (c) = Cosigned By    Initials Name Provider Type    WK Arlen Cardona PTA Physical Therapist Assistant                Physical Therapy Education     Title: PT OT SLP Therapies (In Progress)     Topic: Physical Therapy (In Progress)     Point: Mobility training (Done)     Learning Progress Summary           Patient Acceptance, E, VU,DU,NR by CHEYENNE at 2/8/2023 0905    Comment: benefits of acitvity, progression of PT                   Point: Home exercise program (Done)     Learning Progress Summary           Patient Acceptance, E,D, DU by ERICA at 2/9/2023 0903    Comment: breathing exercises                   Point: Body mechanics (Not Started)     Learner Progress:  Not documented in this visit.          Point: Precautions (Not Started)     Learner Progress:  Not documented in this visit.                      User Key     Initials Effective Dates Name Provider Type Discipline    CHEYENNE 02/03/23 -  Rafi Browning PT DPT Physical Therapist PT    ERICA 02/03/23 -  Franca Fuchs PTA Physical Therapist Assistant PT              PT Recommendation and Plan     Plan of Care Reviewed With: patient  Outcome Evaluation: PT transferred to chair min A with straight cane. Pt performed marching in place 1 minute and static standing for 20 seconds and then performed sitting rest.  Pt performed this 3x. Pt o2 was 88%-94% during treatment   Outcome Measures     Row Name 02/10/23 1400 02/09/23 0900          How much help from another person do you currently need...    Turning from your back to your side while in flat bed without using bedrails? 4  -WK 3  -ERICA     Moving from lying on back to sitting on the side of a flat bed without bedrails? 4  -WK 3  -ERICA     Moving to and from a bed to a chair (including a wheelchair)? 3  -WK 3  -ERICA     Standing up from a chair using your arms (e.g., wheelchair, bedside chair)? 3  -WK 3  -ERICA     Climbing 3-5 steps with a railing? 2  -WK 2  -ERICA     To walk in hospital room? 2  -WK 3  -ERICA     AM-PAC 6 Clicks Score (PT) 18  -WK 17  -ERICA        Functional Assessment    Outcome Measure Options AM-PAC 6 Clicks Basic Mobility (PT)  -WK --           User Key  (r) = Recorded By, (t) = Taken By, (c) = Cosigned By    Initials Name Provider Type    ERICA Franca Fuchs PTA Physical Therapist Assistant    WK Arlen Cardona PTA Physical Therapist Assistant                 Time Calculation:    PT Charges     Row Name 02/10/23 1434             Time Calculation    Start Time 1400  -WK      Stop Time 1429  -WK      Time Calculation (min) 29 min  -WK      PT Received On 02/10/23  -WK         Time Calculation- PT    Total Timed Code Minutes- PT 29 minute(s)  -WK            User Key  (r) = Recorded By, (t) = Taken By, (c) = Cosigned By    Initials Name Provider Type    WK Arlen Cardona PTA Physical Therapist Assistant              Therapy Charges for Today     Code Description Service Date Service Provider Modifiers Qty    [id number]  PT THERAPEUTIC ACT EA 15 MIN 2/10/2023 Arlen Cardoan PTA GP 2          PT G-Codes  Outcome Measure Options: AM-PAC 6 Clicks Basic Mobility (PT)  AM-PAC 6 Clicks Score (PT): 18  AM-PAC 6 Clicks Score (OT): 23    Arlen Cardona PTA  2/10/2023

## 2023-02-10 NOTE — PLAN OF CARE
Goal Outcome Evaluation:.    Pt slept more today. Got up to the chair with PT. IV Lasix 20mg given. No other issues.                  No

## 2023-02-10 NOTE — PROGRESS NOTES
[facility] Medicine Services  INPATIENT PROGRESS NOTE    Patient Name: Gonzalo Durham  Date of Admission: 2/7/2023  Today's Date: 02/10/23  Length of Stay: 3  Primary Care Physician: Abel Romero MD    Subjective   Chief Complaint: SOA  Shortness of Breath  Associated symptoms include wheezing. Pertinent negatives include no abdominal pain, chest pain, ear pain, fever, headaches, rash or vomiting.      Still SOA, on 40L Vapotherm at 100% FIO2        Review of Systems   Constitutional: Positive for fatigue. Negative for fever.   HENT: Negative for congestion and ear pain.    Eyes: Negative for redness and visual disturbance.   Respiratory: Positive for cough, shortness of breath and wheezing.    Cardiovascular: Negative for chest pain and palpitations.   Gastrointestinal: Negative for abdominal pain, diarrhea, nausea and vomiting.   Endocrine: Negative for cold intolerance and heat intolerance.   Genitourinary: Negative for dysuria and frequency.   Musculoskeletal: Negative for arthralgias and back pain.   Skin: Negative for rash and wound.   Neurological: Positive for weakness. Negative for dizziness and headaches.   Psychiatric/Behavioral: Negative for confusion. The patient is not nervous/anxious.           All pertinent negatives and positives are as above. All other systems have been reviewed and are negative unless otherwise stated.     Objective    Temp:  [96.1 °F (35.6 °C)-98.6 °F (37 °C)] 96.6 °F (35.9 °C)  Heart Rate:  [] 81  Resp:  [18-27] 26  BP: (109-152)/(48-84) 128/62  Physical Exam  Vitals reviewed.   Constitutional:       Appearance: He is well-developed.   HENT:      Head: Normocephalic and atraumatic.      Right Ear: External ear normal.      Left Ear: External ear normal.      Nose: Nose normal.   Eyes:      General: No scleral icterus.        Right eye: No discharge.         Left eye: No discharge.      Conjunctiva/sclera: Conjunctivae normal.       Pupils: Pupils are equal, round, and reactive to light.   Neck:      Thyroid: No thyromegaly.      Trachea: No tracheal deviation.   Cardiovascular:      Rate and Rhythm: Normal rate and regular rhythm.      Heart sounds: Normal heart sounds. No murmur heard.    No friction rub. No gallop.   Pulmonary:      Effort: Pulmonary effort is normal. No respiratory distress.      Breath sounds: No stridor. Decreased breath sounds, wheezing and rhonchi present. No rales.   Chest:      Chest wall: No tenderness.   Abdominal:      General: Bowel sounds are normal. There is no distension.      Palpations: Abdomen is soft. There is no mass.      Tenderness: There is no abdominal tenderness. There is no guarding or rebound.      Hernia: No hernia is present.   Musculoskeletal:         General: No deformity. Normal range of motion.      Cervical back: Normal range of motion and neck supple.   Lymphadenopathy:      Cervical: No cervical adenopathy.   Skin:     General: Skin is warm and dry.      Coloration: Skin is not pale.      Findings: No erythema or rash.   Neurological:      Mental Status: He is alert and oriented to person, place, and time.      Cranial Nerves: No cranial nerve deficit.      Motor: No abnormal muscle tone.      Coordination: Coordination normal.      Deep Tendon Reflexes: Reflexes are normal and symmetric. Reflexes normal.   Psychiatric:         Behavior: Behavior normal.         Thought Content: Thought content normal.         Judgment: Judgment normal.           Results Review:  I have reviewed the labs, radiology results, and diagnostic studies.    Laboratory Data:   Results from last 7 days   Lab Units 02/07/23  0936   WBC 10*3/mm3 12.35*   HEMOGLOBIN g/dL 10.9*   HEMATOCRIT % 34.1*   PLATELETS 10*3/mm3 251        Results from last 7 days   Lab Units 02/10/23  0420 02/09/23  0313 02/08/23  0447   SODIUM mmol/L 137 137 138   POTASSIUM mmol/L 4.0 3.9 3.9   CHLORIDE mmol/L 98 96* 99   CO2 mmol/L 25.0 28.0  26.0   BUN mg/dL 40* 34* 33*   CREATININE mg/dL 0.74* 0.82 0.79   CALCIUM mg/dL 9.2 9.6 8.9   BILIRUBIN mg/dL 0.3 0.3 0.6   ALK PHOS U/L 58 79 54   ALT (SGPT) U/L 22 27 17   AST (SGOT) U/L 19 27 24   GLUCOSE mg/dL 220* 224* 168*       Culture Data:   No results found for: BLOODCX, URINECX, WOUNDCX, MRSACX, RESPCX, STOOLCX    Radiology Data:   Imaging Results (Last 24 Hours)     Procedure Component Value Units Date/Time    XR Chest 1 View [[id number]] Collected: 02/10/23 0638     Updated: 02/10/23 0643    Narrative:      EXAMINATION: XR CHEST 1 VW-     2/10/2023 1:07 AM CST     HISTORY: dyspnea; Z74.09-Other reduced mobility; Z78.9-Other specified  health status     A frontal projection of the chest is compared with the previous study  dated 02/07/2023.     There are extensive coarse interstitial changes in the lower lungs  bilaterally similar to the previous study.     Chronic emphysematous changes are seen in the upper lungs bilaterally,  right more than the left, unchanged.     There is no pulmonary congestion or pneumothorax.     The heart size is not evaluated due to obliteration of the cardiac  border by the adjacent infiltrate. Atheromatous changes thoracic aorta  noted. There is evidence of prior cardiac surgery.     The cardiac 2 monitors in place.     No acute bony abnormality.       Impression:      1. Persistent and unchanged chronic inflammatory and obstructive lung  changes bilaterally.        This report was finalized on 02/10/2023 06:40 by Dr. Bayron Wagner MD.          I have reviewed the patient's current medications.     Assessment/Plan   Assessment  Active Hospital Problems    Diagnosis    • **COVID-19        Treatment Plan  1.  Acute on chronic hypoxic respiratory failure  -Decadron  -Albuterol     2.  COPD AE  -Decadron  -Albuterol     3.  Covid-19 pneumonia  -Decadron  -Albuterol  -Remdesivir     4.  A-fib  -Eliquis  -Metoprolol     5.  CAD  -ASA  -Pravastatin  -Metoprolol     6.   T2DM  -Metformin  -SSI  -Imdur     7.  HTN  -Metoprolol  -HCTZ     8.  HLD  -Pravastatin     9.  GERD  -Protonix     10.  BPH  -Hytrin  -Proscar       Medical Decision Making  Number and Complexity of problems: 10 problems, high complexity:  Covid-19 pneumonia and respiratory failure represent threat to life    Risk:  High:  Requiring high flow oxygen    Differential Diagnosis: Pneumonia, Viral pneumonia, COPD, CHF    Conditions and Status        Condition is worsening.     MDM Data  Cardiac tracing (EKG, telemetry) interpretation: NSR  Labs reviewed: Normal renal function     Discussed with: patient, nursing     Care Planning  Shared decision making: Patient agreeable to treatment plan  Code status and discussions: full code    Disposition  Social Determinants of Health that impact treatment or disposition: n/a  D/c planning uncertain    Electronically signed by Trenton Cespedes MD, 02/10/23, 15:23 CST.

## 2023-02-10 NOTE — PLAN OF CARE
Problem: Adult Inpatient Plan of Care  Goal: Plan of Care Review    Plan of Care Reviewed With: patient  Outcome Evaluation: PT transferred to [facility] min A with straight cane. Pt performed marching in place 1 minute and static standing for 20 seconds and then performed sitting rest. Pt performed this 3x. Pt o2 was 88%-94% during treatment

## 2023-02-11 LAB
ALBUMIN SERPL-MCNC: 2.9 G/DL (ref 3.5–5.2)
ALBUMIN/GLOB SERPL: 0.8 G/DL
ALP SERPL-CCNC: 56 U/L (ref 39–117)
ALT SERPL W P-5'-P-CCNC: 21 U/L (ref 1–41)
ANION GAP SERPL CALCULATED.3IONS-SCNC: 11 MMOL/L (ref 5–15)
AST SERPL-CCNC: 18 U/L (ref 1–40)
BILIRUB SERPL-MCNC: 0.6 MG/DL (ref 0–1.2)
BUN SERPL-MCNC: 40 MG/DL (ref 8–23)
BUN/CREAT SERPL: 51.9 (ref 7–25)
CALCIUM SPEC-SCNC: 8.9 MG/DL (ref 8.6–10.5)
CHLORIDE SERPL-SCNC: 97 MMOL/L (ref 98–107)
CO2 SERPL-SCNC: 25 MMOL/L (ref 22–29)
CREAT SERPL-MCNC: 0.77 MG/DL (ref 0.76–1.27)
EGFRCR SERPLBLD CKD-EPI 2021: 85.6 ML/MIN/1.73
GLOBULIN UR ELPH-MCNC: 3.7 GM/DL
GLUCOSE BLDC GLUCOMTR-MCNC: 108 MG/DL (ref 70–130)
GLUCOSE BLDC GLUCOMTR-MCNC: 175 MG/DL (ref 70–130)
GLUCOSE BLDC GLUCOMTR-MCNC: 231 MG/DL (ref 70–130)
GLUCOSE BLDC GLUCOMTR-MCNC: 378 MG/DL (ref 70–130)
GLUCOSE SERPL-MCNC: 160 MG/DL (ref 65–99)
POTASSIUM SERPL-SCNC: 3.8 MMOL/L (ref 3.5–5.2)
PROCALCITONIN SERPL-MCNC: 0.1 NG/ML (ref 0–0.25)
PROT SERPL-MCNC: 6.6 G/DL (ref 6–8.5)
SODIUM SERPL-SCNC: 133 MMOL/L (ref 136–145)

## 2023-02-11 PROCEDURE — 99233 SBSQ HOSP IP/OBS HIGH 50: CPT | Performed by: INTERNAL MEDICINE

## 2023-02-11 PROCEDURE — 94799 UNLISTED PULMONARY SVC/PX: CPT

## 2023-02-11 PROCEDURE — 25010000002 CEFTRIAXONE PER 250 MG: Performed by: FAMILY MEDICINE

## 2023-02-11 PROCEDURE — 63710000001 INSULIN LISPRO (HUMAN) PER 5 UNITS: Performed by: FAMILY MEDICINE

## 2023-02-11 PROCEDURE — 25010000002 DEXAMETHASONE SODIUM PHOSPHATE 10 MG/ML SOLUTION: Performed by: NURSE PRACTITIONER

## 2023-02-11 PROCEDURE — 80053 COMPREHEN METABOLIC PANEL: CPT | Performed by: FAMILY MEDICINE

## 2023-02-11 PROCEDURE — 25010000002 REMDESIVIR 100 MG/20ML SOLUTION 1 EACH VIAL: Performed by: FAMILY MEDICINE

## 2023-02-11 PROCEDURE — 82962 GLUCOSE BLOOD TEST: CPT

## 2023-02-11 PROCEDURE — 25010000002 FUROSEMIDE PER 20 MG: Performed by: FAMILY MEDICINE

## 2023-02-11 PROCEDURE — 84145 PROCALCITONIN (PCT): CPT | Performed by: FAMILY MEDICINE

## 2023-02-11 PROCEDURE — 94664 DEMO&/EVAL PT USE INHALER: CPT

## 2023-02-11 PROCEDURE — 25010000002 DEXAMETHASONE SODIUM PHOSPHATE 10 MG/ML SOLUTION: Performed by: FAMILY MEDICINE

## 2023-02-11 RX ORDER — DEXAMETHASONE SODIUM PHOSPHATE 10 MG/ML
6 INJECTION, SOLUTION INTRAMUSCULAR; INTRAVENOUS 2 TIMES DAILY
Status: DISCONTINUED | OUTPATIENT
Start: 2023-02-11 | End: 2023-02-13

## 2023-02-11 RX ORDER — FUROSEMIDE 10 MG/ML
40 INJECTION INTRAMUSCULAR; INTRAVENOUS
Status: DISCONTINUED | OUTPATIENT
Start: 2023-02-11 | End: 2023-02-16

## 2023-02-11 RX ADMIN — OXYCODONE HYDROCHLORIDE AND ACETAMINOPHEN 500 MG: 500 TABLET ORAL at 08:14

## 2023-02-11 RX ADMIN — INSULIN LISPRO 8 UNITS: 100 INJECTION, SOLUTION INTRAVENOUS; SUBCUTANEOUS at 11:49

## 2023-02-11 RX ADMIN — BUDESONIDE AND FORMOTEROL FUMARATE DIHYDRATE 2 PUFF: 160; 4.5 AEROSOL RESPIRATORY (INHALATION) at 18:52

## 2023-02-11 RX ADMIN — ALBUTEROL SULFATE 2 PUFF: 90 AEROSOL, METERED RESPIRATORY (INHALATION) at 11:14

## 2023-02-11 RX ADMIN — INSULIN LISPRO 20 UNITS: 100 INJECTION, SOLUTION INTRAVENOUS; SUBCUTANEOUS at 17:32

## 2023-02-11 RX ADMIN — Medication 1 APPLICATION: at 20:47

## 2023-02-11 RX ADMIN — ISOSORBIDE MONONITRATE 30 MG: 30 TABLET, EXTENDED RELEASE ORAL at 08:15

## 2023-02-11 RX ADMIN — PANTOPRAZOLE SODIUM 40 MG: 40 TABLET, DELAYED RELEASE ORAL at 06:23

## 2023-02-11 RX ADMIN — CHLORHEXIDINE GLUCONATE 1 APPLICATION: 500 CLOTH TOPICAL at 04:34

## 2023-02-11 RX ADMIN — PRAVASTATIN SODIUM 80 MG: 20 TABLET ORAL at 08:14

## 2023-02-11 RX ADMIN — ALBUTEROL SULFATE 2 PUFF: 90 AEROSOL, METERED RESPIRATORY (INHALATION) at 00:03

## 2023-02-11 RX ADMIN — METOPROLOL TARTRATE 12.5 MG: 25 TABLET, FILM COATED ORAL at 08:14

## 2023-02-11 RX ADMIN — Medication 1 APPLICATION: at 08:14

## 2023-02-11 RX ADMIN — REMDESIVIR 100 MG: 100 INJECTION, POWDER, LYOPHILIZED, FOR SOLUTION INTRAVENOUS at 08:13

## 2023-02-11 RX ADMIN — DEXAMETHASONE SODIUM PHOSPHATE 6 MG: 10 INJECTION, SOLUTION INTRAMUSCULAR; INTRAVENOUS at 20:50

## 2023-02-11 RX ADMIN — ALBUTEROL SULFATE 2 PUFF: 90 AEROSOL, METERED RESPIRATORY (INHALATION) at 03:08

## 2023-02-11 RX ADMIN — ALBUTEROL SULFATE 2 PUFF: 90 AEROSOL, METERED RESPIRATORY (INHALATION) at 07:13

## 2023-02-11 RX ADMIN — CEFTRIAXONE 1 G: 1 INJECTION, POWDER, FOR SOLUTION INTRAMUSCULAR; INTRAVENOUS at 18:30

## 2023-02-11 RX ADMIN — HYDROCHLOROTHIAZIDE 12.5 MG: 25 TABLET ORAL at 08:14

## 2023-02-11 RX ADMIN — ALBUTEROL SULFATE 2 PUFF: 90 AEROSOL, METERED RESPIRATORY (INHALATION) at 18:52

## 2023-02-11 RX ADMIN — BUDESONIDE AND FORMOTEROL FUMARATE DIHYDRATE 2 PUFF: 160; 4.5 AEROSOL RESPIRATORY (INHALATION) at 07:14

## 2023-02-11 RX ADMIN — DEXAMETHASONE SODIUM PHOSPHATE 6 MG: 10 INJECTION, SOLUTION INTRAMUSCULAR; INTRAVENOUS at 08:15

## 2023-02-11 RX ADMIN — ASPIRIN 81 MG: 81 TABLET, COATED ORAL at 08:14

## 2023-02-11 RX ADMIN — APIXABAN 5 MG: 5 TABLET, FILM COATED ORAL at 20:48

## 2023-02-11 RX ADMIN — FUROSEMIDE 40 MG: 10 INJECTION, SOLUTION INTRAMUSCULAR; INTRAVENOUS at 11:49

## 2023-02-11 RX ADMIN — METOPROLOL TARTRATE 12.5 MG: 25 TABLET, FILM COATED ORAL at 20:48

## 2023-02-11 RX ADMIN — ALBUTEROL SULFATE 2 PUFF: 90 AEROSOL, METERED RESPIRATORY (INHALATION) at 14:56

## 2023-02-11 RX ADMIN — APIXABAN 5 MG: 5 TABLET, FILM COATED ORAL at 08:15

## 2023-02-11 RX ADMIN — INSULIN LISPRO 4 UNITS: 100 INJECTION, SOLUTION INTRAVENOUS; SUBCUTANEOUS at 06:48

## 2023-02-11 RX ADMIN — FUROSEMIDE 40 MG: 10 INJECTION, SOLUTION INTRAMUSCULAR; INTRAVENOUS at 17:32

## 2023-02-11 RX ADMIN — FINASTERIDE 5 MG: 5 TABLET, FILM COATED ORAL at 08:14

## 2023-02-11 RX ADMIN — ALBUTEROL SULFATE 2 PUFF: 90 AEROSOL, METERED RESPIRATORY (INHALATION) at 23:16

## 2023-02-11 RX ADMIN — GUAIFENESIN 600 MG: 600 TABLET, EXTENDED RELEASE ORAL at 08:15

## 2023-02-11 RX ADMIN — FERROUS SULFATE TAB 325 MG (65 MG ELEMENTAL FE) 325 MG: 325 (65 FE) TAB at 08:15

## 2023-02-11 NOTE — PROGRESS NOTES
[facility] Medicine Services  INPATIENT PROGRESS NOTE    Patient Name: Gonzalo Durham  Date of Admission: 2/7/2023  Today's Date: 02/11/23  Length of Stay: 4  Primary Care Physician: Abel Romero MD    Subjective   Chief Complaint: SOA  Shortness of Breath  Associated symptoms include wheezing. Pertinent negatives include no abdominal pain, chest pain, ear pain, fever, headaches, rash or vomiting.      He is still SOA  He is requiring CPAP  He was placed on Vapotherm and sats dropped into the low 80's  No fevers  He maintains he wants to be intubated should he deteriorate.        Review of Systems   Constitutional: Positive for fatigue. Negative for fever.   HENT: Negative for congestion and ear pain.    Eyes: Negative for redness and visual disturbance.   Respiratory: Positive for cough, shortness of breath and wheezing.    Cardiovascular: Negative for chest pain and palpitations.   Gastrointestinal: Negative for abdominal pain, diarrhea, nausea and vomiting.   Endocrine: Negative for cold intolerance and heat intolerance.   Genitourinary: Negative for dysuria and frequency.   Musculoskeletal: Negative for arthralgias and back pain.   Skin: Negative for rash and wound.   Neurological: Positive for weakness. Negative for dizziness and headaches.   Psychiatric/Behavioral: Negative for confusion. The patient is not nervous/anxious.           All pertinent negatives and positives are as above. All other systems have been reviewed and are negative unless otherwise stated.     Objective    Temp:  [97 °F (36.1 °C)-97.1 °F (36.2 °C)] 97 °F (36.1 °C)  Heart Rate:  [] 98  Resp:  [23-32] 30  BP: ()/(48-85) 104/48  Physical Exam  Vitals reviewed.   Constitutional:       Appearance: He is well-developed.   HENT:      Head: Normocephalic and atraumatic.      Right Ear: External ear normal.      Left Ear: External ear normal.      Nose: Nose normal.   Eyes:      General: No  scleral icterus.        Right eye: No discharge.         Left eye: No discharge.      Conjunctiva/sclera: Conjunctivae normal.      Pupils: Pupils are equal, round, and reactive to light.   Neck:      Thyroid: No thyromegaly.      Trachea: No tracheal deviation.   Cardiovascular:      Rate and Rhythm: Normal rate and regular rhythm.      Heart sounds: Normal heart sounds. No murmur heard.    No friction rub. No gallop.   Pulmonary:      Effort: Pulmonary effort is normal. No respiratory distress.      Breath sounds: No stridor. Decreased breath sounds, wheezing and rhonchi present. No rales.   Chest:      Chest wall: No tenderness.   Abdominal:      General: Bowel sounds are normal. There is no distension.      Palpations: Abdomen is soft. There is no mass.      Tenderness: There is no abdominal tenderness. There is no guarding or rebound.      Hernia: No hernia is present.   Musculoskeletal:         General: No deformity. Normal range of motion.      Cervical back: Normal range of motion and neck supple.   Lymphadenopathy:      Cervical: No cervical adenopathy.   Skin:     General: Skin is warm and dry.      Coloration: Skin is not pale.      Findings: No erythema or rash.   Neurological:      Mental Status: He is alert and oriented to person, place, and time.      Cranial Nerves: No cranial nerve deficit.      Motor: No abnormal muscle tone.      Coordination: Coordination normal.      Deep Tendon Reflexes: Reflexes are normal and symmetric. Reflexes normal.   Psychiatric:         Behavior: Behavior normal.         Thought Content: Thought content normal.         Judgment: Judgment normal.           Results Review:  I have reviewed the labs, radiology results, and diagnostic studies.    Laboratory Data:   Results from last 7 days   Lab Units 02/07/23  0936   WBC 10*3/mm3 12.35*   HEMOGLOBIN g/dL 10.9*   HEMATOCRIT % 34.1*   PLATELETS 10*3/mm3 251        Results from last 7 days   Lab Units 02/11/23  0515  02/10/23  0420 02/09/23  0313   SODIUM mmol/L 133* 137 137   POTASSIUM mmol/L 3.8 4.0 3.9   CHLORIDE mmol/L 97* 98 96*   CO2 mmol/L 25.0 25.0 28.0   BUN mg/dL 40* 40* 34*   CREATININE mg/dL 0.77 0.74* 0.82   CALCIUM mg/dL 8.9 9.2 9.6   BILIRUBIN mg/dL 0.6 0.3 0.3   ALK PHOS U/L 56 58 79   ALT (SGPT) U/L 21 22 27   AST (SGOT) U/L 18 19 27   GLUCOSE mg/dL 160* 220* 224*       Culture Data:   No results found for: BLOODCX, URINECX, WOUNDCX, MRSACX, RESPCX, STOOLCX    Radiology Data:   Imaging Results (Last 24 Hours)     ** No results found for the last 24 hours. **          I have reviewed the patient's current medications.     Assessment/Plan   Assessment  Active Hospital Problems    Diagnosis    • **COVID-19        Treatment Plan  1.  Acute on chronic hypoxic respiratory failure  -Decadron increased to BID  -Albuterol     2.  COPD AE  -Decadron increased to BID  -Albuterol     3.  Covid-19 pneumonia  -Decadron increased to BID  -Albuterol  -Remdesivir     4.  A-fib  -Eliquis  -Metoprolol     5.  CAD  -ASA  -Pravastatin  -Metoprolol     6.  T2DM  -Metformin  -SSI  -Imdur     7.  HTN  -Metoprolol  -HCTZ     8.  HLD  -Pravastatin     9.  GERD  -Protonix     10.  BPH  -Hytrin  -Proscar    Will give additional lasix given bilateral infiltrates in case any degree of pulmonary edema       Medical Decision Making  Number and Complexity of problems: 10 problems, high complexity:  Covid-19 pneumonia and respiratory failure represent threat to life    Risk:  High:  Requiring high flow oxygen, high risk of morbidity/mortality    Differential Diagnosis: Pneumonia, Viral pneumonia, COPD, CHF    Conditions and Status        Condition is worsening.     MDM Data  Cardiac tracing (EKG, telemetry) interpretation: NSR  Labs reviewed: Normal renal function     Discussed with: patient, nursing     Care Planning  Shared decision making: Patient agreeable to treatment plan  Code status and discussions: full code    Disposition  Social  Determinants of Health that impact treatment or disposition: n/a  D/c planning uncertain    Electronically signed by Trenton Cespedes MD, 02/11/23, 10:55 CST.

## 2023-02-11 NOTE — PLAN OF CARE
Goal Outcome Evaluation:      Patient is requiring constant bipap. Any exertion or speaking causes saturation to drop. Patient is on 100% FIO2.

## 2023-02-11 NOTE — PLAN OF CARE
Pt on cpap for most of the day. Lasix 40 mg ordered BID per MD. Able to tolerate being off cpap in the late afternoon. Up in the chair for dinner on vapotherm and NRB. Blood sugars continue to be elevated. Family called with questions and updates.

## 2023-02-11 NOTE — PROGRESS NOTES
RT EQUIPMENT DEVICE RELATED - SKIN ASSESSMENT    RT Medical Equipment/Device:     NIV Mask:  Full-face    size: medium    Skin Assessment:      Nose:  Intact    Device Skin Pressure Protection:  Skin-to-device areas padded:  Hydrocolloid nasal strips on bridge of nose

## 2023-02-11 NOTE — PROGRESS NOTES
[facility] PULMONARY AND CRITICAL CARE PROGRESS NOTE - [facility]    Patient: Gonzalo Durham  1934   MR# [id number]   Acct# [id number]  02/11/23   07:00 CST  Referring Provider: Trenton Cespedes MD    Chief Complaint: Covid-19     Interval history: The patient is Covid positive and has been examined through the glass doors to prevent possible cross-contamination, unnecessary exposure and unnecessary use of PPE. He is observed sitting up on the side of the bed with CPAP on and sat of 94%. FIO2 is 1.0. No new ABG or imaging this am. Per RN, Attending has discussed case with him today and he states should he deteriorate any further he would want to be intubated. Nursing is at bedside providing care. No obvious distress.    Meds:  albuterol sulfate HFA, 2 puff, Inhalation, 4x Daily - RT  apixaban, 5 mg, Oral, BID  ascorbic acid, 500 mg, Oral, Daily  aspirin, 81 mg, Oral, Daily  budesonide-formoterol, 2 puff, Inhalation, BID - RT  cefTRIAXone, 1 g, Intravenous, Q24H  Chlorhexidine Gluconate Cloth, 1 application, Topical, Q24H  dexamethasone, 6 mg, Intravenous, Daily  ferrous sulfate, 325 mg, Oral, Daily With Breakfast  finasteride, 5 mg, Oral, Daily  guaiFENesin, 600 mg, Oral, Daily  hydroCHLOROthiazide, 12.5 mg, Oral, Daily  insulin lispro, 0-24 Units, Subcutaneous, TID AC  isosorbide mononitrate, 30 mg, Oral, Daily  metoprolol tartrate, 12.5 mg, Oral, BID  mupirocin, 1 application, Each Nare, BID  pantoprazole, 40 mg, Oral, Q AM  pravastatin, 80 mg, Oral, Daily  remdesivir, 100 mg, Intravenous, Daily  terazosin, 10 mg, Oral, Nightly      Pharmacy Consult - Remdesivir,       Physical Exam:  SpO2 Percentage    02/11/23 0600 02/11/23 0635 02/11/23 0655   SpO2: 91% (!) 87% (!) 88%     Temp:  [96.6 °F (35.9 °C)-97.1 °F (36.2 °C)] 97.1 °F (36.2 °C)  Heart Rate:  [] 102  Resp:  [23-32] 30  BP: ()/(48-85) 102/63    Intake/Output Summary (Last 24 hours) at 2/11/2023 0700  Last data filed at  2/11/2023 0331  Gross per 24 hour   Intake 590 ml   Output 1550 ml   Net -960 ml     Body mass index is 26.75 kg/m².   Physical Exam  Vitals and nursing note reviewed.   Constitutional:       Comments: CPAP mask   HENT:      Head: Normocephalic.      Nose: Nose normal.   Cardiovascular:      Rate and Rhythm: Normal rate. Rhythm irregular.   Pulmonary:      Effort: No respiratory distress.   Musculoskeletal:      Cervical back: Normal range of motion.   Skin:     Coloration: Skin is not pale.   Neurological:      Mental Status: He is alert.   Psychiatric:         Behavior: Behavior normal.        Electronically signed by DOMINIQUE Marte, 2/11/2023, 07:00 CST      Physician substantive portion: medical decision making    Physician substantive portion: medical decision making    Result Review    Laboratory Data:  Results from last 7 days   Lab Units 02/07/23  0936   WBC 10*3/mm3 12.35*   HEMOGLOBIN g/dL 10.9*   PLATELETS 10*3/mm3 251     Results from last 7 days   Lab Units 02/11/23  0515 02/10/23  0420 02/09/23  0313 02/08/23  0447 02/07/23  2232   SODIUM mmol/L 133* 137 137 138  --    POTASSIUM mmol/L 3.8 4.0 3.9 3.9  --    CO2 mmol/L 25.0 25.0 28.0 26.0  --    BUN mg/dL 40* 40* 34* 33*  --    CREATININE mg/dL 0.77 0.74* 0.82 0.79  --    LACTATE mmol/L  --   --   --   --  1.3   CRP mg/dL  --   --   --  21.21*  --      Results from last 7 days   Lab Units 02/10/23  0028 02/07/23  0943   PH, ARTERIAL pH units 7.460*  7.460*  7.460*  7.460* 7.416   PCO2, ARTERIAL mm Hg 41.6  41.6  41.6  41.6 43.8   PO2 ART mm Hg 45.0*  45.0*  45.0*  45.0* 66.8*   FIO2 % 100  100  100  100  --      Microbiology Results (last 10 days)     Procedure Component Value - Date/Time    Blood Culture - Blood, Hand, Right [[id number]]  (Normal) Collected: 02/07/23 1909    Lab Status: Preliminary result Specimen: Blood from Hand, Right Updated: 02/10/23 1945     Blood Culture No growth at 3 days    Blood Culture - Blood, Arm,  Left [[id number]]  (Normal) Collected: 02/07/23 1909    Lab Status: Preliminary result Specimen: Blood from Arm, Left Updated: 02/10/23 1945     Blood Culture No growth at 3 days    COVID-19 and FLU A/B PCR - Swab, Nasopharynx [[id number]]  (Abnormal) Collected: 02/07/23 0928    Lab Status: Final result Specimen: Swab from Nasopharynx Updated: 02/07/23 1010     COVID19 Detected     Influenza A PCR Not Detected     Influenza B PCR Not Detected    Narrative:      Fact sheet for providers: https://www.fda.gov/media/688769/download    Fact sheet for patients: https://www.fda.gov/media/640684/download    Test performed by PCR.  Influenza A and Influenza B negative results should be considered presumptive in samples that have a positive SARS-CoV-2 result.    Competitive inhibition studies showed that SARS-CoV-2 virus, when present at concentrations above 3.6E+04 copies/mL, can inhibit the detection and amplification of influenza A and influenza B virus RNA if present at or below 1.8E+02 copies/mL or 4.9E+02 copies/mL, respectively, and may lead to false negative influenza virus results. If co-infection with influenza A or influenza B virus is suspected in samples with a positive SARS-CoV-2 result, the sample should be re-tested with another FDA cleared, approved, or authorized influenza test, if influenza virus detection would change clinical management.         Recent films:  XR Chest 1 View    Result Date: 2/10/2023  EXAMINATION: XR CHEST 1 VW-  2/10/2023 1:07 AM CST  HISTORY: dyspnea; Z74.09-Other reduced mobility; Z78.9-Other specified health status  A frontal projection of the chest is compared with the previous study dated 02/07/2023.  There are extensive coarse interstitial changes in the lower lungs bilaterally similar to the previous study.  Chronic emphysematous changes are seen in the upper lungs bilaterally, right more than the left, unchanged.  There is no pulmonary congestion or pneumothorax.  The heart size  is not evaluated due to obliteration of the cardiac border by the adjacent infiltrate. Atheromatous changes thoracic aorta noted. There is evidence of prior cardiac surgery.  The cardiac 2 monitors in place.  No acute bony abnormality.      Impression: 1. Persistent and unchanged chronic inflammatory and obstructive lung changes bilaterally.   This report was finalized on 02/10/2023 06:40 by Dr. Bayron Wagner MD.         Pulmonary Assessment:  1. Acute resp failure with hypoxia due to Covid, threat to life and bodily function, requiring cpap  2. Covid 19  3. Moderate copd    Recommend/plan:   · Continue iv dexamethasone; dose raised by medicine this am  · Continue cpap alternating with high flow oxygen as needed  · Remdesivir  · Prognosis guarded  · Pt indicates wish for continued aggressive support    This visit was performed by both a physician and an [facility] RN.  I personally evaluated and examined the patient.  I performed all aspects of the medical decision making as documented.  Electronically signed by Parrish Cardona MD, 2/11/2023, 12:38 CST

## 2023-02-11 NOTE — PLAN OF CARE
Patient alert and oriented. Patient remains on CPAP overnight at 80% fio2. Continues to drop 02 sat into the mid 80's with minimal exertion.  Patient states that he feels like his breathing has improved. Urine output adequate. Patient has no complaints at this time.     Problem: Adult Inpatient Plan of Care  Goal: Plan of Care Review  Outcome: Ongoing, Progressing  Goal: Patient-Specific Goal (Individualized)  Outcome: Ongoing, Progressing  Goal: Absence of Hospital-Acquired Illness or Injury  Outcome: Ongoing, Progressing  Intervention: Identify and Manage Fall Risk  Recent Flowsheet Documentation  Taken 2/11/2023 0500 by Rad Dupree RN  Safety Promotion/Fall Prevention:   safety round/check completed   fall prevention program maintained  Taken 2/11/2023 0400 by Rad Dupree RN  Safety Promotion/Fall Prevention:   safety round/check completed   fall prevention program maintained  Taken 2/11/2023 0300 by Rad Dupree RN  Safety Promotion/Fall Prevention:   safety round/check completed   fall prevention program maintained  Taken 2/11/2023 0200 by Rad Dupree RN  Safety Promotion/Fall Prevention:   safety round/check completed   fall prevention program maintained  Taken 2/11/2023 0100 by Rad Dupree RN  Safety Promotion/Fall Prevention:   safety round/check completed   fall prevention program maintained  Taken 2/11/2023 0002 by Rad Dupree RN  Safety Promotion/Fall Prevention:   safety round/check completed   fall prevention program maintained  Taken 2/10/2023 2200 by Rad Dupree RN  Safety Promotion/Fall Prevention:   safety round/check completed   fall prevention program maintained  Taken 2/10/2023 2100 by Rad Dupree RN  Safety Promotion/Fall Prevention:   safety round/check completed   fall prevention program maintained  Taken 2/10/2023 2012 by Rad Dupree RN  Safety Promotion/Fall Prevention:   safety round/check completed   fall prevention program maintained  Taken 2/10/2023 1900 by Rad Dupree  RN  Safety Promotion/Fall Prevention:   safety round/check completed   fall prevention program maintained  Intervention: Prevent Skin Injury  Recent Flowsheet Documentation  Taken 2/11/2023 0400 by Rad Dupree RN  Body Position: position changed independently  Taken 2/11/2023 0002 by Rad Dupree RN  Body Position: position changed independently  Taken 2/10/2023 2012 by Rad Dupree RN  Body Position: position changed independently  Intervention: Prevent and Manage VTE (Venous Thromboembolism) Risk  Recent Flowsheet Documentation  Taken 2/11/2023 0400 by Rad Dupree RN  Activity Management: activity adjusted per tolerance  VTE Prevention/Management: (see emar) other (see comments)  Taken 2/11/2023 0002 by Rad Dupree RN  Activity Management: activity adjusted per tolerance  Taken 2/10/2023 2012 by Rad Dupree RN  Activity Management: activity adjusted per tolerance  VTE Prevention/Management: (see emar) other (see comments)  Intervention: Prevent Infection  Recent Flowsheet Documentation  Taken 2/11/2023 0400 by Rad Dupree RN  Infection Prevention: rest/sleep promoted  Goal: Optimal Comfort and Wellbeing  Outcome: Ongoing, Progressing  Intervention: Provide Person-Centered Care  Recent Flowsheet Documentation  Taken 2/11/2023 0400 by Rad Dupree RN  Trust Relationship/Rapport: care explained  Taken 2/11/2023 0002 by Rad Dupree RN  Trust Relationship/Rapport: care explained  Taken 2/10/2023 2012 by Rad Dupree RN  Trust Relationship/Rapport: care explained  Goal: Readiness for Transition of Care  Outcome: Ongoing, Progressing     Problem: Fall Injury Risk  Goal: Absence of Fall and Fall-Related Injury  Outcome: Ongoing, Progressing  Intervention: Identify and Manage Contributors  Recent Flowsheet Documentation  Taken 2/11/2023 0400 by Rad Dupree RN  Self-Care Promotion: independence encouraged  Taken 2/10/2023 2012 by Rad Dupree RN  Medication Review/Management: medications  reviewed  Self-Care Promotion: independence encouraged  Intervention: Promote Injury-Free Environment  Recent Flowsheet Documentation  Taken 2/11/2023 0500 by Rad Dupree RN  Safety Promotion/Fall Prevention:   safety round/check completed   fall prevention program maintained  Taken 2/11/2023 0400 by Rad Dupree RN  Safety Promotion/Fall Prevention:   safety round/check completed   fall prevention program maintained  Taken 2/11/2023 0300 by Rda Dupree RN  Safety Promotion/Fall Prevention:   safety round/check completed   fall prevention program maintained  Taken 2/11/2023 0200 by Rad Dupree RN  Safety Promotion/Fall Prevention:   safety round/check completed   fall prevention program maintained  Taken 2/11/2023 0100 by Rad Dupree RN  Safety Promotion/Fall Prevention:   safety round/check completed   fall prevention program maintained  Taken 2/11/2023 0002 by Rad Dupree RN  Safety Promotion/Fall Prevention:   safety round/check completed   fall prevention program maintained  Taken 2/10/2023 2200 by Rad Dupree RN  Safety Promotion/Fall Prevention:   safety round/check completed   fall prevention program maintained  Taken 2/10/2023 2100 by Rad Dupree RN  Safety Promotion/Fall Prevention:   safety round/check completed   fall prevention program maintained  Taken 2/10/2023 2012 by Rad Dupree RN  Safety Promotion/Fall Prevention:   safety round/check completed   fall prevention program maintained  Taken 2/10/2023 1900 by Rad Dupree RN  Safety Promotion/Fall Prevention:   safety round/check completed   fall prevention program maintained     Problem: COPD (Chronic Obstructive Pulmonary Disease) Comorbidity  Goal: Maintenance of COPD Symptom Control  Outcome: Ongoing, Progressing  Intervention: Maintain COPD-Symptom Control  Recent Flowsheet Documentation  Taken 2/10/2023 2012 by Rad Dupree RN  Medication Review/Management: medications reviewed     Problem: Skin Injury Risk Increased  Goal:  Skin Health and Integrity  Outcome: Ongoing, Progressing  Intervention: Optimize Skin Protection  Recent Flowsheet Documentation  Taken 2/11/2023 0400 by Rad Dupree RN  Head of Bed ([facility]) Positioning: HOB at 20-30 degrees  Taken 2/11/2023 0002 by Rad Dupree RN  Head of Bed ([facility]) Positioning: HOB at 20-30 degrees  Pressure Reduction Devices: pressure-redistributing mattress utilized  Taken 2/10/2023 2012 by Rad Dupree RN  Head of Bed ([facility]) Positioning: HOB at 30-45 degrees  Pressure Reduction Devices: pressure-redistributing mattress utilized   Goal Outcome Evaluation:

## 2023-02-11 NOTE — PROGRESS NOTES
RT EQUIPMENT DEVICE RELATED - SKIN ASSESSMENT    RT Medical Equipment/Device:     NIV Mask:  Full-face    size: M    Skin Assessment:      Nose:  Intact    Device Skin Pressure Protection:  Skin-to-device areas padded:  Hydrocolloid nasal strips on bridge of nose

## 2023-02-12 LAB
ALBUMIN SERPL-MCNC: 3.5 G/DL (ref 3.5–5.2)
ALBUMIN/GLOB SERPL: 1 G/DL
ALP SERPL-CCNC: 59 U/L (ref 39–117)
ALT SERPL W P-5'-P-CCNC: 21 U/L (ref 1–41)
ANION GAP SERPL CALCULATED.3IONS-SCNC: 13 MMOL/L (ref 5–15)
AST SERPL-CCNC: 15 U/L (ref 1–40)
BACTERIA SPEC AEROBE CULT: NORMAL
BACTERIA SPEC AEROBE CULT: NORMAL
BILIRUB SERPL-MCNC: 0.5 MG/DL (ref 0–1.2)
BUN SERPL-MCNC: 56 MG/DL (ref 8–23)
BUN/CREAT SERPL: 56.6 (ref 7–25)
CALCIUM SPEC-SCNC: 9.2 MG/DL (ref 8.6–10.5)
CHLORIDE SERPL-SCNC: 94 MMOL/L (ref 98–107)
CO2 SERPL-SCNC: 29 MMOL/L (ref 22–29)
CREAT SERPL-MCNC: 0.99 MG/DL (ref 0.76–1.27)
EGFRCR SERPLBLD CKD-EPI 2021: 72.8 ML/MIN/1.73
GLOBULIN UR ELPH-MCNC: 3.5 GM/DL
GLUCOSE BLDC GLUCOMTR-MCNC: 221 MG/DL (ref 70–130)
GLUCOSE BLDC GLUCOMTR-MCNC: 258 MG/DL (ref 70–130)
GLUCOSE BLDC GLUCOMTR-MCNC: 307 MG/DL (ref 70–130)
GLUCOSE BLDC GLUCOMTR-MCNC: 323 MG/DL (ref 70–130)
GLUCOSE SERPL-MCNC: 236 MG/DL (ref 65–99)
POTASSIUM SERPL-SCNC: 3.8 MMOL/L (ref 3.5–5.2)
PROT SERPL-MCNC: 7 G/DL (ref 6–8.5)
SODIUM SERPL-SCNC: 136 MMOL/L (ref 136–145)

## 2023-02-12 PROCEDURE — 82962 GLUCOSE BLOOD TEST: CPT

## 2023-02-12 PROCEDURE — 94799 UNLISTED PULMONARY SVC/PX: CPT

## 2023-02-12 PROCEDURE — 63710000001 INSULIN LISPRO (HUMAN) PER 5 UNITS: Performed by: FAMILY MEDICINE

## 2023-02-12 PROCEDURE — 63710000001 INSULIN LISPRO (HUMAN) PER 5 UNITS: Performed by: INTERNAL MEDICINE

## 2023-02-12 PROCEDURE — 25010000002 FUROSEMIDE PER 20 MG: Performed by: FAMILY MEDICINE

## 2023-02-12 PROCEDURE — 97116 GAIT TRAINING THERAPY: CPT

## 2023-02-12 PROCEDURE — 80053 COMPREHEN METABOLIC PANEL: CPT | Performed by: FAMILY MEDICINE

## 2023-02-12 PROCEDURE — 25010000002 DEXAMETHASONE SODIUM PHOSPHATE 10 MG/ML SOLUTION: Performed by: FAMILY MEDICINE

## 2023-02-12 PROCEDURE — 25010000002 CEFTRIAXONE PER 250 MG: Performed by: FAMILY MEDICINE

## 2023-02-12 PROCEDURE — 99232 SBSQ HOSP IP/OBS MODERATE 35: CPT | Performed by: INTERNAL MEDICINE

## 2023-02-12 RX ORDER — INSULIN LISPRO 100 [IU]/ML
0-24 INJECTION, SOLUTION INTRAVENOUS; SUBCUTANEOUS
Status: DISCONTINUED | OUTPATIENT
Start: 2023-02-12 | End: 2023-02-17 | Stop reason: HOSPADM

## 2023-02-12 RX ADMIN — PRAVASTATIN SODIUM 80 MG: 20 TABLET ORAL at 08:04

## 2023-02-12 RX ADMIN — FUROSEMIDE 40 MG: 10 INJECTION, SOLUTION INTRAMUSCULAR; INTRAVENOUS at 18:42

## 2023-02-12 RX ADMIN — INSULIN LISPRO 16 UNITS: 100 INJECTION, SOLUTION INTRAVENOUS; SUBCUTANEOUS at 21:11

## 2023-02-12 RX ADMIN — INSULIN LISPRO 8 UNITS: 100 INJECTION, SOLUTION INTRAVENOUS; SUBCUTANEOUS at 16:55

## 2023-02-12 RX ADMIN — Medication 1 APPLICATION: at 08:06

## 2023-02-12 RX ADMIN — CHLORHEXIDINE GLUCONATE 1 APPLICATION: 500 CLOTH TOPICAL at 04:57

## 2023-02-12 RX ADMIN — APIXABAN 5 MG: 5 TABLET, FILM COATED ORAL at 20:00

## 2023-02-12 RX ADMIN — INSULIN LISPRO 16 UNITS: 100 INJECTION, SOLUTION INTRAVENOUS; SUBCUTANEOUS at 11:41

## 2023-02-12 RX ADMIN — METOPROLOL TARTRATE 12.5 MG: 25 TABLET, FILM COATED ORAL at 08:03

## 2023-02-12 RX ADMIN — ALBUTEROL SULFATE 2 PUFF: 90 AEROSOL, METERED RESPIRATORY (INHALATION) at 18:46

## 2023-02-12 RX ADMIN — HYDROCHLOROTHIAZIDE 12.5 MG: 25 TABLET ORAL at 08:03

## 2023-02-12 RX ADMIN — GUAIFENESIN 600 MG: 600 TABLET, EXTENDED RELEASE ORAL at 08:03

## 2023-02-12 RX ADMIN — OXYCODONE HYDROCHLORIDE AND ACETAMINOPHEN 500 MG: 500 TABLET ORAL at 08:03

## 2023-02-12 RX ADMIN — DEXAMETHASONE SODIUM PHOSPHATE 6 MG: 10 INJECTION, SOLUTION INTRAMUSCULAR; INTRAVENOUS at 08:04

## 2023-02-12 RX ADMIN — METOPROLOL TARTRATE 12.5 MG: 25 TABLET, FILM COATED ORAL at 20:00

## 2023-02-12 RX ADMIN — ALBUTEROL SULFATE 2 PUFF: 90 AEROSOL, METERED RESPIRATORY (INHALATION) at 10:36

## 2023-02-12 RX ADMIN — FINASTERIDE 5 MG: 5 TABLET, FILM COATED ORAL at 08:03

## 2023-02-12 RX ADMIN — ASPIRIN 81 MG: 81 TABLET, COATED ORAL at 08:03

## 2023-02-12 RX ADMIN — FUROSEMIDE 40 MG: 10 INJECTION, SOLUTION INTRAMUSCULAR; INTRAVENOUS at 08:04

## 2023-02-12 RX ADMIN — ISOSORBIDE MONONITRATE 30 MG: 30 TABLET, EXTENDED RELEASE ORAL at 08:03

## 2023-02-12 RX ADMIN — Medication 1 APPLICATION: at 20:00

## 2023-02-12 RX ADMIN — ALBUTEROL SULFATE 2 PUFF: 90 AEROSOL, METERED RESPIRATORY (INHALATION) at 07:16

## 2023-02-12 RX ADMIN — FERROUS SULFATE TAB 325 MG (65 MG ELEMENTAL FE) 325 MG: 325 (65 FE) TAB at 08:03

## 2023-02-12 RX ADMIN — BUDESONIDE AND FORMOTEROL FUMARATE DIHYDRATE 2 PUFF: 160; 4.5 AEROSOL RESPIRATORY (INHALATION) at 07:16

## 2023-02-12 RX ADMIN — DEXAMETHASONE SODIUM PHOSPHATE 6 MG: 10 INJECTION, SOLUTION INTRAMUSCULAR; INTRAVENOUS at 20:00

## 2023-02-12 RX ADMIN — INSULIN LISPRO 12 UNITS: 100 INJECTION, SOLUTION INTRAVENOUS; SUBCUTANEOUS at 06:37

## 2023-02-12 RX ADMIN — APIXABAN 5 MG: 5 TABLET, FILM COATED ORAL at 08:03

## 2023-02-12 RX ADMIN — ACETAMINOPHEN 650 MG: 325 TABLET, FILM COATED ORAL at 14:30

## 2023-02-12 RX ADMIN — BUDESONIDE AND FORMOTEROL FUMARATE DIHYDRATE 2 PUFF: 160; 4.5 AEROSOL RESPIRATORY (INHALATION) at 18:46

## 2023-02-12 RX ADMIN — TERAZOSIN HYDROCHLORIDE 10 MG: 5 CAPSULE ORAL at 20:00

## 2023-02-12 RX ADMIN — CEFTRIAXONE 1 G: 1 INJECTION, POWDER, FOR SOLUTION INTRAMUSCULAR; INTRAVENOUS at 18:42

## 2023-02-12 RX ADMIN — ALBUTEROL SULFATE 2 PUFF: 90 AEROSOL, METERED RESPIRATORY (INHALATION) at 14:59

## 2023-02-12 RX ADMIN — ALBUTEROL SULFATE 2 PUFF: 90 AEROSOL, METERED RESPIRATORY (INHALATION) at 02:52

## 2023-02-12 RX ADMIN — PANTOPRAZOLE SODIUM 40 MG: 40 TABLET, DELAYED RELEASE ORAL at 06:37

## 2023-02-12 NOTE — PROGRESS NOTES
[facility] [place] PULMONARY AND CRITICAL CARE PROGRESS NOTE - [facility]    Patient: Gonzalo Durham  1934   MR# [id number]   Acct# [id number]  02/12/23   06:54 CST  Referring Provider: Trenton Cespedes MD    Chief Complaint: Covid-19     Interval history: The patient is Covid positive and has been examined through the glass doors to prevent possible cross-contamination, unnecessary exposure and unnecessary use of PPE. He wore CPAP .75 through the night and now sitting up in the bedside chair on Vapotherm 40 L Fio2 1.0 + NRB with sat of 89%. He reports he is breathing a little better. Remdesivir completes today. His Dexamethasone was increased by attending to twice daily yesterday. He is on Lasix 40 mg twice a day and RN states he is averaging 1L output per shift. No new imaging or labs to review.     Meds:  albuterol sulfate HFA, 2 puff, Inhalation, 4x Daily - RT  apixaban, 5 mg, Oral, BID  ascorbic acid, 500 mg, Oral, Daily  aspirin, 81 mg, Oral, Daily  budesonide-formoterol, 2 puff, Inhalation, BID - RT  cefTRIAXone, 1 g, Intravenous, Q24H  Chlorhexidine Gluconate Cloth, 1 application, Topical, Q24H  dexamethasone, 6 mg, Intravenous, BID  ferrous sulfate, 325 mg, Oral, Daily With Breakfast  finasteride, 5 mg, Oral, Daily  furosemide, 40 mg, Intravenous, BID  guaiFENesin, 600 mg, Oral, Daily  hydroCHLOROthiazide, 12.5 mg, Oral, Daily  insulin lispro, 0-24 Units, Subcutaneous, TID AC  isosorbide mononitrate, 30 mg, Oral, Daily  metoprolol tartrate, 12.5 mg, Oral, BID  mupirocin, 1 application, Each Nare, BID  pantoprazole, 40 mg, Oral, Q AM  pravastatin, 80 mg, Oral, Daily  terazosin, 10 mg, Oral, Nightly      Pharmacy Consult - Remdesivir,       Physical Exam:  SpO2 Percentage    02/12/23 0400 02/12/23 0500 02/12/23 0600   SpO2: 90% 92% 91%     Temp:  [96.5 °F (35.8 °C)-97 °F (36.1 °C)] 96.5 °F (35.8 °C)  Heart Rate:  [] 67  Resp:  [20-36] 28  BP: ()/(48-80) 116/73    Intake/Output Summary  (Last 24 hours) at 2/12/2023 0654  Last data filed at 2/12/2023 0304  Gross per 24 hour   Intake 100 ml   Output 1900 ml   Net -1800 ml     Body mass index is 26.75 kg/m².   Physical Exam  Vitals and nursing note reviewed.   Constitutional:       Comments: Vapo 40/1.0 + NRB    HENT:      Head: Normocephalic.      Nose: Nose normal.   Cardiovascular:      Rate and Rhythm: Normal rate. Rhythm irregular.   Pulmonary:      Effort: No respiratory distress.   Musculoskeletal:      Cervical back: Normal range of motion.   Skin:     Coloration: Skin is not pale.   Neurological:      Mental Status: He is alert.   Psychiatric:         Behavior: Behavior normal.        Electronically signed by DOMINIQUE Marte, 2/12/2023, 06:54 CST      Physician substantive portion: medical decision making    Result Review    Laboratory Data:  Results from last 7 days   Lab Units 02/07/23  0936   WBC 10*3/mm3 12.35*   HEMOGLOBIN g/dL 10.9*   PLATELETS 10*3/mm3 251     Results from last 7 days   Lab Units 02/12/23  0226 02/11/23  0515 02/10/23  0420 02/09/23  0313 02/08/23  0447 02/07/23  2232   SODIUM mmol/L 136 133* 137   < > 138  --    POTASSIUM mmol/L 3.8 3.8 4.0   < > 3.9  --    CO2 mmol/L 29.0 25.0 25.0   < > 26.0  --    BUN mg/dL 56* 40* 40*   < > 33*  --    CREATININE mg/dL 0.99 0.77 0.74*   < > 0.79  --    LACTATE mmol/L  --   --   --   --   --  1.3   CRP mg/dL  --   --   --   --  21.21*  --     < > = values in this interval not displayed.     Results from last 7 days   Lab Units 02/10/23  0028 02/07/23  0943   PH, ARTERIAL pH units 7.460*  7.460*  7.460*  7.460* 7.416   PCO2, ARTERIAL mm Hg 41.6  41.6  41.6  41.6 43.8   PO2 ART mm Hg 45.0*  45.0*  45.0*  45.0* 66.8*   FIO2 % 100  100  100  100  --      Microbiology Results (last 10 days)     Procedure Component Value - Date/Time    Blood Culture - Blood, Hand, Right [[id number]]  (Normal) Collected: 02/07/23 1909    Lab Status: Preliminary result Specimen: Blood  from Hand, Right Updated: 02/11/23 1945     Blood Culture No growth at 4 days    Blood Culture - Blood, Arm, Left [[id number]]  (Normal) Collected: 02/07/23 1909    Lab Status: Preliminary result Specimen: Blood from Arm, Left Updated: 02/11/23 1945     Blood Culture No growth at 4 days    COVID-19 and FLU A/B PCR - Swab, Nasopharynx [[id number]]  (Abnormal) Collected: 02/07/23 0928    Lab Status: Final result Specimen: Swab from Nasopharynx Updated: 02/07/23 1010     COVID19 Detected     Influenza A PCR Not Detected     Influenza B PCR Not Detected    Narrative:      Fact sheet for providers: https://www.fda.gov/media/152560/download    Fact sheet for patients: https://www.fda.gov/media/161929/download    Test performed by PCR.  Influenza A and Influenza B negative results should be considered presumptive in samples that have a positive SARS-CoV-2 result.    Competitive inhibition studies showed that SARS-CoV-2 virus, when present at concentrations above 3.6E+04 copies/mL, can inhibit the detection and amplification of influenza A and influenza B virus RNA if present at or below 1.8E+02 copies/mL or 4.9E+02 copies/mL, respectively, and may lead to false negative influenza virus results. If co-infection with influenza A or influenza B virus is suspected in samples with a positive SARS-CoV-2 result, the sample should be re-tested with another FDA cleared, approved, or authorized influenza test, if influenza virus detection would change clinical management.         Recent films:  No radiology results from the last 24 hrs       Pulmonary Assessment:  1. Acute respiratory failure with hypoxia, threat to life and bodily function, high risk of morbidity from additional diagnostic testing or treatment, possibly improving  2. Covid  3. anemia    Recommend/plan:   · Continue high flow oxygen, try to taper  · Continue ICU care    This visit was performed by both a physician and an [facility] RN.  I personally evaluated  and examined the patient.  I performed all aspects of the medical decision making as documented.  Electronically signed by Parrish Cardona MD, 2/12/2023, 11:13 CST

## 2023-02-12 NOTE — PLAN OF CARE
Problem: Adult Inpatient Plan of Care  Goal: Plan of Care Review    Progress: improving  Plan of Care Reviewed With: patient  Outcome Evaluation: Pt amb 5' forwards and 5' backwards x3 then sitting rest. Pt performed this x2. Pt was CGA and o2 ranger 87-92% during activity with cues for breathing technique.

## 2023-02-12 NOTE — PROGRESS NOTES
[facility]  INPATIENT PROGRESS NOTE    Patient Name: Gonzalo Durham  Date of Admission: 2/7/2023  Today's Date: 02/12/23  Length of Stay: 5  Primary Care Physician: Abel Romero MD    Subjective   Chief Complaint: SOA  Shortness of Breath  Associated symptoms include wheezing. Pertinent negatives include no abdominal pain, chest pain, ear pain, fever, headaches, rash or vomiting.      Doing better  He is less SOA  He is afebrile  He is breathing comfortably on 40L Vapotherm      Review of Systems   Constitutional: Positive for fatigue. Negative for fever.   HENT: Negative for congestion and ear pain.    Eyes: Negative for redness and visual disturbance.   Respiratory: Positive for cough, shortness of breath and wheezing.    Cardiovascular: Negative for chest pain and palpitations.   Gastrointestinal: Negative for abdominal pain, diarrhea, nausea and vomiting.   Endocrine: Negative for cold intolerance and heat intolerance.   Genitourinary: Negative for dysuria and frequency.   Musculoskeletal: Negative for arthralgias and back pain.   Skin: Negative for rash and wound.   Neurological: Positive for weakness. Negative for dizziness and headaches.   Psychiatric/Behavioral: Negative for confusion. The patient is not nervous/anxious.           All pertinent negatives and positives are as above. All other systems have been reviewed and are negative unless otherwise stated.     Objective    Temp:  [96.5 °F (35.8 °C)-97 °F (36.1 °C)] 96.6 °F (35.9 °C)  Heart Rate:  [] 96  Resp:  [20-36] 28  BP: ()/(50-80) 128/61  Physical Exam  Vitals reviewed.   Constitutional:       Appearance: He is well-developed.   HENT:      Head: Normocephalic and atraumatic.      Right Ear: External ear normal.      Left Ear: External ear normal.      Nose: Nose normal.   Eyes:      General: No scleral icterus.        Right eye: No discharge.         Left eye: No discharge.       Conjunctiva/sclera: Conjunctivae normal.      Pupils: Pupils are equal, round, and reactive to light.   Neck:      Thyroid: No thyromegaly.      Trachea: No tracheal deviation.   Cardiovascular:      Rate and Rhythm: Normal rate and regular rhythm.      Heart sounds: Normal heart sounds. No murmur heard.    No friction rub. No gallop.   Pulmonary:      Effort: Pulmonary effort is normal. No respiratory distress.      Breath sounds: No stridor. Decreased breath sounds, wheezing and rhonchi present. No rales.   Chest:      Chest wall: No tenderness.   Abdominal:      General: Bowel sounds are normal. There is no distension.      Palpations: Abdomen is soft. There is no mass.      Tenderness: There is no abdominal tenderness. There is no guarding or rebound.      Hernia: No hernia is present.   Musculoskeletal:         General: No deformity. Normal range of motion.      Cervical back: Normal range of motion and neck supple.   Lymphadenopathy:      Cervical: No cervical adenopathy.   Skin:     General: Skin is warm and dry.      Coloration: Skin is not pale.      Findings: No erythema or rash.   Neurological:      Mental Status: He is alert and oriented to person, place, and time.      Cranial Nerves: No cranial nerve deficit.      Motor: No abnormal muscle tone.      Coordination: Coordination normal.      Deep Tendon Reflexes: Reflexes are normal and symmetric. Reflexes normal.   Psychiatric:         Behavior: Behavior normal.         Thought Content: Thought content normal.         Judgment: Judgment normal.           Results Review:  I have reviewed the labs, radiology results, and diagnostic studies.    Laboratory Data:   Results from last 7 days   Lab Units 02/07/23  0936   WBC 10*3/mm3 12.35*   HEMOGLOBIN g/dL 10.9*   HEMATOCRIT % 34.1*   PLATELETS 10*3/mm3 251        Results from last 7 days   Lab Units 02/12/23  0226 02/11/23  0515 02/10/23  0420   SODIUM mmol/L 136 133* 137   POTASSIUM mmol/L 3.8 3.8 4.0    CHLORIDE mmol/L 94* 97* 98   CO2 mmol/L 29.0 25.0 25.0   BUN mg/dL 56* 40* 40*   CREATININE mg/dL 0.99 0.77 0.74*   CALCIUM mg/dL 9.2 8.9 9.2   BILIRUBIN mg/dL 0.5 0.6 0.3   ALK PHOS U/L 59 56 58   ALT (SGPT) U/L 21 21 22   AST (SGOT) U/L 15 18 19   GLUCOSE mg/dL 236* 160* 220*       Culture Data:   No results found for: BLOODCX, URINECX, WOUNDCX, MRSACX, RESPCX, STOOLCX    Radiology Data:   Imaging Results (Last 24 Hours)     ** No results found for the last 24 hours. **          I have reviewed the patient's current medications.     Assessment/Plan   Assessment  Active Hospital Problems    Diagnosis    • **COVID-19        Treatment Plan  1.  Acute on chronic hypoxic respiratory failure  -Decadron BID  -Albuterol  -Pulm following     2.  COPD AE  -Decadron BID  -Albuterol  -Pulm following     3.  Covid-19 pneumonia  -Decadron BID  -Albuterol  -Remdesivir completed  -Pulm following     4.  A-fib  -Eliquis  -Metoprolol     5.  CAD  -ASA  -Pravastatin  -Metoprolol     6.  T2DM  -Metformin  -SSI  -Imdur     7.  HTN  -Metoprolol  -HCTZ     8.  HLD  -Pravastatin     9.  GERD  -Protonix     10.  BPH  -Hytrin  -Proscar    Will give additional lasix given bilateral infiltrates in case any degree of pulmonary edema       Medical Decision Making  Number and Complexity of problems: 10 problems, high complexity:  Covid-19 pneumonia and respiratory failure represent threat to life    Risk:  High:  Requiring high flow oxygen, high risk of morbidity/mortality, requiring ICU level monitoring    Differential Diagnosis: Pneumonia, Viral pneumonia, COPD, CHF    Conditions and Status        Condition is worsening.     MDM Data  Cardiac tracing (EKG, telemetry) interpretation: NSR  Labs reviewed: Normal renal function     Discussed with: patient, nursing     Care Planning  Shared decision making: Patient agreeable to treatment plan  Code status and discussions: full code    Disposition  Social Determinants of Health that impact  treatment or disposition: n/a  D/c planning uncertain    Electronically signed by Trenton Cespedes MD, 02/12/23, 11:42 CST.

## 2023-02-12 NOTE — PLAN OF CARE
Goal Outcome Evaluation:     Pt states that he is feeling better. Able to stay off cpap today. Worked with PT.

## 2023-02-12 NOTE — PLAN OF CARE
Patient was able to tolerate weaning fio2 down to 75% while on cpap. Currently up to the chair this morning on vaportherm and nonrebreather.  States that he feels like he is breathing a little better.  Urine output remains adequate.  Care discussed with his daughter Mary over the phone.     Problem: Adult Inpatient Plan of Care  Goal: Plan of Care Review  Outcome: Ongoing, Progressing  Goal: Patient-Specific Goal (Individualized)  Outcome: Ongoing, Progressing  Goal: Absence of Hospital-Acquired Illness or Injury  Outcome: Ongoing, Progressing  Intervention: Identify and Manage Fall Risk  Recent Flowsheet Documentation  Taken 2/12/2023 0600 by Rad Dupree RN  Safety Promotion/Fall Prevention: safety round/check completed  Taken 2/12/2023 0500 by Rad Dupree RN  Safety Promotion/Fall Prevention:   safety round/check completed   fall prevention program maintained  Taken 2/12/2023 0353 by Rad Dupree RN  Safety Promotion/Fall Prevention: safety round/check completed  Taken 2/12/2023 0300 by Rad Dupree RN  Safety Promotion/Fall Prevention:   safety round/check completed   fall prevention program maintained  Taken 2/12/2023 0200 by Rad Dupree RN  Safety Promotion/Fall Prevention:   safety round/check completed   fall prevention program maintained  Taken 2/12/2023 0100 by Rad Dupree RN  Safety Promotion/Fall Prevention:   safety round/check completed   fall prevention program maintained  Taken 2/12/2023 0004 by Rad Dupree RN  Safety Promotion/Fall Prevention:   safety round/check completed   fall prevention program maintained  Taken 2/11/2023 2300 by Rad Dupree RN  Safety Promotion/Fall Prevention:   safety round/check completed   fall prevention program maintained  Taken 2/11/2023 2150 by Rad Dupree RN  Safety Promotion/Fall Prevention:   safety round/check completed   fall prevention program maintained  Taken 2/11/2023 2100 by Rad Dupree RN  Safety Promotion/Fall Prevention:   safety  round/check completed   fall prevention program maintained  Taken 2/11/2023 1953 by Rad Dupree RN  Safety Promotion/Fall Prevention: safety round/check completed  Taken 2/11/2023 1900 by Rad Dupree RN  Safety Promotion/Fall Prevention:   safety round/check completed   fall prevention program maintained  Intervention: Prevent Skin Injury  Recent Flowsheet Documentation  Taken 2/12/2023 0353 by Rad Dupree RN  Body Position:   position changed independently   head facing, right  Taken 2/12/2023 0004 by Rad Dupree RN  Body Position: position changed independently  Taken 2/11/2023 1953 by Rad Dupree RN  Body Position: position changed independently  Intervention: Prevent and Manage VTE (Venous Thromboembolism) Risk  Recent Flowsheet Documentation  Taken 2/12/2023 0353 by Rad Dupree RN  Activity Management: activity adjusted per tolerance  VTE Prevention/Management: (see emar) other (see comments)  Taken 2/12/2023 0004 by Rad Dupree RN  Activity Management: activity adjusted per tolerance  Taken 2/11/2023 1953 by Rad Dupree RN  Activity Management: activity adjusted per tolerance  VTE Prevention/Management: (see emar) other (see comments)  Intervention: Prevent Infection  Recent Flowsheet Documentation  Taken 2/12/2023 0004 by Rad Dupree RN  Infection Prevention: single patient room provided  Taken 2/11/2023 1953 by Rad Dupree RN  Infection Prevention: single patient room provided  Goal: Optimal Comfort and Wellbeing  Outcome: Ongoing, Progressing  Intervention: Provide Person-Centered Care  Recent Flowsheet Documentation  Taken 2/12/2023 0353 by Rad Dupree RN  Trust Relationship/Rapport: care explained  Taken 2/12/2023 0004 by Rad Dupree RN  Trust Relationship/Rapport: care explained  Taken 2/11/2023 1953 by Rad Dupree RN  Trust Relationship/Rapport: care explained  Goal: Readiness for Transition of Care  Outcome: Ongoing, Progressing     Problem: Fall Injury Risk  Goal: Absence of  Fall and Fall-Related Injury  Outcome: Ongoing, Progressing  Intervention: Identify and Manage Contributors  Recent Flowsheet Documentation  Taken 2/12/2023 0600 by Rad Dupree, RN  Medication Review/Management: medications reviewed  Taken 2/12/2023 0500 by Rad Dupree, RN  Medication Review/Management: medications reviewed  Taken 2/12/2023 0353 by Rad Dupree, RN  Medication Review/Management: medications reviewed  Taken 2/11/2023 1953 by Rad Dupree, RN  Medication Review/Management: medications reviewed  Intervention: Promote Injury-Free Environment  Recent Flowsheet Documentation  Taken 2/12/2023 0600 by Rad Dupree, RN  Safety Promotion/Fall Prevention: safety round/check completed  Taken 2/12/2023 0500 by Rad Dupree, RN  Safety Promotion/Fall Prevention:   safety round/check completed   fall prevention program maintained  Taken 2/12/2023 0353 by Rad Dupree RN  Safety Promotion/Fall Prevention: safety round/check completed  Taken 2/12/2023 0300 by Rad Dupree, RN  Safety Promotion/Fall Prevention:   safety round/check completed   fall prevention program maintained  Taken 2/12/2023 0200 by Rad Dupree RN  Safety Promotion/Fall Prevention:   safety round/check completed   fall prevention program maintained  Taken 2/12/2023 0100 by Rad Dupree, RN  Safety Promotion/Fall Prevention:   safety round/check completed   fall prevention program maintained  Taken 2/12/2023 0004 by Rad Dupree RN  Safety Promotion/Fall Prevention:   safety round/check completed   fall prevention program maintained  Taken 2/11/2023 2300 by Rad Dupree, RN  Safety Promotion/Fall Prevention:   safety round/check completed   fall prevention program maintained  Taken 2/11/2023 2150 by Rad Dupree RN  Safety Promotion/Fall Prevention:   safety round/check completed   fall prevention program maintained  Taken 2/11/2023 2100 by Rad Dupree, RN  Safety Promotion/Fall Prevention:   safety round/check completed   fall prevention  program maintained  Taken 2/11/2023 1953 by Rad Dupree, RN  Safety Promotion/Fall Prevention: safety round/check completed  Taken 2/11/2023 1900 by Rad Dupree, RN  Safety Promotion/Fall Prevention:   safety round/check completed   fall prevention program maintained     Problem: COPD (Chronic Obstructive Pulmonary Disease) Comorbidity  Goal: Maintenance of COPD Symptom Control  Outcome: Ongoing, Progressing  Intervention: Maintain COPD-Symptom Control  Recent Flowsheet Documentation  Taken 2/12/2023 0600 by Rad Dupree, RN  Medication Review/Management: medications reviewed  Taken 2/12/2023 0500 by Rad Dupree, RN  Medication Review/Management: medications reviewed  Taken 2/12/2023 0353 by Rad Dupree, RN  Medication Review/Management: medications reviewed  Taken 2/11/2023 1953 by Rad Dupree, LASHONDA  Medication Review/Management: medications reviewed     Problem: Skin Injury Risk Increased  Goal: Skin Health and Integrity  Outcome: Ongoing, Progressing  Intervention: Optimize Skin Protection  Recent Flowsheet Documentation  Taken 2/12/2023 0353 by Rad Dupree, RN  Head of Bed (HOB) Positioning: HOB at 20-30 degrees  Taken 2/12/2023 0004 by Rad Dupree, RN  Pressure Reduction Devices: pressure-redistributing mattress utilized  Taken 2/11/2023 1953 by Rad Dupree, RN  Pressure Reduction Techniques: frequent weight shift encouraged  Head of Bed (HOB) Positioning: HOB at 30-45 degrees   Goal Outcome Evaluation:

## 2023-02-12 NOTE — THERAPY TREATMENT NOTE
Acute Care - Physical Therapy Treatment Note  [facility]     Patient Name: Gonzalo Durham  : 1934  MRN: [id number]  Today's Date: 2023      Visit Dx:     ICD-10-CM ICD-9-CM   1. COVID-19  U07.1 079.89   2. Impaired mobility  Z74.09 799.89   3. Decreased activities of daily living (ADL)  Z78.9 V49.89     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, without long-term current use of insulin ([facility])   • Stage 3a chronic kidney disease (HCC)   • Erectile dysfunction   • Coronary artery disease involving native coronary artery of native heart without angina pectoris   • Hyperlipidemia LDL goal <70-statin   • Hypertension   • Prostatism-(young) urology   • Tremor   • Varicose vein of leg   • Plantar fasciitis   • Nocturnal leg movements   • Bad dreams   • Depression   • Peripheral neuropathy- clinical   • Hx of colonic polyps   • Gastroesophageal reflux disease   • Left lower quadrant pain   • Laboratory test*   • Anticoagulated-CAD, DM2, CVA/ASA 81+()+plavix » Anticoagulated-CAD, DM2, CVA/ASA 81+()+plavix+eliquist   • SOB: copd,CAD,#, hypoxemia   • Hypoxia#to pulmonary   • Corn or callus   • Anemia: ASA81,plavix,eliquis,gi(3/3+,div,cp,eitis   • Atherosclerosis: CAD, AAA vascular   • AAA: -(ro) steffen   • Heme positive stool   • Stage 2 moderate COPD by GOLD classification ([facility])   • Abnormal stress test   • Tingling of both feet-to consider NCV   • Cryptogenic stroke (HCC)   • Chronic diastolic congestive heart failure (HCC)   • Gross hematuria   • BPH with obstruction/lower urinary tract symptoms   • Chest pain   • Obstructive sleep apnea   • Abnormal CT of the chest ./done   • 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)   • Oxygen dependent   • S/P CABG x 3   • Pulmonary hypertension (HCC)   • PAF (paroxysmal atrial fibrillation) (HCC)   • Umbilical hernia-a waqar   • COVID-19      Past Medical History:   Diagnosis Date   • A-fib (HCC)    • AAA (abdominal aortic aneurysm) without rupture    • Arthritis    • BPH (benign prostatic hypertrophy)    • Cataract     bialteral   • CKD (chronic kidney disease)    • COPD (chronic obstructive pulmonary disease) ([facility])    • Coronary artery disease involving native coronary artery of native heart without angina pectoris 1/20/2017    CABG/[place]/1981 No TCC echo  7.16.18 Right ventricular cavity is mild-to-moderately dilated. Left ventricular diastolic dysfunction (grade I) consistent with impaired relaxation. Left ventricular systolic function is normal. Left atrial cavity size is borderline dilated. RIGHT HEART ENLARGEMENT - RVSP NOT ASSESSABLE NORMAL LV AND RV SYSTOLIC FUNCTION NO SIGNIFICANT VALVULAR DYSFUNCTION  Seein   • Diabetes mellitus ([facility])     Type 2   • DM (diabetes mellitus screen)    • GERD (gastroesophageal reflux disease)    • History of loop recorder     at current time   • Hx of colonic polyp    • Hypercholesteremia    • Hypertension    • Macular degeneration     treated with injections in right eye   • Myocardial infarction ([facility])    • Neuropathy    • Oxygen deficit     at 4liters   • Prostate cancer ([facility])    • Pulmonary hypertension ([facility]) 1/7/2022   • S/P CABG x 3 1/7/2022 1980 [facility]   • Sleep apnea     cpap   • Stage 3a chronic kidney disease (HCC) 1/20/2017   • Stroke ([facility])     recovererd   • Varicose vein of leg     bialteral     Past Surgical History:   Procedure Laterality Date   • APPENDECTOMY     • CARDIAC CATHETERIZATION     • CARDIAC CATHETERIZATION Left 5/22/2019    Procedure: Cardiac Catheterization/Vascular Study Positive occult blood in stools  ? BMS vs REGGIE Get cabg report  ;  Surgeon: Bradley Carver MD;  Location: [facility] CATH INVASIVE LOC[facility];  Service: Cardiology   • COLONOSCOPY N/A 6/15/2017    Diverticulosis repeat exam prn   • COLONOSCOPY W/ POLYPECTOMY  10/21/2013    2 Tubular adenomatous polyps,  Diverticulosis repeat exam in 5 years   • CORONARY ARTERY BYPASS GRAFT  1980    X 3   • CYSTOSCOPY RETROGRADE PYELOGRAM Bilateral 2/8/2021    Procedure: CYSTOSCOPY RETROGRADE PYELOGRAM;  Surgeon: Danie Cadena MD;  Location:  [facility] OR;  Service: Urology;  Laterality: Bilateral;   • ENDOSCOPY N/A 6/15/2017    LA Grade A reflux esophagitis   • EYE SURGERY Bilateral    • HERNIA REPAIR     • TRANSURETHRAL RESECTION OF BLADDER TUMOR N/A 2/8/2021    Procedure: CYSTOSCOPY TRANSURETHRAL RESECTION OF BLADDER TUMOR WITH GEMCITABINE INSTILLATION;  Surgeon: Danie Cadena MD;  Location:  [facility] OR;  Service: Urology;  Laterality: N/A;     PT Assessment (last 12 hours)     PT Evaluation and Treatment     Row Name 02/12/23 1357 02/12/23 1020       Physical Therapy Time and Intention    Subjective Information no complaints  -WK --    Document Type therapy note (daily note)  -WK --    Mode of Treatment physical therapy  -WK --    Session Not Performed -- other (see comments)  -WK    Comment -- pt was on BSC  -WK    Row Name 02/12/23 1357          General Information    Existing Precautions/Restrictions fall;oxygen therapy device and L/min  vapotherm 40L/100, non rebreather 15L  -WK     Row Name 02/12/23 1357          Bed Mobility    Comment, (Bed Mobility) sitting EOB  -WK     Row Name 02/12/23 1357          Sit-Stand Transfer    Sit-Stand [place] (Transfers) verbal cues;contact guard  -WK     Row Name 02/12/23 1357          Stand-Sit Transfer    Stand-Sit [place] (Transfers) verbal cues;contact guard  -WK     Row Name 02/12/23 1357          Gait/Stairs (Locomotion)    [place] Level (Gait) verbal cues;contact guard  -WK     Assistive Device (Gait) cane, straight  -WK     Distance in Feet (Gait) 5' forward and 5' backward x 3 then stting rest. Performed x2  -WK     Deviations/Abnormal Patterns (Gait) gait speed decreased  -WK     Row Name 02/12/23 0277          Plan of Care Review    Plan of Care Reviewed With  patient  -WK     Progress improving  -WK     Outcome Evaluation Pt amb 5' forwards and 5' backwards x3 then sitting rest. Pt performed this x2. Pt was CGA and o2 ranger 87-92% during activity with cues for breathing technique.  -WK     Row Name 02/12/23 4707          Positioning and Restraints    Pre-Treatment Position in bed  -WK     Post Treatment Position bed  -WK     In Bed sitting EOB;call light within reach;encouraged to call for assist  -WK           User Key  (r) = Recorded By, (t) = Taken By, (c) = Cosigned By    Initials Name Provider Type    WK Arlen Cardona PTA Physical Therapist Assistant                Physical Therapy Education     Title: PT OT SLP Therapies (In Progress)     Topic: Physical Therapy (In Progress)     Point: Mobility training (Done)     Learning Progress Summary           Patient Acceptance, E, VU,DU,NR by CHEYENNE at 2/8/2023 0905    Comment: benefits of acitvity, progression of PT                   Point: Home exercise program (Done)     Learning Progress Summary           Patient Acceptance, E,D, DU by ERICA at 2/9/2023 0903    Comment: breathing exercises                   Point: Body mechanics (Not Started)     Learner Progress:  Not documented in this visit.          Point: Precautions (Not Started)     Learner Progress:  Not documented in this visit.                      User Key     Initials Effective Dates Name Provider Type Discipline    CHEYENNE 02/03/23 -  Rafi Browning PT DPT Physical Therapist PT    ERICA 02/03/23 -  Franca Fuchs PTA Physical Therapist Assistant PT              PT Recommendation and Plan     Plan of Care Reviewed With: patient  Progress: improving  Outcome Evaluation: Pt amb 5' forwards and 5' backwards x3 then sitting rest. Pt performed this x2. Pt was CGA and o2 ranger 87-92% during activity with cues for breathing technique.   Outcome Measures     Row Name 02/10/23 1400             How much help from another person do you currently need...    Turning from  your back to your side while in flat bed without using bedrails? 4  -WK      Moving from lying on back to sitting on the side of a flat bed without bedrails? 4  -WK      Moving to and from a bed to a chair (including a wheelchair)? 3  -WK      Standing up from a chair using your arms (e.g., wheelchair, bedside chair)? 3  -WK      Climbing 3-5 steps with a railing? 2  -WK      To walk in hospital room? 2  -WK      AM-PAC 6 Clicks Score (PT) 18  -WK         Functional Assessment    Outcome Measure Options AM-PAC 6 Clicks Basic Mobility (PT)  -WK            User Key  (r) = Recorded By, (t) = Taken By, (c) = Cosigned By    Initials Name Provider Type    WK Arlen Cardona PTA Physical Therapist Assistant                 Time Calculation:    PT Charges     Row Name 02/12/23 1442             Time Calculation    Start Time 1357  -WK      Stop Time 1413  -WK      Time Calculation (min) 16 min  -WK      PT Received On 02/12/23  -WK         Time Calculation- PT    Total Timed Code Minutes- PT 16 minute(s)  -WK            User Key  (r) = Recorded By, (t) = Taken By, (c) = Cosigned By    Initials Name Provider Type    WK Arlen Cardona PTA Physical Therapist Assistant              Therapy Charges for Today     Code Description Service Date Service Provider Modifiers Qty    [id number] HC GAIT TRAINING EA 15 MIN 2/12/2023 Arlen Cardona PTA GP 1          PT G-Codes  Outcome Measure Options: AM-PAC 6 Clicks Basic Mobility (PT)  AM-PAC 6 Clicks Score (PT): 18  AM-PAC 6 Clicks Score (OT): 23    Arlen Cardona PTA  2/12/2023

## 2023-02-12 NOTE — PROGRESS NOTES
RT EQUIPMENT DEVICE RELATED - SKIN ASSESSMENT    RT Medical Equipment/Device:     NIV Mask:  Full-face    size: medium    Skin Assessment:      Neck:  Intact    Device Skin Pressure Protection:  Skin-to-device areas padded:  Protecta-Gel nasal gel pad

## 2023-02-13 LAB
ALBUMIN SERPL-MCNC: 3.4 G/DL (ref 3.5–5.2)
ALBUMIN/GLOB SERPL: 1 G/DL
ALP SERPL-CCNC: 61 U/L (ref 39–117)
ALT SERPL W P-5'-P-CCNC: 18 U/L (ref 1–41)
ANION GAP SERPL CALCULATED.3IONS-SCNC: 11 MMOL/L (ref 5–15)
AST SERPL-CCNC: 13 U/L (ref 1–40)
BILIRUB SERPL-MCNC: 0.4 MG/DL (ref 0–1.2)
BUN SERPL-MCNC: 70 MG/DL (ref 8–23)
BUN/CREAT SERPL: 59.8 (ref 7–25)
CALCIUM SPEC-SCNC: 9.3 MG/DL (ref 8.6–10.5)
CHLORIDE SERPL-SCNC: 94 MMOL/L (ref 98–107)
CO2 SERPL-SCNC: 31 MMOL/L (ref 22–29)
CREAT SERPL-MCNC: 1.17 MG/DL (ref 0.76–1.27)
EGFRCR SERPLBLD CKD-EPI 2021: 59.6 ML/MIN/1.73
GLOBULIN UR ELPH-MCNC: 3.4 GM/DL
GLUCOSE BLDC GLUCOMTR-MCNC: 143 MG/DL (ref 70–130)
GLUCOSE BLDC GLUCOMTR-MCNC: 164 MG/DL (ref 70–130)
GLUCOSE BLDC GLUCOMTR-MCNC: 396 MG/DL (ref 70–130)
GLUCOSE BLDC GLUCOMTR-MCNC: 412 MG/DL (ref 70–130)
GLUCOSE SERPL-MCNC: 187 MG/DL (ref 65–99)
POTASSIUM SERPL-SCNC: 3.9 MMOL/L (ref 3.5–5.2)
PROT SERPL-MCNC: 6.8 G/DL (ref 6–8.5)
SODIUM SERPL-SCNC: 136 MMOL/L (ref 136–145)

## 2023-02-13 PROCEDURE — 94660 CPAP INITIATION&MGMT: CPT

## 2023-02-13 PROCEDURE — 63710000001 DEXAMETHASONE PER 0.25 MG: Performed by: FAMILY MEDICINE

## 2023-02-13 PROCEDURE — 94799 UNLISTED PULMONARY SVC/PX: CPT

## 2023-02-13 PROCEDURE — 94664 DEMO&/EVAL PT USE INHALER: CPT

## 2023-02-13 PROCEDURE — 94761 N-INVAS EAR/PLS OXIMETRY MLT: CPT

## 2023-02-13 PROCEDURE — 25010000002 FUROSEMIDE PER 20 MG: Performed by: FAMILY MEDICINE

## 2023-02-13 PROCEDURE — 63710000001 INSULIN DETEMIR PER 5 UNITS: Performed by: FAMILY MEDICINE

## 2023-02-13 PROCEDURE — 25010000002 DEXAMETHASONE SODIUM PHOSPHATE 10 MG/ML SOLUTION: Performed by: FAMILY MEDICINE

## 2023-02-13 PROCEDURE — 99232 SBSQ HOSP IP/OBS MODERATE 35: CPT | Performed by: INTERNAL MEDICINE

## 2023-02-13 PROCEDURE — 63710000001 INSULIN LISPRO (HUMAN) PER 5 UNITS: Performed by: INTERNAL MEDICINE

## 2023-02-13 PROCEDURE — 25010000002 CEFTRIAXONE PER 250 MG: Performed by: FAMILY MEDICINE

## 2023-02-13 PROCEDURE — 80053 COMPREHEN METABOLIC PANEL: CPT | Performed by: FAMILY MEDICINE

## 2023-02-13 PROCEDURE — 97530 THERAPEUTIC ACTIVITIES: CPT

## 2023-02-13 PROCEDURE — 82962 GLUCOSE BLOOD TEST: CPT

## 2023-02-13 RX ADMIN — INSULIN LISPRO 24 UNITS: 100 INJECTION, SOLUTION INTRAVENOUS; SUBCUTANEOUS at 13:26

## 2023-02-13 RX ADMIN — INSULIN LISPRO 4 UNITS: 100 INJECTION, SOLUTION INTRAVENOUS; SUBCUTANEOUS at 20:46

## 2023-02-13 RX ADMIN — PRAVASTATIN SODIUM 80 MG: 20 TABLET ORAL at 09:17

## 2023-02-13 RX ADMIN — GUAIFENESIN 600 MG: 600 TABLET, EXTENDED RELEASE ORAL at 09:14

## 2023-02-13 RX ADMIN — METOPROLOL TARTRATE 12.5 MG: 25 TABLET, FILM COATED ORAL at 09:16

## 2023-02-13 RX ADMIN — ALBUTEROL SULFATE 2 PUFF: 90 AEROSOL, METERED RESPIRATORY (INHALATION) at 07:22

## 2023-02-13 RX ADMIN — ISOSORBIDE MONONITRATE 30 MG: 30 TABLET, EXTENDED RELEASE ORAL at 09:17

## 2023-02-13 RX ADMIN — ASPIRIN 81 MG: 81 TABLET, COATED ORAL at 09:16

## 2023-02-13 RX ADMIN — METOPROLOL TARTRATE 12.5 MG: 25 TABLET, FILM COATED ORAL at 20:33

## 2023-02-13 RX ADMIN — ACETAMINOPHEN 650 MG: 325 TABLET, FILM COATED ORAL at 15:46

## 2023-02-13 RX ADMIN — BUDESONIDE AND FORMOTEROL FUMARATE DIHYDRATE 2 PUFF: 160; 4.5 AEROSOL RESPIRATORY (INHALATION) at 07:22

## 2023-02-13 RX ADMIN — FUROSEMIDE 40 MG: 10 INJECTION, SOLUTION INTRAMUSCULAR; INTRAVENOUS at 17:43

## 2023-02-13 RX ADMIN — BUDESONIDE AND FORMOTEROL FUMARATE DIHYDRATE 2 PUFF: 160; 4.5 AEROSOL RESPIRATORY (INHALATION) at 20:10

## 2023-02-13 RX ADMIN — PANTOPRAZOLE SODIUM 40 MG: 40 TABLET, DELAYED RELEASE ORAL at 07:57

## 2023-02-13 RX ADMIN — APIXABAN 5 MG: 5 TABLET, FILM COATED ORAL at 20:33

## 2023-02-13 RX ADMIN — HYDROCHLOROTHIAZIDE 12.5 MG: 25 TABLET ORAL at 09:14

## 2023-02-13 RX ADMIN — ALBUTEROL SULFATE 2 PUFF: 90 AEROSOL, METERED RESPIRATORY (INHALATION) at 20:10

## 2023-02-13 RX ADMIN — INSULIN LISPRO 24 UNITS: 100 INJECTION, SOLUTION INTRAVENOUS; SUBCUTANEOUS at 10:38

## 2023-02-13 RX ADMIN — OXYCODONE HYDROCHLORIDE AND ACETAMINOPHEN 500 MG: 500 TABLET ORAL at 09:15

## 2023-02-13 RX ADMIN — ALBUTEROL SULFATE 2 PUFF: 90 AEROSOL, METERED RESPIRATORY (INHALATION) at 10:23

## 2023-02-13 RX ADMIN — LINAGLIPTIN 5 MG: 5 TABLET, FILM COATED ORAL at 11:52

## 2023-02-13 RX ADMIN — Medication 1 APPLICATION: at 20:35

## 2023-02-13 RX ADMIN — Medication 1 APPLICATION: at 09:17

## 2023-02-13 RX ADMIN — INSULIN DETEMIR 5 UNITS: 100 INJECTION, SOLUTION SUBCUTANEOUS at 13:25

## 2023-02-13 RX ADMIN — APIXABAN 5 MG: 5 TABLET, FILM COATED ORAL at 09:14

## 2023-02-13 RX ADMIN — CEFTRIAXONE 1 G: 1 INJECTION, POWDER, FOR SOLUTION INTRAMUSCULAR; INTRAVENOUS at 20:46

## 2023-02-13 RX ADMIN — FUROSEMIDE 40 MG: 10 INJECTION, SOLUTION INTRAMUSCULAR; INTRAVENOUS at 09:19

## 2023-02-13 RX ADMIN — INSULIN DETEMIR 5 UNITS: 100 INJECTION, SOLUTION SUBCUTANEOUS at 20:33

## 2023-02-13 RX ADMIN — FINASTERIDE 5 MG: 5 TABLET, FILM COATED ORAL at 09:13

## 2023-02-13 RX ADMIN — FERROUS SULFATE TAB 325 MG (65 MG ELEMENTAL FE) 325 MG: 325 (65 FE) TAB at 09:14

## 2023-02-13 RX ADMIN — CHLORHEXIDINE GLUCONATE 1 APPLICATION: 500 CLOTH TOPICAL at 04:12

## 2023-02-13 RX ADMIN — DEXAMETHASONE 6 MG: 4 TABLET ORAL at 17:43

## 2023-02-13 RX ADMIN — ALBUTEROL SULFATE 2 PUFF: 90 AEROSOL, METERED RESPIRATORY (INHALATION) at 14:51

## 2023-02-13 RX ADMIN — TERAZOSIN HYDROCHLORIDE 10 MG: 5 CAPSULE ORAL at 20:33

## 2023-02-13 RX ADMIN — DEXAMETHASONE SODIUM PHOSPHATE 6 MG: 10 INJECTION, SOLUTION INTRAMUSCULAR; INTRAVENOUS at 10:26

## 2023-02-13 NOTE — PLAN OF CARE
Problem: Adult Inpatient Plan of Care  Goal: Absence of Hospital-Acquired Illness or Injury  Intervention: Identify and Manage Fall Risk  Recent Flowsheet Documentation  Taken 2/13/2023 0900 by Elen Vivas RN  Safety Promotion/Fall Prevention: safety round/check completed  Taken 2/13/2023 0800 by Elen Vivas RN  Safety Promotion/Fall Prevention: safety round/check completed  Taken 2/13/2023 0700 by Elen Vivas RN  Safety Promotion/Fall Prevention: safety round/check completed  Intervention: Prevent Skin Injury  Recent Flowsheet Documentation  Taken 2/13/2023 0900 by Elen Vivas RN  Body Position: position changed independently  Taken 2/13/2023 0800 by Elen Vivas RN  Body Position: position changed independently  Skin Protection: tubing/devices free from skin contact  Taken 2/13/2023 0700 by Elen Vivas RN  Body Position: position changed independently  Intervention: Prevent and Manage VTE (Venous Thromboembolism) Risk  Recent Flowsheet Documentation  Taken 2/13/2023 0800 by Elen Vivas RN  Activity Management: activity adjusted per tolerance  VTE Prevention/Management: (MAR) other (see comments)  Intervention: Prevent Infection  Recent Flowsheet Documentation  Taken 2/13/2023 0800 by Elen Vivas RN  Infection Prevention: single patient room provided  Taken 2/13/2023 0700 by Elen Vivas RN  Infection Prevention: single patient room provided  Goal: Optimal Comfort and Wellbeing  Intervention: Provide Person-Centered Care  Recent Flowsheet Documentation  Taken 2/13/2023 0800 by Elen Vivas RN  Trust Relationship/Rapport:   care explained   thoughts/feelings acknowledged     Problem: Fall Injury Risk  Goal: Absence of Fall and Fall-Related Injury  Intervention: Identify and Manage Contributors  Recent Flowsheet Documentation  Taken 2/13/2023 0900 by Elen Vivas RN  Medication Review/Management: medications reviewed  Taken 2/13/2023 0800 by Elen Vivas  RN  Medication Review/Management: medications reviewed  Taken 2/13/2023 0700 by Elen Vivas RN  Medication Review/Management: medications reviewed  Intervention: Promote Injury-Free Environment  Recent Flowsheet Documentation  Taken 2/13/2023 0900 by Elen Vivas RN  Safety Promotion/Fall Prevention: safety round/check completed  Taken 2/13/2023 0800 by Elen Vivas RN  Safety Promotion/Fall Prevention: safety round/check completed  Taken 2/13/2023 0700 by Elen Vivas RN  Safety Promotion/Fall Prevention: safety round/check completed     Problem: COPD (Chronic Obstructive Pulmonary Disease) Comorbidity  Goal: Maintenance of COPD Symptom Control  Intervention: Maintain COPD-Symptom Control  Recent Flowsheet Documentation  Taken 2/13/2023 0900 by Elen Vivas RN  Medication Review/Management: medications reviewed  Taken 2/13/2023 0800 by Elen Vivas RN  Medication Review/Management: medications reviewed  Taken 2/13/2023 0700 by Elen Vivas RN  Medication Review/Management: medications reviewed     Problem: Skin Injury Risk Increased  Goal: Skin Health and Integrity  Intervention: Optimize Skin Protection  Recent Flowsheet Documentation  Taken 2/13/2023 0800 by Elen Vivas RN  Pressure Reduction Techniques: frequent weight shift encouraged  Head of Bed (HOB) Positioning: HOB at 30-45 degrees  Pressure Reduction Devices: pressure-redistributing mattress utilized  Skin Protection: tubing/devices free from skin contact  Taken 2/13/2023 0700 by Elen Vivas RN  Head of Bed (HOB) Positioning: HOB at 30-45 degrees

## 2023-02-13 NOTE — PROGRESS NOTES
RT EQUIPMENT DEVICE RELATED - SKIN ASSESSMENT    RT Medical Equipment/Device:     High Flow Nasal Cannula:   Vapotherm    Skin Assessment:      Nose:  Intact    Device Skin Pressure Protection:  Skin-to-device areas padded:  Protecta-Gel nasal gel pad

## 2023-02-13 NOTE — PROGRESS NOTES
[facility] [place]     Patient: Gonzalo Durham  1934   MR# [id number]   Acct# [id number]  02/13/23   08:18 CST  Referring Provider: Robert Dunn MD    Chief Complaint: Covid-19     Interval history: The patient is Covid positive and has been examined through the glass doors to prevent possible cross-contamination, unnecessary exposure and unnecessary use of PPE. He is seen awake and alert resting in bed. He has remained on cpap overnight, fio2 0.75. He requires vapotherm 40/100 + NRB when off cpap. Remdesivir completed yesterday. He remains on dexamethasone 6mg bid. ABX continue. Afebrile. No new events reported overnight.     Meds:  albuterol sulfate HFA, 2 puff, Inhalation, 4x Daily - RT  apixaban, 5 mg, Oral, BID  ascorbic acid, 500 mg, Oral, Daily  aspirin, 81 mg, Oral, Daily  budesonide-formoterol, 2 puff, Inhalation, BID - RT  cefTRIAXone, 1 g, Intravenous, Q24H  Chlorhexidine Gluconate Cloth, 1 application, Topical, Q24H  dexamethasone, 6 mg, Intravenous, BID  ferrous sulfate, 325 mg, Oral, Daily With Breakfast  finasteride, 5 mg, Oral, Daily  furosemide, 40 mg, Intravenous, BID  guaiFENesin, 600 mg, Oral, Daily  hydroCHLOROthiazide, 12.5 mg, Oral, Daily  insulin lispro, 0-24 Units, Subcutaneous, 4x Daily With Meals & Nightly  isosorbide mononitrate, 30 mg, Oral, Daily  metoprolol tartrate, 12.5 mg, Oral, BID  mupirocin, 1 application, Each Nare, BID  pantoprazole, 40 mg, Oral, Q AM  pravastatin, 80 mg, Oral, Daily  terazosin, 10 mg, Oral, Nightly         Physical Exam:  SpO2 Percentage    02/13/23 0700 02/13/23 0722 02/13/23 0800   SpO2: 97% 93% 91%     Temp:  [96.5 °F (35.8 °C)-96.6 °F (35.9 °C)] 96.5 °F (35.8 °C)  Heart Rate:  [58-98] 85  Resp:  [24-30] 27  BP: ()/(36-82) 113/36    Intake/Output Summary (Last 24 hours) at 2/13/2023 0818  Last data filed at 2/13/2023 0400  Gross per 24 hour   Intake 200 ml   Output 2600 ml   Net -2400 ml      Body mass index is 26.64 kg/m².   Physical Exam  Vitals and nursing note reviewed.   Constitutional:       Comments: cpap    HENT:      Head: Normocephalic.      Nose: Nose normal.   Cardiovascular:      Rate and Rhythm: Normal rate. Rhythm irregular.   Pulmonary:      Effort: No respiratory distress.   Musculoskeletal:      Cervical back: Normal range of motion.      Comments: No visible edema    Skin:     Coloration: Skin is not pale.   Neurological:      Mental Status: He is alert.   Psychiatric:         Behavior: Behavior normal.        Electronically signed by DOMINIQUE Rosales, 2/13/2023, 08:18 CST      Physician substantive portion: medical decision making    Result Review    Laboratory Data:  Results from last 7 days   Lab Units 02/07/23  0936   WBC 10*3/mm3 12.35*   HEMOGLOBIN g/dL 10.9*   PLATELETS 10*3/mm3 251     Results from last 7 days   Lab Units 02/13/23  0230 02/12/23  0226 02/11/23  0515 02/09/23  0313 02/08/23  0447 02/07/23  2232   SODIUM mmol/L 136 136 133*   < > 138  --    POTASSIUM mmol/L 3.9 3.8 3.8   < > 3.9  --    CO2 mmol/L 31.0* 29.0 25.0   < > 26.0  --    BUN mg/dL 70* 56* 40*   < > 33*  --    CREATININE mg/dL 1.17 0.99 0.77   < > 0.79  --    LACTATE mmol/L  --   --   --   --   --  1.3   CRP mg/dL  --   --   --   --  21.21*  --     < > = values in this interval not displayed.     Results from last 7 days   Lab Units 02/10/23  0028 02/07/23  0943   PH, ARTERIAL pH units 7.460*  7.460*  7.460*  7.460* 7.416   PCO2, ARTERIAL mm Hg 41.6  41.6  41.6  41.6 43.8   PO2 ART mm Hg 45.0*  45.0*  45.0*  45.0* 66.8*   FIO2 % 100  100  100  100  --      Microbiology Results (last 10 days)       Procedure Component Value - Date/Time    Blood Culture - Blood, Hand, Right [[id number]]  (Normal) Collected: 02/07/23 1909    Lab Status: Final result Specimen: Blood from Hand, Right Updated: 02/12/23 1945     Blood Culture No growth at 5 days    Blood Culture - Blood, Arm, Left [[id number]]   (Normal) Collected: 02/07/23 1909    Lab Status: Final result Specimen: Blood from Arm, Left Updated: 02/12/23 1945     Blood Culture No growth at 5 days    COVID-19 and FLU A/B PCR - Swab, Nasopharynx [[id number]]  (Abnormal) Collected: 02/07/23 0928    Lab Status: Final result Specimen: Swab from Nasopharynx Updated: 02/07/23 1010     COVID19 Detected     Influenza A PCR Not Detected     Influenza B PCR Not Detected    Narrative:      Fact sheet for providers: https://www.fda.gov/media/197466/download    Fact sheet for patients: https://www.fda.gov/media/778058/download    Test performed by PCR.  Influenza A and Influenza B negative results should be considered presumptive in samples that have a positive SARS-CoV-2 result.    Competitive inhibition studies showed that SARS-CoV-2 virus, when present at concentrations above 3.6E+04 copies/mL, can inhibit the detection and amplification of influenza A and influenza B virus RNA if present at or below 1.8E+02 copies/mL or 4.9E+02 copies/mL, respectively, and may lead to false negative influenza virus results. If co-infection with influenza A or influenza B virus is suspected in samples with a positive SARS-CoV-2 result, the sample should be re-tested with another FDA cleared, approved, or authorized influenza test, if influenza virus detection would change clinical management.           Recent films:  No radiology results from the last 24 hrs       Pulmonary Assessment:  Acute resp failure with hypoxia  Covid  Hx copd    Recommend/plan:   Continue high flow oxygen  Continue dexamethasone; recommend 6 mg per day for 10 day course    This visit was performed by both a physician and an [facility] RN.  I personally evaluated and examined the patient.  I performed all aspects of the medical decision making as documented.  Electronically signed by Parrish Cardona MD, 2/13/2023, 18:00 CST

## 2023-02-13 NOTE — PROGRESS NOTES
Automatic IV to PO Pharmacy Note - General    Gonzalo Durham is a 89 y.o. male who meets the following criteria for IV to PO therapy conversion :     Tolerating oral fluids or 40mL/hour of enteral nutrition and oral route not otherwise compromised  Receiving other oral medications on a scheduled basis    Assessment/Plan  Based on this criteria, Dexamethasone 6 mg IV BID has been changed to Dexamethasone 6 mg PO BID with meals per the directives and guidelines established by [facility] and Therapeutics Committee and [facility] Medical Executive Committee .       Reg Juárez, PharmD  02/13/2314:04 CST

## 2023-02-13 NOTE — PLAN OF CARE
Goal Outcome Evaluation:   Patient is eager to get up and move/ Worked thru LE and inspiratory exercises. Stands and ambulated 5' forward and back x 5. After a sitting rest marched in place 25 steps bilaterally. SATs 97% -86%. Will continue to benefit from strengthening activities.

## 2023-02-13 NOTE — CASE MANAGEMENT/SOCIAL WORK
Continued Stay Note   [place]     Patient Name: Gonzalo Durham  MRN: [id number]  Today's Date: 2/13/2023    Admit Date: 2/7/2023    Plan: Pending   Discharge Plan     Row Name 02/13/23 1100       Plan    Plan Pending    Plan Comments Events noted.  SW following to assist with discharge plan.               Discharge Codes    No documentation.                     CARRILLO Moon

## 2023-02-13 NOTE — PLAN OF CARE
Goal Outcome Evaluation:      Pt remained A&Ox4 and afebrile throughout the shift. PERRLA. VSS. Chronic afib. Pulses good. Urine adequate. No drips. CPAP at 100% and sating in high 90s throughout the night. Safety maintained.

## 2023-02-13 NOTE — PROGRESS NOTES
Patient placed on 15lpm NRB to see what would happen at his request. Desaturated to 87% in about 3 minutes. Vapotherm placed back on pt on previous settings. 40lpm 100%

## 2023-02-13 NOTE — PLAN OF CARE
Goal Outcome Evaluation:  Patient wearing Vapotherm 40L/100% with  lpm. Patient wears Bipap intermittently and at night.  Improve oxygenation and ventilation

## 2023-02-13 NOTE — PROGRESS NOTES
[facility] Medicine Services  INPATIENT PROGRESS NOTE    Patient Name: Gonzalo Durham  Date of Admission: 2/7/2023  Today's Date: 02/13/23  Length of Stay: 6  Primary Care Physician: Abel Romero MD    Subjective   Chief Complaint: COVID-pneumonia/COPD exacerbation.    HPI   Blood stable, afebrile.  Patient is currently requiring Vapotherm maxing out..  Overall patient doing better slowly.  Patient is on chronic 4 L oxygen at home.  Patient denies any chest pain.    Review of Systems   Constitutional: Positive for activity change, appetite change and fatigue. Negative for chills and fever.   HENT: Negative for hearing loss, nosebleeds, tinnitus and trouble swallowing.    Eyes: Negative for visual disturbance.   Respiratory: Positive for shortness of breath. Negative for cough, chest tightness and wheezing.    Cardiovascular: Negative for chest pain, palpitations and leg swelling.   Gastrointestinal: Negative for abdominal distention, abdominal pain, blood in stool, constipation, diarrhea, nausea and vomiting.   Endocrine: Negative for cold intolerance, heat intolerance, polydipsia, polyphagia and polyuria.   Genitourinary: Negative for decreased urine volume, difficulty urinating, dysuria, flank pain, frequency and hematuria.   Musculoskeletal: Positive for arthralgias, gait problem and myalgias. Negative for joint swelling.   Skin: Negative for rash.   Allergic/Immunologic: Negative for immunocompromised state.   Neurological: Positive for weakness. Negative for dizziness, syncope, light-headedness and headaches.   Hematological: Negative for adenopathy. Does not bruise/bleed easily.   Psychiatric/Behavioral: Negative for confusion and sleep disturbance. The patient is not nervous/anxious.       All pertinent negatives and positives are as above. All other systems have been reviewed and are negative unless otherwise stated.     Objective    Temp:  [96.2 °F (35.7 °C)-96.6  °F (35.9 °C)] 96.2 °F (35.7 °C)  Heart Rate:  [] 78  Resp:  [22-30] 22  BP: ()/(36-82) 128/62  Physical Exam  Vitals and nursing note reviewed.   Constitutional:       General: He is not in acute distress.     Appearance: He is not toxic-appearing.      Comments: Advanced age.  Chronically ill.  Hard of hearing.   HENT:      Head: Normocephalic.      Mouth/Throat:      Mouth: Mucous membranes are moist.      Pharynx: Oropharynx is clear.   Eyes:      Extraocular Movements: Extraocular movements intact.      Conjunctiva/sclera: Conjunctivae normal.      Pupils: Pupils are equal, round, and reactive to light.   Cardiovascular:      Rate and Rhythm: Normal rate and regular rhythm.      Pulses: Normal pulses.      Heart sounds: No murmur heard.  Pulmonary:      Effort: No respiratory distress.      Breath sounds: No wheezing or rhonchi.      Comments: On Vapotherm.  Diminished breath sound bilateral, clear .  Abdominal:      General: Bowel sounds are normal. There is no distension.      Palpations: Abdomen is soft.      Tenderness: There is no abdominal tenderness.   Musculoskeletal:         General: No swelling or tenderness. Normal range of motion.      Cervical back: Normal range of motion and neck supple. No muscular tenderness.   Skin:     General: Skin is warm and dry.      Capillary Refill: Capillary refill takes 2 to 3 seconds.      Findings: No erythema or rash.   Neurological:      General: No focal deficit present.      Mental Status: He is alert and oriented to person, place, and time.      Cranial Nerves: No cranial nerve deficit.      Motor: Weakness present.   Psychiatric:         Mood and Affect: Mood normal.         Behavior: Behavior normal.             Results Review:  I have reviewed the labs, radiology results, and diagnostic studies.    Laboratory Data:   Results from last 7 days   Lab Units 02/07/23  0936   WBC 10*3/mm3 12.35*   HEMOGLOBIN g/dL 10.9*   HEMATOCRIT % 34.1*   PLATELETS  10*3/mm3 251        Results from last 7 days   Lab Units 02/13/23  0230 02/12/23  0226 02/11/23  0515   SODIUM mmol/L 136 136 133*   POTASSIUM mmol/L 3.9 3.8 3.8   CHLORIDE mmol/L 94* 94* 97*   CO2 mmol/L 31.0* 29.0 25.0   BUN mg/dL 70* 56* 40*   CREATININE mg/dL 1.17 0.99 0.77   CALCIUM mg/dL 9.3 9.2 8.9   BILIRUBIN mg/dL 0.4 0.5 0.6   ALK PHOS U/L 61 59 56   ALT (SGPT) U/L 18 21 21   AST (SGOT) U/L 13 15 18   GLUCOSE mg/dL 187* 236* 160*       Culture Data:   Blood Culture   Date Value Ref Range Status   02/07/2023 No growth at 5 days  Final   02/07/2023 No growth at 5 days  Final       Radiology Data:   Imaging Results (Last 24 Hours)     ** No results found for the last 24 hours. **          I have reviewed the patient's current medications.     Assessment/Plan   Assessment  Active Hospital Problems    Diagnosis    • **COVID-19    • S/P CABG x 3    • PAF (paroxysmal atrial fibrillation) ([facility])    • Pulmonary hypertension ([facility])    • Obstructive sleep apnea    • Chronic diastolic congestive heart failure ([facility])    • Stage 2 moderate COPD by GOLD classification ([facility])    • Gastroesophageal reflux disease    • Stage 3a chronic kidney disease ([facility])        Treatment Plan  Covid pneumonia/COPD exacerbation.  Patient had been vaccinated with Moderna x2 in the past. patient is on chronic 4 L oxygen at home. Patient finished remdesivir.  Decadron twice daily.  Pulmonary consult.  Albuterol inhaler.  Rocephin antibiotics.  Vitamin C.  Symbicort.  Mucinex.  Chest x-ray-Persistent and unchanged chronic inflammatory and obstructive lung changes bilaterally.  Vapotherm 40/100 . nonrebreather mask when off CPAP.  CPAP overnight.    Atrial fibrillation/CAD.  Eliquis.  Metoprolol.  Aspirin . Pravastatin.  Imdur.  Hydrochlorothiazide.  IV Lasix.  Nitro as needed.  Echocardiogram 10/26/2022- ejection fraction 56 - 60%, tricuspid regurgitation.    Diabetes.  Hold metformin.  High sliding scale.  Add Levemir.  Add Tradjenta    Reflux.   Protonix.  Zofran as needed    Benign prostate hypertrophy.  Hytrin . Proscar.    Insomnia.  Melatonin at night as needed.    Anemia.  Iron sulfate.    Nutrition . diabetic/consistent carb/regular texture diet.    Deconditioning.  PT consult.    Blood culture-no growth 5 days.  COVID-19-positive.  Flu screen- positive    Medical Decision Making  Number and Complexity of problems: 10 problems, high complexity:  Covid-19 pneumonia and respiratory failure represent threat to life     Risk:  High:  Requiring high flow oxygen, high risk of morbidity/mortality, requiring ICU level monitoring     Differential Diagnosis: Pneumonia, Viral pneumonia, COPD, CHF     Conditions and Status        Condition is worsening.     MDM Data  Cardiac tracing (EKG, telemetry) interpretation: NSR  Labs reviewed: Normal renal function     Discussed with: patient, nursing     Care Planning  Shared decision making: Patient agreeable to treatment plan  Code status and discussions: full code     Disposition  Social Determinants of Health that impact treatment or disposition: n/a  D/c planning uncertain    Electronically signed by Robert Dunn MD, 02/13/23, 11:27 CST.

## 2023-02-13 NOTE — THERAPY TREATMENT NOTE
Acute Care - Physical Therapy Treatment Note  [facility]     Patient Name: Gonzalo Durham  : 1934  MRN: [id number]  Today's Date: 2023      Visit Dx:     ICD-10-CM ICD-9-CM   1. COVID-19  U07.1 079.89   2. Impaired mobility  Z74.09 799.89   3. Decreased activities of daily living (ADL)  Z78.9 V49.89     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, without long-term current use of insulin ([facility])   • Stage 3a chronic kidney disease (HCC)   • Erectile dysfunction   • Coronary artery disease involving native coronary artery of native heart without angina pectoris   • Hyperlipidemia LDL goal <70-statin   • Hypertension   • Prostatism-(young) urology   • Tremor   • Varicose vein of leg   • Plantar fasciitis   • Nocturnal leg movements   • Bad dreams   • Depression   • Peripheral neuropathy- clinical   • Hx of colonic polyps   • Gastroesophageal reflux disease   • Left lower quadrant pain   • Laboratory test*   • Anticoagulated-CAD, DM2, CVA/ASA 81+()+plavix » Anticoagulated-CAD, DM2, CVA/ASA 81+()+plavix+eliquist   • SOB: copd,CAD,#, hypoxemia   • Hypoxia#to pulmonary   • Corn or callus   • Anemia: ASA81,plavix,eliquis,gi(3/3+,div,cp,eitis   • Atherosclerosis: CAD, AAA vascular   • AAA: -(ro) steffen   • Heme positive stool   • Stage 2 moderate COPD by GOLD classification ([facility])   • Abnormal stress test   • Tingling of both feet-to consider NCV   • Cryptogenic stroke (HCC)   • Chronic diastolic congestive heart failure (HCC)   • Gross hematuria   • BPH with obstruction/lower urinary tract symptoms   • Chest pain   • Obstructive sleep apnea   • Abnormal CT of the chest ./done   • 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)   • Oxygen dependent   • S/P CABG x 3   • Pulmonary hypertension (HCC)   • PAF (paroxysmal atrial fibrillation) (HCC)   • Umbilical hernia-a waqar   • COVID-19      Past Medical History:   Diagnosis Date   • A-fib (HCC)    • AAA (abdominal aortic aneurysm) without rupture    • Arthritis    • BPH (benign prostatic hypertrophy)    • Cataract     bialteral   • CKD (chronic kidney disease)    • COPD (chronic obstructive pulmonary disease) ([facility])    • Coronary artery disease involving native coronary artery of native heart without angina pectoris 1/20/2017    CABG/Az/[place]/1981 No TCC echo  7.16.18 Right ventricular cavity is mild-to-moderately dilated. Left ventricular diastolic dysfunction (grade I) consistent with impaired relaxation. Left ventricular systolic function is normal. Left atrial cavity size is borderline dilated. RIGHT HEART ENLARGEMENT - RVSP NOT ASSESSABLE NORMAL LV AND RV SYSTOLIC FUNCTION NO SIGNIFICANT VALVULAR DYSFUNCTION  Seein   • Diabetes mellitus ([facility])     Type 2   • DM (diabetes mellitus screen)    • GERD (gastroesophageal reflux disease)    • History of loop recorder     at current time   • Hx of colonic polyp    • Hypercholesteremia    • Hypertension    • Macular degeneration     treated with injections in right eye   • Myocardial infarction ([facility])    • Neuropathy    • Oxygen deficit     at 4liters   • Prostate cancer ([facility])    • Pulmonary hypertension ([facility]) 1/7/2022   • S/P CABG x 3 1/7/2022 1980 [facility]   • Sleep apnea     cpap   • Stage 3a chronic kidney disease (HCC) 1/20/2017   • Stroke ([facility])     recovererd   • Varicose vein of leg     bialteral     Past Surgical History:   Procedure Laterality Date   • APPENDECTOMY     • CARDIAC CATHETERIZATION     • CARDIAC CATHETERIZATION Left 5/22/2019    Procedure: Cardiac Catheterization/Vascular Study Positive occult blood in stools  ? BMS vs REGGIE Get cabg report  ;  Surgeon: Bradley Carver MD;  Location: [facility] CATH INVASIVE LOCATION;  Service: Cardiology   • COLONOSCOPY N/A 6/15/2017    Diverticulosis repeat exam prn   • COLONOSCOPY W/ POLYPECTOMY  10/21/2013    2 Tubular adenomatous polyps,  Diverticulosis repeat exam in 5 years   • CORONARY ARTERY BYPASS GRAFT  1980    X 3   • CYSTOSCOPY RETROGRADE PYELOGRAM Bilateral 2/8/2021    Procedure: CYSTOSCOPY RETROGRADE PYELOGRAM;  Surgeon: Danie Cadena MD;  Location:  [facility] OR;  Service: Urology;  Laterality: Bilateral;   • ENDOSCOPY N/A 6/15/2017    LA Grade A reflux esophagitis   • EYE SURGERY Bilateral    • HERNIA REPAIR     • TRANSURETHRAL RESECTION OF BLADDER TUMOR N/A 2/8/2021    Procedure: CYSTOSCOPY TRANSURETHRAL RESECTION OF BLADDER TUMOR WITH GEMCITABINE INSTILLATION;  Surgeon: Danie Cadena MD;  Location:  [facility] OR;  Service: Urology;  Laterality: N/A;     PT Assessment (last 12 hours)     PT Evaluation and Treatment     Row Name 02/13/23 0918          Physical Therapy Time and Intention    Subjective Information no complaints  -     Document Type therapy note (daily note)  Actual start time 9:17  -ERICA     Mode of Treatment physical therapy  -     Row Name 02/13/23 0918          General Information    Existing Precautions/Restrictions fall;oxygen therapy device and L/min  vapotherm & Non rebreather  -     Row Name 02/13/23 0918          Pain    Posttreatment Pain Rating 0/10 - no pain  -     Row Name 02/13/23 0918          Bed Mobility    Supine-Sit [place] (Bed Mobility) independent  -     Assistive Device (Bed Mobility) head of bed elevated  -     Row Name 02/13/23 0918          Sit-Stand Transfer    Sit-Stand [place] (Transfers) standby assist  -ERICA     Row Name 02/13/23 0918          Stand-Sit Transfer    Stand-Sit [place] (Transfers) standby assist  -     Row Name 02/13/23 0918          Gait/Stairs (Locomotion)    [place] Level (Gait) standby assist  -     Assistive Device (Gait) cane, straight  -     Distance in Feet (Gait) 5' forward & back x5. Sit rest then repeat x3.  -ERICA     Deviations/Abnormal Patterns (Gait) stride length decreased  -     Row Name 02/13/23 0918          Motor Skills     Additional Documentation --  All ex's performed before gait and all again after gait.  -ERICA     Row Name 02/13/23 0918          Shoulder (Therapeutic Exercise)    Shoulder AROM (Therapeutic Exercise) bilateral;scapular elevation;scapular retraction  along with breathing ed. 10 x2  -ERICA     Row Name 02/13/23 0918          Hip (Therapeutic Exercise)    Hip AROM (Therapeutic Exercise) bilateral;aBduction;aDduction;flexion  15 x2  -ERICA     Row Name 02/13/23 0918          Knee (Therapeutic Exercise)    Knee AROM (Therapeutic Exercise) bilateral;LAQ (long arc quad)  15 x2  -ERICA     Row Name 02/13/23 0918          Ankle (Therapeutic Exercise)    Ankle AROM (Therapeutic Exercise) bilateral;dorsiflexion;plantarflexion  2 x2  -ERICA     Row Name 02/13/23 0918          Vital Signs    Pre SpO2 (%) 95  -ERICA     O2 Delivery Pre Treatment --  [id number]/100%. Non [id number]  -ERICA     Intra SpO2 (%) 86  -ERICA     Post SpO2 (%) 95  -ERICA     Row Name 02/13/23 0918          Positioning and Restraints    Pre-Treatment Position in bed  -ERICA     Post Treatment Position chair  -ERICA     In Chair reclined;call light within reach;encouraged to call for assist;notified nsg;RUE elevated;LUE elevated  -ERICA           User Key  (r) = Recorded By, (t) = Taken By, (c) = Cosigned By    Initials Name Provider Type    Franca Rudd, PTA Physical Therapist Assistant                Physical Therapy Education     Title: PT OT SLP Therapies (In Progress)     Topic: Physical Therapy (In Progress)     Point: Mobility training (Done)     Learning Progress Summary           Patient Acceptance, E, VU,DU,NR by CHEYENNE at 2/8/2023 0905    Comment: benefits of acitvity, progression of PT                   Point: Home exercise program (Done)     Learning Progress Summary           Patient Acceptance, E,D, VU,DU by ERICA at 2/13/2023 1002    Comment: inspiratory exercises    Acceptance, E,D, DU by ERICA at 2/9/2023 0903    Comment: breathing exercises                   Point:  Body mechanics (Not Started)     Learner Progress:  Not documented in this visit.          Point: Precautions (Not Started)     Learner Progress:  Not documented in this visit.                      User Key     Initials Effective Dates Name Provider Type Tanja QUINN 02/03/23 -  Rafi Browning, PT DPT Physical Therapist PT    ERICA 02/03/23 -  Franca Fuchs, EMMA Physical Therapist Assistant PT              PT Recommendation and Plan         Outcome Measures     Row Name 02/13/23 1000 02/10/23 1400          How much help from another person do you currently need...    Turning from your back to your side while in flat bed without using bedrails? 4  -ERICA 4  -WK     Moving from lying on back to sitting on the side of a flat bed without bedrails? 4  -ERICA 4  -WK     Moving to and from a bed to a chair (including a wheelchair)? 3  -ERICA 3  -WK     Standing up from a chair using your arms (e.g., wheelchair, bedside chair)? 3  -ERICA 3  -WK     Climbing 3-5 steps with a railing? 3  -ERICA 2  -WK     To walk in hospital room? 3  -ERICA 2  -WK     AM-PAC 6 Clicks Score (PT) 20  -ERICA 18  -WK        Functional Assessment    Outcome Measure Options -- AM-PAC 6 Clicks Basic Mobility (PT)  -WK           User Key  (r) = Recorded By, (t) = Taken By, (c) = Cosigned By    Initials Name Provider Type    Franca Rudd, EMMA Physical Therapist Assistant    WK Arlen Cardona, EMMA Physical Therapist Assistant                 Time Calculation:    PT Charges     Row Name 02/13/23 1007             Time Calculation    Start Time 0917  -ERICA      Stop Time 0956  -ERICA      Time Calculation (min) 39 min  -ERICA         Timed Charges    10595 - PT Therapeutic Activity Minutes 39  -ERICA         Total Minutes    Timed Charges Total Minutes 39  -ERICA       Total Minutes 39  -ERICA            User Key  (r) = Recorded By, (t) = Taken By, (c) = Cosigned By    Initials Name Provider Type    Franca Rudd, EMMA Physical Therapist Assistant              Therapy  Charges for Today     Code Description Service Date Service Provider Modifiers Qty    [id number] HC PT THERAPEUTIC ACT EA 15 MIN 2/13/2023 Franca Fuchs, PTA GP 3          PT G-Codes  Outcome Measure Options: AM-PAC 6 Clicks Basic Mobility (PT)  AM-PAC 6 Clicks Score (PT): 20  AM-PAC 6 Clicks Score (OT): 23    Franca Fuchs, EMMA  2/13/2023

## 2023-02-13 NOTE — PLAN OF CARE
Goal Outcome Evaluation:   Patient is independent in bed mobility. Stands wi SBA. Walked 5' forward and back a total of 8 times with SBA. Actively worked thru LE ex's and inspiratory ex's. Stood 3 x's approx 1 minute each time. Tolerated well. On non rebreather at 15lpm and Vapo at 40LPM and 100%. SATs 96% to 86%. Will continue to benefit from strengthening activities.

## 2023-02-14 LAB
ALBUMIN SERPL-MCNC: 3.4 G/DL (ref 3.5–5.2)
ALBUMIN/GLOB SERPL: 0.9 G/DL
ALP SERPL-CCNC: 62 U/L (ref 39–117)
ALT SERPL W P-5'-P-CCNC: 17 U/L (ref 1–41)
ANION GAP SERPL CALCULATED.3IONS-SCNC: 16 MMOL/L (ref 5–15)
AST SERPL-CCNC: 15 U/L (ref 1–40)
BASOPHILS # BLD AUTO: 0.01 10*3/MM3 (ref 0–0.2)
BASOPHILS NFR BLD AUTO: 0.1 % (ref 0–1.5)
BILIRUB SERPL-MCNC: 0.5 MG/DL (ref 0–1.2)
BUN SERPL-MCNC: 77 MG/DL (ref 8–23)
BUN/CREAT SERPL: 68.1 (ref 7–25)
CALCIUM SPEC-SCNC: 9.4 MG/DL (ref 8.6–10.5)
CHLORIDE SERPL-SCNC: 92 MMOL/L (ref 98–107)
CHOLEST SERPL-MCNC: 123 MG/DL (ref 0–200)
CO2 SERPL-SCNC: 28 MMOL/L (ref 22–29)
CREAT SERPL-MCNC: 1.13 MG/DL (ref 0.76–1.27)
CRP SERPL-MCNC: 7.23 MG/DL (ref 0–0.5)
DEPRECATED RDW RBC AUTO: 43.5 FL (ref 37–54)
EGFRCR SERPLBLD CKD-EPI 2021: 62.1 ML/MIN/1.73
EOSINOPHIL # BLD AUTO: 0 10*3/MM3 (ref 0–0.4)
EOSINOPHIL NFR BLD AUTO: 0 % (ref 0.3–6.2)
ERYTHROCYTE [DISTWIDTH] IN BLOOD BY AUTOMATED COUNT: 14.1 % (ref 12.3–15.4)
GLOBULIN UR ELPH-MCNC: 3.6 GM/DL
GLUCOSE BLDC GLUCOMTR-MCNC: 195 MG/DL (ref 70–130)
GLUCOSE BLDC GLUCOMTR-MCNC: 210 MG/DL (ref 70–130)
GLUCOSE BLDC GLUCOMTR-MCNC: 226 MG/DL (ref 70–130)
GLUCOSE BLDC GLUCOMTR-MCNC: 250 MG/DL (ref 70–130)
GLUCOSE SERPL-MCNC: 210 MG/DL (ref 65–99)
HBA1C MFR BLD: 7.4 % (ref 4.8–5.6)
HCT VFR BLD AUTO: 39.5 % (ref 37.5–51)
HDLC SERPL-MCNC: 43 MG/DL (ref 40–60)
HGB BLD-MCNC: 12.5 G/DL (ref 13–17.7)
IMM GRANULOCYTES # BLD AUTO: 0.17 10*3/MM3 (ref 0–0.05)
IMM GRANULOCYTES NFR BLD AUTO: 1.6 % (ref 0–0.5)
LDLC SERPL CALC-MCNC: 69 MG/DL (ref 0–100)
LDLC/HDLC SERPL: 1.65 {RATIO}
LYMPHOCYTES # BLD AUTO: 0.53 10*3/MM3 (ref 0.7–3.1)
LYMPHOCYTES NFR BLD AUTO: 4.9 % (ref 19.6–45.3)
MCH RBC QN AUTO: 27.2 PG (ref 26.6–33)
MCHC RBC AUTO-ENTMCNC: 31.6 G/DL (ref 31.5–35.7)
MCV RBC AUTO: 85.9 FL (ref 79–97)
MONOCYTES # BLD AUTO: 0.72 10*3/MM3 (ref 0.1–0.9)
MONOCYTES NFR BLD AUTO: 6.6 % (ref 5–12)
NEUTROPHILS NFR BLD AUTO: 86.8 % (ref 42.7–76)
NEUTROPHILS NFR BLD AUTO: 9.49 10*3/MM3 (ref 1.7–7)
NRBC BLD AUTO-RTO: 0 /100 WBC (ref 0–0.2)
PLATELET # BLD AUTO: 473 10*3/MM3 (ref 140–450)
PMV BLD AUTO: 10 FL (ref 6–12)
POTASSIUM SERPL-SCNC: 3.9 MMOL/L (ref 3.5–5.2)
PROT SERPL-MCNC: 7 G/DL (ref 6–8.5)
RBC # BLD AUTO: 4.6 10*6/MM3 (ref 4.14–5.8)
SODIUM SERPL-SCNC: 136 MMOL/L (ref 136–145)
TRIGL SERPL-MCNC: 46 MG/DL (ref 0–150)
TSH SERPL DL<=0.05 MIU/L-ACNC: 0.88 UIU/ML (ref 0.27–4.2)
VLDLC SERPL-MCNC: 11 MG/DL (ref 5–40)
WBC NRBC COR # BLD: 10.92 10*3/MM3 (ref 3.4–10.8)

## 2023-02-14 PROCEDURE — 82962 GLUCOSE BLOOD TEST: CPT

## 2023-02-14 PROCEDURE — 85025 COMPLETE CBC W/AUTO DIFF WBC: CPT | Performed by: FAMILY MEDICINE

## 2023-02-14 PROCEDURE — 94799 UNLISTED PULMONARY SVC/PX: CPT

## 2023-02-14 PROCEDURE — 97530 THERAPEUTIC ACTIVITIES: CPT

## 2023-02-14 PROCEDURE — 94664 DEMO&/EVAL PT USE INHALER: CPT

## 2023-02-14 PROCEDURE — 63710000001 INSULIN LISPRO (HUMAN) PER 5 UNITS: Performed by: INTERNAL MEDICINE

## 2023-02-14 PROCEDURE — 80061 LIPID PANEL: CPT | Performed by: FAMILY MEDICINE

## 2023-02-14 PROCEDURE — 83036 HEMOGLOBIN GLYCOSYLATED A1C: CPT | Performed by: FAMILY MEDICINE

## 2023-02-14 PROCEDURE — 94761 N-INVAS EAR/PLS OXIMETRY MLT: CPT

## 2023-02-14 PROCEDURE — 99232 SBSQ HOSP IP/OBS MODERATE 35: CPT | Performed by: INTERNAL MEDICINE

## 2023-02-14 PROCEDURE — 63710000001 DEXAMETHASONE PER 0.25 MG: Performed by: FAMILY MEDICINE

## 2023-02-14 PROCEDURE — 25010000002 FUROSEMIDE PER 20 MG: Performed by: FAMILY MEDICINE

## 2023-02-14 PROCEDURE — 84443 ASSAY THYROID STIM HORMONE: CPT | Performed by: FAMILY MEDICINE

## 2023-02-14 PROCEDURE — 94660 CPAP INITIATION&MGMT: CPT

## 2023-02-14 PROCEDURE — 86140 C-REACTIVE PROTEIN: CPT | Performed by: FAMILY MEDICINE

## 2023-02-14 PROCEDURE — 80053 COMPREHEN METABOLIC PANEL: CPT | Performed by: FAMILY MEDICINE

## 2023-02-14 PROCEDURE — 63710000001 INSULIN DETEMIR PER 5 UNITS: Performed by: FAMILY MEDICINE

## 2023-02-14 RX ADMIN — LINAGLIPTIN 5 MG: 5 TABLET, FILM COATED ORAL at 08:01

## 2023-02-14 RX ADMIN — ALBUTEROL SULFATE 2 PUFF: 90 AEROSOL, METERED RESPIRATORY (INHALATION) at 14:40

## 2023-02-14 RX ADMIN — ALBUTEROL SULFATE 2 PUFF: 90 AEROSOL, METERED RESPIRATORY (INHALATION) at 07:16

## 2023-02-14 RX ADMIN — HYDROCHLOROTHIAZIDE 12.5 MG: 25 TABLET ORAL at 08:04

## 2023-02-14 RX ADMIN — BUDESONIDE AND FORMOTEROL FUMARATE DIHYDRATE 2 PUFF: 160; 4.5 AEROSOL RESPIRATORY (INHALATION) at 07:16

## 2023-02-14 RX ADMIN — ASPIRIN 81 MG: 81 TABLET, COATED ORAL at 08:03

## 2023-02-14 RX ADMIN — TERAZOSIN HYDROCHLORIDE 10 MG: 5 CAPSULE ORAL at 20:41

## 2023-02-14 RX ADMIN — PRAVASTATIN SODIUM 80 MG: 20 TABLET ORAL at 08:00

## 2023-02-14 RX ADMIN — DEXAMETHASONE 6 MG: 4 TABLET ORAL at 07:51

## 2023-02-14 RX ADMIN — DEXAMETHASONE 6 MG: 4 TABLET ORAL at 17:04

## 2023-02-14 RX ADMIN — APIXABAN 5 MG: 5 TABLET, FILM COATED ORAL at 08:03

## 2023-02-14 RX ADMIN — ACETAMINOPHEN 650 MG: 325 TABLET, FILM COATED ORAL at 11:12

## 2023-02-14 RX ADMIN — BUDESONIDE AND FORMOTEROL FUMARATE DIHYDRATE 2 PUFF: 160; 4.5 AEROSOL RESPIRATORY (INHALATION) at 19:39

## 2023-02-14 RX ADMIN — FERROUS SULFATE TAB 325 MG (65 MG ELEMENTAL FE) 325 MG: 325 (65 FE) TAB at 07:51

## 2023-02-14 RX ADMIN — INSULIN LISPRO 4 UNITS: 100 INJECTION, SOLUTION INTRAVENOUS; SUBCUTANEOUS at 20:54

## 2023-02-14 RX ADMIN — Medication 3 MG: at 20:41

## 2023-02-14 RX ADMIN — ISOSORBIDE MONONITRATE 30 MG: 30 TABLET, EXTENDED RELEASE ORAL at 08:03

## 2023-02-14 RX ADMIN — INSULIN DETEMIR 5 UNITS: 100 INJECTION, SOLUTION SUBCUTANEOUS at 08:46

## 2023-02-14 RX ADMIN — OXYCODONE HYDROCHLORIDE AND ACETAMINOPHEN 500 MG: 500 TABLET ORAL at 08:03

## 2023-02-14 RX ADMIN — PANTOPRAZOLE SODIUM 40 MG: 40 TABLET, DELAYED RELEASE ORAL at 05:42

## 2023-02-14 RX ADMIN — CHLORHEXIDINE GLUCONATE 1 APPLICATION: 500 CLOTH TOPICAL at 03:33

## 2023-02-14 RX ADMIN — INSULIN LISPRO 8 UNITS: 100 INJECTION, SOLUTION INTRAVENOUS; SUBCUTANEOUS at 08:22

## 2023-02-14 RX ADMIN — GUAIFENESIN 600 MG: 600 TABLET, EXTENDED RELEASE ORAL at 08:05

## 2023-02-14 RX ADMIN — METOPROLOL TARTRATE 12.5 MG: 25 TABLET, FILM COATED ORAL at 20:41

## 2023-02-14 RX ADMIN — INSULIN DETEMIR 10 UNITS: 100 INJECTION, SOLUTION SUBCUTANEOUS at 20:41

## 2023-02-14 RX ADMIN — FINASTERIDE 5 MG: 5 TABLET, FILM COATED ORAL at 08:04

## 2023-02-14 RX ADMIN — INSULIN LISPRO 8 UNITS: 100 INJECTION, SOLUTION INTRAVENOUS; SUBCUTANEOUS at 17:04

## 2023-02-14 RX ADMIN — FUROSEMIDE 40 MG: 10 INJECTION, SOLUTION INTRAMUSCULAR; INTRAVENOUS at 08:02

## 2023-02-14 RX ADMIN — Medication 1 APPLICATION: at 20:41

## 2023-02-14 RX ADMIN — ALBUTEROL SULFATE 2 PUFF: 90 AEROSOL, METERED RESPIRATORY (INHALATION) at 11:32

## 2023-02-14 RX ADMIN — APIXABAN 5 MG: 5 TABLET, FILM COATED ORAL at 20:41

## 2023-02-14 RX ADMIN — INSULIN LISPRO 12 UNITS: 100 INJECTION, SOLUTION INTRAVENOUS; SUBCUTANEOUS at 12:16

## 2023-02-14 RX ADMIN — METOPROLOL TARTRATE 12.5 MG: 25 TABLET, FILM COATED ORAL at 08:03

## 2023-02-14 RX ADMIN — FUROSEMIDE 40 MG: 10 INJECTION, SOLUTION INTRAMUSCULAR; INTRAVENOUS at 17:04

## 2023-02-14 RX ADMIN — Medication 1 APPLICATION: at 08:01

## 2023-02-14 RX ADMIN — ALBUTEROL SULFATE 2 PUFF: 90 AEROSOL, METERED RESPIRATORY (INHALATION) at 19:39

## 2023-02-14 NOTE — PROGRESS NOTES
[facility] [place]ICAL [place]ARE [place]GRESS [place] - [facility]    Patient: Gonzalo Durham  1934   MR# [id number]   Acct# [id number]  02/14/23   07:22 CST  Referring Provider: Robert Dunn MD    Chief Complaint: Covid-19     Interval history: He remains in COVID isolation.  Afebrile.  Saturation 91 on 40 L plus FiO2 0.95+ nonrebreather.  Intermittently using noninvasive ventilation.  Working some with therapy.  Last x-ray 2-10.  CRP today trending down.  White count normal.  Oral intake adequate.    Meds:  albuterol sulfate HFA, 2 puff, Inhalation, 4x Daily - RT  apixaban, 5 mg, Oral, BID  ascorbic acid, 500 mg, Oral, Daily  aspirin, 81 mg, Oral, Daily  budesonide-formoterol, 2 puff, Inhalation, BID - RT  Chlorhexidine Gluconate Cloth, 1 application, Topical, Q24H  dexamethasone, 6 mg, Oral, BID With Meals  ferrous sulfate, 325 mg, Oral, Daily With Breakfast  finasteride, 5 mg, Oral, Daily  furosemide, 40 mg, Intravenous, BID  guaiFENesin, 600 mg, Oral, Daily  hydroCHLOROthiazide, 12.5 mg, Oral, Daily  insulin detemir, 5 Units, Subcutaneous, Q12H  insulin lispro, 0-24 Units, Subcutaneous, 4x Daily With Meals & Nightly  isosorbide mononitrate, 30 mg, Oral, Daily  linagliptin, 5 mg, Oral, Daily  metoprolol tartrate, 12.5 mg, Oral, BID  mupirocin, 1 application, Each Nare, BID  pantoprazole, 40 mg, Oral, Q AM  pravastatin, 80 mg, Oral, Daily  terazosin, 10 mg, Oral, Nightly         Physical Exam:  SpO2 Percentage    02/14/23 0400 02/14/23 0710 02/14/23 0719   SpO2: 97% 94% 91%     Temp:  [96.2 °F (35.7 °C)-97.6 °F (36.4 °C)] 97.6 °F (36.4 °C)  Heart Rate:  [] 74  Resp:  [17-27] 23  BP: ()/(36-80) 112/62    Intake/Output Summary (Last 24 hours) at 2/14/2023 0722  Last data filed at 2/14/2023 0200  Gross per 24 hour   Intake 900 ml   Output 1675 ml   Net -775 ml     Body mass index is 26.64 kg/m².   Physical Exam   Vitals and nursing note reviewed.   Constitutional:       Comments:  Vapotherm  HENT:      Head: Normocephalic.  Glasses     Nose: Nose normal.   Cardiovascular:      Rate and Rhythm: Normal rate. Rhythm irregular.   Pulmonary:      Effort: No respiratory distress.   Musculoskeletal:      Cervical back: Normal range of motion.      Comments: No visible edema    Skin:     Coloration: Skin is not pale.   Neurological:      Mental Status: He is alert and oriented.  Waving/eating breakfast  Psychiatric:         Behavior: Behavior normal.     Electronically signed by DOMINIQUE Devine, 2/14/2023, 07:22 CST      Physician substantive portion: medical decision making    Result Review    Laboratory Data:  Results from last 7 days   Lab Units 02/14/23  0621   WBC 10*3/mm3 10.92*   HEMOGLOBIN g/dL 12.5*   PLATELETS 10*3/mm3 473*     Results from last 7 days   Lab Units 02/14/23  0645 02/13/23  0230 02/12/23  0226 02/08/23  0447 02/07/23  2232   SODIUM mmol/L 136 136 136   < >  --    POTASSIUM mmol/L 3.9 3.9 3.8   < >  --    CO2 mmol/L 28.0 31.0* 29.0   < >  --    BUN mg/dL 77* 70* 56*   < >  --    CREATININE mg/dL 1.13 1.17 0.99   < >  --    LACTATE mmol/L  --   --   --   --  1.3   CRP mg/dL 7.23*  --   --    < >  --     < > = values in this interval not displayed.     Results from last 7 days   Lab Units 02/10/23  0028   PH, ARTERIAL pH units 7.460*  7.460*  7.460*  7.460*   PCO2, ARTERIAL mm Hg 41.6  41.6  41.6  41.6   PO2 ART mm Hg 45.0*  45.0*  45.0*  45.0*   FIO2 % 100  100  100  100     Microbiology Results (last 10 days)     Procedure Component Value - Date/Time    Blood Culture - Blood, Hand, Right [[id number]]  (Normal) Collected: 02/07/23 1909    Lab Status: Final result Specimen: Blood from Hand, Right Updated: 02/12/23 1945     Blood Culture No growth at 5 days    Blood Culture - Blood, Arm, Left [[id number]]  (Normal) Collected: 02/07/23 1909    Lab Status: Final result Specimen: Blood from Arm, Left Updated: 02/12/23 1945     Blood Culture No growth at 5 days     COVID-19 and FLU A/B PCR - Swab, Nasopharynx [[id number]]  (Abnormal) Collected: 02/07/23 0928    Lab Status: Final result Specimen: Swab from Nasopharynx Updated: 02/07/23 1010     COVID19 Detected     Influenza A PCR Not Detected     Influenza B PCR Not Detected    Narrative:      Fact sheet for providers: https://www.fda.gov/media/311922/download    Fact sheet for patients: https://www.fda.gov/media/900451/download    Test performed by PCR.  Influenza A and Influenza B negative results should be considered presumptive in samples that have a positive SARS-CoV-2 result.    Competitive inhibition studies showed that SARS-CoV-2 virus, when present at concentrations above 3.6E+04 copies/mL, can inhibit the detection and amplification of influenza A and influenza B virus RNA if present at or below 1.8E+02 copies/mL or 4.9E+02 copies/mL, respectively, and may lead to false negative influenza virus results. If co-infection with influenza A or influenza B virus is suspected in samples with a positive SARS-CoV-2 result, the sample should be re-tested with another FDA cleared, approved, or authorized influenza test, if influenza virus detection would change clinical management.         Recent films:  No radiology results from the last 24 hrs       Pulmonary Assessment:  1. Acute resp failure with hypoxia  2. Covid    Recommend/plan:   · Complete dexamethasone 10 day course, recommend 6 mg per day  · Continue oxygen, high flow    This visit was performed by both a physician and an [facility] RN.  I personally evaluated and examined the patient.  I performed all aspects of the medical decision making as documented.  Electronically signed by Parrish Cardona MD, 2/14/2023, 10:06 CST

## 2023-02-14 NOTE — THERAPY TREATMENT NOTE
Acute Care - Physical Therapy Treatment Note  [facility]     Patient Name: Gonzalo Durham  : 1934  MRN: [id number]  Today's Date: 2023      Visit Dx:     ICD-10-CM ICD-9-CM   1. COVID-19  U07.1 079.89   2. Impaired mobility  Z74.09 799.89   3. Decreased activities of daily living (ADL)  Z78.9 V49.89     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, without long-term current use of insulin ([facility])   • Stage 3a chronic kidney disease (HCC)   • Erectile dysfunction   • Coronary artery disease involving native coronary artery of native heart without angina pectoris   • Hyperlipidemia LDL goal <70-statin   • Hypertension   • Prostatism-(young) urology   • Tremor   • Varicose vein of leg   • Plantar fasciitis   • Nocturnal leg movements   • Bad dreams   • Depression   • Peripheral neuropathy- clinical   • Hx of colonic polyps   • Gastroesophageal reflux disease   • Left lower quadrant pain   • Laboratory test*   • Anticoagulated-CAD, DM2, CVA/ASA 81+()+plavix » Anticoagulated-CAD, DM2, CVA/ASA 81+()+plavix+eliquist   • SOB: copd,CAD,#, hypoxemia   • Hypoxia#to pulmonary   • Corn or callus   • Anemia: ASA81,plavix,eliquis,gi(3/3+,div,cp,eitis   • Atherosclerosis: CAD, AAA vascular   • AAA: -(ro) steffen   • Heme positive stool   • Stage 2 moderate COPD by GOLD classification ([facility])   • Abnormal stress test   • Tingling of both feet-to consider NCV   • Cryptogenic stroke (HCC)   • Chronic diastolic congestive heart failure (HCC)   • Gross hematuria   • BPH with obstruction/lower urinary tract symptoms   • Chest pain   • Obstructive sleep apnea   • Abnormal CT of the chest ./done   • 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)   • Oxygen dependent   • S/P CABG x 3   • Pulmonary hypertension (HCC)   • PAF (paroxysmal atrial fibrillation) (HCC)   • Umbilical hernia-a waqar   • COVID-19      Past Medical History:   Diagnosis Date   • A-fib (HCC)    • AAA (abdominal aortic aneurysm) without rupture    • Arthritis    • BPH (benign prostatic hypertrophy)    • Cataract     bialteral   • CKD (chronic kidney disease)    • COPD (chronic obstructive pulmonary disease) ([facility])    • Coronary artery disease involving native coronary artery of native heart without angina pectoris 1/20/2017    CABG/Az/[place]/1981 No TCC echo  7.16.18 Right ventricular cavity is mild-to-moderately dilated. Left ventricular diastolic dysfunction (grade I) consistent with impaired relaxation. Left ventricular systolic function is normal. Left atrial cavity size is borderline dilated. RIGHT HEART ENLARGEMENT - RVSP NOT ASSESSABLE NORMAL LV AND RV SYSTOLIC FUNCTION NO SIGNIFICANT VALVULAR DYSFUNCTION  Seein   • Diabetes mellitus ([facility])     Type 2   • DM (diabetes mellitus screen)    • GERD (gastroesophageal reflux disease)    • History of loop recorder     at current time   • Hx of colonic polyp    • Hypercholesteremia    • Hypertension    • Macular degeneration     treated with injections in right eye   • Myocardial infarction ([facility])    • Neuropathy    • Oxygen deficit     at 4liters   • Prostate cancer ([facility])    • Pulmonary hypertension ([facility]) 1/7/2022   • S/P CABG x 3 1/7/2022 1980 [facility]   • Sleep apnea     cpap   • Stage 3a chronic kidney disease (HCC) 1/20/2017   • Stroke ([facility])     recovererd   • Varicose vein of leg     bialteral     Past Surgical History:   Procedure Laterality Date   • APPENDECTOMY     • CARDIAC CATHETERIZATION     • CARDIAC CATHETERIZATION Left 5/22/2019    Procedure: Cardiac Catheterization/Vascular Study Positive occult blood in stools  ? BMS vs REGGIE Get cabg report  ;  Surgeon: Bradley Carver MD;  Location: [facility] CATH INVASIVE LOCATION;  Service: Cardiology   • COLONOSCOPY N/A 6/15/2017    Diverticulosis repeat exam prn   • COLONOSCOPY W/ POLYPECTOMY  10/21/2013    2 Tubular adenomatous polyps,  Diverticulosis repeat exam in 5 years   • CORONARY ARTERY BYPASS GRAFT  1980    X 3   • CYSTOSCOPY RETROGRADE PYELOGRAM Bilateral 2/8/2021    Procedure: CYSTOSCOPY RETROGRADE PYELOGRAM;  Surgeon: Danie Cadena MD;  Location:  [facility] OR;  Service: Urology;  Laterality: Bilateral;   • ENDOSCOPY N/A 6/15/2017    LA Grade A reflux esophagitis   • EYE SURGERY Bilateral    • HERNIA REPAIR     • TRANSURETHRAL RESECTION OF BLADDER TUMOR N/A 2/8/2021    Procedure: CYSTOSCOPY TRANSURETHRAL RESECTION OF BLADDER TUMOR WITH GEMCITABINE INSTILLATION;  Surgeon: Danie Cadena MD;  Location:  [facility] OR;  Service: Urology;  Laterality: N/A;     PT Assessment (last 12 hours)     PT Evaluation and Treatment     Row Name 02/14/23 1345          Physical Therapy Time and Intention    Subjective Information complains of;dizziness  -     Document Type therapy note (daily note)  -     Mode of Treatment physical therapy  -     Row Name 02/14/23 1345          General Information    Existing Precautions/Restrictions fall;oxygen therapy device and L/min  -     Limitations/Impairments hearing  -     Row Name 02/14/23 1345          Pain    Posttreatment Pain Rating 0/10 - no pain  -     Row Name 02/14/23 1345          Sit-Stand Transfer    Sit-Stand [place] (Transfers) standby assist  -     Row Name 02/14/23 1345          Stand-Sit Transfer    Stand-Sit [place] (Transfers) standby assist  -     Row Name 02/14/23 1345          Gait/Stairs (Locomotion)    [place] Level (Gait) contact guard  -     Distance in Feet (Gait) Marched in place 30 steps bilaterally x2.  -     Comment, (Gait/Stairs) Did not walk 2nd to dizziness  -     Row Name 02/14/23 1345          Shoulder (Therapeutic Exercise)    Shoulder AROM (Therapeutic Exercise) bilateral;scapular elevation;scapular retraction  along with breathing instruction  -     Row Name 02/14/23 1345          Knee (Therapeutic Exercise)    Knee AROM (Therapeutic  Exercise) bilateral;LAQ (long arc quad)  -     Row Name 02/14/23 1345          Ankle (Therapeutic Exercise)    Ankle AROM (Therapeutic Exercise) bilateral;dorsiflexion;plantarflexion  -     Row Name             Wound 02/14/23 1048 coccyx    Wound - Properties Group Placement Date: 02/14/23  -MW Placement Time: 1048  -MW Location: coccyx  -MW    Retired Wound - Properties Group Placement Date: 02/14/23  -MW Placement Time: 1048  -MW Location: coccyx  -MW    Retired Wound - Properties Group Date first assessed: 02/14/23  -MW Time first assessed: 1048  -MW Location: coccyx  -MW    Row Name 02/14/23 1345          Vital Signs    Pre SpO2 (%) 96  -ERICA     O2 Delivery Pre Treatment --  vapo 40lpm/ 95%.  -ERICA     Intra SpO2 (%) 86  -ERICA     Post SpO2 (%) 94  -ERICA     Row Name 02/14/23 1345          Positioning and Restraints    Pre-Treatment Position sitting in chair/recliner  -ERICA     Post Treatment Position chair  -ERICA     In Bed notified nsg;sitting;call light within reach  -ERICA           User Key  (r) = Recorded By, (t) = Taken By, (c) = Cosigned By    Initials Name Provider Type    ERICA Franca Fuchs PTA Physical Therapist Assistant    Elen Reyna, RN Registered Nurse                Physical Therapy Education     Title: PT OT SLP Therapies (In Progress)     Topic: Physical Therapy (In Progress)     Point: Mobility training (Done)     Learning Progress Summary           Patient Acceptance, E,D, DU,VU by ERICA at 2/14/2023 1426    Comment: Safety with transfers    Acceptance, E, VU,DU,NR by CHEYENNE at 2/8/2023 0905    Comment: benefits of acitvity, progression of PT                   Point: Home exercise program (Done)     Learning Progress Summary           Patient Acceptance, E,D, DU,VU by ERICA at 2/14/2023 1426    Comment: Safety with transfers    Acceptance, E,D, VU,DU by ERICA at 2/13/2023 1002    Comment: inspiratory exercises    Acceptance, E,D, DU by ERICA at 2/9/2023 0903    Comment: breathing exercises                    Point: Body mechanics (Not Started)     Learner Progress:  Not documented in this visit.          Point: Precautions (Not Started)     Learner Progress:  Not documented in this visit.                      User Key     Initials Effective Dates Name Provider Type Discipline    CHEYENNE 02/03/23 -  Rafi Browning, PT DPT Physical Therapist PT     02/03/23 -  Franca Fuchs PTA Physical Therapist Assistant PT              PT Recommendation and Plan         Outcome Measures     Row Name 02/14/23 1400 02/13/23 1000          How much help from another person do you currently need...    Turning from your back to your side while in flat bed without using bedrails? 4  -ERICA 4  -ERICA     Moving from lying on back to sitting on the side of a flat bed without bedrails? 4  -ERICA 4  -ERICA     Moving to and from a bed to a chair (including a wheelchair)? 3  -ERICA 3  -ERICA     Standing up from a chair using your arms (e.g., wheelchair, bedside chair)? 3  -ERICA 3  -ERICA     Climbing 3-5 steps with a railing? 3  -ERICA 3  -ERICA     To walk in hospital room? 3  -ERICA 3  -ERICA     AM-PAC 6 Clicks Score (PT) 20  -ERICA 20  -ERICA           User Key  (r) = Recorded By, (t) = Taken By, (c) = Cosigned By    Initials Name Provider Type    Franca Rudd PTA Physical Therapist Assistant                 Time Calculation:    PT Charges     Row Name 02/14/23 1429             Time Calculation    Start Time 1345  -ERICA      Stop Time 1410  -ERICA      Time Calculation (min) 25 min  -ERICA         Timed Charges    17789 - PT Therapeutic Activity Minutes 25  -ERICA         Total Minutes    Timed Charges Total Minutes 25  -ERICA       Total Minutes 25  -ERICA            User Key  (r) = Recorded By, (t) = Taken By, (c) = Cosigned By    Initials Name Provider Type    Franca Rudd PTA Physical Therapist Assistant              Therapy Charges for Today     Code Description Service Date Service Provider Modifiers Qty    09521569425 HC PT THERAPEUTIC ACT EA 15 MIN 2/13/2023 Shila  Franca MAXWELL, PTA GP 3    70217127574 HC PT THERAPEUTIC ACT EA 15 MIN 2/13/2023 Franca Fuchs, PTA GP 2    11927484052 HC PT THERAPEUTIC ACT EA 15 MIN 2/14/2023 Franca Fuchs, PTA GP 2          PT G-Codes  Outcome Measure Options: AM-PAC 6 Clicks Basic Mobility (PT)  AM-PAC 6 Clicks Score (PT): 20  AM-PAC 6 Clicks Score (OT): 23    Franca Fuchs, EMMA  2/14/2023

## 2023-02-14 NOTE — PLAN OF CARE
Goal Outcome Evaluation:  Plan of Care Reviewed With: other (see comments) (RN)        Progress: improving  Outcome Evaluation: RD assessment complted. Nursing reports pt consuming % with most meals. Boost Glucose Control BID. Cont to follow for plan of care.

## 2023-02-14 NOTE — PROGRESS NOTES
[facility] Medicine Services  INPATIENT PROGRESS NOTE    Patient Name: Gonzalo Durham  Date of Admission: 2/7/2023  Today's Date: 02/14/23  Length of Stay: 7  Primary Care Physician: Abel Romero MD    Subjective   Chief Complaint: Respiratory failure.  HPI   Patient is on Vapotherm 40/95.  Blood pressure still remain the low side.  Patient is afebrile.  Glucose remain high, increase Levemir.  Tradjenta was started yesterday.  Leukocytosis improving.  C-reactive protein is also improving.  Afebrile, blood pressures on low side.    Review of Systems   Constitutional: Positive for activity change, appetite change and fatigue. Negative for chills and fever.   HENT: Negative for hearing loss, nosebleeds, tinnitus and trouble swallowing.    Eyes: Negative for visual disturbance.   Respiratory: Positive for shortness of breath. Negative for cough, chest tightness and wheezing.    Cardiovascular: Negative for chest pain, palpitations and leg swelling.   Gastrointestinal: Negative for abdominal distention, abdominal pain, blood in stool, constipation, diarrhea, nausea and vomiting.   Endocrine: Negative for cold intolerance, heat intolerance, polydipsia, polyphagia and polyuria.   Genitourinary: Negative for decreased urine volume, difficulty urinating, dysuria, flank pain, frequency and hematuria.   Musculoskeletal: Positive for arthralgias, gait problem and myalgias. Negative for joint swelling.   Skin: Negative for rash.   Allergic/Immunologic: Negative for immunocompromised state.   Neurological: Positive for weakness. Negative for dizziness, syncope, light-headedness and headaches.   Hematological: Negative for adenopathy. Does not bruise/bleed easily.   Psychiatric/Behavioral: Negative for confusion and sleep disturbance. The patient is not nervous/anxious.    All pertinent negatives and positives are as above. All other systems have been reviewed and are negative unless  otherwise stated.     Objective    Temp:  [96.6 °F (35.9 °C)-97.6 °F (36.4 °C)] 96.6 °F (35.9 °C)  Heart Rate:  [] 100  Resp:  [17-27] 22  BP: ()/(48-97) 81/61  Physical Exam  Vitals and nursing note reviewed.   Constitutional:       General: He is not in acute distress.     Appearance: He is not toxic-appearing.      Comments: Advanced age.  Chronically ill.  Hard of hearing.   HENT:      Head: Normocephalic.      Mouth/Throat:      Mouth: Mucous membranes are moist.      Pharynx: Oropharynx is clear.   Eyes:      Extraocular Movements: Extraocular movements intact.      Conjunctiva/sclera: Conjunctivae normal.      Pupils: Pupils are equal, round, and reactive to light.   Cardiovascular:      Rate and Rhythm: Normal rate and regular rhythm.      Pulses: Normal pulses.      Heart sounds: No murmur heard.  Pulmonary:      Effort: No respiratory distress.      Breath sounds: No wheezing or rhonchi.      Comments: On Vapotherm.  Diminished breath sound bilateral, clear .  Abdominal:      General: Bowel sounds are normal. There is no distension.      Palpations: Abdomen is soft.      Tenderness: There is no abdominal tenderness.   Musculoskeletal:         General: No swelling or tenderness. Normal range of motion.      Cervical back: Normal range of motion and neck supple. No muscular tenderness.   Skin:     General: Skin is warm and dry.      Capillary Refill: Capillary refill takes 2 to 3 seconds.      Findings: No erythema or rash.   Neurological:      General: No focal deficit present.      Mental Status: He is alert and oriented to person, place, and time.      Cranial Nerves: No cranial nerve deficit.      Motor: Weakness present.   Psychiatric:         Mood and Affect: Mood normal.         Behavior: Behavior normal.       Results Review:  I have reviewed the labs, radiology results, and diagnostic studies.    Laboratory Data:   Results from last 7 days   Lab Units 02/14/23  0621   WBC 10*3/mm3 10.92*    HEMOGLOBIN g/dL 12.5*   HEMATOCRIT % 39.5   PLATELETS 10*3/mm3 473*        Results from last 7 days   Lab Units 02/14/23  0645 02/13/23  0230 02/12/23  0226   SODIUM mmol/L 136 136 136   POTASSIUM mmol/L 3.9 3.9 3.8   CHLORIDE mmol/L 92* 94* 94*   CO2 mmol/L 28.0 31.0* 29.0   BUN mg/dL 77* 70* 56*   CREATININE mg/dL 1.13 1.17 0.99   CALCIUM mg/dL 9.4 9.3 9.2   BILIRUBIN mg/dL 0.5 0.4 0.5   ALK PHOS U/L 62 61 59   ALT (SGPT) U/L 17 18 21   AST (SGOT) U/L 15 13 15   GLUCOSE mg/dL 210* 187* 236*       Culture Data:   Blood Culture   Date Value Ref Range Status   02/07/2023 No growth at 5 days  Final   02/07/2023 No growth at 5 days  Final       Radiology Data:   Imaging Results (Last 24 Hours)     ** No results found for the last 24 hours. **          I have reviewed the patient's current medications.     Assessment/Plan   Assessment  Active Hospital Problems    Diagnosis    • **COVID-19    • S/P CABG x 3    • PAF (paroxysmal atrial fibrillation) ([facility])    • Pulmonary hypertension ([facility])    • Obstructive sleep apnea    • Chronic diastolic congestive heart failure ([facility])    • Stage 2 moderate COPD by GOLD classification ([facility])    • Gastroesophageal reflux disease    • Stage 3a chronic kidney disease ([facility])        Treatment Plan  Covid pneumonia/COPD exacerbation.  Patient had been vaccinated with Moderna x2 in the past. patient is on chronic 4 L oxygen at home. Patient finished remdesivir.  Patient completed Decadron 10 days course, continue Decadron per pulmonary..  Pulmonary consult.  Albuterol inhaler.  Rocephin antibiotics.  Vitamin C.  Symbicort.  Mucinex.  Leukocytosis improving.  C-reactive protein is improving.  Chest x-ray-Persistent and unchanged chronic inflammatory and obstructive lung changes bilaterally.  Vapotherm 40/95 . Nonrebreather mask when off CPAP.  CPAP overnight.     Atrial fibrillation/CAD.  Eliquis.  Metoprolol.  Aspirin . Pravastatin.  Imdur.  Hydrochlorothiazide.  IV Lasix.  Nitro as  needed.  Echocardiogram 10/26/2022- ejection fraction 56 - 60%, tricuspid regurgitation.     Diabetes.  Hold metformin.  High sliding scale.  Increase Levemir.    Continue Tradjenta.  Hemoglobin A1c 7.4.     Reflux.  Protonix.  Zofran as needed     Benign prostate hypertrophy.  Hytrin . Proscar.     Insomnia.  Melatonin at night as needed.     Anemia.  Hemoglobin increasing. Iron sulfate.     Nutrition . diabetic/consistent carb/regular texture diet.     Deconditioning.  PT consult.     Blood culture-no growth 5 days.  COVID-19-positive.  Flu screen- positive    Patient wants to go home to see his wife.  His wife in hospice due to throat cancer.     Medical Decision Making  Number and Complexity of problems: 10 problems, high complexity:  Covid-19 pneumonia and respiratory failure represent threat to life     Risk:  High:  Requiring high flow oxygen, high risk of morbidity/mortality, requiring ICU level monitoring     Differential Diagnosis: Pneumonia, Viral pneumonia, COPD, CHF     Conditions and Status        Condition is worsening.     MDM Data  Cardiac tracing (EKG, telemetry) interpretation: NSR  Labs reviewed: Normal renal function     Discussed with: patient, nursing     Care Planning  Shared decision making: Patient agreeable to treatment plan  Code status and discussions: full code     Disposition  Social Determinants of Health that impact treatment or disposition: n/a  D/c planning uncertain    Electronically signed by Robert Dunn MD, 02/14/23, 10:32 CST.

## 2023-02-14 NOTE — PROGRESS NOTES
RT EQUIPMENT DEVICE RELATED - SKIN ASSESSMENT    RT Medical Equipment/Device:     High Flow Nasal Cannula:   Vapotherm    Skin Assessment:      Nose:  Intact    Device Skin Pressure Protection:  Skin-to-device areas padded:  Protecta-Gel nasal gel pad with Bipap

## 2023-02-14 NOTE — PLAN OF CARE
Goal Outcome Evaluation:   Patient wearing Vapotherm 40L/100% with NRM 15 lpm. Patient wears Bipap intermittently and at night.  1. Improve oxygenation and ventilation

## 2023-02-14 NOTE — PLAN OF CARE
Goal Outcome Evaluation:      Patient stands with SBA. Did not actually walk. C/O dizziness. Marched in place taking good high steps. 2 x 30 steps bilaterally. Worked thru inspiratory ex's. SATs on vapotherm at 40 lpm and 95% from 96-86%. Very motivated. Beginning to get wore out from day to day stress but handling well.

## 2023-02-14 NOTE — PLAN OF CARE
Goal Outcome Evaluation:      Pt remained in stable afib throughout the shift, pt remained afebrile, did not wear cpap for shift, A&OX4, pulses are good, pt voided 825ml for the shift. VSS will continue to monitor.

## 2023-02-15 LAB
ANION GAP SERPL CALCULATED.3IONS-SCNC: 12 MMOL/L (ref 5–15)
BUN SERPL-MCNC: 82 MG/DL (ref 8–23)
BUN/CREAT SERPL: 68.3 (ref 7–25)
CALCIUM SPEC-SCNC: 8.6 MG/DL (ref 8.6–10.5)
CHLORIDE SERPL-SCNC: 93 MMOL/L (ref 98–107)
CO2 SERPL-SCNC: 31 MMOL/L (ref 22–29)
CREAT SERPL-MCNC: 1.2 MG/DL (ref 0.76–1.27)
CRP SERPL-MCNC: 3.84 MG/DL (ref 0–0.5)
DEPRECATED RDW RBC AUTO: 43.8 FL (ref 37–54)
EGFRCR SERPLBLD CKD-EPI 2021: 57.8 ML/MIN/1.73
ERYTHROCYTE [DISTWIDTH] IN BLOOD BY AUTOMATED COUNT: 14 % (ref 12.3–15.4)
GLUCOSE BLDC GLUCOMTR-MCNC: 152 MG/DL (ref 70–130)
GLUCOSE BLDC GLUCOMTR-MCNC: 162 MG/DL (ref 70–130)
GLUCOSE BLDC GLUCOMTR-MCNC: 180 MG/DL (ref 70–130)
GLUCOSE BLDC GLUCOMTR-MCNC: 209 MG/DL (ref 70–130)
GLUCOSE BLDC GLUCOMTR-MCNC: 269 MG/DL (ref 70–130)
GLUCOSE SERPL-MCNC: 176 MG/DL (ref 65–99)
HCT VFR BLD AUTO: 39.4 % (ref 37.5–51)
HGB BLD-MCNC: 12.4 G/DL (ref 13–17.7)
MCH RBC QN AUTO: 27.1 PG (ref 26.6–33)
MCHC RBC AUTO-ENTMCNC: 31.5 G/DL (ref 31.5–35.7)
MCV RBC AUTO: 86 FL (ref 79–97)
PLATELET # BLD AUTO: 446 10*3/MM3 (ref 140–450)
PMV BLD AUTO: 10.2 FL (ref 6–12)
POTASSIUM SERPL-SCNC: 4 MMOL/L (ref 3.5–5.2)
RBC # BLD AUTO: 4.58 10*6/MM3 (ref 4.14–5.8)
SODIUM SERPL-SCNC: 136 MMOL/L (ref 136–145)
WBC NRBC COR # BLD: 12.78 10*3/MM3 (ref 3.4–10.8)

## 2023-02-15 PROCEDURE — 97530 THERAPEUTIC ACTIVITIES: CPT

## 2023-02-15 PROCEDURE — 94664 DEMO&/EVAL PT USE INHALER: CPT

## 2023-02-15 PROCEDURE — 85027 COMPLETE CBC AUTOMATED: CPT | Performed by: FAMILY MEDICINE

## 2023-02-15 PROCEDURE — 94799 UNLISTED PULMONARY SVC/PX: CPT

## 2023-02-15 PROCEDURE — 99232 SBSQ HOSP IP/OBS MODERATE 35: CPT | Performed by: INTERNAL MEDICINE

## 2023-02-15 PROCEDURE — 86140 C-REACTIVE PROTEIN: CPT | Performed by: FAMILY MEDICINE

## 2023-02-15 PROCEDURE — 63710000001 INSULIN LISPRO (HUMAN) PER 5 UNITS: Performed by: INTERNAL MEDICINE

## 2023-02-15 PROCEDURE — 63710000001 INSULIN DETEMIR PER 5 UNITS: Performed by: FAMILY MEDICINE

## 2023-02-15 PROCEDURE — 25010000002 FUROSEMIDE PER 20 MG: Performed by: FAMILY MEDICINE

## 2023-02-15 PROCEDURE — 82962 GLUCOSE BLOOD TEST: CPT

## 2023-02-15 PROCEDURE — 80048 BASIC METABOLIC PNL TOTAL CA: CPT | Performed by: FAMILY MEDICINE

## 2023-02-15 PROCEDURE — 63710000001 DEXAMETHASONE PER 0.25 MG: Performed by: FAMILY MEDICINE

## 2023-02-15 RX ADMIN — METOPROLOL TARTRATE 12.5 MG: 25 TABLET, FILM COATED ORAL at 20:12

## 2023-02-15 RX ADMIN — INSULIN DETEMIR 10 UNITS: 100 INJECTION, SOLUTION SUBCUTANEOUS at 20:12

## 2023-02-15 RX ADMIN — FINASTERIDE 5 MG: 5 TABLET, FILM COATED ORAL at 08:14

## 2023-02-15 RX ADMIN — INSULIN LISPRO 12 UNITS: 100 INJECTION, SOLUTION INTRAVENOUS; SUBCUTANEOUS at 20:22

## 2023-02-15 RX ADMIN — GUAIFENESIN 600 MG: 600 TABLET, EXTENDED RELEASE ORAL at 08:04

## 2023-02-15 RX ADMIN — PRAVASTATIN SODIUM 80 MG: 20 TABLET ORAL at 08:05

## 2023-02-15 RX ADMIN — CHLORHEXIDINE GLUCONATE 1 APPLICATION: 500 CLOTH TOPICAL at 04:34

## 2023-02-15 RX ADMIN — APIXABAN 5 MG: 5 TABLET, FILM COATED ORAL at 08:04

## 2023-02-15 RX ADMIN — BUDESONIDE AND FORMOTEROL FUMARATE DIHYDRATE 2 PUFF: 160; 4.5 AEROSOL RESPIRATORY (INHALATION) at 06:40

## 2023-02-15 RX ADMIN — DEXAMETHASONE 6 MG: 4 TABLET ORAL at 08:04

## 2023-02-15 RX ADMIN — METOPROLOL TARTRATE 12.5 MG: 25 TABLET, FILM COATED ORAL at 08:06

## 2023-02-15 RX ADMIN — HYDROCHLOROTHIAZIDE 12.5 MG: 25 TABLET ORAL at 08:06

## 2023-02-15 RX ADMIN — INSULIN LISPRO 4 UNITS: 100 INJECTION, SOLUTION INTRAVENOUS; SUBCUTANEOUS at 12:15

## 2023-02-15 RX ADMIN — ALBUTEROL SULFATE 2 PUFF: 90 AEROSOL, METERED RESPIRATORY (INHALATION) at 10:31

## 2023-02-15 RX ADMIN — ASPIRIN 81 MG: 81 TABLET, COATED ORAL at 08:04

## 2023-02-15 RX ADMIN — LINAGLIPTIN 5 MG: 5 TABLET, FILM COATED ORAL at 08:05

## 2023-02-15 RX ADMIN — ALBUTEROL SULFATE 2 PUFF: 90 AEROSOL, METERED RESPIRATORY (INHALATION) at 18:55

## 2023-02-15 RX ADMIN — FERROUS SULFATE TAB 325 MG (65 MG ELEMENTAL FE) 325 MG: 325 (65 FE) TAB at 08:04

## 2023-02-15 RX ADMIN — BUDESONIDE AND FORMOTEROL FUMARATE DIHYDRATE 2 PUFF: 160; 4.5 AEROSOL RESPIRATORY (INHALATION) at 18:55

## 2023-02-15 RX ADMIN — ALBUTEROL SULFATE 2 PUFF: 90 AEROSOL, METERED RESPIRATORY (INHALATION) at 14:27

## 2023-02-15 RX ADMIN — ACETAMINOPHEN 650 MG: 325 TABLET, FILM COATED ORAL at 12:05

## 2023-02-15 RX ADMIN — INSULIN LISPRO 8 UNITS: 100 INJECTION, SOLUTION INTRAVENOUS; SUBCUTANEOUS at 08:07

## 2023-02-15 RX ADMIN — ALBUTEROL SULFATE 2 PUFF: 90 AEROSOL, METERED RESPIRATORY (INHALATION) at 06:40

## 2023-02-15 RX ADMIN — Medication 3 MG: at 20:12

## 2023-02-15 RX ADMIN — FUROSEMIDE 40 MG: 10 INJECTION, SOLUTION INTRAMUSCULAR; INTRAVENOUS at 17:55

## 2023-02-15 RX ADMIN — OXYCODONE HYDROCHLORIDE AND ACETAMINOPHEN 500 MG: 500 TABLET ORAL at 08:04

## 2023-02-15 RX ADMIN — FUROSEMIDE 40 MG: 10 INJECTION, SOLUTION INTRAMUSCULAR; INTRAVENOUS at 08:06

## 2023-02-15 RX ADMIN — INSULIN DETEMIR 10 UNITS: 100 INJECTION, SOLUTION SUBCUTANEOUS at 08:14

## 2023-02-15 RX ADMIN — APIXABAN 5 MG: 5 TABLET, FILM COATED ORAL at 20:12

## 2023-02-15 RX ADMIN — ISOSORBIDE MONONITRATE 30 MG: 30 TABLET, EXTENDED RELEASE ORAL at 08:04

## 2023-02-15 RX ADMIN — TERAZOSIN HYDROCHLORIDE 10 MG: 5 CAPSULE ORAL at 20:12

## 2023-02-15 RX ADMIN — PANTOPRAZOLE SODIUM 40 MG: 40 TABLET, DELAYED RELEASE ORAL at 06:16

## 2023-02-15 NOTE — PLAN OF CARE
Goal Outcome Evaluation:     Problem: Adult Inpatient Plan of Care  Goal: Absence of Hospital-Acquired Illness or Injury  Intervention: Identify and Manage Fall Risk  Recent Flowsheet Documentation  Taken 2/14/2023 1800 by Elen Vivas RN  Safety Promotion/Fall Prevention: safety round/check completed  Taken 2/14/2023 1700 by Elen Vivas RN  Safety Promotion/Fall Prevention: safety round/check completed  Taken 2/14/2023 1600 by Elen Vivas RN  Safety Promotion/Fall Prevention: safety round/check completed  Taken 2/14/2023 1500 by Elen Vivas RN  Safety Promotion/Fall Prevention: safety round/check completed  Taken 2/14/2023 1400 by Elen Vivas RN  Safety Promotion/Fall Prevention: safety round/check completed  Taken 2/14/2023 1300 by Elen Vivas RN  Safety Promotion/Fall Prevention: safety round/check completed  Taken 2/14/2023 1200 by Elen Vivas RN  Safety Promotion/Fall Prevention: safety round/check completed  Taken 2/14/2023 1100 by Elen Vivas RN  Safety Promotion/Fall Prevention: safety round/check completed  Taken 2/14/2023 0900 by Elen Vivas RN  Safety Promotion/Fall Prevention: safety round/check completed  Taken 2/14/2023 0800 by Elen Vivas RN  Safety Promotion/Fall Prevention: safety round/check completed  Intervention: Prevent Skin Injury  Recent Flowsheet Documentation  Taken 2/14/2023 1700 by Elen Vivas RN  Body Position: position changed independently  Taken 2/14/2023 1600 by Elen Vivas RN  Body Position: position changed independently  Skin Protection: tubing/devices free from skin contact  Taken 2/14/2023 1500 by Elen Vivas RN  Body Position: position changed independently  Taken 2/14/2023 1200 by Elen Vivas RN  Body Position: position changed independently  Skin Protection: tubing/devices free from skin contact  Taken 2/14/2023 1100 by Elen Vivas RN  Body Position: position changed independently  Taken 2/14/2023 0900  by Elen Vivas RN  Body Position:   sitting up in bed   position changed independently  Taken 2/14/2023 0800 by Elen Vivas RN  Body Position: position changed independently  Skin Protection: tubing/devices free from skin contact  Intervention: Prevent and Manage VTE (Venous Thromboembolism) Risk  Recent Flowsheet Documentation  Taken 2/14/2023 1800 by Elen Vivas RN  Activity Management: back to bed  Taken 2/14/2023 1700 by Elen Vivas RN  Activity Management: sitting, edge of bed  Taken 2/14/2023 1600 by Elen Vivas RN  Activity Management:   standing at bedside   sitting, edge of bed  VTE Prevention/Management: (see mar) --  Taken 2/14/2023 1500 by Elen Vivas RN  Activity Management: sitting, edge of bed  Taken 2/14/2023 1400 by Elen Vivas RN  Activity Management: up in chair  Taken 2/14/2023 1300 by Elen Vivas RN  Activity Management:   up in chair   activity adjusted per tolerance  Taken 2/14/2023 1200 by Elen Vivas RN  Activity Management:   up in chair   activity adjusted per tolerance  VTE Prevention/Management: (see mar) other (see comments)  Taken 2/14/2023 1100 by Elen Vivas RN  Activity Management:   up in chair   activity adjusted per tolerance  Taken 2/14/2023 1000 by Elen Vivas RN  Activity Management: up in chair  Taken 2/14/2023 0900 by Elen Vivas RN  Activity Management: activity adjusted per tolerance  Taken 2/14/2023 0800 by Elen Vivas RN  Activity Management: activity adjusted per tolerance  VTE Prevention/Management: (see mar) other (see comments)  Intervention: Prevent Infection  Recent Flowsheet Documentation  Taken 2/14/2023 1600 by Elen Vivas RN  Infection Prevention: single patient room provided  Taken 2/14/2023 1200 by Elen Vivas RN  Infection Prevention: single patient room provided  Taken 2/14/2023 0800 by Elen Vivas RN  Infection Prevention: single patient room provided  Goal: Optimal Comfort and  Wellbeing  Intervention: Provide Person-Centered Care  Recent Flowsheet Documentation  Taken 2/14/2023 1600 by Elen Vivas RN  Trust Relationship/Rapport:   thoughts/feelings acknowledged   care explained  Taken 2/14/2023 1200 by Elen Vivas RN  Trust Relationship/Rapport:   care explained   thoughts/feelings acknowledged  Taken 2/14/2023 0800 by Elen Vivas RN  Trust Relationship/Rapport:   care explained   thoughts/feelings acknowledged     Problem: Fall Injury Risk  Goal: Absence of Fall and Fall-Related Injury  Intervention: Identify and Manage Contributors  Recent Flowsheet Documentation  Taken 2/14/2023 1600 by Elen Vivas RN  Medication Review/Management: medications reviewed  Taken 2/14/2023 1400 by Elen Vivas RN  Medication Review/Management: medications reviewed  Taken 2/14/2023 1300 by Elen Vivas RN  Medication Review/Management: medications reviewed  Taken 2/14/2023 1200 by Elen Vivas RN  Medication Review/Management: medications reviewed  Taken 2/14/2023 1100 by Elen Vivas RN  Medication Review/Management: medications reviewed  Taken 2/14/2023 0900 by Elen Vivas RN  Medication Review/Management: medications reviewed  Taken 2/14/2023 0800 by Elen Vivas RN  Medication Review/Management: medications reviewed  Self-Care Promotion: independence encouraged  Intervention: Promote Injury-Free Environment  Recent Flowsheet Documentation  Taken 2/14/2023 1800 by Elen Vivas RN  Safety Promotion/Fall Prevention: safety round/check completed  Taken 2/14/2023 1700 by Elen Vivas RN  Safety Promotion/Fall Prevention: safety round/check completed  Taken 2/14/2023 1600 by Elen Vivas RN  Safety Promotion/Fall Prevention: safety round/check completed  Taken 2/14/2023 1500 by Elen Vivas RN  Safety Promotion/Fall Prevention: safety round/check completed  Taken 2/14/2023 1400 by Elen Vivas RN  Safety Promotion/Fall Prevention: safety  round/check completed  Taken 2/14/2023 1300 by Elen Vivas RN  Safety Promotion/Fall Prevention: safety round/check completed  Taken 2/14/2023 1200 by Elen Vivas RN  Safety Promotion/Fall Prevention: safety round/check completed  Taken 2/14/2023 1100 by Elen Vivas RN  Safety Promotion/Fall Prevention: safety round/check completed  Taken 2/14/2023 0900 by Elen Vivas RN  Safety Promotion/Fall Prevention: safety round/check completed  Taken 2/14/2023 0800 by Elen Vivas RN  Safety Promotion/Fall Prevention: safety round/check completed     Problem: COPD (Chronic Obstructive Pulmonary Disease) Comorbidity  Goal: Maintenance of COPD Symptom Control  Intervention: Maintain COPD-Symptom Control  Recent Flowsheet Documentation  Taken 2/14/2023 1600 by Elen Vivas RN  Medication Review/Management: medications reviewed  Taken 2/14/2023 1400 by Elen Vivas RN  Medication Review/Management: medications reviewed  Taken 2/14/2023 1300 by Elen Vivas RN  Medication Review/Management: medications reviewed  Taken 2/14/2023 1200 by Elen Vivas RN  Medication Review/Management: medications reviewed  Taken 2/14/2023 1100 by Elen Vivas RN  Medication Review/Management: medications reviewed  Taken 2/14/2023 0900 by Elen Vivas RN  Medication Review/Management: medications reviewed  Taken 2/14/2023 0800 by Elen Vivas RN  Medication Review/Management: medications reviewed     Problem: Skin Injury Risk Increased  Goal: Skin Health and Integrity  Intervention: Optimize Skin Protection  Recent Flowsheet Documentation  Taken 2/14/2023 1600 by Elen Vivas RN  Pressure Reduction Techniques: frequent weight shift encouraged  Head of Bed (HOB) Positioning: HOB elevated  Pressure Reduction Devices: specialty bed utilized  Skin Protection: tubing/devices free from skin contact  Taken 2/14/2023 1200 by Elen Vivas RN  Pressure Reduction Techniques: frequent weight shift  encouraged  Pressure Reduction Devices: specialty bed utilized  Skin Protection: tubing/devices free from skin contact  Taken 2/14/2023 0900 by Elen Vivas RN  Head of Bed (HOB) Positioning: HOB elevated  Taken 2/14/2023 0800 by Elen Vivas RN  Pressure Reduction Techniques: frequent weight shift encouraged  Head of Bed (HOB) Positioning: HOB elevated  Pressure Reduction Devices: specialty bed utilized  Skin Protection: tubing/devices free from skin contact  Goal: Skin Health and Integrity  Intervention: Optimize Skin Protection  Recent Flowsheet Documentation  Taken 2/14/2023 1600 by Elen Vivas RN  Pressure Reduction Techniques: frequent weight shift encouraged  Head of Bed (HOB) Positioning: HOB elevated  Pressure Reduction Devices: specialty bed utilized  Skin Protection: tubing/devices free from skin contact  Taken 2/14/2023 1200 by Elen Vivas RN  Pressure Reduction Techniques: frequent weight shift encouraged  Pressure Reduction Devices: specialty bed utilized  Skin Protection: tubing/devices free from skin contact  Taken 2/14/2023 0900 by Elen Vivas RN  Head of Bed (HOB) Positioning: HOB elevated  Taken 2/14/2023 0800 by Elen Vivas RN  Pressure Reduction Techniques: frequent weight shift encouraged  Head of Bed (HOB) Positioning: HOB elevated  Pressure Reduction Devices: specialty bed utilized  Skin Protection: tubing/devices free from skin contact     Problem: Noninvasive Ventilation Acute  Goal: Effective Unassisted Ventilation and Oxygenation  Intervention: Monitor and Manage Noninvasive Ventilation  Recent Flowsheet Documentation  Taken 2/14/2023 1600 by Elen Vivas RN  Airway/Ventilation Management: airway patency maintained  Taken 2/14/2023 1200 by Elen Vivas RN  Airway/Ventilation Management: airway patency maintained  Taken 2/14/2023 0800 by Elen Vivas RN  Airway/Ventilation Management: airway patency maintained     Problem: Pain Acute  Goal:  Acceptable Pain Control and Functional Ability  Intervention: Prevent or Manage Pain  Recent Flowsheet Documentation  Taken 2/14/2023 1600 by Elen Vivas RN  Medication Review/Management: medications reviewed  Taken 2/14/2023 1400 by Elen Vivas RN  Medication Review/Management: medications reviewed  Taken 2/14/2023 1300 by Elen Vivas RN  Medication Review/Management: medications reviewed  Taken 2/14/2023 1200 by Elen Vivas RN  Medication Review/Management: medications reviewed  Taken 2/14/2023 1100 by Elen Vivas RN  Medication Review/Management: medications reviewed  Taken 2/14/2023 0900 by Elen Vivas RN  Medication Review/Management: medications reviewed  Taken 2/14/2023 0800 by Elen Vivas RN  Medication Review/Management: medications reviewed  Intervention: Optimize Psychosocial Wellbeing  Recent Flowsheet Documentation  Taken 2/14/2023 1200 by Elen Vivas RN  Diversional Activities:   television   tablet   smartphone  Taken 2/14/2023 0800 by Elen Vivas RN  Diversional Activities:   television   smartphone   video games

## 2023-02-15 NOTE — THERAPY TREATMENT NOTE
Acute Care - Physical Therapy Treatment Note  [facility]     Patient Name: Gonzalo Durham  : 1934  MRN: [id number]  Today's Date: 2/15/2023      Visit Dx:     ICD-10-CM ICD-9-CM   1. COVID-19  U07.1 079.89   2. Impaired mobility  Z74.09 799.89   3. Decreased activities of daily living (ADL)  Z78.9 V49.89     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, without long-term current use of insulin ([facility])   • Stage 3a chronic kidney disease (HCC)   • Erectile dysfunction   • Coronary artery disease involving native coronary artery of native heart without angina pectoris   • Hyperlipidemia LDL goal <70-statin   • Hypertension   • Prostatism-(young) urology   • Tremor   • Varicose vein of leg   • Plantar fasciitis   • Nocturnal leg movements   • Bad dreams   • Depression   • Peripheral neuropathy- clinical   • Hx of colonic polyps   • Gastroesophageal reflux disease   • Left lower quadrant pain   • Laboratory test*   • Anticoagulated-CAD, DM2, CVA/ASA 81+()+plavix » Anticoagulated-CAD, DM2, CVA/ASA 81+()+plavix+eliquist   • SOB: copd,CAD,#, hypoxemia   • Hypoxia#to pulmonary   • Corn or callus   • Anemia: ASA81,plavix,eliquis,gi(3/3+,div,cp,eitis   • Atherosclerosis: CAD, AAA vascular   • AAA: -(ro) steffen   • Heme positive stool   • Stage 2 moderate COPD by GOLD classification ([facility])   • Abnormal stress test   • Tingling of both feet-to consider NCV   • Cryptogenic stroke (HCC)   • Chronic diastolic congestive heart failure (HCC)   • Gross hematuria   • BPH with obstruction/lower urinary tract symptoms   • Chest pain   • Obstructive sleep apnea   • Abnormal CT of the chest ./done   • 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)   • Oxygen dependent   • S/P CABG x 3   • Pulmonary hypertension (HCC)   • PAF (paroxysmal atrial fibrillation) (HCC)   • Umbilical hernia-a waqar   • COVID-19      Past Medical History:   Diagnosis Date   • A-fib (HCC)    • AAA (abdominal aortic aneurysm) without rupture    • Arthritis    • BPH (benign prostatic hypertrophy)    • Cataract     bialteral   • CKD (chronic kidney disease)    • COPD (chronic obstructive pulmonary disease) ([facility])    • Coronary artery disease involving native coronary artery of native heart without angina pectoris 1/20/2017    CABG/Az/[place]/1981 No TCC echo  7.16.18 Right ventricular cavity is mild-to-moderately dilated. Left ventricular diastolic dysfunction (grade I) consistent with impaired relaxation. Left ventricular systolic function is normal. Left atrial cavity size is borderline dilated. RIGHT HEART ENLARGEMENT - RVSP NOT ASSESSABLE NORMAL LV AND RV SYSTOLIC FUNCTION NO SIGNIFICANT VALVULAR DYSFUNCTION  Seein   • Diabetes mellitus ([facility])     Type 2   • DM (diabetes mellitus screen)    • GERD (gastroesophageal reflux disease)    • History of loop recorder     at current time   • Hx of colonic polyp    • Hypercholesteremia    • Hypertension    • Macular degeneration     treated with injections in right eye   • Myocardial infarction ([facility])    • Neuropathy    • Oxygen deficit     at 4liters   • Prostate cancer ([facility])    • Pulmonary hypertension ([facility]) 1/7/2022   • S/P CABG x 3 1/7/2022 1980 [facility]   • Sleep apnea     cpap   • Stage 3a chronic kidney disease (HCC) 1/20/2017   • Stroke ([facility])     recovererd   • Varicose vein of leg     bialteral     Past Surgical History:   Procedure Laterality Date   • APPENDECTOMY     • CARDIAC CATHETERIZATION     • CARDIAC CATHETERIZATION Left 5/22/2019    Procedure: Cardiac Catheterization/Vascular Study Positive occult blood in stools  ? BMS vs REGGIE Get cabg report  ;  Surgeon: Bradley Carver MD;  Location: [facility] CATH INVASIVE LOCATION;  Service: Cardiology   • COLONOSCOPY N/A 6/15/2017    Diverticulosis repeat exam prn   • COLONOSCOPY W/ POLYPECTOMY  10/21/2013    2 Tubular adenomatous polyps,  Diverticulosis repeat exam in 5 years   • CORONARY ARTERY BYPASS GRAFT  1980    X 3   • CYSTOSCOPY RETROGRADE PYELOGRAM Bilateral 2/8/2021    Procedure: CYSTOSCOPY RETROGRADE PYELOGRAM;  Surgeon: Danie Cadena MD;  Location:  [facility] OR;  Service: Urology;  Laterality: Bilateral;   • ENDOSCOPY N/A 6/15/2017    LA Grade A reflux esophagitis   • EYE SURGERY Bilateral    • HERNIA REPAIR     • TRANSURETHRAL RESECTION OF BLADDER TUMOR N/A 2/8/2021    Procedure: CYSTOSCOPY TRANSURETHRAL RESECTION OF BLADDER TUMOR WITH GEMCITABINE INSTILLATION;  Surgeon: Danie Cadena MD;  Location:  [facility] OR;  Service: Urology;  Laterality: N/A;     PT Assessment (last 12 hours)     PT Evaluation and Treatment     Row Name 02/15/23 1145          Physical Therapy Time and Intention    Subjective Information complains of;dizziness;fatigue;weakness  -     Document Type therapy note (daily note)  -     Mode of Treatment physical therapy  -     Row Name 02/15/23 1145          General Information    Existing Precautions/Restrictions fall;oxygen therapy device and L/min  -     Limitations/Impairments hearing  -     Row Name 02/15/23 1145          Pain    Pretreatment Pain Rating 0/10 - no pain  -     Posttreatment Pain Rating 0/10 - no pain  -     Row Name 02/15/23 1145          Sit-Stand Transfer    Sit-Stand [place] (Transfers) standby assist  -     Row Name 02/15/23 1145          Stand-Sit Transfer    Stand-Sit [place] (Transfers) standby assist  -     Row Name 02/15/23 1145          Gait/Stairs (Locomotion)    [place] Level (Gait) contact guard  -     Distance in Feet (Gait) Marched in place 30 steps bilateral.  -     Row Name 02/15/23 1145          Shoulder (Therapeutic Exercise)    Shoulder AROM (Therapeutic Exercise) bilateral;scapular retraction;scapular elevation  with breathing  -     Row Name 02/15/23 1145          Hip (Therapeutic Exercise)    Hip AROM (Therapeutic Exercise)  bilateral;flexion  -     Row Name 02/15/23 1145          Knee (Therapeutic Exercise)    Knee (Therapeutic Exercise) AROM (active range of motion)  -     Knee AROM (Therapeutic Exercise) bilateral;LAQ (long arc quad)  -     Row Name 02/15/23 1145          Ankle (Therapeutic Exercise)    Ankle AROM (Therapeutic Exercise) bilateral;dorsiflexion;plantarflexion  -     Row Name             Wound 02/14/23 1048 coccyx    Wound - Properties Group Placement Date: 02/14/23  -MW Placement Time: 1048  -MW Location: coccyx  -MW    Retired Wound - Properties Group Placement Date: 02/14/23  -MW Placement Time: 1048  -MW Location: coccyx  -MW    Retired Wound - Properties Group Date first assessed: 02/14/23  -MW Time first assessed: 1048  -MW Location: coccyx  -MW    Row Name 02/15/23 1145          Vital Signs    Pre SpO2 (%) 99  -ERICA     O2 Delivery Pre Treatment --  40 LPM /90%. Vapo  -ERICA     Intra SpO2 (%) 94  -ERICA     Post SpO2 (%) 96  -ERICA     Row Name 02/15/23 1145          Positioning and Restraints    Pre-Treatment Position sitting in chair/recliner  -ERICA     Post Treatment Position chair  -ERICA     In Chair notified nsg;sitting;call light within reach;encouraged to call for assist  -ERICA           User Key  (r) = Recorded By, (t) = Taken By, (c) = Cosigned By    Initials Name Provider Type    Franca Rudd PTA Physical Therapist Assistant    Elen Reyna, RN Registered Nurse                Physical Therapy Education     Title: PT OT SLP Therapies (In Progress)     Topic: Physical Therapy (In Progress)     Point: Mobility training (Done)     Learning Progress Summary           Patient Acceptance, D, DU by ERICA at 2/15/2023 1307    Comment: Inspiratory exercises    Acceptance, E,D, DU,VU by ERICA at 2/14/2023 1426    Comment: Safety with transfers    Acceptance, E, VU,DU,NR by CHEYENNE at 2/8/2023 0905    Comment: benefits of acitvity, progression of PT                   Point: Home exercise program (Done)     Learning  Progress Summary           Patient Acceptance, E,D, DU,VU by ERICA at 2/14/2023 1426    Comment: Safety with transfers    Acceptance, E,D, VU,DU by ERICA at 2/13/2023 1002    Comment: inspiratory exercises    Acceptance, E,D, DU by ERICA at 2/9/2023 0903    Comment: breathing exercises                   Point: Body mechanics (Not Started)     Learner Progress:  Not documented in this visit.          Point: Precautions (Not Started)     Learner Progress:  Not documented in this visit.                      User Key     Initials Effective Dates Name Provider Type Discipline    CHEYENNE 02/03/23 -  Rafi Browning, PT DPT Physical Therapist PT    ERICA 02/03/23 -  Franca Fuchs PTA Physical Therapist Assistant PT              PT Recommendation and Plan         Outcome Measures     Row Name 02/15/23 1300 02/14/23 1400 02/13/23 1000       How much help from another person do you currently need...    Turning from your back to your side while in flat bed without using bedrails? 4  -ERICA 4  -ERICA 4  -ERICA    Moving from lying on back to sitting on the side of a flat bed without bedrails? 4  -ERICA 4  -ERICA 4  -ERICA    Moving to and from a bed to a chair (including a wheelchair)? 3  -ERICA 3  -ERICA 3  -ERICA    Standing up from a chair using your arms (e.g., wheelchair, bedside chair)? 3  -ERICA 3  -ERICA 3  -ERICA    Climbing 3-5 steps with a railing? 3  -ERICA 3  -ERICA 3  -ERICA    To walk in hospital room? 3  -ERICA 3  -ERICA 3  -ERICA    AM-PAC 6 Clicks Score (PT) 20  -ERICA 20  -ERICA 20  -ERICA          User Key  (r) = Recorded By, (t) = Taken By, (c) = Cosigned By    Initials Name Provider Type    ERICA Franca Fuchs, EMMA Physical Therapist Assistant                 Time Calculation:    PT Charges     Row Name 02/15/23 1310             Time Calculation    Start Time 1145  -ERICA      Stop Time 1210  -ERICA      Time Calculation (min) 25 min  -ERICA         Timed Charges    02105 - PT Therapeutic Activity Minutes 25  -ERICA         Total Minutes    Timed Charges Total Minutes 25  -ERICA       Total  Minutes 25  -ERICA            User Key  (r) = Recorded By, (t) = Taken By, (c) = Cosigned By    Initials Name Provider Type    Franca Rudd, EMMA Physical Therapist Assistant              Therapy Charges for Today     Code Description Service Date Service Provider Modifiers Qty    80137034789 HC PT THERAPEUTIC ACT EA 15 MIN 2/14/2023 Franca Fuchs, EMMA GP 2    80744154973 HC PT THERAPEUTIC ACT EA 15 MIN 2/15/2023 Franca Fuchs, EMMA GP 2          PT G-Codes  Outcome Measure Options: AM-PAC 6 Clicks Basic Mobility (PT)  AM-PAC 6 Clicks Score (PT): 20  AM-PAC 6 Clicks Score (OT): 23    Franca Fuchs PTA  2/15/2023

## 2023-02-15 NOTE — PROGRESS NOTES
[facility] [place]GR[place] - [facility]    Patient: Gonzalo Durham  1934   MR# [id number]   Acct# [id number]  02/15/23   07:09 CST  Referring Provider: Robert Dunn MD    Chief Complaint: Covid-19     Interval history: He is afebrile.  He is in COVID isolation.  Saturation 94 40 L and FiO2 0.95.  He has not used the nonrebreather since yesterday.  He is sitting up on the side of the bed.  He appears to be breathing comfortably.  He has been working with therapy but unable to walk.  No x-ray for review.  CRP continues to trend down at 3.8.  Still on twice a day dexamethasone. He has had adequate oral intake.     Meds:  albuterol sulfate HFA, 2 puff, Inhalation, 4x Daily - RT  apixaban, 5 mg, Oral, BID  ascorbic acid, 500 mg, Oral, Daily  aspirin, 81 mg, Oral, Daily  budesonide-formoterol, 2 puff, Inhalation, BID - RT  Chlorhexidine Gluconate Cloth, 1 application, Topical, Q24H  dexamethasone, 6 mg, Oral, BID With Meals  ferrous sulfate, 325 mg, Oral, Daily With Breakfast  finasteride, 5 mg, Oral, Daily  furosemide, 40 mg, Intravenous, BID  guaiFENesin, 600 mg, Oral, Daily  hydroCHLOROthiazide, 12.5 mg, Oral, Daily  insulin detemir, 10 Units, Subcutaneous, Q12H  insulin lispro, 0-24 Units, Subcutaneous, 4x Daily With Meals & Nightly  isosorbide mononitrate, 30 mg, Oral, Daily  linagliptin, 5 mg, Oral, Daily  metoprolol tartrate, 12.5 mg, Oral, BID  pantoprazole, 40 mg, Oral, Q AM  pravastatin, 80 mg, Oral, Daily  terazosin, 10 mg, Oral, Nightly         Physical Exam:  SpO2 Percentage    02/15/23 0400 02/15/23 0500 02/15/23 0600   SpO2: 94% 96% 97%     Temp:  [96.6 °F (35.9 °C)-97.4 °F (36.3 °C)] 97.4 °F (36.3 °C)  Heart Rate:  [] 62  Resp:  [20-26] 20  BP: ()/(55-97) 122/56    Intake/Output Summary (Last 24 hours) at 2/15/2023 0709  Last data filed at 2/15/2023 0600  Gross per 24 hour   Intake 290 ml   Output 2040 ml   Net -1750 ml     Body mass index is 26.64  kg/m².   Physical Exam   Vitals and nursing note reviewed.   Constitutional:       Comments: Vapotherm 40+0.95  HENT:      Head: Normocephalic.  Glasses     Nose: Nose normal.   Cardiovascular:      Rate and Rhythm: Normal rate. Rhythm irregular.   Pulmonary:      Effort: No respiratory distress.   Musculoskeletal:      Cervical back: Normal range of motion.      Comments: No edema    Skin:     Coloration: Skin is not pale.   Neurological:      Mental Status: He is alert and oriented.    Sitting up on the side of the bed eating breakfast   Psychiatric:         Behavior: Behavior normal.     Electronically signed by DOMINIQUE Devine, 2/15/2023, 07:09 CST      Physician substantive portion: medical decision making    Result Review    Laboratory Data:  Results from last 7 days   Lab Units 02/15/23  0507 02/14/23  0621   WBC 10*3/mm3 12.78* 10.92*   HEMOGLOBIN g/dL 12.4* 12.5*   PLATELETS 10*3/mm3 446 473*     Results from last 7 days   Lab Units 02/15/23  0507 02/14/23  0645 02/14/23  0645 02/13/23  0230   SODIUM mmol/L 136  --  136 136   POTASSIUM mmol/L 4.0  --  3.9 3.9   CO2 mmol/L 31.0*  --  28.0 31.0*   BUN mg/dL 82*  --  77* 70*   CREATININE mg/dL 1.20  --  1.13 1.17   CRP mg/dL 3.84*   < > 7.23*  --     < > = values in this interval not displayed.     Results from last 7 days   Lab Units 02/10/23  0028   PH, ARTERIAL pH units 7.460*  7.460*  7.460*  7.460*   PCO2, ARTERIAL mm Hg 41.6  41.6  41.6  41.6   PO2 ART mm Hg 45.0*  45.0*  45.0*  45.0*   FIO2 % 100  100  100  100     Microbiology Results (last 10 days)     Procedure Component Value - Date/Time    Blood Culture - Blood, Hand, Right [[id number]]  (Normal) Collected: 02/07/23 1909    Lab Status: Final result Specimen: Blood from Hand, Right Updated: 02/12/23 1945     Blood Culture No growth at 5 days    Blood Culture - Blood, Arm, Left [[id number]]  (Normal) Collected: 02/07/23 1909    Lab Status: Final result Specimen: Blood from Arm, Left  Updated: 02/12/23 1945     Blood Culture No growth at 5 days    COVID-19 and FLU A/B PCR - Swab, Nasopharynx [[id number]]  (Abnormal) Collected: 02/07/23 0928    Lab Status: Final result Specimen: Swab from Nasopharynx Updated: 02/07/23 1010     COVID19 Detected     Influenza A PCR Not Detected     Influenza B PCR Not Detected    Narrative:      Fact sheet for providers: https://www.fda.gov/media/172110/download    Fact sheet for patients: https://www.fda.gov/media/511102/download    Test performed by PCR.  Influenza A and Influenza B negative results should be considered presumptive in samples that have a positive SARS-CoV-2 result.    Competitive inhibition studies showed that SARS-CoV-2 virus, when present at concentrations above 3.6E+04 copies/mL, can inhibit the detection and amplification of influenza A and influenza B virus RNA if present at or below 1.8E+02 copies/mL or 4.9E+02 copies/mL, respectively, and may lead to false negative influenza virus results. If co-infection with influenza A or influenza B virus is suspected in samples with a positive SARS-CoV-2 result, the sample should be re-tested with another FDA cleared, approved, or authorized influenza test, if influenza virus detection would change clinical management.         Recent films:  No radiology results from the last 24 hrs       Pulmonary Assessment:  1. Acute resp failure with hypoxia  2. covid    Recommend/plan:   · Reduce dexamethasone to daily and complete 10 day course on 2/17  · Continue high flow oxygen    This visit was performed by both a physician and an [facility] RN.  I personally evaluated and examined the patient.  I performed all aspects of the medical decision making as documented.  Electronically signed by Parrish Cardona MD, 2/15/2023, 14:31 CST

## 2023-02-15 NOTE — PROGRESS NOTES
RT EQUIPMENT DEVICE RELATED - SKIN ASSESSMENT    RT Medical Equipment/Device:     High Flow Nasal Cannula:   Vapotherm    Skin Assessment:      Nose:  Intact    Device Skin Pressure Protection:  Skin-to skin areas padded    Nurse Notification:  Madina Han, RRT

## 2023-02-15 NOTE — PROGRESS NOTES
[facility] Medicine Services  INPATIENT PROGRESS NOTE    Patient Name: Gonzalo Durham  Date of Admission: 2/7/2023  Today's Date: 02/15/23  Length of Stay: 8  Primary Care Physician: Abel Romero MD    Subjective   Chief Complaint: Respiratory failure.    HPI   Patient is getting better slowly.  Blood pressure is low, afebrile.  Glucose seems to be better today.  C-reactive protein is improving.  Leukocyte slight increase, probably secondary to steroid.    Review of Systems   Constitutional: Positive for activity change, appetite change and fatigue. Negative for chills and fever.   HENT: Negative for hearing loss, nosebleeds, tinnitus and trouble swallowing.    Eyes: Negative for visual disturbance.   Respiratory: Positive for shortness of breath. Negative for cough, chest tightness and wheezing.    Cardiovascular: Negative for chest pain, palpitations and leg swelling.   Gastrointestinal: Negative for abdominal distention, abdominal pain, blood in stool, constipation, diarrhea, nausea and vomiting.   Endocrine: Negative for cold intolerance, heat intolerance, polydipsia, polyphagia and polyuria.   Genitourinary: Negative for decreased urine volume, difficulty urinating, dysuria, flank pain, frequency and hematuria.   Musculoskeletal: Positive for arthralgias, gait problem and myalgias. Negative for joint swelling.   Skin: Negative for rash.   Allergic/Immunologic: Negative for immunocompromised state.   Neurological: Positive for weakness. Negative for dizziness, syncope, light-headedness and headaches.   Hematological: Negative for adenopathy. Does not bruise/bleed easily.   Psychiatric/Behavioral: Negative for confusion and sleep disturbance. The patient is not nervous/anxious.    All pertinent negatives and positives are as above. All other systems have been reviewed and are negative unless otherwise stated.     Objective    Temp:  [96.4 °F (35.8 °C)-97.4 °F (36.3 °C)]  96.4 °F (35.8 °C)  Heart Rate:  [58-99] 99  Resp:  [15-29] 17  BP: ()/(55-86) 99/61  Physical Exam  Vitals and nursing note reviewed.   Constitutional:       General: He is not in acute distress.     Appearance: He is not toxic-appearing.      Comments: Advanced age.  Chronically ill.  Hard of hearing.   HENT:      Head: Normocephalic.      Mouth/Throat:      Mouth: Mucous membranes are moist.      Pharynx: Oropharynx is clear.   Eyes:      Extraocular Movements: Extraocular movements intact.      Conjunctiva/sclera: Conjunctivae normal.      Pupils: Pupils are equal, round, and reactive to light.   Cardiovascular:      Rate and Rhythm: Normal rate and regular rhythm.      Pulses: Normal pulses.      Heart sounds: No murmur heard.  Pulmonary:      Effort: No respiratory distress.      Breath sounds: No wheezing or rhonchi.      Comments: On Vapotherm.  Diminished breath sound bilateral, clear .  Abdominal:      General: Bowel sounds are normal. There is no distension.      Palpations: Abdomen is soft.      Tenderness: There is no abdominal tenderness.   Musculoskeletal:         General: No swelling or tenderness. Normal range of motion.      Cervical back: Normal range of motion and neck supple. No muscular tenderness.   Skin:     General: Skin is warm and dry.      Capillary Refill: Capillary refill takes 2 to 3 seconds.      Findings: No erythema or rash.   Neurological:      General: No focal deficit present.      Mental Status: He is alert and oriented to person, place, and time.      Cranial Nerves: No cranial nerve deficit.      Motor: Weakness present.   Psychiatric:         Mood and Affect: Mood normal.         Behavior: Behavior normal.       Results Review:  I have reviewed the labs, radiology results, and diagnostic studies.    Laboratory Data:   Results from last 7 days   Lab Units 02/15/23  0507 02/14/23  0621   WBC 10*3/mm3 12.78* 10.92*   HEMOGLOBIN g/dL 12.4* 12.5*   HEMATOCRIT % 39.4 39.5    PLATELETS 10*3/mm3 446 473*        Results from last 7 days   Lab Units 02/15/23  0507 02/14/23  0645 02/13/23  0230 02/12/23  0226   SODIUM mmol/L 136 136 136 136   POTASSIUM mmol/L 4.0 3.9 3.9 3.8   CHLORIDE mmol/L 93* 92* 94* 94*   CO2 mmol/L 31.0* 28.0 31.0* 29.0   BUN mg/dL 82* 77* 70* 56*   CREATININE mg/dL 1.20 1.13 1.17 0.99   CALCIUM mg/dL 8.6 9.4 9.3 9.2   BILIRUBIN mg/dL  --  0.5 0.4 0.5   ALK PHOS U/L  --  62 61 59   ALT (SGPT) U/L  --  17 18 21   AST (SGOT) U/L  --  15 13 15   GLUCOSE mg/dL 176* 210* 187* 236*       Culture Data:   No results found for: BLOODCX, URINECX, WOUNDCX, MRSACX, RESPCX, STOOLCX    Radiology Data:   Imaging Results (Last 24 Hours)     ** No results found for the last 24 hours. **          I have reviewed the patient's current medications.     Assessment/Plan   Assessment  Active Hospital Problems    Diagnosis    • **COVID-19    • S/P CABG x 3    • PAF (paroxysmal atrial fibrillation) ([facility])    • Pulmonary hypertension ([facility])    • Obstructive sleep apnea    • Chronic diastolic congestive heart failure ([facility])    • Stage 2 moderate COPD by GOLD classification ([facility])    • Gastroesophageal reflux disease    • Stage 3a chronic kidney disease ([facility])        Treatment Plan  Covid pneumonia/COPD exacerbation.  Patient had been vaccinated with Moderna x2 in the past. patient is on chronic 4 L oxygen at home. Patient finished remdesivir.  Patient completed Decadron 10 days course, continue Decadron per pulmonary..  Pulmonary consult.  Albuterol inhaler.  Rocephin antibiotics.  Vitamin C.  Symbicort.  Mucinex.  Leukocytosis increased.  C-reactive protein is improving.  Chest x-ray-Persistent and unchanged chronic inflammatory and obstructive lung changes bilaterally.  Vapotherm 30/60 . Nonrebreather mask when off CPAP.  CPAP overnight.     Atrial fibrillation/CAD.  Eliquis.  Metoprolol.  Aspirin . Pravastatin.  Imdur.  Hydrochlorothiazide.  IV Lasix.  Nitro as needed.  Echocardiogram  10/26/2022- ejection fraction 56 - 60%, tricuspid regurgitation.     Diabetes.  Hold metformin.  High sliding scale.  Increase Levemir.    Continue Tradjenta.  Hemoglobin A1c 7.4.      Reflux.  Protonix.  Zofran as needed     Benign prostate hypertrophy.  Hytrin . Proscar.     Insomnia.  Melatonin at night as needed.     Anemia.  Hemoglobin increasing. Iron sulfate.     Nutrition . diabetic/consistent carb/regular texture diet.     Deconditioning.  PT consult.     Blood culture-no growth 5 days.  COVID-19-positive.  Flu screen- positive     Patient wants to go home to see his wife.  His wife in hospice due to throat cancer.     Medical Decision Making  Number and Complexity of problems: 10 problems, high complexity:  Covid-19 pneumonia and respiratory failure represent threat to life     Risk:  High:  Requiring high flow oxygen, high risk of morbidity/mortality, requiring ICU level monitoring     Differential Diagnosis: Pneumonia, Viral pneumonia, COPD, CHF     Conditions and Status        Condition is worsening.     MDM Data  Cardiac tracing (EKG, telemetry) interpretation: NSR  Labs reviewed: Normal renal function     Discussed with: patient, nursing     Care Planning  Shared decision making: Patient agreeable to treatment plan  Code status and discussions: full code     Disposition  Social Determinants of Health that impact treatment or disposition: n/a  D/c planning uncertain    Electronically signed by Robert Dunn MD, 02/15/23, 15:57 CST.

## 2023-02-15 NOTE — PLAN OF CARE
Goal Outcome Evaluation:   Pt is A&OX4 and remained in stable afib throughout the shift. Pt remained afebrile, pulses are good, pt voided 740ml during this shift. VSS will continue to monitor.

## 2023-02-15 NOTE — PLAN OF CARE
Goal Outcome Evaluation:   Patient wearing Vapotherm 40L/95%. Patient wears Bipap PRN  Improve oxygenation

## 2023-02-15 NOTE — PLAN OF CARE
Goal Outcome Evaluation:   Patient states he is tired today. Stands and sits with SBA. Light dizziness when stood. Marched in place 30 steps bilaterally. SATs improved with activity today. 99% -94% -96%. On 40 LPM at 90%. Will continue to benefit from exercise.

## 2023-02-16 LAB
ANION GAP SERPL CALCULATED.3IONS-SCNC: 10 MMOL/L (ref 5–15)
BUN SERPL-MCNC: 90 MG/DL (ref 8–23)
BUN/CREAT SERPL: 69.2 (ref 7–25)
CALCIUM SPEC-SCNC: 9.7 MG/DL (ref 8.6–10.5)
CHLORIDE SERPL-SCNC: 91 MMOL/L (ref 98–107)
CO2 SERPL-SCNC: 33 MMOL/L (ref 22–29)
CREAT SERPL-MCNC: 1.3 MG/DL (ref 0.76–1.27)
CRP SERPL-MCNC: 2.51 MG/DL (ref 0–0.5)
DEPRECATED RDW RBC AUTO: 43.5 FL (ref 37–54)
EGFRCR SERPLBLD CKD-EPI 2021: 52.5 ML/MIN/1.73
ERYTHROCYTE [DISTWIDTH] IN BLOOD BY AUTOMATED COUNT: 14.1 % (ref 12.3–15.4)
GLUCOSE BLDC GLUCOMTR-MCNC: 117 MG/DL (ref 70–130)
GLUCOSE BLDC GLUCOMTR-MCNC: 142 MG/DL (ref 70–130)
GLUCOSE BLDC GLUCOMTR-MCNC: 151 MG/DL (ref 70–130)
GLUCOSE BLDC GLUCOMTR-MCNC: 224 MG/DL (ref 70–130)
GLUCOSE SERPL-MCNC: 97 MG/DL (ref 65–99)
HCT VFR BLD AUTO: 42.2 % (ref 37.5–51)
HGB BLD-MCNC: 13.5 G/DL (ref 13–17.7)
MCH RBC QN AUTO: 27.2 PG (ref 26.6–33)
MCHC RBC AUTO-ENTMCNC: 32 G/DL (ref 31.5–35.7)
MCV RBC AUTO: 85.1 FL (ref 79–97)
PLATELET # BLD AUTO: 424 10*3/MM3 (ref 140–450)
PMV BLD AUTO: 9.9 FL (ref 6–12)
POTASSIUM SERPL-SCNC: 3.6 MMOL/L (ref 3.5–5.2)
RBC # BLD AUTO: 4.96 10*6/MM3 (ref 4.14–5.8)
SODIUM SERPL-SCNC: 134 MMOL/L (ref 136–145)
WBC NRBC COR # BLD: 21.05 10*3/MM3 (ref 3.4–10.8)

## 2023-02-16 PROCEDURE — 63710000001 INSULIN LISPRO (HUMAN) PER 5 UNITS: Performed by: INTERNAL MEDICINE

## 2023-02-16 PROCEDURE — 25010000002 FUROSEMIDE PER 20 MG: Performed by: FAMILY MEDICINE

## 2023-02-16 PROCEDURE — 82962 GLUCOSE BLOOD TEST: CPT

## 2023-02-16 PROCEDURE — 63710000001 INSULIN DETEMIR PER 5 UNITS: Performed by: FAMILY MEDICINE

## 2023-02-16 PROCEDURE — 85027 COMPLETE CBC AUTOMATED: CPT | Performed by: FAMILY MEDICINE

## 2023-02-16 PROCEDURE — 25010000002 ONDANSETRON PER 1 MG: Performed by: FAMILY MEDICINE

## 2023-02-16 PROCEDURE — 80048 BASIC METABOLIC PNL TOTAL CA: CPT | Performed by: FAMILY MEDICINE

## 2023-02-16 PROCEDURE — 94799 UNLISTED PULMONARY SVC/PX: CPT

## 2023-02-16 PROCEDURE — 99232 SBSQ HOSP IP/OBS MODERATE 35: CPT | Performed by: INTERNAL MEDICINE

## 2023-02-16 PROCEDURE — 63710000001 DEXAMETHASONE PER 0.25 MG: Performed by: INTERNAL MEDICINE

## 2023-02-16 PROCEDURE — 97530 THERAPEUTIC ACTIVITIES: CPT

## 2023-02-16 PROCEDURE — 86140 C-REACTIVE PROTEIN: CPT | Performed by: FAMILY MEDICINE

## 2023-02-16 RX ORDER — FUROSEMIDE 10 MG/ML
20 INJECTION INTRAMUSCULAR; INTRAVENOUS
Status: DISCONTINUED | OUTPATIENT
Start: 2023-02-16 | End: 2023-02-17 | Stop reason: HOSPADM

## 2023-02-16 RX ORDER — DOCUSATE SODIUM 100 MG/1
100 CAPSULE, LIQUID FILLED ORAL 2 TIMES DAILY
Status: DISCONTINUED | OUTPATIENT
Start: 2023-02-16 | End: 2023-02-17 | Stop reason: HOSPADM

## 2023-02-16 RX ADMIN — INSULIN LISPRO 8 UNITS: 100 INJECTION, SOLUTION INTRAVENOUS; SUBCUTANEOUS at 11:19

## 2023-02-16 RX ADMIN — FERROUS SULFATE TAB 325 MG (65 MG ELEMENTAL FE) 325 MG: 325 (65 FE) TAB at 08:50

## 2023-02-16 RX ADMIN — INSULIN DETEMIR 10 UNITS: 100 INJECTION, SOLUTION SUBCUTANEOUS at 08:51

## 2023-02-16 RX ADMIN — ALBUTEROL SULFATE 2 PUFF: 90 AEROSOL, METERED RESPIRATORY (INHALATION) at 14:20

## 2023-02-16 RX ADMIN — HYDROCHLOROTHIAZIDE 12.5 MG: 25 TABLET ORAL at 08:49

## 2023-02-16 RX ADMIN — ALBUTEROL SULFATE 2 PUFF: 90 AEROSOL, METERED RESPIRATORY (INHALATION) at 06:47

## 2023-02-16 RX ADMIN — DOCUSATE SODIUM 100 MG: 100 CAPSULE ORAL at 20:49

## 2023-02-16 RX ADMIN — INSULIN DETEMIR 10 UNITS: 100 INJECTION, SOLUTION SUBCUTANEOUS at 20:49

## 2023-02-16 RX ADMIN — ONDANSETRON 4 MG: 2 INJECTION INTRAMUSCULAR; INTRAVENOUS at 09:58

## 2023-02-16 RX ADMIN — ASPIRIN 81 MG: 81 TABLET, COATED ORAL at 08:50

## 2023-02-16 RX ADMIN — DOCUSATE SODIUM 100 MG: 100 CAPSULE ORAL at 11:16

## 2023-02-16 RX ADMIN — METOPROLOL TARTRATE 12.5 MG: 25 TABLET, FILM COATED ORAL at 08:50

## 2023-02-16 RX ADMIN — CHLORHEXIDINE GLUCONATE 1 APPLICATION: 500 CLOTH TOPICAL at 03:55

## 2023-02-16 RX ADMIN — METOPROLOL TARTRATE 12.5 MG: 25 TABLET, FILM COATED ORAL at 20:49

## 2023-02-16 RX ADMIN — FINASTERIDE 5 MG: 5 TABLET, FILM COATED ORAL at 08:50

## 2023-02-16 RX ADMIN — FUROSEMIDE 20 MG: 10 INJECTION, SOLUTION INTRAMUSCULAR; INTRAVENOUS at 17:53

## 2023-02-16 RX ADMIN — LINAGLIPTIN 5 MG: 5 TABLET, FILM COATED ORAL at 08:50

## 2023-02-16 RX ADMIN — ISOSORBIDE MONONITRATE 30 MG: 30 TABLET, EXTENDED RELEASE ORAL at 08:50

## 2023-02-16 RX ADMIN — ALBUTEROL SULFATE 2 PUFF: 90 AEROSOL, METERED RESPIRATORY (INHALATION) at 10:38

## 2023-02-16 RX ADMIN — DEXAMETHASONE 6 MG: 4 TABLET ORAL at 08:49

## 2023-02-16 RX ADMIN — OXYCODONE HYDROCHLORIDE AND ACETAMINOPHEN 500 MG: 500 TABLET ORAL at 08:50

## 2023-02-16 RX ADMIN — PRAVASTATIN SODIUM 80 MG: 20 TABLET ORAL at 08:49

## 2023-02-16 RX ADMIN — GUAIFENESIN 600 MG: 600 TABLET, EXTENDED RELEASE ORAL at 08:50

## 2023-02-16 RX ADMIN — APIXABAN 5 MG: 5 TABLET, FILM COATED ORAL at 08:50

## 2023-02-16 RX ADMIN — APIXABAN 5 MG: 5 TABLET, FILM COATED ORAL at 20:49

## 2023-02-16 RX ADMIN — TERAZOSIN HYDROCHLORIDE 10 MG: 5 CAPSULE ORAL at 20:49

## 2023-02-16 RX ADMIN — ACETAMINOPHEN 650 MG: 325 TABLET, FILM COATED ORAL at 03:42

## 2023-02-16 RX ADMIN — BUDESONIDE AND FORMOTEROL FUMARATE DIHYDRATE 2 PUFF: 160; 4.5 AEROSOL RESPIRATORY (INHALATION) at 06:47

## 2023-02-16 RX ADMIN — FUROSEMIDE 40 MG: 10 INJECTION, SOLUTION INTRAMUSCULAR; INTRAVENOUS at 08:50

## 2023-02-16 RX ADMIN — BUDESONIDE AND FORMOTEROL FUMARATE DIHYDRATE 2 PUFF: 160; 4.5 AEROSOL RESPIRATORY (INHALATION) at 18:45

## 2023-02-16 RX ADMIN — ALBUTEROL SULFATE 2 PUFF: 90 AEROSOL, METERED RESPIRATORY (INHALATION) at 18:45

## 2023-02-16 NOTE — PROGRESS NOTES
[facility] Medicine Services  INPATIENT PROGRESS NOTE    Patient Name: Gonzalo Durham  Date of Admission: 2/7/2023  Today's Date: 02/16/23  Length of Stay: 9  Primary Care Physician: Abel Rmoero MD    Subjective   Chief Complaint: Respiratory failure.  COVID positive  HPI   Net urine output -1100, increasing creatinine, cut back Lasix to 20 twice a day for now.  Sodium decreased.  C-reactive protein is improving.  Increasing white blood cell probably secondary to steroid.  Blood pressures on the low side, afebrile.  Possible turn up the heat discussed with nursing staff.    Review of Systems   Constitutional: Positive for activity change, appetite change and fatigue. Negative for chills and fever.   HENT: Negative for hearing loss, nosebleeds, tinnitus and trouble swallowing.    Eyes: Negative for visual disturbance.   Respiratory: Positive for shortness of breath. Negative for cough, chest tightness and wheezing.    Cardiovascular: Negative for chest pain, palpitations and leg swelling.   Gastrointestinal: Negative for abdominal distention, abdominal pain, blood in stool, constipation, diarrhea, nausea and vomiting.   Endocrine: Negative for cold intolerance, heat intolerance, polydipsia, polyphagia and polyuria.   Genitourinary: Negative for decreased urine volume, difficulty urinating, dysuria, flank pain, frequency and hematuria.   Musculoskeletal: Positive for arthralgias, gait problem and myalgias. Negative for joint swelling.   Skin: Negative for rash.   Allergic/Immunologic: Negative for immunocompromised state.   Neurological: Positive for weakness. Negative for dizziness, syncope, light-headedness and headaches.   Hematological: Negative for adenopathy. Does not bruise/bleed easily.   Psychiatric/Behavioral: Negative for confusion and sleep disturbance. The patient is not nervous/anxious.    All pertinent negatives and positives are as above. All other systems  have been reviewed and are negative unless otherwise stated.     Objective    Temp:  [95.9 °F (35.5 °C)-96.7 °F (35.9 °C)] 96.2 °F (35.7 °C)  Heart Rate:  [66-99] 90  Resp:  [17-24] 20  BP: ()/() 83/72       Intake/Output Summary (Last 24 hours) at 2/16/2023 1241  Last data filed at 2/16/2023 0700  Gross per 24 hour   Intake --   Output 1100 ml   Net -1100 ml     Physical Exam  Vitals and nursing note reviewed.   Constitutional:       General: He is not in acute distress.     Appearance: He is not toxic-appearing.      Comments: Advanced age.  Chronically ill.  Hard of hearing.   HENT:      Head: Normocephalic.      Mouth/Throat:      Mouth: Mucous membranes are moist.      Pharynx: Oropharynx is clear.   Eyes:      Extraocular Movements: Extraocular movements intact.      Conjunctiva/sclera: Conjunctivae normal.      Pupils: Pupils are equal, round, and reactive to light.   Cardiovascular:      Rate and Rhythm: Normal rate and regular rhythm.      Pulses: Normal pulses.      Heart sounds: No murmur heard.  Pulmonary:      Effort: No respiratory distress.      Breath sounds: No wheezing or rhonchi.      Comments: On Vapotherm.  Diminished breath sound bilateral, clear .  Abdominal:      General: Bowel sounds are normal. There is no distension.      Palpations: Abdomen is soft.      Tenderness: There is no abdominal tenderness.   Musculoskeletal:         General: No swelling or tenderness. Normal range of motion.      Cervical back: Normal range of motion and neck supple. No muscular tenderness.   Skin:     General: Skin is warm and dry.      Capillary Refill: Capillary refill takes 2 to 3 seconds.      Findings: No erythema or rash.   Neurological:      General: No focal deficit present.      Mental Status: He is alert and oriented to person, place, and time.      Cranial Nerves: No cranial nerve deficit.      Motor: Weakness present.   Psychiatric:         Mood and Affect: Mood normal.         Behavior:  Behavior normal.       Results Review:  I have reviewed the labs, radiology results, and diagnostic studies.    Laboratory Data:   Results from last 7 days   Lab Units 02/16/23  0614 02/15/23  0507 02/14/23  0621   WBC 10*3/mm3 21.05* 12.78* 10.92*   HEMOGLOBIN g/dL 13.5 12.4* 12.5*   HEMATOCRIT % 42.2 39.4 39.5   PLATELETS 10*3/mm3 424 446 473*        Results from last 7 days   Lab Units 02/16/23  0614 02/15/23  0507 02/14/23  0645 02/13/23  0230 02/12/23  0226   SODIUM mmol/L 134* 136 136 136 136   POTASSIUM mmol/L 3.6 4.0 3.9 3.9 3.8   CHLORIDE mmol/L 91* 93* 92* 94* 94*   CO2 mmol/L 33.0* 31.0* 28.0 31.0* 29.0   BUN mg/dL 90* 82* 77* 70* 56*   CREATININE mg/dL 1.30* 1.20 1.13 1.17 0.99   CALCIUM mg/dL 9.7 8.6 9.4 9.3 9.2   BILIRUBIN mg/dL  --   --  0.5 0.4 0.5   ALK PHOS U/L  --   --  62 61 59   ALT (SGPT) U/L  --   --  17 18 21   AST (SGOT) U/L  --   --  15 13 15   GLUCOSE mg/dL 97 176* 210* 187* 236*       Culture Data:   No results found for: BLOODCX, URINECX, WOUNDCX, MRSACX, RESPCX, STOOLCX    Radiology Data:   Imaging Results (Last 24 Hours)     ** No results found for the last 24 hours. **          I have reviewed the patient's current medications.     Assessment/Plan   Assessment  Active Hospital Problems    Diagnosis    • **COVID-19    • S/P CABG x 3    • PAF (paroxysmal atrial fibrillation) ([facility])    • Pulmonary hypertension (HCC)    • Obstructive sleep apnea    • Chronic diastolic congestive heart failure (HCC)    • Stage 2 moderate COPD by GOLD classification ([facility])    • Gastroesophageal reflux disease    • Stage 3a chronic kidney disease (HCC)        Treatment Plan  Covid pneumonia/COPD exacerbation.  Patient had been vaccinated with Moderna x2 in the past. patient is on chronic 4 L oxygen at home. Patient finished remdesivir.  Patient completed Decadron 10 days course, continue Decadron per pulmonary..  Pulmonary consult.  Albuterol inhaler.  Rocephin antibiotics.  Vitamin C.  Symbicort.   Mucinex.  Leukocytosis increased.  C-reactive protein is improving.   Chest x-ray-Persistent and unchanged chronic inflammatory and obstructive lung changes bilaterally.  Vapotherm 30/65 . Nonrebreather mask when off CPAP.  CPAP overnight.     Atrial fibrillation/CAD.  Eliquis.  Metoprolol.  Aspirin . Pravastatin.  Imdur.  Hydrochlorothiazide.  Decrease IV Lasix due to increasing creatinine.  Nitro as needed.  Echocardiogram 10/26/2022- ejection fraction 56 - 60%, tricuspid regurgitation.     Diabetes.  Hold metformin.  High sliding scale.  Increase Levemir.    Continue Tradjenta.  Hemoglobin A1c 7.4.      Reflux.  Protonix.  Zofran as needed     Benign prostate hypertrophy.  Hytrin . Proscar.    Acute creatinine insufficiency.  Increasing creatinine.  Decrease Lasix.     Insomnia.  Melatonin at night as needed.     Anemia.  Hemoglobin increasing. Iron sulfate.     Nutrition . diabetic/consistent carb/regular texture diet.     Deconditioning.  PT consult.     Blood culture-no growth 5 days.  COVID-19-positive.  Flu screen- positive     Patient wants to go home to see his wife.  His wife in hospice due to throat cancer.     Medical Decision Making  Number and Complexity of problems: 10 problems, high complexity:  Covid-19 pneumonia and respiratory failure represent threat to life     Risk:  High:  Requiring high flow oxygen, high risk of morbidity/mortality, requiring ICU level monitoring     Differential Diagnosis: Pneumonia, Viral pneumonia, COPD, CHF     Conditions and Status        Condition is worsening.     MDM Data  Cardiac tracing (EKG, telemetry) interpretation: NSR  Labs reviewed: Normal renal function     Discussed with: patient, nursing     Care Planning  Shared decision making: Patient agreeable to treatment plan  Code status and discussions: full code     Disposition  Social Determinants of Health that impact treatment or disposition: n/a  LTAC placement consulted.    Electronically signed by Robert NO  MD Shaun, 02/16/23, 12:37 CST.

## 2023-02-16 NOTE — PLAN OF CARE
Goal Outcome Evaluation:      Pt remained on high flow 35L 70% throughout the night. VSS will continue to monitor

## 2023-02-16 NOTE — THERAPY TREATMENT NOTE
Acute Care - Physical Therapy Treatment Note  [facility]     Patient Name: Gonzalo Durham  : 1934  MRN: [id number]  Today's Date: 2023      Visit Dx:     ICD-10-CM ICD-9-CM   1. COVID-19  U07.1 079.89   2. Impaired mobility  Z74.09 799.89   3. Decreased activities of daily living (ADL)  Z78.9 V49.89     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, without long-term current use of insulin ([facility])   • Stage 3a chronic kidney disease (HCC)   • Erectile dysfunction   • Coronary artery disease involving native coronary artery of native heart without angina pectoris   • Hyperlipidemia LDL goal <70-statin   • Hypertension   • Prostatism-(young) urology   • Tremor   • Varicose vein of leg   • Plantar fasciitis   • Nocturnal leg movements   • Bad dreams   • Depression   • Peripheral neuropathy- clinical   • Hx of colonic polyps   • Gastroesophageal reflux disease   • Left lower quadrant pain   • Laboratory test*   • Anticoagulated-CAD, DM2, CVA/ASA 81+()+plavix » Anticoagulated-CAD, DM2, CVA/ASA 81+()+plavix+eliquist   • SOB: copd,CAD,#, hypoxemia   • Hypoxia#to pulmonary   • Corn or callus   • Anemia: ASA81,plavix,eliquis,gi(3/3+,div,cp,eitis   • Atherosclerosis: CAD, AAA vascular   • AAA: -(ro) steffen   • Heme positive stool   • Stage 2 moderate COPD by GOLD classification ([facility])   • Abnormal stress test   • Tingling of both feet-to consider NCV   • Cryptogenic stroke (HCC)   • Chronic diastolic congestive heart failure (HCC)   • Gross hematuria   • BPH with obstruction/lower urinary tract symptoms   • Chest pain   • Obstructive sleep apnea   • Abnormal CT of the chest /done   • 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)   • Oxygen dependent   • S/P CABG x 3   • Pulmonary hypertension (HCC)   • PAF (paroxysmal atrial fibrillation) (HCC)   • Umbilical hernia-a waqar   • COVID-19      Past Medical History:   Diagnosis Date   • A-fib (HCC)    • AAA (abdominal aortic aneurysm) without rupture    • Arthritis    • BPH (benign prostatic hypertrophy)    • Cataract     bialteral   • CKD (chronic kidney disease)    • COPD (chronic obstructive pulmonary disease) ([facility])    • Coronary artery disease involving native coronary artery of native heart without angina pectoris 1/20/2017    CABG/Az/[place]/1981 No TCC echo  7.16.18 Right ventricular cavity is mild-to-moderately dilated. Left ventricular diastolic dysfunction (grade I) consistent with impaired relaxation. Left ventricular systolic function is normal. Left atrial cavity size is borderline dilated. RIGHT HEART ENLARGEMENT - RVSP NOT ASSESSABLE NORMAL LV AND RV SYSTOLIC FUNCTION NO SIGNIFICANT VALVULAR DYSFUNCTION  Seein   • Diabetes mellitus ([facility])     Type 2   • DM (diabetes mellitus screen)    • GERD (gastroesophageal reflux disease)    • History of loop recorder     at current time   • Hx of colonic polyp    • Hypercholesteremia    • Hypertension    • Macular degeneration     treated with injections in right eye   • Myocardial infarction ([facility])    • Neuropathy    • Oxygen deficit     at 4liters   • Prostate cancer ([facility])    • Pulmonary hypertension ([facility]) 1/7/2022   • S/P CABG x 3 1/7/2022 1980 [facility]   • Sleep apnea     cpap   • Stage 3a chronic kidney disease (HCC) 1/20/2017   • Stroke ([facility])     recovererd   • Varicose vein of leg     bialteral     Past Surgical History:   Procedure Laterality Date   • APPENDECTOMY     • CARDIAC CATHETERIZATION     • CARDIAC CATHETERIZATION Left 5/22/2019    Procedure: Cardiac Catheterization/Vascular Study Positive occult blood in stools  ? BMS vs REGGIE Get cabg report  ;  Surgeon: Bradley Carver MD;  Location: [facility] CATH INVASIVE LOCATION;  Service: Cardiology   • COLONOSCOPY N/A 6/15/2017    Diverticulosis repeat exam prn   • COLONOSCOPY W/ POLYPECTOMY  10/21/2013    2 Tubular adenomatous polyps,  Diverticulosis repeat exam in 5 years   • CORONARY ARTERY BYPASS GRAFT  1980    X 3   • CYSTOSCOPY RETROGRADE PYELOGRAM Bilateral 2/8/2021    Procedure: CYSTOSCOPY RETROGRADE PYELOGRAM;  Surgeon: Danie Cadena MD;  Location:  [facility] OR;  Service: Urology;  Laterality: Bilateral;   • ENDOSCOPY N/A 6/15/2017    LA Grade A reflux esophagitis   • EYE SURGERY Bilateral    • HERNIA REPAIR     • TRANSURETHRAL RESECTION OF BLADDER TUMOR N/A 2/8/2021    Procedure: CYSTOSCOPY TRANSURETHRAL RESECTION OF BLADDER TUMOR WITH GEMCITABINE INSTILLATION;  Surgeon: Danie Cadena MD;  Location:  [facility] OR;  Service: Urology;  Laterality: N/A;     PT Assessment (last 12 hours)     PT Evaluation and Treatment     Row Name 02/16/23 0747          Physical Therapy Time and Intention    Subjective Information complains of;fatigue  -     Document Type therapy note (daily note)  -     Mode of Treatment physical therapy  -     Row Name 02/16/23 0747          General Information    Existing Precautions/Restrictions fall;oxygen therapy device and L/min  -     Limitations/Impairments hearing  -     Row Name 02/16/23 0747          Pain    Posttreatment Pain Rating 0/10 - no pain  -     Row Name 02/16/23 0747          Cognition    Orientation Status (Cognition) oriented x 4  -     Row Name 02/16/23 0747          Bed Mobility    Comment, (Bed Mobility) sitting EOB  -     Row Name 02/16/23 0747          Transfers    Comment, (Transfers) stood x4  -     Row Name 02/16/23 0747          Sit-Stand Transfer    Sit-Stand [place] (Transfers) standby assist  -     Row Name 02/16/23 0747          Stand-Sit Transfer    Stand-Sit [place] (Transfers) standby assist  -     Row Name 02/16/23 0747          Toilet Transfer    Type (Toilet Transfer) lateral;sit-stand;stand-sit  -     [place] Level (Toilet Transfer) standby assist  -     Row Name 02/16/23 0747          Gait/Stairs (Locomotion)    [place] Level  (Gait) standby assist  -     Distance in Feet (Gait) Marched in place 1 x30 steps bilaterally/ sit rest/ 1 x25 steps bilaterally.  -     Row Name 02/16/23 0747          Balance    Static Sitting Balance independent  -     Dynamic Sitting Balance independent  -     Position, Sitting Balance unsupported;sitting edge of bed  -     Row Name 02/16/23 0747          Motor Skills    Additional Documentation --  consoistent cues to slow down with exercising  -     Row Name 02/16/23 0747          Shoulder (Therapeutic Exercise)    Shoulder AROM (Therapeutic Exercise) bilateral;scapular retraction;scapular elevation  along with breathing  -     Row Name 02/16/23 0747          Hip (Therapeutic Exercise)    Hip (Therapeutic Exercise) AROM (active range of motion)  -     Row Name 02/16/23 0747          Knee (Therapeutic Exercise)    Knee AROM (Therapeutic Exercise) bilateral;flexion;LAQ (long arc quad)  -     Row Name 02/16/23 0747          Ankle (Therapeutic Exercise)    Ankle AROM (Therapeutic Exercise) bilateral;dorsiflexion;plantarflexion  -     Row Name             Wound 02/14/23 1048 coccyx    Wound - Properties Group Placement Date: 02/14/23  -MW Placement Time: 1048  -MW Location: coccyx  -MW    Retired Wound - Properties Group Placement Date: 02/14/23  -MW Placement Time: 1048  -MW Location: coccyx  -MW    Retired Wound - Properties Group Date first assessed: 02/14/23  -MW Time first assessed: 1048  -MW Location: coccyx  -MW    Row Name 02/16/23 0747          Vital Signs    Pre SpO2 (%) 96  -ERICA     O2 Delivery Pre Treatment --  vapo/ 30 LPM /65%  -ERICA     Intra SpO2 (%) 86  -ERICA     Post SpO2 (%) 95  -ERICA     Row Name 02/16/23 0747          Positioning and Restraints    Pre-Treatment Position --  sitting EOB  -ERICA     Post Treatment Position bed  -           User Key  (r) = Recorded By, (t) = Taken By, (c) = Cosigned By    Initials Name Provider Type    Franca Rudd, PTA Physical Therapist  Assistant    Elen Reyna, RN Registered Nurse                Physical Therapy Education     Title: PT OT SLP Therapies (In Progress)     Topic: Physical Therapy (In Progress)     Point: Mobility training (In Progress)     Learning Progress Summary           Patient Acceptance, E, NR by ERICA at 2/16/2023 0854    Comment: Pacing self with exercises and breathing    Acceptance, D, DU by ERICA at 2/15/2023 1307    Comment: Inspiratory exercises    Acceptance, E,D, DU,VU by ERICA at 2/14/2023 1426    Comment: Safety with transfers    Acceptance, E, VU,DU,NR by CHEYENNE at 2/8/2023 0905    Comment: benefits of acitvity, progression of PT                   Point: Home exercise program (Done)     Learning Progress Summary           Patient Acceptance, E,D, DU,VU by ERICA at 2/14/2023 1426    Comment: Safety with transfers    Acceptance, E,D, VU,DU by ERICA at 2/13/2023 1002    Comment: inspiratory exercises    Acceptance, E,D, DU by ERICA at 2/9/2023 0903    Comment: breathing exercises                   Point: Body mechanics (Not Started)     Learner Progress:  Not documented in this visit.          Point: Precautions (Not Started)     Learner Progress:  Not documented in this visit.                      User Key     Initials Effective Dates Name Provider Type Discipline     02/03/23 -  Rafi Browning, PT DPT Physical Therapist PT    ERICA 02/03/23 -  Franca Fuchs PTA Physical Therapist Assistant PT              PT Recommendation and Plan         Outcome Measures     Row Name 02/16/23 0800 02/15/23 1300 02/14/23 1400       How much help from another person do you currently need...    Turning from your back to your side while in flat bed without using bedrails? 4  -ERICA 4  -ERICA 4  -ERICA    Moving from lying on back to sitting on the side of a flat bed without bedrails? 4  -ERICA 4  -ERICA 4  -ERICA    Moving to and from a bed to a chair (including a wheelchair)? 3  -ERICA 3  -ERICA 3  -ERICA    Standing up from a chair using your arms (e.g., wheelchair,  bedside chair)? 3  -ERICA 3  -ERICA 3  -ERICA    Climbing 3-5 steps with a railing? 3  -ERICA 3  -ERICA 3  -ERICA    To walk in hospital room? 3  -ERICA 3  -ERICA 3  -ERICA    AM-PAC 6 Clicks Score (PT) 20  -ERICA 20  -ERICA 20  -ERICA    Row Name 02/13/23 1000             How much help from another person do you currently need...    Turning from your back to your side while in flat bed without using bedrails? 4  -ERICA      Moving from lying on back to sitting on the side of a flat bed without bedrails? 4  -ERICA      Moving to and from a bed to a chair (including a wheelchair)? 3  -ERICA      Standing up from a chair using your arms (e.g., wheelchair, bedside chair)? 3  -ERICA      Climbing 3-5 steps with a railing? 3  -ERICA      To walk in hospital room? 3  -ERICA      AM-PAC 6 Clicks Score (PT) 20  -ERICA            User Key  (r) = Recorded By, (t) = Taken By, (c) = Cosigned By    Initials Name Provider Type    Franca Rudd PTA Physical Therapist Assistant                 Time Calculation:    PT Charges     Row Name 02/16/23 0859             Time Calculation    Start Time 0747  -ERICA      Stop Time 0813  -ERICA      Time Calculation (min) 26 min  -ERICA         Timed Charges    22176 - PT Therapeutic Activity Minutes 26  -ERICA         Total Minutes    Timed Charges Total Minutes 26  -ERICA       Total Minutes 26  -ERICA            User Key  (r) = Recorded By, (t) = Taken By, (c) = Cosigned By    Initials Name Provider Type    Franca Rudd PTA Physical Therapist Assistant              Therapy Charges for Today     Code Description Service Date Service Provider Modifiers Qty    37937072827 HC PT THERAPEUTIC ACT EA 15 MIN 2/15/2023 Franca Fuchs PTA GP 2    29984780701 HC PT THERAPEUTIC ACT EA 15 MIN 2/16/2023 Franca Fuchs PTA GP 2          PT G-Codes  Outcome Measure Options: AM-PAC 6 Clicks Basic Mobility (PT)  AM-PAC 6 Clicks Score (PT): 20  AM-PAC 6 Clicks Score (OT): 23    Franca Fuchs PTA  2/16/2023

## 2023-02-16 NOTE — CASE MANAGEMENT/SOCIAL WORK
Continued Stay Note  [facility]     Patient Name: Gonzalo Durham  MRN: [id number]  Today's Date: 2/16/2023    Admit Date: 2/7/2023    Plan: Home vs Home with HH vs Swing Bed placement   Discharge Plan     Row Name 02/16/23 0940       Plan    Plan Home vs Home with HH vs Swing Bed placement    Plan Comments Patient continues in ICU on vapotherm.  Patient is working with physical therapy.  SW following to assist with discharge planning needs.               Discharge Codes    No documentation.                     CARRILLO Moon

## 2023-02-16 NOTE — PROGRESS NOTES
[facility] – [place], [place] PULMONARY AND CRITICAL CARE PROGRESS NOTE - [facility]    Patient: Gonzalo Durham  1934   MR# [id number]   Acct# [id number]  02/16/23   07:08 CST  Referring Provider: Robert Dunn MD    Chief Complaint: Covid-19     Interval history: He is afebrile.  He is in COVID isolation.  Saturation 94 30 L and FiO2 0.65.  He is sitting up on the side of the bed.  He appears to be breathing comfortably.    Therapy at bedside. No x-ray for review. CRP continues to trend down at 2.5  Dexamethasone being tapered. He has had adequate oral intake.      Meds:  albuterol sulfate HFA, 2 puff, Inhalation, 4x Daily - RT  apixaban, 5 mg, Oral, BID  ascorbic acid, 500 mg, Oral, Daily  aspirin, 81 mg, Oral, Daily  budesonide-formoterol, 2 puff, Inhalation, BID - RT  Chlorhexidine Gluconate Cloth, 1 application, Topical, Q24H  dexamethasone, 6 mg, Oral, Daily With Breakfast  ferrous sulfate, 325 mg, Oral, Daily With Breakfast  finasteride, 5 mg, Oral, Daily  furosemide, 40 mg, Intravenous, BID  guaiFENesin, 600 mg, Oral, Daily  hydroCHLOROthiazide, 12.5 mg, Oral, Daily  insulin detemir, 10 Units, Subcutaneous, Q12H  insulin lispro, 0-24 Units, Subcutaneous, 4x Daily With Meals & Nightly  isosorbide mononitrate, 30 mg, Oral, Daily  linagliptin, 5 mg, Oral, Daily  metoprolol tartrate, 12.5 mg, Oral, BID  pantoprazole, 40 mg, Oral, Q AM  pravastatin, 80 mg, Oral, Daily  terazosin, 10 mg, Oral, Nightly         Physical Exam:  SpO2 Percentage    02/16/23 0500 02/16/23 0600 02/16/23 0647   SpO2: 97% 94% 94%     Temp:  [95.9 °F (35.5 °C)-96.7 °F (35.9 °C)] 96.7 °F (35.9 °C)  Heart Rate:  [66-99] 73  Resp:  [15-24] 20  BP: ()/(56-91) 108/61    Intake/Output Summary (Last 24 hours) at 2/16/2023 0708  Last data filed at 2/16/2023 0400  Gross per 24 hour   Intake 150 ml   Output 900 ml   Net -750 ml     Body mass index is 23.43 kg/m².   Physical Exam   Vitals and nursing note reviewed.   Constitutional:        Comments: Vapotherm 30+0.65  HENT:      Head: Normocephalic.  Glasses     Nose: Nose normal.   Cardiovascular:      Rate and Rhythm: Normal rate. Rhythm irregular.   Pulmonary:      Effort: No respiratory distress.   Musculoskeletal:      Cervical back: Normal range of motion.      Comments: No edema    Skin:     Coloration: Skin is not pale.   Neurological:      Mental Status: He is alert and oriented.  Sitting up on the side of the bed working with therapy   Psychiatric:         Behavior: Behavior normal.      Electronically signed by DOMINIQUE Devine, 2/16/2023, 07:08 CST      Physician substantive portion: medical decision making    Result Review    Laboratory Data:  Results from last 7 days   Lab Units 02/16/23  0614 02/15/23  0507 02/14/23  0621   WBC 10*3/mm3 21.05* 12.78* 10.92*   HEMOGLOBIN g/dL 13.5 12.4* 12.5*   PLATELETS 10*3/mm3 424 446 473*     Results from last 7 days   Lab Units 02/16/23  0614 02/15/23  0507 02/14/23  0645   SODIUM mmol/L 134* 136 136   POTASSIUM mmol/L 3.6 4.0 3.9   CO2 mmol/L 33.0* 31.0* 28.0   BUN mg/dL 90* 82* 77*   CREATININE mg/dL 1.30* 1.20 1.13   CRP mg/dL 2.51* 3.84* 7.23*     Results from last 7 days   Lab Units 02/10/23  0028   PH, ARTERIAL pH units 7.460*  7.460*  7.460*  7.460*   PCO2, ARTERIAL mm Hg 41.6  41.6  41.6  41.6   PO2 ART mm Hg 45.0*  45.0*  45.0*  45.0*   FIO2 % 100  100  100  100     Microbiology Results (last 10 days)     Procedure Component Value - Date/Time    Blood Culture - Blood, Hand, Right [[id number]]  (Normal) Collected: 02/07/23 1909    Lab Status: Final result Specimen: Blood from Hand, Right Updated: 02/12/23 1945     Blood Culture No growth at 5 days    Blood Culture - Blood, Arm, Left [[id number]]  (Normal) Collected: 02/07/23 1909    Lab Status: Final result Specimen: Blood from Arm, Left Updated: 02/12/23 1945     Blood Culture No growth at 5 days    COVID-19 and FLU A/B PCR - Swab, Nasopharynx [[id number]]  (Abnormal)  Collected: 02/07/23 0928    Lab Status: Final result Specimen: Swab from Nasopharynx Updated: 02/07/23 1010     COVID19 Detected     Influenza A PCR Not Detected     Influenza B PCR Not Detected    Narrative:      Fact sheet for providers: https://www.fda.gov/media/366403/download    Fact sheet for patients: https://www.fda.gov/media/874803/download    Test performed by PCR.  Influenza A and Influenza B negative results should be considered presumptive in samples that have a positive SARS-CoV-2 result.    Competitive inhibition studies showed that SARS-CoV-2 virus, when present at concentrations above 3.6E+04 copies/mL, can inhibit the detection and amplification of influenza A and influenza B virus RNA if present at or below 1.8E+02 copies/mL or 4.9E+02 copies/mL, respectively, and may lead to false negative influenza virus results. If co-infection with influenza A or influenza B virus is suspected in samples with a positive SARS-CoV-2 result, the sample should be re-tested with another FDA cleared, approved, or authorized influenza test, if influenza virus detection would change clinical management.         Recent films:  No radiology results from the last 24 hrs       Pulmonary Assessment:  1. Acute resp failure with hypoxia improving, severe  2. Covid    Recommend/plan:   · Continue high flow oxygen, taper/titrate as tolerated  · Continue ICU care  · Out of bed    This visit was performed by both a physician and an [facility] RN.  I personally evaluated and examined the patient.  I performed all aspects of the medical decision making as documented.  Electronically signed by Parrish Cardona MD, 2/16/2023, 10:55 CST

## 2023-02-16 NOTE — PLAN OF CARE
Goal Outcome Evaluation:   Patient sitting EOB upon entering. Began with shoulders and inspiratory exercises. Moved to LE exercises. SATs 90-88% at this point. Stood x2 and marched in place up to 30 steps bilaterally x2. SATs on 30Lpm and 65% began at 94% dropped to 86% - recovered to 92% in 35 seconds. Will continue to benefit from strengthening activities.

## 2023-02-16 NOTE — CASE MANAGEMENT/SOCIAL WORK
Continued Stay Note  [facility]     Patient Name: Gonzalo Durham  MRN: [id number]  Today's Date: 2/16/2023    Admit Date: 2/7/2023    Plan: LTAC referral   Discharge Plan     Row Name 02/16/23 1150       Plan    Plan LTAC referral    Patient/Family in Agreement with Plan yes    Provided Post Acute Provider List? Yes    Post Acute Provider List Long Term Acute Care    Provided Post Acute Provider Quality & Resource List? Yes    Post Acute Provider Quality and Resource List Long Term Acute Care    Delivered To Support Person    Support Person Daughter - Suyapa Osborn    Method of Delivery Telephone    Plan Comments LTAC referral received.  SW spoke with patient's daughter and she was in agreement with referral.  SW attempted to contact patient on his cell phone and received voicemail.  Patient currently in isolation.   esme Flores with [facility] of referral.    Row Name 02/16/23 0925       Plan    Plan Home vs Home with HH vs Swing Bed placement    Plan Comments Patient continues in ICU on vapotherm.  Patient is working with physical therapy.  SW following to assist with discharge planning needs.               Discharge Codes    No documentation.                     LUCINA MoonW

## 2023-02-17 ENCOUNTER — HOSPITAL ENCOUNTER (OUTPATIENT)
Facility: HOSPITAL | Age: 88
Discharge: HOME | End: 2023-03-03
Attending: INTERNAL MEDICINE | Admitting: INTERNAL MEDICINE
Payer: COMMERCIAL

## 2023-02-17 VITALS
DIASTOLIC BLOOD PRESSURE: 73 MMHG | WEIGHT: 166.3 LBS | OXYGEN SATURATION: 97 % | HEART RATE: 70 BPM | HEIGHT: 71 IN | TEMPERATURE: 98.6 F | BODY MASS INDEX: 23.28 KG/M2 | RESPIRATION RATE: 20 BRPM | SYSTOLIC BLOOD PRESSURE: 106 MMHG

## 2023-02-17 PROBLEM — D89.831 CYTOKINE RELEASE SYNDROME, GRADE 1: Status: ACTIVE | Noted: 2023-02-17

## 2023-02-17 LAB
ANION GAP SERPL CALCULATED.3IONS-SCNC: 13 MMOL/L (ref 5–15)
BUN SERPL-MCNC: 91 MG/DL (ref 8–23)
BUN/CREAT SERPL: 72.8 (ref 7–25)
CALCIUM SPEC-SCNC: 9.6 MG/DL (ref 8.6–10.5)
CHLORIDE SERPL-SCNC: 88 MMOL/L (ref 98–107)
CO2 SERPL-SCNC: 34 MMOL/L (ref 22–29)
CREAT SERPL-MCNC: 1.25 MG/DL (ref 0.76–1.27)
DEPRECATED RDW RBC AUTO: 45.4 FL (ref 37–54)
EGFRCR SERPLBLD CKD-EPI 2021: 55 ML/MIN/1.73
ERYTHROCYTE [DISTWIDTH] IN BLOOD BY AUTOMATED COUNT: 14 % (ref 12.3–15.4)
GLUCOSE BLDC GLUCOMTR-MCNC: 127 MG/DL (ref 70–130)
GLUCOSE BLDC GLUCOMTR-MCNC: 131 MG/DL (ref 70–130)
GLUCOSE BLDC GLUCOMTR-MCNC: 179 MG/DL (ref 70–130)
GLUCOSE BLDC GLUCOMTR-MCNC: 205 MG/DL (ref 70–130)
GLUCOSE SERPL-MCNC: 140 MG/DL (ref 65–99)
HCT VFR BLD AUTO: 42.2 % (ref 37.5–51)
HGB BLD-MCNC: 13.2 G/DL (ref 13–17.7)
MCH RBC QN AUTO: 27.4 PG (ref 26.6–33)
MCHC RBC AUTO-ENTMCNC: 31.3 G/DL (ref 31.5–35.7)
MCV RBC AUTO: 87.7 FL (ref 79–97)
PLATELET # BLD AUTO: 397 10*3/MM3 (ref 140–450)
PMV BLD AUTO: 10 FL (ref 6–12)
POTASSIUM SERPL-SCNC: 4 MMOL/L (ref 3.5–5.2)
RBC # BLD AUTO: 4.81 10*6/MM3 (ref 4.14–5.8)
SODIUM SERPL-SCNC: 135 MMOL/L (ref 136–145)
WBC NRBC COR # BLD: 18.31 10*3/MM3 (ref 3.4–10.8)

## 2023-02-17 PROCEDURE — 63710000001 INSULIN LISPRO (HUMAN) PER 5 UNITS: Performed by: INTERNAL MEDICINE

## 2023-02-17 PROCEDURE — 94799 UNLISTED PULMONARY SVC/PX: CPT

## 2023-02-17 PROCEDURE — 99232 SBSQ HOSP IP/OBS MODERATE 35: CPT | Performed by: INTERNAL MEDICINE

## 2023-02-17 PROCEDURE — 63710000001 INSULIN DETEMIR PER 5 UNITS: Performed by: FAMILY MEDICINE

## 2023-02-17 PROCEDURE — 25010000002 FUROSEMIDE PER 20 MG: Performed by: FAMILY MEDICINE

## 2023-02-17 PROCEDURE — 82962 GLUCOSE BLOOD TEST: CPT

## 2023-02-17 PROCEDURE — 85027 COMPLETE CBC AUTOMATED: CPT | Performed by: FAMILY MEDICINE

## 2023-02-17 PROCEDURE — 25010000002 FUROSEMIDE PER 20 MG: Performed by: INTERNAL MEDICINE

## 2023-02-17 PROCEDURE — 63710000001 DEXAMETHASONE PER 0.25 MG: Performed by: INTERNAL MEDICINE

## 2023-02-17 PROCEDURE — 63710000001 INSULIN DETEMIR PER 5 UNITS: Performed by: INTERNAL MEDICINE

## 2023-02-17 PROCEDURE — 80048 BASIC METABOLIC PNL TOTAL CA: CPT | Performed by: FAMILY MEDICINE

## 2023-02-17 RX ORDER — TERAZOSIN 5 MG/1
10 CAPSULE ORAL NIGHTLY
Status: DISCONTINUED | OUTPATIENT
Start: 2023-02-17 | End: 2023-03-03 | Stop reason: HOSPADM

## 2023-02-17 RX ORDER — PANTOPRAZOLE SODIUM 40 MG/1
40 TABLET, DELAYED RELEASE ORAL
Status: DISCONTINUED | OUTPATIENT
Start: 2023-02-18 | End: 2023-03-03 | Stop reason: HOSPADM

## 2023-02-17 RX ORDER — ISOSORBIDE MONONITRATE 30 MG/1
30 TABLET, EXTENDED RELEASE ORAL DAILY
Status: DISCONTINUED | OUTPATIENT
Start: 2023-02-18 | End: 2023-03-03 | Stop reason: HOSPADM

## 2023-02-17 RX ORDER — LANOLIN ALCOHOL/MO/W.PET/CERES
3 CREAM (GRAM) TOPICAL NIGHTLY PRN
Start: 2023-02-17

## 2023-02-17 RX ORDER — ONDANSETRON 4 MG/1
4 TABLET, FILM COATED ORAL EVERY 6 HOURS PRN
Status: DISCONTINUED | OUTPATIENT
Start: 2023-02-17 | End: 2023-03-03 | Stop reason: HOSPADM

## 2023-02-17 RX ORDER — LANOLIN ALCOHOL/MO/W.PET/CERES
3 CREAM (GRAM) TOPICAL NIGHTLY PRN
Status: DISCONTINUED | OUTPATIENT
Start: 2023-02-17 | End: 2023-03-03 | Stop reason: HOSPADM

## 2023-02-17 RX ORDER — BUDESONIDE AND FORMOTEROL FUMARATE DIHYDRATE 160; 4.5 UG/1; UG/1
2 AEROSOL RESPIRATORY (INHALATION)
Refills: 12
Start: 2023-02-17 | End: 2023-03-09

## 2023-02-17 RX ORDER — ONDANSETRON 2 MG/ML
4 INJECTION INTRAMUSCULAR; INTRAVENOUS EVERY 6 HOURS PRN
Status: DISCONTINUED | OUTPATIENT
Start: 2023-02-17 | End: 2023-03-03 | Stop reason: HOSPADM

## 2023-02-17 RX ORDER — ASCORBIC ACID 500 MG
500 TABLET ORAL DAILY
Status: DISCONTINUED | OUTPATIENT
Start: 2023-02-18 | End: 2023-03-03 | Stop reason: HOSPADM

## 2023-02-17 RX ORDER — FERROUS SULFATE 325(65) MG
325 TABLET ORAL
Status: DISCONTINUED | OUTPATIENT
Start: 2023-02-18 | End: 2023-03-03 | Stop reason: HOSPADM

## 2023-02-17 RX ORDER — DEXTROSE MONOHYDRATE 25 G/50ML
25 INJECTION, SOLUTION INTRAVENOUS
Status: DISCONTINUED | OUTPATIENT
Start: 2023-02-17 | End: 2023-03-03 | Stop reason: HOSPADM

## 2023-02-17 RX ORDER — ASPIRIN 81 MG/1
81 TABLET ORAL DAILY
Status: DISCONTINUED | OUTPATIENT
Start: 2023-02-18 | End: 2023-03-03 | Stop reason: HOSPADM

## 2023-02-17 RX ORDER — CHLORHEXIDINE GLUCONATE 500 MG/1
1 CLOTH TOPICAL EVERY 24 HOURS
Start: 2023-02-18 | End: 2023-03-09

## 2023-02-17 RX ORDER — GUAIFENESIN 600 MG/1
600 TABLET, EXTENDED RELEASE ORAL DAILY
Status: DISCONTINUED | OUTPATIENT
Start: 2023-02-18 | End: 2023-03-03 | Stop reason: HOSPADM

## 2023-02-17 RX ORDER — SODIUM CHLORIDE 0.9 % (FLUSH) 0.9 %
10 SYRINGE (ML) INJECTION EVERY 12 HOURS SCHEDULED
Status: DISCONTINUED | OUTPATIENT
Start: 2023-02-17 | End: 2023-03-03 | Stop reason: HOSPADM

## 2023-02-17 RX ORDER — POLYETHYLENE GLYCOL 3350 17 G/17G
17 POWDER, FOR SOLUTION ORAL DAILY
Start: 2023-02-17

## 2023-02-17 RX ORDER — DEXTROSE MONOHYDRATE 25 G/50ML
25 INJECTION, SOLUTION INTRAVENOUS
Start: 2023-02-17 | End: 2023-03-09

## 2023-02-17 RX ORDER — NICOTINE POLACRILEX 4 MG
15 LOZENGE BUCCAL
Status: DISCONTINUED | OUTPATIENT
Start: 2023-02-17 | End: 2023-03-03 | Stop reason: HOSPADM

## 2023-02-17 RX ORDER — ONDANSETRON 2 MG/ML
4 INJECTION INTRAMUSCULAR; INTRAVENOUS EVERY 6 HOURS PRN
Start: 2023-02-17 | End: 2023-03-09

## 2023-02-17 RX ORDER — HYDROCHLOROTHIAZIDE 25 MG/1
12.5 TABLET ORAL DAILY
Status: DISCONTINUED | OUTPATIENT
Start: 2023-02-18 | End: 2023-03-03 | Stop reason: HOSPADM

## 2023-02-17 RX ORDER — SODIUM CHLORIDE 0.9 % (FLUSH) 0.9 %
10 SYRINGE (ML) INJECTION AS NEEDED
Status: DISCONTINUED | OUTPATIENT
Start: 2023-02-17 | End: 2023-03-03 | Stop reason: HOSPADM

## 2023-02-17 RX ORDER — NITROGLYCERIN 0.4 MG/1
0.4 TABLET SUBLINGUAL
Status: DISCONTINUED | OUTPATIENT
Start: 2023-02-17 | End: 2023-03-03 | Stop reason: HOSPADM

## 2023-02-17 RX ORDER — ALBUTEROL SULFATE 2.5 MG/3ML
2.5 SOLUTION RESPIRATORY (INHALATION)
Status: DISCONTINUED | OUTPATIENT
Start: 2023-02-17 | End: 2023-03-03 | Stop reason: HOSPADM

## 2023-02-17 RX ORDER — INSULIN LISPRO 100 [IU]/ML
0-9 INJECTION, SOLUTION INTRAVENOUS; SUBCUTANEOUS
Status: DISCONTINUED | OUTPATIENT
Start: 2023-02-17 | End: 2023-03-03 | Stop reason: HOSPADM

## 2023-02-17 RX ORDER — ACETAMINOPHEN 650 MG/1
650 SUPPOSITORY RECTAL EVERY 4 HOURS PRN
Status: DISCONTINUED | OUTPATIENT
Start: 2023-02-17 | End: 2023-03-03 | Stop reason: HOSPADM

## 2023-02-17 RX ORDER — FINASTERIDE 5 MG/1
5 TABLET, FILM COATED ORAL DAILY
Status: DISCONTINUED | OUTPATIENT
Start: 2023-02-18 | End: 2023-03-03 | Stop reason: HOSPADM

## 2023-02-17 RX ORDER — PRAVASTATIN SODIUM 40 MG
80 TABLET ORAL DAILY
Status: DISCONTINUED | OUTPATIENT
Start: 2023-02-18 | End: 2023-03-03 | Stop reason: HOSPADM

## 2023-02-17 RX ORDER — NICOTINE POLACRILEX 4 MG
15 LOZENGE BUCCAL
Start: 2023-02-17 | End: 2023-03-09

## 2023-02-17 RX ORDER — BUDESONIDE 0.5 MG/2ML
0.5 INHALANT ORAL
Status: DISCONTINUED | OUTPATIENT
Start: 2023-02-17 | End: 2023-03-03 | Stop reason: HOSPADM

## 2023-02-17 RX ORDER — FUROSEMIDE 10 MG/ML
20 INJECTION INTRAMUSCULAR; INTRAVENOUS 2 TIMES DAILY
Start: 2023-02-17 | End: 2023-03-09

## 2023-02-17 RX ORDER — POLYETHYLENE GLYCOL 3350 17 G/17G
17 POWDER, FOR SOLUTION ORAL DAILY
Status: DISCONTINUED | OUTPATIENT
Start: 2023-02-17 | End: 2023-02-17 | Stop reason: HOSPADM

## 2023-02-17 RX ORDER — DOCUSATE SODIUM 100 MG/1
100 CAPSULE, LIQUID FILLED ORAL 2 TIMES DAILY
Status: DISCONTINUED | OUTPATIENT
Start: 2023-02-17 | End: 2023-03-03 | Stop reason: HOSPADM

## 2023-02-17 RX ORDER — ACETAMINOPHEN 325 MG/1
650 TABLET ORAL EVERY 4 HOURS PRN
Status: DISCONTINUED | OUTPATIENT
Start: 2023-02-17 | End: 2023-03-03 | Stop reason: HOSPADM

## 2023-02-17 RX ORDER — PSEUDOEPHEDRINE HCL 30 MG
100 TABLET ORAL 2 TIMES DAILY
Start: 2023-02-17

## 2023-02-17 RX ORDER — ALBUTEROL SULFATE 2.5 MG/3ML
2.5 SOLUTION RESPIRATORY (INHALATION) EVERY 4 HOURS PRN
Status: DISCONTINUED | OUTPATIENT
Start: 2023-02-17 | End: 2023-03-03 | Stop reason: HOSPADM

## 2023-02-17 RX ORDER — FUROSEMIDE 10 MG/ML
20 INJECTION INTRAMUSCULAR; INTRAVENOUS
Status: DISCONTINUED | OUTPATIENT
Start: 2023-02-17 | End: 2023-02-24

## 2023-02-17 RX ADMIN — METOPROLOL TARTRATE 12.5 MG: 25 TABLET, FILM COATED ORAL at 08:13

## 2023-02-17 RX ADMIN — CHLORHEXIDINE GLUCONATE 1 APPLICATION: 500 CLOTH TOPICAL at 04:38

## 2023-02-17 RX ADMIN — ALBUTEROL SULFATE 2 PUFF: 90 AEROSOL, METERED RESPIRATORY (INHALATION) at 14:24

## 2023-02-17 RX ADMIN — ACETAMINOPHEN 650 MG: 325 TABLET, FILM COATED ORAL at 11:12

## 2023-02-17 RX ADMIN — FINASTERIDE 5 MG: 5 TABLET, FILM COATED ORAL at 08:13

## 2023-02-17 RX ADMIN — OXYCODONE HYDROCHLORIDE AND ACETAMINOPHEN 500 MG: 500 TABLET ORAL at 08:13

## 2023-02-17 RX ADMIN — POLYETHYLENE GLYCOL 3350 17 G: 17 POWDER, FOR SOLUTION ORAL at 12:35

## 2023-02-17 RX ADMIN — LINAGLIPTIN 5 MG: 5 TABLET, FILM COATED ORAL at 08:13

## 2023-02-17 RX ADMIN — DOCUSATE SODIUM 100 MG: 100 CAPSULE ORAL at 08:13

## 2023-02-17 RX ADMIN — FUROSEMIDE 20 MG: 10 INJECTION, SOLUTION INTRAMUSCULAR; INTRAVENOUS at 08:14

## 2023-02-17 RX ADMIN — APIXABAN 5 MG: 5 TABLET, FILM COATED ORAL at 08:13

## 2023-02-17 RX ADMIN — PRAVASTATIN SODIUM 80 MG: 20 TABLET ORAL at 08:13

## 2023-02-17 RX ADMIN — ALBUTEROL SULFATE 2 PUFF: 90 AEROSOL, METERED RESPIRATORY (INHALATION) at 06:50

## 2023-02-17 RX ADMIN — BUDESONIDE AND FORMOTEROL FUMARATE DIHYDRATE 2 PUFF: 160; 4.5 AEROSOL RESPIRATORY (INHALATION) at 06:50

## 2023-02-17 RX ADMIN — FERROUS SULFATE TAB 325 MG (65 MG ELEMENTAL FE) 325 MG: 325 (65 FE) TAB at 08:13

## 2023-02-17 RX ADMIN — ISOSORBIDE MONONITRATE 30 MG: 30 TABLET, EXTENDED RELEASE ORAL at 08:13

## 2023-02-17 RX ADMIN — PANTOPRAZOLE SODIUM 40 MG: 40 TABLET, DELAYED RELEASE ORAL at 05:52

## 2023-02-17 RX ADMIN — ALBUTEROL SULFATE 2 PUFF: 90 AEROSOL, METERED RESPIRATORY (INHALATION) at 10:11

## 2023-02-17 RX ADMIN — INSULIN DETEMIR 10 UNITS: 100 INJECTION, SOLUTION SUBCUTANEOUS at 08:14

## 2023-02-17 RX ADMIN — ASPIRIN 81 MG: 81 TABLET, COATED ORAL at 08:13

## 2023-02-17 RX ADMIN — GUAIFENESIN 600 MG: 600 TABLET, EXTENDED RELEASE ORAL at 08:13

## 2023-02-17 RX ADMIN — DEXAMETHASONE 6 MG: 4 TABLET ORAL at 08:12

## 2023-02-17 RX ADMIN — HYDROCHLOROTHIAZIDE 12.5 MG: 25 TABLET ORAL at 08:13

## 2023-02-17 NOTE — PLAN OF CARE
Goal Outcome Evaluation:               Pt still on Vapotherm 30/65. Not much of an appetite today. No other issues or concerns. Possible Ltach tomorrow per case management

## 2023-02-17 NOTE — PROGRESS NOTES
[facility] PULMONARY AND CRITICAL CARE PROGRESS NOTE - [facility]    Patient: Gonzalo Durham  1934   MR# [id number]   Acct# [id number]  02/17/23   08:30 CST  Referring Provider: Robert Dunn MD    Chief Complaint: Covid-19     Interval history:The patient was seen from outside the glass to preserve PPE and limit exposure. He is seen resting in bed. Oxygen saturation 96% on vapotherm 35/70. Dexamethasone to complete today. Afebrile. Oxygen need continues to trend down. No new imaging for review. No new issues reported overnight.       Meds:  albuterol sulfate HFA, 2 puff, Inhalation, 4x Daily - RT  apixaban, 5 mg, Oral, BID  ascorbic acid, 500 mg, Oral, Daily  aspirin, 81 mg, Oral, Daily  budesonide-formoterol, 2 puff, Inhalation, BID - RT  Chlorhexidine Gluconate Cloth, 1 application, Topical, Q24H  docusate sodium, 100 mg, Oral, BID  ferrous sulfate, 325 mg, Oral, Daily With Breakfast  finasteride, 5 mg, Oral, Daily  furosemide, 20 mg, Intravenous, BID  guaiFENesin, 600 mg, Oral, Daily  hydroCHLOROthiazide, 12.5 mg, Oral, Daily  insulin detemir, 10 Units, Subcutaneous, Q12H  insulin lispro, 0-24 Units, Subcutaneous, 4x Daily With Meals & Nightly  isosorbide mononitrate, 30 mg, Oral, Daily  linagliptin, 5 mg, Oral, Daily  metoprolol tartrate, 12.5 mg, Oral, BID  pantoprazole, 40 mg, Oral, Q AM  pravastatin, 80 mg, Oral, Daily  terazosin, 10 mg, Oral, Nightly         Physical Exam:  SpO2 Percentage    02/17/23 0500 02/17/23 0600 02/17/23 0650   SpO2: 91% 97% 91%     Temp:  [98 °F (36.7 °C)-98.6 °F (37 °C)] 98 °F (36.7 °C)  Heart Rate:  [] 75  Resp:  [18-23] 19  BP: ()/(53-87) 128/64    Intake/Output Summary (Last 24 hours) at 2/17/2023 0830  Last data filed at 2/17/2023 0800  Gross per 24 hour   Intake 200 ml   Output 2250 ml   Net -2050 ml     Body mass index is 23.19 kg/m².   Physical Exam  Constitutional:       General: He is not in acute distress.     Interventions: Nasal cannula in  place.      Comments: vapotherm 35/70   HENT:      Head: Normocephalic.      Nose: Nose normal.      Mouth/Throat:      Mouth: Mucous membranes are moist.   Eyes:      General: No scleral icterus.  Cardiovascular:      Rate and Rhythm: Normal rate. Rhythm irregular.      Comments: Rate 68  Pulmonary:      Effort: No respiratory distress.   Abdominal:      General: There is no distension.   Neurological:      Mental Status: He is alert and oriented to person, place, and time.   Psychiatric:         Mood and Affect: Mood normal.         Behavior: Behavior is cooperative.        Electronically signed by DOMINIQUE Rosales, 2/17/2023, 08:30 CST      Physician substantive portion: medical decision making    Result Review    Laboratory Data:  Results from last 7 days   Lab Units 02/17/23  0530 02/16/23  0614 02/15/23  0507   WBC 10*3/mm3 18.31* 21.05* 12.78*   HEMOGLOBIN g/dL 13.2 13.5 12.4*   PLATELETS 10*3/mm3 397 424 446     Results from last 7 days   Lab Units 02/17/23  0530 02/16/23  0614 02/15/23  0507   SODIUM mmol/L 135* 134* 136   POTASSIUM mmol/L 4.0 3.6 4.0   CO2 mmol/L 34.0* 33.0* 31.0*   BUN mg/dL 91* 90* 82*   CREATININE mg/dL 1.25 1.30* 1.20   CRP mg/dL  --  2.51* 3.84*         Microbiology Results (last 10 days)     Procedure Component Value - Date/Time    Blood Culture - Blood, Hand, Right [[id number]]  (Normal) Collected: 02/07/23 1909    Lab Status: Final result Specimen: Blood from Hand, Right Updated: 02/12/23 1945     Blood Culture No growth at 5 days    Blood Culture - Blood, Arm, Left [[id number]]  (Normal) Collected: 02/07/23 1909    Lab Status: Final result Specimen: Blood from Arm, Left Updated: 02/12/23 1945     Blood Culture No growth at 5 days    COVID-19 and FLU A/B PCR - Swab, Nasopharynx [[id number]]  (Abnormal) Collected: 02/07/23 0928    Lab Status: Final result Specimen: Swab from Nasopharynx Updated: 02/07/23 1010     COVID19 Detected     Influenza A PCR Not Detected     Influenza B PCR  Not Detected    Narrative:      Fact sheet for providers: https://www.fda.gov/media/897730/download    Fact sheet for patients: https://www.fda.gov/media/799241/download    Test performed by PCR.  Influenza A and Influenza B negative results should be considered presumptive in samples that have a positive SARS-CoV-2 result.    Competitive inhibition studies showed that SARS-CoV-2 virus, when present at concentrations above 3.6E+04 copies/mL, can inhibit the detection and amplification of influenza A and influenza B virus RNA if present at or below 1.8E+02 copies/mL or 4.9E+02 copies/mL, respectively, and may lead to false negative influenza virus results. If co-infection with influenza A or influenza B virus is suspected in samples with a positive SARS-CoV-2 result, the sample should be re-tested with another FDA cleared, approved, or authorized influenza test, if influenza virus detection would change clinical management.         Recent films:  No radiology results from the last 24 hrs       Pulmonary Assessment:  1. Covid, severe, improved  2. Acute resp failure with hypoxia, severe, improved    Recommend/plan:   · Continue high flow oxygen and taper down as tolerated.  May take several more days  · Complete dexamethasone course today  · Mobilize as tolerated  · No additional recommendations for therapy at this point.  We will sign off but available should his trajectory change.    This visit was performed by both a physician and an Advanced Practice RN.  I personally evaluated and examined the patient.  I performed all aspects of the medical decision making as documented.  Electronically signed by Parrish Cardona MD, 2/17/2023, 08:40 CST

## 2023-02-17 NOTE — CASE MANAGEMENT/SOCIAL WORK
Continued Stay Note  [facility]     Patient Name: Gonzalo Durham  MRN: [id number]  Today's Date: 2/17/2023    Admit Date: 2/7/2023    Plan: Referral to [facility]   Discharge Plan     Row Name 02/17/23 0946       Plan    Plan Referral to [facility]    Plan Comments Left a message for Jeanette in admissions with [facility] to check status of referral. Will follow for update.                    LORETO Mary

## 2023-02-17 NOTE — PLAN OF CARE
Goal Outcome Evaluation:              Outcome Evaluation: Pt with productive cough through shift. Vapotherm 35L 70%. No neuro changes, hemodynamically stable.

## 2023-02-17 NOTE — DISCHARGE SUMMARY
[facility] Medicine Services  DISCHARGE SUMMARY       Date of Admission: 2/7/2023  Date of Discharge:  2/17/2023  Primary Care Physician: Abel Romero MD    Presenting Problem/History of Present Illness:    Final Discharge Diagnoses:  Active Hospital Problems    Diagnosis    • **COVID-19    • Cytokine release syndrome, grade 1    • S/P CABG x 3    • PAF (paroxysmal atrial fibrillation) ([facility])    • Pulmonary hypertension ([facility])    • Obstructive sleep apnea    • Chronic diastolic congestive heart failure ([facility])    • Stage 2 moderate COPD by GOLD classification ([facility])    • Gastroesophageal reflux disease    • Stage 3a chronic kidney disease ([facility])        Consults: Pulmonary    Pertinent Test Results:   Results for orders placed during the hospital encounter of 10/26/22    Adult Transthoracic Echo Complete w/ Color, Spectral and Contrast if necessary per protocol    Interpretation Summary  •  Left ventricular systolic function is normal. Left ventricular ejection fraction appears to be 56 - 60%.  •  Left ventricular diastolic function was normal.  •  Abnormal global longitudinal LV strain (GLS) = -11.0%  •  Estimated right ventricular systolic pressure from tricuspid regurgitation is normal (<35 mmHg).      Imaging Results (All)     Procedure Component Value Units Date/Time    XR Chest 1 View [[id number]] Collected: 02/10/23 0638     Updated: 02/10/23 0643    Narrative:      EXAMINATION: XR CHEST 1 VW-     2/10/2023 1:07 AM CST     HISTORY: dyspnea; Z74.09-Other reduced mobility; Z78.9-Other specified  health status     A frontal projection of the chest is compared with the previous study  dated 02/07/2023.     There are extensive coarse interstitial changes in the lower lungs  bilaterally similar to the previous study.     Chronic emphysematous changes are seen in the upper lungs bilaterally,  right more than the left, unchanged.     There is no pulmonary congestion or  pneumothorax.     The heart size is not evaluated due to obliteration of the cardiac  border by the adjacent infiltrate. Atheromatous changes thoracic aorta  noted. There is evidence of prior cardiac surgery.     The cardiac 2 monitors in place.     No acute bony abnormality.       Impression:      1. Persistent and unchanged chronic inflammatory and obstructive lung  changes bilaterally.        This report was finalized on 02/10/2023 06:40 by Dr. Bayron Wagner MD.    XR Chest 1 View [[id number]] Collected: 02/07/23 1001     Updated: 02/07/23 1006    Narrative:      HISTORY: Shortness of breath     CXR: A frontal view the chest obtained     COMPARISON: 02/20/2022     FINDINGS: Prior mediastinal surgery with mechanical device implanted in  left chest wall. Advanced emphysema. Increasing interstitial densities  in the lower lobes may relate to pneumonitis versus edema. Questionable  trace pleural effusions. No pneumothorax. Stable mediastinal contours.  Thoracic aortic calcification.       Impression:      1. Increasing interstitial densities in the mid and lower lobes may  relate to edema versus pneumonitis.  2. Prior mediastinal surgery. Device implanted within the anterior left  chest wall.  3. Emphysema. No pneumothorax.  This report was finalized on 02/07/2023 10:03 by Dr. Erika Cobos MD.        LAB RESULTS:      Lab 02/17/23  0530 02/16/23  0614 02/15/23  0507 02/14/23  0645 02/14/23  0621 02/11/23  0515   WBC 18.31* 21.05* 12.78*  --  10.92*  --    HEMOGLOBIN 13.2 13.5 12.4*  --  12.5*  --    HEMATOCRIT 42.2 42.2 39.4  --  39.5  --    PLATELETS 397 424 446  --  473*  --    NEUTROS ABS  --   --   --   --  9.49*  --    IMMATURE GRANS (ABS)  --   --   --   --  0.17*  --    LYMPHS ABS  --   --   --   --  0.53*  --    MONOS ABS  --   --   --   --  0.72  --    EOS ABS  --   --   --   --  0.00  --    MCV 87.7 85.1 86.0  --  85.9  --    CRP  --  2.51* 3.84* 7.23*  --   --    PROCALCITONIN  --   --   --   --   --   0.10         Lab 02/17/23  0530 02/16/23  0614 02/15/23  0507 02/14/23  0645 02/14/23  0621 02/13/23  0230   SODIUM 135* 134* 136 136  --  136   POTASSIUM 4.0 3.6 4.0 3.9  --  3.9   CHLORIDE 88* 91* 93* 92*  --  94*   CO2 34.0* 33.0* 31.0* 28.0  --  31.0*   ANION GAP 13.0 10.0 12.0 16.0*  --  11.0   BUN 91* 90* 82* 77*  --  70*   CREATININE 1.25 1.30* 1.20 1.13  --  1.17   EGFR 55.0* 52.5* 57.8* 62.1  --  59.6*   GLUCOSE 140* 97 176* 210*  --  187*   CALCIUM 9.6 9.7 8.6 9.4  --  9.3   HEMOGLOBIN A1C  --   --   --   --  7.40*  --    TSH  --   --   --  0.875  --   --          Lab 02/14/23  0645 02/13/23  0230 02/12/23  0226 02/11/23  0515   TOTAL PROTEIN 7.0 6.8 7.0 6.6   ALBUMIN 3.4* 3.4* 3.5 2.9*   GLOBULIN 3.6 3.4 3.5 3.7   ALT (SGPT) 17 18 21 21   AST (SGOT) 15 13 15 18   BILIRUBIN 0.5 0.4 0.5 0.6   ALK PHOS 62 61 59 56             Lab 02/14/23  0645   CHOLESTEROL 123   LDL CHOL 69   HDL CHOL 43   TRIGLYCERIDES 46             Brief Urine Lab Results  (Last result in the past 365 days)      Color   Clarity   Blood   Leuk Est   Nitrite   Protein   CREAT   Urine HCG        12/12/22 0924 Yellow   Clear   Negative   Negative   Negative   Negative               Microbiology Results (last 10 days)     Procedure Component Value - Date/Time    Blood Culture - Blood, Hand, Right [[id number]]  (Normal) Collected: 02/07/23 1909    Lab Status: Final result Specimen: Blood from Hand, Right Updated: 02/12/23 1945     Blood Culture No growth at 5 days    Blood Culture - Blood, Arm, Left [[id number]]  (Normal) Collected: 02/07/23 1909    Lab Status: Final result Specimen: Blood from Arm, Left Updated: 02/12/23 1945     Blood Culture No growth at 5 days        HPI.  Mr. Durham is an 89 year old gentleman who resides in [place], [place].  He has a history of A-fib, BPH, and T2DM.  These three chronic conditions are stable.  He also has chronic hypoxic respiratory failure secondary to COPD.  He wears 4L of Oxygen via nasal cannula.  He  follows with Dr. Romero for primary care support.       He presents to the ER with 6 days of SOA.  He developed cough and SOA last Wedensday.  He saw his Doctor and was tested for Covid-19 and the flu.  He was positive for Covid-19.  He was started on antibiotics.  He has not improved.       He has continued to have cough.  He has developed fever as high as 102.  He has thick mucus.  He is being admitted for further evaluation and management.       Hospital Course:   Covid pneumonia/COPD exacerbation.  Patient had been vaccinated with Moderna x2 in the past. patient is on chronic 4 L oxygen at home. Patient finished remdesivir.  Patient completed Decadron 10 days course, continue Decadron per pulmonary..  Pulmonary consult.  Albuterol inhaler.  Rocephin antibiotics.  Vitamin C.  Symbicort.  Mucinex.  Leukocytosis decreased.  C-reactive protein is improving.   Chest x-ray-Persistent and unchanged chronic inflammatory and obstructive lung changes bilaterally.  Vapotherm 35/70. Nonrebreather mask when off CPAP.  CPAP overnight.     Atrial fibrillation/CAD.  Eliquis.  Metoprolol.  Aspirin . Pravastatin.  Imdur.  Hydrochlorothiazide.  Decrease IV Lasix due to increasing creatinine.  Nitro as needed.  Echocardiogram 10/26/2022- ejection fraction 56 - 60%, tricuspid regurgitation.     Diabetes.  Hold metformin.  High sliding scale.  Increase Levemir.    Continue Tradjenta.  Hemoglobin A1c 7.4.      Reflux.  Protonix.  Zofran as needed     Benign prostate hypertrophy.  Hytrin . Proscar.     Acute creatinine insufficiency.  Increasing creatinine.  Decrease Lasix.     Insomnia.  Melatonin at night as needed.     Anemia.  Hemoglobin increasing. Iron sulfate.     Nutrition . diabetic/consistent carb/regular texture diet.     Deconditioning.  PT consult.     Blood culture-no growth 5 days.  COVID-19-positive.  Flu screen- positive     Patient wants to go home to see his wife.  His wife in hospice due to throat cancer.    Vital  "signs stable, afebrile.  Patient be transferred to LTAC for further management.  Follow-up with LTAC physician soon as able.    Physical Exam on Discharge:  /57 (BP Location: Left arm, Patient Position: Lying)   Pulse 80   Temp 98.6 °F (37 °C) (Axillary)   Resp 20   Ht 180.3 cm (71\")   Wt 75.4 kg (166 lb 4.8 oz)   SpO2 96%   BMI 23.19 kg/m²   Physical Exam  Vitals and nursing note reviewed.   Constitutional:       General: He is not in acute distress.     Appearance: He is not toxic-appearing.      Comments: Advanced age.  Chronically ill.  Hard of hearing.   HENT:      Head: Normocephalic.      Mouth/Throat:      Mouth: Mucous membranes are moist.      Pharynx: Oropharynx is clear.   Eyes:      Extraocular Movements: Extraocular movements intact.      Conjunctiva/sclera: Conjunctivae normal.      Pupils: Pupils are equal, round, and reactive to light.   Cardiovascular:      Rate and Rhythm: Normal rate and regular rhythm.      Pulses: Normal pulses.      Heart sounds: No murmur heard.  Pulmonary:      Effort: No respiratory distress.      Breath sounds: No wheezing or rhonchi.      Comments: On Vapotherm.  Diminished breath sound bilateral, clear .  Abdominal:      General: Bowel sounds are normal. There is no distension.      Palpations: Abdomen is soft.      Tenderness: There is no abdominal tenderness.   Musculoskeletal:         General: No swelling or tenderness. Normal range of motion.      Cervical back: Normal range of motion and neck supple. No muscular tenderness.   Skin:     General: Skin is warm and dry.      Capillary Refill: Capillary refill takes 2 to 3 seconds.      Findings: No erythema or rash.   Neurological:      General: No focal deficit present.      Mental Status: He is alert and oriented to person, place, and time.      Cranial Nerves: No cranial nerve deficit.      Motor: Weakness present.   Psychiatric:         Mood and Affect: Mood normal.         Behavior: " Behavior normal.        Condition on Discharge: Stable.    Discharge Disposition: Discharge to LTAC.      Discharge Medications:     Discharge Medications      New Medications      Instructions Start Date   budesonide-formoterol 160-4.5 MCG/ACT inhaler  Commonly known as: SYMBICORT   2 puffs, Inhalation, 2 Times Daily - RT      Chlorhexidine Gluconate Cloth 2 % pads   1 application, Topical, Every 24 Hours   Start Date: February 18, 2023     dextrose 40 % gel  Commonly known as: GLUTOSE   15 g, Oral, Every 15 Minutes PRN      dextrose 50 % solution  Commonly known as: D50W   25 g, Intravenous, Every 15 Minutes PRN      docusate sodium 100 MG capsule   100 mg, Oral, 2 Times Daily      furosemide 10 MG/ML injection  Commonly known as: LASIX   20 mg, Intravenous, 2 Times Daily      glucagon (human recombinant) 1 MG injection  Commonly known as: GLUCAGEN DIAGNOSTIC   1 mg, Intramuscular, Every 15 Minutes PRN      insulin detemir 100 UNIT/ML injection  Commonly known as: LEVEMIR   10 Units, Subcutaneous, Every 12 Hours Scheduled      linagliptin 5 MG tablet tablet  Commonly known as: TRADJENTA   5 mg, Oral, Daily   Start Date: February 18, 2023     melatonin 3 MG tablet   3 mg, Oral, Nightly PRN      ondansetron 2 mg/mL injection  Commonly known as: ZOFRAN   4 mg, Intravenous, Every 6 Hours PRN      polyethylene glycol 17 g packet  Commonly known as: MIRALAX   17 g, Oral, Daily         Continue These Medications      Instructions Start Date   acetaminophen 325 MG tablet  Commonly known as: TYLENOL   650 mg, Oral, 2 Times Daily      albuterol sulfate  (90 Base) MCG/ACT inhaler  Commonly known as: PROVENTIL HFA;VENTOLIN HFA;PROAIR HFA   2 puffs, Inhalation, Every 6 Hours PRN      apixaban 5 MG tablet tablet  Commonly known as: ELIQUIS   5 mg, Oral, 2 Times Daily      ascorbic acid 500 MG tablet  Commonly known as: VITAMIN C   500 mg, Oral, Daily      aspirin 81 MG EC tablet   81 mg, Oral, Daily      ferrous sulfate  325 (65 Fe) MG tablet   325 mg, Oral, Daily With Breakfast      finasteride 5 MG tablet  Commonly known as: PROSCAR   5 mg, Oral, Daily      guaiFENesin 600 MG 12 hr tablet  Commonly known as: MUCINEX   600 mg, Oral, Daily      hydroCHLOROthiazide 12.5 MG capsule  Commonly known as: MICROZIDE   12.5 mg, Oral, Daily      Insulin Lispro 100 UNIT/ML injection  Commonly known as: humaLOG   Subcutaneous, Using sliding scale. Not to exceed 30 units daily      isosorbide mononitrate 30 MG 24 hr tablet  Commonly known as: IMDUR   30 mg, Oral, Daily      metoprolol tartrate 25 MG tablet  Commonly known as: LOPRESSOR   12.5 mg, Oral, 2 Times Daily      nitroglycerin 0.4 MG SL tablet  Commonly known as: Nitrostat   0.4 mg, Sublingual, Every 5 Minutes PRN      pantoprazole 40 MG EC tablet  Commonly known as: PROTONIX   40 mg, Oral, Daily      pravastatin 80 MG tablet  Commonly known as: Pravachol   80 mg, Oral, Daily      terazosin 10 MG capsule  Commonly known as: HYTRIN   10 mg, Oral, Nightly         Stop These Medications    Breztri Aerosphere 160-9-4.8 MCG/ACT aerosol inhaler  Generic drug: Budeson-Glycopyrrol-Formoterol     calcium carbonate-vitamin d 600-400 MG-UNIT per tablet     glyburide 5 MG tablet  Commonly known as: DIAbeta     hydrocortisone 1 % lotion     Just Tears Eye Drops solution     loratadine 10 MG tablet  Commonly known as: CLARITIN     metFORMIN 500 MG tablet  Commonly known as: Glucophage     methylPREDNISolone 4 MG dose pack  Commonly known as: MEDROL     multivitamin with minerals tablet tablet     potassium chloride 10 MEQ CR tablet            Discharge Diet:   Diet Instructions     Advance Diet As Tolerated            Activity at Discharge:   Activity Instructions     Activity as Tolerated            Follow-up Appointments:   Future Appointments   Date Time Provider Department Center   3/14/2023  9:00 AM Toe Jha MD [facility]AD PAD   5/11/2023  9:45 AM Diallo Hightower APRN [facility]  PAD   6/12/2023  9:40 AM Danie Cadena MD [facility]AD   7/27/2023  9:30 AM Felzi Frye APRN [facility]   7/27/2023  1:45 PM Abel Romero MD [facility] METR [facility]   1/9/2024  9:30 AM PAD CT 2 BH PAD CAT PAD   1/9/2024 10:00 AM PAD US NIVAS CART 2 BH PAD US PAD   1/9/2024 11:00 AM Nasir Gutierrez DO [facility] PAD PAD       Follow-up with LTAC physician as soon as able.    Electronically signed by Robert Dunn MD, 02/17/23, 12:25 CST.    Time: Greater than 30 minutes.

## 2023-02-17 NOTE — CASE MANAGEMENT/SOCIAL WORK
Continued Stay Note  [facility]     Patient Name: Gonzalo Durham  MRN: [id number]  Today's Date: 2/17/2023    Admit Date: 2/7/2023    Plan: [facility]   Discharge Plan     Row Name 02/17/23 1025       Plan    Plan [facility]    Final Discharge Disposition Code 63 - [facility]    Final Note [facility]. SW notified Dr. Dunn.                    Yvonne Angeles MSW

## 2023-02-18 LAB
ALBUMIN SERPL-MCNC: 3.5 G/DL (ref 3.5–5.2)
ALBUMIN/GLOB SERPL: 1.2 G/DL
ALP SERPL-CCNC: 65 U/L (ref 39–117)
ALT SERPL W P-5'-P-CCNC: 11 U/L (ref 1–41)
ANION GAP SERPL CALCULATED.3IONS-SCNC: 10 MMOL/L (ref 5–15)
AST SERPL-CCNC: 16 U/L (ref 1–40)
BASOPHILS # BLD AUTO: 0.03 10*3/MM3 (ref 0–0.2)
BASOPHILS NFR BLD AUTO: 0.2 % (ref 0–1.5)
BILIRUB SERPL-MCNC: 0.7 MG/DL (ref 0–1.2)
BUN SERPL-MCNC: 79 MG/DL (ref 8–23)
BUN/CREAT SERPL: 79 (ref 7–25)
CALCIUM SPEC-SCNC: 9 MG/DL (ref 8.6–10.5)
CHLORIDE SERPL-SCNC: 90 MMOL/L (ref 98–107)
CO2 SERPL-SCNC: 35 MMOL/L (ref 22–29)
CREAT SERPL-MCNC: 1 MG/DL (ref 0.76–1.27)
DEPRECATED RDW RBC AUTO: 42.5 FL (ref 37–54)
EGFRCR SERPLBLD CKD-EPI 2021: 71.9 ML/MIN/1.73
EOSINOPHIL # BLD AUTO: 0.25 10*3/MM3 (ref 0–0.4)
EOSINOPHIL NFR BLD AUTO: 1.5 % (ref 0.3–6.2)
ERYTHROCYTE [DISTWIDTH] IN BLOOD BY AUTOMATED COUNT: 13.8 % (ref 12.3–15.4)
GLOBULIN UR ELPH-MCNC: 2.9 GM/DL
GLUCOSE BLDC GLUCOMTR-MCNC: 107 MG/DL (ref 70–130)
GLUCOSE BLDC GLUCOMTR-MCNC: 120 MG/DL (ref 70–130)
GLUCOSE BLDC GLUCOMTR-MCNC: 171 MG/DL (ref 70–130)
GLUCOSE BLDC GLUCOMTR-MCNC: 213 MG/DL (ref 70–130)
GLUCOSE SERPL-MCNC: 110 MG/DL (ref 65–99)
HCT VFR BLD AUTO: 39.6 % (ref 37.5–51)
HGB BLD-MCNC: 12.8 G/DL (ref 13–17.7)
IMM GRANULOCYTES # BLD AUTO: 0.23 10*3/MM3 (ref 0–0.05)
IMM GRANULOCYTES NFR BLD AUTO: 1.4 % (ref 0–0.5)
LYMPHOCYTES # BLD AUTO: 0.86 10*3/MM3 (ref 0.7–3.1)
LYMPHOCYTES NFR BLD AUTO: 5.1 % (ref 19.6–45.3)
MCH RBC QN AUTO: 27.4 PG (ref 26.6–33)
MCHC RBC AUTO-ENTMCNC: 32.3 G/DL (ref 31.5–35.7)
MCV RBC AUTO: 84.8 FL (ref 79–97)
MONOCYTES # BLD AUTO: 1.3 10*3/MM3 (ref 0.1–0.9)
MONOCYTES NFR BLD AUTO: 7.7 % (ref 5–12)
NEUTROPHILS NFR BLD AUTO: 14.11 10*3/MM3 (ref 1.7–7)
NEUTROPHILS NFR BLD AUTO: 84.1 % (ref 42.7–76)
NRBC BLD AUTO-RTO: 0 /100 WBC (ref 0–0.2)
PLATELET # BLD AUTO: 360 10*3/MM3 (ref 140–450)
PMV BLD AUTO: 9.8 FL (ref 6–12)
POTASSIUM SERPL-SCNC: 3.5 MMOL/L (ref 3.5–5.2)
PREALB SERPL-MCNC: 24.4 MG/DL (ref 20–40)
PROT SERPL-MCNC: 6.4 G/DL (ref 6–8.5)
RBC # BLD AUTO: 4.67 10*6/MM3 (ref 4.14–5.8)
SODIUM SERPL-SCNC: 135 MMOL/L (ref 136–145)
WBC NRBC COR # BLD: 16.78 10*3/MM3 (ref 3.4–10.8)

## 2023-02-18 PROCEDURE — 25010000002 FUROSEMIDE PER 20 MG: Performed by: INTERNAL MEDICINE

## 2023-02-18 PROCEDURE — 63710000001 INSULIN LISPRO (HUMAN) PER 5 UNITS: Performed by: INTERNAL MEDICINE

## 2023-02-18 PROCEDURE — 85025 COMPLETE CBC W/AUTO DIFF WBC: CPT | Performed by: INTERNAL MEDICINE

## 2023-02-18 PROCEDURE — 84134 ASSAY OF PREALBUMIN: CPT | Performed by: INTERNAL MEDICINE

## 2023-02-18 PROCEDURE — 63710000001 INSULIN DETEMIR PER 5 UNITS: Performed by: INTERNAL MEDICINE

## 2023-02-18 PROCEDURE — 99222 1ST HOSP IP/OBS MODERATE 55: CPT | Performed by: INTERNAL MEDICINE

## 2023-02-18 PROCEDURE — 80053 COMPREHEN METABOLIC PANEL: CPT | Performed by: INTERNAL MEDICINE

## 2023-02-18 PROCEDURE — 97166 OT EVAL MOD COMPLEX 45 MIN: CPT

## 2023-02-18 PROCEDURE — 82962 GLUCOSE BLOOD TEST: CPT

## 2023-02-18 PROCEDURE — 92610 EVALUATE SWALLOWING FUNCTION: CPT | Performed by: SPEECH-LANGUAGE PATHOLOGIST

## 2023-02-18 PROCEDURE — 97162 PT EVAL MOD COMPLEX 30 MIN: CPT

## 2023-02-18 RX ORDER — POLYETHYLENE GLYCOL 3350 17 G/17G
17 POWDER, FOR SOLUTION ORAL DAILY PRN
Status: DISCONTINUED | OUTPATIENT
Start: 2023-02-18 | End: 2023-02-20

## 2023-02-19 ENCOUNTER — APPOINTMENT (OUTPATIENT)
Dept: GENERAL RADIOLOGY | Facility: HOSPITAL | Age: 88
End: 2023-02-19
Payer: COMMERCIAL

## 2023-02-19 LAB
ARTERIAL PATENCY WRIST A: ABNORMAL
ATMOSPHERIC PRESS: 754 MMHG
BASE EXCESS BLDA CALC-SCNC: 13.7 MMOL/L (ref 0–2)
BDY SITE: ABNORMAL
BODY TEMPERATURE: 37 C
GAS FLOW AIRWAY: 7 LPM
GLUCOSE BLDC GLUCOMTR-MCNC: 129 MG/DL (ref 70–130)
GLUCOSE BLDC GLUCOMTR-MCNC: 146 MG/DL (ref 70–130)
GLUCOSE BLDC GLUCOMTR-MCNC: 207 MG/DL (ref 70–130)
GLUCOSE BLDC GLUCOMTR-MCNC: 259 MG/DL (ref 70–130)
HCO3 BLDA-SCNC: 37.6 MMOL/L (ref 20–26)
Lab: ABNORMAL
MODALITY: ABNORMAL
PCO2 BLDA: 43.1 MM HG (ref 35–45)
PCO2 TEMP ADJ BLD: 43.1 MM HG (ref 35–45)
PH BLDA: 7.55 PH UNITS (ref 7.35–7.45)
PH, TEMP CORRECTED: 7.55 PH UNITS (ref 7.35–7.45)
PO2 BLDA: 56.5 MM HG (ref 83–108)
PO2 TEMP ADJ BLD: 56.5 MM HG (ref 83–108)
SAO2 % BLDCOA: 92.2 % (ref 94–99)
VENTILATOR MODE: ABNORMAL

## 2023-02-19 PROCEDURE — 99233 SBSQ HOSP IP/OBS HIGH 50: CPT | Performed by: INTERNAL MEDICINE

## 2023-02-19 PROCEDURE — 82803 BLOOD GASES ANY COMBINATION: CPT

## 2023-02-19 PROCEDURE — 63710000001 INSULIN LISPRO (HUMAN) PER 5 UNITS: Performed by: INTERNAL MEDICINE

## 2023-02-19 PROCEDURE — 82962 GLUCOSE BLOOD TEST: CPT

## 2023-02-19 PROCEDURE — 63710000001 INSULIN DETEMIR PER 5 UNITS: Performed by: INTERNAL MEDICINE

## 2023-02-19 PROCEDURE — 25010000002 FUROSEMIDE PER 20 MG: Performed by: INTERNAL MEDICINE

## 2023-02-19 PROCEDURE — 71045 X-RAY EXAM CHEST 1 VIEW: CPT

## 2023-02-19 NOTE — THERAPY DISCHARGE NOTE
Acute Care - Physical Therapy Discharge Summary  [facility]       Patient Name: Gonzalo Durham  : 1934  MRN: [id number]    Today's Date: 2023                 Admit Date: 2023      PT Recommendation and Plan    Visit Dx:    ICD-10-CM ICD-9-CM   1. COVID-19  U07.1 079.89   2. Impaired mobility  Z74.09 799.89   3. Decreased activities of daily living (ADL)  Z78.9 V49.89                PT Rehab Goals     Row Name 23 0811             Transfer Goal 1 (PT)    Activity/Assistive Device (Transfer Goal 1, PT) sit-to-stand/stand-to-sit;bed-to-chair/chair-to-bed;cane, straight  -MF      [place] Level/Cues Needed (Transfer Goal 1, PT) modified independence  -MF      Time Frame (Transfer Goal 1, PT) long term goal (LTG);10 days  -MF      Progress/Outcome (Transfer Goal 1, PT) goal not met  -MF         Gait Training Goal 1 (PT)    Activity/Assistive Device (Gait Training Goal 1, PT) gait (walking locomotion);assistive device use;decrease fall risk;improve balance and speed;increase endurance/gait distance;increase energy conservation  -MF      [place] Level (Gait Training Goal 1, PT) modified independence  -MF      Distance (Gait Training Goal 1, PT) 60 ft  -MF      Time Frame (Gait Training Goal 1, PT) long term goal (LTG);10 days  -MF      Progress/Outcome (Gait Training Goal 1, PT) goal not met  -MF            User Key  (r) = Recorded By, (t) = Taken By, (c) = Cosigned By    Initials Name Provider Type Discipline     Emily Blue PTA Physical Therapist Assistant PT                    PT Discharge Summary  Anticipated Discharge Disposition (PT): [facility] ([facility] hospital)  Reason for Discharge: Discharge from facility  Outcomes Achieved: Unable to make functional progress toward goals at this time  Discharge Destination: [facility]      Emily Blue PTA   2023

## 2023-02-20 LAB
ALBUMIN SERPL-MCNC: 3.2 G/DL (ref 3.5–5.2)
ALBUMIN/GLOB SERPL: 1.1 G/DL
ALP SERPL-CCNC: 68 U/L (ref 39–117)
ALT SERPL W P-5'-P-CCNC: 10 U/L (ref 1–41)
ANION GAP SERPL CALCULATED.3IONS-SCNC: 11 MMOL/L (ref 5–15)
AST SERPL-CCNC: 16 U/L (ref 1–40)
BASOPHILS # BLD AUTO: 0.02 10*3/MM3 (ref 0–0.2)
BASOPHILS NFR BLD AUTO: 0.1 % (ref 0–1.5)
BILIRUB SERPL-MCNC: 0.7 MG/DL (ref 0–1.2)
BUN SERPL-MCNC: 57 MG/DL (ref 8–23)
BUN/CREAT SERPL: 53.8 (ref 7–25)
CALCIUM SPEC-SCNC: 8.8 MG/DL (ref 8.6–10.5)
CHLORIDE SERPL-SCNC: 88 MMOL/L (ref 98–107)
CO2 SERPL-SCNC: 33 MMOL/L (ref 22–29)
CREAT SERPL-MCNC: 1.06 MG/DL (ref 0.76–1.27)
CRP SERPL-MCNC: 1.53 MG/DL (ref 0–0.5)
DEPRECATED RDW RBC AUTO: 42.6 FL (ref 37–54)
EGFRCR SERPLBLD CKD-EPI 2021: 67.1 ML/MIN/1.73
EOSINOPHIL # BLD AUTO: 0.3 10*3/MM3 (ref 0–0.4)
EOSINOPHIL NFR BLD AUTO: 2 % (ref 0.3–6.2)
ERYTHROCYTE [DISTWIDTH] IN BLOOD BY AUTOMATED COUNT: 13.7 % (ref 12.3–15.4)
ERYTHROCYTE [SEDIMENTATION RATE] IN BLOOD: 69 MM/HR (ref 0–20)
GLOBULIN UR ELPH-MCNC: 3 GM/DL
GLUCOSE BLDC GLUCOMTR-MCNC: 231 MG/DL (ref 70–130)
GLUCOSE BLDC GLUCOMTR-MCNC: 243 MG/DL (ref 70–130)
GLUCOSE BLDC GLUCOMTR-MCNC: 343 MG/DL (ref 70–130)
GLUCOSE BLDC GLUCOMTR-MCNC: 78 MG/DL (ref 70–130)
GLUCOSE SERPL-MCNC: 77 MG/DL (ref 65–99)
HCT VFR BLD AUTO: 39.3 % (ref 37.5–51)
HGB BLD-MCNC: 12.2 G/DL (ref 13–17.7)
IMM GRANULOCYTES # BLD AUTO: 0.22 10*3/MM3 (ref 0–0.05)
IMM GRANULOCYTES NFR BLD AUTO: 1.4 % (ref 0–0.5)
LYMPHOCYTES # BLD AUTO: 0.6 10*3/MM3 (ref 0.7–3.1)
LYMPHOCYTES NFR BLD AUTO: 3.9 % (ref 19.6–45.3)
MCH RBC QN AUTO: 26.9 PG (ref 26.6–33)
MCHC RBC AUTO-ENTMCNC: 31 G/DL (ref 31.5–35.7)
MCV RBC AUTO: 86.6 FL (ref 79–97)
MONOCYTES # BLD AUTO: 1.08 10*3/MM3 (ref 0.1–0.9)
MONOCYTES NFR BLD AUTO: 7 % (ref 5–12)
NEUTROPHILS NFR BLD AUTO: 13.13 10*3/MM3 (ref 1.7–7)
NEUTROPHILS NFR BLD AUTO: 85.6 % (ref 42.7–76)
NRBC BLD AUTO-RTO: 0 /100 WBC (ref 0–0.2)
PLATELET # BLD AUTO: 300 10*3/MM3 (ref 140–450)
PMV BLD AUTO: 9.8 FL (ref 6–12)
POTASSIUM SERPL-SCNC: 3.2 MMOL/L (ref 3.5–5.2)
PROT SERPL-MCNC: 6.2 G/DL (ref 6–8.5)
RBC # BLD AUTO: 4.54 10*6/MM3 (ref 4.14–5.8)
SODIUM SERPL-SCNC: 132 MMOL/L (ref 136–145)
WBC NRBC COR # BLD: 15.35 10*3/MM3 (ref 3.4–10.8)

## 2023-02-20 PROCEDURE — 85652 RBC SED RATE AUTOMATED: CPT | Performed by: INTERNAL MEDICINE

## 2023-02-20 PROCEDURE — 99232 SBSQ HOSP IP/OBS MODERATE 35: CPT | Performed by: INTERNAL MEDICINE

## 2023-02-20 PROCEDURE — 85025 COMPLETE CBC W/AUTO DIFF WBC: CPT | Performed by: INTERNAL MEDICINE

## 2023-02-20 PROCEDURE — 63710000001 INSULIN LISPRO (HUMAN) PER 5 UNITS: Performed by: INTERNAL MEDICINE

## 2023-02-20 PROCEDURE — 63710000001 INSULIN DETEMIR PER 5 UNITS: Performed by: INTERNAL MEDICINE

## 2023-02-20 PROCEDURE — 97530 THERAPEUTIC ACTIVITIES: CPT

## 2023-02-20 PROCEDURE — 80053 COMPREHEN METABOLIC PANEL: CPT | Performed by: INTERNAL MEDICINE

## 2023-02-20 PROCEDURE — 86140 C-REACTIVE PROTEIN: CPT | Performed by: INTERNAL MEDICINE

## 2023-02-20 PROCEDURE — 82962 GLUCOSE BLOOD TEST: CPT

## 2023-02-20 PROCEDURE — 25010000002 FUROSEMIDE PER 20 MG: Performed by: INTERNAL MEDICINE

## 2023-02-20 PROCEDURE — 97535 SELF CARE MNGMENT TRAINING: CPT

## 2023-02-20 RX ORDER — POTASSIUM CHLORIDE 750 MG/1
40 CAPSULE, EXTENDED RELEASE ORAL EVERY 4 HOURS
Status: DISPENSED | OUTPATIENT
Start: 2023-02-20 | End: 2023-02-20

## 2023-02-20 RX ORDER — POLYETHYLENE GLYCOL 3350 17 G/17G
17 POWDER, FOR SOLUTION ORAL DAILY PRN
Status: DISCONTINUED | OUTPATIENT
Start: 2023-02-20 | End: 2023-03-03 | Stop reason: HOSPADM

## 2023-02-20 RX ORDER — BISACODYL 10 MG
10 SUPPOSITORY, RECTAL RECTAL DAILY PRN
Status: DISCONTINUED | OUTPATIENT
Start: 2023-02-20 | End: 2023-03-03 | Stop reason: HOSPADM

## 2023-02-20 RX ORDER — BISACODYL 10 MG
10 SUPPOSITORY, RECTAL RECTAL
Status: ACTIVE | OUTPATIENT
Start: 2023-02-20 | End: 2023-02-20

## 2023-02-20 RX ORDER — POTASSIUM CHLORIDE 750 MG/1
20 CAPSULE, EXTENDED RELEASE ORAL DAILY
Status: DISCONTINUED | OUTPATIENT
Start: 2023-02-21 | End: 2023-02-28

## 2023-02-21 LAB
ANION GAP SERPL CALCULATED.3IONS-SCNC: 8 MMOL/L (ref 5–15)
BUN SERPL-MCNC: 40 MG/DL (ref 8–23)
BUN/CREAT SERPL: 45.5 (ref 7–25)
CALCIUM SPEC-SCNC: 8.4 MG/DL (ref 8.6–10.5)
CHLORIDE SERPL-SCNC: 95 MMOL/L (ref 98–107)
CO2 SERPL-SCNC: 31 MMOL/L (ref 22–29)
CREAT SERPL-MCNC: 0.88 MG/DL (ref 0.76–1.27)
EGFRCR SERPLBLD CKD-EPI 2021: 82.2 ML/MIN/1.73
GLUCOSE BLDC GLUCOMTR-MCNC: 174 MG/DL (ref 70–130)
GLUCOSE BLDC GLUCOMTR-MCNC: 180 MG/DL (ref 70–130)
GLUCOSE BLDC GLUCOMTR-MCNC: 203 MG/DL (ref 70–130)
GLUCOSE BLDC GLUCOMTR-MCNC: 70 MG/DL (ref 70–130)
GLUCOSE SERPL-MCNC: 62 MG/DL (ref 65–99)
MAGNESIUM SERPL-MCNC: 2.4 MG/DL (ref 1.6–2.4)
POTASSIUM SERPL-SCNC: 3.6 MMOL/L (ref 3.5–5.2)
SODIUM SERPL-SCNC: 134 MMOL/L (ref 136–145)

## 2023-02-21 PROCEDURE — 63710000001 INSULIN DETEMIR PER 5 UNITS: Performed by: NURSE PRACTITIONER

## 2023-02-21 PROCEDURE — 63710000001 INSULIN LISPRO (HUMAN) PER 5 UNITS: Performed by: INTERNAL MEDICINE

## 2023-02-21 PROCEDURE — 83735 ASSAY OF MAGNESIUM: CPT | Performed by: INTERNAL MEDICINE

## 2023-02-21 PROCEDURE — 82962 GLUCOSE BLOOD TEST: CPT

## 2023-02-21 PROCEDURE — 25010000002 FUROSEMIDE PER 20 MG: Performed by: INTERNAL MEDICINE

## 2023-02-21 PROCEDURE — 80048 BASIC METABOLIC PNL TOTAL CA: CPT | Performed by: INTERNAL MEDICINE

## 2023-02-21 PROCEDURE — 63710000001 INSULIN DETEMIR PER 5 UNITS: Performed by: INTERNAL MEDICINE

## 2023-02-21 PROCEDURE — 92523 SPEECH SOUND LANG COMPREHEN: CPT

## 2023-02-21 PROCEDURE — 97116 GAIT TRAINING THERAPY: CPT

## 2023-02-21 PROCEDURE — 97110 THERAPEUTIC EXERCISES: CPT

## 2023-02-22 LAB
GLUCOSE BLDC GLUCOMTR-MCNC: 121 MG/DL (ref 70–130)
GLUCOSE BLDC GLUCOMTR-MCNC: 154 MG/DL (ref 70–130)
GLUCOSE BLDC GLUCOMTR-MCNC: 220 MG/DL (ref 70–130)

## 2023-02-22 PROCEDURE — 92507 TX SP LANG VOICE COMM INDIV: CPT

## 2023-02-22 PROCEDURE — 97116 GAIT TRAINING THERAPY: CPT

## 2023-02-22 PROCEDURE — 97530 THERAPEUTIC ACTIVITIES: CPT

## 2023-02-22 PROCEDURE — 63710000001 INSULIN LISPRO (HUMAN) PER 5 UNITS: Performed by: INTERNAL MEDICINE

## 2023-02-22 PROCEDURE — 97110 THERAPEUTIC EXERCISES: CPT

## 2023-02-22 PROCEDURE — 25010000002 FUROSEMIDE PER 20 MG: Performed by: INTERNAL MEDICINE

## 2023-02-22 PROCEDURE — 63710000001 INSULIN DETEMIR PER 5 UNITS: Performed by: INTERNAL MEDICINE

## 2023-02-22 PROCEDURE — 82962 GLUCOSE BLOOD TEST: CPT

## 2023-02-22 RX ORDER — ECHINACEA PURPUREA EXTRACT 125 MG
1 TABLET ORAL AS NEEDED
Status: DISCONTINUED | OUTPATIENT
Start: 2023-02-22 | End: 2023-03-03 | Stop reason: HOSPADM

## 2023-02-23 LAB
ANION GAP SERPL CALCULATED.3IONS-SCNC: 9 MMOL/L (ref 5–15)
BASOPHILS # BLD AUTO: 0.02 10*3/MM3 (ref 0–0.2)
BASOPHILS NFR BLD AUTO: 0.2 % (ref 0–1.5)
BUN SERPL-MCNC: 20 MG/DL (ref 8–23)
BUN/CREAT SERPL: 26 (ref 7–25)
CALCIUM SPEC-SCNC: 8.1 MG/DL (ref 8.6–10.5)
CHLORIDE SERPL-SCNC: 100 MMOL/L (ref 98–107)
CO2 SERPL-SCNC: 28 MMOL/L (ref 22–29)
CREAT SERPL-MCNC: 0.77 MG/DL (ref 0.76–1.27)
DEPRECATED RDW RBC AUTO: 46.3 FL (ref 37–54)
EGFRCR SERPLBLD CKD-EPI 2021: 85.6 ML/MIN/1.73
EOSINOPHIL # BLD AUTO: 0.23 10*3/MM3 (ref 0–0.4)
EOSINOPHIL NFR BLD AUTO: 1.9 % (ref 0.3–6.2)
ERYTHROCYTE [DISTWIDTH] IN BLOOD BY AUTOMATED COUNT: 14.4 % (ref 12.3–15.4)
GLUCOSE BLDC GLUCOMTR-MCNC: 180 MG/DL (ref 70–130)
GLUCOSE BLDC GLUCOMTR-MCNC: 192 MG/DL (ref 70–130)
GLUCOSE BLDC GLUCOMTR-MCNC: 207 MG/DL (ref 70–130)
GLUCOSE SERPL-MCNC: 86 MG/DL (ref 65–99)
HCT VFR BLD AUTO: 32 % (ref 37.5–51)
HGB BLD-MCNC: 9.9 G/DL (ref 13–17.7)
IMM GRANULOCYTES # BLD AUTO: 0.09 10*3/MM3 (ref 0–0.05)
IMM GRANULOCYTES NFR BLD AUTO: 0.8 % (ref 0–0.5)
LYMPHOCYTES # BLD AUTO: 0.81 10*3/MM3 (ref 0.7–3.1)
LYMPHOCYTES NFR BLD AUTO: 6.8 % (ref 19.6–45.3)
MCH RBC QN AUTO: 27.7 PG (ref 26.6–33)
MCHC RBC AUTO-ENTMCNC: 30.9 G/DL (ref 31.5–35.7)
MCV RBC AUTO: 89.4 FL (ref 79–97)
MONOCYTES # BLD AUTO: 0.74 10*3/MM3 (ref 0.1–0.9)
MONOCYTES NFR BLD AUTO: 6.2 % (ref 5–12)
NEUTROPHILS NFR BLD AUTO: 10.1 10*3/MM3 (ref 1.7–7)
NEUTROPHILS NFR BLD AUTO: 84.1 % (ref 42.7–76)
NRBC BLD AUTO-RTO: 0 /100 WBC (ref 0–0.2)
NT-PROBNP SERPL-MCNC: 585.2 PG/ML (ref 0–1800)
PLATELET # BLD AUTO: 235 10*3/MM3 (ref 140–450)
PMV BLD AUTO: 10.1 FL (ref 6–12)
POTASSIUM SERPL-SCNC: 3.4 MMOL/L (ref 3.5–5.2)
RBC # BLD AUTO: 3.58 10*6/MM3 (ref 4.14–5.8)
SODIUM SERPL-SCNC: 137 MMOL/L (ref 136–145)
WBC NRBC COR # BLD: 11.99 10*3/MM3 (ref 3.4–10.8)

## 2023-02-23 PROCEDURE — 97535 SELF CARE MNGMENT TRAINING: CPT

## 2023-02-23 PROCEDURE — 92507 TX SP LANG VOICE COMM INDIV: CPT

## 2023-02-23 PROCEDURE — 63710000001 INSULIN LISPRO (HUMAN) PER 5 UNITS: Performed by: INTERNAL MEDICINE

## 2023-02-23 PROCEDURE — 25010000002 FUROSEMIDE PER 20 MG: Performed by: INTERNAL MEDICINE

## 2023-02-23 PROCEDURE — 63710000001 INSULIN DETEMIR PER 5 UNITS: Performed by: INTERNAL MEDICINE

## 2023-02-23 PROCEDURE — 85025 COMPLETE CBC W/AUTO DIFF WBC: CPT | Performed by: INTERNAL MEDICINE

## 2023-02-23 PROCEDURE — 97116 GAIT TRAINING THERAPY: CPT

## 2023-02-23 PROCEDURE — 82962 GLUCOSE BLOOD TEST: CPT

## 2023-02-23 PROCEDURE — 80048 BASIC METABOLIC PNL TOTAL CA: CPT | Performed by: INTERNAL MEDICINE

## 2023-02-23 PROCEDURE — 83880 ASSAY OF NATRIURETIC PEPTIDE: CPT | Performed by: INTERNAL MEDICINE

## 2023-02-23 PROCEDURE — 97110 THERAPEUTIC EXERCISES: CPT

## 2023-02-23 RX ORDER — POTASSIUM CHLORIDE 750 MG/1
20 CAPSULE, EXTENDED RELEASE ORAL
Status: DISPENSED | OUTPATIENT
Start: 2023-02-23 | End: 2023-02-24

## 2023-02-24 LAB
ANION GAP SERPL CALCULATED.3IONS-SCNC: 10 MMOL/L (ref 5–15)
BUN SERPL-MCNC: 22 MG/DL (ref 8–23)
BUN/CREAT SERPL: 31.4 (ref 7–25)
CALCIUM SPEC-SCNC: 8.2 MG/DL (ref 8.6–10.5)
CHLORIDE SERPL-SCNC: 99 MMOL/L (ref 98–107)
CO2 SERPL-SCNC: 28 MMOL/L (ref 22–29)
CREAT SERPL-MCNC: 0.7 MG/DL (ref 0.76–1.27)
EGFRCR SERPLBLD CKD-EPI 2021: 88.1 ML/MIN/1.73
GLUCOSE BLDC GLUCOMTR-MCNC: 112 MG/DL (ref 70–130)
GLUCOSE BLDC GLUCOMTR-MCNC: 200 MG/DL (ref 70–130)
GLUCOSE BLDC GLUCOMTR-MCNC: 230 MG/DL (ref 70–130)
GLUCOSE SERPL-MCNC: 87 MG/DL (ref 65–99)
MAGNESIUM SERPL-MCNC: 1.9 MG/DL (ref 1.6–2.4)
POTASSIUM SERPL-SCNC: 3.2 MMOL/L (ref 3.5–5.2)
SODIUM SERPL-SCNC: 137 MMOL/L (ref 136–145)

## 2023-02-24 PROCEDURE — 63710000001 INSULIN DETEMIR PER 5 UNITS: Performed by: INTERNAL MEDICINE

## 2023-02-24 PROCEDURE — 83735 ASSAY OF MAGNESIUM: CPT | Performed by: INTERNAL MEDICINE

## 2023-02-24 PROCEDURE — 25010000002 FUROSEMIDE PER 20 MG: Performed by: INTERNAL MEDICINE

## 2023-02-24 PROCEDURE — 97535 SELF CARE MNGMENT TRAINING: CPT

## 2023-02-24 PROCEDURE — 97116 GAIT TRAINING THERAPY: CPT

## 2023-02-24 PROCEDURE — 92507 TX SP LANG VOICE COMM INDIV: CPT

## 2023-02-24 PROCEDURE — 82962 GLUCOSE BLOOD TEST: CPT

## 2023-02-24 PROCEDURE — 97110 THERAPEUTIC EXERCISES: CPT

## 2023-02-24 PROCEDURE — 63710000001 INSULIN LISPRO (HUMAN) PER 5 UNITS: Performed by: INTERNAL MEDICINE

## 2023-02-24 PROCEDURE — 80048 BASIC METABOLIC PNL TOTAL CA: CPT | Performed by: INTERNAL MEDICINE

## 2023-02-24 RX ORDER — FUROSEMIDE 20 MG/1
20 TABLET ORAL
Status: DISCONTINUED | OUTPATIENT
Start: 2023-02-25 | End: 2023-03-03 | Stop reason: HOSPADM

## 2023-02-24 RX ORDER — POTASSIUM CHLORIDE 750 MG/1
40 CAPSULE, EXTENDED RELEASE ORAL
Status: DISPENSED | OUTPATIENT
Start: 2023-02-24 | End: 2023-02-24

## 2023-02-25 LAB
ANION GAP SERPL CALCULATED.3IONS-SCNC: 8 MMOL/L (ref 5–15)
BUN SERPL-MCNC: 20 MG/DL (ref 8–23)
BUN/CREAT SERPL: 25.3 (ref 7–25)
CALCIUM SPEC-SCNC: 8.5 MG/DL (ref 8.6–10.5)
CHLORIDE SERPL-SCNC: 100 MMOL/L (ref 98–107)
CO2 SERPL-SCNC: 29 MMOL/L (ref 22–29)
CREAT SERPL-MCNC: 0.79 MG/DL (ref 0.76–1.27)
EGFRCR SERPLBLD CKD-EPI 2021: 84.9 ML/MIN/1.73
GLUCOSE BLDC GLUCOMTR-MCNC: 163 MG/DL (ref 70–130)
GLUCOSE BLDC GLUCOMTR-MCNC: 173 MG/DL (ref 70–130)
GLUCOSE BLDC GLUCOMTR-MCNC: 183 MG/DL (ref 70–130)
GLUCOSE SERPL-MCNC: 142 MG/DL (ref 65–99)
MAGNESIUM SERPL-MCNC: 1.7 MG/DL (ref 1.6–2.4)
POTASSIUM SERPL-SCNC: 3.5 MMOL/L (ref 3.5–5.2)
SODIUM SERPL-SCNC: 137 MMOL/L (ref 136–145)

## 2023-02-25 PROCEDURE — 83735 ASSAY OF MAGNESIUM: CPT | Performed by: INTERNAL MEDICINE

## 2023-02-25 PROCEDURE — 63710000001 INSULIN DETEMIR PER 5 UNITS: Performed by: INTERNAL MEDICINE

## 2023-02-25 PROCEDURE — 63710000001 INSULIN LISPRO (HUMAN) PER 5 UNITS: Performed by: INTERNAL MEDICINE

## 2023-02-25 PROCEDURE — 80048 BASIC METABOLIC PNL TOTAL CA: CPT | Performed by: INTERNAL MEDICINE

## 2023-02-25 PROCEDURE — 82962 GLUCOSE BLOOD TEST: CPT

## 2023-02-26 LAB
GLUCOSE BLDC GLUCOMTR-MCNC: 119 MG/DL (ref 70–130)
GLUCOSE BLDC GLUCOMTR-MCNC: 128 MG/DL (ref 70–130)
GLUCOSE BLDC GLUCOMTR-MCNC: 286 MG/DL (ref 70–130)

## 2023-02-26 PROCEDURE — 82962 GLUCOSE BLOOD TEST: CPT

## 2023-02-26 PROCEDURE — 97116 GAIT TRAINING THERAPY: CPT

## 2023-02-26 PROCEDURE — 63710000001 INSULIN LISPRO (HUMAN) PER 5 UNITS: Performed by: INTERNAL MEDICINE

## 2023-02-26 PROCEDURE — 63710000001 INSULIN DETEMIR PER 5 UNITS: Performed by: INTERNAL MEDICINE

## 2023-02-27 VITALS — HEIGHT: 71 IN | WEIGHT: 173 LBS | BODY MASS INDEX: 24.22 KG/M2

## 2023-02-27 LAB
ANION GAP SERPL CALCULATED.3IONS-SCNC: 8 MMOL/L (ref 5–15)
BASOPHILS # BLD AUTO: 0.02 10*3/MM3 (ref 0–0.2)
BASOPHILS NFR BLD AUTO: 0.3 % (ref 0–1.5)
BUN SERPL-MCNC: 18 MG/DL (ref 8–23)
BUN/CREAT SERPL: 24.7 (ref 7–25)
CALCIUM SPEC-SCNC: 8.1 MG/DL (ref 8.6–10.5)
CHLORIDE SERPL-SCNC: 98 MMOL/L (ref 98–107)
CO2 SERPL-SCNC: 32 MMOL/L (ref 22–29)
CREAT SERPL-MCNC: 0.73 MG/DL (ref 0.76–1.27)
DEPRECATED RDW RBC AUTO: 47.3 FL (ref 37–54)
EGFRCR SERPLBLD CKD-EPI 2021: 87 ML/MIN/1.73
EOSINOPHIL # BLD AUTO: 0.33 10*3/MM3 (ref 0–0.4)
EOSINOPHIL NFR BLD AUTO: 4.6 % (ref 0.3–6.2)
ERYTHROCYTE [DISTWIDTH] IN BLOOD BY AUTOMATED COUNT: 14.6 % (ref 12.3–15.4)
GLUCOSE BLDC GLUCOMTR-MCNC: 118 MG/DL (ref 70–130)
GLUCOSE BLDC GLUCOMTR-MCNC: 193 MG/DL (ref 70–130)
GLUCOSE BLDC GLUCOMTR-MCNC: 220 MG/DL (ref 70–130)
GLUCOSE SERPL-MCNC: 118 MG/DL (ref 65–99)
HCT VFR BLD AUTO: 30.9 % (ref 37.5–51)
HGB BLD-MCNC: 9.5 G/DL (ref 13–17.7)
IMM GRANULOCYTES # BLD AUTO: 0.04 10*3/MM3 (ref 0–0.05)
IMM GRANULOCYTES NFR BLD AUTO: 0.6 % (ref 0–0.5)
LYMPHOCYTES # BLD AUTO: 0.88 10*3/MM3 (ref 0.7–3.1)
LYMPHOCYTES NFR BLD AUTO: 12.3 % (ref 19.6–45.3)
MCH RBC QN AUTO: 27.6 PG (ref 26.6–33)
MCHC RBC AUTO-ENTMCNC: 30.7 G/DL (ref 31.5–35.7)
MCV RBC AUTO: 89.8 FL (ref 79–97)
MONOCYTES # BLD AUTO: 0.59 10*3/MM3 (ref 0.1–0.9)
MONOCYTES NFR BLD AUTO: 8.2 % (ref 5–12)
NEUTROPHILS NFR BLD AUTO: 5.32 10*3/MM3 (ref 1.7–7)
NEUTROPHILS NFR BLD AUTO: 74 % (ref 42.7–76)
NRBC BLD AUTO-RTO: 0 /100 WBC (ref 0–0.2)
NT-PROBNP SERPL-MCNC: 695.7 PG/ML (ref 0–1800)
PLATELET # BLD AUTO: 203 10*3/MM3 (ref 140–450)
PMV BLD AUTO: 9.7 FL (ref 6–12)
POTASSIUM SERPL-SCNC: 3.2 MMOL/L (ref 3.5–5.2)
RBC # BLD AUTO: 3.44 10*6/MM3 (ref 4.14–5.8)
SODIUM SERPL-SCNC: 138 MMOL/L (ref 136–145)
WBC NRBC COR # BLD: 7.18 10*3/MM3 (ref 3.4–10.8)

## 2023-02-27 PROCEDURE — 97530 THERAPEUTIC ACTIVITIES: CPT

## 2023-02-27 PROCEDURE — 97535 SELF CARE MNGMENT TRAINING: CPT

## 2023-02-27 PROCEDURE — 92507 TX SP LANG VOICE COMM INDIV: CPT

## 2023-02-27 PROCEDURE — 63710000001 INSULIN DETEMIR PER 5 UNITS: Performed by: INTERNAL MEDICINE

## 2023-02-27 PROCEDURE — 83880 ASSAY OF NATRIURETIC PEPTIDE: CPT | Performed by: INTERNAL MEDICINE

## 2023-02-27 PROCEDURE — 97116 GAIT TRAINING THERAPY: CPT

## 2023-02-27 PROCEDURE — 80048 BASIC METABOLIC PNL TOTAL CA: CPT | Performed by: INTERNAL MEDICINE

## 2023-02-27 PROCEDURE — 82962 GLUCOSE BLOOD TEST: CPT

## 2023-02-27 PROCEDURE — 97110 THERAPEUTIC EXERCISES: CPT

## 2023-02-27 PROCEDURE — 63710000001 INSULIN LISPRO (HUMAN) PER 5 UNITS: Performed by: INTERNAL MEDICINE

## 2023-02-27 PROCEDURE — 85025 COMPLETE CBC W/AUTO DIFF WBC: CPT | Performed by: INTERNAL MEDICINE

## 2023-02-27 RX ORDER — POTASSIUM CHLORIDE 750 MG/1
40 CAPSULE, EXTENDED RELEASE ORAL
Status: DISPENSED | OUTPATIENT
Start: 2023-02-27 | End: 2023-02-28

## 2023-02-28 LAB
ANION GAP SERPL CALCULATED.3IONS-SCNC: 11 MMOL/L (ref 5–15)
BUN SERPL-MCNC: 17 MG/DL (ref 8–23)
BUN/CREAT SERPL: 24.6 (ref 7–25)
CALCIUM SPEC-SCNC: 8.5 MG/DL (ref 8.6–10.5)
CHLORIDE SERPL-SCNC: 98 MMOL/L (ref 98–107)
CO2 SERPL-SCNC: 28 MMOL/L (ref 22–29)
CREAT SERPL-MCNC: 0.69 MG/DL (ref 0.76–1.27)
EGFRCR SERPLBLD CKD-EPI 2021: 88.5 ML/MIN/1.73
GLUCOSE BLDC GLUCOMTR-MCNC: 101 MG/DL (ref 70–130)
GLUCOSE BLDC GLUCOMTR-MCNC: 162 MG/DL (ref 70–130)
GLUCOSE BLDC GLUCOMTR-MCNC: 177 MG/DL (ref 70–130)
GLUCOSE SERPL-MCNC: 155 MG/DL (ref 65–99)
MAGNESIUM SERPL-MCNC: 1.7 MG/DL (ref 1.6–2.4)
POTASSIUM SERPL-SCNC: 3.4 MMOL/L (ref 3.5–5.2)
SODIUM SERPL-SCNC: 137 MMOL/L (ref 136–145)

## 2023-02-28 PROCEDURE — 63710000001 INSULIN LISPRO (HUMAN) PER 5 UNITS: Performed by: INTERNAL MEDICINE

## 2023-02-28 PROCEDURE — 97116 GAIT TRAINING THERAPY: CPT

## 2023-02-28 PROCEDURE — 63710000001 INSULIN DETEMIR PER 5 UNITS: Performed by: INTERNAL MEDICINE

## 2023-02-28 PROCEDURE — 83735 ASSAY OF MAGNESIUM: CPT | Performed by: INTERNAL MEDICINE

## 2023-02-28 PROCEDURE — 80048 BASIC METABOLIC PNL TOTAL CA: CPT | Performed by: INTERNAL MEDICINE

## 2023-02-28 PROCEDURE — 97535 SELF CARE MNGMENT TRAINING: CPT

## 2023-02-28 PROCEDURE — 82962 GLUCOSE BLOOD TEST: CPT

## 2023-02-28 RX ORDER — POTASSIUM CHLORIDE 750 MG/1
20 CAPSULE, EXTENDED RELEASE ORAL 2 TIMES DAILY WITH MEALS
Status: DISCONTINUED | OUTPATIENT
Start: 2023-02-28 | End: 2023-03-03 | Stop reason: HOSPADM

## 2023-03-01 LAB
GLUCOSE BLDC GLUCOMTR-MCNC: 135 MG/DL (ref 70–130)
GLUCOSE BLDC GLUCOMTR-MCNC: 156 MG/DL (ref 70–130)
GLUCOSE BLDC GLUCOMTR-MCNC: 225 MG/DL (ref 70–130)

## 2023-03-01 PROCEDURE — 82962 GLUCOSE BLOOD TEST: CPT

## 2023-03-01 PROCEDURE — 63710000001 INSULIN DETEMIR PER 5 UNITS: Performed by: INTERNAL MEDICINE

## 2023-03-01 PROCEDURE — 63710000001 INSULIN LISPRO (HUMAN) PER 5 UNITS: Performed by: INTERNAL MEDICINE

## 2023-03-01 PROCEDURE — 92507 TX SP LANG VOICE COMM INDIV: CPT

## 2023-03-01 PROCEDURE — 97116 GAIT TRAINING THERAPY: CPT

## 2023-03-01 PROCEDURE — 97530 THERAPEUTIC ACTIVITIES: CPT

## 2023-03-01 PROCEDURE — 97110 THERAPEUTIC EXERCISES: CPT

## 2023-03-01 NOTE — PROGRESS NOTES
Gordon Leyva M.D.  DOMINIQUE Lovelace        Internal Medicine Progress Note    3/1/2023   10:31 CST    Name:  Gonzalo Durham  MRN:    [id number]     Acct:     [id number]   Room:  [address] [place] [address] Day: 0     Admit Date: 2/17/2023  3:00 PM  PCP: Abel Romero MD    Subjective:     C/C: Oxygen weaning and rehabilitation efforts    Interval History: Status: Improved. Up to side of bed. No family at bedside. Afebrile. 02 sats stable with 02 at 4 lpm at rest - home liter flow 4 lpm. Progressing with therapies. Increased anxiety at times.  Anxious for discharge to home.     Review of Systems   Constitutional: Negative for chills, decreased appetite, fever, malaise/fatigue, weight gain and weight loss.   HENT: Negative for congestion, ear discharge, ear pain, hoarse voice, sore throat and tinnitus.    Eyes: Negative for blurred vision, discharge, pain, visual disturbance and visual halos.   Cardiovascular: Positive for dyspnea on exertion. Negative for chest pain, claudication, irregular heartbeat, leg swelling, orthopnea and paroxysmal nocturnal dyspnea.   Respiratory: Positive for shortness of breath. Negative for cough, hemoptysis, sputum production and wheezing.    Endocrine: Negative for cold intolerance, heat intolerance, polydipsia, polyphagia and polyuria.   Hematologic/Lymphatic: Negative for adenopathy. Does not bruise/bleed easily.   Skin: Negative for dry skin, itching, nail changes, rash and suspicious lesions.   Musculoskeletal: Negative for arthritis, back pain, falls, joint pain, muscle weakness and myalgias.   Gastrointestinal: Negative for bloating, abdominal pain, constipation, diarrhea, dysphagia, hematemesis, nausea and vomiting.   Genitourinary: Negative for bladder incontinence, dysuria, flank pain and frequency.   Neurological: Positive for weakness. Negative for aphonia, brief paralysis, disturbances in coordination, excessive daytime sleepiness and dizziness.    Psychiatric/Behavioral: Negative for altered mental status, depression, memory loss, substance abuse, suicidal ideas and thoughts of violence. The patient does not have insomnia and is not nervous/anxious.    Allergic/Immunologic: Negative for environmental allergies, HIV exposure, hives and persistent infections.         Medications:     Allergies:   Allergies   Allergen Reactions   • Symbicort [Budesonide-Formoterol Fumarate] Dizziness     Patient passed out and fell   • Prozac [Fluoxetine Hcl] Mental Status Change     Bad dreams.       Current Meds:   Current Facility-Administered Medications:   •  acetaminophen (TYLENOL) tablet 650 mg, 650 mg, Oral, Q4H PRN **OR** acetaminophen (TYLENOL) suppository 650 mg, 650 mg, Rectal, Q4H PRN, Audie Leyva MD  •  albuterol (PROVENTIL) nebulizer solution 0.083% 2.5 mg/3mL, 2.5 mg, Nebulization, 4x Daily - RT, Audie Leyva MD  •  albuterol (PROVENTIL) nebulizer solution 0.083% 2.5 mg/3mL, 2.5 mg, Nebulization, Q4H PRN, Audie Leyva MD  •  apixaban (ELIQUIS) tablet 5 mg, 5 mg, Oral, BID, Audie Leyva MD  •  ascorbic acid (VITAMIN C) tablet 500 mg, 500 mg, Oral, Daily, Audie Leyva MD  •  aspirin EC tablet 81 mg, 81 mg, Oral, Daily, Audie Leyva MD  •  bisacodyl (DULCOLAX) suppository 10 mg, 10 mg, Rectal, Daily PRN, Audie Leyva MD  •  budesonide (PULMICORT) nebulizer solution 0.5 mg, 0.5 mg, Nebulization, BID - RT, Audie Leyva MD  •  dextrose (D50W) (25 g/50 mL) IV injection 25 g, 25 g, Intravenous, Q15 Min PRN, Audie Leyva MD  •  dextrose (GLUTOSE) oral gel 15 g, 15 g, Oral, Q15 Min PRN, Audie Leyva MD  •  docusate sodium (COLACE) capsule 100 mg, 100 mg, Oral, BID, Audie Leyva MD  •  ferrous sulfate tablet 325 mg, 325 mg, Oral, Daily With Breakfast, Audie Leyva MD  •  finasteride (PROSCAR) tablet 5 mg, 5 mg, Oral, Daily, Audie Leyva MD  •   furosemide (LASIX) tablet 20 mg, 20 mg, Oral, BID, Audie Leyva MD  •  glucagon (human recombinant) (GLUCAGEN DIAGNOSTIC) injection 1 mg, 1 mg, Intramuscular, Q15 Min PRN, Audie Leyva MD  •  guaiFENesin (MUCINEX) 12 hr tablet 600 mg, 600 mg, Oral, Daily, Audie Leyva MD  •  hydroCHLOROthiazide (HYDRODIURIL) tablet 12.5 mg, 12.5 mg, Oral, Daily, Audie Leyva MD  •  insulin detemir (LEVEMIR) injection 15 Units, 15 Units, Subcutaneous, Daily, Audie Leyva MD  •  Insulin Lispro (humaLOG) injection 0-9 Units, 0-9 Units, Subcutaneous, TID With Meals, Audie Leyva MD  •  isosorbide mononitrate (IMDUR) 24 hr tablet 30 mg, 30 mg, Oral, Daily, Audie Leyva MD  •  linagliptin (TRADJENTA) tablet 5 mg, 5 mg, Oral, Daily, Audie Leyva MD  •  melatonin tablet 3 mg, 3 mg, Oral, Nightly PRN, Audie Leyva MD  •  metoprolol tartrate (LOPRESSOR) tablet 12.5 mg, 12.5 mg, Oral, BID, Audie Leyva MD  •  nitroglycerin (NITROSTAT) SL tablet 0.4 mg, 0.4 mg, Sublingual, Q5 Min PRN, Audie Leyva MD  •  ondansetron (ZOFRAN) tablet 4 mg, 4 mg, Oral, Q6H PRN **OR** ondansetron (ZOFRAN) injection 4 mg, 4 mg, Intravenous, Q6H PRN, Audie Leyva MD  •  pantoprazole (PROTONIX) EC tablet 40 mg, 40 mg, Oral, Q AM, Audie Leyva MD  •  polyethylene glycol (MIRALAX) packet 17 g, 17 g, Oral, Daily PRN, Dyana Dumas APRN  •  potassium chloride (MICRO-K) CR capsule 20 mEq, 20 mEq, Oral, BID With Meals, Audie Leyva MD  •  pravastatin (PRAVACHOL) tablet 80 mg, 80 mg, Oral, Daily, Audie Leyva MD  •  sodium chloride 0.9 % flush 10 mL, 10 mL, Intravenous, Q12H, Audie Leyva MD  •  sodium chloride 0.9 % flush 10 mL, 10 mL, Intravenous, PRN, Audie Leyva MD  •  sodium chloride nasal spray 1 spray, 1 spray, Each Nare, KAT, Dyana Dumas APRN  •  terazosin (HYTRIN) capsule 10 mg,  "10 mg, Oral, Nightly, Audie Leyva MD    Data:     Code Status:    There are no questions and answers to display.       Family History   Problem Relation Age of Onset   • Heart disease Mother    • Stroke Mother    • Melanoma Mother    • Heart disease Father    • Diabetes Father    • Prostate cancer Father    • Colon cancer Neg Hx    • Colon polyps Neg Hx        Social History     Socioeconomic History   • Marital status:    Tobacco Use   • Smoking status: Former     Packs/day: 1.00     Years: 26.00     Pack years: 26.00     Types: Cigarettes     Start date:      Quit date:      Years since quittin.1   • Smokeless tobacco: Never   Vaping Use   • Vaping Use: Never used   Substance and Sexual Activity   • Alcohol use: No   • Drug use: No   • Sexual activity: Defer       Vitals:  Ht 180.3 cm (71\")   Wt 78.5 kg (173 lb)   BMI 24.13 kg/m²   T 98.6 HR 85 RR 18 BP 92/42 SPO2 90% (4 lpm)          I/O (24Hr):  No intake or output data in the 24 hours ending 23 1031    Labs and imaging:      Recent Results (from the past 12 hour(s))   POC Glucose Once    Collection Time: 23  7:01 AM    Specimen: Blood   Result Value Ref Range    Glucose 135 (H) 70 - 130 mg/dL         Physical Examination:        Physical Exam  Vitals and nursing note reviewed.   Constitutional:       Appearance: Normal appearance. He is normal weight.   HENT:      Head: Normocephalic and atraumatic.      Right Ear: External ear normal.      Left Ear: External ear normal.      Nose: Nose normal.      Mouth/Throat:      Mouth: Mucous membranes are dry.      Pharynx: Oropharynx is clear.   Eyes:      Extraocular Movements: Extraocular movements intact.      Conjunctiva/sclera: Conjunctivae normal.      Pupils: Pupils are equal, round, and reactive to light.   Cardiovascular:      Rate and Rhythm: Normal rate and regular rhythm.      Pulses: Normal pulses.      Heart sounds: Normal heart sounds.   Pulmonary:      Effort: " Pulmonary effort is normal.   Abdominal:      General: Abdomen is flat. Bowel sounds are normal.      Palpations: Abdomen is soft.   Musculoskeletal:         General: Normal range of motion.      Cervical back: Normal range of motion and neck supple.      Comments: Generalized weakness   Skin:     General: Skin is warm and dry.      Capillary Refill: Capillary refill takes 2 to 3 seconds.   Neurological:      General: No focal deficit present.      Mental Status: He is alert and oriented to person, place, and time. Mental status is at baseline.   Psychiatric:         Mood and Affect: Mood normal.         Behavior: Behavior normal.         Thought Content: Thought content normal.         Judgment: Judgment normal.           Assessment:             * No active hospital problems. *    Past Medical History:   Diagnosis Date   • A-fib ([facility])    • AAA (abdominal aortic aneurysm) without rupture    • Arthritis    • BPH (benign prostatic hypertrophy)    • Cataract     bialteral   • CKD (chronic kidney disease)    • COPD (chronic obstructive pulmonary disease) ([facility])    • Coronary artery disease involving native coronary artery of native heart without angina pectoris 1/20/2017    CABG/Az/[place]/1981 No TCC echo  7.16.18 Right ventricular cavity is mild-to-moderately dilated. Left ventricular diastolic dysfunction (grade I) consistent with impaired relaxation. Left ventricular systolic function is normal. Left atrial cavity size is borderline dilated. RIGHT HEART ENLARGEMENT - RVSP NOT ASSESSABLE NORMAL LV AND RV SYSTOLIC FUNCTION NO SIGNIFICANT VALVULAR DYSFUNCTION  Seein   • Diabetes mellitus ([facility])     Type 2   • DM (diabetes mellitus screen)    • GERD (gastroesophageal reflux disease)    • History of loop recorder     at current time   • Hx of colonic polyp    • Hypercholesteremia    • Hypertension    • Macular degeneration     treated with injections in right eye   • Myocardial infarction ([facility])    • Neuropathy    • Oxygen  deficit     at 4liters   • Prostate cancer ([facility])    • Pulmonary hypertension (HCC) 1/7/2022   • 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 Acute on chronic hypoxic respiratory failure  2. S/p COVID 19  3. Acute on chronic diastolic CHF  4. Paroxysmal atrial fibrillation  5. Pulmonary hypertension  6. Obstructive sleep apnea  7. COPD stage 2 moderate   8. GERD  9. CKD3   10. DM2 with hyperglycemia on long term insulin  11. HTN  12. HLD  13. History of CAD  14. Hypokalemia  15. Chronic anticoagulation     Continue current treatment. Monitor counts. Increase activity. Labs in am. Oxygen weaning as tolerated. Aggressive therapies as tolerated. Continue glycemic control efforts. Discharge planning.     Electronically signed by DOMINIQUE Bautista on 3/1/2023 at 10:31 CST     I have discussed the care of Gonzalo Durham, including pertinent history and exam findings, with the nurse practitioner.    I have seen and examined the patient and the key elements of all parts of the encounter have been performed by me.  I agree with the assessment, plan and orders as documented by DOMINIQUE Lovelace, after I modified the exam findings and the plan of treatments and the final version is my approved version of the assessment.        Electronically signed by Audie Leyva MD on 3/1/2023 at 21:52 CST

## 2023-03-02 LAB
ANION GAP SERPL CALCULATED.3IONS-SCNC: 9 MMOL/L (ref 5–15)
BASOPHILS # BLD AUTO: 0.02 10*3/MM3 (ref 0–0.2)
BASOPHILS NFR BLD AUTO: 0.3 % (ref 0–1.5)
BUN SERPL-MCNC: 18 MG/DL (ref 8–23)
BUN/CREAT SERPL: 25.4 (ref 7–25)
CALCIUM SPEC-SCNC: 8.5 MG/DL (ref 8.6–10.5)
CHLORIDE SERPL-SCNC: 101 MMOL/L (ref 98–107)
CO2 SERPL-SCNC: 30 MMOL/L (ref 22–29)
CREAT SERPL-MCNC: 0.71 MG/DL (ref 0.76–1.27)
DEPRECATED RDW RBC AUTO: 49.1 FL (ref 37–54)
EGFRCR SERPLBLD CKD-EPI 2021: 87.7 ML/MIN/1.73
EOSINOPHIL # BLD AUTO: 0.35 10*3/MM3 (ref 0–0.4)
EOSINOPHIL NFR BLD AUTO: 5.9 % (ref 0.3–6.2)
ERYTHROCYTE [DISTWIDTH] IN BLOOD BY AUTOMATED COUNT: 14.9 % (ref 12.3–15.4)
GLUCOSE BLDC GLUCOMTR-MCNC: 136 MG/DL (ref 70–130)
GLUCOSE BLDC GLUCOMTR-MCNC: 254 MG/DL (ref 70–130)
GLUCOSE BLDC GLUCOMTR-MCNC: 66 MG/DL (ref 70–130)
GLUCOSE SERPL-MCNC: 113 MG/DL (ref 65–99)
HCT VFR BLD AUTO: 31.6 % (ref 37.5–51)
HGB BLD-MCNC: 9.5 G/DL (ref 13–17.7)
IMM GRANULOCYTES # BLD AUTO: 0.03 10*3/MM3 (ref 0–0.05)
IMM GRANULOCYTES NFR BLD AUTO: 0.5 % (ref 0–0.5)
LYMPHOCYTES # BLD AUTO: 0.81 10*3/MM3 (ref 0.7–3.1)
LYMPHOCYTES NFR BLD AUTO: 13.7 % (ref 19.6–45.3)
MCH RBC QN AUTO: 27.5 PG (ref 26.6–33)
MCHC RBC AUTO-ENTMCNC: 30.1 G/DL (ref 31.5–35.7)
MCV RBC AUTO: 91.3 FL (ref 79–97)
MONOCYTES # BLD AUTO: 0.51 10*3/MM3 (ref 0.1–0.9)
MONOCYTES NFR BLD AUTO: 8.6 % (ref 5–12)
NEUTROPHILS NFR BLD AUTO: 4.18 10*3/MM3 (ref 1.7–7)
NEUTROPHILS NFR BLD AUTO: 71 % (ref 42.7–76)
NRBC BLD AUTO-RTO: 0 /100 WBC (ref 0–0.2)
PLATELET # BLD AUTO: 180 10*3/MM3 (ref 140–450)
PMV BLD AUTO: 9.2 FL (ref 6–12)
POTASSIUM SERPL-SCNC: 3.8 MMOL/L (ref 3.5–5.2)
RBC # BLD AUTO: 3.46 10*6/MM3 (ref 4.14–5.8)
SODIUM SERPL-SCNC: 140 MMOL/L (ref 136–145)
WBC NRBC COR # BLD: 5.9 10*3/MM3 (ref 3.4–10.8)

## 2023-03-02 PROCEDURE — 97535 SELF CARE MNGMENT TRAINING: CPT

## 2023-03-02 PROCEDURE — 63710000001 INSULIN DETEMIR PER 5 UNITS: Performed by: INTERNAL MEDICINE

## 2023-03-02 PROCEDURE — 85025 COMPLETE CBC W/AUTO DIFF WBC: CPT | Performed by: INTERNAL MEDICINE

## 2023-03-02 PROCEDURE — 63710000001 INSULIN LISPRO (HUMAN) PER 5 UNITS: Performed by: INTERNAL MEDICINE

## 2023-03-02 PROCEDURE — 97110 THERAPEUTIC EXERCISES: CPT

## 2023-03-02 PROCEDURE — 80048 BASIC METABOLIC PNL TOTAL CA: CPT | Performed by: INTERNAL MEDICINE

## 2023-03-02 PROCEDURE — 97116 GAIT TRAINING THERAPY: CPT

## 2023-03-02 PROCEDURE — 97530 THERAPEUTIC ACTIVITIES: CPT

## 2023-03-02 PROCEDURE — 92507 TX SP LANG VOICE COMM INDIV: CPT

## 2023-03-02 PROCEDURE — 82962 GLUCOSE BLOOD TEST: CPT

## 2023-03-02 NOTE — PROGRESS NOTES
JOSEFA Nichole APRN        Internal Medicine Progress Note    3/2/2023   15:05 CST    Name:  Gonzalo Durham  MRN:    [id number]     Acct:     [id number]   Room:  [address] [place] [address] Day: 0     Admit Date: 2/17/2023  3:00 PM  PCP: Abel Romero MD    Subjective:     C/C: Oxygen weaning and rehabilitation efforts    Interval History: Status: Improved. Up to chair. No family at bedside. Afebrile. 02 sats stable with 02 at 4 lpm at rest - home liter flow 4 lpm. Progressing with therapies. Increased anxiety at times. Counts stable.  Anxious for discharge to home in am.     Review of Systems   Constitutional: Negative for chills, decreased appetite, fever, malaise/fatigue, weight gain and weight loss.   HENT: Negative for congestion, ear discharge, ear pain, hoarse voice, sore throat and tinnitus.    Eyes: Negative for blurred vision, discharge, pain, visual disturbance and visual halos.   Cardiovascular: Positive for dyspnea on exertion. Negative for chest pain, claudication, irregular heartbeat, leg swelling, orthopnea and paroxysmal nocturnal dyspnea.   Respiratory: Positive for shortness of breath. Negative for cough, hemoptysis, sputum production and wheezing.    Endocrine: Negative for cold intolerance, heat intolerance, polydipsia, polyphagia and polyuria.   Hematologic/Lymphatic: Negative for adenopathy. Does not bruise/bleed easily.   Skin: Negative for dry skin, itching, nail changes, rash and suspicious lesions.   Musculoskeletal: Negative for arthritis, back pain, falls, joint pain, muscle weakness and myalgias.   Gastrointestinal: Negative for bloating, abdominal pain, constipation, diarrhea, dysphagia, hematemesis, nausea and vomiting.   Genitourinary: Negative for bladder incontinence, dysuria, flank pain and frequency.   Neurological: Positive for weakness. Negative for aphonia, brief paralysis, disturbances in coordination, excessive daytime sleepiness and dizziness.    Psychiatric/Behavioral: Negative for altered mental status, depression, memory loss, substance abuse, suicidal ideas and thoughts of violence. The patient does not have insomnia and is not nervous/anxious.    Allergic/Immunologic: Negative for environmental allergies, HIV exposure, hives and persistent infections.         Medications:     Allergies:   Allergies   Allergen Reactions   • Symbicort [Budesonide-Formoterol Fumarate] Dizziness     Patient passed out and fell   • Prozac [Fluoxetine Hcl] Mental Status Change     Bad dreams.       Current Meds:   Current Facility-Administered Medications:   •  acetaminophen (TYLENOL) tablet 650 mg, 650 mg, Oral, Q4H PRN **OR** acetaminophen (TYLENOL) suppository 650 mg, 650 mg, Rectal, Q4H PRN, Audie Leyva MD  •  albuterol (PROVENTIL) nebulizer solution 0.083% 2.5 mg/3mL, 2.5 mg, Nebulization, 4x Daily - RT, Audie Leyva MD  •  albuterol (PROVENTIL) nebulizer solution 0.083% 2.5 mg/3mL, 2.5 mg, Nebulization, Q4H PRN, Audie Leyva MD  •  apixaban (ELIQUIS) tablet 5 mg, 5 mg, Oral, BID, Audie Leyva MD  •  ascorbic acid (VITAMIN C) tablet 500 mg, 500 mg, Oral, Daily, Audie Leyva MD  •  aspirin EC tablet 81 mg, 81 mg, Oral, Daily, Audie Leyva MD  •  bisacodyl (DULCOLAX) suppository 10 mg, 10 mg, Rectal, Daily PRN, Audie Leyva MD  •  budesonide (PULMICORT) nebulizer solution 0.5 mg, 0.5 mg, Nebulization, BID - RT, Audie Leyva MD  •  dextrose (D50W) (25 g/50 mL) IV injection 25 g, 25 g, Intravenous, Q15 Min PRN, Audie Leyva MD  •  dextrose (GLUTOSE) oral gel 15 g, 15 g, Oral, Q15 Min PRN, Audie Leyva MD  •  docusate sodium (COLACE) capsule 100 mg, 100 mg, Oral, BID, Audie Leyva MD  •  ferrous sulfate tablet 325 mg, 325 mg, Oral, Daily With Breakfast, Audie Leyva MD  •  finasteride (PROSCAR) tablet 5 mg, 5 mg, Oral, Daily, Audie Leyva MD  •   furosemide (LASIX) tablet 20 mg, 20 mg, Oral, BID, Audie Leyva MD  •  glucagon (human recombinant) (GLUCAGEN DIAGNOSTIC) injection 1 mg, 1 mg, Intramuscular, Q15 Min PRN, Audie Leyva MD  •  guaiFENesin (MUCINEX) 12 hr tablet 600 mg, 600 mg, Oral, Daily, Audie Leyva MD  •  hydroCHLOROthiazide (HYDRODIURIL) tablet 12.5 mg, 12.5 mg, Oral, Daily, Audie Leyva MD  •  insulin detemir (LEVEMIR) injection 15 Units, 15 Units, Subcutaneous, Daily, Audie Leyva MD  •  Insulin Lispro (humaLOG) injection 0-9 Units, 0-9 Units, Subcutaneous, TID With Meals, Audie Leyva MD  •  isosorbide mononitrate (IMDUR) 24 hr tablet 30 mg, 30 mg, Oral, Daily, Audie Leyva MD  •  linagliptin (TRADJENTA) tablet 5 mg, 5 mg, Oral, Daily, Audie Leyva MD  •  melatonin tablet 3 mg, 3 mg, Oral, Nightly PRN, Audie Leyva MD  •  metoprolol tartrate (LOPRESSOR) tablet 12.5 mg, 12.5 mg, Oral, BID, Audie Leyva MD  •  nitroglycerin (NITROSTAT) SL tablet 0.4 mg, 0.4 mg, Sublingual, Q5 Min PRN, Audie Leyva MD  •  ondansetron (ZOFRAN) tablet 4 mg, 4 mg, Oral, Q6H PRN **OR** ondansetron (ZOFRAN) injection 4 mg, 4 mg, Intravenous, Q6H PRN, Audie Leyva MD  •  pantoprazole (PROTONIX) EC tablet 40 mg, 40 mg, Oral, Q AM, Audie Leyva MD  •  polyethylene glycol (MIRALAX) packet 17 g, 17 g, Oral, Daily PRN, Dyana Dumas APRN  •  potassium chloride (MICRO-K) CR capsule 20 mEq, 20 mEq, Oral, BID With Meals, Audie Leyva MD  •  pravastatin (PRAVACHOL) tablet 80 mg, 80 mg, Oral, Daily, Audie Leyva MD  •  sodium chloride 0.9 % flush 10 mL, 10 mL, Intravenous, Q12H, Audie Leyva MD  •  sodium chloride 0.9 % flush 10 mL, 10 mL, Intravenous, PRN, Audie Leyva MD  •  sodium chloride nasal spray 1 spray, 1 spray, Each Nare, KAT, Dyana Dumas APRN  •  terazosin (HYTRIN) capsule 10 mg,  "10 mg, Oral, Nightly, Audie Leyva MD    Data:     Code Status:    There are no questions and answers to display.       Family History   Problem Relation Age of Onset   • Heart disease Mother    • Stroke Mother    • Melanoma Mother    • Heart disease Father    • Diabetes Father    • Prostate cancer Father    • Colon cancer Neg Hx    • Colon polyps Neg Hx        Social History     Socioeconomic History   • Marital status:    Tobacco Use   • Smoking status: Former     Packs/day: 1.00     Years: 26.00     Pack years: 26.00     Types: Cigarettes     Start date:      Quit date:      Years since quittin.1   • Smokeless tobacco: Never   Vaping Use   • Vaping Use: Never used   Substance and Sexual Activity   • Alcohol use: No   • Drug use: No   • Sexual activity: Defer       Vitals:  Ht 180.3 cm (71\")   Wt 78.5 kg (173 lb)   BMI 24.13 kg/m²   T 98.8 HR 98 RR 23 BP 92/60 SPO2 100% (4 lpm)          I/O (24Hr):  No intake or output data in the 24 hours ending 23 1505    Labs and imaging:      Recent Results (from the past 12 hour(s))   Basic Metabolic Panel    Collection Time: 23  4:41 AM    Specimen: Blood   Result Value Ref Range    Glucose 113 (H) 65 - 99 mg/dL    BUN 18 8 - 23 mg/dL    Creatinine 0.71 (L) 0.76 - 1.27 mg/dL    Sodium 140 136 - 145 mmol/L    Potassium 3.8 3.5 - 5.2 mmol/L    Chloride 101 98 - 107 mmol/L    CO2 30.0 (H) 22.0 - 29.0 mmol/L    Calcium 8.5 (L) 8.6 - 10.5 mg/dL    BUN/Creatinine Ratio 25.4 (H) 7.0 - 25.0    Anion Gap 9.0 5.0 - 15.0 mmol/L    eGFR 87.7 >60.0 mL/min/1.73   CBC Auto Differential    Collection Time: 23  4:41 AM    Specimen: Blood   Result Value Ref Range    WBC 5.90 3.40 - 10.80 10*3/mm3    RBC 3.46 (L) 4.14 - 5.80 10*6/mm3    Hemoglobin 9.5 (L) 13.0 - 17.7 g/dL    Hematocrit 31.6 (L) 37.5 - 51.0 %    MCV 91.3 79.0 - 97.0 fL    MCH 27.5 26.6 - 33.0 pg    MCHC 30.1 (L) 31.5 - 35.7 g/dL    RDW 14.9 12.3 - 15.4 %    RDW-SD 49.1 37.0 - " 54.0 fl    MPV 9.2 6.0 - 12.0 fL    Platelets 180 140 - 450 10*3/mm3    Neutrophil % 71.0 42.7 - 76.0 %    Lymphocyte % 13.7 (L) 19.6 - 45.3 %    Monocyte % 8.6 5.0 - 12.0 %    Eosinophil % 5.9 0.3 - 6.2 %    Basophil % 0.3 0.0 - 1.5 %    Immature Grans % 0.5 0.0 - 0.5 %    Neutrophils, Absolute 4.18 1.70 - 7.00 10*3/mm3    Lymphocytes, Absolute 0.81 0.70 - 3.10 10*3/mm3    Monocytes, Absolute 0.51 0.10 - 0.90 10*3/mm3    Eosinophils, Absolute 0.35 0.00 - 0.40 10*3/mm3    Basophils, Absolute 0.02 0.00 - 0.20 10*3/mm3    Immature Grans, Absolute 0.03 0.00 - 0.05 10*3/mm3    nRBC 0.0 0.0 - 0.2 /100 WBC   POC Glucose Once    Collection Time: 03/02/23  7:59 AM    Specimen: Blood   Result Value Ref Range    Glucose 136 (H) 70 - 130 mg/dL   POC Glucose Once    Collection Time: 03/02/23 10:49 AM    Specimen: Blood   Result Value Ref Range    Glucose 254 (H) 70 - 130 mg/dL         Physical Examination:        Physical Exam  Vitals and nursing note reviewed.   Constitutional:       Appearance: Normal appearance. He is normal weight.   HENT:      Head: Normocephalic and atraumatic.      Right Ear: External ear normal.      Left Ear: External ear normal.      Nose: Nose normal.      Mouth/Throat:      Mouth: Mucous membranes are dry.      Pharynx: Oropharynx is clear.   Eyes:      Extraocular Movements: Extraocular movements intact.      Conjunctiva/sclera: Conjunctivae normal.      Pupils: Pupils are equal, round, and reactive to light.   Cardiovascular:      Rate and Rhythm: Normal rate and regular rhythm.      Pulses: Normal pulses.      Heart sounds: Normal heart sounds.   Pulmonary:      Effort: Pulmonary effort is normal.   Abdominal:      General: Abdomen is flat. Bowel sounds are normal.      Palpations: Abdomen is soft.   Musculoskeletal:         General: Normal range of motion.      Cervical back: Normal range of motion and neck supple.      Comments: Generalized weakness   Skin:     General: Skin is warm and dry.       Capillary Refill: Capillary refill takes 2 to 3 seconds.   Neurological:      General: No focal deficit present.      Mental Status: He is alert and oriented to person, place, and time. Mental status is at baseline.   Psychiatric:         Mood and Affect: Mood normal.         Behavior: Behavior normal.         Thought Content: Thought content normal.         Judgment: Judgment normal.           Assessment:             * No active hospital problems. *    Past Medical History:   Diagnosis Date   • A-fib ([facility])    • AAA (abdominal aortic aneurysm) without rupture    • Arthritis    • BPH (benign prostatic hypertrophy)    • Cataract     bialteral   • CKD (chronic kidney disease)    • COPD (chronic obstructive pulmonary disease) ([facility])    • Coronary artery disease involving native coronary artery of native heart without angina pectoris 1/20/2017    CABG/Az/[place]/1981 No TCC echo  7.16.18 Right ventricular cavity is mild-to-moderately dilated. Left ventricular diastolic dysfunction (grade I) consistent with impaired relaxation. Left ventricular systolic function is normal. Left atrial cavity size is borderline dilated. RIGHT HEART ENLARGEMENT - RVSP NOT ASSESSABLE NORMAL LV AND RV SYSTOLIC FUNCTION NO SIGNIFICANT VALVULAR DYSFUNCTION  Seein   • Diabetes mellitus ([facility])     Type 2   • DM (diabetes mellitus screen)    • GERD (gastroesophageal reflux disease)    • History of loop recorder     at current time   • Hx of colonic polyp    • Hypercholesteremia    • Hypertension    • Macular degeneration     treated with injections in right eye   • Myocardial infarction ([facility])    • Neuropathy    • Oxygen deficit     at 4liters   • Prostate cancer ([facility])    • Pulmonary hypertension ([facility]) 1/7/2022   • 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 Acute on chronic hypoxic  respiratory failure  2. S/p COVID 19  3. Acute on chronic diastolic CHF  4. Paroxysmal atrial fibrillation  5. Pulmonary hypertension  6. Obstructive sleep apnea  7. COPD stage 2 moderate   8. GERD  9. CKD3   10. DM2 with hyperglycemia on long term insulin  11. HTN  12. HLD  13. History of CAD  14. Hypokalemia  15. Chronic anticoagulation     Continue current treatment. Monitor counts. Increase activity. Oxygen weaning as tolerated. Aggressive therapies as tolerated. Continue glycemic control efforts. Discharge planning for return to home with family in am.     Electronically signed by DOMINIQUE Bautista on 3/2/2023 at 15:05 CST

## 2023-03-03 ENCOUNTER — HOME HEALTH ADMISSION (OUTPATIENT)
Dept: HOME HEALTH SERVICES | Facility: HOME HEALTHCARE | Age: 88
End: 2023-03-03
Payer: MEDICARE

## 2023-03-03 ENCOUNTER — TRANSCRIBE ORDERS (OUTPATIENT)
Dept: HOME HEALTH SERVICES | Facility: HOME HEALTHCARE | Age: 88
End: 2023-03-03
Payer: MEDICARE

## 2023-03-03 DIAGNOSIS — U07.1 CLINICAL DIAGNOSIS OF COVID-19: ICD-10-CM

## 2023-03-03 DIAGNOSIS — J96.01 ACUTE RESPIRATORY FAILURE WITH HYPOXIA: Primary | ICD-10-CM

## 2023-03-03 LAB — GLUCOSE BLDC GLUCOMTR-MCNC: 263 MG/DL (ref 70–130)

## 2023-03-03 PROCEDURE — 97530 THERAPEUTIC ACTIVITIES: CPT

## 2023-03-03 PROCEDURE — 82962 GLUCOSE BLOOD TEST: CPT

## 2023-03-03 PROCEDURE — 63710000001 INSULIN LISPRO (HUMAN) PER 5 UNITS: Performed by: INTERNAL MEDICINE

## 2023-03-03 PROCEDURE — 63710000001 INSULIN DETEMIR PER 5 UNITS: Performed by: INTERNAL MEDICINE

## 2023-03-03 NOTE — DISCHARGE SUMMARY
JOSEFA Nichole APRN      Internal Medicine Discharge Summary    Patient ID: Gonzalo Durham  MRN: [id number]     Acct:  [id number]       Patient's PCP: Abel Romero MD    Admit Date: 2/17/2023     Discharge Date:   3/3/23    Admitting Physician: Audie Leyva MD    Discharge Physician: DOMINIQUE Bautista     Active Discharge Diagnoses:  Acute on chronic hypoxic respiratory failure  S/p COVID 19  Acute on chronic diastolic CHF  Paroxysmal atrial fibrillation  Pulmonary hypertension  Obstructive sleep apnea  COPD stage 2 moderate   GERD  CKD3   DM2 with hyperglycemia on long term insulin  HTN  HLD  History of CAD  Hypokalemia  Chronic anticoagulation    Hospital Problems    * No active hospital problems. *     Past Medical History:   Diagnosis Date    A-fib (HCC)     AAA (abdominal aortic aneurysm) without rupture     Arthritis     BPH (benign prostatic hypertrophy)     Cataract     bialteral    CKD (chronic kidney disease)     COPD (chronic obstructive pulmonary disease) (HCC)     Coronary artery disease involving native coronary artery of native heart without angina pectoris 1/20/2017    CABG/[place]/1981 No TCC echo  7.16.18 Right ventricular cavity is mild-to-moderately dilated. Left ventricular diastolic dysfunction (grade I) consistent with impaired relaxation. Left ventricular systolic function is normal. Left atrial cavity size is borderline dilated. 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    Code Status:    There are no questions and answers to display.       Hospital Course: The patient is an 89 year old male who presented to ER on 2/7/23 with complaints of increased shortness of breath. The patient has a history of COPD and chronic hypoxic respiratory failure on home 02 at 4-5  Lpm. He developed shortness of breath and reports that he was evaluated as an outpatient by his PCP where he was tested for influenza and COVID. COVID test returned positive. He was started on outpatient treatment, but had continued symptoms including fever up to 102, cough with thick sputum production and worsening shortness of breath. He was admitted to the service of the hospitalists and was treated with supplemental oxygen, IV steroids, remdesivir, and nebulizer treatments. He was seen in consultation by pulmonology. He required Vapotherm and CPAP therapy. He completed remdesivir therapy and steroids were weaned. He transferred to our facility for continued oxygen weaning and rehabilitation efforts.   While at our facility, he was weaned to high flow oxygen at 8 lpm and was followed by pulmonology for continued oxygen management. Patient quickly weaned to his home liter flow and pulmonology signed off. He progressed with therapies. He is maintaining adequate 02 sats with 02 at 4-5 lpm at rest and at times, requires 02 at 6 lpm to maintain adequate 02 sats with activity. He developed hypokalemia secondary to diuresis and this was corrected. He has tolerated treatment thus far and is now felt stable for discharge to  home with the assistance of family and home health care with close outpatient follow up. We are working to secure a 10 lpm oxygen concentrator for home use to allow for higher liter flow with activity.     Consults:  Dr. Thibodeaux (pulmonology)    Disposition: home health    Discharged Condition: Stable    Physical Exam  Vitals and nursing note reviewed.   Constitutional:       Appearance: Normal appearance. He is normal weight.   HENT:      Head: Normocephalic and atraumatic.      Right Ear: External ear normal.      Left Ear: External ear normal.      Nose: Nose normal.      Mouth/Throat:      Mouth: Mucous membranes are dry.      Pharynx: Oropharynx is clear.   Eyes:      Extraocular Movements: Extraocular movements intact.      Conjunctiva/sclera: Conjunctivae normal.      Pupils: Pupils are equal, round, and reactive to light.   Cardiovascular:      Rate and Rhythm: Normal rate and regular rhythm.      Pulses: Normal pulses.      Heart sounds: Normal heart sounds.   Pulmonary:      Effort: Pulmonary effort is normal.   Abdominal:      General: Abdomen is flat. Bowel sounds are normal.      Palpations: Abdomen is soft.   Musculoskeletal:         General: Normal range of motion.      Cervical back: Normal range of motion and neck supple.      Comments: Generalized weakness   Skin:     General: Skin is warm and dry.      Capillary Refill: Capillary refill takes 2 to 3 seconds.   Neurological:      General: No focal deficit present.      Mental Status: He is alert and oriented to person, place, and time. Mental status is at baseline.   Psychiatric:         Mood and Affect: Mood normal.         Behavior: Behavior normal.         Thought Content: Thought content normal.         Judgment: Judgment normal.     Follow Up: PCP 1 week    Diet: Diet: Regular/House Diet; Texture: Soft to Chew (NDD 3); Soft to Chew: Ground Meat; Fluid Consistency: Thin (IDDSI 0)    Discharge Medications:   See computer generated medication  reconciliation form      Time Spent on discharge is  32 minutes in patient examination, evaluation, patient/family counseling as well as medication reconciliation, prescriptions for required medications, discharge plan and follow up.     Electronically signed by DOMINIQUE Bautista on 3/3/2023 at 09:01 CST     I have discussed the care of Gonzalo Durham, including pertinent history and exam findings, with the nurse practitioner.    I have seen and examined the patient and the key elements of all parts of the encounter have been performed by me.  I agree with the assessment, plan and orders as documented by DOMINIQUE Lovelace, after I modified the exam findings and the plan of treatments and the final version is my approved version of the assessment.        Electronically signed by Audie Leyva MD on 3/5/2023 at 06:57 CST

## 2023-03-03 NOTE — NURSING NOTE
Spoke with patient regarding referral for home health services via phone call. Patient is agreeable to services and confirms address and phone number on file is correct. Pt and MD (Abel Romero) aware it will be the first of the week before SN can see patient and possibly another week before OT can see patient. Pt and MD agreeable to this

## 2023-03-04 ENCOUNTER — HOME CARE VISIT (OUTPATIENT)
Dept: HOME HEALTH SERVICES | Facility: CLINIC | Age: 88
End: 2023-03-04
Payer: MEDICARE

## 2023-03-04 PROCEDURE — G0299 HHS/HOSPICE OF RN EA 15 MIN: HCPCS

## 2023-03-05 VITALS
SYSTOLIC BLOOD PRESSURE: 112 MMHG | HEART RATE: 54 BPM | RESPIRATION RATE: 18 BRPM | OXYGEN SATURATION: 91 % | TEMPERATURE: 98.4 F | DIASTOLIC BLOOD PRESSURE: 60 MMHG

## 2023-03-07 ENCOUNTER — HOME CARE VISIT (OUTPATIENT)
Dept: HOME HEALTH SERVICES | Facility: CLINIC | Age: 88
End: 2023-03-07
Payer: MEDICARE

## 2023-03-07 VITALS — RESPIRATION RATE: 22 BRPM | HEART RATE: 76 BPM | OXYGEN SATURATION: 93 % | TEMPERATURE: 97.9 F

## 2023-03-07 PROCEDURE — G0300 HHS/HOSPICE OF LPN EA 15 MIN: HCPCS

## 2023-03-07 NOTE — HOME HEALTH
SOC Note: Pt admitted for homecare services after inpatient stay for acute on chronic respiratory failure secondary to Covid-19. Pt lives with wife who is currently on hospice. Daughter and  live next door and family takes turns staying with pt so he is never alone. Pt is currently on 4L O2 via nasal cannula. He uses a walker when ambulating. Daughter fills med box weekly.   Home Health ordered for: SN, PT, OT  Reason for Hosp/Primary Dx/Co-morbidities: Acute on chronic respiratory failure scondary to Covid-19, COPD, Type 2 diabetes with longterm use of insulin, Congestive heart failure  Focus of Care: Acute on chronic respiratory failure scondary to Covid-19, COPD education, Type 2 diabetes education, Congestive heart failure education, CPA, Med education, oxygen safety education, fall precautions, pressure injury precautions    Current Functional status/mobility/DME:Pt uses walker when ambulating, needs assistance with ADLs  HB status/Living Arrangements: Lives with wife with assistance from daughter and SUZANNE  Skin Integrity/wound status: No wounds noted  Code Status: DNR  Fall Risk: 7

## 2023-03-08 ENCOUNTER — HOME CARE VISIT (OUTPATIENT)
Dept: HOME HEALTH SERVICES | Facility: CLINIC | Age: 88
End: 2023-03-08
Payer: MEDICARE

## 2023-03-08 VITALS
SYSTOLIC BLOOD PRESSURE: 100 MMHG | OXYGEN SATURATION: 95 % | HEART RATE: 69 BPM | TEMPERATURE: 98.6 F | DIASTOLIC BLOOD PRESSURE: 60 MMHG | RESPIRATION RATE: 18 BRPM

## 2023-03-08 PROCEDURE — G0152 HHCP-SERV OF OT,EA 15 MIN: HCPCS

## 2023-03-09 ENCOUNTER — OFFICE VISIT (OUTPATIENT)
Dept: FAMILY MEDICINE CLINIC | Facility: CLINIC | Age: 88
End: 2023-03-09
Payer: MEDICARE

## 2023-03-09 VITALS
HEIGHT: 71 IN | SYSTOLIC BLOOD PRESSURE: 116 MMHG | WEIGHT: 179 LBS | BODY MASS INDEX: 25.06 KG/M2 | RESPIRATION RATE: 14 BRPM | DIASTOLIC BLOOD PRESSURE: 80 MMHG | OXYGEN SATURATION: 98 % | HEART RATE: 73 BPM

## 2023-03-09 DIAGNOSIS — U07.1 ACUTE HYPOXEMIC RESPIRATORY FAILURE DUE TO COVID-19: ICD-10-CM

## 2023-03-09 DIAGNOSIS — D64.9 ANEMIA, UNSPECIFIED TYPE: ICD-10-CM

## 2023-03-09 DIAGNOSIS — J44.9 CHRONIC OBSTRUCTIVE PULMONARY DISEASE, UNSPECIFIED COPD TYPE: ICD-10-CM

## 2023-03-09 DIAGNOSIS — I48.0 PAROXYSMAL ATRIAL FIBRILLATION: ICD-10-CM

## 2023-03-09 DIAGNOSIS — J96.01 ACUTE HYPOXEMIC RESPIRATORY FAILURE DUE TO COVID-19: ICD-10-CM

## 2023-03-09 DIAGNOSIS — E87.6 HYPOKALEMIA: ICD-10-CM

## 2023-03-09 PROCEDURE — 99495 TRANSJ CARE MGMT MOD F2F 14D: CPT | Performed by: FAMILY MEDICINE

## 2023-03-09 PROCEDURE — 1111F DSCHRG MED/CURRENT MED MERGE: CPT | Performed by: FAMILY MEDICINE

## 2023-03-09 RX ORDER — FUROSEMIDE 20 MG/1
1 TABLET ORAL EVERY 12 HOURS SCHEDULED
COMMUNITY
Start: 2023-03-03 | End: 2023-03-24 | Stop reason: SDUPTHER

## 2023-03-09 RX ORDER — BUDESONIDE 0.5 MG/2ML
INHALANT ORAL
COMMUNITY
Start: 2023-03-03

## 2023-03-09 RX ORDER — POTASSIUM CHLORIDE 1500 MG/1
1 TABLET, FILM COATED, EXTENDED RELEASE ORAL 2 TIMES DAILY WITH MEALS
COMMUNITY
Start: 2023-03-03 | End: 2023-03-24 | Stop reason: SDUPTHER

## 2023-03-09 RX ORDER — BUDESONIDE, GLYCOPYRROLATE, AND FORMOTEROL FUMARATE 160; 9; 4.8 UG/1; UG/1; UG/1
2 AEROSOL, METERED RESPIRATORY (INHALATION) 2 TIMES DAILY
COMMUNITY

## 2023-03-09 NOTE — HOME HEALTH
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, COPD, CHF  SURGICAL PROCEDURES-  none    PREVIOUS OCCUPATIONAL THERAPY- yes  OXYGEN USE-4L    CLINICAL FINDINGS:  WOUND / SKIN CONDITION- tail bone redness, but no open sores per patient, using barrier cream  EDEMA-  none        EQUIPMENT: Walker, shower seat, hand held shower                     DRIVING- no longer driving          FUNCTIONAL ENDURANCE FOR SAFE ACTIVITIES OF DAILY LIVING / INSTRUMENTAL ACTIVITIES OF DAILY LIVING:  poor due to SOA with activity         WEIGHT BEARING STATUS/PRECAUTIONS-  WBAT   ASSISTIVE DEVICE-  walker     ACTIVITIES OF DAILY LIVING MOBILITY/FALLS RISK ASSESSMENT- at risk for falls due to weakness, impaired gait and balance, dependence on AD      MEDICAL NECESSITY- Skilled OT eval necessary to assess safety and ADL's in the home environment.    PATIENT GOAL FOR THIS EPISODE OF CARE-  eval only    TODAY'S INTERVENTIONS-   initail eval completed. Family states patient will not allow them to help with shower or dressing, but they stand outside to supervise as needed, Patient reports getting in and sitting, then he dries and dresses before getting out of shower. Patient denied need for riser on toilet, but has one if needed. Patient has excellent UB strength, but poor endurance with 4 L of O2. Patient becomes SOA with min exertion, but shows good recovery and energy conservation. Patient's spouse is on Hospice and lives here with patient. Patient and spouse have 24 hour care. No further OT warranted at this time, as patient feels he is only in need of PT.    REHAB POTENTIAL-   good for stated goals    DATE OF NEXT APPOINTMENT WITH DOCTOR- 3/9/23 Dr. Romero  PATIENT / CAREGIVER AGREE WITH DISCHARGE PLAN- yes  COMMUNICATION / CARE COORDINATION- Staff re: OT eval

## 2023-03-09 NOTE — PROGRESS NOTES
Transitional Care Follow Up Visit  Subjective     Gonzalo Durham is a 89 y.o. male who presents for a transitional care management visit.  With two daughters.    Within 48 business hours after discharge our office contacted him via telephone to coordinate his care and needs.      I reviewed and discussed the details of that call along with the discharge summary, hospital problems, inpatient lab results, inpatient diagnostic studies, and consultation reports with Gonzalo.     Current outpatient and discharge medications have been reconciled for the patient.  2.17.23-3.3.23; inpt and LTAC  1. Acute on chronic hypoxic respiratory failure  2. S/p COVID 19  3. Acute on chronic diastolic CHF  4. Paroxysmal atrial fibrillation  5. Pulmonary hypertension  6. Obstructive sleep apnea  7. COPD stage 2 moderate   8. GERD  9. CKD3   10. DM2 with hyperglycemia on long term insulin  11. HTN  12. HLD  13. History of CAD  14. Hypokalemia  15. Chronic anticoagulation    Risk for Readmission (LACE) Score: 13 (3/3/2023  5:00 AM)    History of Present Illness presented to [facility] [place] [facility] with increasing shortness of breath baseline multiple chronic diseases and recent development of COVID    Course During Hospital Stay: His care included Vapotherm and CPAP with completion of remdesivir and steroids.  A prolonged appointment for oxygen supplementation led to an admission to LTAC where he slowly improved.  Hypokalemia's with diuresis was corrected.  We note his hemoglobin dropped with no mention of melena or rectal bleeding     The following portions of the patient's history were reviewed and updated as appropriate: allergies, current medications, past family history, past medical history, past social history, past surgical history and problem list.    Review of Systems   GENERAL:  Inactive/slower with limits, speed, samni for age and SOB.  Sleep is ok; but occ interrupted caring for wife.    SKIN:  Ongoing callous of feet; no problems with  this/other skin today unless above/below.    ENDO:  No syncope, near or diaphoretic sweaty spells.  BS Ok: with download-see correspondence.  HEENT: No head injury, headache.  No vision change.  Same mild hearing loss.  Ears without pain/drainage.  No sore throat.  No significant nasal/sinus congestion/drainage unless uses CPAP; then for couple hours. No epistaxis.  CHEST: No chest wall tenderness or mass. Stable occ cough without productive without wheeze except briefly after CPAP use.   Mild/same SOB; no hemoptysis.  Wears oxygen continous.   CV: No chest pain, palpatations, ankle edema.  GI: No heartburn significant dysphagia.  No abdominal pain, diarrhea, constipation, rectal bleeding, or melena.    :  Voids without dysuria, or incontience to completion.  ORTHO: No painful/swollen joints but various on /off sore.  No change occ/usual sore neck or back.  But no acute neck but above mid back pain without recent injury.   NEURO: No dizziness; diffuse weakness of extremities.  No change some LE numbness/parethesias. Walking often has to hold on; balance problems.   PSYCH: No memory loss.  Mood good; not that anxious, depressed but/and not suicidal.  Tolerated stress.   Screening:  Mammogram: NA  Bone density: NA  Low dose CT chest: Tobacco-smoker/age 22/1 ppd/dc 1980: (22): NA (had CT angio)  IMPRESSION: CT chest with/BH/1.10.22  1. Stable 6 mm subpleural right lower lobe nodule. Stable for 3.5 years.  No additional workup needed.  2. Linear atelectasis or scarring in both lower lobes. Calcified  granulomas. Centrilobular emphysema.  3. Linear secretions in the distal trachea.  4. 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  Dilated pulmonary arteries.  5. Fatty infiltration of the liver.       GI: Colon-div/[facility]/6.15.17/prn  Prostate: chin confirmed 7.11.22  urology/confirmed 7.8.21  Chin/confirmed 11.22.19  [place] in past   Usual lab order  6m CBC, BMP, A1c  12m CBC, CMP, A1c, TSH,  LIPID, PSAs, Vit D    Current Outpatient Medications:   •  acetaminophen (TYLENOL) 325 MG tablet, Take 2 tablets by mouth 2 (Two) Times a Day., Disp: 360 tablet, Rfl: 2  •  albuterol sulfate  (90 Base) MCG/ACT inhaler, Inhale 2 puffs Every 6 (Six) Hours As Needed for Wheezing., Disp: , Rfl:   •  apixaban (ELIQUIS) 5 MG tablet tablet, Take 1 tablet by mouth 2 (Two) Times a Day., Disp: , Rfl:   •  ascorbic acid (VITAMIN C) 500 MG tablet, Take 1 tablet by mouth Daily., Disp: , Rfl:   •  aspirin 81 MG EC tablet, Take 1 tablet by mouth Daily., Disp: , Rfl:   •  Budeson-Glycopyrrol-Formoterol (Breztri Aerosphere) 160-9-4.8 MCG/ACT aerosol inhaler, Inhale 2 puffs 2 (Two) Times a Day., Disp: , Rfl:   •  budesonide (PULMICORT) 0.5 MG/2ML nebulizer solution, USE ONE VIAL PER NEBULIZER TWICE DAILY, Disp: , Rfl:   •  ferrous sulfate 325 (65 Fe) MG tablet, Take 1 tablet by mouth Daily With Breakfast., Disp: , Rfl:   •  finasteride (PROSCAR) 5 MG tablet, Take 1 tablet by mouth Daily., Disp: , Rfl:   •  furosemide (LASIX) 20 MG tablet, Take 1 tablet by mouth Every 12 (Twelve) Hours., Disp: , Rfl:   •  guaiFENesin (MUCINEX) 600 MG 12 hr tablet, Take 1 tablet by mouth Daily., Disp: , Rfl:   •  hydroCHLOROthiazide (MICROZIDE) 12.5 MG capsule, Take 1 capsule by mouth Daily., Disp: 90 capsule, Rfl: 3  •  isosorbide mononitrate (IMDUR) 30 MG 24 hr tablet, Take 1 tablet by mouth Daily., Disp: , Rfl:   •  melatonin 3 MG tablet, Take 1 tablet by mouth At Night As Needed for Sleep., Disp: , Rfl:   •  metFORMIN (GLUCOPHAGE) 500 MG tablet, Take 1 tablet by mouth Daily With Breakfast., Disp: , Rfl:   •  metoprolol tartrate (LOPRESSOR) 25 MG tablet, Take 12.5 mg by mouth 2 (Two) Times a Day., Disp: , Rfl:   •  Multiple Vitamins-Minerals (MULTIVITAMIN & MINERAL PO), Take 1 tablet by mouth Daily., Disp: , Rfl:   •  nitroglycerin (Nitrostat) 0.4 MG SL tablet, Place 1 tablet under the tongue Every 5 (Five) Minutes As Needed for Chest Pain.,  Disp: 90 tablet, Rfl: 3  •  pantoprazole (PROTONIX) 40 MG EC tablet, Take 1 tablet by mouth Daily., Disp: 90 tablet, Rfl: 3  •  polyethylene glycol 17 g packet, Take 17 g by mouth Daily., Disp: , Rfl:   •  potassium chloride ER (K-TAB) 20 MEQ tablet controlled-release ER tablet, Take 1 tablet by mouth 2 (Two) Times a Day With Meals., Disp: , Rfl:   •  pravastatin (Pravachol) 80 MG tablet, Take 1 tablet by mouth Daily., Disp: 90 tablet, Rfl: 3  •  terazosin (HYTRIN) 10 MG capsule, Take 1 capsule by mouth Every Night., Disp: , Rfl:   •  docusate sodium 100 MG capsule, Take 1 capsule by mouth 2 (Two) Times a Day., Disp: , Rfl:   •  insulin detemir (LEVEMIR) 100 UNIT/ML injection, Inject 10 Units under the skin into the appropriate area as directed Every 12 (Twelve) Hours., Disp: , Rfl: 12-not using  •  Insulin Lispro (humaLOG) 100 UNIT/ML injection, Inject  under the skin into the appropriate area as directed. Using sliding scale. Not to exceed 30 units daily, Disp: , Rfl:     glyburide (DIAbeta) 5 MG tablet, TAKE 1 TABLET EVERY MORNING AND ONE-HALF (1/2) TABLET BEFORE SUPPER, Disp: 135 tablet, Rfl: 3    Results for orders placed or performed during the hospital encounter of 02/17/23   Comprehensive Metabolic Panel    Specimen: Blood   Result Value Ref Range    Glucose 110 (H) 65 - 99 mg/dL    BUN 79 (H) 8 - 23 mg/dL    Creatinine 1.00 0.76 - 1.27 mg/dL    Sodium 135 (L) 136 - 145 mmol/L    Potassium 3.5 3.5 - 5.2 mmol/L    Chloride 90 (L) 98 - 107 mmol/L    CO2 35.0 (H) 22.0 - 29.0 mmol/L    Calcium 9.0 8.6 - 10.5 mg/dL    Total Protein 6.4 6.0 - 8.5 g/dL    Albumin 3.5 3.5 - 5.2 g/dL    ALT (SGPT) 11 1 - 41 U/L    AST (SGOT) 16 1 - 40 U/L    Alkaline Phosphatase 65 39 - 117 U/L    Total Bilirubin 0.7 0.0 - 1.2 mg/dL    Globulin 2.9 gm/dL    A/G Ratio 1.2 g/dL    BUN/Creatinine Ratio 79.0 (H) 7.0 - 25.0    Anion Gap 10.0 5.0 - 15.0 mmol/L    eGFR 71.9 >60.0 mL/min/1.73   Prealbumin    Specimen: Blood   Result Value  Ref Range    Prealbumin 24.4 20.0 - 40.0 mg/dL   CBC Auto Differential    Specimen: Blood   Result Value Ref Range    WBC 16.78 (H) 3.40 - 10.80 10*3/mm3    RBC 4.67 4.14 - 5.80 10*6/mm3    Hemoglobin 12.8 (L) 13.0 - 17.7 g/dL    Hematocrit 39.6 37.5 - 51.0 %    MCV 84.8 79.0 - 97.0 fL    MCH 27.4 26.6 - 33.0 pg    MCHC 32.3 31.5 - 35.7 g/dL    RDW 13.8 12.3 - 15.4 %    RDW-SD 42.5 37.0 - 54.0 fl    MPV 9.8 6.0 - 12.0 fL    Platelets 360 140 - 450 10*3/mm3    Neutrophil % 84.1 (H) 42.7 - 76.0 %    Lymphocyte % 5.1 (L) 19.6 - 45.3 %    Monocyte % 7.7 5.0 - 12.0 %    Eosinophil % 1.5 0.3 - 6.2 %    Basophil % 0.2 0.0 - 1.5 %    Immature Grans % 1.4 (H) 0.0 - 0.5 %    Neutrophils, Absolute 14.11 (H) 1.70 - 7.00 10*3/mm3    Lymphocytes, Absolute 0.86 0.70 - 3.10 10*3/mm3    Monocytes, Absolute 1.30 (H) 0.10 - 0.90 10*3/mm3    Eosinophils, Absolute 0.25 0.00 - 0.40 10*3/mm3    Basophils, Absolute 0.03 0.00 - 0.20 10*3/mm3    Immature Grans, Absolute 0.23 (H) 0.00 - 0.05 10*3/mm3    nRBC 0.0 0.0 - 0.2 /100 WBC   Blood Gas, Arterial -    Specimen: Arterial Blood   Result Value Ref Range    Site Right Brachial     Doni's Test N/A     pH, Arterial 7.548 (H) 7.350 - 7.450 pH units    pCO2, Arterial 43.1 35.0 - 45.0 mm Hg    pO2, Arterial 56.5 (L) 83.0 - 108.0 mm Hg    HCO3, Arterial 37.6 (H) 20.0 - 26.0 mmol/L    Base Excess, Arterial 13.7 (H) 0.0 - 2.0 mmol/L    O2 Saturation, Arterial 92.2 (L) 94.0 - 99.0 %    Temperature 37.0 C    Barometric Pressure for Blood Gas 754 mmHg    Modality HFNC     Flow Rate 7.0 lpm    Ventilator Mode NA     Collected by KENIA GALLARDO     pCO2, Temperature Corrected 43.1 35 - 45 mm Hg    pH, Temp Corrected 7.548 (H) 7.350 - 7.450 pH Units    pO2, Temperature Corrected 56.5 (L) 83 - 108 mm Hg   Comprehensive Metabolic Panel    Specimen: Blood   Result Value Ref Range    Glucose 77 65 - 99 mg/dL    BUN 57 (H) 8 - 23 mg/dL    Creatinine 1.06 0.76 - 1.27 mg/dL    Sodium 132 (L) 136 - 145 mmol/L     Potassium 3.2 (L) 3.5 - 5.2 mmol/L    Chloride 88 (L) 98 - 107 mmol/L    CO2 33.0 (H) 22.0 - 29.0 mmol/L    Calcium 8.8 8.6 - 10.5 mg/dL    Total Protein 6.2 6.0 - 8.5 g/dL    Albumin 3.2 (L) 3.5 - 5.2 g/dL    ALT (SGPT) 10 1 - 41 U/L    AST (SGOT) 16 1 - 40 U/L    Alkaline Phosphatase 68 39 - 117 U/L    Total Bilirubin 0.7 0.0 - 1.2 mg/dL    Globulin 3.0 gm/dL    A/G Ratio 1.1 g/dL    BUN/Creatinine Ratio 53.8 (H) 7.0 - 25.0    Anion Gap 11.0 5.0 - 15.0 mmol/L    eGFR 67.1 >60.0 mL/min/1.73   Sedimentation Rate    Specimen: Blood   Result Value Ref Range    Sed Rate 69 (H) 0 - 20 mm/hr   C-reactive Protein    Specimen: Blood   Result Value Ref Range    C-Reactive Protein 1.53 (H) 0.00 - 0.50 mg/dL   CBC Auto Differential    Specimen: Blood   Result Value Ref Range    WBC 15.35 (H) 3.40 - 10.80 10*3/mm3    RBC 4.54 4.14 - 5.80 10*6/mm3    Hemoglobin 12.2 (L) 13.0 - 17.7 g/dL    Hematocrit 39.3 37.5 - 51.0 %    MCV 86.6 79.0 - 97.0 fL    MCH 26.9 26.6 - 33.0 pg    MCHC 31.0 (L) 31.5 - 35.7 g/dL    RDW 13.7 12.3 - 15.4 %    RDW-SD 42.6 37.0 - 54.0 fl    MPV 9.8 6.0 - 12.0 fL    Platelets 300 140 - 450 10*3/mm3    Neutrophil % 85.6 (H) 42.7 - 76.0 %    Lymphocyte % 3.9 (L) 19.6 - 45.3 %    Monocyte % 7.0 5.0 - 12.0 %    Eosinophil % 2.0 0.3 - 6.2 %    Basophil % 0.1 0.0 - 1.5 %    Immature Grans % 1.4 (H) 0.0 - 0.5 %    Neutrophils, Absolute 13.13 (H) 1.70 - 7.00 10*3/mm3    Lymphocytes, Absolute 0.60 (L) 0.70 - 3.10 10*3/mm3    Monocytes, Absolute 1.08 (H) 0.10 - 0.90 10*3/mm3    Eosinophils, Absolute 0.30 0.00 - 0.40 10*3/mm3    Basophils, Absolute 0.02 0.00 - 0.20 10*3/mm3    Immature Grans, Absolute 0.22 (H) 0.00 - 0.05 10*3/mm3    nRBC 0.0 0.0 - 0.2 /100 WBC   Basic Metabolic Panel    Specimen: Blood   Result Value Ref Range    Glucose 62 (L) 65 - 99 mg/dL    BUN 40 (H) 8 - 23 mg/dL    Creatinine 0.88 0.76 - 1.27 mg/dL    Sodium 134 (L) 136 - 145 mmol/L    Potassium 3.6 3.5 - 5.2 mmol/L    Chloride 95 (L) 98 -  107 mmol/L    CO2 31.0 (H) 22.0 - 29.0 mmol/L    Calcium 8.4 (L) 8.6 - 10.5 mg/dL    BUN/Creatinine Ratio 45.5 (H) 7.0 - 25.0    Anion Gap 8.0 5.0 - 15.0 mmol/L    eGFR 82.2 >60.0 mL/min/1.73   Magnesium    Specimen: Blood   Result Value Ref Range    Magnesium 2.4 1.6 - 2.4 mg/dL   Basic Metabolic Panel    Specimen: Blood   Result Value Ref Range    Glucose 86 65 - 99 mg/dL    BUN 20 8 - 23 mg/dL    Creatinine 0.77 0.76 - 1.27 mg/dL    Sodium 137 136 - 145 mmol/L    Potassium 3.4 (L) 3.5 - 5.2 mmol/L    Chloride 100 98 - 107 mmol/L    CO2 28.0 22.0 - 29.0 mmol/L    Calcium 8.1 (L) 8.6 - 10.5 mg/dL    BUN/Creatinine Ratio 26.0 (H) 7.0 - 25.0    Anion Gap 9.0 5.0 - 15.0 mmol/L    eGFR 85.6 >60.0 mL/min/1.73   BNP    Specimen: Blood   Result Value Ref Range    proBNP 585.2 0.0 - 1,800.0 pg/mL   CBC Auto Differential    Specimen: Blood   Result Value Ref Range    WBC 11.99 (H) 3.40 - 10.80 10*3/mm3    RBC 3.58 (L) 4.14 - 5.80 10*6/mm3    Hemoglobin 9.9 (L) 13.0 - 17.7 g/dL    Hematocrit 32.0 (L) 37.5 - 51.0 %    MCV 89.4 79.0 - 97.0 fL    MCH 27.7 26.6 - 33.0 pg    MCHC 30.9 (L) 31.5 - 35.7 g/dL    RDW 14.4 12.3 - 15.4 %    RDW-SD 46.3 37.0 - 54.0 fl    MPV 10.1 6.0 - 12.0 fL    Platelets 235 140 - 450 10*3/mm3    Neutrophil % 84.1 (H) 42.7 - 76.0 %    Lymphocyte % 6.8 (L) 19.6 - 45.3 %    Monocyte % 6.2 5.0 - 12.0 %    Eosinophil % 1.9 0.3 - 6.2 %    Basophil % 0.2 0.0 - 1.5 %    Immature Grans % 0.8 (H) 0.0 - 0.5 %    Neutrophils, Absolute 10.10 (H) 1.70 - 7.00 10*3/mm3    Lymphocytes, Absolute 0.81 0.70 - 3.10 10*3/mm3    Monocytes, Absolute 0.74 0.10 - 0.90 10*3/mm3    Eosinophils, Absolute 0.23 0.00 - 0.40 10*3/mm3    Basophils, Absolute 0.02 0.00 - 0.20 10*3/mm3    Immature Grans, Absolute 0.09 (H) 0.00 - 0.05 10*3/mm3    nRBC 0.0 0.0 - 0.2 /100 WBC   Basic Metabolic Panel    Specimen: Blood   Result Value Ref Range    Glucose 87 65 - 99 mg/dL    BUN 22 8 - 23 mg/dL    Creatinine 0.70 (L) 0.76 - 1.27 mg/dL     Sodium 137 136 - 145 mmol/L    Potassium 3.2 (L) 3.5 - 5.2 mmol/L    Chloride 99 98 - 107 mmol/L    CO2 28.0 22.0 - 29.0 mmol/L    Calcium 8.2 (L) 8.6 - 10.5 mg/dL    BUN/Creatinine Ratio 31.4 (H) 7.0 - 25.0    Anion Gap 10.0 5.0 - 15.0 mmol/L    eGFR 88.1 >60.0 mL/min/1.73   Magnesium    Specimen: Blood   Result Value Ref Range    Magnesium 1.9 1.6 - 2.4 mg/dL   Basic Metabolic Panel    Specimen: Blood   Result Value Ref Range    Glucose 142 (H) 65 - 99 mg/dL    BUN 20 8 - 23 mg/dL    Creatinine 0.79 0.76 - 1.27 mg/dL    Sodium 137 136 - 145 mmol/L    Potassium 3.5 3.5 - 5.2 mmol/L    Chloride 100 98 - 107 mmol/L    CO2 29.0 22.0 - 29.0 mmol/L    Calcium 8.5 (L) 8.6 - 10.5 mg/dL    BUN/Creatinine Ratio 25.3 (H) 7.0 - 25.0    Anion Gap 8.0 5.0 - 15.0 mmol/L    eGFR 84.9 >60.0 mL/min/1.73   Magnesium    Specimen: Blood   Result Value Ref Range    Magnesium 1.7 1.6 - 2.4 mg/dL   Basic Metabolic Panel    Specimen: Blood   Result Value Ref Range    Glucose 118 (H) 65 - 99 mg/dL    BUN 18 8 - 23 mg/dL    Creatinine 0.73 (L) 0.76 - 1.27 mg/dL    Sodium 138 136 - 145 mmol/L    Potassium 3.2 (L) 3.5 - 5.2 mmol/L    Chloride 98 98 - 107 mmol/L    CO2 32.0 (H) 22.0 - 29.0 mmol/L    Calcium 8.1 (L) 8.6 - 10.5 mg/dL    BUN/Creatinine Ratio 24.7 7.0 - 25.0    Anion Gap 8.0 5.0 - 15.0 mmol/L    eGFR 87.0 >60.0 mL/min/1.73   BNP    Specimen: Blood   Result Value Ref Range    proBNP 695.7 0.0 - 1,800.0 pg/mL   CBC Auto Differential    Specimen: Blood   Result Value Ref Range    WBC 7.18 3.40 - 10.80 10*3/mm3    RBC 3.44 (L) 4.14 - 5.80 10*6/mm3    Hemoglobin 9.5 (L) 13.0 - 17.7 g/dL    Hematocrit 30.9 (L) 37.5 - 51.0 %    MCV 89.8 79.0 - 97.0 fL    MCH 27.6 26.6 - 33.0 pg    MCHC 30.7 (L) 31.5 - 35.7 g/dL    RDW 14.6 12.3 - 15.4 %    RDW-SD 47.3 37.0 - 54.0 fl    MPV 9.7 6.0 - 12.0 fL    Platelets 203 140 - 450 10*3/mm3    Neutrophil % 74.0 42.7 - 76.0 %    Lymphocyte % 12.3 (L) 19.6 - 45.3 %    Monocyte % 8.2 5.0 - 12.0 %     Eosinophil % 4.6 0.3 - 6.2 %    Basophil % 0.3 0.0 - 1.5 %    Immature Grans % 0.6 (H) 0.0 - 0.5 %    Neutrophils, Absolute 5.32 1.70 - 7.00 10*3/mm3    Lymphocytes, Absolute 0.88 0.70 - 3.10 10*3/mm3    Monocytes, Absolute 0.59 0.10 - 0.90 10*3/mm3    Eosinophils, Absolute 0.33 0.00 - 0.40 10*3/mm3    Basophils, Absolute 0.02 0.00 - 0.20 10*3/mm3    Immature Grans, Absolute 0.04 0.00 - 0.05 10*3/mm3    nRBC 0.0 0.0 - 0.2 /100 WBC   Basic Metabolic Panel    Specimen: Blood   Result Value Ref Range    Glucose 155 (H) 65 - 99 mg/dL    BUN 17 8 - 23 mg/dL    Creatinine 0.69 (L) 0.76 - 1.27 mg/dL    Sodium 137 136 - 145 mmol/L    Potassium 3.4 (L) 3.5 - 5.2 mmol/L    Chloride 98 98 - 107 mmol/L    CO2 28.0 22.0 - 29.0 mmol/L    Calcium 8.5 (L) 8.6 - 10.5 mg/dL    BUN/Creatinine Ratio 24.6 7.0 - 25.0    Anion Gap 11.0 5.0 - 15.0 mmol/L    eGFR 88.5 >60.0 mL/min/1.73   Magnesium    Specimen: Blood   Result Value Ref Range    Magnesium 1.7 1.6 - 2.4 mg/dL   Basic Metabolic Panel    Specimen: Blood   Result Value Ref Range    Glucose 113 (H) 65 - 99 mg/dL    BUN 18 8 - 23 mg/dL    Creatinine 0.71 (L) 0.76 - 1.27 mg/dL    Sodium 140 136 - 145 mmol/L    Potassium 3.8 3.5 - 5.2 mmol/L    Chloride 101 98 - 107 mmol/L    CO2 30.0 (H) 22.0 - 29.0 mmol/L    Calcium 8.5 (L) 8.6 - 10.5 mg/dL    BUN/Creatinine Ratio 25.4 (H) 7.0 - 25.0    Anion Gap 9.0 5.0 - 15.0 mmol/L    eGFR 87.7 >60.0 mL/min/1.73   CBC Auto Differential    Specimen: Blood   Result Value Ref Range    WBC 5.90 3.40 - 10.80 10*3/mm3    RBC 3.46 (L) 4.14 - 5.80 10*6/mm3    Hemoglobin 9.5 (L) 13.0 - 17.7 g/dL    Hematocrit 31.6 (L) 37.5 - 51.0 %    MCV 91.3 79.0 - 97.0 fL    MCH 27.5 26.6 - 33.0 pg    MCHC 30.1 (L) 31.5 - 35.7 g/dL    RDW 14.9 12.3 - 15.4 %    RDW-SD 49.1 37.0 - 54.0 fl    MPV 9.2 6.0 - 12.0 fL    Platelets 180 140 - 450 10*3/mm3    Neutrophil % 71.0 42.7 - 76.0 %    Lymphocyte % 13.7 (L) 19.6 - 45.3 %    Monocyte % 8.6 5.0 - 12.0 %    Eosinophil %  5.9 0.3 - 6.2 %    Basophil % 0.3 0.0 - 1.5 %    Immature Grans % 0.5 0.0 - 0.5 %    Neutrophils, Absolute 4.18 1.70 - 7.00 10*3/mm3    Lymphocytes, Absolute 0.81 0.70 - 3.10 10*3/mm3    Monocytes, Absolute 0.51 0.10 - 0.90 10*3/mm3    Eosinophils, Absolute 0.35 0.00 - 0.40 10*3/mm3    Basophils, Absolute 0.02 0.00 - 0.20 10*3/mm3    Immature Grans, Absolute 0.03 0.00 - 0.05 10*3/mm3    nRBC 0.0 0.0 - 0.2 /100 WBC   POC Glucose Once    Specimen: Blood   Result Value Ref Range    Glucose 179 (H) 70 - 130 mg/dL   POC Glucose Once    Specimen: Blood   Result Value Ref Range    Glucose 205 (H) 70 - 130 mg/dL   POC Glucose Once    Specimen: Blood   Result Value Ref Range    Glucose 107 70 - 130 mg/dL   POC Glucose Once    Specimen: Blood   Result Value Ref Range    Glucose 213 (H) 70 - 130 mg/dL   POC Glucose Once    Specimen: Blood   Result Value Ref Range    Glucose 171 (H) 70 - 130 mg/dL   POC Glucose Once    Specimen: Blood   Result Value Ref Range    Glucose 120 70 - 130 mg/dL   POC Glucose Once    Specimen: Blood   Result Value Ref Range    Glucose 129 70 - 130 mg/dL   POC Glucose Once    Specimen: Blood   Result Value Ref Range    Glucose 259 (H) 70 - 130 mg/dL   POC Glucose Once    Specimen: Blood   Result Value Ref Range    Glucose 146 (H) 70 - 130 mg/dL   POC Glucose Once    Specimen: Blood   Result Value Ref Range    Glucose 207 (H) 70 - 130 mg/dL   POC Glucose Once    Specimen: Blood   Result Value Ref Range    Glucose 78 70 - 130 mg/dL   POC Glucose Once    Specimen: Blood   Result Value Ref Range    Glucose 343 (H) 70 - 130 mg/dL   POC Glucose Once    Specimen: Blood   Result Value Ref Range    Glucose 231 (H) 70 - 130 mg/dL   POC Glucose Once    Specimen: Blood   Result Value Ref Range    Glucose 243 (H) 70 - 130 mg/dL   POC Glucose Once    Specimen: Blood   Result Value Ref Range    Glucose 70 70 - 130 mg/dL   POC Glucose Once    Specimen: Blood   Result Value Ref Range    Glucose 203 (H) 70 - 130 mg/dL    POC Glucose Once    Specimen: Blood   Result Value Ref Range    Glucose 174 (H) 70 - 130 mg/dL   POC Glucose Once    Specimen: Blood   Result Value Ref Range    Glucose 180 (H) 70 - 130 mg/dL   POC Glucose Once    Specimen: Blood   Result Value Ref Range    Glucose 121 70 - 130 mg/dL   POC Glucose Once    Specimen: Blood   Result Value Ref Range    Glucose 220 (H) 70 - 130 mg/dL   POC Glucose Once    Specimen: Blood   Result Value Ref Range    Glucose 154 (H) 70 - 130 mg/dL   POC Glucose Once    Specimen: Blood   Result Value Ref Range    Glucose 180 (H) 70 - 130 mg/dL   POC Glucose Once    Specimen: Blood   Result Value Ref Range    Glucose 192 (H) 70 - 130 mg/dL   POC Glucose Once    Specimen: Blood   Result Value Ref Range    Glucose 207 (H) 70 - 130 mg/dL   POC Glucose Once    Specimen: Blood   Result Value Ref Range    Glucose 112 70 - 130 mg/dL   POC Glucose Once    Specimen: Blood   Result Value Ref Range    Glucose 200 (H) 70 - 130 mg/dL   POC Glucose Once    Specimen: Blood   Result Value Ref Range    Glucose 230 (H) 70 - 130 mg/dL   POC Glucose Once    Specimen: Blood   Result Value Ref Range    Glucose 183 (H) 70 - 130 mg/dL   POC Glucose Once    Specimen: Blood   Result Value Ref Range    Glucose 173 (H) 70 - 130 mg/dL   POC Glucose Once    Specimen: Blood   Result Value Ref Range    Glucose 163 (H) 70 - 130 mg/dL   POC Glucose Once    Specimen: Blood   Result Value Ref Range    Glucose 119 70 - 130 mg/dL   POC Glucose Once    Specimen: Blood   Result Value Ref Range    Glucose 286 (H) 70 - 130 mg/dL   POC Glucose Once    Specimen: Blood   Result Value Ref Range    Glucose 128 70 - 130 mg/dL   POC Glucose Once    Specimen: Blood   Result Value Ref Range    Glucose 118 70 - 130 mg/dL   POC Glucose Once    Specimen: Blood   Result Value Ref Range    Glucose 193 (H) 70 - 130 mg/dL   POC Glucose Once    Specimen: Blood   Result Value Ref Range    Glucose 220 (H) 70 - 130 mg/dL   POC Glucose Once     Specimen: Blood   Result Value Ref Range    Glucose 162 (H) 70 - 130 mg/dL   POC Glucose Once    Specimen: Blood   Result Value Ref Range    Glucose 101 70 - 130 mg/dL   POC Glucose Once    Specimen: Blood   Result Value Ref Range    Glucose 177 (H) 70 - 130 mg/dL   POC Glucose Once    Specimen: Blood   Result Value Ref Range    Glucose 135 (H) 70 - 130 mg/dL   POC Glucose Once    Specimen: Blood   Result Value Ref Range    Glucose 225 (H) 70 - 130 mg/dL   POC Glucose Once    Specimen: Blood   Result Value Ref Range    Glucose 156 (H) 70 - 130 mg/dL   POC Glucose Once    Specimen: Blood   Result Value Ref Range    Glucose 136 (H) 70 - 130 mg/dL   POC Glucose Once    Specimen: Blood   Result Value Ref Range    Glucose 254 (H) 70 - 130 mg/dL   POC Glucose Once    Specimen: Blood   Result Value Ref Range    Glucose 66 (L) 70 - 130 mg/dL   POC Glucose Once    Specimen: Blood   Result Value Ref Range    Glucose 263 (H) 70 - 130 mg/dL       Lab Results   Component Value Date    PSA 1.720 01/16/2023    PSA 0.983 01/05/2022    PSA 1.040 01/05/2021        Lab Results:  CBC:  Lab Results - Last 18 Months   Lab Units 03/02/23  0441 02/27/23  0614 02/23/23  0522 02/20/23  0322 02/18/23  0333 02/17/23  0530 02/16/23  0614 02/07/23  0936 01/16/23  1019 10/11/22  0849 08/08/22  1010 07/06/22  0715 02/20/22  0405 01/05/22  0658   WBC 10*3/mm3 5.90 7.18 11.99* 15.35* 16.78* 18.31* 21.05*   < > 8.16 9.59 11.28* 7.20   < > 7.98   HEMOGLOBIN g/dL 9.5* 9.5* 9.9* 12.2* 12.8* 13.2 13.5   < > 11.6* 11.6* 10.9* 11.5*   < > 12.6*   HEMATOCRIT % 31.6* 30.9* 32.0* 39.3 39.6 42.2 42.2   < > 37.2* 36.3* 33.9* 34.5*   < > 38.4   PLATELETS 10*3/mm3 180 203 235 300 360 397 424   < > 226 239 284 231   < > 220   IRON mcg/dL  --   --   --   --   --   --   --   --  75 65 64 112  --  60    < > = values in this interval not displayed.      BMP/CMP:  Lab Results - Last 18 Months   Lab Units 03/02/23  0441 02/28/23  0412 02/27/23  0614 02/25/23  0240  02/24/23  0438 02/23/23  0522 02/21/23  0524 07/06/22  0715 02/20/22  0405 01/05/22  0658   SODIUM mmol/L 140 137 138 137 137 137 134*   < > 143 145   POTASSIUM mmol/L 3.8 3.4* 3.2* 3.5 3.2* 3.4* 3.6   < > 4.3 4.4   CHLORIDE mmol/L 101 98 98 100 99 100 95*   < > 105 106   TOTAL CO2   --   --   --   --   --   --   --    < >  --  32.7*   CO2 mmol/L 30.0* 28.0 32.0* 29.0 28.0 28.0 31.0*   < > 29.0  --    BUN mg/dL 18 17 18 20 22 20 40*   < > 24* 27*   CREATININE mg/dL 0.71* 0.69* 0.73* 0.79 0.70* 0.77 0.88   < > 1.14 1.28*   EGFR IF NONAFRICN AM mL/min/1.73  --   --   --   --   --   --   --   --  61 53*   EGFR IF AFRICN AM mL/min/1.73  --   --   --   --   --   --   --   --   --  64   CALCIUM mg/dL 8.5* 8.5* 8.1* 8.5* 8.2* 8.1* 8.4*   < > 9.1 9.9    < > = values in this interval not displayed.     HEPATIC:  Lab Results - Last 18 Months   Lab Units 02/20/23  0322 02/18/23  0333 02/14/23  0645 02/13/23  0230 02/12/23  0226 02/11/23  0515 02/10/23  0420   ALT (SGPT) U/L 10 11 17 18 21 21 22   AST (SGOT) U/L 16 16 15 13 15 18 19   ALK PHOS U/L 68 65 62 61 59 56 58     THYROID:  Lab Results - Last 18 Months   Lab Units 02/14/23  0645 01/16/23  1019 01/05/22  0658   TSH uIU/mL 0.875 2.780 2.620     A1C:  Lab Results - Last 18 Months   Lab Units 02/14/23  0621 01/16/23  1019 07/06/22  0715 01/05/22  0658   HEMOGLOBIN A1C % 7.40* 6.70* 6.00* 6.30*     PSA:  Lab Results - Last 18 Months   Lab Units 01/16/23  1019 01/05/22  0658   PSA ng/mL 1.720 0.983     CT Angiogram Abdomen Pelvis    Result Date: 1/24/2023  1. 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  This report was finalized on 01/24/2023 10:13 by Dr Juan Carlos Wilkinson, .    XR Chest 1 View    Result Date: 2/19/2023  1. Underlying lung emphysema with bibasilar fibrosis and superimposed patchy airspace opacities/pneumonia. These superimposed patchy infiltrates are partially resolved on today's exam. No new or worsening infiltrates.   This  "report was finalized on 02/19/2023 09:06 by Dr Juan Carlos Wilkinson, .    XR Chest 1 View    Result Date: 2/10/2023  1. Persistent and unchanged chronic inflammatory and obstructive lung changes bilaterally.   This report was finalized on 02/10/2023 06:40 by Dr. Bayron Wagner MD.    XR Chest 1 View    Result Date: 2/7/2023  1. Increasing interstitial densities in the mid and lower lobes may relate to edema versus pneumonitis. 2. Prior mediastinal surgery. Device implanted within the anterior left chest wall. 3. Emphysema. No pneumothorax. This report was finalized on 02/07/2023 10:03 by Dr. Erika Cobos MD.    Objective   /80   Pulse 73   Resp 14   Ht 180.3 cm (71\")   Wt 81.2 kg (179 lb)   SpO2 98%   BMI 24.97 kg/m²   Body mass index is 24.97 kg/m².     Wt Readings from Last 15 Encounters:   03/09/23 1043 81.2 kg (179 lb)   02/27/23 0300 78.5 kg (173 lb)   02/19/23 2100 73.7 kg (162 lb 6.4 oz)   02/17/23 1129 75.4 kg (166 lb 3.6 oz)   02/17/23 0400 75.4 kg (166 lb 4.8 oz)   02/16/23 0600 76.2 kg (167 lb 15.9 oz)   02/13/23 0800 86.6 kg (191 lb)   02/10/23 0400 87 kg (191 lb 12.8 oz)   02/09/23 0400 86.6 kg (190 lb 14.7 oz)   02/07/23 0854 83.9 kg (185 lb)   01/26/23 0929 90.7 kg (200 lb)   01/24/23 1004 88.5 kg (195 lb)   01/18/23 0852 90.3 kg (199 lb)   12/15/22 0823 91.6 kg (202 lb)   11/15/22 0931 95.7 kg (211 lb)   10/26/22 1048 95.7 kg (211 lb)   10/17/22 1310 95.7 kg (211 lb)   10/11/22 0936 97.1 kg (214 lb)   08/11/22 1006 94.8 kg (209 lb)   08/08/22 1029 94.8 kg (209 lb)   07/21/22 0855 96.2 kg (212 lb)   07/11/22 0951 96.8 kg (213 lb 6.4 oz)       Physical Exam  GENERAL:  AFA/developed in no acute distress.  Frail/thin.  SKIN: Turgor ok without wound, rash, lesion  HEENT: Normal cephalic without trauma.  Pupils equal round reactive to light. Extraocular motions full without nystagmus.    No thyroidmegaly, mass, tenderness, lymphadenopathy and supple.  CV: Regular rhythm.  No murmur, gallop,  " edema. Posterior pulses intact.  No carotid bruits.  CHEST: No chest wall tenderness or mass.   LUNGS: Symmetric motion with clear/decreased to auscultation.   ABD: Soft, nontender without mass.   ORTHO: Symmetric extremities without swelling/point tenderness.  Full gross range of motion except hips reduced.  Symmetric LE.   NEURO: CN 2-12 grossly intact.  Symmetric facies. 1/4 x bicep knee equal reflexes.  UE/LE   3/5 strength throughout except 2/5  L.  Nonfocal use extremities. Speech clear.    PSYCH: Oriented x 3.  Pleasant calm, well kept.  Purposeful/directed conservation with intact short/long gross memory.      Assessment & Plan     1. Anemia, unspecified type    2. Acute hypoxemic respiratory failure due to COVID-19 (HCC)    3. Paroxysmal atrial fibrillation (HCC)    4. Chronic obstructive pulmonary disease, unspecified COPD type (HCC)    5. Hypokalemia      Rx: reviewed/changes:  No orders of the defined types were placed in this encounter.    LAB/Testing/Referrals: reviewed/orders:   Today:   Orders Placed This Encounter   Procedures   • Iron Profile   • Vitamin B12   • Folate   • Comprehensive Metabolic Panel   • CBC & Differential     Usual:   same    Discussions:   Labs to see if anemia has improved; should have additional tx    Body mass index is 24.97 kg/m².   BMI is within normal parameters. No other follow-up for BMI required.  Non-smoker  Gonzalo Durham  reports that he quit smoking about 43 years ago. His smoking use included cigarettes. He started smoking about 69 years ago. He has a 26.00 pack-year smoking history. He has never used smokeless tobacco..     There are no Patient Instructions on file for this visit.    Follow up: Return for lab today; then Dr Romero 1m.  Future Appointments   Date Time Provider Department Center   3/10/2023  1:30 PM Isela Briggs, RN [facility]   3/10/2023  1:30 PM Herberth Madrigal, PT [facility]   3/13/2023 To Be Determined Elvi Bowser LPN [facility] PAD  [facility]   3/14/2023  9:00 AM Teo Jha MD [facility] PAD PAD   3/16/2023 To Be Determined Isela Briggs RN  PAD [facility] PAD   3/21/2023 To Be Determined Elvi Bowser LPN  PAD [place] PAD   3/23/2023 To Be Determined Isela Briggs RN  PAD [facility] PAD   3/30/2023 To Be Determined Isela Briggs RN  PAD [facility] PAD   4/6/2023 To Be Determined Isela Briggs RN  PAD [facility] PAD   4/13/2023 To Be Determined Isela Briggs RN  PAD [facility] PAD   4/20/2023 To Be Determined Isela Briggs RN  PAD [facility] PAD   4/27/2023 To Be Determined Isela Briggs RN  PAD [facility] PAD   4/27/2023  9:45 AM Abel Romero MD [facility]   5/2/2023 To Be Determined Haase, Mallory L, RN  PAD [facility] PAD   5/11/2023 10:00 AM Diallo Hightower APRN [facility] PAD PAD   6/12/2023  9:40 AM Danie Cadena MD [facility] PAD PAD   7/27/2023  9:30 AM Feliz Frye APRN [facility] PAD PAD   7/27/2023  1:45 PM Abel Romero MD [facility] PAD   1/9/2024  9:30 AM PAD CT 2 BH PAD CAT PAD   1/9/2024 10:00 AM PAD US NIVAS CART 2 BH PAD US PAD   1/9/2024 11:00 AM Nasir Gutierrez DO [facility] PAD PAD              Current outpatient and discharge medications have been reconciled for the patient.    Transitional Care Management Certification  I certify that the following are true:  1. Communication was made within 2 business days of discharge.  2. Complexity of Medical Decision Making is high.  3. Face to face visit occurred within 6 days.    *Note: 84272 is for high complexity patients with a face to face visit within 7 days of discharge.  78543 is for high complexity patients with a face to face on days 8-14 post discharge or medium complexity with face to face visit within 14 days post discharge.

## 2023-03-10 ENCOUNTER — HOME CARE VISIT (OUTPATIENT)
Dept: HOME HEALTH SERVICES | Facility: CLINIC | Age: 88
End: 2023-03-10
Payer: MEDICARE

## 2023-03-10 VITALS
SYSTOLIC BLOOD PRESSURE: 102 MMHG | RESPIRATION RATE: 18 BRPM | HEART RATE: 68 BPM | TEMPERATURE: 97.8 F | OXYGEN SATURATION: 98 % | DIASTOLIC BLOOD PRESSURE: 60 MMHG

## 2023-03-10 VITALS
TEMPERATURE: 97.8 F | SYSTOLIC BLOOD PRESSURE: 102 MMHG | DIASTOLIC BLOOD PRESSURE: 60 MMHG | RESPIRATION RATE: 16 BRPM | OXYGEN SATURATION: 98 % | HEART RATE: 68 BPM

## 2023-03-10 LAB
ALBUMIN SERPL-MCNC: 3.8 G/DL (ref 3.5–5.2)
ALBUMIN/GLOB SERPL: 1.7 G/DL
ALP SERPL-CCNC: 69 U/L (ref 39–117)
ALT SERPL-CCNC: 11 U/L (ref 1–41)
AST SERPL-CCNC: 11 U/L (ref 1–40)
BASOPHILS # BLD AUTO: 0.02 10*3/MM3 (ref 0–0.2)
BASOPHILS NFR BLD AUTO: 0.3 % (ref 0–1.5)
BILIRUB SERPL-MCNC: <0.2 MG/DL (ref 0–1.2)
BUN SERPL-MCNC: 23 MG/DL (ref 8–23)
BUN/CREAT SERPL: 27.1 (ref 7–25)
CALCIUM SERPL-MCNC: 9.4 MG/DL (ref 8.6–10.5)
CHLORIDE SERPL-SCNC: 100 MMOL/L (ref 98–107)
CO2 SERPL-SCNC: 28.2 MMOL/L (ref 22–29)
CREAT SERPL-MCNC: 0.85 MG/DL (ref 0.76–1.27)
EGFRCR SERPLBLD CKD-EPI 2021: 83.1 ML/MIN/1.73
EOSINOPHIL # BLD AUTO: 0.14 10*3/MM3 (ref 0–0.4)
EOSINOPHIL NFR BLD AUTO: 2.1 % (ref 0.3–6.2)
ERYTHROCYTE [DISTWIDTH] IN BLOOD BY AUTOMATED COUNT: 14.6 % (ref 12.3–15.4)
FOLATE SERPL-MCNC: 17.4 NG/ML (ref 4.78–24.2)
GLOBULIN SER CALC-MCNC: 2.3 GM/DL
GLUCOSE SERPL-MCNC: 150 MG/DL (ref 65–99)
HCT VFR BLD AUTO: 33.9 % (ref 37.5–51)
HGB BLD-MCNC: 10.8 G/DL (ref 13–17.7)
IMM GRANULOCYTES # BLD AUTO: 0.1 10*3/MM3 (ref 0–0.05)
IMM GRANULOCYTES NFR BLD AUTO: 1.5 % (ref 0–0.5)
IRON SATN MFR SERPL: 18 % (ref 20–50)
IRON SERPL-MCNC: 63 MCG/DL (ref 59–158)
LYMPHOCYTES # BLD AUTO: 1.25 10*3/MM3 (ref 0.7–3.1)
LYMPHOCYTES NFR BLD AUTO: 18.6 % (ref 19.6–45.3)
MCH RBC QN AUTO: 28.3 PG (ref 26.6–33)
MCHC RBC AUTO-ENTMCNC: 31.9 G/DL (ref 31.5–35.7)
MCV RBC AUTO: 89 FL (ref 79–97)
MONOCYTES # BLD AUTO: 0.58 10*3/MM3 (ref 0.1–0.9)
MONOCYTES NFR BLD AUTO: 8.6 % (ref 5–12)
NEUTROPHILS # BLD AUTO: 4.63 10*3/MM3 (ref 1.7–7)
NEUTROPHILS NFR BLD AUTO: 68.9 % (ref 42.7–76)
NRBC BLD AUTO-RTO: 0 /100 WBC (ref 0–0.2)
PLATELET # BLD AUTO: 435 10*3/MM3 (ref 140–450)
POTASSIUM SERPL-SCNC: 4.7 MMOL/L (ref 3.5–5.2)
PROT SERPL-MCNC: 6.1 G/DL (ref 6–8.5)
RBC # BLD AUTO: 3.81 10*6/MM3 (ref 4.14–5.8)
SODIUM SERPL-SCNC: 140 MMOL/L (ref 136–145)
TIBC SERPL-MCNC: 359 MCG/DL
UIBC SERPL-MCNC: 296 MCG/DL (ref 112–346)
VIT B12 SERPL-MCNC: 573 PG/ML (ref 211–946)
WBC # BLD AUTO: 6.72 10*3/MM3 (ref 3.4–10.8)

## 2023-03-10 PROCEDURE — G0299 HHS/HOSPICE OF RN EA 15 MIN: HCPCS

## 2023-03-10 PROCEDURE — G0151 HHCP-SERV OF PT,EA 15 MIN: HCPCS

## 2023-03-10 NOTE — HOME HEALTH
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  Patient is using 4L continous oxygen.  Patient reports weakness and shortness of breath.  Patient reports he has had one fall prior to his most recent hospitalization.  Patient progressed with exercise and ambulation to improve his strength and endurance per patient tolerance.

## 2023-03-10 NOTE — HOME HEALTH
FOCUS OF CARE/SKILLED NEED: CPA/EDUCATION    TEACHING/INTERVENTIONS: Pt denies falls, chest pain or worsening shortness of breath. Pt sitting with multiple family members in home eating lunch. Pt with good color. AAO x 3. Denies sob at present. States he is still weak but feeling better every day. LS clear bilat. O2 on at 4 lpm/nc. Abdo soft with bs present. Good appetite.     PROGRESS TOWARD GOALS: Progressing as expected.     PHYSICIAN CONTACT:  None needed    INSURANCE CHANGES?  Denies    FALLS SINCE LAST VISIT?  Denies    MEDICATION CHANGES? Denies    PLAN FOR NEXT VISIT: cpa/education    PRE-SCREENED FOR COVID? No s/s

## 2023-03-13 PROCEDURE — G0180 MD CERTIFICATION HHA PATIENT: HCPCS | Performed by: FAMILY MEDICINE

## 2023-03-14 ENCOUNTER — OFFICE VISIT (OUTPATIENT)
Dept: PULMONOLOGY | Facility: CLINIC | Age: 88
End: 2023-03-14
Payer: MEDICARE

## 2023-03-14 VITALS
WEIGHT: 180 LBS | SYSTOLIC BLOOD PRESSURE: 124 MMHG | HEART RATE: 57 BPM | DIASTOLIC BLOOD PRESSURE: 82 MMHG | HEIGHT: 71 IN | OXYGEN SATURATION: 99 % | BODY MASS INDEX: 25.2 KG/M2

## 2023-03-14 DIAGNOSIS — G47.33 OBSTRUCTIVE SLEEP APNEA: Primary | Chronic | ICD-10-CM

## 2023-03-14 DIAGNOSIS — J96.11 CHRONIC RESPIRATORY FAILURE WITH HYPOXIA: ICD-10-CM

## 2023-03-14 DIAGNOSIS — U09.9 POST-COVID SYNDROME: ICD-10-CM

## 2023-03-14 DIAGNOSIS — E66.3 OVERWEIGHT: ICD-10-CM

## 2023-03-14 DIAGNOSIS — R04.0 EPISTAXIS: ICD-10-CM

## 2023-03-14 DIAGNOSIS — J44.9 STAGE 2 MODERATE COPD BY GOLD CLASSIFICATION: Chronic | ICD-10-CM

## 2023-03-14 DIAGNOSIS — Z87.891 PERSONAL HISTORY OF NICOTINE DEPENDENCE: ICD-10-CM

## 2023-03-14 PROCEDURE — 1160F RVW MEDS BY RX/DR IN RCRD: CPT | Performed by: INTERNAL MEDICINE

## 2023-03-14 PROCEDURE — 1159F MED LIST DOCD IN RCRD: CPT | Performed by: INTERNAL MEDICINE

## 2023-03-14 PROCEDURE — 99214 OFFICE O/P EST MOD 30 MIN: CPT | Performed by: INTERNAL MEDICINE

## 2023-03-14 NOTE — PROGRESS NOTES
Chief Complaint  Sleep Apnea and Recent COVID-19 infection.    Subjective    History of Present Illness     Gonzalo Durham presents to [facility] for sleep apnea and a recent COVID-19 infection.    History of Present Illness   The patient is accompanied by 2 of his daughters today.  He had been seen by Feliz Galan in late January and had some complaints of difficulties tolerating his CPAP and for that reason was scheduled to see me today.  In the interim he unfortunately developed COVID-19 for which he required hospitalization for about 2 weeks in early to mid February at [facility] and was subsequently transferred to [facility] for about another 2 weeks.  He does have some issues with chronic sinus congestion and this has been much worse since he had the COVID infection and he is having associated nosebleeds.  He is on chronic Eliquis therapy which certainly could contribute to the nosebleed issues.  He does state that even before COVID he again was having difficulty tolerating his CPAP and it seemed to relate to issues with the CPAP causing a lot of sinus congestion and also subsequently some chest congestion.  It does not appear he was definitely an issue with higher pressures being tolerated from a respiratory standpoint although that still could be part of the issue but again his major complaints were of increasing problems with sinus congestion and cough and chest congestion.  He is normally on CPAP of 9 and he has been followed by Diallo Hightower at the [facility] sleep clinic and actually has an appointment to see him on May 11.  He is not using his CPAP currently and I advised him I would really recommend he try to get back on it and if he just does not think he can tolerate a pressure of 9 cm we could try to drop it to 7 cm and then get a download at some point but if he cannot tolerate it to the point where we can at least get a download the  neck step is an in lab titration study.  I do not think that will be feasible at this time in view of his recent COVID-19 infection.  He is on chronic oxygen therapy.  I told the patient's daughters that again I would recommend he try to try CPAP again at 9 cm and if he just cannot tolerate this to contact myself and I will check with his [employer] company about dropping it to 7 cm.  Then we will try to get a download at some point.  He does receive his CPAP through [facility].  Also I am going to arrange for ENT referral as soon if he has significant ENT issues this could affect his ability to tolerate CPAP.  He has had the COVID-19 vaccine in the form of the Moderna vaccine.  He has had a Prevnar 13 in the past.  Prior to Admission medications    Medication Sig Start Date End Date Taking? Authorizing Provider   acetaminophen (TYLENOL) 325 MG tablet Take 2 tablets by mouth 2 (Two) Times a Day. 4/8/21  Yes Abel Romero MD   albuterol sulfate  (90 Base) MCG/ACT inhaler Inhale 2 puffs Every 6 (Six) Hours As Needed for Wheezing.   Yes Gal Fischer MD   apixaban (ELIQUIS) 5 MG tablet tablet Take 1 tablet by mouth 2 (Two) Times a Day.   Yes Gal Fischer MD   ascorbic acid (VITAMIN C) 500 MG tablet Take 1 tablet by mouth Daily.   Yes Gal Fischer MD   aspirin 81 MG EC tablet Take 1 tablet by mouth Daily.   Yes Gal Fischer MD   Budeson-Glycopyrrol-Formoterol (Breztri Aerosphere) 160-9-4.8 MCG/ACT aerosol inhaler Inhale 2 puffs 2 (Two) Times a Day.   Yes Gal Fischer MD   budesonide (PULMICORT) 0.5 MG/2ML nebulizer solution USE ONE VIAL PER NEBULIZER TWICE DAILY 3/3/23  Yes Gal Fischer MD   docusate sodium 100 MG capsule Take 1 capsule by mouth 2 (Two) Times a Day. 2/17/23  Yes Robert Dunn MD   ferrous sulfate 325 (65 Fe) MG tablet Take 1 tablet by mouth Daily With Breakfast.   Yes Gal Fischer MD   finasteride (PROSCAR) 5 MG tablet Take 1  tablet by mouth Daily.   Yes Gal Fischer MD   furosemide (LASIX) 20 MG tablet Take 1 tablet by mouth Every 12 (Twelve) Hours. 3/3/23  Yes Gal Fischer MD   guaiFENesin (MUCINEX) 600 MG 12 hr tablet Take 1 tablet by mouth Daily.   Yes Gal Fischer MD   hydroCHLOROthiazide (MICROZIDE) 12.5 MG capsule Take 1 capsule by mouth Daily. 10/11/22  Yes Bradley Carver MD   insulin detemir (LEVEMIR) 100 UNIT/ML injection Inject 10 Units under the skin into the appropriate area as directed Every 12 (Twelve) Hours. 2/17/23  Yes Robert Dunn MD   Insulin Lispro (humaLOG) 100 UNIT/ML injection Inject  under the skin into the appropriate area as directed. Using sliding scale. Not to exceed 30 units daily   Yes Gal Fischer MD   isosorbide mononitrate (IMDUR) 30 MG 24 hr tablet Take 1 tablet by mouth Daily.   Yes Gal Fischer MD   melatonin 3 MG tablet Take 1 tablet by mouth At Night As Needed for Sleep. 2/17/23  Yes Robert Dunn MD   metFORMIN (GLUCOPHAGE) 500 MG tablet Take 1 tablet by mouth Daily With Breakfast. 3/4/23  Yes Gal Fischer MD   metoprolol tartrate (LOPRESSOR) 25 MG tablet Take 12.5 mg by mouth 2 (Two) Times a Day.   Yes Gal Fischer MD   Multiple Vitamins-Minerals (MULTIVITAMIN & MINERAL PO) Take 1 tablet by mouth Daily. 3/4/23  Yes Gal Fischer MD   nitroglycerin (Nitrostat) 0.4 MG SL tablet Place 1 tablet under the tongue Every 5 (Five) Minutes As Needed for Chest Pain. 7/6/22  Yes Abel Romero MD   pantoprazole (PROTONIX) 40 MG EC tablet Take 1 tablet by mouth Daily. 7/6/22  Yes Abel Romero MD   polyethylene glycol 17 g packet Take 17 g by mouth Daily. 2/17/23  Yes Robert Dunn MD   potassium chloride ER (K-TAB) 20 MEQ tablet controlled-release ER tablet Take 1 tablet by mouth 2 (Two) Times a Day With Meals. 3/3/23  Yes Gal Fischer MD   pravastatin (Pravachol) 80 MG tablet Take 1 tablet by mouth Daily.  "22  Yes Abel Romero MD   terazosin (HYTRIN) 10 MG capsule Take 1 capsule by mouth Every Night.   Yes ProviderGal MD       Social History     Socioeconomic History   • Marital status:    Tobacco Use   • Smoking status: Former     Packs/day: 1.00     Years: 26.00     Pack years: 26.00     Types: Cigarettes     Start date:      Quit date: 1980     Years since quittin.2   • Smokeless tobacco: Never   Vaping Use   • Vaping Use: Never used   Substance and Sexual Activity   • Alcohol use: No   • Drug use: No   • Sexual activity: Defer       Objective   Vital Signs:   /82   Pulse 57   Ht 180.3 cm (71\")   Wt 81.6 kg (180 lb)   SpO2 99% Comment: 4 L  BMI 25.10 kg/m²     Physical Exam  Vitals and nursing note reviewed.   Constitutional:       Comments: He is a chronically ill appearing white male in a wheelchair.   HENT:      Head: Normocephalic.      Comments: He is wearing a mask and has nasal oxygen in place.  Eyes:      Extraocular Movements: Extraocular movements intact.      Pupils: Pupils are equal, round, and reactive to light.   Cardiovascular:      Rate and Rhythm: Normal rate and regular rhythm.   Pulmonary:      Effort: Pulmonary effort is normal.      Breath sounds: Normal breath sounds.   Musculoskeletal:      Comments: He is in a wheelchair.     Skin:     General: Skin is warm and dry.   Neurological:      General: No focal deficit present.      Mental Status: He is alert and oriented to person, place, and time.      Motor: Weakness present.   Psychiatric:         Mood and Affect: Mood normal.         Behavior: Behavior normal.        Result Review :    PFT Values        Some values may be hidden. Unless noted otherwise, only the newest values recorded on each date are displayed.         Old Values PFT Results 22   No data to display.      Pre Drug PFT Results 22   FVC 87 124   FEV1 50 64   FEF 25-75% 15 24   FEV1/FVC 43.7 38      Post " Drug PFT Results 1/26/22 1/26/23   No data to display.      Other Tests PFT Results 1/26/22 1/26/23   No data to display.               Results for orders placed in visit on 01/26/23    Pulmonary Function Test    Narrative  Pulmonary Function Test  Performed by: Cindy Ramires, REJI  Authorized by: Feliz Frye APRN    Pre Drug % Predicted  FVC: 124%  FEV1: 64%  FEF 25-75%: 24%  FEV1/FVC: 38%    Interpretation  Spirometry  Spirometry shows moderate obstruction. There is reduced midflow suggesting small airway/airflow obstruction.      Results for orders placed in visit on 01/26/22    Pulmonary Function Test    Narrative  Pulmonary Function Test  Performed by: Cindy Ramires, REJI  Authorized by: Feliz Frye APRN    Pre Drug % Predicted  FVC: 87%  FEV1: 50%  FEF 25-75%: 15%  FEV1/FVC: 43.7%    Interpretation  Spirometry  Spirometry shows moderate obstruction. There is reduced midflow suggesting small airway/airflow obstruction.  Overall comments: Current FEV1 is 50% predicted compared to 66% predicted in 2019.  Reviewed the results of the drop in lung function with the patient and his daughter.  The patient states understanding but was not interested in changing his inhaler regimen.  He reports that he feels no more symptomatic than he did 2 or 3 years ago.      Results for orders placed in visit on 02/25/19    Pulmonary Function Test    Rest/Exercise Pulse Ox Values        Some values may be hidden. Unless noted otherwise, only the newest values recorded on each date are displayed.         Rest/Exercise Pulse Ox Results 1/26/23   Rest room air SAT % 92   Exercise room air SAT % 79   Exercise on O2 @ Liters 2   Exercise on O2 SAT % 89                      My interpretation of imaging:    XR Chest 1 View (02/19/2023 04:35)    My interpretation of labs:   COVID-19 and FLU A/B PCR - Swab, Nasopharynx (02/07/2023 09:28)      Assessment and Plan     Diagnoses and all orders for this  visit:    1. Obstructive sleep apnea (Primary)  Assessment & Plan:  He is cleaning his device on a regular basis.  Oxygen is bled into the system and he is humidifying his system.  At this point I think some of the difficulties having an tolerating CPAP may relate to some sinus issues and his underlying chronic lung disease with bronchitis rather than just a pressure issue and again I will arrange for ENT referral and he will try to wear the CPAP again at 9 cm.  If he just cannot tolerate this we could try to drop the pressure to 7 cm and get a download.  If he has persistent problems tolerating CPAP despite pressure adjustments the neck step would be an in lab titration study to see if he might do better with BiPAP but I would not recommend even considering this until he recovers further from his recent COVID-19 infection.      2. Chronic respiratory failure with hypoxia (HCC)  Assessment & Plan:  He will continue with oxygen therapy.      3. Stage 2 moderate COPD by GOLD classification ([facility])  Assessment & Plan:  He will continue his Breztri Aerosphere and his as needed albuterol HFA.        4. Post-COVID syndrome  Assessment & Plan:  He still has a lot of issues with cough which I do suspect relates in large part to his recent COVID-19 infection.      5. Epistaxis  Assessment & Plan:  I will refer him to the ENT group regarding his recurrent problems with epistaxis and his chronic sinus congestion symptoms.  Certainly his sinus issues could contribute to difficulties in tolerating CPAP.    Orders:  -     Cancel: Ambulatory Referral to ENT (Otolaryngology)  -     Ambulatory Referral to ENT (Otolaryngology)    6. Personal history of nicotine dependence  Assessment & Plan:  He quit smoking in 1980.      7. Overweight  Assessment & Plan:  Patient's (Body mass index is 25.1 kg/m².) indicates that they are overweight with health conditions that include obstructive sleep apnea . Weight is unchanged.  Diet is encouraged.   His exercise capabilities be limited by his recent COVID-19 infection and his chronic respiratory failure in particular.  He will follow-up with his primary care physician regarding his elevated BMI otherwise.            Teo Jha MD  3/14/2023  13:01 CDT    Follow Up   Return in about 2 months (around 5/14/2023) for To see me specifically.    Patient was given instructions and counseling regarding his condition or for health maintenance advice. Please see specific information pulled into the AVS if appropriate.

## 2023-03-14 NOTE — PATIENT INSTRUCTIONS
The patient is having some issues with his CPAP device.  He recently had COVID but was having problems even before hand although these are much worse now with a lot of sinus congestion and drainage.  Has not been able to use a CPAP device recently.  I told him I would like him to try to get back on the device at his current pressure of 9 cm and if he just cannot tolerate this we could possibly try to decrease his pressures to 7 cm and if this still was not successful we might need to do an in lab titration study.  In terms of his recent COVID problems this does appear to worsen his sinus issues and I will arrange for ENT referral.  I will see him back otherwise in about 2 months.

## 2023-03-14 NOTE — ASSESSMENT & PLAN NOTE
Patient's (Body mass index is 25.1 kg/m².) indicates that they are overweight with health conditions that include obstructive sleep apnea . Weight is unchanged.  Diet is encouraged.  His exercise capabilities be limited by his recent COVID-19 infection and his chronic respiratory failure in particular.  He will follow-up with his primary care physician regarding his elevated BMI otherwise.

## 2023-03-14 NOTE — ASSESSMENT & PLAN NOTE
I will refer him to the ENT group regarding his recurrent problems with epistaxis and his chronic sinus congestion symptoms.  Certainly his sinus issues could contribute to difficulties in tolerating CPAP.

## 2023-03-14 NOTE — ASSESSMENT & PLAN NOTE
He is cleaning his device on a regular basis.  Oxygen is bled into the system and he is humidifying his system.  At this point I think some of the difficulties having an tolerating CPAP may relate to some sinus issues and his underlying chronic lung disease with bronchitis rather than just a pressure issue and again I will arrange for ENT referral and he will try to wear the CPAP again at 9 cm.  If he just cannot tolerate this we could try to drop the pressure to 7 cm and get a download.  If he has persistent problems tolerating CPAP despite pressure adjustments the neck step would be an in lab titration study to see if he might do better with BiPAP but I would not recommend even considering this until he recovers further from his recent COVID-19 infection.

## 2023-03-14 NOTE — ASSESSMENT & PLAN NOTE
He still has a lot of issues with cough which I do suspect relates in large part to his recent COVID-19 infection.

## 2023-03-15 ENCOUNTER — HOME CARE VISIT (OUTPATIENT)
Dept: HOME HEALTH SERVICES | Facility: CLINIC | Age: 88
End: 2023-03-15
Payer: MEDICARE

## 2023-03-15 ENCOUNTER — TELEPHONE (OUTPATIENT)
Dept: FAMILY MEDICINE CLINIC | Facility: CLINIC | Age: 88
End: 2023-03-15
Payer: MEDICARE

## 2023-03-15 VITALS
HEART RATE: 78 BPM | SYSTOLIC BLOOD PRESSURE: 118 MMHG | TEMPERATURE: 97.9 F | OXYGEN SATURATION: 97 % | RESPIRATION RATE: 20 BRPM | DIASTOLIC BLOOD PRESSURE: 62 MMHG

## 2023-03-15 VITALS
RESPIRATION RATE: 16 BRPM | HEART RATE: 67 BPM | DIASTOLIC BLOOD PRESSURE: 62 MMHG | SYSTOLIC BLOOD PRESSURE: 118 MMHG | OXYGEN SATURATION: 93 % | TEMPERATURE: 98.6 F

## 2023-03-15 DIAGNOSIS — R05.9 COUGH, UNSPECIFIED TYPE: ICD-10-CM

## 2023-03-15 DIAGNOSIS — R47.9 SPEECH PROBLEM: Primary | ICD-10-CM

## 2023-03-15 PROCEDURE — G0157 HHC PT ASSISTANT EA 15: HCPCS

## 2023-03-15 PROCEDURE — G0300 HHS/HOSPICE OF LPN EA 15 MIN: HCPCS

## 2023-03-15 NOTE — TELEPHONE ENCOUNTER
Caller: NORMA    Relationship: Home Health WITH [facility] PHYSICAL THERAPY    Best call back number: [phone]    What is the best time to reach you: ANYTIME    Who are you requesting to speak with (clinical staff, provider,  specific staff member): CLINICAL      What was the call regarding: NORMA IS WANTING TO REQUEST A SPEECH THERAPY CONSULT FOR THE PATIENT. NORMA STATES THAT THEIR SPEECH THERAPY COVERS BREATH SUPPORT. ALSO NORMA WANTING TO LET DR KAUR KNOW THAT FELIX HAS A BIT OF A PRODUCTIVE COUGH GOING ON RIGHT NOW AND IS COUGHING UP SOME YELLOW. PATIENT HAS NO FEVER.    Do you require a callback: YES

## 2023-03-15 NOTE — HOME HEALTH
COVID SCREENING: no s/s  FOCUS OF CARE/SKILLED NEED: gait, ther ex  TEACHING/INTERVENTIONS: HEP, endurance  PROGRESS TOWARD GOALS: pt is progressing toward goals  PHYSICIAN CONTACT: Dr Romero's office notified of pt's productive cough with yellow drainage and to request an order for ST for breath support.  INSURANCE CHANGES: no  FALLS SINCE LAST HH VISIT? no  MEDICATION CHANGES SINCE LAST HH VISIT? no  PLAN FOR NEXT VISIT: gait, ther ex, walk outside if weather allows    Pt c/o difficulty sleeping in his bed. He says that the mattress is not comfortable, but he is in the process of getting a new one. Pt c/o chronic back pain and it is difficult to get comfortable. Pt also c/o a cough and had yellow drainage during this visit.

## 2023-03-16 ENCOUNTER — HOME CARE VISIT (OUTPATIENT)
Dept: HOME HEALTH SERVICES | Facility: HOME HEALTHCARE | Age: 88
End: 2023-03-16
Payer: MEDICARE

## 2023-03-16 NOTE — TELEPHONE ENCOUNTER
Sent to [facility] note  Ok speech  Order placed for [facility]/outpt imaging to get CXR-please advise patient/family to get this done

## 2023-03-17 ENCOUNTER — HOME CARE VISIT (OUTPATIENT)
Dept: HOME HEALTH SERVICES | Facility: CLINIC | Age: 88
End: 2023-03-17
Payer: MEDICARE

## 2023-03-17 VITALS
TEMPERATURE: 97.3 F | DIASTOLIC BLOOD PRESSURE: 60 MMHG | RESPIRATION RATE: 16 BRPM | HEART RATE: 87 BPM | OXYGEN SATURATION: 93 % | SYSTOLIC BLOOD PRESSURE: 112 MMHG

## 2023-03-17 PROCEDURE — G0157 HHC PT ASSISTANT EA 15: HCPCS

## 2023-03-17 NOTE — HOME HEALTH
COVID SCREENING: no s/s  FOCUS OF CARE/SKILLED NEED: gait, ther ex  TEACHING/INTERVENTIONS: endurance  PROGRESS TOWARD GOALS: pt is progressing toward goals  PHYSICIAN CONTACT: n/a  INSURANCE CHANGES: no  FALLS SINCE LAST HH VISIT? no  MEDICATION CHANGES SINCE LAST HH VISIT? no  PLAN FOR NEXT VISIT: gait, [place]    Pt c/o difficulty keeping his o2 sat above 90%. Pt also c/o generalized pain today. Pt says that his cough is better today. He is going to have a chest x-ray next week.

## 2023-03-20 ENCOUNTER — HOME CARE VISIT (OUTPATIENT)
Dept: HOME HEALTH SERVICES | Facility: CLINIC | Age: 88
End: 2023-03-20
Payer: MEDICARE

## 2023-03-20 VITALS
HEART RATE: 63 BPM | SYSTOLIC BLOOD PRESSURE: 90 MMHG | TEMPERATURE: 97.8 F | DIASTOLIC BLOOD PRESSURE: 54 MMHG | OXYGEN SATURATION: 97 % | RESPIRATION RATE: 36 BRPM

## 2023-03-20 VITALS
RESPIRATION RATE: 18 BRPM | HEART RATE: 61 BPM | TEMPERATURE: 98.6 F | OXYGEN SATURATION: 96 % | DIASTOLIC BLOOD PRESSURE: 54 MMHG | SYSTOLIC BLOOD PRESSURE: 118 MMHG

## 2023-03-20 PROCEDURE — G0157 HHC PT ASSISTANT EA 15: HCPCS

## 2023-03-20 PROCEDURE — G0153 HHCP-SVS OF S/L PATH,EA 15MN: HCPCS

## 2023-03-20 NOTE — HOME HEALTH
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 IS READY FOR ST VISIT WITH COMPLAINTS OF RUNNING SHORT OF BREATH WHEN COMMUNICATING VERBALLY. .  PT'S DTR CARMEN AND PD CG CHRISSY ARE PRESENT.     PREVIOUS ST:  NO    INSURANCE CHANGES:  NONE    MEDICATION CHANGES:  NO    FALL REPORTED:  NONE    PATIENT STATED GOAL:    MEDICAL NECESSITY:  SKILLED ST NECESSARY TO MAXIMIZE VERBAL COMMUNICATION OF WANTS, NEEDS, PAIN, SAFETY AND MEDICAL CONDITION.    NEXT DOCTOR APPOINTMENT:  N/A    REHAB POTENTIAL:  GOOD    DISCHARGE PLAN:  D/C WHEN GOALS ARE MET OR WHEN MAX FUNCTIONAL GAIN IS ACHIEVED.    FREQUENCY AND DURATION:  2WK2; 1WK2 BEGINNING WK OF 03/20/23    PLAN FOR NEXT VISIT: VOICE/BREATH SUPPORT TREATMENT TASKS; TRAINING AND EDUCATION ON HEP    ASSESSMENT:  PT. IS A VERY MOTIVATED AND COOPERATIVE 89 YR OLD MALE REFERRED TO ST FOR EVALUATION DUE TO DECLINE IN VERBAL COMMUNICATION DUE TO INCREASED DIFFICULTY WITH BREATH SUPPORT FUNCTIONING.  PT. DEMONSTRATES NO DIFFICULTY WITH COGNITION, SPEECH, LANGUAGE OR SWALLOWING FUNCTION.   PT. DEMONSTRATES MILD TO MODERATE BREATH SUPPORT RELATED VOICE DEFICIT INTERFERING WITH RELIABLE COMMUNICATION.  SLP HAS PROVIDED INITIAL TRAINING, EDUCATION, VERBAL AND WRITTEN INSTRUCTION TO PT/DTR/CG WHO ARE ABLE TO PARTIALLY VERBALIZED UNDERSTANDING USING TEACH BACK METHOD OF EDUCATION, THEREFORE ONGOING INSTRUCTION IS REQUIRED NEXT VISIT.

## 2023-03-20 NOTE — HOME HEALTH
COVID SCREENING: no s/s  FOCUS OF CARE/SKILLED NEED: gait, endurance  TEACHING/INTERVENTIONS: endurance  PROGRESS TOWARD GOALS: pt is progressing toward goals  PHYSICIAN CONTACT: n/a  INSURANCE CHANGES: no  FALLS SINCE LAST HH VISIT? no  MEDICATION CHANGES SINCE LAST HH VISIT? no  PLAN FOR NEXT VISIT: gait, endurance    Pt says that he feels pretty good today.

## 2023-03-21 ENCOUNTER — HOSPITAL ENCOUNTER (OUTPATIENT)
Dept: GENERAL RADIOLOGY | Facility: HOSPITAL | Age: 88
Discharge: HOME | End: 2023-03-21
Admitting: FAMILY MEDICINE
Payer: MEDICARE

## 2023-03-21 ENCOUNTER — TELEPHONE (OUTPATIENT)
Dept: FAMILY MEDICINE CLINIC | Facility: CLINIC | Age: 88
End: 2023-03-21
Payer: MEDICARE

## 2023-03-21 DIAGNOSIS — R05.9 COUGH, UNSPECIFIED TYPE: ICD-10-CM

## 2023-03-21 PROCEDURE — 71046 X-RAY EXAM CHEST 2 VIEWS: CPT

## 2023-03-21 NOTE — TELEPHONE ENCOUNTER
"Jennie is asking/someone is asking why [facility] sent this message    What was the call regarding: [facility] IS WANTING TO REQUEST A SPEECH THERAPY CONSULT FOR THE PATIENT. [facility] STATES THAT THEIR SPEECH THERAPY COVERS BREATH SUPPORT.     Sent to [place] to see if   A. Still needed  B. If they feel that \"breath support\" will get it   " No

## 2023-03-21 NOTE — TELEPHONE ENCOUNTER
Please see the below message from the scheduling department        PLEASE UPDATE DIAGNOSIS ON REFERRAL FOR WHAT NEEDS TO BE TREATED WITH SPEECH THERAPY

## 2023-03-22 ENCOUNTER — HOME CARE VISIT (OUTPATIENT)
Dept: HOME HEALTH SERVICES | Facility: CLINIC | Age: 88
End: 2023-03-22
Payer: MEDICARE

## 2023-03-22 ENCOUNTER — TELEPHONE (OUTPATIENT)
Dept: FAMILY MEDICINE CLINIC | Facility: CLINIC | Age: 88
End: 2023-03-22
Payer: MEDICARE

## 2023-03-22 VITALS
HEART RATE: 75 BPM | RESPIRATION RATE: 20 BRPM | SYSTOLIC BLOOD PRESSURE: 123 MMHG | TEMPERATURE: 99.9 F | OXYGEN SATURATION: 98 % | DIASTOLIC BLOOD PRESSURE: 58 MMHG

## 2023-03-22 VITALS
SYSTOLIC BLOOD PRESSURE: 132 MMHG | RESPIRATION RATE: 20 BRPM | OXYGEN SATURATION: 95 % | DIASTOLIC BLOOD PRESSURE: 56 MMHG | HEART RATE: 66 BPM | TEMPERATURE: 98.3 F

## 2023-03-22 PROCEDURE — G0153 HHCP-SVS OF S/L PATH,EA 15MN: HCPCS

## 2023-03-22 PROCEDURE — G0300 HHS/HOSPICE OF LPN EA 15 MIN: HCPCS

## 2023-03-22 NOTE — TELEPHONE ENCOUNTER
Home health needs to present the continued productive cough to pulmonary; I am not sure what they advise to do with this-sent to l    Can get UA/culture indicated and CBC

## 2023-03-22 NOTE — TELEPHONE ENCOUNTER
Elvi w/ [facility] called & said that Mr. Durham's daughter was concerned that he had a temp over the night but now it has subsided.  He also has a productive cough.  Elvi asked if you want any kind of blood work or certain work up for this patient.     [phone]

## 2023-03-22 NOTE — HOME HEALTH
COVID SCREENING:  NEGATIVE    FOCUS OF CARE/SKILLED NEED: VOICE/BREATH SUPPORT TO MAXIMIZE VERBAL COMMUNICATION    TEACHING/INTERVENTIONS: VOICE/BREATH SUPPORT TREATMENT TASKS;  TRAINING AND EDUCATION    PROGRESS TOWARD GOALS:  GOOD USE AND UNDERSTANDING OF HEP, HOWEVER, REQUIRES CUES AND DEMONSTRATION.  VOICE QUALITY ADEQUATE THIS VISIT WITH PERIODS OF RASPINESS AND DIMINISHED LOUDNESS DUE TO INCREASED CHEST CONGESTION.  ALL GOALS WILL CONTINUE.    PHYSICIAN CONTACT:  NO    INSURANCE CHANGES? NO    FALLS SINCE LAST VISIT? NO    MEDICATION CHANGES SINCE LAST VISIT? NO    PLAN FOR NEXT VISIT:  VOICE/BREATH SUPPORT TREATMENT;  TRAINING, INSTRUCTION, EDUCATION AND DEMONSTRATION AS NECESSARY.

## 2023-03-23 ENCOUNTER — HOME CARE VISIT (OUTPATIENT)
Dept: HOME HEALTH SERVICES | Facility: CLINIC | Age: 88
End: 2023-03-23
Payer: MEDICARE

## 2023-03-23 ENCOUNTER — TELEPHONE (OUTPATIENT)
Dept: FAMILY MEDICINE CLINIC | Facility: CLINIC | Age: 88
End: 2023-03-23
Payer: MEDICARE

## 2023-03-23 VITALS
RESPIRATION RATE: 20 BRPM | TEMPERATURE: 98.7 F | OXYGEN SATURATION: 96 % | TEMPERATURE: 99.5 F | RESPIRATION RATE: 18 BRPM | DIASTOLIC BLOOD PRESSURE: 80 MMHG | HEART RATE: 68 BPM | HEART RATE: 72 BPM | SYSTOLIC BLOOD PRESSURE: 92 MMHG | SYSTOLIC BLOOD PRESSURE: 128 MMHG | DIASTOLIC BLOOD PRESSURE: 60 MMHG | OXYGEN SATURATION: 98 %

## 2023-03-23 PROCEDURE — G0299 HHS/HOSPICE OF RN EA 15 MIN: HCPCS

## 2023-03-23 PROCEDURE — G0157 HHC PT ASSISTANT EA 15: HCPCS

## 2023-03-23 NOTE — TELEPHONE ENCOUNTER
"Both Elvi with Home Health and Juan Antonio (patient's daughter) left messages stating that Gonzalo still had a temp yesterday and has been a little dizzy.  They say that he is \"just not feeling well\".    Elvi said that Mr. Durham had a CXR yesterday.  They both just want to know what else they can do or what you would like done.      Please advise.  "

## 2023-03-23 NOTE — HOME HEALTH
"FOCUS OF CARE/SKILLED NEED: CPA/EDUCATION    TEACHING/INTERVENTIONS: Pt denies falls, chest pain or worsening shortness of breath. Pt was in urgent care yesterday with a new diagnosis of bronchitis. Temp 99.5 today however, pt states he has jsut washed his face. CG to check temp periodically and if not improved on the doxycycline by monday to call and SN will obtain cbc and ua per md order if needed. LS clear upper lobes. Slight fine crackles noted to RLL. PC with green/brown/yellow phlegm. Pt states, \"I feel so much better today.\" Pt has paid cg's and family close. Educated on hypokalemia s/sx and the purpose of potassium replacement.     PROGRESS TOWARD GOALS: Progressing as expected.     PHYSICIAN CONTACT:  None needed    INSURANCE CHANGES?  Denies    FALLS SINCE LAST VISIT?  Denies    MEDICATION CHANGES? Yes, doxycycline po dose, freq, expected actions and side effects reviewed. Pt and Cg can repeat back all education. Educted to take morning and night and take iron mid day while on doxycycline.     PLAN FOR NEXT VISIT: cpa/education.     PRE-SCREENED FOR COVID? No s/s"

## 2023-03-23 NOTE — TELEPHONE ENCOUNTER
Malcolm, please call Mr. Durham's daughter Juan Antonio back and let her know that Dr. Romero has communicated with me, Dr. Jha has communicated with me and if Mr. Durham is continuing to run fever, he needs to go to the ER as he may need to be readmitted to the hospital.  It sounds like he needs cultures done (such as blood, sputum and possibly urine cultures).  This is unfortunately not uncommon after having COVID.  Thank you.

## 2023-03-23 NOTE — CASE COMMUNICATION
May draw cbc, and obtain ua with c/s if indicated week of 3/27 if needed. Pt was at urgent care on day ordered from Dr. Romero and received an antibiotic, chest xray.

## 2023-03-23 NOTE — TELEPHONE ENCOUNTER
Yesterday message he went to        Started Monday recently in hospital yesterday had CXR fever 101.0 has tried to call Dr Romero no answer home health has been out to house today, Feb 7th had covid   Was given doxycycline    CT Angiogram Abdomen Pelvis    Result Date: 1/24/2023  1. 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  This report was finalized on 01/24/2023 10:13 by Dr Juan Carlos Wilkinson, .    XR Chest 1 View    Result Date: 2/19/2023  1. Underlying lung emphysema with bibasilar fibrosis and superimposed patchy airspace opacities/pneumonia. These superimposed patchy infiltrates are partially resolved on today's exam. No new or worsening infiltrates.   This report was finalized on 02/19/2023 09:06 by Dr Juan Carlos Wilkinson, .    XR Chest 1 View    Result Date: 2/10/2023  1. Persistent and unchanged chronic inflammatory and obstructive lung changes bilaterally.   This report was finalized on 02/10/2023 06:40 by Dr. Bayron Wagner MD.    XR Chest 1 View    Result Date: 2/7/2023  1. Increasing interstitial densities in the mid and lower lobes may relate to edema versus pneumonitis. 2. Prior mediastinal surgery. Device implanted within the anterior left chest wall. 3. Emphysema. No pneumothorax. This report was finalized on 02/07/2023 10:03 by Dr. Erika Cobos MD.    XR Chest PA & Lateral    Result Date: 3/21/2023  1. Lung emphysema with bibasilar fibrosis and superimposed bibasilar patchy lung infiltrates, the appearance of which is very similar to what was seen on the recent February 19th exam. I do not see any area of worsening infiltrate such as a new segmental or lobar consolidation.  This report was finalized on 03/21/2023 10:28 by Dr Juan Carlos Wilkinson, .    Called daughter  Advised my answers to home [employer] had been for them to tell the daughter to get hold of pulmonary about his poor improvement and new fever.(Dr Johns answer implies I was not answering)  That said she  somehow got the message to call Dr Jha and did.  She was advised Dr jha only does sleep  Since he is on doxy; see if it resolves the fever/helps BUT if it does not possibly his stay in the hospital has exposed him to drug resistant germs (or another source such as urine) and the doxy is just not going to work.     She is aware I am passing this along to Sonia/pulmonary as Dr Jha wanted Sonia to take care of this.

## 2023-03-23 NOTE — HOME HEALTH
COVID SCREENING: no s/s  FOCUS OF CARE/SKILLED NEED: gait, endurance  TEACHING/INTERVENTIONS: endurance activities  PROGRESS TOWARD GOALS: pt is progressing toward goals  PHYSICIAN CONTACT: n/a  INSURANCE CHANGES: no  FALLS SINCE LAST HH VISIT? no  MEDICATION CHANGES SINCE LAST HH VISIT? no  PLAN FOR NEXT VISIT: address remaining goals and d/c    Pt has no new c/o today.

## 2023-03-24 RX ORDER — FUROSEMIDE 20 MG/1
20 TABLET ORAL EVERY 12 HOURS SCHEDULED
Qty: 90 TABLET | Refills: 1 | Status: SHIPPED | OUTPATIENT
Start: 2023-03-24

## 2023-03-24 RX ORDER — POTASSIUM CHLORIDE 1500 MG/1
20 TABLET, FILM COATED, EXTENDED RELEASE ORAL 2 TIMES DAILY WITH MEALS
Qty: 180 TABLET | Refills: 1 | Status: SHIPPED | OUTPATIENT
Start: 2023-03-24

## 2023-03-27 ENCOUNTER — HOME CARE VISIT (OUTPATIENT)
Dept: HOME HEALTH SERVICES | Facility: CLINIC | Age: 88
End: 2023-03-27
Payer: MEDICARE

## 2023-03-27 VITALS
HEART RATE: 75 BPM | SYSTOLIC BLOOD PRESSURE: 118 MMHG | OXYGEN SATURATION: 96 % | DIASTOLIC BLOOD PRESSURE: 58 MMHG | TEMPERATURE: 98.5 F

## 2023-03-27 PROCEDURE — G0153 HHCP-SVS OF S/L PATH,EA 15MN: HCPCS

## 2023-03-27 NOTE — HOME HEALTH
COVID SCREENING:  NEGATIVE    FOCUS OF CARE/SKILLED NEED: VOICE/BREATH SUPPORT TO MAXIMIZE VERBAL COMMUNICATION    TEACHING/INTERVENTIONS: VOICE/BREATH SUPPORT TREATMENT TASKS;  TRAINING AND EDUCATION    PROGRESS TOWARD GOALS:  PT. IMPROVING TECHNIQUE WITH VOICE/BREATH SUPPORT TASKS WITH DEMONSTRATION AND INSTRUCTION BY SLP.. ALL GOALS WILL CONTINUE.    PHYSICIAN CONTACT:  NO    INSURANCE CHANGES? NO    FALLS SINCE LAST VISIT? NO    MEDICATION CHANGES SINCE LAST VISIT? NO    PLAN FOR NEXT VISIT:  VOICE/BREATH SUPPORT TREATMENT;  TRAINING, INSTRUCTION, EDUCATION AND DEMONSTRATION AS NECESSARY.

## 2023-03-29 ENCOUNTER — HOME CARE VISIT (OUTPATIENT)
Dept: HOME HEALTH SERVICES | Facility: CLINIC | Age: 88
End: 2023-03-29
Payer: MEDICARE

## 2023-03-29 VITALS
HEART RATE: 75 BPM | OXYGEN SATURATION: 97 % | DIASTOLIC BLOOD PRESSURE: 60 MMHG | TEMPERATURE: 98.5 F | RESPIRATION RATE: 24 BRPM | SYSTOLIC BLOOD PRESSURE: 110 MMHG

## 2023-03-29 VITALS
SYSTOLIC BLOOD PRESSURE: 104 MMHG | RESPIRATION RATE: 18 BRPM | OXYGEN SATURATION: 97 % | DIASTOLIC BLOOD PRESSURE: 58 MMHG | HEART RATE: 57 BPM | TEMPERATURE: 98 F

## 2023-03-29 PROCEDURE — G0153 HHCP-SVS OF S/L PATH,EA 15MN: HCPCS

## 2023-03-29 PROCEDURE — G0157 HHC PT ASSISTANT EA 15: HCPCS

## 2023-03-29 NOTE — HOME HEALTH
COVID SCREENING:  NEGATIVE    FOCUS OF CARE/SKILLED NEED: VOICE/BREATH SUPPORT TO MAXIMIZE VERBAL COMMUNICATION    TEACHING/INTERVENTIONS: VOICE/BREATH SUPPORT TREATMENT TASKS;  TRAINING AND EDUCATION    PROGRESS TOWARD GOALS: EXCELLENT PROGRESS TOWARD ALL GOALS AND PT. REQUIRING DECREASED CUES AND DEMONSTRATION.  IMPROVED VOCAL QUALITY AND LOUDNESS IN NOW WNL'S AND APPROPRIATE FOR VERBAL COMMUNICATION.    PHYSICIAN CONTACT:  NO    INSURANCE CHANGES? NO    FALLS SINCE LAST VISIT? NO    MEDICATION CHANGES SINCE LAST VISIT? NO    PLAN FOR NEXT VISIT:  VOICE/BREATH SUPPORT TREATMENT;  TRAINING, INSTRUCTION, EDUCATION AND DEMONSTRATION AS NECESSARY

## 2023-03-29 NOTE — HOME HEALTH
COVID SCREENING: no s/s  FOCUS OF CARE/SKILLED NEED: gait, endurance  TEACHING/INTERVENTIONS: endurance  PROGRESS TOWARD GOALS: pt is progressing toward goals  PHYSICIAN CONTACT: n/a  INSURANCE CHANGES: no  FALLS SINCE LAST HH VISIT? no  MEDICATION CHANGES SINCE LAST HH VISIT? no  PLAN FOR NEXT VISIT: d/c to self/family care    Pt says that he feels good today. He wants to take a trip to [place] this weekend.

## 2023-03-30 ENCOUNTER — HOME CARE VISIT (OUTPATIENT)
Dept: HOME HEALTH SERVICES | Facility: CLINIC | Age: 88
End: 2023-03-30
Payer: MEDICARE

## 2023-03-31 ENCOUNTER — HOME CARE VISIT (OUTPATIENT)
Dept: HOME HEALTH SERVICES | Facility: CLINIC | Age: 88
End: 2023-03-31
Payer: MEDICARE

## 2023-03-31 VITALS
OXYGEN SATURATION: 96 % | HEART RATE: 66 BPM | RESPIRATION RATE: 18 BRPM | SYSTOLIC BLOOD PRESSURE: 102 MMHG | DIASTOLIC BLOOD PRESSURE: 60 MMHG | TEMPERATURE: 98.2 F

## 2023-03-31 VITALS
RESPIRATION RATE: 16 BRPM | TEMPERATURE: 98.2 F | SYSTOLIC BLOOD PRESSURE: 102 MMHG | HEART RATE: 66 BPM | DIASTOLIC BLOOD PRESSURE: 60 MMHG | OXYGEN SATURATION: 98 %

## 2023-03-31 PROCEDURE — G0162 HHC RN E&M PLAN SVS, 15 MIN: HCPCS

## 2023-03-31 PROCEDURE — G0151 HHCP-SERV OF PT,EA 15 MIN: HCPCS

## 2023-03-31 NOTE — HOME HEALTH
"FOCUS OF CARE/SKILLED NEED: CPA/EDUCATION    TEACHING/INTERVENTIONS: Pt denies falls, chest pain or worsening shortness of breath. Pt states, \"I feel great today\". LS clear/diminished bilat. O2 on at 2 lpm/nc. No distress noted. Pt has finished antibiotics. A/O x 3 spheres. ABdo soft with bs present. BM wnl. Pt has a very small red area noted on coccyx that cg is using calmoseptin cram and a foam dressing on it. No open area noted. Slightly reddened. Will continue to assess and cg to apply calmoseptin and foam dressing for comfort.     PROGRESS TOWARD GOALS: Progressing as expected.     PHYSICIAN CONTACT:  None needed.     INSURANCE CHANGES?  Denies    FALLS SINCE LAST VISIT?  Denies    MEDICATION CHANGES? Denies    PLAN FOR NEXT VISIT: cpa/education, assess coccyx area.     PRE-SCREENED FOR COVID? No s/s"

## 2023-03-31 NOTE — HOME HEALTH
pre-screened covid 19    Patient reports he is improving.  Patient denies pain.  Patient is agreeable to discharge to [facility].

## 2023-04-03 ENCOUNTER — HOME CARE VISIT (OUTPATIENT)
Dept: HOME HEALTH SERVICES | Facility: HOME HEALTHCARE | Age: 88
End: 2023-04-03
Payer: MEDICARE

## 2023-04-03 NOTE — CASE COMMUNICATION
Patient missed a ST visit from [facility] on 04/03/23     Reason: PT. OUT OF SERVICE AREA AND DTR REQUESTS NO VISITS TIL WK OF 04/10/23.      For your records only.   As per home health protocol, MD must be notified of missed/cancelled visits; therefore the prescribed frequency was not met.

## 2023-04-07 ENCOUNTER — HOME CARE VISIT (OUTPATIENT)
Dept: HOME HEALTH SERVICES | Facility: CLINIC | Age: 88
End: 2023-04-07
Payer: MEDICARE

## 2023-04-07 VITALS
TEMPERATURE: 97.2 F | RESPIRATION RATE: 18 BRPM | SYSTOLIC BLOOD PRESSURE: 108 MMHG | HEART RATE: 68 BPM | OXYGEN SATURATION: 98 % | DIASTOLIC BLOOD PRESSURE: 70 MMHG

## 2023-04-07 PROCEDURE — G0299 HHS/HOSPICE OF RN EA 15 MIN: HCPCS

## 2023-04-07 NOTE — HOME HEALTH
FOCUS OF CARE/SKILLED NEED: CPA/EDUCATION    TEACHING/INTERVENTIONS: Pt denies falls, chest pain or worsening shortness of breath. Pt and family in a discussion about pt wanting to drive. Educated on the dangers and his MD would need to be consulted at his next appt about this. Emotional support provided to pt.     PROGRESS TOWARD GOALS: Progressing as expected.     PHYSICIAN CONTACT:  None needed    INSURANCE CHANGES?  Denies    FALLS SINCE LAST VISIT?  Denies    MEDICATION CHANGES? Denies    PLAN FOR NEXT VISIT: Discharge    PRE-SCREENED FOR COVID? No s/s

## 2023-04-10 ENCOUNTER — HOME CARE VISIT (OUTPATIENT)
Dept: HOME HEALTH SERVICES | Facility: CLINIC | Age: 88
End: 2023-04-10
Payer: MEDICARE

## 2023-04-10 VITALS
TEMPERATURE: 98.4 F | RESPIRATION RATE: 18 BRPM | SYSTOLIC BLOOD PRESSURE: 118 MMHG | OXYGEN SATURATION: 97 % | HEART RATE: 62 BPM | DIASTOLIC BLOOD PRESSURE: 62 MMHG

## 2023-04-10 PROCEDURE — G0153 HHCP-SVS OF S/L PATH,EA 15MN: HCPCS

## 2023-04-10 NOTE — CASE COMMUNICATION
SPEECH THERAPY DISCHARGE VISIT    SUBJECTIVE:  PT. IS READY FOR ST D/C VISIT AND STATED NO CHANGES OR COMPLAINTS.  YAN LOWE CG IS PRESENT.    PAIN:  0/10    EDEMA:  NONE    WOUND / SKIN CONDITION:  INTACT    TODAY'S INTERVENTIONS / EDUCATION / PATIENT'S RESPONSE / GOAL STATUS:  VOICE/BREATH SUPPORT TREATMENT TASKS; REVIEW TRAINING/INSTRUCTION HEP; REVIEW GOALS AND PROGRESS; D/C WITH GOALS MET AND PT. IS AN (I) VERBAL COMMUNICATOR.       EXPLANATION OF UNMET GOALS:  N/A    DISCHARGE STATUS     TO SELF  TO FAMILY     DISCHARGE CONDITION   GOOD    DISCHARGE REASON    GOALS MET       INSTRUCTIONS HOME PROGRAM PROVIDED TO [place]SC[place]          CONTENT: VOICE/BREATH SUPPORT HEP          PATIENT CAREGIVER LEVEL OF COMPLIANCE:  GOOD    UNMET NEEDS:  NONE    SERVICES CONTINUING:  SN    COMMUNICATION / CARE COORDINATION:  D/C SUMMARY TO MD/TEAM      PATIENT AND TATIANNA GARCIA ARE AGREEABLE WITH DISCHARGE PLAN:  YES

## 2023-04-13 ENCOUNTER — TELEPHONE (OUTPATIENT)
Dept: FAMILY MEDICINE CLINIC | Facility: CLINIC | Age: 88
End: 2023-04-13
Payer: MEDICARE

## 2023-04-13 ENCOUNTER — HOME CARE VISIT (OUTPATIENT)
Dept: HOME HEALTH SERVICES | Facility: HOME HEALTHCARE | Age: 88
End: 2023-04-13
Payer: MEDICARE

## 2023-04-13 ENCOUNTER — HOME CARE VISIT (OUTPATIENT)
Dept: HOME HEALTH SERVICES | Facility: CLINIC | Age: 88
End: 2023-04-13
Payer: MEDICARE

## 2023-04-13 VITALS
DIASTOLIC BLOOD PRESSURE: 60 MMHG | TEMPERATURE: 98.1 F | HEART RATE: 60 BPM | OXYGEN SATURATION: 98 % | RESPIRATION RATE: 20 BRPM | SYSTOLIC BLOOD PRESSURE: 108 MMHG

## 2023-04-13 PROCEDURE — G0493 RN CARE EA 15 MIN HH/HOSPICE: HCPCS

## 2023-04-13 RX ORDER — BUDESONIDE 0.5 MG/2ML
0.5 INHALANT ORAL 2 TIMES DAILY
Qty: 90 ML | Refills: 3 | Status: SHIPPED | OUTPATIENT
Start: 2023-04-13

## 2023-04-13 RX ORDER — BUDESONIDE 0.5 MG/2ML
0.5 INHALANT ORAL 2 TIMES DAILY
Qty: 90 ML | Refills: 3 | Status: SHIPPED | OUTPATIENT
Start: 2023-04-13 | End: 2023-04-13

## 2023-04-13 NOTE — TELEPHONE ENCOUNTER
Mandy from [facility] called and stated patient is out of pulmicort neb solution and [facility] would [facility]       [facility]

## 2023-04-13 NOTE — HOME HEALTH
FOCUS OF CARE/SKILLED NEED: Discharge from services r/t goals met.     TEACHING/INTERVENTIONS: Patient agreeable to homecare discharge. Discharge instructions reviewed and signed by the pt. Medication reconciliation completed and no issues found. Pt had no further questions or concerns regarding the pt's medications or discharge.    PROGRESS TOWARD GOALS: Goals met.     PHYSICIAN CONTACT: Dr. Romero r/t goals met.     INSURANCE CHANGES? Denies    FALLS SINCE LAST VISIT? Denies    MEDICATION CHANGES? Denies    PRE-SCREENED FOR COVID? Yes, No s/s of COVID. No recent exposure.

## 2023-04-26 ENCOUNTER — OFFICE VISIT (OUTPATIENT)
Dept: FAMILY MEDICINE CLINIC | Facility: CLINIC | Age: 88
End: 2023-04-26
Payer: MEDICARE

## 2023-04-26 VITALS
OXYGEN SATURATION: 97 % | HEIGHT: 71 IN | SYSTOLIC BLOOD PRESSURE: 118 MMHG | BODY MASS INDEX: 26.18 KG/M2 | DIASTOLIC BLOOD PRESSURE: 72 MMHG | RESPIRATION RATE: 12 BRPM | WEIGHT: 187 LBS | HEART RATE: 67 BPM

## 2023-04-26 DIAGNOSIS — J44.9 STAGE 2 MODERATE COPD BY GOLD CLASSIFICATION: Chronic | ICD-10-CM

## 2023-04-26 DIAGNOSIS — I70.90 ATHEROSCLEROSIS: ICD-10-CM

## 2023-04-26 DIAGNOSIS — N18.31 STAGE 3A CHRONIC KIDNEY DISEASE: Chronic | ICD-10-CM

## 2023-04-26 DIAGNOSIS — E11.59 CONTROLLED TYPE 2 DIABETES MELLITUS WITH OTHER CIRCULATORY COMPLICATION, WITHOUT LONG-TERM CURRENT USE OF INSULIN: Chronic | ICD-10-CM

## 2023-04-26 DIAGNOSIS — J96.11 CHRONIC RESPIRATORY FAILURE WITH HYPOXIA: ICD-10-CM

## 2023-04-26 DIAGNOSIS — G47.33 OBSTRUCTIVE SLEEP APNEA: Chronic | ICD-10-CM

## 2023-04-26 DIAGNOSIS — I10 PRIMARY HYPERTENSION: Chronic | ICD-10-CM

## 2023-04-26 DIAGNOSIS — Z79.01 CHRONIC ANTICOAGULATION: Chronic | ICD-10-CM

## 2023-04-26 DIAGNOSIS — D64.9 ANEMIA, UNSPECIFIED TYPE: ICD-10-CM

## 2023-04-26 DIAGNOSIS — Z86.16 HISTORY OF COVID-19: ICD-10-CM

## 2023-04-26 DIAGNOSIS — I25.10 CORONARY ARTERY DISEASE INVOLVING NATIVE CORONARY ARTERY OF NATIVE HEART WITHOUT ANGINA PECTORIS: Chronic | ICD-10-CM

## 2023-04-26 DIAGNOSIS — I50.32 CHRONIC DIASTOLIC CONGESTIVE HEART FAILURE: Chronic | ICD-10-CM

## 2023-04-26 DIAGNOSIS — R06.02 SHORTNESS OF BREATH: ICD-10-CM

## 2023-04-26 DIAGNOSIS — E87.6 HYPOKALEMIA: ICD-10-CM

## 2023-04-26 RX ORDER — LORATADINE 10 MG/1
TABLET ORAL
COMMUNITY
Start: 2023-04-08

## 2023-04-26 RX ORDER — GLYBURIDE 5 MG/1
TABLET ORAL
COMMUNITY
Start: 2023-04-08

## 2023-04-26 NOTE — PROGRESS NOTES
Subjective   Gonzalo Durham is a 89 y.o. male presenting with chief complaint of:   Chief Complaint   Patient presents with   • Follow-up     1 mo f/u   anemia     AWV 7.11.22  Last Completed Annual Wellness Visit          ANNUAL WELLNESS VISIT (Yearly) Next due on 7/11/2023 07/11/2022  Level of Service: PPPS, SUBSEQ VISIT    06/24/2020  Done    06/24/2020  Level of Service: [place] PPPS, SUBSEQ VISIT                 History of Present Illness : With daughter.   Here for review of chronic problems that includes HTN and others.     Has multiple chronic problems to consider that might have a bearing on today's issues;  an interval appointment.       Chronic/acute problems reviewed today:   1. History of COVID-19 recent COVID that required hospitalization.  Made a significant difference decline of his breathing and abilities.  Slowly improving   2. Obstructive sleep apnea Chronic/stable.  Uses cpap compliantly without significant problems with equipment.  Finds sleeps better and less sleepy during the day.   Recently saw Dr. Turk in confusional he has an appointment with [facility]     3. Chronic respiratory failure with hypoxia chronic stable hypoxemia treated with supplemental oxygen.  Primary etiologies COPD, anemia   4. SOB: copd,CAD,#, hypoxemia Chronic/improved:  SOB worse with exertion/ok at rest.  Contributing diagnosis: copd/anemia/others.     5. Stage 2 moderate COPD by GOLD classification (HCC) Chronic/stable mild occ cough, sob, wheeze.  Rx helps.   No smoking.       6. Anemia, unspecified type Chronic or past history/stable low: This has been present before.    There has been GI evaulation in the past. There is no current melena, hematochezia. It has been benign to date and stable/treating/watching.  Contributing comorbidities to date: iron def/blood thinners/? Others as gi behind (pro/con last gi decided against testing)     7. Hypokalemia Chronic/variable high or low K as uses diuretic; has to have  lab monitoring.  No significant fatigue or muscle cramps     8. Stage 3a chronic kidney disease Chronic/stable.    No dysuria.  Previously warned/reminded:   To avoid further kidney function decline  A. Treat any time you think you have infection  B. Stay hydrated (dont get dehydrated); drink at least 60 oz fluid every 24 hr (1800 cc or nearly a 2L)  C. Do not allow any xrays with dye WITHOUT the doctor ordering checking your renal function  D. Do not get new medications without the doctor considering your renal condition  E. Do not use motrin/ibuprofen, alleve/naprosyn and these types of medications     9. Controlled type 2 diabetes mellitus with other circulatory complication, without long-term current use of insulin Chronic/stable.  No problem/pattern hypoglycemia/hyperglycemia manifest by poly- dypsia, phagia, uria, or sweats, diaphoretic episodes, syncope/near.     10. Anticoagulated-CAD, DM2, CVA/ASA 81+()+plavix » Anticoagulated-CAD, DM2, CVA/ASA 81+()+plavix+eliquist Chronic/stable reason for stopping or use of.  Denies bleeding issues; especially epistaxis, melena, hematochezia.  Upper arms/others do not significantly bruise easily.  No significant bleeding or falls.      11. Atherosclerosis: CAD, AAA vascular chronic/stable various areas of disease.  No plans for upcoming interventions.  Denies development/change in chest pain, claudication, CVA-TIA symptoms such as (Manifest by slurred speech asymmetry of face dysfunction/weakness of extremities).  Blood thinners noted.      12. Chronic diastolic congestive heart failure Chronic/stable.  Denies significant sob, orthopnea, leg edema, weight gain.  Aware of influence diet/salt and watching weight at home.       13. Primary hypertension Chronic/stable. Stable here past/and occ home blood pressures.  No significant chest pain, worse/SOB, LE edema, orthopnea, near syncope, dizziness/light headness.   Recent Vitals       4/10/2023 4/13/2023  4/26/2023       BP: 118/62 108/60 118/72     Pulse: 62 60 67     Temp: 98.4 °F (36.9 °C) 98.1 °F (36.7 °C) --     Weight: -- -- 84.8 kg (187 lb)     BMI (Calculated): -- -- 26.1            14. Coronary artery disease involving native coronary artery of native heart without angina pectoris chronic/stable as no chest pain, SOB, use of NTG       Has an/another acute issue today: none.    The following portions of the patient's history were reviewed and updated as appropriate: allergies, current medications, past family history, past medical history, past social history, past surgical history and problem list.      Current Outpatient Medications:   •  acetaminophen (TYLENOL) 325 MG tablet, Take 2 tablets by mouth 2 (Two) Times a Day., Disp: 360 tablet, Rfl: 2  •  albuterol sulfate  (90 Base) MCG/ACT inhaler, Inhale 2 puffs Every 6 (Six) Hours As Needed for Wheezing. Indications: Spasm of Lung Air Passages, Disp: , Rfl:   •  apixaban (ELIQUIS) 5 MG tablet tablet, Take 1 tablet by mouth 2 (Two) Times a Day. Indications: Atrial Fibrillation, Disp: , Rfl:   •  ascorbic acid (VITAMIN C) 500 MG tablet, Take 1 tablet by mouth Daily. Indications: Inadequate Vitamin C, Disp: , Rfl:   •  aspirin 81 MG EC tablet, Take 1 tablet by mouth Daily. Indications: Acute Heart Attack, Disp: , Rfl:   •  Budeson-Glycopyrrol-Formoterol (Breztri Aerosphere) 160-9-4.8 MCG/ACT aerosol inhaler, Inhale 2 puffs 2 (Two) Times a Day. Indications: Chronic Bronchitis, Disp: , Rfl:   •  budesonide (PULMICORT) 0.5 MG/2ML nebulizer solution, Take 2 mL by nebulization 2 (Two) Times a Day. Indications: Chronic Obstructive Lung Disease, Disp: 90 mL, Rfl: 3  •  docusate sodium 100 MG capsule, Take 1 capsule by mouth 2 (Two) Times a Day., Disp: , Rfl:   •  ferrous sulfate 325 (65 Fe) MG tablet, Take 1 tablet by mouth Daily With Breakfast. Indications: Anemia From Inadequate Iron in the Body, Disp: , Rfl:   •  finasteride (PROSCAR) 5 MG tablet, Take 1  tablet by mouth Daily. Indications: Benign Enlargement of Prostate, Disp: , Rfl:   •  furosemide (LASIX) 20 MG tablet, Take 1 tablet by mouth Every 12 (Twelve) Hours. Indications: Edema, Disp: 90 tablet, Rfl: 1  •  glucose blood (FREESTYLE LITE) test strip, Use to test blood sugar daily, Disp: 100 each, Rfl: 1  •  glyburide (DIAbeta) 5 MG tablet, , Disp: , Rfl:   •  guaiFENesin (MUCINEX) 600 MG 12 hr tablet, Take 1 tablet by mouth Daily. Indications: Cough, Disp: , Rfl:   •  hydroCHLOROthiazide (MICROZIDE) 12.5 MG capsule, Take 1 capsule by mouth Daily., Disp: 90 capsule, Rfl: 3  •  insulin detemir (LEVEMIR) 100 UNIT/ML injection, Inject 10 Units under the skin into the appropriate area as directed Every 12 (Twelve) Hours., Disp: , Rfl: 12  •  Insulin Lispro (humaLOG) 100 UNIT/ML injection, Inject  under the skin into the appropriate area as directed. Using sliding scale. Not to exceed 30 units daily  Indications: Type 2 Diabetes, Disp: , Rfl:   •  isosorbide mononitrate (IMDUR) 30 MG 24 hr tablet, Take 1 tablet by mouth Daily. Indications: Stable Angina Pectoris, Disp: , Rfl:   •  loratadine (CLARITIN) 10 MG tablet, , Disp: , Rfl:   •  melatonin 3 MG tablet, Take 1 tablet by mouth At Night As Needed for Sleep., Disp: , Rfl:   •  metFORMIN (GLUCOPHAGE) 500 MG tablet, Take 1 tablet by mouth Daily With Breakfast. Indications: Type 2 Diabetes, Disp: , Rfl:   •  metoprolol tartrate (LOPRESSOR) 25 MG tablet, Take 12.5 mg by mouth 2 (Two) Times a Day. Indications: High Blood Pressure Disorder, Disp: , Rfl:   •  Multiple Vitamins-Minerals (MULTIVITAMIN & MINERAL PO), Take 1 tablet by mouth Daily. Indications: vit, Disp: , Rfl:   •  nitroglycerin (Nitrostat) 0.4 MG SL tablet, Place 1 tablet under the tongue Every 5 (Five) Minutes As Needed for Chest Pain., Disp: 90 tablet, Rfl: 3  •  pantoprazole (PROTONIX) 40 MG EC tablet, Take 1 tablet by mouth Daily., Disp: 90 tablet, Rfl: 3  •  polyethylene glycol 17 g packet, Take 17 g  by mouth Daily., Disp: , Rfl:   •  potassium chloride ER (K-TAB) 20 MEQ tablet controlled-release ER tablet, Take 1 tablet by mouth 2 (Two) Times a Day With Meals. Indications: Low Amount of Potassium in the Blood, Disp: 180 tablet, Rfl: 1  •  pravastatin (Pravachol) 80 MG tablet, Take 1 tablet by mouth Daily., Disp: 90 tablet, Rfl: 3  •  terazosin (HYTRIN) 10 MG capsule, Take 1 capsule by mouth Every Night. Indications: High Blood Pressure Disorder, Disp: , Rfl:     No problems with medications.    Allergies   Allergen Reactions   • Symbicort [Budesonide-Formoterol Fumarate] Dizziness     Patient passed out and fell   • Prozac [Fluoxetine Hcl] Mental Status Change     Bad dreams.       Review of Systems   GENERAL:  Inactive/slower with limits, speed, samni for age and SOB.  Sleep is ok; but occ interrupted pain.  SKIN:  Ongoing callous of feet; no problems with this/other skin today unless above/below.    ENDO:  No syncope, near or diaphoretic sweaty spells.  BS Ok: with download-see correspondence.  HEENT: No head injury, headache.  No vision change.  Same mild hearing loss.  Ears without pain/drainage.  No sore throat.  No significant nasal/sinus congestion/drainage unless uses CPAP; then for couple hours. No epistaxis.  CHEST: No chest wall tenderness or mass. Stable occ cough without productive without wheeze except briefly after CPAP use.   Mild/same SOB; no hemoptysis.  Wears oxygen continous.   CV: No chest pain, palpatations, ankle edema.  GI: No heartburn significant dysphagia.  No abdominal pain, diarrhea, constipation, rectal bleeding, or melena.    :  Voids without dysuria, or incontience to completion.  ORTHO: No painful/swollen joints but various on /off sore.  No change occ/usual sore neck or back.  But no acute neck but above mid back pain without recent injury.   NEURO: No dizziness; diffuse weakness of extremities.  No change some LE numbness/parethesias. Walking often has to hold on; balance  problems.   PSYCH: No memory loss.  Mood good; not that anxious, depressed but/and not suicidal.  Tolerated stress.   Screening:  Mammogram: NA  Bone density: NA  Low dose CT chest: Tobacco-smoker/age 22/1 ppd/dc 1980: (22): NA (had CT angio)  IMPRESSION: CT chest with/BH/1.10.22  1. Stable 6 mm subpleural right lower lobe nodule. Stable for 3.5 years.  No additional workup needed.  2. Linear atelectasis or scarring in both lower lobes. Calcified  granulomas. Centrilobular emphysema.  3. Linear secretions in the distal trachea.  4. 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  Dilated pulmonary arteries.  5. Fatty infiltration of the liver.       GI: Colon-div/[facility]/6.15.17/prn  Prostate: chin confirmed 7.11.22  urology/confirmed 7.8.21  Chin/confirmed 11.22.19  [place] in past   Usual lab order  6m CBC, BMP, A1c  12m CBC, CMP, A1c, TSH, LIPID, PSAs, Vit D    Copy/paste function used for ROS/exam AND each area of these were reviewed, updated, confirmed and supplemented as needed.  Data reviewed:   Recent admit/ER/MD visits: 3.9.23    1. Anemia, unspecified type    2. Acute hypoxemic respiratory failure due to COVID-19 (HCC)    3. Paroxysmal atrial fibrillation (HCC)    4. Chronic obstructive pulmonary disease, unspecified COPD type (HCC)    5. Hypokalemia       Rx: reviewed/changes:  No orders of the defined types were placed in this encounter.     LAB/Testing/Referrals: reviewed/orders:   Today:       Orders Placed This Encounter   Procedures   • Iron Profile   • Vitamin B12   • Folate   • Comprehensive Metabolic Panel   • CBC & Differential      Usual:   same     Discussions:   Labs to see if anemia has improved; should have additional tx    Last cardiac testing:   Echo:   Results for orders placed during the hospital encounter of 10/26/22    Adult Transthoracic Echo Complete w/ Color, Spectral and Contrast if necessary per protocol    Interpretation Summary  •  Left ventricular systolic  function is normal. Left ventricular ejection fraction appears to be 56 - 60%.  •  Left ventricular diastolic function was normal.  •  Abnormal global longitudinal LV strain (GLS) = -11.0%  •  Estimated right ventricular systolic pressure from tricuspid regurgitation is normal (<35 mmHg).    Radiology considered:   XR Chest 1 View    Result Date: 2/19/2023  1. Underlying lung emphysema with bibasilar fibrosis and superimposed patchy airspace opacities/pneumonia. These superimposed patchy infiltrates are partially resolved on today's exam. No new or worsening infiltrates.   This report was finalized on 02/19/2023 09:06 by Dr Juan Carlos Wilkinson, .    XR Chest 1 View    Result Date: 2/10/2023  1. Persistent and unchanged chronic inflammatory and obstructive lung changes bilaterally.   This report was finalized on 02/10/2023 06:40 by Dr. Bayron Wagner MD.    XR Chest 1 View    Result Date: 2/7/2023  1. Increasing interstitial densities in the mid and lower lobes may relate to edema versus pneumonitis. 2. Prior mediastinal surgery. Device implanted within the anterior left chest wall. 3. Emphysema. No pneumothorax. This report was finalized on 02/07/2023 10:03 by Dr. Erika Cobos MD.    XR Chest PA & Lateral    Result Date: 3/21/2023  1. Lung emphysema with bibasilar fibrosis and superimposed bibasilar patchy lung infiltrates, the appearance of which is very similar to what was seen on the recent February 19th exam. I do not see any area of worsening infiltrate such as a new segmental or lobar consolidation.  This report was finalized on 03/21/2023 10:28 by Dr Juan Carlos Wilkinson, .    Lab Results:  Results for orders placed or performed in visit on 03/09/23   Iron Profile    Specimen: Blood   Result Value Ref Range    TIBC 359 mcg/dL    UIBC 296 112 - 346 mcg/dL    Iron 63 59 - 158 mcg/dL    Iron Saturation 18 (L) 20 - 50 %   Vitamin B12    Specimen: Blood   Result Value Ref Range    Vitamin B-12 573 211 - 946 pg/mL   Folate     Specimen: Blood   Result Value Ref Range    Folate 17.40 4.78 - 24.20 ng/mL   Comprehensive Metabolic Panel    Specimen: Blood   Result Value Ref Range    Glucose 150 (H) 65 - 99 mg/dL    BUN 23 8 - 23 mg/dL    Creatinine 0.85 0.76 - 1.27 mg/dL    EGFR Result 83.1 >60.0 mL/min/1.73    BUN/Creatinine Ratio 27.1 (H) 7.0 - 25.0    Sodium 140 136 - 145 mmol/L    Potassium 4.7 3.5 - 5.2 mmol/L    Chloride 100 98 - 107 mmol/L    Total CO2 28.2 22.0 - 29.0 mmol/L    Calcium 9.4 8.6 - 10.5 mg/dL    Total Protein 6.1 6.0 - 8.5 g/dL    Albumin 3.8 3.5 - 5.2 g/dL    Globulin 2.3 gm/dL    A/G Ratio 1.7 g/dL    Total Bilirubin <0.2 0.0 - 1.2 mg/dL    Alkaline Phosphatase 69 39 - 117 U/L    AST (SGOT) 11 1 - 40 U/L    ALT (SGPT) 11 1 - 41 U/L   CBC & Differential    Specimen: Blood   Result Value Ref Range    WBC 6.72 3.40 - 10.80 10*3/mm3    RBC 3.81 (L) 4.14 - 5.80 10*6/mm3    Hemoglobin 10.8 (L) 13.0 - 17.7 g/dL    Hematocrit 33.9 (L) 37.5 - 51.0 %    MCV 89.0 79.0 - 97.0 fL    MCH 28.3 26.6 - 33.0 pg    MCHC 31.9 31.5 - 35.7 g/dL    RDW 14.6 12.3 - 15.4 %    Platelets 435 140 - 450 10*3/mm3    Neutrophil Rel % 68.9 42.7 - 76.0 %    Lymphocyte Rel % 18.6 (L) 19.6 - 45.3 %    Monocyte Rel % 8.6 5.0 - 12.0 %    Eosinophil Rel % 2.1 0.3 - 6.2 %    Basophil Rel % 0.3 0.0 - 1.5 %    Neutrophils Absolute 4.63 1.70 - 7.00 10*3/mm3    Lymphocytes Absolute 1.25 0.70 - 3.10 10*3/mm3    Monocytes Absolute 0.58 0.10 - 0.90 10*3/mm3    Eosinophils Absolute 0.14 0.00 - 0.40 10*3/mm3    Basophils Absolute 0.02 0.00 - 0.20 10*3/mm3    Immature Granulocyte Rel % 1.5 (H) 0.0 - 0.5 %    Immature Grans Absolute 0.10 (H) 0.00 - 0.05 10*3/mm3    nRBC 0.0 0.0 - 0.2 /100 WBC       A1C:  Lab Results - Last 18 Months   Lab Units 02/14/23  0621 01/16/23  1019 07/06/22  0715 01/05/22  0658   HEMOGLOBIN A1C % 7.40* 6.70* 6.00* 6.30*     GLUCOSE:  Lab Results - Last 18 Months   Lab Units 03/09/23  1033 03/02/23  0441 02/28/23  0412 02/27/23  0614  02/25/23  0240 02/24/23  0438 02/23/23  0522   GLUCOSE mg/dL 150* 113* 155* 118* 142* 87 86     LIPID:  Lab Results - Last 18 Months   Lab Units 02/14/23  0645 01/16/23  1019 01/05/22  0658   CHOLESTEROL mg/dL  --  176 150   LDL CHOL mg/dL 69 114* 94   HDL CHOL mg/dL 43 36* 37*   TRIGLYCERIDES mg/dL 46 145 103     PSA:  Lab Results   Component Value Date/Time    PSA 1.720 01/16/2023 10:19 AM    PSA 0.983 01/05/2022 06:58 AM    PSA 1.040 01/05/2021 07:32 AM    PSA 1.100 05/15/2019 07:08 AM    PSA 1.230 11/08/2018 01:28 PM    PSA 1.240 09/08/2017 10:50 AM       CBC:  Lab Results - Last 18 Months   Lab Units 03/09/23  1033 03/02/23  0441 02/27/23  0614 02/23/23  0522 02/20/23  0322 02/18/23  0333 02/17/23  0530 02/07/23  0936 01/16/23  1019 10/11/22  0849 08/08/22  1010 07/06/22  0715 02/20/22  0405 01/05/22  0658   WBC 10*3/mm3 6.72 5.90 7.18 11.99* 15.35* 16.78* 18.31*   < > 8.16 9.59 11.28* 7.20   < > 7.98   HEMOGLOBIN g/dL 10.8* 9.5* 9.5* 9.9* 12.2* 12.8* 13.2   < > 11.6* 11.6* 10.9* 11.5*   < > 12.6*   HEMATOCRIT % 33.9* 31.6* 30.9* 32.0* 39.3 39.6 42.2   < > 37.2* 36.3* 33.9* 34.5*   < > 38.4   PLATELETS 10*3/mm3 435 180 203 235 300 360 397   < > 226 239 284 231   < > 220   IRON mcg/dL 63  --   --   --   --   --   --   --  75 65 64 112  --  60   VITAMIN B 12 pg/mL 573  --   --   --   --   --   --   --  601  --   --  557  --  738   FOLATE ng/mL 17.40  --   --   --   --   --   --   --  >20.00  --   --  >20.00  --  >20.00    < > = values in this interval not displayed.     BMP/CMP:  Lab Results - Last 18 Months   Lab Units 03/09/23  1033 03/02/23  0441 02/28/23  0412 02/27/23  0614 02/25/23  0240 02/24/23  0438 02/23/23  0522 01/24/23  0838 01/16/23  1019 10/11/22  0849 08/08/22  1010 07/21/22  0750 07/06/22  0715 02/20/22  0405 01/05/22  0658   SODIUM mmol/L 140 140 137 138 137 137 137   < > 139   < > 142  --  140 143 145   POTASSIUM mmol/L 4.7 3.8 3.4* 3.2* 3.5 3.2* 3.4*   < > 4.5   < > 4.3  --  4.7 4.3 4.4  "  CHLORIDE mmol/L 100 101 98 98 100 99 100   < > 101   < > 104  --  102 105 106   TOTAL CO2 mmol/L 28.2  --   --   --   --   --   --   --  30.2*  --  29.5*  --  29.7*  --  32.7*   CO2 mmol/L  --  30.0* 28.0 32.0* 29.0 28.0 28.0   < >  --    < >  --   --   --  29.0  --    GLUCOSE mg/dL 150* 113* 155* 118* 142* 87 86   < > 119*   < > 108*  --  77 112* 73   BUN mg/dL 23 18 17 18 20 22 20   < > 21   < > 21  --  22 24* 27*   CREATININE mg/dL 0.85 0.71* 0.69* 0.73* 0.79 0.70* 0.77   < > 1.20   < > 0.95   < > 1.26 1.14 1.28*   EGFR IF NONAFRICN AM mL/min/1.73  --   --   --   --   --   --   --   --   --   --   --   --   --  61 53*   EGFR IF AFRICN AM mL/min/1.73  --   --   --   --   --   --   --   --   --   --   --   --   --   --  64   EGFR RESULT mL/min/1.73 83.1  --   --   --   --   --   --   --  58.2*  --  77.0  --  54.9*  --   --    CALCIUM mg/dL 9.4 8.5* 8.5* 8.1* 8.5* 8.2* 8.1*   < > 9.7   < > 9.5  --  9.7 9.1 9.9    < > = values in this interval not displayed.     HEPATIC:  Lab Results - Last 18 Months   Lab Units 03/09/23  1033 02/20/23  0322 02/18/23  0333 02/14/23  0645 02/13/23  0230 02/12/23  0226 02/11/23  0515   ALT (SGPT) U/L 11 10 11 17 18 21 21   AST (SGOT) U/L 11 16 16 15 13 15 18   ALK PHOS U/L 69 68 65 62 61 59 56     Vit D:  Lab Results - Last 18 Months   Lab Units 01/16/23  1019 01/05/22  0658   VIT D 25 HYDROXY ng/ml 54.3 63.3     THYROID:  Lab Results - Last 18 Months   Lab Units 02/14/23  0645 01/16/23  1019 01/05/22  0658   TSH uIU/mL 0.875 2.780 2.620         Objective   /72   Pulse 67   Resp 12   Ht 180.3 cm (71\")   Wt 84.8 kg (187 lb)   SpO2 97%   BMI 26.08 kg/m²   Body mass index is 26.08 kg/m².    Recent Vitals       4/7/2023 4/10/2023 4/13/2023       BP: 108/70 118/62 108/60     Pulse: 68 62 60     Temp: 97.2 °F (36.2 °C) 98.4 °F (36.9 °C) 98.1 °F (36.7 °C)         Wt Readings from Last 15 Encounters:   04/26/23 0859 84.8 kg (187 lb)   03/22/23 1340 83 kg (183 lb)   03/14/23 0852 " 81.6 kg (180 lb)   03/09/23 1043 81.2 kg (179 lb)   02/27/23 0300 78.5 kg (173 lb)   02/19/23 2100 73.7 kg (162 lb 6.4 oz)   02/17/23 1129 75.4 kg (166 lb 3.6 oz)   02/17/23 0400 75.4 kg (166 lb 4.8 oz)   02/16/23 0600 76.2 kg (167 lb 15.9 oz)   02/13/23 0800 86.6 kg (191 lb)   02/10/23 0400 87 kg (191 lb 12.8 oz)   02/09/23 0400 86.6 kg (190 lb 14.7 oz)   02/07/23 0854 83.9 kg (185 lb)   01/26/23 0929 90.7 kg (200 lb)   01/24/23 1004 88.5 kg (195 lb)   01/18/23 0852 90.3 kg (199 lb)   12/15/22 0823 91.6 kg (202 lb)   11/15/22 0931 95.7 kg (211 lb)   10/26/22 1048 95.7 kg (211 lb)   10/17/22 1310 95.7 kg (211 lb)   10/11/22 0936 97.1 kg (214 lb)   08/11/22 1006 94.8 kg (209 lb)       Physical Exam  GENERAL:  Well nourished/developed in no acute distress.   SKIN: Turgor ok without wound, rash, lesion  HEENT: Normal cephalic without trauma.  Pupils equal round reactive to light. Extraocular motions full without nystagmus.    No thyroidmegaly, mass, tenderness, lymphadenopathy and supple.  CV: Regular rhythm.  No murmur, gallop,  edema. Posterior pulses intact.  No carotid bruits.  CHEST: No chest wall tenderness or mass.   LUNGS: Symmetric motion with clear/decreased to auscultation.   ABD: Soft, nontender without mass.   ORTHO: Symmetric extremities without swelling/point tenderness.  Full gross range of motion except hips reduced.  Symmetric LE.    Mid back sore touch.   NEURO: CN 2-12 grossly intact.  Symmetric facies. 1/4 x bicep knee equal reflexes.  UE/LE   3/5 strength throughout except 2/5  L.  Nonfocal use extremities. Speech clear.  Light touch decreased bilateral feet.   PSYCH: Oriented x 3.  Pleasant calm, well kept.  Purposeful/directed conservation with intact short/long gross memory.      Assessment & Plan     1. History of COVID-19    2. Obstructive sleep apnea    3. Chronic respiratory failure with hypoxia    4. SOB: copd,CAD,#, hypoxemia    5. Stage 2 moderate COPD by GOLD classification ([facility])     6. Anemia, unspecified type    7. Hypokalemia    8. Stage 3a chronic kidney disease    9. Controlled type 2 diabetes mellitus with other circulatory complication, without long-term current use of insulin    10. Anticoagulated-CAD, DM2, CVA/ASA 81+()+plavix » Anticoagulated-CAD, DM2, CVA/ASA 81+()+plavix+eliquist    11. Atherosclerosis: CAD, AAA vascular    12. Chronic diastolic congestive heart failure    13. Primary hypertension    14. Coronary artery disease involving native coronary artery of native heart without angina pectoris        Data review above:   Discussions/medical decisions/reviews:  BP ok  Other vitals ok  DM/BS 7.4 2.14.23  Lipid LDL 69 2.14.23; pravachol 80  PSA ok 1.16.23  CBC 10.8 3.9.23 -substrates ok on iron/gi NOT up to date/anticoagulated with hx blood in stool past  Iron 63N 3.9.23-on iron  B12 ok 3.9.23  Folate ok 3.9.23:   Renal ok 3.9.23  Liver ok 3.9.23  Vit D 54 1.16.23  Thyroid TSH ok 2.14.23    Screening reviewed/updated ? Needs gi-screening and anemia (see below) eye exam advised  Vaccines discussed no record zoster, tdap,   Pro/con on needing iron and no current EGD/colon; ? Going back and since improved asking gi to reconsider last decision to fogo these tests  Accept fact; bladder cancer could also be cause of iron def/anemia  hemocult x 2   Suggest keep planned apt ENT  Having seen gallo; looks like can stop messi (sleep neuro)  Offered norco HS if his night pain gets too bad    Data review above:   Rx: reviewed and decisions:   Rx new/changes: none  No orders of the defined types were placed in this encounter.    Orders placed:   LAB/Testing/Referrals: reviewed/orders:   Today:   No orders of the defined types were placed in this encounter.    Chronic/recurrent labs above or change to:   Same    Immunization History   Administered Date(s) Administered   • COVID-19 (MODERNA) 1st,2nd,3rd Dose Monovalent 03/03/2021, 03/31/2021   • FLUAD TRI 65YR+ 11/22/2019   •  "Fluad Quad 65+ 10/06/2020   • Fluzone High Dose =>65 Years ([employer] ONLY) 10/09/2018   • Fluzone High-Dose 65+yrs 12/27/2021, 01/26/2023   • Fluzone Quad >6mos (Multi-dose) 10/21/2015, 01/20/2017, 01/29/2018   • Influenza Whole 10/21/2015   • Pneumococcal Conjugate 13-Valent (PCV13) 10/21/2015     We advised/reaffirmed our support/suggestion for staying complete with covid- covid boosters, seasonal flu/yearly and any missing vaccine from list we supplied; we suggest contact with local health department office to review missing/needed vaccines and then bring nursing documentation for these vaccines to this office or call this information in. Shingles became \"free\" 1.1.23 for medicare insurance.    Health maintenance:   Body mass index is 26.08 kg/m².  BMI is >= 25 and <30. (Overweight) The following options were offered after discussion;: none (medical contraindication)      Tobacco use reviewed:   Gonzalo Durham  reports that he quit smoking about 43 years ago. His smoking use included cigarettes. He started smoking about 69 years ago. He has a 26.00 pack-year smoking history. He has never used smokeless tobacco..   There are no Patient Instructions on file for this visit.    Follow up: Return for leave with hemocult x2; lab/Dr Romero .  Future Appointments   Date Time Provider Department Center   5/8/2023  1:00 PM Ronnie Ugarte APRN [facility]   5/11/2023 10:00 AM Diallo Hightower APRN [facility]   5/22/2023  9:15 AM Teo Jha MD [facility]   6/14/2023 10:20 AM Danie Cadena MD [facility]   7/20/2023  8:50 AM [facility]   7/27/2023  9:30 AM Feliz Frye APRN [facility]   7/27/2023  1:45 PM Abel Romero MD [facility]   1/9/2024  9:30 AM PAD CT 2 BH [facility] CAT [facility]   1/9/2024 10:00 AM [facility] US [facility] CART 2 BH [facility] US [facility]AD   1/9/2024 11:00 AM Nasir Gutierrez,  [facility]             "

## 2023-05-30 ENCOUNTER — HOSPITAL ENCOUNTER (INPATIENT)
Facility: HOSPITAL | Age: 88
LOS: 1 days | Discharge: HOME | DRG: 378 | End: 2023-05-31
Attending: INTERNAL MEDICINE | Admitting: FAMILY MEDICINE
Payer: MEDICARE

## 2023-05-30 PROBLEM — K62.5 BRBPR (BRIGHT RED BLOOD PER RECTUM): Status: ACTIVE | Noted: 2023-05-30

## 2023-05-30 LAB
ADV 40+41 DNA STL QL NAA+NON-PROBE: NOT DETECTED
ASTRO TYP 1-8 RNA STL QL NAA+NON-PROBE: NOT DETECTED
BASOPHILS # BLD AUTO: 0.05 10*3/MM3 (ref 0–0.2)
BASOPHILS NFR BLD AUTO: 0.5 % (ref 0–1.5)
C CAYETANENSIS DNA STL QL NAA+NON-PROBE: NOT DETECTED
C COLI+JEJ+UPSA DNA STL QL NAA+NON-PROBE: NOT DETECTED
C DIFF GDH + TOXINS A+B STL QL IA.RAPID: NEGATIVE
C DIFF TOX GENS STL QL NAA+PROBE: POSITIVE
CRYPTOSP DNA STL QL NAA+NON-PROBE: NOT DETECTED
DEPRECATED RDW RBC AUTO: 49.2 FL (ref 37–54)
E HISTOLYT DNA STL QL NAA+NON-PROBE: NOT DETECTED
EAEC PAA PLAS AGGR+AATA ST NAA+NON-PRB: NOT DETECTED
EC STX1+STX2 GENES STL QL NAA+NON-PROBE: NOT DETECTED
EOSINOPHIL # BLD AUTO: 0.48 10*3/MM3 (ref 0–0.4)
EOSINOPHIL NFR BLD AUTO: 5.1 % (ref 0.3–6.2)
EPEC EAE GENE STL QL NAA+NON-PROBE: NOT DETECTED
ERYTHROCYTE [DISTWIDTH] IN BLOOD BY AUTOMATED COUNT: 14.6 % (ref 12.3–15.4)
ETEC LTA+ST1A+ST1B TOX ST NAA+NON-PROBE: NOT DETECTED
G LAMBLIA DNA STL QL NAA+NON-PROBE: NOT DETECTED
GLUCOSE BLDC GLUCOMTR-MCNC: 109 MG/DL (ref 70–130)
GLUCOSE BLDC GLUCOMTR-MCNC: 139 MG/DL (ref 70–130)
GLUCOSE BLDC GLUCOMTR-MCNC: 82 MG/DL (ref 70–130)
GLUCOSE BLDC GLUCOMTR-MCNC: 90 MG/DL (ref 70–130)
HCT VFR BLD AUTO: 31.5 % (ref 37.5–51)
HCT VFR BLD AUTO: 34.9 % (ref 37.5–51)
HCT VFR BLD AUTO: 35.9 % (ref 37.5–51)
HEMOCCULT STL QL: POSITIVE
HGB BLD-MCNC: 10.1 G/DL (ref 13–17.7)
HGB BLD-MCNC: 10.8 G/DL (ref 13–17.7)
HGB BLD-MCNC: 9.5 G/DL (ref 13–17.7)
IMM GRANULOCYTES # BLD AUTO: 0.03 10*3/MM3 (ref 0–0.05)
IMM GRANULOCYTES NFR BLD AUTO: 0.3 % (ref 0–0.5)
LYMPHOCYTES # BLD AUTO: 2.16 10*3/MM3 (ref 0.7–3.1)
LYMPHOCYTES NFR BLD AUTO: 23 % (ref 19.6–45.3)
MCH RBC QN AUTO: 26.4 PG (ref 26.6–33)
MCHC RBC AUTO-ENTMCNC: 28.9 G/DL (ref 31.5–35.7)
MCV RBC AUTO: 91.4 FL (ref 79–97)
MONOCYTES # BLD AUTO: 0.64 10*3/MM3 (ref 0.1–0.9)
MONOCYTES NFR BLD AUTO: 6.8 % (ref 5–12)
NEUTROPHILS NFR BLD AUTO: 6.02 10*3/MM3 (ref 1.7–7)
NEUTROPHILS NFR BLD AUTO: 64.3 % (ref 42.7–76)
NOROVIRUS GI+II RNA STL QL NAA+NON-PROBE: NOT DETECTED
NRBC BLD AUTO-RTO: 0 /100 WBC (ref 0–0.2)
P SHIGELLOIDES DNA STL QL NAA+NON-PROBE: NOT DETECTED
PLATELET # BLD AUTO: 254 10*3/MM3 (ref 140–450)
PMV BLD AUTO: 9.7 FL (ref 6–12)
RBC # BLD AUTO: 3.82 10*6/MM3 (ref 4.14–5.8)
RVA RNA STL QL NAA+NON-PROBE: NOT DETECTED
S ENT+BONG DNA STL QL NAA+NON-PROBE: NOT DETECTED
SAPO I+II+IV+V RNA STL QL NAA+NON-PROBE: NOT DETECTED
SHIGELLA SP+EIEC IPAH ST NAA+NON-PROBE: NOT DETECTED
V CHOL+PARA+VUL DNA STL QL NAA+NON-PROBE: NOT DETECTED
V CHOLERAE DNA STL QL NAA+NON-PROBE: NOT DETECTED
WBC NRBC COR # BLD: 9.38 10*3/MM3 (ref 3.4–10.8)
Y ENTEROCOL DNA STL QL NAA+NON-PROBE: NOT DETECTED

## 2023-05-30 PROCEDURE — 87493 C DIFF AMPLIFIED PROBE: CPT | Performed by: INTERNAL MEDICINE

## 2023-05-30 PROCEDURE — 85025 COMPLETE CBC W/AUTO DIFF WBC: CPT | Performed by: INTERNAL MEDICINE

## 2023-05-30 PROCEDURE — 87507 IADNA-DNA/RNA PROBE TQ 12-25: CPT | Performed by: INTERNAL MEDICINE

## 2023-05-30 PROCEDURE — 82948 REAGENT STRIP/BLOOD GLUCOSE: CPT

## 2023-05-30 PROCEDURE — 82272 OCCULT BLD FECES 1-3 TESTS: CPT | Performed by: INTERNAL MEDICINE

## 2023-05-30 PROCEDURE — 99221 1ST HOSP IP/OBS SF/LOW 40: CPT | Performed by: INTERNAL MEDICINE

## 2023-05-30 PROCEDURE — 85014 HEMATOCRIT: CPT | Performed by: FAMILY MEDICINE

## 2023-05-30 PROCEDURE — 87449 NOS EACH ORGANISM AG IA: CPT | Performed by: INTERNAL MEDICINE

## 2023-05-30 PROCEDURE — 85018 HEMOGLOBIN: CPT | Performed by: FAMILY MEDICINE

## 2023-05-30 PROCEDURE — 94640 AIRWAY INHALATION TREATMENT: CPT

## 2023-05-30 PROCEDURE — 94799 UNLISTED PULMONARY SVC/PX: CPT

## 2023-05-30 RX ORDER — BUDESONIDE 0.5 MG/2ML
0.5 INHALANT ORAL 2 TIMES DAILY
Status: DISCONTINUED | OUTPATIENT
Start: 2023-05-30 | End: 2023-05-31 | Stop reason: HOSPADM

## 2023-05-30 RX ORDER — ACETAMINOPHEN 325 MG/1
650 TABLET ORAL EVERY 4 HOURS PRN
Status: DISCONTINUED | OUTPATIENT
Start: 2023-05-30 | End: 2023-05-31 | Stop reason: HOSPADM

## 2023-05-30 RX ORDER — DEXTROSE MONOHYDRATE 25 G/50ML
25 INJECTION, SOLUTION INTRAVENOUS
Status: DISCONTINUED | OUTPATIENT
Start: 2023-05-30 | End: 2023-05-31 | Stop reason: HOSPADM

## 2023-05-30 RX ORDER — FINASTERIDE 5 MG/1
5 TABLET, FILM COATED ORAL DAILY
Status: DISCONTINUED | OUTPATIENT
Start: 2023-05-30 | End: 2023-05-31 | Stop reason: HOSPADM

## 2023-05-30 RX ORDER — PANTOPRAZOLE SODIUM 40 MG/1
40 TABLET, DELAYED RELEASE ORAL DAILY
Status: DISCONTINUED | OUTPATIENT
Start: 2023-05-30 | End: 2023-05-31 | Stop reason: HOSPADM

## 2023-05-30 RX ORDER — ONDANSETRON 2 MG/ML
4 INJECTION INTRAMUSCULAR; INTRAVENOUS EVERY 6 HOURS PRN
Status: DISCONTINUED | OUTPATIENT
Start: 2023-05-30 | End: 2023-05-31 | Stop reason: HOSPADM

## 2023-05-30 RX ORDER — SODIUM CHLORIDE, SODIUM LACTATE, POTASSIUM CHLORIDE, CALCIUM CHLORIDE 600; 310; 30; 20 MG/100ML; MG/100ML; MG/100ML; MG/100ML
75 INJECTION, SOLUTION INTRAVENOUS CONTINUOUS
Status: DISPENSED | OUTPATIENT
Start: 2023-05-30 | End: 2023-05-31

## 2023-05-30 RX ORDER — ISOSORBIDE MONONITRATE 30 MG/1
30 TABLET, EXTENDED RELEASE ORAL DAILY
Status: DISCONTINUED | OUTPATIENT
Start: 2023-05-30 | End: 2023-05-31 | Stop reason: HOSPADM

## 2023-05-30 RX ORDER — NICOTINE POLACRILEX 4 MG
15 LOZENGE BUCCAL
Status: DISCONTINUED | OUTPATIENT
Start: 2023-05-30 | End: 2023-05-31 | Stop reason: HOSPADM

## 2023-05-30 RX ORDER — SODIUM CHLORIDE 9 MG/ML
40 INJECTION, SOLUTION INTRAVENOUS AS NEEDED
Status: DISCONTINUED | OUTPATIENT
Start: 2023-05-30 | End: 2023-05-31 | Stop reason: HOSPADM

## 2023-05-30 RX ORDER — SODIUM CHLORIDE 0.9 % (FLUSH) 0.9 %
10 SYRINGE (ML) INJECTION AS NEEDED
Status: DISCONTINUED | OUTPATIENT
Start: 2023-05-30 | End: 2023-05-31 | Stop reason: HOSPADM

## 2023-05-30 RX ORDER — PRAVASTATIN SODIUM 20 MG
80 TABLET ORAL NIGHTLY
Status: DISCONTINUED | OUTPATIENT
Start: 2023-05-30 | End: 2023-05-31 | Stop reason: HOSPADM

## 2023-05-30 RX ORDER — SODIUM CHLORIDE 0.9 % (FLUSH) 0.9 %
10 SYRINGE (ML) INJECTION EVERY 12 HOURS SCHEDULED
Status: DISCONTINUED | OUTPATIENT
Start: 2023-05-30 | End: 2023-05-31 | Stop reason: HOSPADM

## 2023-05-30 RX ORDER — TERAZOSIN 5 MG/1
10 CAPSULE ORAL NIGHTLY
Status: DISCONTINUED | OUTPATIENT
Start: 2023-05-30 | End: 2023-05-31 | Stop reason: HOSPADM

## 2023-05-30 RX ORDER — INSULIN LISPRO 100 [IU]/ML
2-7 INJECTION, SOLUTION INTRAVENOUS; SUBCUTANEOUS
Status: DISCONTINUED | OUTPATIENT
Start: 2023-05-30 | End: 2023-05-31 | Stop reason: HOSPADM

## 2023-05-30 RX ORDER — NITROGLYCERIN 0.4 MG/1
0.4 TABLET SUBLINGUAL
Status: DISCONTINUED | OUTPATIENT
Start: 2023-05-30 | End: 2023-05-31 | Stop reason: HOSPADM

## 2023-05-30 RX ORDER — CALCIUM CARBONATE 500(1250)
500 TABLET ORAL DAILY
COMMUNITY

## 2023-05-30 RX ADMIN — FINASTERIDE 5 MG: 5 TABLET, FILM COATED ORAL at 09:35

## 2023-05-30 RX ADMIN — Medication 10 ML: at 20:51

## 2023-05-30 RX ADMIN — METOPROLOL TARTRATE 12.5 MG: 25 TABLET, FILM COATED ORAL at 20:50

## 2023-05-30 RX ADMIN — PANTOPRAZOLE SODIUM 40 MG: 40 TABLET, DELAYED RELEASE ORAL at 09:35

## 2023-05-30 RX ADMIN — BUDESONIDE 0.5 MG: 0.5 INHALANT RESPIRATORY (INHALATION) at 18:59

## 2023-05-30 RX ADMIN — SODIUM CHLORIDE, POTASSIUM CHLORIDE, SODIUM LACTATE AND CALCIUM CHLORIDE 75 ML/HR: 600; 310; 30; 20 INJECTION, SOLUTION INTRAVENOUS at 09:34

## 2023-05-30 RX ADMIN — PRAVASTATIN SODIUM 80 MG: 20 TABLET ORAL at 20:51

## 2023-05-30 RX ADMIN — BUDESONIDE 0.5 MG: 0.5 INHALANT RESPIRATORY (INHALATION) at 09:56

## 2023-05-30 RX ADMIN — TERAZOSIN HYDROCHLORIDE 10 MG: 5 CAPSULE ORAL at 20:51

## 2023-05-30 RX ADMIN — Medication 10 ML: at 09:34

## 2023-05-30 NOTE — PROGRESS NOTES
"                   Initial Inpatient Consult Note  Referring Provider: Alesha  Reason for Consultation: Rectal bleeding    Subjective     History of present illness: This is a gentleman who has had diarrhea for about 10 days by his reckoning.  It is a painless process.  Usually catches him by surprise, i.e. he is incontinent of stool.  In the last day he has noticed blood staining on several occasions.  He has a history of prostate cancer but did not undergo any radiation therapy.  He had periodic colonoscopies throughout his lifetime, the last 1 of which was performed in 2017.  He does not feel poorly at this time.  He did have COVID-19 earlier in the year and said that he had \"every antibiotic imaginable\".  He has no prior history of any form of colitis.  He has no family history of any sort of inflammatory bowel disease.    Past Medical History:  Past Medical History:   Diagnosis Date   • A-fib    • AAA (abdominal aortic aneurysm) without rupture    • Arthritis    • BPH (benign prostatic hypertrophy)    • Cataract     bialteral   • CKD (chronic kidney disease)    • COPD (chronic obstructive pulmonary disease)    • Coronary artery disease involving native coronary artery of native heart without angina pectoris 1/20/2017    CABG/Az/[place]/1981 No TCC echo  7.16.18 Right ventricular cavity is mild-to-moderately dilated. Left ventricular diastolic dysfunction (grade I) consistent with impaired relaxation. Left ventricular systolic function is normal. Left atrial cavity size is borderline dilated. RIGHT HEART ENLARGEMENT - RVSP NOT ASSESSABLE NORMAL LV AND RV SYSTOLIC FUNCTION NO SIGNIFICANT VALVULAR DYSFUNCTION  Seein   • Diabetes mellitus     Type 2   • DM (diabetes mellitus screen)    • GERD (gastroesophageal reflux disease)    • History of loop recorder     at current time   • Hx of colonic polyp    • Hypercholesteremia    • Hypertension    • Macular degeneration     treated with injections in right eye   • " Myocardial infarction    • Neuropathy    • Oxygen deficit     at 4liters   • Prostate cancer    • Pulmonary hypertension 2022   • S/P CABG x 3 2022 [facility]   • Sleep apnea     cpap   • Stage 3a chronic kidney disease 2017   • Stroke     recovererd   • Varicose vein of leg     bialteral       Past Surgical History:  Past Surgical History:   Procedure Laterality Date   • APPENDECTOMY     • CARDIAC CATHETERIZATION     • CARDIAC CATHETERIZATION Left 2019    Procedure: Cardiac Catheterization/Vascular Study Positive occult blood in stools  ? BMS vs REGGIE Get cabg report  ;  Surgeon: Bradley Carver MD;  Location:  [facility];  Service: Cardiology   • COLONOSCOPY N/A 6/15/2017    Diverticulosis repeat exam prn   • COLONOSCOPY W/ POLYPECTOMY  10/21/2013    2 Tubular adenomatous polyps, Diverticulosis repeat exam in 5 years   • CORONARY ARTERY BYPASS GRAFT  1980    X 3   • CYSTOSCOPY RETROGRADE PYELOGRAM Bilateral 2021    Procedure: CYSTOSCOPY RETROGRADE PYELOGRAM;  Surgeon: Danie Cadena MD;  Location:  [facility] OR;  Service: Urology;  Laterality: Bilateral;   • ENDOSCOPY N/A 6/15/2017    LA Grade A reflux esophagitis   • EYE SURGERY Bilateral    • HERNIA REPAIR     • TRANSURETHRAL RESECTION OF BLADDER TUMOR N/A 2021    Procedure: CYSTOSCOPY TRANSURETHRAL RESECTION OF BLADDER TUMOR WITH GEMCITABINE INSTILLATION;  Surgeon: Danie Cadena MD;  Location:  [facility] OR;  Service: Urology;  Laterality: N/A;        Social History:   Social History     Tobacco Use   • Smoking status: Former     Packs/day: 1.00     Years: 26.00     Pack years: 26.00     Types: Cigarettes     Start date:      Quit date:      Years since quittin.4   • Smokeless tobacco: Never   Substance Use Topics   • Alcohol use: No        Family History:  Family History   Problem Relation Age of Onset   • Heart disease Mother    • Stroke Mother    • Melanoma Mother    • Heart disease Father    •  Diabetes Father    • Prostate cancer Father    • Colon cancer Neg Hx    • Colon polyps Neg Hx        Home Meds:  Medications Prior to Admission   Medication Sig Dispense Refill Last Dose   • albuterol sulfate  (90 Base) MCG/ACT inhaler Inhale 2 puffs Every 6 (Six) Hours As Needed for Wheezing. Indications: Spasm of Lung Air Passages   5/29/2023   • apixaban (ELIQUIS) 5 MG tablet tablet Take 1 tablet by mouth 2 (Two) Times a Day. Indications: Atrial Fibrillation   5/29/2023   • ascorbic acid (VITAMIN C) 500 MG tablet Take 1 tablet by mouth Daily. Indications: Inadequate Vitamin C   5/29/2023   • aspirin 81 MG EC tablet Take 1 tablet by mouth Daily. Indications: Acute Heart Attack   5/29/2023   • Budeson-Glycopyrrol-Formoterol (Breztri Aerosphere) 160-9-4.8 MCG/ACT aerosol inhaler Inhale 2 puffs 2 (Two) Times a Day. Indications: Chronic Bronchitis   5/29/2023   • budesonide (PULMICORT) 0.5 MG/2ML nebulizer solution Take 2 mL by nebulization 2 (Two) Times a Day. Indications: Chronic Obstructive Lung Disease 90 mL 3 5/29/2023   • docusate sodium 100 MG capsule Take 1 capsule by mouth 2 (Two) Times a Day.   5/29/2023   • ferrous sulfate 325 (65 Fe) MG tablet Take 1 tablet by mouth Daily With Breakfast. Indications: Anemia From Inadequate Iron in the Body   5/29/2023   • finasteride (PROSCAR) 5 MG tablet Take 1 tablet by mouth Daily. Indications: Benign Enlargement of Prostate   5/29/2023   • furosemide (LASIX) 20 MG tablet Take 1 tablet by mouth Every 12 (Twelve) Hours. Indications: Edema 90 tablet 1 5/29/2023   • glucose blood (FREESTYLE LITE) test strip Use to test blood sugar daily 100 each 1 5/29/2023   • glyburide (DIAbeta) 5 MG tablet    5/29/2023   • guaiFENesin (MUCINEX) 600 MG 12 hr tablet Take 1 tablet by mouth Daily. Indications: Cough   5/29/2023   • hydroCHLOROthiazide (MICROZIDE) 12.5 MG capsule Take 1 capsule by mouth Daily. 90 capsule 3 5/29/2023   • insulin detemir (LEVEMIR) 100 UNIT/ML injection  Inject 10 Units under the skin into the appropriate area as directed Every 12 (Twelve) Hours.  12 5/29/2023   • Insulin Lispro (humaLOG) 100 UNIT/ML injection Inject  under the skin into the appropriate area as directed. Using sliding scale. Not to exceed 30 units daily  Indications: Type 2 Diabetes   5/29/2023   • isosorbide mononitrate (IMDUR) 30 MG 24 hr tablet Take 1 tablet by mouth Daily. Indications: Stable Angina Pectoris   5/29/2023   • loratadine (CLARITIN) 10 MG tablet    5/29/2023   • melatonin 3 MG tablet Take 1 tablet by mouth At Night As Needed for Sleep.   5/29/2023   • metFORMIN (GLUCOPHAGE) 500 MG tablet Take 1 tablet by mouth Daily With Breakfast. Indications: Type 2 Diabetes   5/29/2023   • metoprolol tartrate (LOPRESSOR) 25 MG tablet Take 12.5 mg by mouth 2 (Two) Times a Day. Indications: High Blood Pressure Disorder   5/29/2023   • Multiple Vitamins-Minerals (MULTIVITAMIN & MINERAL PO) Take 1 tablet by mouth Daily. Indications: vit   5/29/2023   • pantoprazole (PROTONIX) 40 MG EC tablet Take 1 tablet by mouth Daily. 90 tablet 3 5/29/2023   • polyethylene glycol 17 g packet Take 17 g by mouth Daily.   5/29/2023   • potassium chloride ER (K-TAB) 20 MEQ tablet controlled-release ER tablet Take 1 tablet by mouth 2 (Two) Times a Day With Meals. Indications: Low Amount of Potassium in the Blood 180 tablet 1 5/29/2023   • pravastatin (Pravachol) 80 MG tablet Take 1 tablet by mouth Daily. 90 tablet 3 5/29/2023   • terazosin (HYTRIN) 10 MG capsule Take 1 capsule by mouth Every Night. Indications: High Blood Pressure Disorder   5/29/2023   • acetaminophen (TYLENOL) 325 MG tablet Take 2 tablets by mouth 2 (Two) Times a Day. 360 tablet 2    • nitroglycerin (Nitrostat) 0.4 MG SL tablet Place 1 tablet under the tongue Every 5 (Five) Minutes As Needed for Chest Pain. 90 tablet 3        Current Meds:   budesonide, 0.5 mg, Nebulization, BID  finasteride, 5 mg, Oral, Daily  insulin detemir, 10 Units,  Subcutaneous, Q12H  insulin lispro, 2-7 Units, Subcutaneous, 4x Daily AC & at Bedtime  isosorbide mononitrate, 30 mg, Oral, Daily  metoprolol tartrate, 12.5 mg, Oral, BID  pantoprazole, 40 mg, Oral, Daily  pravastatin, 80 mg, Oral, Nightly  sodium chloride, 10 mL, Intravenous, Q12H  terazosin, 10 mg, Oral, Nightly        Allergies:  Allergies   Allergen Reactions   • Symbicort [Budesonide-Formoterol Fumarate] Dizziness     Patient passed out and fell   • Prozac [Fluoxetine Hcl] Mental Status Change     Bad dreams.       Review of Systems  Pertinent items are noted in HPI, all other systems reviewed and negative    Objective     Vital Signs  Temp:  [97.5 °F (36.4 °C)-98 °F (36.7 °C)] 98 °F (36.7 °C)  Heart Rate:  [66-75] 66  Resp:  [18-20] 18  BP: (106-132)/(57-64) 132/64    Physical Exam:     General Appearance:    Alert, cooperative, in no acute distress   Head:    Normocephalic, without obvious abnormality, atraumatic   Eyes:            Lids and lashes normal, conjunctivae and sclerae normal, no   icterus, no pallor, corneas clear, PERRLA   Ears:    Ears appear intact with no abnormalities noted   Throat:   No oral lesions, no thrush, oral mucosa moist   Neck:   No adenopathy, supple, trachea midline, no thyromegaly, no   carotid bruit, no JVD   Back:     No kyphosis present, no scoliosis present, no skin lesions,      erythema or scars, no tenderness to percussion or                   palpation,   range of motion normal   Lungs:     Clear to auscultation,respirations regular, even and                  unlabored    Heart:    Regular rhythm and normal rate, normal S1 and S2, no            murmur, no gallop, no rub, no click   Chest Wall:    No abnormalities observed   Abdomen:     Normal bowel sounds, no masses, no organomegaly, soft        non-tender, non-distended, no guarding, no rebound                tenderness   Rectal:     Deferred   Extremities:   Moves all extremities well, no edema, no cyanosis, no              redness   Pulses:   Pulses palpable and equal bilaterally   Skin:   No bleeding, bruising or rash   Lymph nodes:   No palpable adenopathy   Neurologic:   Cranial nerves 2 - 12 grossly intact, sensation intact, DTR       present and equal bilaterally       Results Review:   I reviewed the patient's new clinical results.    WBC No results found for: WBCS   HGB Hemoglobin   Date Value Ref Range Status   05/30/2023 10.8 (L) 13.0 - 17.7 g/dL Final   05/30/2023 10.1 (L) 13.0 - 17.7 g/dL Final      HCT Hematocrit   Date Value Ref Range Status   05/30/2023 35.9 (L) 37.5 - 51.0 % Final   05/30/2023 34.9 (L) 37.5 - 51.0 % Final      Platelets No results found for: LABPLAT   MCV MCV   Date Value Ref Range Status   05/30/2023 91.4 79.0 - 97.0 fL Final          Sodium No results found for: NA   Potassium No results found for: K   Chloride No results found for: CL   CO2 No results found for: CO2   BUN No results found for: BUN   Creatinine No results found for: CREATININE   Calcium No results found for: CALCIUM   Albumin No results found for: ALBUMIN   AST  ALT  PT/INR:   No results found for: AST  No results found for: ALT  No results found for: PROTIME/No results found for: INR         Imaging Results (Last 72 Hours)     ** No results found for the last 72 hours. **          Assessment & Plan       BRBPR (bright red blood per rectum)      The patient has subacute diarrhea.  The nurse saved his most recent stool for my inspection and it is not bloody.  It is not odiferous to suggest C. difficile colitis.  His C. difficile PCR is positive, his C. difficile toxin antigen testing is negative.  This suggests asymptomatic colonization with C. difficile rather than active infection and the patient to me does not look ill enough for C. difficile.  Right now I would treat very conservatively.  We discussed that we might consider colonoscopy down the road.  However, the patient has little enthusiasm for this procedure due to his  age.  We will follow along with you.    I discussed the patients findings and my recommendations with patient    Matt Nunez MD  05/30/23  14:43 CDT

## 2023-05-30 NOTE — PROGRESS NOTES
Patient feels some better     Has had additonal bloody stool x 3.    C dif ab pos but ag neg    Serial h/h    Consult GI    Discussed with patient.     Taisha Eduardo  05/30/23  13:53 CDT

## 2023-05-30 NOTE — PLAN OF CARE
Goal Outcome Evaluation:           Progress: no change          Pt alert and oriented x4. VSS. no c/o pain. CHAVES. PPP. SCDS for VTE. Up adlib. 4L o2 via NC. . Tolerating reg diet. Skin intact. Voiding via bedside commode. Last BM today. Blood in stool noted. Cdiff toxin detected, antigen negative. MD notified. Contact spore isolation initiated. BS monitoring. Pt reports last dose of eliquis was taken yesterday evening. Call light within reach. Safety maintained.

## 2023-05-30 NOTE — CASE MANAGEMENT/SOCIAL WORK
Discharge Planning Assessment  Psychiatric     Patient Name: Gonzalo Durham  MRN: [id number]  Today's Date: 5/30/2023    Admit Date: 5/30/2023        Discharge Needs Assessment       Row Name 05/30/23 0956       Living Environment    People in Home alone    Current Living Arrangements home    Potentially Unsafe Housing Conditions none    Primary Care Provided by self;other (see comments)   CNA  daily care    Provides Primary Care For no one, unable/limited ability to care for self    Caregiving Concerns weakness/ bleeding from rectum/ oxygen needs    Family Caregiver if Needed child(felicita), adult;other (see comments)  through  long term insurance. daily assist from CNA/ home care duties included.    Quality of Family Relationships unable to assess    Able to Return to Prior Arrangements yes       Resource/Environmental Concerns    Resource/Environmental Concerns none    Transportation Concerns none       Food Insecurity    Within the past 12 months, you worried that your food would run out before you got the money to buy more. Never true    Within the past 12 months, the food you bought just didn't last and you didn't have money to get more. Never true       Transition Planning    Patient/Family Anticipates Transition to home    Patient/Family Anticipated Services at Transition none    Transportation Anticipated family or friend will provide       Discharge Needs Assessment    Readmission Within the Last 30 Days no previous admission in last 30 days    Current Outpatient/Agency/Support Group other (see comments)  long term insurance caregiver in home daily    Equipment Currently Used at Home oxygen;respiratory supplies;walker, rolling;commode;wound care supplies  buttocks cream for continual irritaion d/t previous injury    Concerns to be Addressed no discharge needs identified    Anticipated Changes Related to Illness none    Equipment Needed After Discharge none                   Discharge Plan      Plan for home  with continued caregiver to assist on daily basis. DME in home. Patient has PCP and rx coverage. Will follow for dc order per MD.   Oxygen comes from [facility] - Admitted Since 5/30/2023    Coordination has not been started for this encounter.       Expected Discharge Date and Time       Expected Discharge Date Expected Discharge Time    Jun 1, 2023            Demographic Summary    No documentation.                  Functional Status    No documentation.                  Psychosocial    No documentation.                  Abuse/Neglect    No documentation.                  Legal    No documentation.                  Substance Abuse    No documentation.                  Patient Forms    No documentation.                     Erma Ramirez RN

## 2023-05-30 NOTE — H&P
[facility] Medicine Services  HISTORY AND PHYSICAL    Date of Admission: 5/30/2023  Primary Care Physician: Abel Romero MD    Subjective   Primary Historian: Patient    Chief Complaint: GI bleed    History of Present Illness  89-year-old male who presents as a transfer from [place].  The patient presented to that facility for blood in his stool.  He has had diarrhea for 1 week.  He states he went to the [place] restaurant and proceeded to have diarrhea afterwards.  He had no nausea or vomiting.  He states no one else in the home got sick.  He thought initially he saw some red streaks around the stool in the commode, but takes an iron pill and states that it was hard to tell.  Last night he had an episode of incontinence in when he checked himself it was bright red blood.  He has had no abdominal pain.  Patient has not had a recent colonoscopy secondary to his age.  The patient states that he has been on multiple antibiotics over the last several months secondary to a diagnosis of COVID in January.  He also states that he recently lost his wife and has not been eating well and taking very good care of himself.  BUN 22 creatinine 1.26 hemoglobin 11.5 and hematocrit 37.2.  CT scan showed no perforation but possibly bleeding in the distal rectum.  Patient does take chronic anticoagulation, Eliquis, for A-fib.        Review of Systems   Constitutional:  Negative for chills and fever.   HENT:  Negative for congestion and rhinorrhea.    Respiratory:  Negative for cough and shortness of breath.    Cardiovascular:  Negative for chest pain and leg swelling.   Gastrointestinal:  Positive for blood in stool and diarrhea. Negative for nausea and vomiting.   Genitourinary:  Negative for dysuria and hematuria.      Otherwise complete ROS reviewed and negative except as mentioned in the HPI.    Past Medical History:   Past Medical History:   Diagnosis Date    A-fib     AAA (abdominal  aortic aneurysm) without rupture     Arthritis     BPH (benign prostatic hypertrophy)     Cataract     bialteral    CKD (chronic kidney disease)     COPD (chronic obstructive pulmonary disease)     Coronary artery disease involving native coronary artery of native heart without angina pectoris 1/20/2017    CABG/[place]/1981 No TCC echo  7.16.18 Right ventricular cavity is mild-to-moderately dilated. Left ventricular diastolic dysfunction (grade I) consistent with impaired relaxation. Left ventricular systolic function is normal. Left atrial cavity size is borderline dilated. 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 BMS vs REGGIE Get cabg report  ;  Surgeon: Bradley Carver MD;  Location: [facility] CATH INVASIVE LOCATION;  Service: Cardiology    COLONOSCOPY N/A 6/15/2017    Diverticulosis repeat exam prn    COLONOSCOPY W/ POLYPECTOMY  10/21/2013    2 Tubular adenomatous polyps, Diverticulosis repeat exam in 5 years    CORONARY ARTERY BYPASS GRAFT  1980    X 3    CYSTOSCOPY RETROGRADE PYELOGRAM Bilateral  2/8/2021    Procedure: CYSTOSCOPY RETROGRADE PYELOGRAM;  Surgeon: Danie Cadena MD;  Location:  [facility] OR;  Service: Urology;  Laterality: Bilateral;    ENDOSCOPY N/A 6/15/2017    LA Grade A reflux esophagitis    EYE SURGERY Bilateral     HERNIA REPAIR      TRANSURETHRAL RESECTION OF BLADDER TUMOR N/A 2/8/2021    Procedure: CYSTOSCOPY TRANSURETHRAL RESECTION OF BLADDER TUMOR WITH GEMCITABINE INSTILLATION;  Surgeon: Danie Cadena MD;  Location:  [facility] OR;  Service: Urology;  Laterality: N/A;     Social History:  reports that he quit smoking about 43 years ago. His smoking use included cigarettes. He started smoking about 69 years ago. He has a 26.00 pack-year smoking history. He has never used smokeless tobacco. He reports that he does not drink alcohol and does not use drugs.    Family History: family history includes Diabetes in his father; Heart disease in his father and mother; Melanoma in his mother; Prostate cancer in his father; Stroke in his mother.       Allergies:  Allergies   Allergen Reactions    Symbicort [Budesonide-Formoterol Fumarate] Dizziness     Patient passed out and fell    Prozac [Fluoxetine Hcl] Mental Status Change     Bad dreams.       Medications:  Prior to Admission medications    Medication Sig Start Date End Date Taking? Authorizing Provider   albuterol sulfate  (90 Base) MCG/ACT inhaler Inhale 2 puffs Every 6 (Six) Hours As Needed for Wheezing. Indications: Spasm of Lung Air Passages   Yes ProviderGal MD   apixaban (ELIQUIS) 5 MG tablet tablet Take 1 tablet by mouth 2 (Two) Times a Day. Indications: Atrial Fibrillation   Yes Gal Fischer MD   ascorbic acid (VITAMIN C) 500 MG tablet Take 1 tablet by mouth Daily. Indications: Inadequate Vitamin C   Yes ProviderGal MD   aspirin 81 MG EC tablet Take 1 tablet by mouth Daily. Indications: Acute Heart Attack   Yes Gal Fischer MD   Budeson-Glycopyrrol-Formoterol (Breztri Aerosphere)  160-9-4.8 MCG/ACT aerosol inhaler Inhale 2 puffs 2 (Two) Times a Day. Indications: Chronic Bronchitis   Yes Gal Fischer MD   budesonide (PULMICORT) 0.5 MG/2ML nebulizer solution Take 2 mL by nebulization 2 (Two) Times a Day. Indications: Chronic Obstructive Lung Disease 4/13/23  Yes Abel Romero MD   docusate sodium 100 MG capsule Take 1 capsule by mouth 2 (Two) Times a Day. 2/17/23  Yes Robert Dunn MD   ferrous sulfate 325 (65 Fe) MG tablet Take 1 tablet by mouth Daily With Breakfast. Indications: Anemia From Inadequate Iron in the Body   Yes Gal Fischer MD   finasteride (PROSCAR) 5 MG tablet Take 1 tablet by mouth Daily. Indications: Benign Enlargement of Prostate   Yes Gal Fischer MD   furosemide (LASIX) 20 MG tablet Take 1 tablet by mouth Every 12 (Twelve) Hours. Indications: Edema 3/24/23  Yes Abel Romero MD   glucose blood (FREESTYLE LITE) test strip Use to test blood sugar daily 4/3/23  Yes Abel Romero MD   glyburide (DIAbeta) 5 MG tablet  4/8/23  Yes Gal Fischer MD   guaiFENesin (MUCINEX) 600 MG 12 hr tablet Take 1 tablet by mouth Daily. Indications: Cough   Yes Gal Fischer MD   hydroCHLOROthiazide (MICROZIDE) 12.5 MG capsule Take 1 capsule by mouth Daily. 10/11/22  Yes Bradley Carver MD   insulin detemir (LEVEMIR) 100 UNIT/ML injection Inject 10 Units under the skin into the appropriate area as directed Every 12 (Twelve) Hours. 2/17/23  Yes Robert Dunn MD   Insulin Lispro (humaLOG) 100 UNIT/ML injection Inject  under the skin into the appropriate area as directed. Using sliding scale. Not to exceed 30 units daily  Indications: Type 2 Diabetes   Yes Gal Fischer MD   isosorbide mononitrate (IMDUR) 30 MG 24 hr tablet Take 1 tablet by mouth Daily. Indications: Stable Angina Pectoris   Yes Gal Fischer MD   loratadine (CLARITIN) 10 MG tablet  4/8/23  Yes Gal Fischer MD   melatonin 3 MG tablet Take 1  "tablet by mouth At Night As Needed for Sleep. 2/17/23  Yes Robert Dunn MD   metFORMIN (GLUCOPHAGE) 500 MG tablet Take 1 tablet by mouth Daily With Breakfast. Indications: Type 2 Diabetes 3/4/23  Yes Gal Fischer MD   metoprolol tartrate (LOPRESSOR) 25 MG tablet Take 12.5 mg by mouth 2 (Two) Times a Day. Indications: High Blood Pressure Disorder   Yes Gal Fischer MD   Multiple Vitamins-Minerals (MULTIVITAMIN & MINERAL PO) Take 1 tablet by mouth Daily. Indications: vit 3/4/23  Yes Gal Fischer MD   pantoprazole (PROTONIX) 40 MG EC tablet Take 1 tablet by mouth Daily. 7/6/22  Yes Abel Romero MD   polyethylene glycol 17 g packet Take 17 g by mouth Daily. 2/17/23  Yes Robert Dunn MD   potassium chloride ER (K-TAB) 20 MEQ tablet controlled-release ER tablet Take 1 tablet by mouth 2 (Two) Times a Day With Meals. Indications: Low Amount of Potassium in the Blood 3/24/23  Yes Abel Romero MD   pravastatin (Pravachol) 80 MG tablet Take 1 tablet by mouth Daily. 7/14/22  Yes Abel Romero MD   terazosin (HYTRIN) 10 MG capsule Take 1 capsule by mouth Every Night. Indications: High Blood Pressure Disorder   Yes Gal Fischer MD   acetaminophen (TYLENOL) 325 MG tablet Take 2 tablets by mouth 2 (Two) Times a Day. 4/8/21   Abel Romero MD   nitroglycerin (Nitrostat) 0.4 MG SL tablet Place 1 tablet under the tongue Every 5 (Five) Minutes As Needed for Chest Pain. 7/6/22   Abel Romero MD     I have utilized all available immediate resources to obtain, update, or review the patient's current medications (including all prescriptions, over-the-counter products, herbals, cannabis/cannabidiol products, and vitamin/mineral/dietary (nutritional) supplements).    Objective     Vital Signs: /57 (BP Location: Right arm, Patient Position: Sitting)   Pulse 74   Temp 97.5 °F (36.4 °C) (Oral)   Resp 18   Ht 180.3 cm (71\")   Wt 84.9 kg (187 lb 1.6 " oz)   SpO2 97%   BMI 26.10 kg/m²   Physical Exam  Vitals reviewed.   Constitutional:       Appearance: He is ill-appearing.   HENT:      Head: Normocephalic and atraumatic.      Right Ear: External ear normal.      Left Ear: External ear normal.      Nose: Nose normal.      Mouth/Throat:      Mouth: Mucous membranes are moist.      Pharynx: No oropharyngeal exudate.   Eyes:      General: No scleral icterus.     Conjunctiva/sclera: Conjunctivae normal.   Cardiovascular:      Rate and Rhythm: Normal rate and regular rhythm.      Heart sounds: Normal heart sounds.   Pulmonary:      Effort: Pulmonary effort is normal.      Breath sounds: Normal breath sounds.   Abdominal:      General: There is no distension.      Palpations: Abdomen is soft.      Tenderness: There is no abdominal tenderness.   Musculoskeletal:         General: No swelling or tenderness.      Cervical back: Normal range of motion and neck supple.   Skin:     General: Skin is warm and dry.   Neurological:      Mental Status: He is alert and oriented to person, place, and time.      Cranial Nerves: No cranial nerve deficit.   Psychiatric:         Mood and Affect: Mood normal.         Behavior: Behavior normal.      Results Reviewed:  Lab Results (last 24 hours)       Procedure Component Value Units Date/Time    Occult Blood X 1, Stool - Stool, Per Rectum [[id number]]  (Abnormal) Collected: 05/30/23 0609    Specimen: Stool from Per Rectum Updated: 05/30/23 0636     Fecal Occult Blood Positive          Imaging Results (Last 24 Hours)       ** No results found for the last 24 hours. **          I have personally reviewed and interpreted the radiology studies and ECG obtained at time of admission.     Assessment / Plan   Assessment:   Active Hospital Problems    Diagnosis     **BRBPR (bright red blood per rectum)      Impression:  BRBPR  Diarrhea with antibiotics taken within the last 3 months.   A fib on chronic anticoagulation  Dehydration  5.    IDDM    Treatment Plan  Admit to the hospital  STAT CBC  FU labs in the am  GI panel and C diff   Hold ASA and Eliquis  Hold Glucophage and start SSI with accu-checks  GI consult    The patient will be admitted to my service here at [facility].  Primary team to take over the morning    Medical Decision Making  Number and Complexity of problems: 4, high  Differential Diagnosis: Hemorrhoid    Conditions and Status        Condition is unchanged.     [facility] Data  External documents reviewed: Reviewed  Cardiac tracing (EKG, telemetry) interpretation: None  Radiology interpretation: None  Labs reviewed: Reviewed  Any tests that were considered but not ordered: None     Decision rules/scores evaluated (example [id number], [place], etc): None     Discussed with: Patient     Care Planning  Shared decision making: Patient and transferring physician  Code status and discussions: Full    Disposition  Social Determinants of Health that impact treatment or disposition: None  Estimated length of stay is 1 to 2 days.     I confirmed that the patient's advanced care plan is present, code status is documented, and a surrogate decision maker is listed in the patient's medical record.     The patient's surrogate decision maker is family.     The patient was seen and examined by me on 5/30/2023 at 630.    Electronically signed by Sin Barnes DO, 05/30/23, 07:04 CDT.

## 2023-05-30 NOTE — PLAN OF CARE
Goal Outcome Evaluation:  Plan of Care Reviewed With: patient        Progress: no change  DA From [place] With c/o blood in stool.  Admitted to room 337. A&Ox4. No c/o pain, n/t, n/v. Occult stool sample collected and sent to lab. 4 L NC baseline. Up adlib to RR.  In Cdiff precautions until sample is collected. NPO.

## 2023-05-31 ENCOUNTER — READMISSION MANAGEMENT (OUTPATIENT)
Dept: CALL CENTER | Facility: HOSPITAL | Age: 88
End: 2023-05-31

## 2023-05-31 VITALS
TEMPERATURE: 97.9 F | HEIGHT: 71 IN | WEIGHT: 187.1 LBS | RESPIRATION RATE: 18 BRPM | HEART RATE: 65 BPM | BODY MASS INDEX: 26.19 KG/M2 | SYSTOLIC BLOOD PRESSURE: 130 MMHG | OXYGEN SATURATION: 97 % | DIASTOLIC BLOOD PRESSURE: 54 MMHG

## 2023-05-31 PROBLEM — Z22.1 CLOSTRIDIOIDES DIFFICILE CARRIER: Status: ACTIVE | Noted: 2023-05-31

## 2023-05-31 PROBLEM — R19.7 ACUTE DIARRHEA: Status: ACTIVE | Noted: 2023-05-31

## 2023-05-31 PROBLEM — Z79.01 CHRONIC ANTICOAGULATION: Status: ACTIVE | Noted: 2023-05-31

## 2023-05-31 LAB
ALBUMIN SERPL-MCNC: 3.5 G/DL (ref 3.5–5.2)
ALBUMIN/GLOB SERPL: 1.3 G/DL
ALP SERPL-CCNC: 56 U/L (ref 39–117)
ALT SERPL W P-5'-P-CCNC: 8 U/L (ref 1–41)
ANION GAP SERPL CALCULATED.3IONS-SCNC: 7 MMOL/L (ref 5–15)
AST SERPL-CCNC: 13 U/L (ref 1–40)
BASOPHILS # BLD AUTO: 0.04 10*3/MM3 (ref 0–0.2)
BASOPHILS NFR BLD AUTO: 0.5 % (ref 0–1.5)
BILIRUB SERPL-MCNC: 0.2 MG/DL (ref 0–1.2)
BUN SERPL-MCNC: 15 MG/DL (ref 8–23)
BUN/CREAT SERPL: 17.2 (ref 7–25)
CALCIUM SPEC-SCNC: 8.7 MG/DL (ref 8.6–10.5)
CHLORIDE SERPL-SCNC: 110 MMOL/L (ref 98–107)
CO2 SERPL-SCNC: 26 MMOL/L (ref 22–29)
CREAT SERPL-MCNC: 0.87 MG/DL (ref 0.76–1.27)
DEPRECATED RDW RBC AUTO: 48.2 FL (ref 37–54)
EGFRCR SERPLBLD CKD-EPI 2021: 82.5 ML/MIN/1.73
EOSINOPHIL # BLD AUTO: 0.44 10*3/MM3 (ref 0–0.4)
EOSINOPHIL NFR BLD AUTO: 5.2 % (ref 0.3–6.2)
ERYTHROCYTE [DISTWIDTH] IN BLOOD BY AUTOMATED COUNT: 14.5 % (ref 12.3–15.4)
GLOBULIN UR ELPH-MCNC: 2.6 GM/DL
GLUCOSE BLDC GLUCOMTR-MCNC: 107 MG/DL (ref 70–130)
GLUCOSE BLDC GLUCOMTR-MCNC: 132 MG/DL (ref 70–130)
GLUCOSE SERPL-MCNC: 112 MG/DL (ref 65–99)
HCT VFR BLD AUTO: 33.4 % (ref 37.5–51)
HGB BLD-MCNC: 10.3 G/DL (ref 13–17.7)
IMM GRANULOCYTES # BLD AUTO: 0.03 10*3/MM3 (ref 0–0.05)
IMM GRANULOCYTES NFR BLD AUTO: 0.4 % (ref 0–0.5)
LYMPHOCYTES # BLD AUTO: 1.69 10*3/MM3 (ref 0.7–3.1)
LYMPHOCYTES NFR BLD AUTO: 20 % (ref 19.6–45.3)
MCH RBC QN AUTO: 27.8 PG (ref 26.6–33)
MCHC RBC AUTO-ENTMCNC: 30.8 G/DL (ref 31.5–35.7)
MCV RBC AUTO: 90.3 FL (ref 79–97)
MONOCYTES # BLD AUTO: 0.74 10*3/MM3 (ref 0.1–0.9)
MONOCYTES NFR BLD AUTO: 8.8 % (ref 5–12)
NEUTROPHILS NFR BLD AUTO: 5.5 10*3/MM3 (ref 1.7–7)
NEUTROPHILS NFR BLD AUTO: 65.1 % (ref 42.7–76)
NRBC BLD AUTO-RTO: 0 /100 WBC (ref 0–0.2)
PLATELET # BLD AUTO: 233 10*3/MM3 (ref 140–450)
PMV BLD AUTO: 9.5 FL (ref 6–12)
POTASSIUM SERPL-SCNC: 3.9 MMOL/L (ref 3.5–5.2)
PROT SERPL-MCNC: 6.1 G/DL (ref 6–8.5)
RBC # BLD AUTO: 3.7 10*6/MM3 (ref 4.14–5.8)
SODIUM SERPL-SCNC: 143 MMOL/L (ref 136–145)
WBC NRBC COR # BLD: 8.44 10*3/MM3 (ref 3.4–10.8)

## 2023-05-31 PROCEDURE — 80053 COMPREHEN METABOLIC PANEL: CPT | Performed by: INTERNAL MEDICINE

## 2023-05-31 PROCEDURE — 85025 COMPLETE CBC W/AUTO DIFF WBC: CPT | Performed by: FAMILY MEDICINE

## 2023-05-31 PROCEDURE — 82948 REAGENT STRIP/BLOOD GLUCOSE: CPT

## 2023-05-31 PROCEDURE — 94664 DEMO&/EVAL PT USE INHALER: CPT

## 2023-05-31 PROCEDURE — 94799 UNLISTED PULMONARY SVC/PX: CPT

## 2023-05-31 RX ADMIN — PANTOPRAZOLE SODIUM 40 MG: 40 TABLET, DELAYED RELEASE ORAL at 08:52

## 2023-05-31 RX ADMIN — BUDESONIDE 0.5 MG: 0.5 INHALANT RESPIRATORY (INHALATION) at 06:24

## 2023-05-31 RX ADMIN — FINASTERIDE 5 MG: 5 TABLET, FILM COATED ORAL at 08:52

## 2023-05-31 RX ADMIN — SODIUM CHLORIDE, POTASSIUM CHLORIDE, SODIUM LACTATE AND CALCIUM CHLORIDE 75 ML/HR: 600; 310; 30; 20 INJECTION, SOLUTION INTRAVENOUS at 00:03

## 2023-05-31 NOTE — DISCHARGE SUMMARY
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 Nunez with gastroenterology.    Procedures Performed: none.    Pertinent Test Results:   Results for orders placed during the hospital encounter of 10/26/22    Adult Transthoracic Echo Complete w/ Color, Spectral and Contrast if necessary per protocol    Interpretation Summary    Left ventricular systolic function is normal. Left ventricular ejection fraction appears to be 56 - 60%.    Left ventricular diastolic function was normal.    Abnormal global longitudinal LV strain (GLS) = -11.0%    Estimated right ventricular systolic pressure from tricuspid regurgitation is normal (<35 mmHg).      Imaging Results (All)       None          LAB RESULTS:      Lab 05/31/23  0437 05/30/23  2129 05/30/23  1413 05/30/23  0835   WBC 8.44  --   --  9.38   HEMOGLOBIN 10.3* 9.5* 10.8* 10.1*   HEMATOCRIT 33.4* 31.5* 35.9* 34.9*   PLATELETS 233  --   --  254   NEUTROS ABS 5.50  --   --  6.02   IMMATURE GRANS (ABS) 0.03  --   --  0.03   LYMPHS ABS 1.69  --   --  2.16   MONOS ABS 0.74  --   --  0.64   EOS ABS 0.44*  --   --  0.48*   MCV 90.3  --   --  91.4         Lab 05/31/23  0437   SODIUM 143   POTASSIUM 3.9   CHLORIDE 110*   CO2 26.0   ANION GAP 7.0   BUN 15   CREATININE 0.87   EGFR 82.5    GLUCOSE 112*   CALCIUM 8.7         Lab 05/31/23  0437   TOTAL PROTEIN 6.1   ALBUMIN 3.5   GLOBULIN 2.6   ALT (SGPT) 8   AST (SGOT) 13   BILIRUBIN 0.2   ALK PHOS 56                     Brief Urine Lab Results  (Last result in the past 365 days)        Color   Clarity   Blood   Leuk Est   Nitrite   Protein   CREAT   Urine HCG        12/12/22 0924 Yellow   Clear   Negative   Negative   Negative   Negative                 Microbiology Results (last 10 days)       Procedure Component Value - Date/Time    Clostridioides difficile Toxin - Stool, Per Rectum [[id number]]  (Abnormal) Collected: 05/30/23 0852    Lab Status: Final result Specimen: Stool from Per Rectum Updated: 05/30/23 1348    Narrative:      The following orders were created for panel order Clostridioides difficile Toxin - Stool, Per Rectum.  Procedure                               Abnormality         Status                     ---------                               -----------         ------                     Clostridioides difficile...[[id number]]  Abnormal            Final result                 Please view results for these tests on the individual orders.    Gastrointestinal Panel, PCR - Stool, Per Rectum [[id number]]  (Normal) Collected: 05/30/23 0852    Lab Status: Final result Specimen: Stool from Per Rectum Updated: 05/30/23 1043     Campylobacter Not Detected     Plesiomonas shigelloides Not Detected     Salmonella Not Detected     Vibrio Not Detected     Vibrio cholerae Not Detected     Yersinia enterocolitica Not Detected     Enteroaggregative E. coli (EAEC) Not Detected     Enteropathogenic E. coli (EPEC) Not Detected     Enterotoxigenic E. coli (ETEC) lt/st Not Detected     Shiga-like toxin-producing E. coli (STEC) stx1/stx2 Not Detected     Shigella/Enteroinvasive E. coli (EIEC) Not Detected     Cryptosporidium Not Detected     Cyclospora cayetanensis Not Detected     Entamoeba histolytica Not Detected     Giardia lamblia Not Detected      Adenovirus F40/41 Not Detected     Astrovirus Not Detected     Norovirus GI/GII Not Detected     Rotavirus A Not Detected     Sapovirus (I, II, IV or V) Not Detected    Narrative:      If Aeromonas, Staphylococcus aureus or Bacillus cereus are suspected, please order [id number]: Stool Culture, Aeromonas, S aureus, B Cereus.    Clostridioides difficile Toxin, PCR - Stool, Per Rectum [[id number]]  (Abnormal) Collected: 05/30/23 0852    Lab Status: Final result Specimen: Stool from Per Rectum Updated: 05/30/23 1348     C. Difficile Toxins by PCR Positive    Narrative:      DNA from a toxigenic strain of C.difficile has been detected. Antigen testing for the presence of free C.difficile toxin is currently in progress, to help determine the clinical significance of this PCR result.     Clostridioides difficile toxin Ag, Reflex - Stool, Per Rectum [[id number]]  (Normal) Collected: 05/30/23 0852    Lab Status: Final result Specimen: Stool from Per Rectum Updated: 05/30/23 1349     C.diff Toxin Ag Negative    Narrative:      DNA from a toxigenic strain of C.difficile was detected, although the free toxin itself was not detected. These findings are consistent with C.difficile colonization and may not reflect actual C.difficile infection. Clinical correlation needed.            Hospital Course:   Mr. Durham is an 89-year-old male who presented to [facility] on 5/30 as a transfer from [facility] for 1 week history of diarrhea.  States that he went to a [place] restaurant and proceeded to have diarrhea afterwards.  He does take iron at home and states that his stool is always dark in color.  He thought he saw some red streaks around the stool in the commode.  He then had an episode of incontinence and reported stool was bright red in color.  No nausea, vomiting or abdominal pain. Patient has not had a recent colonoscopy secondary to his age.  States he has been on multiple antibiotics over the last several months  "secondary to a diagnosis of COVID in January. He has a history of prostate cancer but did not undergo any radiation therapy.  He has no prior history of any form of colitis.  CT scan at outlying facility showed no perforation but possibly bleeding in the distal rectum. Patient does take chronic anticoagulation, Eliquis, for A-fib.  Hemoglobin 10.1.    Gastroenterology, Dr. Nunez consulted for rectal bleeding.  GI panel, PCR negative.  C. difficile PCR is positive, C. difficile toxin antigen testing is negative.  This suggests asymptomatic colonization with C. Difficile.  WBC normal.  No fever.  Blood count stable.  Recommends to treat conservatively.  Can consider outpatient colonoscopy.    Patient is tolerating a diet.  No nausea, vomiting or abdominal pain.  Has had 3 loose stools.  No fever and normal WBC.  Overall he is feeling well and feels ready for discharge.  He has reached maximum benefit of hospitalization.  He is medically stable and appropriate for discharge home.    Physical Exam on Discharge:  /54 (BP Location: Right arm, Patient Position: Lying)   Pulse 65   Temp 97.9 °F (36.6 °C) (Oral)   Resp 18   Ht 180.3 cm (71\")   Wt 84.9 kg (187 lb 1.6 oz)   SpO2 97%   BMI 26.10 kg/m²   Physical Exam  Vitals reviewed.   Constitutional:       General: He is not in acute distress.     Appearance: He is obese. He is not toxic-appearing.      Comments: Sitting up in bed with son-in-law at bedside.  No acute distress.  On home use oxygen.  Discussed with his nurse April.   HENT:      Head: Normocephalic and atraumatic.      Mouth/Throat:      Mouth: Mucous membranes are moist.      Pharynx: Oropharynx is clear.   Eyes:      Extraocular Movements: Extraocular movements intact.      Conjunctiva/sclera: Conjunctivae normal.      Pupils: Pupils are equal, round, and reactive to light.   Cardiovascular:      Rate and Rhythm: Normal rate and regular rhythm.      Pulses: Normal pulses.   Pulmonary:      " Effort: Pulmonary effort is normal. No respiratory distress.      Breath sounds: Normal breath sounds. No wheezing.   Abdominal:      General: Bowel sounds are normal. There is no distension.      Palpations: Abdomen is soft.      Tenderness: There is no abdominal tenderness.   Musculoskeletal:         General: No swelling or tenderness. Normal range of motion.      Cervical back: Normal range of motion and neck supple. No muscular tenderness.   Skin:     General: Skin is warm and dry.      Findings: No erythema or rash.   Neurological:      General: No focal deficit present.      Mental Status: He is alert and oriented to person, place, and time.      Cranial Nerves: No cranial nerve deficit.      Motor: No weakness.   Psychiatric:         Mood and Affect: Mood normal.         Behavior: Behavior normal.     Condition on Discharge: medically stable.    Discharge Disposition:  Home or Self Care    Discharge Medications:     Discharge Medications        Continue These Medications        Instructions Start Date   acetaminophen 325 MG tablet  Commonly known as: TYLENOL   650 mg, Oral, 2 Times Daily      albuterol sulfate  (90 Base) MCG/ACT inhaler  Commonly known as: PROVENTIL HFA;VENTOLIN HFA;PROAIR HFA   2 puffs, Inhalation, Every 6 Hours PRN      apixaban 5 MG tablet tablet  Commonly known as: ELIQUIS   1 tablet, Oral, 2 Times Daily      ascorbic acid 500 MG tablet  Commonly known as: VITAMIN C   1 tablet, Oral, 2 Times Daily      aspirin 81 MG EC tablet   1 tablet, Oral, Daily      Breztri Aerosphere 160-9-4.8 MCG/ACT aerosol inhaler  Generic drug: Budeson-Glycopyrrol-Formoterol   2 puffs, Inhalation, 2 Times Daily      budesonide 0.5 MG/2ML nebulizer solution  Commonly known as: PULMICORT   0.5 mg, Nebulization, 2 Times Daily      calcium carbonate (oyster shell) 500 MG tablet tablet   500 mg, Oral, Daily      docusate sodium 100 MG capsule   100 mg, Oral, 2 Times Daily      ferrous sulfate 325 (65 Fe) MG  tablet   1 tablet, Oral, Daily With Breakfast      finasteride 5 MG tablet  Commonly known as: PROSCAR   1 tablet, Oral, Daily      furosemide 20 MG tablet  Commonly known as: LASIX   20 mg, Oral, Every 12 Hours Scheduled      glyburide 5 MG tablet  Commonly known as: DIAbeta   5 mg, Oral, 2 Times Daily With Meals      guaiFENesin 600 MG 12 hr tablet  Commonly known as: MUCINEX   1 tablet, Oral, 2 Times Daily      hydroCHLOROthiazide 12.5 MG capsule  Commonly known as: MICROZIDE   12.5 mg, Oral, Daily      isosorbide mononitrate 30 MG 24 hr tablet  Commonly known as: IMDUR   1 tablet, Oral, Daily      loratadine 10 MG tablet  Commonly known as: CLARITIN   10 mg, Oral, Daily      melatonin 3 MG tablet   3 mg, Oral, Nightly PRN      metFORMIN 500 MG tablet  Commonly known as: GLUCOPHAGE   1 tablet, Oral, Every Evening      metoprolol tartrate 25 MG tablet  Commonly known as: LOPRESSOR   12.5 mg, Oral, 2 Times Daily      MULTIVITAMIN & MINERAL PO   1 tablet, Oral, Daily      nitroglycerin 0.4 MG SL tablet  Commonly known as: Nitrostat   0.4 mg, Sublingual, Every 5 Minutes PRN      pantoprazole 40 MG EC tablet  Commonly known as: PROTONIX   40 mg, Oral, Daily      polyethylene glycol 17 g packet  Commonly known as: MIRALAX   17 g, Oral, Daily      potassium chloride ER 20 MEQ tablet controlled-release ER tablet  Commonly known as: K-TAB   20 mEq, Oral, 2 Times Daily With Meals      pravastatin 80 MG tablet  Commonly known as: Pravachol   80 mg, Oral, Daily      terazosin 10 MG capsule  Commonly known as: HYTRIN   1 capsule, Oral, Nightly             Discharge Diet:   Diet Instructions       Diet: Regular/House Diet, Cardiac Diets, Diabetic Diets; Healthy Heart (2-3 Na+); Regular Texture (IDDSI 7); Thin (IDDSI 0); Consistent Carbohydrate      Discharge Diet:  Regular/House Diet  Cardiac Diets  Diabetic Diets       Cardiac Diet: Healthy Heart (2-3 Na+)    Texture: Regular Texture (IDDSI 7)    Fluid Consistency: Thin  (IDDSI 0)    Diabetic Diet: Consistent Carbohydrate            Activity at Discharge:   Activity Instructions       Activity as Tolerated              Follow-up Appointments:   1.  Follow-up with Dr. Romero in 1 week.  Future Appointments   Date Time Provider Department Center   6/14/2023 10:20 AM Danie Cadena MD [facility] U PAD PAD   7/11/2023  9:00 AM Erika Elena APRN [facility] GE PAD PAD   7/14/2023  9:00 AM Rnonie Ugarte APRN [facility] ENT PAD PAD   7/20/2023  8:50 AM [facility] [place] [facility] MET[facility] PAD   7/27/2023  9:30 AM Feliz Frye APRN [facility] RD PAD PAD   7/27/2023  1:45 PM Abel Romero MD [facility] PC METR PAD   1/9/2024  9:30 AM PAD CT 2 BH PAD CAT PAD   1/9/2024 10:00 AM PAD US NIVAS CART 2 BH PAD US PAD   1/9/2024 11:00 AM Nasir Gutierrez DO [facility] VS PAD PAD       Test Results Pending at Discharge: none.    Electronically signed by DOMINIQUE Sands, 05/31/23, 11:29 CDT.    Time: 35 minutes.

## 2023-05-31 NOTE — PLAN OF CARE
Goal Outcome Evaluation:           Progress: improving          Pt alert and oriented x4. VSS. No c/o pain. CHAVES. PPP. SCDS for VTE. . 4L via NC baseline. Tolerating reg diet. Skin intact. Voiding via bathroom. Last BM last night. Pt reports that stool was still black, but he also usually takes an iron pill. APRN aware of black stool. BS monitoring. Contact spore Isolation. Plan to d/c home today. Call light within reach. Safety maintained.

## 2023-06-01 ENCOUNTER — TRANSITIONAL CARE MANAGEMENT TELEPHONE ENCOUNTER (OUTPATIENT)
Dept: CALL CENTER | Facility: HOSPITAL | Age: 88
End: 2023-06-01

## 2023-06-01 NOTE — OUTREACH NOTE
Prep Survey    Flowsheet Row Responses   [facility] patient discharged from? [place]   Is LACE score < 7 ? No   Eligibility [facility]   Date of Admission 05/30/23   Date of Discharge 05/31/23   Discharge Disposition Home or Self Care   Discharge diagnosis Acute diarrhea   Does the patient have one of the following disease processes/diagnoses(primary or secondary)? Other   Does the patient have Home health ordered? No   Is there a DME ordered? No   Prep survey completed? Yes          Dior REMY - Registered Nurse

## 2023-06-01 NOTE — OUTREACH NOTE
Call Center TCM Note    Flowsheet Row Responses   [facility] patient discharged from? [place]   Does the patient have one of the following disease processes/diagnoses(primary or secondary)? Other   TCM attempt successful? Yes   Call start time 1156   Call end time 1157   Discharge diagnosis Acute diarrhea   Does the patient have all medications ordered at discharge? N/A   Is the patient taking all medications as directed (includes completed medication regime)? Yes   Does the patient have an appointment with their PCP within 7 days of discharge? No   Has home health visited the patient within 72 hours of discharge? N/A   Psychosocial issues? No   Did the patient receive a copy of their discharge instructions? Yes   What is the patient's perception of their health status since discharge? Improving   Is the patient/caregiver able to teach back the hierarchy of who to call/visit for symptoms/problems? PCP, Specialist, Home health nurse, Urgent Care, ED, 911 Yes   TCM call completed? Yes   Wrap up additional comments Quick call, patient was at the [facility] center eating lunch but states he is doing well, he will call to make his f/u appts.   Call end time 1157   Would this patient benefit from a Referral to Amb Social Work? No   Is the patient interested in additional calls from an ambulatory ?  NOTE:  applies to high risk patients requiring additional follow-up. No          Sonya Diaz RN    6/1/2023, 11:57 CDT

## 2023-06-05 RX ORDER — FUROSEMIDE 20 MG/1
TABLET ORAL
Qty: 90 TABLET | Refills: 7 | Status: SHIPPED | OUTPATIENT
Start: 2023-06-05

## 2023-06-12 ENCOUNTER — OFFICE VISIT (OUTPATIENT)
Dept: FAMILY MEDICINE CLINIC | Facility: CLINIC | Age: 88
End: 2023-06-12
Payer: MEDICARE

## 2023-06-12 VITALS
TEMPERATURE: 97.8 F | DIASTOLIC BLOOD PRESSURE: 82 MMHG | SYSTOLIC BLOOD PRESSURE: 122 MMHG | WEIGHT: 187.4 LBS | HEART RATE: 75 BPM | BODY MASS INDEX: 26.23 KG/M2 | RESPIRATION RATE: 18 BRPM | OXYGEN SATURATION: 92 % | HEIGHT: 71 IN

## 2023-06-12 DIAGNOSIS — Z79.01 CHRONIC ANTICOAGULATION: Chronic | ICD-10-CM

## 2023-06-12 DIAGNOSIS — D64.9 ANEMIA, UNSPECIFIED TYPE: ICD-10-CM

## 2023-06-12 DIAGNOSIS — R19.7 DIARRHEA, UNSPECIFIED TYPE: ICD-10-CM

## 2023-06-12 DIAGNOSIS — A49.8 CLOSTRIDIUM DIFFICILE INFECTION: ICD-10-CM

## 2023-06-12 DIAGNOSIS — K62.5 RECTAL BLEEDING: ICD-10-CM

## 2023-06-12 PROCEDURE — 1111F DSCHRG MED/CURRENT MED MERGE: CPT | Performed by: FAMILY MEDICINE

## 2023-06-12 PROCEDURE — 99495 TRANSJ CARE MGMT MOD F2F 14D: CPT | Performed by: FAMILY MEDICINE

## 2023-06-12 RX ORDER — SACCHAROMYCES BOULARDII 250 MG
250 CAPSULE ORAL 2 TIMES DAILY
Qty: 60 CAPSULE | Refills: 5 | Status: SHIPPED | OUTPATIENT
Start: 2023-06-12

## 2023-06-12 NOTE — PROGRESS NOTES
Transitional Care Follow Up Visit  Subjective     Gonzalo Durham is a 89 y.o. male who presents for a transitional care management visit.  With daughter..    Within 48 business hours after discharge our office contacted him via telephone to coordinate his care and needs.      I reviewed and discussed the details of that call along with the discharge summary, hospital problems, inpatient lab results, inpatient diagnostic studies, and consultation reports with Gonzalo.     Current outpatient and discharge medications have been reconciled for the patient.        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    Course During Hospital Stay:    Mr. Durham is an 89-year-old male who presented to [facility] on [facility] as a transfer from [facility] for 1 week history of diarrhea.  States that he went to a [place] restaurant and proceeded to have diarrhea afterwards.  He does take iron at home and states that his stool is always dark in color.  He thought he saw some red streaks around the stool in the commode.  He then had an episode of incontinence and reported stool was bright red in color.  No nausea, vomiting or abdominal pain. Patient has not had a recent  colonoscopy secondary to his age.  States he has been on multiple antibiotics over the last several months secondary to a diagnosis of COVID in January. He has a history of prostate cancer but did not undergo any radiation therapy.  He has no prior history of any form of colitis.  CT scan at outlying facility showed no perforation but possibly bleeding in the distal rectum. Patient does take chronic anticoagulation, Eliquis, for A-fib.  Hemoglobin 10.1.     Gastroenterology, Dr. Nunez consulted for rectal bleeding.  GI panel, PCR negative.  C. difficile PCR is positive, C. difficile toxin antigen testing is negative.  This suggests asymptomatic colonization with C. Difficile.  WBC normal.  No fever.  Blood count stable.  Recommends to treat conservatively.  Can consider outpatient colonoscopy.     Patient is tolerating a diet.  No nausea, vomiting or abdominal pain.  Has had 3 loose stools.  No fever and normal WBC.  Overall he is feeling well and feels ready for discharge.  He has reached maximum benefit of hospitalization.  He is medically stable and appropriate for discharge home.       The following portions of the patient's history were reviewed and updated as appropriate: allergies, current medications, past family history, past medical history, past social history, past surgical history, and problem list.    Review of Systems   Constitutional:  Positive for activity change, appetite change and fatigue. Negative for chills and fever.   HENT: Negative.     Respiratory:  Negative for cough and shortness of breath.    Cardiovascular:  Negative for chest pain, palpitations and leg swelling.   Gastrointestinal:  Positive for diarrhea. Negative for abdominal pain and blood in stool.   Neurological:  Positive for weakness. Speech difficulty: trans.      Results for orders placed or performed during the hospital encounter of 05/30/23   Gastrointestinal Panel, PCR - Stool, Per Rectum    Specimen: Per Rectum; Stool    Result Value Ref Range    Campylobacter Not Detected Not Detected    Plesiomonas shigelloides Not Detected Not Detected    Salmonella Not Detected Not Detected    Vibrio Not Detected Not Detected    Vibrio cholerae Not Detected Not Detected    Yersinia enterocolitica Not Detected Not Detected    Enteroaggregative E. coli (EAEC) Not Detected Not Detected    Enteropathogenic E. coli (EPEC) Not Detected Not Detected    Enterotoxigenic E. coli (ETEC) lt/st Not Detected Not Detected    Shiga-like toxin-producing E. coli (STEC) stx1/stx2 Not Detected Not Detected    Shigella/Enteroinvasive E. coli (EIEC) Not Detected Not Detected    Cryptosporidium Not Detected Not Detected    Cyclospora cayetanensis Not Detected Not Detected    Entamoeba histolytica Not Detected Not Detected    Giardia lamblia Not Detected Not Detected    Adenovirus F40/41 Not Detected Not Detected    Astrovirus Not Detected Not Detected    Norovirus GI/GII Not Detected Not Detected    Rotavirus A Not Detected Not Detected    Sapovirus (I, II, IV or V) Not Detected Not Detected   Clostridioides difficile Toxin, PCR - Stool, Per Rectum    Specimen: Per Rectum; Stool   Result Value Ref Range    C. Difficile Toxins by PCR Positive (A) Negative   Clostridioides difficile toxin Ag, Reflex - Stool, Per Rectum    Specimen: Per Rectum; Stool   Result Value Ref Range    C.diff Toxin Ag Negative Negative   Occult Blood X 1, Stool - Stool, Per Rectum    Specimen: Per Rectum; Stool   Result Value Ref Range    Fecal Occult Blood Positive (A) Negative   CBC Auto Differential    Specimen: Blood   Result Value Ref Range    WBC 9.38 3.40 - 10.80 10*3/mm3    RBC 3.82 (L) 4.14 - 5.80 10*6/mm3    Hemoglobin 10.1 (L) 13.0 - 17.7 g/dL    Hematocrit 34.9 (L) 37.5 - 51.0 %    MCV 91.4 79.0 - 97.0 fL    MCH 26.4 (L) 26.6 - 33.0 pg    MCHC 28.9 (L) 31.5 - 35.7 g/dL    RDW 14.6 12.3 - 15.4 %    RDW-SD 49.2 37.0 - 54.0 fl    MPV 9.7 6.0 - 12.0 fL    Platelets 254 140 - 450  10*3/mm3    Neutrophil % 64.3 42.7 - 76.0 %    Lymphocyte % 23.0 19.6 - 45.3 %    Monocyte % 6.8 5.0 - 12.0 %    Eosinophil % 5.1 0.3 - 6.2 %    Basophil % 0.5 0.0 - 1.5 %    Immature Grans % 0.3 0.0 - 0.5 %    Neutrophils, Absolute 6.02 1.70 - 7.00 10*3/mm3    Lymphocytes, Absolute 2.16 0.70 - 3.10 10*3/mm3    Monocytes, Absolute 0.64 0.10 - 0.90 10*3/mm3    Eosinophils, Absolute 0.48 (H) 0.00 - 0.40 10*3/mm3    Basophils, Absolute 0.05 0.00 - 0.20 10*3/mm3    Immature Grans, Absolute 0.03 0.00 - 0.05 10*3/mm3    nRBC 0.0 0.0 - 0.2 /100 WBC   Hemoglobin & Hematocrit, Blood    Specimen: Blood   Result Value Ref Range    Hemoglobin 10.8 (L) 13.0 - 17.7 g/dL    Hematocrit 35.9 (L) 37.5 - 51.0 %   Hemoglobin & Hematocrit, Blood    Specimen: Blood   Result Value Ref Range    Hemoglobin 9.5 (L) 13.0 - 17.7 g/dL    Hematocrit 31.5 (L) 37.5 - 51.0 %   Comprehensive Metabolic Panel    Specimen: Blood   Result Value Ref Range    Glucose 112 (H) 65 - 99 mg/dL    BUN 15 8 - 23 mg/dL    Creatinine 0.87 0.76 - 1.27 mg/dL    Sodium 143 136 - 145 mmol/L    Potassium 3.9 3.5 - 5.2 mmol/L    Chloride 110 (H) 98 - 107 mmol/L    CO2 26.0 22.0 - 29.0 mmol/L    Calcium 8.7 8.6 - 10.5 mg/dL    Total Protein 6.1 6.0 - 8.5 g/dL    Albumin 3.5 3.5 - 5.2 g/dL    ALT (SGPT) 8 1 - 41 U/L    AST (SGOT) 13 1 - 40 U/L    Alkaline Phosphatase 56 39 - 117 U/L    Total Bilirubin 0.2 0.0 - 1.2 mg/dL    Globulin 2.6 gm/dL    A/G Ratio 1.3 g/dL    BUN/Creatinine Ratio 17.2 7.0 - 25.0    Anion Gap 7.0 5.0 - 15.0 mmol/L    eGFR 82.5 >60.0 mL/min/1.73   CBC Auto Differential    Specimen: Blood   Result Value Ref Range    WBC 8.44 3.40 - 10.80 10*3/mm3    RBC 3.70 (L) 4.14 - 5.80 10*6/mm3    Hemoglobin 10.3 (L) 13.0 - 17.7 g/dL    Hematocrit 33.4 (L) 37.5 - 51.0 %    MCV 90.3 79.0 - 97.0 fL    MCH 27.8 26.6 - 33.0 pg    MCHC 30.8 (L) 31.5 - 35.7 g/dL    RDW 14.5 12.3 - 15.4 %    RDW-SD 48.2 37.0 - 54.0 fl    MPV 9.5 6.0 - 12.0 fL    Platelets 233 140 - 450  10*3/mm3    Neutrophil % 65.1 42.7 - 76.0 %    Lymphocyte % 20.0 19.6 - 45.3 %    Monocyte % 8.8 5.0 - 12.0 %    Eosinophil % 5.2 0.3 - 6.2 %    Basophil % 0.5 0.0 - 1.5 %    Immature Grans % 0.4 0.0 - 0.5 %    Neutrophils, Absolute 5.50 1.70 - 7.00 10*3/mm3    Lymphocytes, Absolute 1.69 0.70 - 3.10 10*3/mm3    Monocytes, Absolute 0.74 0.10 - 0.90 10*3/mm3    Eosinophils, Absolute 0.44 (H) 0.00 - 0.40 10*3/mm3    Basophils, Absolute 0.04 0.00 - 0.20 10*3/mm3    Immature Grans, Absolute 0.03 0.00 - 0.05 10*3/mm3    nRBC 0.0 0.0 - 0.2 /100 WBC   POC Glucose Once    Specimen: Blood   Result Value Ref Range    Glucose 82 70 - 130 mg/dL   POC Glucose Once    Specimen: Blood   Result Value Ref Range    Glucose 90 70 - 130 mg/dL   POC Glucose Once    Specimen: Blood   Result Value Ref Range    Glucose 109 70 - 130 mg/dL   POC Glucose Once    Specimen: Blood   Result Value Ref Range    Glucose 139 (H) 70 - 130 mg/dL   POC Glucose Once    Specimen: Blood   Result Value Ref Range    Glucose 107 70 - 130 mg/dL   POC Glucose Once    Specimen: Blood   Result Value Ref Range    Glucose 132 (H) 70 - 130 mg/dL       Lab Results   Component Value Date    PSA 1.720 01/16/2023    PSA 0.983 01/05/2022    PSA 1.040 01/05/2021        Lab Results:  CBC:  Lab Results - Last 18 Months   Lab Units 05/31/23  0437 05/30/23  2129 05/30/23  1413 05/30/23  0835 03/09/23  1033 03/02/23  0441 02/27/23  0614 02/23/23  0522 02/20/23  0322 02/07/23  0936 01/16/23  1019 10/11/22  0849 08/08/22  1010 07/06/22  0715 02/20/22  0405 01/05/22  0658   WBC 10*3/mm3 8.44  --   --  9.38 6.72 5.90 7.18 11.99* 15.35*   < > 8.16 9.59 11.28* 7.20   < > 7.98   HEMOGLOBIN g/dL 10.3* 9.5* 10.8* 10.1* 10.8* 9.5* 9.5* 9.9* 12.2*   < > 11.6* 11.6* 10.9* 11.5*   < > 12.6*   HEMATOCRIT % 33.4* 31.5* 35.9* 34.9* 33.9* 31.6* 30.9* 32.0* 39.3   < > 37.2* 36.3* 33.9* 34.5*   < > 38.4   PLATELETS 10*3/mm3 233  --   --  254 435 180 203 235 300   < > 226 239 284 231   < > 220  "  IRON mcg/dL  --   --   --   --  63  --   --   --   --   --  75 65 64 112  --  60    < > = values in this interval not displayed.      BMP/CMP:  Lab Results - Last 18 Months   Lab Units 05/31/23  0437 03/09/23  1033 03/02/23  0441 02/28/23  0412 02/27/23  0614 02/25/23  0240 02/24/23  0438 07/06/22  0715 02/20/22  0405 01/05/22  0658   SODIUM mmol/L 143 140 140 137 138 137 137   < > 143 145   POTASSIUM mmol/L 3.9 4.7 3.8 3.4* 3.2* 3.5 3.2*   < > 4.3 4.4   CHLORIDE mmol/L 110* 100 101 98 98 100 99   < > 105 106   CO2 mmol/L 26.0 28.2 30.0* 28.0 32.0* 29.0 28.0   < > 29.0 32.7*   BUN mg/dL 15 23 18 17 18 20 22   < > 24* 27*   CREATININE mg/dL 0.87 0.85 0.71* 0.69* 0.73* 0.79 0.70*   < > 1.14 1.28*   EGFR IF NONAFRICN AM mL/min/1.73  --   --   --   --   --   --   --   --  61 53*   EGFR IF AFRICN AM mL/min/1.73  --   --   --   --   --   --   --   --   --  64   CALCIUM mg/dL 8.7 9.4 8.5* 8.5* 8.1* 8.5* 8.2*   < > 9.1 9.9    < > = values in this interval not displayed.     HEPATIC:  Lab Results - Last 18 Months   Lab Units 05/31/23  0437 03/09/23  1033 02/20/23  0322 02/18/23  0333 02/14/23  0645 02/13/23  0230 02/12/23  0226   ALT (SGPT) U/L 8 11 10 11 17 18 21   AST (SGOT) U/L 13 11 16 16 15 13 15   ALK PHOS U/L 56 69 68 65 62 61 59     THYROID:  Lab Results - Last 18 Months   Lab Units 02/14/23  0645 01/16/23  1019 01/05/22  0658   TSH uIU/mL 0.875 2.780 2.620     A1C:  Lab Results - Last 18 Months   Lab Units 02/14/23  0621 01/16/23  1019 07/06/22  0715 01/05/22  0658   HEMOGLOBIN A1C % 7.40* 6.70* 6.00* 6.30*     PSA:  Lab Results - Last 18 Months   Lab Units 01/16/23  1019 01/05/22  0658   PSA ng/mL 1.720 0.983           Objective   /82 (BP Location: Left arm, Patient Position: Sitting, Cuff Size: Adult)   Pulse 75   Temp 97.8 °F (36.6 °C) (Infrared)   Resp 18   Ht 180.3 cm (71\")   Wt 85 kg (187 lb 6.4 oz)   SpO2 92% Comment: 4L  BMI 26.14 kg/m²   Body mass index is 26.14 kg/m².    Wt Readings from " Last 15 Encounters:   06/12/23 1304 85 kg (187 lb 6.4 oz)   05/30/23 0550 84.9 kg (187 lb 1.6 oz)   04/26/23 0859 84.8 kg (187 lb)   03/22/23 1340 83 kg (183 lb)   03/14/23 0852 81.6 kg (180 lb)   03/09/23 1043 81.2 kg (179 lb)   02/27/23 0300 78.5 kg (173 lb)   02/19/23 2100 73.7 kg (162 lb 6.4 oz)   02/17/23 1129 75.4 kg (166 lb 3.6 oz)   02/17/23 0400 75.4 kg (166 lb 4.8 oz)   02/16/23 0600 76.2 kg (167 lb 15.9 oz)   02/13/23 0800 86.6 kg (191 lb)   02/10/23 0400 87 kg (191 lb 12.8 oz)   02/09/23 0400 86.6 kg (190 lb 14.7 oz)   02/07/23 0854 83.9 kg (185 lb)   01/26/23 0929 90.7 kg (200 lb)   01/24/23 1004 88.5 kg (195 lb)   01/18/23 0852 90.3 kg (199 lb)   12/15/22 0823 91.6 kg (202 lb)   11/15/22 0931 95.7 kg (211 lb)   10/26/22 1048 95.7 kg (211 lb)   10/17/22 1310 95.7 kg (211 lb)         Physical Exam  GENERAL:  Well nourished/developed in no acute distress.   SKIN: Turgor ok without wound, rash, lesion  HEENT: Normal cephalic without trauma.  Pupils equal round reactive to light. Extraocular motions full without nystagmus.    No thyroidmegaly, mass, tenderness, lymphadenopathy and supple.  CV: Regular rhythm.  No murmur, gallop,  edema. Posterior pulses intact.  No carotid bruits.  CHEST: No chest wall tenderness or mass.   LUNGS: Symmetric motion with clear/decreased to auscultation.   ABD: Soft, nontender without mass.   ORTHO: Symmetric extremities without swelling/point tenderness.  Full gross range of motion except hips reduced.    NEURO: CN 2-12 grossly intact.  Symmetric facies.   Nonfocal use extremities. Speech clear.  PSYCH: Oriented x 3.  Pleasant calm, well kept.  Purposeful/directed conservation with intact short/long gross memory.       Assessment & Plan     1. Diarrhea, unspecified type    2. Clostridium difficile infection    3. Rectal bleeding    4. Anticoagulated-CAD, DM2, CVA/ASA 81+()+plavix » Anticoagulated-CAD, DM2, CVA/ASA 81+()+plavix+eliquist    5. Anemia, unspecified  type        Rx: reviewed/changes:  New Medications Ordered This Visit   Medications    saccharomyces boulardii (Florastor) 250 MG capsule     Sig: Take 1 capsule by mouth 2 (Two) Times a Day.     Dispense:  60 capsule     Refill:  5     LAB/Testing/Referrals: reviewed/orders:   Today:   Orders Placed This Encounter   Procedures    Basic Metabolic Panel    Iron Profile    CBC & Differential     Usual:   same    Discussions:   Pros and cons on the consideration for his C. difficile being colonization versus infection; at least he is not bleeding and his stools are down to approximately 5 a day-he is willing to accept this idea and forego any treatment for C. difficile unless things worsen  Maybe probiotic will help  Report any worsening    Body mass index is 26.14 kg/m².      Non-smoker  Gonzalo Durham  reports that he quit smoking about 43 years ago. His smoking use included cigarettes. He started smoking about 69 years ago. He has a 26.00 pack-year smoking history. He has never used smokeless tobacco..     There are no Patient Instructions on file for this visit.    Follow up: Return for lab today; then lab/Dr Romero as planned.  Future Appointments   Date Time Provider Department Center   6/14/2023 10:20 AM Danie Cadena MD [facility] PAD PAD   7/11/2023  9:00 AM Erika Elena APRN [facility] PAD PAD   7/14/2023  9:00 AM Ronnie Ugarte APRN [facility] PAD PAD   7/20/2023  8:50 AM [facility] [place] [facility]   7/27/2023  9:30 AM Feliz Frye APRN [facility]AD PAD   7/27/2023  1:45 PM Abel Romero MD [facility] METR PAD   1/9/2024  9:30 AM PAD CT 2 BH PAD CAT PAD   1/9/2024 10:00 AM [facility] US [facility] CART 2 BH PAD US PAD   1/9/2024 11:00 AM Nasir Gutierrez DO [facility] PAD PAD              Current outpatient and discharge medications have been reconciled for the patient.    Transitional Care Management Certification  I certify that the following are true:  Communication was made within 2  business days of discharge.  Complexity of Medical Decision Making is high.  Face to face visit occurred within 12 days.    *Note: 99916 is for high complexity patients with a face to face visit within 7 days of discharge.  33490 is for high complexity patients with a face to face on days 8-14 post discharge or medium complexity with face to face visit within 14 days post discharge.

## 2023-06-13 LAB
BASOPHILS # BLD AUTO: 0.05 10*3/MM3 (ref 0–0.2)
BASOPHILS NFR BLD AUTO: 0.5 % (ref 0–1.5)
BUN SERPL-MCNC: 27 MG/DL (ref 8–23)
BUN/CREAT SERPL: 24.3 (ref 7–25)
CALCIUM SERPL-MCNC: 10.2 MG/DL (ref 8.6–10.5)
CHLORIDE SERPL-SCNC: 103 MMOL/L (ref 98–107)
CO2 SERPL-SCNC: 28.2 MMOL/L (ref 22–29)
CREAT SERPL-MCNC: 1.11 MG/DL (ref 0.76–1.27)
EGFRCR SERPLBLD CKD-EPI 2021: 63.5 ML/MIN/1.73
EOSINOPHIL # BLD AUTO: 0.32 10*3/MM3 (ref 0–0.4)
EOSINOPHIL NFR BLD AUTO: 3.5 % (ref 0.3–6.2)
ERYTHROCYTE [DISTWIDTH] IN BLOOD BY AUTOMATED COUNT: 13.5 % (ref 12.3–15.4)
GLUCOSE SERPL-MCNC: 169 MG/DL (ref 65–99)
HCT VFR BLD AUTO: 35 % (ref 37.5–51)
HGB BLD-MCNC: 11.2 G/DL (ref 13–17.7)
IMM GRANULOCYTES # BLD AUTO: 0.03 10*3/MM3 (ref 0–0.05)
IMM GRANULOCYTES NFR BLD AUTO: 0.3 % (ref 0–0.5)
IRON SATN MFR SERPL: 14 % (ref 20–50)
IRON SERPL-MCNC: 56 MCG/DL (ref 59–158)
LYMPHOCYTES # BLD AUTO: 1.89 10*3/MM3 (ref 0.7–3.1)
LYMPHOCYTES NFR BLD AUTO: 20.4 % (ref 19.6–45.3)
MCH RBC QN AUTO: 28 PG (ref 26.6–33)
MCHC RBC AUTO-ENTMCNC: 32 G/DL (ref 31.5–35.7)
MCV RBC AUTO: 87.5 FL (ref 79–97)
MONOCYTES # BLD AUTO: 0.61 10*3/MM3 (ref 0.1–0.9)
MONOCYTES NFR BLD AUTO: 6.6 % (ref 5–12)
NEUTROPHILS # BLD AUTO: 6.36 10*3/MM3 (ref 1.7–7)
NEUTROPHILS NFR BLD AUTO: 68.7 % (ref 42.7–76)
NRBC BLD AUTO-RTO: 0 /100 WBC (ref 0–0.2)
PLATELET # BLD AUTO: 325 10*3/MM3 (ref 140–450)
POTASSIUM SERPL-SCNC: 4.4 MMOL/L (ref 3.5–5.2)
RBC # BLD AUTO: 4 10*6/MM3 (ref 4.14–5.8)
SODIUM SERPL-SCNC: 139 MMOL/L (ref 136–145)
TIBC SERPL-MCNC: 404 MCG/DL
UIBC SERPL-MCNC: 348 MCG/DL (ref 112–346)
WBC # BLD AUTO: 9.26 10*3/MM3 (ref 3.4–10.8)

## 2023-06-14 ENCOUNTER — PROCEDURE VISIT (OUTPATIENT)
Dept: UROLOGY | Facility: CLINIC | Age: 88
End: 2023-06-14
Payer: MEDICARE

## 2023-06-14 DIAGNOSIS — Z85.51 HISTORY OF BLADDER CANCER: Primary | ICD-10-CM

## 2023-06-14 LAB
BILIRUB BLD-MCNC: NEGATIVE MG/DL
CLARITY, POC: CLEAR
COLOR UR: YELLOW
GLUCOSE UR STRIP-MCNC: NEGATIVE MG/DL
KETONES UR QL: NEGATIVE
LEUKOCYTE EST, POC: NEGATIVE
NITRITE UR-MCNC: NEGATIVE MG/ML
PH UR: 5 [PH] (ref 5–8)
PROT UR STRIP-MCNC: NEGATIVE MG/DL
RBC # UR STRIP: ABNORMAL /UL
SP GR UR: 1.01 (ref 1–1.03)
UROBILINOGEN UR QL: ABNORMAL

## 2023-06-14 RX ORDER — BUDESONIDE, GLYCOPYRROLATE, AND FORMOTEROL FUMARATE 160; 9; 4.8 UG/1; UG/1; UG/1
AEROSOL, METERED RESPIRATORY (INHALATION)
Qty: 10.7 G | Refills: 11 | Status: SHIPPED | OUTPATIENT
Start: 2023-06-14

## 2023-06-14 NOTE — PROGRESS NOTES
Pre- operative diagnosis:  Bladder cancer surveillance. 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  He remains on finasteride and Hytrin.  Cystoscopy last visit June 2022 demonstrated small papillary tumors present on his intravesical prostate lobe.  He denies hematuria or bothersome LUTS    Post operative diagnosis:  Bladder Tumor    Procedure:  The patient was prepped and draped in a normal sterile fashion.  The urethra was anesthetized with 2% lidocaine jelly.  A flexible cystoscope was introduced per urethra.      Urethra:  Normal    Bladder:  Normal mucosa and mild trabeculation.  There are small papillary bladder tumors present on his intravesical prostate lobe urothelium.  These appear to be overall stable from 6 months ago    Ureteral orifices:  Normal position bilaterally and Clear efflux bilaterally    Prostate:  lateral lobe hypertrophy-with large intravesical lobe    Patient tolerated the procedure well    Complications: none    Blood loss: minimal    Follow up:    Routine follow up-surveillance cystoscopy in 6 months.  Given  his advanced age and oxygen requirement along with the very small size and stability of his papillary bladder tumors on his intravesical lobe, patient would like to observe for now.  He will follow-up with me for surveillance cystoscopy in 6-month or sooner as needed.

## 2023-06-14 NOTE — LETTER
June 14, 2023     Abel Romero MD  [address] [facility] [address]    Patient: Gonzalo Durham   YOB: 1934   Date of Visit: 6/14/2023     Dear Dr. Nick MD:    Thank you for referring Gonzalo Durham to me for evaluation. Below are the relevant portions of my assessment and plan of care.    If you have questions, please do not hesitate to call me. I look forward to following Gonzalo along with you.         Sincerely,        Danie Cadena MD        CC: No Recipients      Progress Notes:  Pre- operative diagnosis:  Bladder cancer surveillance. 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  He remains on finasteride and Hytrin.  Cystoscopy last visit June 2022 demonstrated small papillary tumors present on his intravesical prostate lobe.  He denies hematuria or bothersome LUTS    Post operative diagnosis:  Bladder Tumor    Procedure:  The patient was prepped and draped in a normal sterile fashion.  The urethra was anesthetized with 2% lidocaine jelly.  A flexible cystoscope was introduced per urethra.      Urethra:  Normal    Bladder:  Normal mucosa and mild trabeculation.  There are small papillary bladder tumors present on his intravesical prostate lobe urothelium.  These appear to be overall stable from 6 months ago    Ureteral orifices:  Normal position bilaterally and Clear efflux bilaterally    Prostate:  lateral lobe hypertrophy-with large intravesical lobe    Patient tolerated the procedure well    Complications: none    Blood loss: minimal    Follow up:    Routine follow up-surveillance cystoscopy in 6 months.  Given  his advanced age and oxygen requirement along with the very small size and stability of his papillary bladder tumors on his intravesical lobe, patient would like to observe for now.  He will follow-up with me for surveillance cystoscopy in 6-month or sooner as needed.

## 2023-07-27 ENCOUNTER — OFFICE VISIT (OUTPATIENT)
Dept: FAMILY MEDICINE CLINIC | Facility: CLINIC | Age: 88
End: 2023-07-27
Payer: MEDICARE

## 2023-07-27 ENCOUNTER — OFFICE VISIT (OUTPATIENT)
Dept: PULMONOLOGY | Facility: CLINIC | Age: 88
End: 2023-07-27
Payer: MEDICARE

## 2023-07-27 VITALS
HEIGHT: 71 IN | WEIGHT: 195 LBS | SYSTOLIC BLOOD PRESSURE: 122 MMHG | RESPIRATION RATE: 18 BRPM | HEART RATE: 83 BPM | TEMPERATURE: 98 F | DIASTOLIC BLOOD PRESSURE: 78 MMHG | BODY MASS INDEX: 27.3 KG/M2 | OXYGEN SATURATION: 93 %

## 2023-07-27 VITALS
DIASTOLIC BLOOD PRESSURE: 70 MMHG | BODY MASS INDEX: 26.74 KG/M2 | HEIGHT: 71 IN | OXYGEN SATURATION: 93 % | HEART RATE: 74 BPM | WEIGHT: 191 LBS | SYSTOLIC BLOOD PRESSURE: 126 MMHG

## 2023-07-27 DIAGNOSIS — E78.5 HYPERLIPIDEMIA LDL GOAL <70: Chronic | ICD-10-CM

## 2023-07-27 DIAGNOSIS — N18.31 STAGE 3A CHRONIC KIDNEY DISEASE: Chronic | ICD-10-CM

## 2023-07-27 DIAGNOSIS — J44.9 STAGE 2 MODERATE COPD BY GOLD CLASSIFICATION: Primary | Chronic | ICD-10-CM

## 2023-07-27 DIAGNOSIS — K21.9 GASTROESOPHAGEAL REFLUX DISEASE, UNSPECIFIED WHETHER ESOPHAGITIS PRESENT: ICD-10-CM

## 2023-07-27 DIAGNOSIS — I10 PRIMARY HYPERTENSION: Chronic | ICD-10-CM

## 2023-07-27 DIAGNOSIS — E11.59 CONTROLLED TYPE 2 DIABETES MELLITUS WITH OTHER CIRCULATORY COMPLICATION, WITHOUT LONG-TERM CURRENT USE OF INSULIN: Chronic | ICD-10-CM

## 2023-07-27 DIAGNOSIS — Z79.899 POLYPHARMACY: ICD-10-CM

## 2023-07-27 DIAGNOSIS — Z79.01 CHRONIC ANTICOAGULATION: Chronic | ICD-10-CM

## 2023-07-27 DIAGNOSIS — Z00.00 WELLNESS EXAMINATION: ICD-10-CM

## 2023-07-27 DIAGNOSIS — N40.0 PROSTATISM: Chronic | ICD-10-CM

## 2023-07-27 DIAGNOSIS — Z77.090 HISTORY OF ASBESTOS EXPOSURE: Chronic | ICD-10-CM

## 2023-07-27 DIAGNOSIS — G47.33 OBSTRUCTIVE SLEEP APNEA: ICD-10-CM

## 2023-07-27 DIAGNOSIS — D64.9 ANEMIA, UNSPECIFIED TYPE: ICD-10-CM

## 2023-07-27 DIAGNOSIS — I70.90 ATHEROSCLEROSIS: ICD-10-CM

## 2023-07-27 DIAGNOSIS — R19.7 DIARRHEA, UNSPECIFIED TYPE: ICD-10-CM

## 2023-07-27 DIAGNOSIS — J96.11 CHRONIC RESPIRATORY FAILURE WITH HYPOXIA: Chronic | ICD-10-CM

## 2023-07-27 PROCEDURE — 99213 OFFICE O/P EST LOW 20 MIN: CPT | Performed by: NURSE PRACTITIONER

## 2023-07-27 PROCEDURE — 1160F RVW MEDS BY RX/DR IN RCRD: CPT | Performed by: NURSE PRACTITIONER

## 2023-07-27 PROCEDURE — 1159F MED LIST DOCD IN RCRD: CPT | Performed by: NURSE PRACTITIONER

## 2023-07-27 RX ORDER — CHOLESTYRAMINE LIGHT 4 G/5.7G
4 POWDER, FOR SUSPENSION ORAL 2 TIMES DAILY
COMMUNITY

## 2023-07-27 NOTE — PROGRESS NOTES
The ABCs of the Annual Wellness Visit  Subsequent Medicare Wellness Visit    Subjective    Gonzalo Durham is a 89 y.o. male who presents for a Subsequent Medicare Wellness Visit.    The following portions of the patient's history were reviewed and   updated as appropriate: allergies, current medications, past family history, past medical history, past social history, past surgical history, and problem list.    Compared to one year ago, the patient feels his physical   health is the same.    Compared to one year ago, the patient feels his mental   health is the same.    Recent Hospitalizations:  This patient has had a [facility] admission record on file within the last 365 days.    Current Medical Providers:  Patient Care Team:  Abel Romero MD as PCP - General (Family Medicine)  Dawson Claros MD (Inactive) as Consulting Physician (Urology)  Abel Romero MD as Referring Physician (Family Medicine)  Bradley Carver MD as Cardiologist (Cardiology)  Sly Ahn MD as Consulting Physician (Gastroenterology)  Abel Romero MD as Referring Physician (Family Medicine)  Juan Lane MD as Consulting Physician (Otolaryngology)  Feliz Frye APRN as Nurse Practitioner (Pulmonary Disease)  Omid (DME Services)  Danie Cadena MD as Consulting Physician (Urology)  Teo Jha MD as Consulting Physician (Pulmonary Disease)    Outpatient Medications Prior to Visit   Medication Sig Dispense Refill    acetaminophen (TYLENOL) 325 MG tablet Take 2 tablets by mouth 2 (Two) Times a Day. 360 tablet 2    albuterol sulfate  (90 Base) MCG/ACT inhaler Inhale 2 puffs Every 6 (Six) Hours As Needed for Wheezing. Indications: Spasm of Lung Air Passages      ascorbic acid (VITAMIN C) 500 MG tablet Take 1 tablet by mouth 2 (Two) Times a Day. Indications: Inadequate Vitamin C      aspirin 81 MG EC tablet Take 1 tablet by mouth Daily. Indications: Acute Heart Attack       Breztri Aerosphere 160-9-4.8 MCG/ACT aerosol inhaler USE 2 INHALATIONS TWICE A DAY 10.7 g 11    budesonide (PULMICORT) 0.5 MG/2ML nebulizer solution Take 2 mL by nebulization 2 (Two) Times a Day. Indications: Chronic Obstructive Lung Disease 90 mL 3    calcium carbonate, oyster shell, 500 MG tablet tablet Take 1 tablet by mouth Daily.      cholestyramine (QUESTRAN) 4 g packet Take 1 packet by mouth 3 (Three) Times a Day With Meals. 90 packet 5    cholestyramine light 4 g packet Take 1 packet by mouth 2 (Two) Times a Day.      docusate sodium 100 MG capsule Take 1 capsule by mouth 2 (Two) Times a Day.      Eliquis 5 MG tablet tablet TAKE 1 TABLET TWICE A  tablet 3    ferrous sulfate 325 (65 Fe) MG tablet Take 1 tablet by mouth Daily With Breakfast. Indications: Anemia From Inadequate Iron in the Body      finasteride (PROSCAR) 5 MG tablet TAKE 1 TABLET DAILY 90 tablet 3    furosemide (LASIX) 20 MG tablet TAKE 1 TABLET EVERY 12 HOURS FOR EDEMA 90 tablet 7    glyburide (DIAbeta) 5 MG tablet TAKE 1 TABLET EVERY MORNING AND ONE-HALF (1/2) TABLET BEFORE SUPPER 135 tablet 3    guaiFENesin (MUCINEX) 600 MG 12 hr tablet Take 1 tablet by mouth 2 (Two) Times a Day. Indications: Cough      hydroCHLOROthiazide (MICROZIDE) 12.5 MG capsule Take 1 capsule by mouth Daily. 90 capsule 3    isosorbide mononitrate (IMDUR) 30 MG 24 hr tablet Take 1 tablet by mouth Daily. Indications: Stable Angina Pectoris      Lactobacillus (PROBIOTIC ACIDOPHILUS PO) Take  by mouth.      loratadine (CLARITIN) 10 MG tablet TAKE 1 TABLET DAILY 90 tablet 3    melatonin 3 MG tablet Take 1 tablet by mouth At Night As Needed for Sleep.      metFORMIN (GLUCOPHAGE) 500 MG tablet TAKE 1 TABLET EVERY NIGHT 90 tablet 3    metoprolol tartrate (LOPRESSOR) 25 MG tablet Take 0.5 tablets by mouth 2 (Two) Times a Day. Indications: High Blood Pressure Disorder      Multiple Vitamins-Minerals (MULTIVITAMIN & MINERAL PO) Take 1 tablet by mouth Daily. Indications:  vit      nitroglycerin (Nitrostat) 0.4 MG SL tablet Place 1 tablet under the tongue Every 5 (Five) Minutes As Needed for Chest Pain. 90 tablet 3    pantoprazole (PROTONIX) 40 MG EC tablet TAKE 1 TABLET DAILY 90 tablet 3    polyethylene glycol 17 g packet Take 17 g by mouth Daily.      potassium chloride ER (K-TAB) 20 MEQ tablet controlled-release ER tablet Take 1 tablet by mouth 2 (Two) Times a Day With Meals. Indications: Low Amount of Potassium in the Blood 180 tablet 1    pravastatin (PRAVACHOL) 80 MG tablet TAKE 1 TABLET DAILY 90 tablet 3    Probiotic Product (PROBIOTIC ADVANCED PO) Take  by mouth.      saccharomyces boulardii (Florastor) 250 MG capsule Take 1 capsule by mouth 2 (Two) Times a Day. 60 capsule 5    saline (AYR) gel nasal gel Apply 1 application  topically to the appropriate area as directed As Needed (epistaxis). 1 each 0    terazosin (HYTRIN) 10 MG capsule TAKE 1 CAPSULE DAILY 90 capsule 3     No facility-administered medications prior to visit.       No opioid medication identified on active medication list. I have reviewed chart for other potential  high risk medication/s and harmful drug interactions in the elderly.        Aspirin is on active medication list. Aspirin use is indicated based on review of current medical condition/s. Pros and cons of this therapy have been discussed today. Benefits of this medication outweigh potential harm.  Patient has been encouraged to continue taking this medication.  .      Patient Active Problem List   Diagnosis    Wellness examination-done    Obesity    Controlled type 2 diabetes mellitus with circulatory disorder, without long-term current use of insulin    Stage 3a chronic kidney disease    Erectile dysfunction    Coronary artery disease involving native coronary artery of native heart without angina pectoris    Hyperlipidemia LDL goal <70-statin    Hypertension    Prostatism-(young) urology    Tremor    Varicose vein of leg    Plantar fasciitis     "Nocturnal leg movements    Bad dreams    Depression    Peripheral neuropathy- clinical    Hx of colonic polyps    Gastroesophageal reflux disease    Left lower quadrant pain    Laboratory test*    Anticoagulated-CAD, DM2, CVA/ASA 81+()+plavix » Anticoagulated-CAD, DM2, CVA/ASA 81+()+plavix+eliquist    SOB: copd,CAD,#, hypoxemia    Hypoxia#to pulmonary    Corn or callus    Anemia: ASA81,plavix,eliquis,gi(3/3+,div,cp,eitis    Atherosclerosis: CAD, AAA vascular    AAA: 2022-(ro) steffen    Heme positive stool    Stage 2 moderate COPD by GOLD classification    Abnormal stress test    Tingling of both feet-to consider NCV    Cryptogenic stroke    Chronic diastolic congestive heart failure    Gross hematuria    BPH with obstruction/lower urinary tract symptoms    Chest pain    Obstructive sleep apnea    Abnormal CT of the chest 1.2022/done    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  ACP discussion was held with the patient during this visit. Patient has an advance directive in EMR which is still valid.      Objective    Vitals:    07/27/23 1347   BP: 122/78   BP Location: Left arm   Patient Position: Sitting   Cuff Size: Adult   Pulse: 83   Resp: 18   Temp: 98 °F (36.7 °C)   TempSrc: Temporal   SpO2: 93%   Weight: 88.5 kg (195 lb)   Height: 180.3 cm (71\")   PainSc: 0-No pain     Estimated body mass index is 27.2 kg/m² as calculated from the following:    Height " "as of this encounter: 180.3 cm (71\").    Weight as of this encounter: 88.5 kg (195 lb).           Does the patient have evidence of cognitive impairment? No    Lab Results   Component Value Date    CHLPL 199 2023    TRIG 161 (H) 2023    HDL 37 (L) 2023     (H) 2023    VLDL 29 2023    HGBA1C 6.60 (H) 2023        HEALTH RISK ASSESSMENT    Smoking Status:  Social History     Tobacco Use   Smoking Status Former    Packs/day: 1.00    Years: 26.00    Pack years: 26.00    Types: Cigarettes    Start date:     Quit date:     Years since quittin.5    Passive exposure: Past   Smokeless Tobacco Never     Alcohol Consumption:  Social History     Substance and Sexual Activity   Alcohol Use No     Fall Risk Screen:    PATRICIA Fall Risk Assessment has not been completed.    Depression Screenin/27/2023     1:49 PM   PHQ-2/PHQ-9 Depression Screening   Little Interest or Pleasure in Doing Things 0-->not at all   Feeling Down, Depressed or Hopeless 0-->not at all   PHQ-9: Brief Depression Severity Measure Score 0       Health Habits and Functional and Cognitive Screenin/27/2023     1:00 PM   Functional & Cognitive Status   Do you have difficulty preparing food and eating? No   Do you have difficulty bathing yourself, getting dressed or grooming yourself? No   Do you have difficulty using the toilet? No   Do you have difficulty moving around from place to place? No   Do you have trouble with steps or getting out of a bed or a chair? Yes   Current Diet Well Balanced Diet   Dental Exam Up to date   Eye Exam Up to date   Exercise (times per week) 7 times per week   Current Exercises Include Walking   Do you need help using the phone?  No   Are you deaf or do you have serious difficulty hearing?  Yes   Do you need help to go to places out of walking distance? Yes   Do you need help shopping? Yes   Do you need help preparing meals?  Yes   Do you need help with " housework?  Yes   Do you need help with laundry? Yes   Do you need help taking your medications? Yes   Do you need help managing money? Yes   Do you ever drive or ride in a car without wearing a seat belt? No   Have you felt unusual stress, anger or loneliness in the last month? No   Who do you live with? Alone   If you need help, do you have trouble finding someone available to you? No   Have you been bothered in the last four weeks by sexual problems? No   Do you have difficulty concentrating, remembering or making decisions? Yes       Age-appropriate Screening Schedule:  Refer to the list below for future screening recommendations based on patient's age, sex and/or medical conditions. Orders for these recommended tests are listed in the plan section. The patient has been provided with a written plan.    Health Maintenance   Topic Date Due    TDAP/TD VACCINES (1 - Tdap) Never done    URINE MICROALBUMIN  05/15/2020    COVID-19 Vaccine (3 - Moderna series) 05/26/2021    ANNUAL WELLNESS VISIT  07/11/2023    DIABETIC EYE EXAM  08/16/2023 (Originally 3/12/2021)    ZOSTER VACCINE (2 of 2) 08/04/2023    INFLUENZA VACCINE  10/01/2023    HEMOGLOBIN A1C  01/20/2024    LIPID PANEL  07/20/2024    Pneumococcal Vaccine 65+  Discontinued                CMS Preventative Services Quick Reference  Risk Factors Identified During Encounter  Immunizations Discussed/Encouraged: Influenza, COVID19, and RSV for winter  The above risks/problems have been discussed with the patient.  Pertinent information has been shared with the patient in the After Visit Summary.  An After Visit Summary and PPPS were made available to the patient.    Follow Up:   Next Medicare Wellness visit to be scheduled in 1 year.       Additional E&M Note during same encounter follows:  Patient has multiple medical problems which are significant and separately identifiable that require additional work above and beyond the Medicare Wellness Visit.        E/M  encounter  Subjective   Gonzalo Durham is a 89 y.o. male presenting with chief complaint of:   Chief Complaint   Patient presents with    Medicare Wellness-subsequent     AWV 7.11.22  Last Completed Annual Wellness Visit       This patient has no relevant Health Maintenance data.             History of Present Illness : With morenita.   Here for review of chronic problems that includes HTN and others.  Also yearly medicare wellness exam.    Has multiple chronic problems to consider that might have a bearing on today's issues;  an interval appointment.       Chronic/acute problems reviewed today:   1. Polypharmacy chronic multiple medications with potential for interactions and side effects and need close supervision   2. Hyperlipidemia LDL goal <70-statin Chronic/stable.  Tolerated use of Rx with labs showing improved lipid values and tolerant liver labs. No muscle aches unexpected.      3. Primary hypertension Chronic/stable. Stable here past/and home blood pressures.  No significant chest pain, SOB, LE edema, orthopnea, near syncope, dizziness/light headness.   Recent Vitals         7/14/2023 7/27/2023 7/27/2023       BP: 165/77 126/70 122/78     Pulse: 98 74 83     Temp: 98.4 °F (36.9 °C) -- 98 °F (36.7 °C)     Weight: 83.5 kg (184 lb) 86.6 kg (191 lb) 88.5 kg (195 lb)     BMI (Calculated): 25.3 26.7 27.2              4. Atherosclerosis: CAD, AAA vascular chronic/stable various areas of disease.  [facility] and no plans for upcoming interventions.  Denies development/change in chest pain, claudication, CVA-TIA symptoms such as (Manifest by slurred speech asymmetry of face dysfunction/weakness of extremities).  Blood thinners noted.      5. Anticoagulated-CAD, DM2, CVA/ASA 81+()+plavix » Anticoagulated-CAD, DM2, CVA/ASA 81+()+plavix+eliquist Chronic/stable reason for stopping or use of.  Denies bleeding issues; especially epistaxis, melena, hematochezia.  Upper arms/others do not  significantly bruise easily.  No significant bleeding or falls.      6. Controlled type 2 diabetes mellitus with other circulatory complication, without long-term current use of insulin Chronic/stable. No problem/pattern hypoglycemia/hyperglycemia manifest by poly- dypsia, phagia, uria, or sweats, diaphoretic episodes, syncope/near.     7. Gastroesophageal reflux disease, unspecified whether esophagitis present Chronic/stable.  Controlled heartburn, reflux without dysphagia, melena.  Rx used, periods not used proven currently needed with symptoms -currently doing ok.      8. Stage 3a chronic kidney disease Chronic/stable.  Sees nephrology.  No dysuria.  Previously warned/reminded:   To avoid further kidney function decline  A. Treat any time you think you have infection  B. Stay hydrated (dont get dehydrated); drink at least 60 oz fluid every 24 hr (1800 cc or nearly a 2L)  C. Do not allow any xrays with dye WITHOUT the doctor ordering checking your renal function  D. Do not get new medications without the doctor considering your renal condition  E. Do not use motrin/ibuprofen, alleve/naprosyn and these types of medications     9. Prostatism-(young) urology Chronic/stable.  Slower but tolerated stream with complete emptying.  Nocturia on occ; tolerated.  No desire to persure evaluations/surgery.      10. Wellness examination-done Chronic ongoing over time screening or advice for general health care/wellness.      11. Anemia, unspecified type Chronic or past history/stable: This has been present before.    There has been GI evaulation in the past. There is no current melena, hematochezia. It has been benign to date and stable/treating/watching.  Contributing comorbidities to date: .    Colonoscopy+hem/[facility]//4-7-00  Colonoscopy+yna-jrha-olo/[facility]//11-17-08/3-5y  Colonoscopy+polyp-div/[facility]//10.21.13/5y  EGD-itis///6.15.17  Colon-div///6.15.17/prn  CKD  ASA 81  Eliquis  Iron yrs normal; 48L 7.20.23  Ferritin  always ok; 113N 10.11.22  B12 always ok; 497N 7.20.23  Folate always ok; 19.3 7.20.23  Retic 1.04N 11.14.18  OB + 11.20.18; ok 1.12.21       Has an/another acute issue today: 6/loose stools continue despite seeing GI and questran added (after our probiotic added)    The following portions of the patient's history were reviewed and updated as appropriate: allergies, current medications, past family history, past medical history, past social history, past surgical history, and problem list.      Current Outpatient Medications:     acetaminophen (TYLENOL) 325 MG tablet, Take 2 tablets by mouth 2 (Two) Times a Day., Disp: 360 tablet, Rfl: 2    albuterol sulfate  (90 Base) MCG/ACT inhaler, Inhale 2 puffs Every 6 (Six) Hours As Needed for Wheezing. Indications: Spasm of Lung Air Passages, Disp: , Rfl:     ascorbic acid (VITAMIN C) 500 MG tablet, Take 1 tablet by mouth 2 (Two) Times a Day. Indications: Inadequate Vitamin C, Disp: , Rfl:     aspirin 81 MG EC tablet, Take 1 tablet by mouth Daily. Indications: Acute Heart Attack, Disp: , Rfl:     Breztri Aerosphere 160-9-4.8 MCG/ACT aerosol inhaler, USE 2 INHALATIONS TWICE A DAY, Disp: 10.7 g, Rfl: 11    budesonide (PULMICORT) 0.5 MG/2ML nebulizer solution, Take 2 mL by nebulization 2 (Two) Times a Day. Indications: Chronic Obstructive Lung Disease, Disp: 90 mL, Rfl: 3    calcium carbonate, oyster shell, 500 MG tablet tablet, Take 1 tablet by mouth Daily., Disp: , Rfl:     cholestyramine (QUESTRAN) 4 g packet, Take 1 packet by mouth 3 (Three) Times a Day With Meals., Disp: 90 packet, Rfl: 5    cholestyramine light 4 g packet, Take 1 packet by mouth 2 (Two) Times a Day., Disp: , Rfl:     Eliquis 5 MG tablet tablet, TAKE 1 TABLET TWICE A DAY, Disp: 180 tablet, Rfl: 3    ferrous sulfate 325 (65 Fe) MG tablet, Take 1 tablet by mouth Daily With Breakfast. Indications: Anemia From Inadequate Iron in the Body, Disp: , Rfl:     finasteride (PROSCAR) 5 MG tablet, TAKE 1 TABLET  DAILY, Disp: 90 tablet, Rfl: 3    furosemide (LASIX) 20 MG tablet, TAKE 1 TABLET EVERY 12 HOURS FOR EDEMA, Disp: 90 tablet, Rfl: 7    glyburide (DIAbeta) 5 MG tablet, TAKE 1 TABLET EVERY MORNING AND ONE-HALF (1/2) TABLET BEFORE SUPPER, Disp: 135 tablet, Rfl: 3    guaiFENesin (MUCINEX) 600 MG 12 hr tablet, Take 1 tablet by mouth 2 (Two) Times a Day. Indications: Cough, Disp: , Rfl:     hydroCHLOROthiazide (MICROZIDE) 12.5 MG capsule, Take 1 capsule by mouth Daily., Disp: 90 capsule, Rfl: 3    isosorbide mononitrate (IMDUR) 30 MG 24 hr tablet, Take 1 tablet by mouth Daily. Indications: Stable Angina Pectoris, Disp: , Rfl:     Lactobacillus (PROBIOTIC ACIDOPHILUS PO), Take  by mouth., Disp: , Rfl:     loratadine (CLARITIN) 10 MG tablet, TAKE 1 TABLET DAILY, Disp: 90 tablet, Rfl: 3    melatonin 3 MG tablet, Take 1 tablet by mouth At Night As Needed for Sleep., Disp: , Rfl:     metFORMIN (GLUCOPHAGE) 500 MG tablet, TAKE 1 TABLET EVERY NIGHT, Disp: 90 tablet, Rfl: 3    metoprolol tartrate (LOPRESSOR) 25 MG tablet, Take 0.5 tablets by mouth 2 (Two) Times a Day. Indications: High Blood Pressure Disorder, Disp: , Rfl:     Multiple Vitamins-Minerals (MULTIVITAMIN & MINERAL PO), Take 1 tablet by mouth Daily. Indications: vit, Disp: , Rfl:     nitroglycerin (Nitrostat) 0.4 MG SL tablet, Place 1 tablet under the tongue Every 5 (Five) Minutes As Needed for Chest Pain., Disp: 90 tablet, Rfl: 3    pantoprazole (PROTONIX) 40 MG EC tablet, TAKE 1 TABLET DAILY, Disp: 90 tablet, Rfl: 3    potassium chloride ER (K-TAB) 20 MEQ tablet controlled-release ER tablet, Take 1 tablet by mouth 2 (Two) Times a Day With Meals. Indications: Low Amount of Potassium in the Blood, Disp: 180 tablet, Rfl: 1    pravastatin (PRAVACHOL) 80 MG tablet, TAKE 1 TABLET DAILY, Disp: 90 tablet, Rfl: 3    saccharomyces boulardii (Florastor) 250 MG capsule, Take 1 capsule by mouth 2 (Two) Times a Day., Disp: 60 capsule, Rfl: 5    saline (AYR) gel nasal gel, Apply 1  application  topically to the appropriate area as directed As Needed (epistaxis)., Disp: 1 each, Rfl: 0    terazosin (HYTRIN) 10 MG capsule, TAKE 1 CAPSULE DAILY, Disp: 90 capsule, Rfl: 3    Current Outpatient Medications that could cause diarrhea     metFORMIN (GLUCOPHAGE) 500 MG tablet, TAKE 1 TABLET EVERY NIGHT, Disp: 90 tablet, Rfl: 3    No problems with medications.    Allergies   Allergen Reactions    Symbicort [Budesonide-Formoterol Fumarate] Dizziness     Patient passed out and fell    Prozac [Fluoxetine Hcl] Mental Status Change     Bad dreams.       Review of Systems   GENERAL:  Inactive/slower with limits, speed, samni for age and SOB.  Sleep is ok..   SKIN:  Ongoing callous of feet; no problems with this/other skin today unless above/below.    ENDO:  No syncope, near or diaphoretic sweaty spells.  BS Ok: with download-see correspondence.  HEENT: No head injury, headache.  No vision change.  Same mild hearing loss.  Ears without pain/drainage.  No sore throat.  No significant nasal/sinus congestion/drainage unless uses CPAP; then for couple hours. No epistaxis.  CHEST: No chest wall tenderness or mass. Stable occ cough without productive without wheeze except briefly after CPAP use.   Mild/same SOB; no hemoptysis.  Wears oxygen continous.   CV: No chest pain, palpatations, ankle edema.  GI: No heartburn significant dysphagia.  No abdominal pain, constipation, rectal bleeding, or melena; 6/loose stools day.    :  Voids without dysuria, or incontience to completion.  ORTHO: No painful/swollen joints but various on /off sore.  No change occ/usual sore neck or back.  But no acute neck but above mid back pain without recent injury.   NEURO: No dizziness; diffuse weakness of extremities.  No change some LE numbness/parethesias. Walking often has to hold on; balance problems.   PSYCH: No memory loss.  Mood good; not that anxious, depressed but/and not suicidal.  Tolerated stress.   Screening:  Mammogram:  NA  Bone density: NA  Low dose CT chest: Tobacco-smoker/age 22/1 ppd/dc 1980: (22): NA (had CT angio)  IMPRESSION: CT chest with/BH/1.10.22  1. Stable 6 mm subpleural right lower lobe nodule. Stable for 3.5 years.  No additional workup needed.  2. Linear atelectasis or scarring in both lower lobes. Calcified  granulomas. Centrilobular emphysema.  3. Linear secretions in the distal trachea.  4. 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  Dilated pulmonary arteries.  5. Fatty infiltration of the liver.       GI: Colon-div/[facility]/6.15.17/prn  Prostate: chin confirmed 7.27.23  chin confirmed 7.11.22  urology/confirmed 7.8.21  [place]/confirmed 11.22.19  Keisha in past   Usual lab order  6m CBC, BMP, A1c  12m CBC, CMP, A1c, TSH, LIPID, PSAs, Vit D    Copy/paste function used for ROS/exam AND each area of these were reviewed, updated, confirmed and supplemented as needed.  Data reviewed:   Recent admit/ER/MD visits: 6.12.23    1. Diarrhea, unspecified type    2. Clostridium difficile infection    3. Rectal bleeding    4. Anticoagulated-CAD, DM2, CVA/ASA 81+()+plavix » Anticoagulated-CAD, DM2, CVA/ASA 81+()+plavix+eliquist    5. Anemia, unspecified type          Rx: reviewed/changes:       New Medications Ordered This Visit   Medications    saccharomyces boulardii (Florastor) 250 MG capsule       Sig: Take 1 capsule by mouth 2 (Two) Times a Day.       Dispense:  60 capsule       Refill:  5      LAB/Testing/Referrals: reviewed/orders:   Today:       Orders Placed This Encounter   Procedures    Basic Metabolic Panel    Iron Profile    CBC & Differential      Usual:   same     Discussions:   Pros and cons on the consideration for his C. difficile being colonization versus infection; at least he is not bleeding and his stools are down to approximately 5 a day-he is willing to accept this idea and forego any treatment for C. difficile unless things worsen  Maybe probiotic will help  Report  any worsening    Last cardiac testing:   Echo: Results for orders placed during the hospital encounter of 10/26/22    Adult Transthoracic Echo Complete w/ Color, Spectral and Contrast if necessary per protocol    Interpretation Summary    Left ventricular systolic function is normal. Left ventricular ejection fraction appears to be 56 - 60%.    Left ventricular diastolic function was normal.    Abnormal global longitudinal LV strain (GLS) = -11.0%    Estimated right ventricular systolic pressure from tricuspid regurgitation is normal (<35 mmHg).      Radiology considered:   No radiology results for the last 90 days.      Lab Results:  Results for orders placed or performed in visit on 07/20/23   Comprehensive Metabolic Panel   Result Value Ref Range    Glucose 96 65 - 99 mg/dL    BUN 21 8 - 23 mg/dL    Creatinine 1.18 0.76 - 1.27 mg/dL    EGFR Result 59.0 (L) >60.0 mL/min/1.73    BUN/Creatinine Ratio 17.8 7.0 - 25.0    Sodium 142 136 - 145 mmol/L    Potassium 4.7 3.5 - 5.2 mmol/L    Chloride 106 98 - 107 mmol/L    Total CO2 25.8 22.0 - 29.0 mmol/L    Calcium 10.0 8.6 - 10.5 mg/dL    Total Protein 6.7 6.0 - 8.5 g/dL    Albumin 4.4 3.5 - 5.2 g/dL    Globulin 2.3 gm/dL    A/G Ratio 1.9 g/dL    Total Bilirubin <0.2 0.0 - 1.2 mg/dL    Alkaline Phosphatase 63 39 - 117 U/L    AST (SGOT) 12 1 - 40 U/L    ALT (SGPT) 13 1 - 41 U/L   Lipid Panel   Result Value Ref Range    Total Cholesterol 199 0 - 200 mg/dL    Triglycerides 161 (H) 0 - 150 mg/dL    HDL Cholesterol 37 (L) 40 - 60 mg/dL    VLDL Cholesterol Kieran 29 5 - 40 mg/dL    LDL Chol Calc (NIH) 133 (H) 0 - 100 mg/dL   Hemoglobin A1c   Result Value Ref Range    Hemoglobin A1C 6.60 (H) 4.80 - 5.60 %   Folate   Result Value Ref Range    Folate 19.30 4.78 - 24.20 ng/mL   TSH   Result Value Ref Range    TSH 2.420 0.270 - 4.200 uIU/mL   PSA DIAGNOSTIC   Result Value Ref Range    PSA 1.050 0.000 - 4.000 ng/mL   Vitamin D,25-Hydroxy   Result Value Ref Range    25 Hydroxy, Vitamin D  36.1 30.0 - 100.0 ng/ml   Iron   Result Value Ref Range    Iron 48 (L) 59 - 158 mcg/dL   Vitamin B12   Result Value Ref Range    Vitamin B-12 497 211 - 946 pg/mL   CBC w AUTO Differential    Specimen: Blood   Result Value Ref Range    WBC 8.61 3.40 - 10.80 10*3/mm3    RBC 4.19 4.14 - 5.80 10*6/mm3    Hemoglobin 11.6 (L) 13.0 - 17.7 g/dL    Hematocrit 36.6 (L) 37.5 - 51.0 %    MCV 87.4 79.0 - 97.0 fL    MCH 27.7 26.6 - 33.0 pg    MCHC 31.7 31.5 - 35.7 g/dL    RDW 14.3 12.3 - 15.4 %    Platelets 275 140 - 450 10*3/mm3    Neutrophil Rel % 62.6 42.7 - 76.0 %    Lymphocyte Rel % 22.8 19.6 - 45.3 %    Monocyte Rel % 8.9 5.0 - 12.0 %    Eosinophil Rel % 4.5 0.3 - 6.2 %    Basophil Rel % 0.7 0.0 - 1.5 %    Neutrophils Absolute 5.39 1.70 - 7.00 10*3/mm3    Lymphocytes Absolute 1.96 0.70 - 3.10 10*3/mm3    Monocytes Absolute 0.77 0.10 - 0.90 10*3/mm3    Eosinophils Absolute 0.39 0.00 - 0.40 10*3/mm3    Basophils Absolute 0.06 0.00 - 0.20 10*3/mm3    Immature Granulocyte Rel % 0.5 0.0 - 0.5 %    Immature Grans Absolute 0.04 0.00 - 0.05 10*3/mm3    nRBC 0.0 0.0 - 0.2 /100 WBC       A1C:  Lab Results - Last 18 Months   Lab Units 07/20/23  1234 02/14/23  0621 01/16/23  1019 07/06/22  0715   HEMOGLOBIN A1C % 6.60* 7.40* 6.70* 6.00*     GLUCOSE:  Lab Results - Last 18 Months   Lab Units 07/20/23  1234 06/12/23  1243 05/31/23  0437 03/09/23  1033 03/02/23  0441 02/28/23  0412 02/27/23  0614   GLUCOSE mg/dL 96 169* 112* 150* 113* 155* 118*     LIPID:  Lab Results - Last 18 Months   Lab Units 07/20/23  1234 02/14/23  0645 01/16/23  1019   CHOLESTEROL mg/dL 199  --  176   LDL CHOL mg/dL 133* 69 114*   HDL CHOL mg/dL 37* 43 36*   TRIGLYCERIDES mg/dL 161* 46 145     PSA:  Lab Results   Component Value Date/Time    PSA 1.050 07/20/2023 12:34 PM    PSA 1.720 01/16/2023 10:19 AM    PSA 0.983 01/05/2022 06:58 AM    PSA 1.040 01/05/2021 07:32 AM    PSA 1.100 05/15/2019 07:08 AM    PSA 1.230 11/08/2018 01:28 PM    PSA 1.240 09/08/2017 10:50 AM        CBC:  Lab Results - Last 18 Months   Lab Units 07/20/23  1234 06/12/23  1243 05/31/23  0437 05/30/23  2129 05/30/23  1413 05/30/23  0835 03/09/23  1033 03/02/23  0441 02/27/23  0614 02/07/23  0936 01/16/23  1019 10/11/22  0849 08/08/22  1010 07/06/22  0715   WBC 10*3/mm3 8.61 9.26 8.44  --   --  9.38 6.72 5.90 7.18   < > 8.16 9.59 11.28* 7.20   HEMOGLOBIN g/dL 11.6* 11.2* 10.3* 9.5* 10.8* 10.1* 10.8* 9.5* 9.5*   < > 11.6* 11.6* 10.9* 11.5*   HEMATOCRIT % 36.6* 35.0* 33.4* 31.5* 35.9* 34.9* 33.9* 31.6* 30.9*   < > 37.2* 36.3* 33.9* 34.5*   PLATELETS 10*3/mm3 275 325 233  --   --  254 435 180 203   < > 226 239 284 231   IRON mcg/dL 48* 56*  --   --   --   --  63  --   --   --  75 65 64 112   VITAMIN B 12 pg/mL 497  --   --   --   --   --  573  --   --   --  601  --   --  557   FOLATE ng/mL 19.30  --   --   --   --   --  17.40  --   --   --  >20.00  --   --  >20.00    < > = values in this interval not displayed.     BMP/CMP:  Lab Results - Last 18 Months   Lab Units 07/20/23  1234 06/12/23  1243 05/31/23  0437 03/09/23  1033 03/02/23  0441 02/28/23  0412 02/27/23  0614 01/24/23  0838 01/16/23  1019 10/11/22  0849 08/08/22  1010 07/21/22  0750 07/06/22  0715 02/20/22  0405   0000   SODIUM mmol/L 142 139 143 140 140 137 138   < > 139   < > 142  --  140 143   < >   POTASSIUM mmol/L 4.7 4.4 3.9 4.7 3.8 3.4* 3.2*   < > 4.5   < > 4.3  --  4.7 4.3   < >   CHLORIDE mmol/L 106 103 110* 100 101 98 98   < > 101   < > 104  --  102 105   < >   CO2 mmol/L 25.8 28.2 26.0 28.2 30.0* 28.0 32.0*   < > 30.2*   < > 29.5*  --  29.7* 29.0   < >   GLUCOSE mg/dL 96 169* 112* 150* 113* 155* 118*   < > 119*   < > 108*  --  77 112*   < >   BUN mg/dL 21 27* 15 23 18 17 18   < > 21   < > 21  --  22 24*   < >   CREATININE mg/dL 1.18 1.11 0.87 0.85 0.71* 0.69* 0.73*   < > 1.20   < > 0.95   < > 1.26 1.14   < >   EGFR IF NONAFRICN AM mL/min/1.73  --   --   --   --   --   --   --   --   --   --   --   --   --  61  --    EGFR RESULT mL/min/1.73  "59.0* 63.5  --  83.1  --   --   --   --  58.2*  --  77.0  --  54.9*  --   --    CALCIUM mg/dL 10.0 10.2 8.7 9.4 8.5* 8.5* 8.1*   < > 9.7   < > 9.5  --  9.7 9.1   < >    < > = values in this interval not displayed.       HEPATIC:  Lab Results - Last 18 Months   Lab Units 07/20/23  1234 05/31/23  0437 03/09/23  1033 02/20/23  0322 02/18/23  0333 02/14/23  0645 02/13/23  0230   ALT (SGPT) U/L 13 8 11 10 11 17 18   AST (SGOT) U/L 12 13 11 16 16 15 13   ALK PHOS U/L 63 56 69 68 65 62 61     HEPATITIS C ANTIBODY: No results found for: HEPCVIRUSABY  Vit D:  Lab Results - Last 18 Months   Lab Units 07/20/23  1234 01/16/23  1019   VIT D 25 HYDROXY ng/ml 36.1 54.3     THYROID:  Lab Results - Last 18 Months   Lab Units 07/20/23  1234 02/14/23  0645 01/16/23  1019   TSH uIU/mL 2.420 0.875 2.780       Objective   /78 (BP Location: Left arm, Patient Position: Sitting, Cuff Size: Adult)   Pulse 83   Temp 98 °F (36.7 °C) (Temporal)   Resp 18   Ht 180.3 cm (71\")   Wt 88.5 kg (195 lb)   SpO2 93%   BMI 27.20 kg/m²       Recent Vitals         7/11/2023 7/14/2023 7/27/2023       BP: 122/62 165/77 126/70     Pulse: 55 98 74     Temp: 98 °F (36.7 °C) 98.4 °F (36.9 °C) --     Weight: 87.1 kg (192 lb) 83.5 kg (184 lb) 86.6 kg (191 lb)     BMI (Calculated): 26.4 25.3 26.7           Wt Readings from Last 15 Encounters:   07/27/23 1347 88.5 kg (195 lb)   07/27/23 0922 86.6 kg (191 lb)   07/14/23 0920 83.5 kg (184 lb)   07/11/23 0851 87.1 kg (192 lb)   07/10/23 0828 86.6 kg (191 lb)   06/12/23 1304 85 kg (187 lb 6.4 oz)   05/30/23 0550 84.9 kg (187 lb 1.6 oz)   04/26/23 0859 84.8 kg (187 lb)   03/22/23 1340 83 kg (183 lb)   03/14/23 0852 81.6 kg (180 lb)   03/09/23 1043 81.2 kg (179 lb)   02/27/23 0300 78.5 kg (173 lb)   02/19/23 2100 73.7 kg (162 lb 6.4 oz)   02/17/23 1129 75.4 kg (166 lb 3.6 oz)   02/17/23 0400 75.4 kg (166 lb 4.8 oz)   02/16/23 0600 76.2 kg (167 lb 15.9 oz)   02/13/23 0800 86.6 kg (191 lb)   02/10/23 0400 87 kg " (191 lb 12.8 oz)   02/09/23 0400 86.6 kg (190 lb 14.7 oz)   02/07/23 0854 83.9 kg (185 lb)   01/26/23 0929 90.7 kg (200 lb)   01/24/23 1004 88.5 kg (195 lb)       Physical Exam  GENERAL:  Well nourished/developed in no acute distress.   SKIN: Turgor excellent, without wound, rash, lesion  HEENT: Normal cephalic without trauma.  Pupils equal round reactive to light. Extraocular motions full without nystagmus.    No thyroidmegaly, mass, tenderness, lymphadenopathy and supple.  CV: Regular rhythm.  No murmur, gallop,  edema. Posterior pulses intact.  No carotid bruits.  CHEST: No chest wall tenderness or mass.   LUNGS: Symmetric motion with clear/decreased to auscultation.   ABD: Soft, nontender without mass.   ORTHO: Symmetric extremities without swelling/point tenderness.  Full gross range of motion except hips reduced.  Symmetric LE.   NEURO: CN 2-12 grossly intact.  Symmetric facies. 1/4 x bicep knee equal reflexes.  UE/LE   3/5 strength throughout except 2/5  L.  Nonfocal use extremities. Speech clear.  Light touch decreased bilateral feet.   PSYCH: Oriented x 3.  Pleasant calm, well kept.  Purposeful/directed conservation with intact short/long gross memory.        Assessment & Plan     1. Polypharmacy    2. Hyperlipidemia LDL goal <70-statin    3. Primary hypertension    4. Atherosclerosis: CAD, AAA vascular    5. Anticoagulated-CAD, DM2, CVA/ASA 81+()+plavix » Anticoagulated-CAD, DM2, CVA/ASA 81+()+plavix+eliquist    6. Controlled type 2 diabetes mellitus with other circulatory complication, without long-term current use of insulin    7. Gastroesophageal reflux disease, unspecified whether esophagitis present    8. Stage 3a chronic kidney disease    9. Prostatism-(young) urology    10. Wellness examination-done    11. Anemia, unspecified type        Issues that are new, uncontrolled, or required review HPI/ROS/exam and decisions beyond wellness today: (ie: requiring the service of a physician  and/or not likely to resolve independently without clinical intervention.)   Diarrhea 6/loose 24 hr persists; BS ok enough to hold metformin    Discussions/medical decisions/reviews:  BP ok  Other vitals noting weight better  Recent Vitals         7/11/2023 7/14/2023 7/27/2023       BP: 122/62 165/77 126/70     Pulse: 55 98 74     Temp: 98 °F (36.7 °C) 98.4 °F (36.9 °C) --     Weight: 87.1 kg (192 lb) 83.5 kg (184 lb) 86.6 kg (191 lb)     BMI (Calculated): 26.4 25.3 26.7           DM/A1c 6.6 7.20.23; down 7.4  DM/.20.23  Lipid  7.20.23-pravachol 80  PSA ok 7.20.23  CBC 11.6 7.20.23  Iron 48L 7.20.23; 325 mg qd  B12 497N 7.20.23  Folate 19.3 7.20.23  Renal 59 7.20.23  Liver ok 7.20.23  Vit D 36 7.20.23  Thyroid TSH 2.4N 7.20.23    Colonoscopy+hem/[facility]//4-7-00  Colonoscopy+xxv-hjrr-noc/[facility]//11-17-08/3-5y  Colonoscopy+polyp-div/[facility]//10.21.13/5y  EGD-itis///6.15.17  Colon-div///6.15.17/prn  CKD  ASA 81  Eliquis  Iron yrs normal; 48L 7.20.23  Ferritin always ok; 113N 10.11.22  B12 always ok; 497N 7.20.23  Folate always ok; 19.3 7.20.23  Retic 1.04N 11.14.18  OB + 11.20.18; ok 1.12.21    Wellness/or annual done   Screening reviewed/updated   Temporary stop metformin; if diarrhea after that contact GI  Stay on iron; tyra 2m  If exercise; not beyond SOB or pain    Data review above:   Rx: reviewed and decisions:   Rx new/changes: none  No orders of the defined types were placed in this encounter.    Orders placed:   LAB/Testing/Referrals: reviewed/orders:   Today:   No orders of the defined types were placed in this encounter.    Chronic/recurrent labs above or change to:   Same     Immunization History   Administered Date(s) Administered    COVID-19 (MODERNA) 1st,2nd,3rd Dose Monovalent 03/03/2021, 03/31/2021    FLUAD TRI 65YR+ 11/22/2019    Fluad Quad 65+ 10/06/2020    Fluzone High Dose =>65 Years ([employer] ONLY) 10/09/2018    Fluzone High-Dose 65+yrs 12/27/2021, 01/26/2023    Fluzone Quad >6mos  "(Multi-dose) 10/21/2015, 01/20/2017, 01/29/2018    Influenza Whole 10/21/2015    Pneumococcal Conjugate 13-Valent (PCV13) 10/21/2015    Shingrix 06/09/2023     We advised/reaffirmed our support/suggestion for staying complete with covid- covid boosters, seasonal flu/yearly and any missing vaccine from list we supplied; we suggest contact with local health department office to review missing/needed vaccines and then bring nursing documentation for these vaccines to this office or call this information in. Shingles became \"free\" 1.1.23 for medicare insurance.     Health maintenance:     Tobacco use reviewed:   Gonzalo Durham  reports that he quit smoking about 43 years ago. His smoking use included cigarettes. He started smoking about 69 years ago. He has a 26.00 pack-year smoking history. He has been exposed to tobacco smoke. He has never used smokeless tobacco..   Annual/wellness also done today.  Issues as appropriate discussed as counseling, anticipatory guidance:   Nutrition, physical activity, healthy weight, injury prevention, misuse of tobacco, alcohol and drugs, sexual behavior and STDs, contraception, dental health, mental health, immunizations, screenings as appropriate. As appropriate see AVS.         Assessment and Plan   Diagnoses and all orders for this visit:    1. Polypharmacy    2. Hyperlipidemia LDL goal <70-statin    3. Primary hypertension    4. Atherosclerosis: CAD, AAA vascular    5. Anticoagulated-CAD, DM2, CVA/ASA 81+()+plavix » Anticoagulated-CAD, DM2, CVA/ASA 81+()+plavix+eliquist    6. Controlled type 2 diabetes mellitus with other circulatory complication, without long-term current use of insulin    7. Gastroesophageal reflux disease, unspecified whether esophagitis present    8. Stage 3a chronic kidney disease    9. Prostatism-(young) urology    10. Wellness examination-done    11. Anemia, unspecified type           There are no Patient Instructions on file for this " visit.    Follow up: No follow-ups on file.  Future Appointments   Date Time Provider Department Center   9/25/2023 10:00 AM Erika Elena APRN [facility] PAD PAD   12/13/2023 10:20 AM Danie Cadena MD [facility] U PAD PAD   1/9/2024  9:30 AM PAD CT 2 BH PAD CAT PAD   1/9/2024 10:00 AM PAD US NIVAS CART 2 BH PAD US PAD   1/9/2024 11:00 AM Nasir Gutierrez DO [facility] VS PAD PAD   1/12/2024  8:30 AM Junior Rees APRN [facility] PAD PAD   1/15/2024  9:30 AM Ronnie Ugarte APRN [facility] ENT PAD PAD   4/30/2024  9:00 AM Feliz Frye APRN [facility] RD PAD PAD

## 2023-07-27 NOTE — PROGRESS NOTES
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  He has been off of his CPAP for a few months continues to wear and benefit from oxygen.  Since I last saw him, his wife passed away.  He is on daily Breztri and uses Mucinex and albuterol as needed.  He requires 4 L of oxygen with exertional activities.  He has been trying to increase his physical activity and plans to join the gym.  He has no new or worsening pulmonary complaints.  He is up-to-date on vaccines.  Objective   Vital Signs:   /70   Pulse 74   Ht 180.3 cm (71\")   Wt 86.6 kg (191 lb)   SpO2 93% Comment: 4L  BMI 26.64 kg/m²     Physical Exam  Vitals reviewed.   Constitutional:       General: He is not in acute distress.     Appearance: Normal appearance.      Interventions: Nasal cannula in place.   HENT:      Head: Normocephalic and atraumatic.      Right Ear: Decreased hearing noted.      Left Ear: Decreased hearing noted.      Ears:      Comments: Bilateral hearing aids  Cardiovascular:      Rate and Rhythm: Normal rate and regular rhythm.   Pulmonary:      Breath sounds: Decreased breath sounds (At bases only, otherwise clear) present.   Musculoskeletal:      Cervical back: Normal range of motion.   Skin:     General: Skin is warm and dry.   Neurological:      Mental Status: He is alert and oriented to person, place, and time.   Psychiatric:         Mood and Affect: Mood normal.         Behavior: Behavior normal.      Result Review :   The following data was reviewed by: DOMINIQUE Yeung on 07/27/2023:  XR Chest PA & Lateral (03/21/2023 10:06)   Results for orders placed in visit on 01/26/23    Pulmonary Function Test    Narrative  Pulmonary Function Test  Performed by: " Cindy Ramires, RRT  Authorized by: Feliz Frye APRN    Pre Drug % Predicted  FVC: 124%  FEV1: 64%  FEF 25-75%: 24%  FEV1/FVC: 38%    Interpretation  Spirometry  Spirometry shows moderate obstruction. There is reduced midflow suggesting small airway/airflow obstruction.      Results for orders placed in visit on 01/26/22    Pulmonary Function Test    Narrative  Pulmonary Function Test  Performed by: Cindy Ramires, REJI  Authorized by: Feliz Frye APRN    Pre Drug % Predicted  FVC: 87%  FEV1: 50%  FEF 25-75%: 15%  FEV1/FVC: 43.7%    Interpretation  Spirometry  Spirometry shows moderate obstruction. There is reduced midflow suggesting small airway/airflow obstruction.  Overall comments: Current FEV1 is 50% predicted compared to 66% predicted in 2019.  Reviewed the results of the drop in lung function with the patient and his daughter.  The patient states understanding but was not interested in changing his inhaler regimen.  He reports that he feels no more symptomatic than he did 2 or 3 years ago.      Results for orders placed in visit on 02/25/19    Pulmonary Function Test    Rest/Exercise Pulse Ox Values          1/26/2023    09:30   Rest/Exercise Pulse Ox Results   Rest room air SAT % 92   Exercise room air SAT % 79   Exercise on O2 @ Liters 2   Exercise on O2 SAT % 89              Assessment and Plan      Diagnoses and all orders for this visit:    1. Stage 2 moderate COPD by GOLD classification (Primary)  Comments:  Stable.  Continue Breztri, Mucinex, albuterol and oxygen.    2. Chronic respiratory failure with hypoxia  Comments:  Stable.  Continue supplemental oxygen.    3. History of asbestos exposure  Comments:  Continue annual chest x-rays.  Orders:  -     XR Chest 2 View; Future    4. Obstructive sleep apnea  Comments:  He remains off of CPAP but is wearing oxygen with sleep.        DOMINIQUE Yeung  7/27/2023  11:28 CDT    Follow Up   Return in about 9  months (around 4/27/2024).    Patient was given instructions and counseling regarding his condition or for health maintenance advice. Please see specific information pulled into the AVS if appropriate.

## 2023-08-21 RX ORDER — POTASSIUM CHLORIDE 1500 MG/1
TABLET, EXTENDED RELEASE ORAL
Qty: 180 TABLET | Refills: 3 | Status: SHIPPED | OUTPATIENT
Start: 2023-08-21

## 2023-09-07 RX ORDER — ALBUTEROL SULFATE 90 UG/1
AEROSOL, METERED RESPIRATORY (INHALATION)
Qty: 51 G | Refills: 3 | Status: SHIPPED | OUTPATIENT
Start: 2023-09-07

## 2023-09-19 RX ORDER — HYDROCHLOROTHIAZIDE 12.5 MG/1
CAPSULE, GELATIN COATED ORAL
Qty: 90 CAPSULE | Refills: 3 | Status: SHIPPED | OUTPATIENT
Start: 2023-09-19

## 2023-09-20 DIAGNOSIS — G62.9 PERIPHERAL POLYNEUROPATHY: ICD-10-CM

## 2023-09-21 RX ORDER — GABAPENTIN 600 MG/1
TABLET ORAL
Qty: 270 TABLET | Refills: 3 | Status: SHIPPED | OUTPATIENT
Start: 2023-09-21

## 2023-09-21 NOTE — TELEPHONE ENCOUNTER
Rx Refill Note  Requested Prescriptions     Pending Prescriptions Disp Refills    gabapentin (NEURONTIN) 600 MG tablet [Pharmacy Med Name: GABAPENTIN TABS 600MG] 270 tablet 3     Sig: TAKE 1 TABLET THREE TIMES A DAY      Last office visit with prescribing clinician: 7/27/2023      Next office visit with prescribing clinician: 1/29/2024            Amando Valentine MA  09/21/23, 08:54 CDT

## 2023-09-22 DIAGNOSIS — R19.7 DIARRHEA, UNSPECIFIED TYPE: Primary | ICD-10-CM

## 2023-09-22 NOTE — PROGRESS NOTES
I rec'd a fax from [facility] requesting refills on pt's Cholestyramine. I called and spoke to his daughter, Suyapa, to make sure they wanted the script to go there. She tells me that it would be cheaper and easier for them to get it through [facility]. I did ask her how many times a day he was taking it as we have 2 different scripts on file. She tells me he is only taking it once daily and that seems to be working well for him. I have pended a new script to Sandy to go to [facility] He isn't out yet and she is aware it will go to pharmacy on Monday as Sandy is already gone for the day. Pt/daughter advised to call me back with any further questions/problems.

## 2023-09-25 ENCOUNTER — OFFICE VISIT (OUTPATIENT)
Dept: GASTROENTEROLOGY | Facility: CLINIC | Age: 88
End: 2023-09-25

## 2023-09-25 VITALS
HEIGHT: 71 IN | SYSTOLIC BLOOD PRESSURE: 128 MMHG | BODY MASS INDEX: 26.82 KG/M2 | DIASTOLIC BLOOD PRESSURE: 78 MMHG | OXYGEN SATURATION: 95 % | WEIGHT: 191.6 LBS | TEMPERATURE: 97.3 F | HEART RATE: 70 BPM

## 2023-09-25 DIAGNOSIS — R19.7 DIARRHEA, UNSPECIFIED TYPE: Primary | ICD-10-CM

## 2023-09-25 DIAGNOSIS — Z78.9 NONSMOKER: ICD-10-CM

## 2023-09-25 DIAGNOSIS — A04.72 COLITIS, CLOSTRIDIUM DIFFICILE: ICD-10-CM

## 2023-09-25 PROCEDURE — 99214 OFFICE O/P EST MOD 30 MIN: CPT | Performed by: CLINICAL NURSE SPECIALIST

## 2023-09-25 RX ORDER — CHOLESTYRAMINE 4 G/9G
1 POWDER, FOR SUSPENSION ORAL DAILY
Qty: 90 PACKET | Refills: 3 | Status: SHIPPED | OUTPATIENT
Start: 2023-09-25

## 2023-09-25 RX ORDER — VANCOMYCIN HYDROCHLORIDE 125 MG/1
125 CAPSULE ORAL 4 TIMES DAILY
Qty: 40 CAPSULE | Refills: 0 | Status: SHIPPED | OUTPATIENT
Start: 2023-09-25 | End: 2023-10-05

## 2023-09-25 NOTE — PROGRESS NOTES
Gonzalo Durham  1934 9/25/2023  Chief Complaint   Patient presents with    GI Problem     8 week fu c diff         HPI    Gonzalo Durham is a  89 y.o. male here for a follow up visit for recent Cdiff infection with some residual diarrhea when I saw him last approx 8 weeks ago. He was admitted on 5/30/23 as a transfer from [place] with diarrhea. I put him on Questran. He desired conservative treatment. I emphasized hydration. Today he says his diarrhea is back and worse than it had been up to 8 times per day. Watery at times with some form. No bleeding.     Past Medical History:   Diagnosis Date    A-fib     AAA (abdominal aortic aneurysm) without rupture     Arthritis     BPH (benign prostatic hypertrophy)     Cataract     bialteral    CKD (chronic kidney disease)     COPD (chronic obstructive pulmonary disease)     Coronary artery disease involving native coronary artery of native heart without angina pectoris 1/20/2017    CABG/[place]/1981 No TCC echo  7.16.18 Right ventricular cavity is mild-to-moderately dilated. Left ventricular diastolic dysfunction (grade I) consistent with impaired relaxation. Left ventricular systolic function is normal. Left atrial cavity size is borderline dilated. 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 BMS vs REGGIE Get cabg report  ;  Surgeon: Bradley Carver MD;  Location:  [facility] CATH [facility]AS[facility];  Service: Cardiology    COLONOSCOPY N/A 6/15/2017    Diverticulosis repeat exam prn    COLONOSCOPY W/ POLYPECTOMY  10/21/2013    2 Tubular adenomatous polyps, Diverticulosis repeat exam in 5 years    CORONARY ARTERY BYPASS GRAFT  1980    X 3    CYSTOSCOPY RETROGRADE PYELOGRAM Bilateral 2/8/2021    Procedure: CYSTOSCOPY RETROGRADE PYELOGRAM;  Surgeon: Danie Cadena MD;  Location:  [facility] OR;  Service: Urology;  Laterality: Bilateral;    ENDOSCOPY N/A 6/15/2017    LA Grade A reflux esophagitis    EYE SURGERY Bilateral     HERNIA REPAIR      TRANSURETHRAL RESECTION OF BLADDER TUMOR N/A 2/8/2021    Procedure: CYSTOSCOPY TRANSURETHRAL RESECTION OF BLADDER TUMOR WITH GEMCITABINE INSTILLATION;  Surgeon: Danie Cadena MD;  Location:  [facility] OR;  Service: Urology;  Laterality: N/A;       Outpatient Medications Marked as Taking for the 9/25/23 encounter (Office Visit) with Erika Elena APRN   Medication Sig Dispense Refill    acetaminophen (TYLENOL) 325 MG tablet Take 2 tablets by mouth 2 (Two) Times a Day. 360 tablet 2    albuterol sulfate  (90 Base) MCG/ACT inhaler USE 2 INHALATIONS FOUR TIMES A DAY 51 g 3    ascorbic acid (VITAMIN C) 500 MG tablet Take 1 tablet by mouth 2 (Two) Times a Day. Indications: Inadequate Vitamin C      aspirin 81 MG EC tablet Take 1 tablet by mouth Daily. Indications: Acute Heart Attack      Breztri Aerosphere 160-9-4.8 MCG/ACT aerosol inhaler USE 2 INHALATIONS TWICE A DAY 10.7 g 11    budesonide (PULMICORT) 0.5 MG/2ML nebulizer solution Take 2 mL by nebulization 2 (Two) Times a Day. Indications: Chronic Obstructive Lung Disease 90 mL 3    calcium  carbonate, oyster shell, 500 MG tablet tablet Take 1 tablet by mouth Daily.      cholestyramine (QUESTRAN) 4 g packet Take 1 packet by mouth 3 (Three) Times a Day With Meals. 90 packet 5    cholestyramine light 4 g packet Take 1 packet by mouth 2 (Two) Times a Day.      docusate sodium 100 MG capsule Take 1 capsule by mouth 2 (Two) Times a Day.      Eliquis 5 MG tablet tablet TAKE 1 TABLET TWICE A  tablet 3    ferrous sulfate 325 (65 Fe) MG tablet Take 1 tablet by mouth Daily With Breakfast. Indications: Anemia From Inadequate Iron in the Body      finasteride (PROSCAR) 5 MG tablet TAKE 1 TABLET DAILY 90 tablet 3    furosemide (LASIX) 20 MG tablet TAKE 1 TABLET EVERY 12 HOURS FOR EDEMA 90 tablet 7    gabapentin (NEURONTIN) 600 MG tablet TAKE 1 TABLET THREE TIMES A  tablet 3    glyburide (DIAbeta) 5 MG tablet TAKE 1 TABLET EVERY MORNING AND ONE-HALF (1/2) TABLET BEFORE SUPPER 135 tablet 3    guaiFENesin (MUCINEX) 600 MG 12 hr tablet Take 1 tablet by mouth 2 (Two) Times a Day. Indications: Cough      hydroCHLOROthiazide (MICROZIDE) 12.5 MG capsule TAKE 1 CAPSULE DAILY 90 capsule 3    isosorbide mononitrate (IMDUR) 30 MG 24 hr tablet Take 1 tablet by mouth Daily. Indications: Stable Angina Pectoris      Lactobacillus (PROBIOTIC ACIDOPHILUS PO) Take  by mouth.      loratadine (CLARITIN) 10 MG tablet TAKE 1 TABLET DAILY 90 tablet 3    melatonin 3 MG tablet Take 1 tablet by mouth At Night As Needed for Sleep.      metoprolol tartrate (LOPRESSOR) 25 MG tablet Take 0.5 tablets by mouth 2 (Two) Times a Day. Indications: High Blood Pressure Disorder      Multiple Vitamins-Minerals (MULTIVITAMIN & MINERAL PO) Take 1 tablet by mouth Daily. Indications: vit      nitroglycerin (Nitrostat) 0.4 MG SL tablet Place 1 tablet under the tongue Every 5 (Five) Minutes As Needed for Chest Pain. 90 tablet 3    pantoprazole (PROTONIX) 40 MG EC tablet TAKE 1 TABLET DAILY 90 tablet 3    polyethylene glycol 17 g packet Take 17 g  by mouth Daily.      potassium chloride ER (K-TAB) 20 MEQ tablet controlled-release ER tablet TAKE 1 TABLET TWICE A DAY WITH MEALS FOR LOW AMOUNT OF POTASSIUM IN THE BLOOD 180 tablet 3    pravastatin (PRAVACHOL) 80 MG tablet TAKE 1 TABLET DAILY 90 tablet 3    Probiotic Product (PROBIOTIC ADVANCED PO) Take  by mouth.      saccharomyces boulardii (Florastor) 250 MG capsule Take 1 capsule by mouth 2 (Two) Times a Day. 60 capsule 5    saline (AYR) gel nasal gel Apply 1 application  topically to the appropriate area as directed As Needed (epistaxis). 1 each 0    terazosin (HYTRIN) 10 MG capsule TAKE 1 CAPSULE DAILY 90 capsule 3       Allergies   Allergen Reactions    Symbicort [Budesonide-Formoterol Fumarate] Dizziness     Patient passed out and fell    Prozac [Fluoxetine Hcl] Mental Status Change     Bad dreams.       Social History     Socioeconomic History    Marital status:    Tobacco Use    Smoking status: Former     Packs/day: 1.00     Years: 26.00     Pack years: 26.00     Types: Cigarettes     Start date:      Quit date:      Years since quittin.7     Passive exposure: Past    Smokeless tobacco: Never   Vaping Use    Vaping Use: Never used   Substance and Sexual Activity    Alcohol use: No    Drug use: No    Sexual activity: Defer       Family History   Problem Relation Age of Onset    Heart disease Mother     Stroke Mother     Melanoma Mother     Heart disease Father     Diabetes Father     Prostate cancer Father     Colon cancer Neg Hx     Colon polyps Neg Hx        Review of Systems   Constitutional:  Negative for activity change, appetite change, chills, diaphoresis, fatigue, fever and unexpected weight change.   HENT:  Negative for ear pain, hearing loss, mouth sores, sore throat, trouble swallowing and voice change.    Eyes: Negative.    Respiratory:  Positive for shortness of breath. Negative for cough, choking and wheezing.    Cardiovascular:  Negative for chest pain and palpitations.  "  Gastrointestinal:  Positive for diarrhea. Negative for abdominal pain, blood in stool, constipation, nausea and vomiting.   Endocrine: Negative for cold intolerance and heat intolerance.   Genitourinary:  Negative for decreased urine volume, dysuria, frequency, hematuria and urgency.   Musculoskeletal:  Negative for back pain, gait problem and myalgias.   Skin:  Negative for color change, pallor and rash.   Allergic/Immunologic: Negative for food allergies and immunocompromised state.   Neurological:  Negative for dizziness, tremors, seizures, syncope, weakness, light-headedness, numbness and headaches.   Hematological:  Negative for adenopathy. Does not bruise/bleed easily.   Psychiatric/Behavioral:  Negative for agitation and confusion. The patient is not nervous/anxious.    All other systems reviewed and are negative.    /78 (BP Location: Left arm)   Pulse 70   Temp 97.3 °F (36.3 °C) (Temporal)   Ht 180.3 cm (70.98\")   Wt 86.9 kg (191 lb 9.6 oz)   SpO2 95%   BMI 26.73 kg/m²   Body mass index is 26.73 kg/m².    Physical Exam  Constitutional:       Appearance: He is well-developed.   HENT:      Head: Normocephalic and atraumatic.   Eyes:      Pupils: Pupils are equal, round, and reactive to light.   Neck:      Trachea: No tracheal deviation.   Cardiovascular:      Rate and Rhythm: Normal rate and regular rhythm.      Heart sounds: Normal heart sounds. No murmur heard.    No friction rub. No gallop.   Pulmonary:      Effort: Pulmonary effort is normal. No respiratory distress.      Breath sounds: Normal breath sounds. No wheezing or rales.      Comments: Diminished bialterally  Chest:      Chest wall: No tenderness.   Abdominal:      General: Bowel sounds are normal. There is no distension.      Palpations: Abdomen is soft. Abdomen is not rigid.      Tenderness: There is no abdominal tenderness. There is no guarding or rebound.   Musculoskeletal:         General: No tenderness or deformity. Normal " range of motion.      Cervical back: Normal range of motion and neck supple.   Skin:     General: Skin is warm and dry.      Coloration: Skin is not pale.      Findings: No rash.   Neurological:      Mental Status: He is alert and oriented to person, place, and time.      Deep Tendon Reflexes: Reflexes are normal and symmetric.   Psychiatric:         Behavior: Behavior normal.         Thought Content: Thought content normal.         Judgment: Judgment normal.       ASSESSMENT AND PLAN         Diagnoses and all orders for this visit:    1. Diarrhea, unspecified type (Primary)    2. Colitis, Clostridium difficile  -     CBC & Differential  -     Comprehensive Metabolic Panel    3. Nonsmoker    Other orders  -     vancomycin (VANCOCIN) 125 MG capsule; Take 1 capsule by mouth 4 (Four) Times a Day for 10 days. Indications: Clostridium Difficile Infection  Dispense: 40 capsule; Refill: 0    Stop the questran and will treat with Vancomycin at this time. He is to have labs on Wednesday he says, I would like to see a WBC and H/H. He is hydrating and overall does not look toxic.   Expressed he is to let me know of any worsening symptoms or no better in 7 days and we may consider a taper. He understands and daughter is with him today.       There are no Patient Instructions on file for this visit.  Erika Elena, DOMINIQUE  10:28 CDT  9/25/2023    Obesity, Adult  Obesity is the condition of having too much total body fat. Being overweight or obese means that your weight is greater than what is considered healthy for your body size. Obesity is determined by a measurement called BMI. BMI is an estimate of body fat and is calculated from height and weight. For adults, a BMI of 30 or higher is considered obese.  Obesity can eventually lead to other health concerns and major illnesses, including:  Stroke.  Coronary artery disease (CAD).  Type 2 diabetes.  Some types of cancer, including cancers of the colon, breast, uterus, and  gallbladder.  Osteoarthritis.  High blood pressure (hypertension).  High cholesterol.  Sleep apnea.  Gallbladder stones.  Infertility problems.  What are the causes?  The main cause of obesity is taking in (consuming) more calories than your body uses for energy. Other factors that contribute to this condition may include:  Being born with genes that make you more likely to become obese.  Having a medical condition that causes obesity. These conditions include:  Hypothyroidism.  Polycystic ovarian syndrome (PCOS).  Binge-eating disorder.  Cushing syndrome.  Taking certain medicines, such as steroids, antidepressants, and seizure medicines.  Not being physically active (sedentary lifestyle).  Living where there are limited places to exercise safely or buy healthy foods.  Not getting enough sleep.  What increases the risk?  The following factors may increase your risk of this condition:  Having a family history of obesity.  Being a woman of -American descent.  Being a man of  descent.  What are the signs or symptoms?  Having excessive body fat is the main symptom of this condition.  How is this diagnosed?  This condition may be diagnosed based on:  Your symptoms.  Your medical history.  A physical exam. Your health care provider may measure:  Your BMI. If you are an adult with a BMI between 25 and less than 30, you are considered overweight. If you are an adult with a BMI of 30 or higher, you are considered obese.  The distances around your hips and your waist (circumferences). These may be compared to each other to help diagnose your condition.  Your skinfold thickness. Your health care provider may gently pinch a fold of your skin and measure it.  How is this treated?  Treatment for this condition often includes changing your lifestyle. Treatment may include some or all of the following:  Dietary changes. Work with your health care provider and a dietitian to set a weight-loss goal that is healthy and  reasonable for you. Dietary changes may include eating:  Smaller portions. A portion size is the amount of a particular food that is healthy for you to eat at one time. This varies from person to person.  Low-calorie or low-fat options.  More whole grains, fruits, and vegetables.  Regular physical activity. This may include aerobic activity (cardio) and strength training.  Medicine to help you lose weight. Your health care provider may prescribe medicine if you are unable to lose 1 pound a week after 6 weeks of eating more healthily and doing more physical activity.  Surgery. Surgical options may include gastric banding and gastric bypass. Surgery may be done if:  Other treatments have not helped to improve your condition.  You have a BMI of 40 or higher.  You have life-threatening health problems related to obesity.  Follow these instructions at home:     Eating and drinking     Follow recommendations from your health care provider about what you eat and drink. Your health care provider may advise you to:  Limit fast foods, sweets, and processed snack foods.  Choose low-fat options, such as low-fat milk instead of whole milk.  Eat 5 or more servings of fruits or vegetables every day.  Eat at home more often. This gives you more control over what you eat.  Choose healthy foods when you eat out.  Learn what a healthy portion size is.  Keep low-fat snacks on hand.  Avoid sugary drinks, such as soda, fruit juice, iced tea sweetened with sugar, and flavored milk.  Eat a healthy breakfast.  Drink enough water to keep your urine clear or pale yellow.  Do not go without eating for long periods of time (do not fast) or follow a fad diet. Fasting and fad diets can be unhealthy and even dangerous.  Physical Activity   Exercise regularly, as told by your health care provider. Ask your health care provider what types of exercise are safe for you and how often you should exercise.  Warm up and stretch before being active.  Cool  down and stretch after being active.  Rest between periods of activity.  Lifestyle   Limit the time that you spend in front of your TV, computer, or video game system.  Find ways to reward yourself that do not involve food.  Limit alcohol intake to no more than 1 drink a day for nonpregnant women and 2 drinks a day for men. One drink equals 12 oz of beer, 5 oz of wine, or 1½ oz of hard liquor.  General instructions   Keep a weight loss journal to keep track of the food you eat and how much you exercise you get.  Take over-the-counter and prescription medicines only as told by your health care provider.  Take vitamins and supplements only as told by your health care provider.  Consider joining a support group. Your health care provider may be able to recommend a support group.  Keep all follow-up visits as told by your health care provider. This is important.  Contact a health care provider if:  You are unable to meet your weight loss goal after 6 weeks of dietary and lifestyle changes.  This information is not intended to replace advice given to you by your health care provider. Make sure you discuss any questions you have with your health care provider.  Document Released: 01/25/2006 Document Revised: 05/22/2017 Document Reviewed: 10/05/2016  [employer] Patient Education © 2017 [employer]      IF YOU SMOKE OR USE TOBACCO PLEASE READ THE FOLLOWING:    Why is smoking bad for me?  Smoking increases the risk of heart disease, lung disease, vascular disease, stroke, and cancer.     If you smoke, STOP!    If you would like more information on quitting smoking, please visit the [employer] website: www.[email or web address]/corporate/healthier-together/smoke   This link will provide additional resources including the QUIT line and the Beat the Pack support groups.     For more information:    Quit Now [place]  1-800-QUIT-NOW  https://[place].quitlogix.org/en-[place]/

## 2023-09-27 ENCOUNTER — LAB (OUTPATIENT)
Dept: FAMILY MEDICINE CLINIC | Facility: CLINIC | Age: 88
End: 2023-09-27
Payer: MEDICARE

## 2023-09-27 DIAGNOSIS — D64.9 ANEMIA, UNSPECIFIED TYPE: ICD-10-CM

## 2023-09-27 DIAGNOSIS — E11.59 CONTROLLED TYPE 2 DIABETES MELLITUS WITH OTHER CIRCULATORY COMPLICATION, WITHOUT LONG-TERM CURRENT USE OF INSULIN: Chronic | ICD-10-CM

## 2023-09-27 DIAGNOSIS — I10 PRIMARY HYPERTENSION: Chronic | ICD-10-CM

## 2023-09-27 DIAGNOSIS — E78.5 HYPERLIPIDEMIA LDL GOAL <70: Primary | Chronic | ICD-10-CM

## 2023-09-27 DIAGNOSIS — E87.6 HYPOKALEMIA: ICD-10-CM

## 2023-09-28 LAB
BASOPHILS # BLD AUTO: 0.05 10*3/MM3 (ref 0–0.2)
BASOPHILS NFR BLD AUTO: 0.6 % (ref 0–1.5)
BUN SERPL-MCNC: 19 MG/DL (ref 8–23)
BUN/CREAT SERPL: 14.6 (ref 7–25)
CALCIUM SERPL-MCNC: 10.1 MG/DL (ref 8.6–10.5)
CHLORIDE SERPL-SCNC: 104 MMOL/L (ref 98–107)
CO2 SERPL-SCNC: 29.1 MMOL/L (ref 22–29)
CREAT SERPL-MCNC: 1.3 MG/DL (ref 0.76–1.27)
EGFRCR SERPLBLD CKD-EPI 2021: 52.5 ML/MIN/1.73
EOSINOPHIL # BLD AUTO: 0.45 10*3/MM3 (ref 0–0.4)
EOSINOPHIL NFR BLD AUTO: 5.2 % (ref 0.3–6.2)
ERYTHROCYTE [DISTWIDTH] IN BLOOD BY AUTOMATED COUNT: 14 % (ref 12.3–15.4)
FOLATE SERPL-MCNC: >20 NG/ML (ref 4.78–24.2)
GLUCOSE SERPL-MCNC: 126 MG/DL (ref 65–99)
HBA1C MFR BLD: 6.3 % (ref 4.8–5.6)
HCT VFR BLD AUTO: 37.5 % (ref 37.5–51)
HGB BLD-MCNC: 12.1 G/DL (ref 13–17.7)
IMM GRANULOCYTES # BLD AUTO: 0.01 10*3/MM3 (ref 0–0.05)
IMM GRANULOCYTES NFR BLD AUTO: 0.1 % (ref 0–0.5)
IRON SERPL-MCNC: 49 MCG/DL (ref 59–158)
LYMPHOCYTES # BLD AUTO: 2.11 10*3/MM3 (ref 0.7–3.1)
LYMPHOCYTES NFR BLD AUTO: 24.5 % (ref 19.6–45.3)
MCH RBC QN AUTO: 27.9 PG (ref 26.6–33)
MCHC RBC AUTO-ENTMCNC: 32.3 G/DL (ref 31.5–35.7)
MCV RBC AUTO: 86.6 FL (ref 79–97)
MONOCYTES # BLD AUTO: 0.66 10*3/MM3 (ref 0.1–0.9)
MONOCYTES NFR BLD AUTO: 7.7 % (ref 5–12)
NEUTROPHILS # BLD AUTO: 5.32 10*3/MM3 (ref 1.7–7)
NEUTROPHILS NFR BLD AUTO: 61.9 % (ref 42.7–76)
NRBC BLD AUTO-RTO: 0 /100 WBC (ref 0–0.2)
PLATELET # BLD AUTO: 260 10*3/MM3 (ref 140–450)
POTASSIUM SERPL-SCNC: 4.4 MMOL/L (ref 3.5–5.2)
RBC # BLD AUTO: 4.33 10*6/MM3 (ref 4.14–5.8)
SODIUM SERPL-SCNC: 143 MMOL/L (ref 136–145)
VIT B12 SERPL-MCNC: 608 PG/ML (ref 211–946)
WBC # BLD AUTO: 8.6 10*3/MM3 (ref 3.4–10.8)

## 2023-10-04 ENCOUNTER — TELEPHONE (OUTPATIENT)
Dept: GASTROENTEROLOGY | Facility: CLINIC | Age: 88
End: 2023-10-04
Payer: MEDICARE

## 2023-10-04 RX ORDER — BLOOD-GLUCOSE METER
KIT MISCELLANEOUS
Qty: 100 EACH | Refills: 3 | Status: SHIPPED | OUTPATIENT
Start: 2023-10-04

## 2023-10-04 NOTE — TELEPHONE ENCOUNTER
Received call from patients daughter that diarrhea has improved with vanco post c diff infection. Still some loose stool but improving. Advised that at this time we will not do vanco taper but to call if diarrhea worsens

## 2023-10-04 NOTE — TELEPHONE ENCOUNTER
Rx Refill Note  Requested Prescriptions     Pending Prescriptions Disp Refills    glucose blood (FREESTYLE LITE) test strip [Pharmacy Med Name: FREESTYLE LITE STRIPS 50'S] 100 each 3     Sig: USE TO TEST BLOOD SUGAR DAILY      Last office visit with prescribing clinician: 7/27/2023      Next office visit with prescribing clinician: 1/29/2024            Amando Valentine MA  10/04/23, 09:54 CDT

## 2023-10-26 ENCOUNTER — OFFICE VISIT (OUTPATIENT)
Dept: GASTROENTEROLOGY | Facility: CLINIC | Age: 88
End: 2023-10-26
Payer: MEDICARE

## 2023-10-26 VITALS
OXYGEN SATURATION: 93 % | HEIGHT: 71 IN | WEIGHT: 196.8 LBS | DIASTOLIC BLOOD PRESSURE: 82 MMHG | TEMPERATURE: 96.6 F | SYSTOLIC BLOOD PRESSURE: 140 MMHG | BODY MASS INDEX: 27.55 KG/M2 | HEART RATE: 64 BPM

## 2023-10-26 DIAGNOSIS — Z78.9 NONSMOKER: ICD-10-CM

## 2023-10-26 DIAGNOSIS — A04.72 COLITIS, CLOSTRIDIUM DIFFICILE: Primary | ICD-10-CM

## 2023-10-26 DIAGNOSIS — R19.7 DIARRHEA, UNSPECIFIED TYPE: ICD-10-CM

## 2023-10-26 PROCEDURE — 99214 OFFICE O/P EST MOD 30 MIN: CPT | Performed by: CLINICAL NURSE SPECIALIST

## 2023-10-26 NOTE — PROGRESS NOTES
Gonzalo Durham  1934      10/26/2023  Chief Complaint   Patient presents with    GI Problem     4 week fu diarrhea         HPI    Gonzalo Durham is a  89 y.o. male here for a follow up visit for possibly recurrent Cdiff infection. I put him on Vanco and things had improved. They were more formed and not watery. Now they do have some urgency to go. He does desire conservative management. WBC was normal.     Note 9/25/23  Gonzalo Durham is a  89 y.o. male here for a follow up visit for recent Cdiff infection with some residual diarrhea when I saw him last approx 8 weeks ago. He was admitted on 5/30/23 as a transfer from [place] with diarrhea. I put him on Questran. He desired conservative treatment. I emphasized hydration. Today he says his diarrhea is back and worse than it had been up to 8 times per day. Watery at times with some form. No bleeding.    Past Medical History:   Diagnosis Date    A-fib     AAA (abdominal aortic aneurysm) without rupture     Arthritis     BPH (benign prostatic hypertrophy)     Cataract     bialteral    CKD (chronic kidney disease)     COPD (chronic obstructive pulmonary disease)     Coronary artery disease involving native coronary artery of native heart without angina pectoris 1/20/2017    CABG/[place]/1981 No TCC echo  7.16.18 Right ventricular cavity is mild-to-moderately dilated. Left ventricular diastolic dysfunction (grade I) consistent with impaired relaxation. Left ventricular systolic function is normal. Left atrial cavity size is borderline dilated. 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 BMS vs REGGIE Get cabg report  ;  Surgeon: Bradley Carver MD;  Location:  [facility] CATH [facility]AS[facility];  Service: Cardiology    COLONOSCOPY N/A 6/15/2017    Diverticulosis repeat exam prn    COLONOSCOPY W/ POLYPECTOMY  10/21/2013    2 Tubular adenomatous polyps, Diverticulosis repeat exam in 5 years    CORONARY ARTERY BYPASS GRAFT  1980    X 3    CYSTOSCOPY RETROGRADE PYELOGRAM Bilateral 2/8/2021    Procedure: CYSTOSCOPY RETROGRADE PYELOGRAM;  Surgeon: Danie Cadena MD;  Location:  [facility] OR;  Service: Urology;  Laterality: Bilateral;    ENDOSCOPY N/A 6/15/2017    LA Grade A reflux esophagitis    EYE SURGERY Bilateral     HERNIA REPAIR      TRANSURETHRAL RESECTION OF BLADDER TUMOR N/A 2/8/2021    Procedure: CYSTOSCOPY TRANSURETHRAL RESECTION OF BLADDER TUMOR WITH GEMCITABINE INSTILLATION;  Surgeon: Danie Cadena MD;  Location:  [facility] OR;  Service: Urology;  Laterality: N/A;       Outpatient Medications Marked as Taking for the 10/26/23 encounter (Office Visit) with Erika Elena APRN   Medication Sig Dispense Refill    acetaminophen (TYLENOL) 325 MG tablet Take 2 tablets by mouth 2 (Two) Times a Day. 360 tablet 2    albuterol sulfate  (90 Base) MCG/ACT inhaler USE 2 INHALATIONS FOUR TIMES A DAY 51 g 3    ascorbic acid (VITAMIN C) 500 MG tablet Take 1 tablet by mouth 2 (Two) Times a Day. Indications: Inadequate Vitamin C      aspirin 81 MG EC tablet Take 1 tablet by mouth Daily. Indications: Acute  Heart Attack      Breztri Aerosphere 160-9-4.8 MCG/ACT aerosol inhaler USE 2 INHALATIONS TWICE A DAY 10.7 g 11    budesonide (PULMICORT) 0.5 MG/2ML nebulizer solution Take 2 mL by nebulization 2 (Two) Times a Day. Indications: Chronic Obstructive Lung Disease 90 mL 3    calcium carbonate, oyster shell, 500 MG tablet tablet Take 1 tablet by mouth Daily.      cholestyramine (QUESTRAN) 4 g packet Take 1 packet by mouth Daily. 90 packet 3    cholestyramine light 4 g packet Take 1 packet by mouth 2 (Two) Times a Day.      docusate sodium 100 MG capsule Take 1 capsule by mouth 2 (Two) Times a Day.      Eliquis 5 MG tablet tablet TAKE 1 TABLET TWICE A  tablet 3    ferrous sulfate 325 (65 Fe) MG tablet Take 1 tablet by mouth Daily With Breakfast. Indications: Anemia From Inadequate Iron in the Body      finasteride (PROSCAR) 5 MG tablet TAKE 1 TABLET DAILY 90 tablet 3    furosemide (LASIX) 20 MG tablet TAKE 1 TABLET EVERY 12 HOURS FOR EDEMA 90 tablet 7    gabapentin (NEURONTIN) 600 MG tablet TAKE 1 TABLET THREE TIMES A  tablet 3    glucose blood (FREESTYLE LITE) test strip USE TO TEST BLOOD SUGAR DAILY 100 each 3    glyburide (DIAbeta) 5 MG tablet TAKE 1 TABLET EVERY MORNING AND ONE-HALF (1/2) TABLET BEFORE SUPPER 135 tablet 3    guaiFENesin (MUCINEX) 600 MG 12 hr tablet Take 1 tablet by mouth 2 (Two) Times a Day. Indications: Cough      hydroCHLOROthiazide (MICROZIDE) 12.5 MG capsule TAKE 1 CAPSULE DAILY 90 capsule 3    isosorbide mononitrate (IMDUR) 30 MG 24 hr tablet Take 1 tablet by mouth Daily. Indications: Stable Angina Pectoris      Lactobacillus (PROBIOTIC ACIDOPHILUS PO) Take  by mouth.      loratadine (CLARITIN) 10 MG tablet TAKE 1 TABLET DAILY 90 tablet 3    melatonin 3 MG tablet Take 1 tablet by mouth At Night As Needed for Sleep.      metoprolol tartrate (LOPRESSOR) 25 MG tablet Take 0.5 tablets by mouth 2 (Two) Times a Day. Indications: High Blood Pressure Disorder      Multiple Vitamins-Minerals  (MULTIVITAMIN & MINERAL PO) Take 1 tablet by mouth Daily. Indications: vit      nitroglycerin (Nitrostat) 0.4 MG SL tablet Place 1 tablet under the tongue Every 5 (Five) Minutes As Needed for Chest Pain. 90 tablet 3    pantoprazole (PROTONIX) 40 MG EC tablet TAKE 1 TABLET DAILY 90 tablet 3    polyethylene glycol 17 g packet Take 17 g by mouth Daily.      potassium chloride ER (K-TAB) 20 MEQ tablet controlled-release ER tablet TAKE 1 TABLET TWICE A DAY WITH MEALS FOR LOW AMOUNT OF POTASSIUM IN THE BLOOD 180 tablet 3    pravastatin (PRAVACHOL) 80 MG tablet TAKE 1 TABLET DAILY 90 tablet 3    Probiotic Product (PROBIOTIC ADVANCED PO) Take  by mouth.      saccharomyces boulardii (Florastor) 250 MG capsule Take 1 capsule by mouth 2 (Two) Times a Day. 60 capsule 5    saline (AYR) gel nasal gel Apply 1 application  topically to the appropriate area as directed As Needed (epistaxis). 1 each 0    terazosin (HYTRIN) 10 MG capsule TAKE 1 CAPSULE DAILY 90 capsule 3       Allergies   Allergen Reactions    Symbicort [Budesonide-Formoterol Fumarate] Dizziness     Patient passed out and fell    Prozac [Fluoxetine Hcl] Mental Status Change     Bad dreams.       Social History     Socioeconomic History    Marital status:    Tobacco Use    Smoking status: Former     Packs/day: 1.00     Years: 26.00     Additional pack years: 0.00     Total pack years: 26.00     Types: Cigarettes     Start date:      Quit date:      Years since quittin.8     Passive exposure: Past    Smokeless tobacco: Never   Vaping Use    Vaping Use: Never used   Substance and Sexual Activity    Alcohol use: No    Drug use: No    Sexual activity: Defer       Family History   Problem Relation Age of Onset    Heart disease Mother     Stroke Mother     Melanoma Mother     Heart disease Father     Diabetes Father     Prostate cancer Father     Colon cancer Neg Hx     Colon polyps Neg Hx        Review of Systems   Constitutional:  Negative for activity  "change, appetite change, chills, diaphoresis, fatigue, fever and unexpected weight change.   HENT:  Negative for ear pain, hearing loss, mouth sores, sore throat, trouble swallowing and voice change.    Eyes: Negative.    Respiratory:  Negative for cough, choking, shortness of breath and wheezing.    Cardiovascular:  Negative for chest pain and palpitations.   Gastrointestinal:  Negative for abdominal pain, blood in stool, constipation, diarrhea, nausea and vomiting.   Endocrine: Negative for cold intolerance and heat intolerance.   Genitourinary:  Negative for decreased urine volume, dysuria, frequency, hematuria and urgency.   Musculoskeletal:  Negative for back pain, gait problem and myalgias.   Skin:  Negative for color change, pallor and rash.   Allergic/Immunologic: Negative for food allergies and immunocompromised state.   Neurological:  Negative for dizziness, tremors, seizures, syncope, weakness, light-headedness, numbness and headaches.   Hematological:  Negative for adenopathy. Does not bruise/bleed easily.   Psychiatric/Behavioral:  Negative for agitation and confusion. The patient is not nervous/anxious.    All other systems reviewed and are negative.      /82 (BP Location: Left arm)   Pulse 64   Temp 96.6 °F (35.9 °C) (Temporal)   Ht 180.3 cm (70.98\")   Wt 89.3 kg (196 lb 12.8 oz)   SpO2 93%   BMI 27.46 kg/m²   Body mass index is 27.46 kg/m².    Physical Exam  Constitutional:       Appearance: He is well-developed.   HENT:      Head: Normocephalic and atraumatic.   Eyes:      Pupils: Pupils are equal, round, and reactive to light.   Neck:      Trachea: No tracheal deviation.   Cardiovascular:      Rate and Rhythm: Normal rate and regular rhythm.      Heart sounds: Normal heart sounds. No murmur heard.     No friction rub. No gallop.   Pulmonary:      Effort: Pulmonary effort is normal. No respiratory distress.      Breath sounds: Normal breath sounds. No wheezing or rales.   Chest:      " Chest wall: No tenderness.   Abdominal:      General: Bowel sounds are normal. There is no distension.      Palpations: Abdomen is soft. Abdomen is not rigid.      Tenderness: There is no abdominal tenderness. There is no guarding or rebound.   Musculoskeletal:         General: No tenderness or deformity. Normal range of motion.      Cervical back: Normal range of motion and neck supple.   Skin:     General: Skin is warm and dry.      Coloration: Skin is not pale.      Findings: No rash.   Neurological:      Mental Status: He is alert and oriented to person, place, and time.      Deep Tendon Reflexes: Reflexes are normal and symmetric.   Psychiatric:         Behavior: Behavior normal.         Thought Content: Thought content normal.         Judgment: Judgment normal.         ASSESSMENT AND PLAN         Diagnoses and all orders for this visit:    1. Colitis, Clostridium difficile (Primary)  Comments:  improved treated with Vanco    2. Diarrhea, unspecified type  Comments:  improved    3. Nonsmoker    Avoid dairy can get back on the Questran as needed        There are no Patient Instructions on file for this visit.  Erika Elena, DOMINIQUE  09:27 CDT  10/26/2023    Obesity, Adult  Obesity is the condition of having too much total body fat. Being overweight or obese means that your weight is greater than what is considered healthy for your body size. Obesity is determined by a measurement called BMI. BMI is an estimate of body fat and is calculated from height and weight. For adults, a BMI of 30 or higher is considered obese.  Obesity can eventually lead to other health concerns and major illnesses, including:  Stroke.  Coronary artery disease (CAD).  Type 2 diabetes.  Some types of cancer, including cancers of the colon, breast, uterus, and gallbladder.  Osteoarthritis.  High blood pressure (hypertension).  High cholesterol.  Sleep apnea.  Gallbladder stones.  Infertility problems.  What are the causes?  The main  cause of obesity is taking in (consuming) more calories than your body uses for energy. Other factors that contribute to this condition may include:  Being born with genes that make you more likely to become obese.  Having a medical condition that causes obesity. These conditions include:  Hypothyroidism.  Polycystic ovarian syndrome (PCOS).  Binge-eating disorder.  Cushing syndrome.  Taking certain medicines, such as steroids, antidepressants, and seizure medicines.  Not being physically active (sedentary lifestyle).  Living where there are limited places to exercise safely or buy healthy foods.  Not getting enough sleep.  What increases the risk?  The following factors may increase your risk of this condition:  Having a family history of obesity.  Being a woman of -American descent.  Being a man of  descent.  What are the signs or symptoms?  Having excessive body fat is the main symptom of this condition.  How is this diagnosed?  This condition may be diagnosed based on:  Your symptoms.  Your medical history.  A physical exam. Your health care provider may measure:  Your BMI. If you are an adult with a BMI between 25 and less than 30, you are considered overweight. If you are an adult with a BMI of 30 or higher, you are considered obese.  The distances around your hips and your waist (circumferences). These may be compared to each other to help diagnose your condition.  Your skinfold thickness. Your health care provider may gently pinch a fold of your skin and measure it.  How is this treated?  Treatment for this condition often includes changing your lifestyle. Treatment may include some or all of the following:  Dietary changes. Work with your health care provider and a dietitian to set a weight-loss goal that is healthy and reasonable for you. Dietary changes may include eating:  Smaller portions. A portion size is the amount of a particular food that is healthy for you to eat at one time. This  varies from person to person.  Low-calorie or low-fat options.  More whole grains, fruits, and vegetables.  Regular physical activity. This may include aerobic activity (cardio) and strength training.  Medicine to help you lose weight. Your health care provider may prescribe medicine if you are unable to lose 1 pound a week after 6 weeks of eating more healthily and doing more physical activity.  Surgery. Surgical options may include gastric banding and gastric bypass. Surgery may be done if:  Other treatments have not helped to improve your condition.  You have a BMI of 40 or higher.  You have life-threatening health problems related to obesity.  Follow these instructions at home:     Eating and drinking     Follow recommendations from your health care provider about what you eat and drink. Your health care provider may advise you to:  Limit fast foods, sweets, and processed snack foods.  Choose low-fat options, such as low-fat milk instead of whole milk.  Eat 5 or more servings of fruits or vegetables every day.  Eat at home more often. This gives you more control over what you eat.  Choose healthy foods when you eat out.  Learn what a healthy portion size is.  Keep low-fat snacks on hand.  Avoid sugary drinks, such as soda, fruit juice, iced tea sweetened with sugar, and flavored milk.  Eat a healthy breakfast.  Drink enough water to keep your urine clear or pale yellow.  Do not go without eating for long periods of time (do not fast) or follow a fad diet. Fasting and fad diets can be unhealthy and even dangerous.  Physical Activity   Exercise regularly, as told by your health care provider. Ask your health care provider what types of exercise are safe for you and how often you should exercise.  Warm up and stretch before being active.  Cool down and stretch after being active.  Rest between periods of activity.  Lifestyle   Limit the time that you spend in front of your TV, computer, or video game system.  Find  ways to reward yourself that do not involve food.  Limit alcohol intake to no more than 1 drink a day for nonpregnant women and 2 drinks a day for men. One drink equals 12 oz of beer, 5 oz of wine, or 1½ oz of hard liquor.  General instructions   Keep a weight loss journal to keep track of the food you eat and how much you exercise you get.  Take over-the-counter and prescription medicines only as told by your health care provider.  Take vitamins and supplements only as told by your health care provider.  Consider joining a support group. Your health care provider may be able to recommend a support group.  Keep all follow-up visits as told by your health care provider. This is important.  Contact a health care provider if:  You are unable to meet your weight loss goal after 6 weeks of dietary and lifestyle changes.  This information is not intended to replace advice given to you by your health care provider. Make sure you discuss any questions you have with your health care provider.  Document Released: 01/25/2006 Document Revised: 05/22/2017 Document Reviewed: 10/05/2016  [employer] Patient Education © 2017 [employer]      IF YOU SMOKE OR USE TOBACCO PLEASE READ THE FOLLOWING:    Why is smoking bad for me?  Smoking increases the risk of heart disease, lung disease, vascular disease, stroke, and cancer.     If you smoke, STOP!    If you would like more information on quitting smoking, please visit the [employer] website: www.[employer]/ChickRx/healthier-together/smoke   This link will provide additional resources including the QUIT line and the Beat the Pack support groups.     For more information:    Quit Now [place]  1-800-QUIT-NOW  https://[place].quitlogix.org/en-[place]/

## 2023-10-30 RX ORDER — BLOOD-GLUCOSE METER
KIT MISCELLANEOUS
Qty: 100 EACH | Refills: 3 | Status: SHIPPED | OUTPATIENT
Start: 2023-10-30

## 2023-10-30 RX ORDER — ISOSORBIDE MONONITRATE 30 MG/1
30 TABLET, EXTENDED RELEASE ORAL DAILY
Qty: 90 TABLET | Refills: 3 | Status: SHIPPED | OUTPATIENT
Start: 2023-10-30

## 2023-12-04 ENCOUNTER — TELEPHONE (OUTPATIENT)
Dept: FAMILY MEDICINE CLINIC | Facility: CLINIC | Age: 88
End: 2023-12-04
Payer: MEDICARE

## 2023-12-04 NOTE — TELEPHONE ENCOUNTER
Patient came in to clinic today asking RO if it be okay to take Root Beet supplement with his other medications. RO advised that medication is not FDA approved,, no research in its pro/con, he ask that we file a note on patient's chart and advise him to lean against taking medication

## 2023-12-13 ENCOUNTER — PROCEDURE VISIT (OUTPATIENT)
Dept: UROLOGY | Facility: CLINIC | Age: 88
End: 2023-12-13
Payer: MEDICARE

## 2023-12-13 DIAGNOSIS — Z85.51 HISTORY OF BLADDER CANCER: Primary | ICD-10-CM

## 2023-12-13 LAB
BILIRUB BLD-MCNC: NEGATIVE MG/DL
CLARITY, POC: CLEAR
COLOR UR: YELLOW
GLUCOSE UR STRIP-MCNC: NEGATIVE MG/DL
KETONES UR QL: NEGATIVE
LEUKOCYTE EST, POC: NEGATIVE
NITRITE UR-MCNC: NEGATIVE MG/ML
PH UR: 5.5 [PH] (ref 5–8)
PROT UR STRIP-MCNC: NEGATIVE MG/DL
RBC # UR STRIP: NEGATIVE /UL
SP GR UR: 1.01 (ref 1–1.03)
UROBILINOGEN UR QL: NORMAL

## 2023-12-13 PROCEDURE — 52000 CYSTOURETHROSCOPY: CPT | Performed by: UROLOGY

## 2023-12-13 PROCEDURE — 81001 URINALYSIS AUTO W/SCOPE: CPT | Performed by: UROLOGY

## 2023-12-13 NOTE — PROGRESS NOTES
Pre- operative diagnosis:  Bladder cancer surveillance. 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  He remains on finasteride and Hytrin.  Cystoscopy last visit June 2022 demonstrated small papillary tumors present on his intravesical prostate lobe.  He denies hematuria or bothersome LUTS     Post operative diagnosis:  Bladder Tumor- small, stable    Procedure:  The patient was prepped and draped in a normal sterile fashion.  The urethra was anesthetized with 2% lidocaine jelly.  A flexible cystoscope was introduced per urethra.      Urethra:  Normal    Bladder:  Normal mucosa and mild trabeculation.  There are small papillary bladder tumors present on his intravesical prostate lobe urothelium.  These appear to be overall stable from 6 months ago     Ureteral orifices:  Normal position bilaterally and Clear efflux bilaterally    Prostate:  lateral lobe hypertrophy-with large intravesical lobe    Patient tolerated the procedure well    Complications: none    Blood loss: minimal    Follow up:    Routine follow up - surveillance cystoscopy in 12 months.  Given his advanced age and oxygen requirement along with the very small size and stability of his papillary bladder tumors on his intravesical lobe, patient would like to observe for now.  He will follow-up with me for surveillance cystoscopy in 1 year or sooner as needed.       This document has been signed by [place] Cadena MD on December 13, 2023 17:52 CST

## 2023-12-13 NOTE — LETTER
December 13, 2023     Abel Romero MD  [address] [facility] [address]    Patient: Gonzalo Durham   YOB: 1934   Date of Visit: 12/13/2023     Dear Dr. Nick MD:    Thank you for referring Gonzalo Durham to me for evaluation. Below are the relevant portions of my assessment and plan of care.    If you have questions, please do not hesitate to call me. I look forward to following Gonzalo along with you.         Sincerely,        Danie Cadena MD        CC: No Recipients      Progress Notes:  Pre- operative diagnosis:  Bladder cancer surveillance. 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  He remains on finasteride and Hytrin.  Cystoscopy last visit June 2022 demonstrated small papillary tumors present on his intravesical prostate lobe.  He denies hematuria or bothersome LUTS     Post operative diagnosis:  Bladder Tumor- small, stable    Procedure:  The patient was prepped and draped in a normal sterile fashion.  The urethra was anesthetized with 2% lidocaine jelly.  A flexible cystoscope was introduced per urethra.      Urethra:  Normal    Bladder:  Normal mucosa and mild trabeculation.  There are small papillary bladder tumors present on his intravesical prostate lobe urothelium.  These appear to be overall stable from 6 months ago     Ureteral orifices:  Normal position bilaterally and Clear efflux bilaterally    Prostate:  lateral lobe hypertrophy-with large intravesical lobe    Patient tolerated the procedure well    Complications: none    Blood loss: minimal    Follow up:    Routine follow up - surveillance cystoscopy in 12 months.  Given his advanced age and oxygen requirement along with the very small size and stability of his papillary bladder tumors on his intravesical lobe, patient would like to observe for now.  He will follow-up with me for surveillance cystoscopy in 1 year or  sooner as needed.       This document has been signed by [place] Cadena MD on December 13, 2023 17:52 CST

## 2024-01-08 ENCOUNTER — TELEPHONE (OUTPATIENT)
Dept: VASCULAR SURGERY | Facility: CLINIC | Age: 89
End: 2024-01-08
Payer: MEDICARE

## 2024-01-09 ENCOUNTER — OFFICE VISIT (OUTPATIENT)
Dept: VASCULAR SURGERY | Facility: CLINIC | Age: 89
End: 2024-01-09
Payer: MEDICARE

## 2024-01-09 ENCOUNTER — HOSPITAL ENCOUNTER (OUTPATIENT)
Dept: CT IMAGING | Facility: HOSPITAL | Age: 89
Discharge: HOME | End: 2024-01-09
Payer: MEDICARE

## 2024-01-09 ENCOUNTER — HOSPITAL ENCOUNTER (OUTPATIENT)
Dept: ULTRASOUND IMAGING | Facility: HOSPITAL | Age: 89
Discharge: HOME | End: 2024-01-09
Payer: MEDICARE

## 2024-01-09 VITALS
SYSTOLIC BLOOD PRESSURE: 136 MMHG | DIASTOLIC BLOOD PRESSURE: 78 MMHG | HEIGHT: 70 IN | WEIGHT: 195 LBS | BODY MASS INDEX: 27.92 KG/M2 | HEART RATE: 55 BPM | OXYGEN SATURATION: 99 %

## 2024-01-09 DIAGNOSIS — E78.5 HYPERLIPIDEMIA LDL GOAL <70: ICD-10-CM

## 2024-01-09 DIAGNOSIS — I65.23 BILATERAL CAROTID ARTERY STENOSIS: ICD-10-CM

## 2024-01-09 DIAGNOSIS — I71.40 ABDOMINAL AORTIC ANEURYSM (AAA) 3.0 CM TO 5.5 CM IN DIAMETER IN MALE: Primary | ICD-10-CM

## 2024-01-09 DIAGNOSIS — I71.40 ABDOMINAL AORTIC ANEURYSM (AAA) 3.0 CM TO 5.5 CM IN DIAMETER IN MALE: ICD-10-CM

## 2024-01-09 DIAGNOSIS — I10 PRIMARY HYPERTENSION: ICD-10-CM

## 2024-01-09 LAB — CREAT BLDA-MCNC: 1.3 MG/DL (ref 0.6–1.3)

## 2024-01-09 PROCEDURE — 93880 EXTRACRANIAL BILAT STUDY: CPT

## 2024-01-09 PROCEDURE — 82565 ASSAY OF CREATININE: CPT

## 2024-01-09 PROCEDURE — 74174 CTA ABD&PLVS W/CONTRAST: CPT

## 2024-01-09 PROCEDURE — 1160F RVW MEDS BY RX/DR IN RCRD: CPT | Performed by: NURSE PRACTITIONER

## 2024-01-09 PROCEDURE — 1159F MED LIST DOCD IN RCRD: CPT | Performed by: NURSE PRACTITIONER

## 2024-01-09 PROCEDURE — 99214 OFFICE O/P EST MOD 30 MIN: CPT | Performed by: NURSE PRACTITIONER

## 2024-01-09 PROCEDURE — 93880 EXTRACRANIAL BILAT STUDY: CPT | Performed by: SURGERY

## 2024-01-09 PROCEDURE — 25510000001 IOPAMIDOL PER 1 ML: Performed by: NURSE PRACTITIONER

## 2024-01-09 RX ADMIN — IOPAMIDOL 100 ML: 755 INJECTION, SOLUTION INTRAVENOUS at 10:38

## 2024-01-09 NOTE — PROGRESS NOTES
"1/9/2024       Abel Romero MD  [address] [facility] [address]    Gonzalo Durham  1934    Chief Complaint   Patient presents with    Follow-up     1 year follow up w/ testing. Last seen 1/9/23. Patient denies any new or worsening back or abdominal pains.        Dear Abel Romero MD     I had the pleasure of seeing your patient Gonzalo Durham in the office today.    As you recall, Gonzalo Durham is a 89 y.o.  male who we are following for a small abdominal aortic aneurysm.  He was previously a smoker but quit 30 years ago.   He is maintained on aspirin, Eliquis, and Pravachol.  Currently he is doing well and denies any abdominal pain.  He did have noninvasive testing performed today, which I did review in office.       Review of Systems   Constitutional: Negative.    HENT: Negative.     Eyes: Negative.    Respiratory:  Positive for shortness of breath (wears O2).    Cardiovascular: Negative.    Gastrointestinal: Negative.    Endocrine: Negative.    Genitourinary: Negative.    Musculoskeletal: Negative.         Pain to feet, neuropathy   Skin: Negative.    Allergic/Immunologic: Negative.    Neurological: Negative.    Hematological: Negative.    Psychiatric/Behavioral: Negative.     All other systems reviewed and are negative.      /78   Pulse 55   Ht 177.8 cm (70\")   Wt 88.5 kg (195 lb)   SpO2 99%   BMI 27.98 kg/m²   Physical Exam  Vitals and nursing note reviewed.   Constitutional:       Appearance: Normal appearance. He is well-developed.   HENT:      Head: Normocephalic and atraumatic.   Eyes:      General: No scleral icterus.     Pupils: Pupils are equal, round, and reactive to light.   Neck:      Thyroid: No thyromegaly.   Cardiovascular:      Rate and Rhythm: Normal rate and regular rhythm.      Heart sounds: Normal heart sounds.   Pulmonary:      Effort: Pulmonary effort is normal.      Breath sounds: Normal breath sounds.      Comments: O2 per nasal cannula  Abdominal:      " General: Bowel sounds are normal.      Palpations: Abdomen is soft. There is pulsatile mass.      Tenderness: There is no abdominal tenderness.   Musculoskeletal:         General: Normal range of motion.      Cervical back: Normal range of motion and neck supple.   Skin:     General: Skin is warm and dry.   Neurological:      Mental Status: He is alert and oriented to person, place, and time.   Psychiatric:         Behavior: Behavior normal.         Thought Content: Thought content normal.         Judgment: Judgment normal.          Diagnostic Data:  US Carotid Bilateral    Result Date: 1/9/2024  Narrative: History: Carotid occlusive disease      Impression: Impression: 1. There is less than 50% stenosis of the right internal carotid artery. 2. There is less than 50% stenosis of the left internal carotid artery. 3. Antegrade flow is demonstrated in bilateral vertebral arteries.  Comments: Bilateral carotid vertebral arterial duplex scan was performed.  Grayscale imaging shows intimal thickening and calcified elements at the carotid bifurcation. The right internal carotid artery peak systolic velocity is 82 cm/sec. The end-diastolic velocity is 24.4 cm/sec. The right ICA/CCA ratio is approximately 1.1. These findings correlate with less than 50% stenosis of the right internal carotid artery.  Grayscale imaging shows intimal thickening and calcified elements at the carotid bifurcation. The left internal carotid artery peak systolic velocity is 102.1 cm/sec. The end-diastolic velocity is 29.8 cm/sec. The left ICA/CCA ratio is approximately 1.1. These findings correlate with less than 50% stenosis of the left internal carotid artery.  Antegrade flow is demonstrated in bilateral vertebral arteries. There is greater than 50% stenosis in the left external carotid artery.  This report was signed and finalized on 1/9/2024 4:35 PM by Dr. Nasir Gutierrez MD.      CT Angiogram Abdomen Pelvis    Result Date:  1/9/2024  Narrative: EXAM/TECHNIQUE: CT angiography with 3D MIP images abdomen and pelvis with IV contrast  INDICATION: Aortic aneurysm (AAA), surveillance; I71.40-Abdominal aortic aneurysm, without rupture, unspecified  COMPARISON: 1/24/2023  DLP: 1260.09 mGy.cm. Automated exposure control was also utilized to decrease patient radiation dose.  FINDINGS:  Slight increase in size of 4.5 x 4.7 cm infrarenal abdominal aortic aneurysm (previously 4.5 x 4.5 cm). Aneurysm begins 3.4 cm distal to the left renal artery and extends to the aortic bifurcation. Peripheral atherosclerotic calcified plaque is present.  The celiac artery, SMA, and SAVANNAH are widely patent. Renal arteries are widely patent. Accessory left upper pole renal artery is noted. Common iliac arteries are widely patent and nonaneurysmal, with heavy atherosclerotic calcification. Internal iliac, external iliac, common femoral, and proximal SFAs are widely patent.  Severe emphysema and mild atelectasis is noted in the included portion of the lung bases. No change in 6 mm right lower lobe pulmonary nodule image 40.  Mild diffuse hepatic steatosis. No arterial enhancing liver lesion. Small area of transient hepatic intensity difference along the right liver is again noted. No gallstone or gallbladder wall thickening. No biliary ductal dilatation. Pancreas, spleen, and adrenal glands are unremarkable.  No solid renal mass. No urolithiasis or hydronephrosis. Diffuse urinary bladder wall thickening. Marked enlargement of the prostate with median lobe hypertrophy indenting the urinary bladder lumen. Prostate is 6 cm in transverse dimension.  No ascites or free pelvic fluid. No pelvic mass or organized pelvic collection. No enlarged abdominal or pelvic lymph nodes.  No acute abdominal wall soft tissue abnormality. No acute osseous finding.      Impression:  1.  Slight increase in size of 4.5 x 4.7 cm infrarenal abdominal aortic aneurysm (previously 4.5 x 4.5 cm).  2.   No change in 6 mm pulmonary nodule in the right lung base. Severe emphysema is noted.  3.  Marked enlargement of the prostate with median lobe hypertrophy indenting the urinary bladder lumen.  This report was signed and finalized on 1/9/2024 10:55 AM by Dr. Reg Dhillon MD.           Patient Active Problem List   Diagnosis    Wellness examination-done    Obesity    Controlled type 2 diabetes mellitus with circulatory disorder, without long-term current use of insulin    Stage 3a chronic kidney disease    Erectile dysfunction    Coronary artery disease involving native coronary artery of native heart without angina pectoris    Hyperlipidemia LDL goal <70-statin    Hypertension    Prostatism-(young) urology    Tremor    Varicose vein of leg    Plantar fasciitis    Nocturnal leg movements    Bad dreams    Depression    Peripheral neuropathy- clinical    Hx of colonic polyps    Gastroesophageal reflux disease    Left lower quadrant pain    Laboratory test*    Anticoagulated-CAD, DM2, CVA/ASA 81+()+plavix » Anticoagulated-CAD, DM2, CVA/ASA 81+()+plavix+eliquist    SOB: copd,CAD,#, hypoxemia    Hypoxia#to pulmonary    Corn or callus    Anemia: ASA81,plavix,eliquis,gi(3/3+,div,cp,eitis    Atherosclerosis: CAD, AAA vascular    AAA: 2022-(ro) steffen    Heme positive stool    Stage 2 moderate COPD by GOLD classification    Abnormal stress test    Tingling of both feet-to consider NCV    Cryptogenic stroke    Chronic diastolic congestive heart failure    Gross hematuria    BPH with obstruction/lower urinary tract symptoms    Chest pain    Obstructive sleep apnea    Abnormal CT of the chest 1.2022/done    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 Abdominal aortic aneurysm (AAA) 3.0 cm to 5.5 cm in diameter in male  I71.40 441.4   2. Primary hypertension  I10 401.9   3. Hyperlipidemia LDL goal <70  E78.5 272.4           Plan: After thoroughly evaluating Gonzalo Durham, I believe the best course of action is to remain conservative from vascular surgery standpoint. I did review his testing which shows aneurysm measuring 4.7 cm.  His carotid duplex shows less than 50% carotid stenosis bilaterally.  We will see him back in 1 year with repeat noninvasive testing including a CTA of the abdomen and pelvis.  I did discuss vascular risk factors as they pertain to the progression of vascular disease including controlling his hypertension and hyperlipidemia.   His blood pressure is stable on his current medications.  He is maintained on Pravachol for his hyperlipidemia.  The patient can continue taking their current medication regimen as previously planned.  This was all discussed in full with complete understanding.    Thank you for allowing me to participate in the care of your patient.  Please do not hesitate with any questions or concerns.  I will keep you aware of any further encounters with Gonzalo Durham.        Sincerely yours,         DOMINIQUE Henry

## 2024-01-12 ENCOUNTER — OFFICE VISIT (OUTPATIENT)
Dept: CARDIOLOGY | Facility: CLINIC | Age: 89
End: 2024-01-12
Payer: MEDICARE

## 2024-01-12 VITALS
HEART RATE: 66 BPM | HEIGHT: 70 IN | OXYGEN SATURATION: 97 % | BODY MASS INDEX: 29.43 KG/M2 | RESPIRATION RATE: 18 BRPM | WEIGHT: 205.6 LBS | SYSTOLIC BLOOD PRESSURE: 122 MMHG | DIASTOLIC BLOOD PRESSURE: 68 MMHG

## 2024-01-12 DIAGNOSIS — Z79.01 CHRONIC ANTICOAGULATION: ICD-10-CM

## 2024-01-12 DIAGNOSIS — I48.0 PAF (PAROXYSMAL ATRIAL FIBRILLATION): ICD-10-CM

## 2024-01-12 DIAGNOSIS — G47.33 OBSTRUCTIVE SLEEP APNEA: ICD-10-CM

## 2024-01-12 DIAGNOSIS — J41.8 MIXED SIMPLE AND MUCOPURULENT CHRONIC BRONCHITIS: ICD-10-CM

## 2024-01-12 DIAGNOSIS — I63.9 CEREBROVASCULAR ACCIDENT (CVA), UNSPECIFIED MECHANISM: ICD-10-CM

## 2024-01-12 DIAGNOSIS — I10 PRIMARY HYPERTENSION: ICD-10-CM

## 2024-01-12 DIAGNOSIS — I25.10 CORONARY ARTERY DISEASE INVOLVING NATIVE CORONARY ARTERY OF NATIVE HEART WITHOUT ANGINA PECTORIS: Primary | ICD-10-CM

## 2024-01-12 DIAGNOSIS — E78.5 HYPERLIPIDEMIA LDL GOAL <70: ICD-10-CM

## 2024-01-12 NOTE — PROGRESS NOTES
Subjective:     Encounter Date: 01/12/2024      Patient ID: Gonzalo Durham is a 89 y.o. male with coronary artery disease s/p 3V-CABG in 1980 in [facility], CVA, pulmonary nodule, hypertension, hyperlipidemia, paroxysmal atrial fibrillation and obstructive sleep apnea.     Chief Complaint: routine follow up  History of Present Illness  Patient presents today for management of coronary artery disease. He reports that he has been doing well. He reports that his C-diff from May finally resolved and his stool has returned to normal. He denies any chest pain. He reports that he has chronic dyspnea on exertion and is on continuous supplemental oxygen at 4L via NC. He denies any heart racing or palpitations. He reports that his BP has remained controlled 110-125/50-70. He denies any leg swelling, orthopnea or PND. He denies any dizziness or lightheadedness. Patient is on eliquis and denies any bleeding issues. Patient follows with Dr Romero as PCP.     The following portions of the patient's history were reviewed and updated as appropriate: allergies, current medications, past family history, past medical history, past social history, past surgical history and problem list.    Allergies   Allergen Reactions    Symbicort [Budesonide-Formoterol Fumarate] Dizziness     Patient passed out and fell    Prozac [Fluoxetine Hcl] Mental Status Change     Bad dreams.       Current Outpatient Medications:     acetaminophen (TYLENOL) 325 MG tablet, Take 2 tablets by mouth 2 (Two) Times a Day., Disp: 360 tablet, Rfl: 2    albuterol sulfate  (90 Base) MCG/ACT inhaler, USE 2 INHALATIONS FOUR TIMES A DAY, Disp: 51 g, Rfl: 3    ascorbic acid (VITAMIN C) 500 MG tablet, Take 1 tablet by mouth 2 (Two) Times a Day. Indications: Inadequate Vitamin C, Disp: , Rfl:     aspirin 81 MG EC tablet, Take 1 tablet by mouth Daily. Indications: Acute Heart Attack, Disp: , Rfl:     Breztri Aerosphere 160-9-4.8 MCG/ACT aerosol inhaler, USE 2  INHALATIONS TWICE A DAY, Disp: 10.7 g, Rfl: 11    calcium carbonate, oyster shell, 500 MG tablet tablet, Take 1 tablet by mouth Daily., Disp: , Rfl:     Eliquis 5 MG tablet tablet, TAKE 1 TABLET TWICE A DAY, Disp: 180 tablet, Rfl: 3    ferrous sulfate 325 (65 Fe) MG tablet, Take 1 tablet by mouth Daily With Breakfast. Indications: Anemia From Inadequate Iron in the Body, Disp: , Rfl:     finasteride (PROSCAR) 5 MG tablet, TAKE 1 TABLET DAILY, Disp: 90 tablet, Rfl: 3    furosemide (LASIX) 20 MG tablet, TAKE 1 TABLET EVERY 12 HOURS FOR EDEMA, Disp: 90 tablet, Rfl: 7    glucose blood (FREESTYLE LITE) test strip, USE TO TEST BLOOD SUGAR DAILY, Disp: 100 each, Rfl: 3    glyburide (DIAbeta) 5 MG tablet, TAKE 1 TABLET EVERY MORNING AND ONE-HALF (1/2) TABLET BEFORE SUPPER, Disp: 135 tablet, Rfl: 3    guaiFENesin (MUCINEX) 600 MG 12 hr tablet, Take 1 tablet by mouth 2 (Two) Times a Day. Indications: Cough, Disp: , Rfl:     hydroCHLOROthiazide (MICROZIDE) 12.5 MG capsule, TAKE 1 CAPSULE DAILY, Disp: 90 capsule, Rfl: 3    isosorbide mononitrate (IMDUR) 30 MG 24 hr tablet, Take 1 tablet by mouth Daily. Indications: Stable Angina Pectoris, Disp: 90 tablet, Rfl: 3    Lactobacillus (PROBIOTIC ACIDOPHILUS PO), Take  by mouth., Disp: , Rfl:     loratadine (CLARITIN) 10 MG tablet, TAKE 1 TABLET DAILY, Disp: 90 tablet, Rfl: 3    melatonin 3 MG tablet, Take 1 tablet by mouth At Night As Needed for Sleep., Disp: , Rfl:     metoprolol tartrate (LOPRESSOR) 25 MG tablet, Take 0.5 tablets by mouth 2 (Two) Times a Day for 90 days. Indications: High Blood Pressure Disorder, Disp: 90 tablet, Rfl: 0    Multiple Vitamins-Minerals (MULTIVITAMIN & MINERAL PO), Take 1 tablet by mouth Daily. Indications: vit, Disp: , Rfl:     nitroglycerin (Nitrostat) 0.4 MG SL tablet, Place 1 tablet under the tongue Every 5 (Five) Minutes As Needed for Chest Pain., Disp: 90 tablet, Rfl: 3    pantoprazole (PROTONIX) 40 MG EC tablet, TAKE 1 TABLET DAILY, Disp: 90  tablet, Rfl: 3    polyethylene glycol 17 g packet, Take 17 g by mouth Daily., Disp: , Rfl:     potassium chloride ER (K-TAB) 20 MEQ tablet controlled-release ER tablet, TAKE 1 TABLET TWICE A DAY WITH MEALS FOR LOW AMOUNT OF POTASSIUM IN THE BLOOD, Disp: 180 tablet, Rfl: 3    pravastatin (PRAVACHOL) 80 MG tablet, TAKE 1 TABLET DAILY, Disp: 90 tablet, Rfl: 3    Probiotic Product (PROBIOTIC ADVANCED PO), Take  by mouth., Disp: , Rfl:     saline (AYR) gel nasal gel, Apply 1 application  topically to the appropriate area as directed As Needed (epistaxis)., Disp: 1 each, Rfl: 0    terazosin (HYTRIN) 10 MG capsule, TAKE 1 CAPSULE DAILY, Disp: 90 capsule, Rfl: 3    Past Medical History:   Diagnosis Date    A-fib     AAA (abdominal aortic aneurysm) without rupture     Arthritis     BPH (benign prostatic hypertrophy)     Cataract     bialteral    CKD (chronic kidney disease)     COPD (chronic obstructive pulmonary disease)     Coronary artery disease involving native coronary artery of native heart without angina pectoris 1/20/2017    CABG/Az/[place]/1981 No [facility] echo  7.16.18 Right ventricular cavity is mild-to-moderately dilated. Left ventricular diastolic dysfunction (grade I) consistent with impaired relaxation. Left ventricular systolic function is normal. Left atrial cavity size is borderline dilated. RIGHT HEART ENLARGEMENT - RVSP NOT ASSESSABLE NORMAL LV AND RV SYSTOLIC FUNCTION NO SIGNIFICANT VALVULAR DYSFUNCTION  Seein    Diabetes mellitus     Type 2    DM (diabetes mellitus screen)     GERD (gastroesophageal reflux disease)     History of loop recorder     at current time    Hx of colonic polyp     Hypercholesteremia     Hypertension     Macular degeneration     treated with injections in right eye    Myocardial infarction     Neuropathy     Oxygen deficit     at 4liters    Prostate cancer     Pulmonary hypertension 1/7/2022    S/P CABG x 3 1/7/2022 1980 [facility]    Sleep apnea     cpap    Stage 3a chronic  kidney disease 2017    Stroke     recovererd    Varicose vein of leg     bialteral     Social History     Socioeconomic History    Marital status:    Tobacco Use    Smoking status: Former     Packs/day: 1.00     Years: 26.00     Additional pack years: 0.00     Total pack years: 26.00     Types: Cigarettes     Start date:      Quit date:      Years since quittin.0     Passive exposure: Past    Smokeless tobacco: Never   Vaping Use    Vaping Use: Never used   Substance and Sexual Activity    Alcohol use: No    Drug use: No    Sexual activity: Defer       Review of Systems   HENT:  Negative for nosebleeds.    Cardiovascular:  Positive for dyspnea on exertion (chronic; on supplemental O2). Negative for chest pain, irregular heartbeat, leg swelling, near-syncope, orthopnea, palpitations, paroxysmal nocturnal dyspnea and syncope.   Respiratory:  Positive for shortness of breath.    Gastrointestinal:  Negative for diarrhea.   Genitourinary:  Negative for hematuria.   Neurological:  Negative for dizziness and weakness.   All other systems reviewed and are negative.         Objective:     Vitals reviewed.   Constitutional:       General: Not in acute distress.     Appearance: Normal appearance. Well-developed. Frail.      Interventions: Nasal cannula in place.      Comments: Patient is on continuous supplemental oxygen at 4L via NC   Eyes:      Pupils: Pupils are equal, round, and reactive to light.   HENT:      Head: Normocephalic and atraumatic.      Nose: Nose normal.   Neck:      Vascular: No carotid bruit.   Pulmonary:      Effort: Pulmonary effort is normal. No respiratory distress.      Breath sounds: Normal breath sounds. No wheezing. No rales.   Cardiovascular:      Normal rate. Regular rhythm.      Murmurs: There is no murmur.   Edema:     Peripheral edema absent.   Abdominal:      General: There is no distension.      Palpations: Abdomen is soft.   Musculoskeletal: Normal range of motion.  "     Cervical back: Normal range of motion and neck supple. Skin:     General: Skin is warm.      Findings: No erythema or rash.   Neurological:      General: No focal deficit present.      Mental Status: Alert and oriented to person, place, and time.   Psychiatric:         Attention and Perception: Attention normal.         Mood and Affect: Mood normal.         Speech: Speech normal.         Behavior: Behavior normal.         Thought Content: Thought content normal.         Judgment: Judgment normal.         /68   Pulse 66   Resp 18   Ht 177.8 cm (70\")   Wt 93.3 kg (205 lb 9.6 oz)   SpO2 97% Comment: 4L o2  BMI 29.50 kg/m²       ECG 12 Lead    Date/Time: 1/12/2024 8:51 AM  Performed by: Junior Rees APRN    Authorized by: Junior Rees APRN  Comparison: compared with previous ECG from 7/10/2023  Similar to previous ECG  Rhythm: sinus rhythm  Ectopy: multifocal PVCs  Rate: normal  BPM: 66          Lab Review:     Results for orders placed during the hospital encounter of 10/26/22    Adult Transthoracic Echo Complete w/ Color, Spectral and Contrast if necessary per protocol    Interpretation Summary    Left ventricular systolic function is normal. Left ventricular ejection fraction appears to be 56 - 60%.    Left ventricular diastolic function was normal.    Abnormal global longitudinal LV strain (GLS) = -11.0%    Estimated right ventricular systolic pressure from tricuspid regurgitation is normal (<35 mmHg).    Lab Results   Component Value Date    CHOL 123 02/14/2023    CHLPL 199 07/20/2023    TRIG 161 (H) 07/20/2023    HDL 37 (L) 07/20/2023     (H) 07/20/2023     I have personally reviewed echo, labs, hospitalization notes and past office notes prior to patients visit  Assessment:          Diagnosis Plan   1. Coronary artery disease involving native coronary artery of native heart without angina pectoris        2. PAF (paroxysmal atrial fibrillation)        3. Chronic anticoagulation      "   4. Primary hypertension        5. Hyperlipidemia LDL goal <70-statin        6. Obstructive sleep apnea        7. Cerebrovascular accident (CVA), unspecified mechanism        8. Mixed simple and mucopurulent chronic bronchitis                 Plan:       CAD: s/p 3V-CABG in 1980. [facility] 2019 showed 70% stenosis of diagonal branch, patent SVG to LAD, patent SVG to RCA, occluded mid LAD stent and occluded proximal RCA. Patient denies any chest pain. Continue aspirin, metoprolol, pravastatin and imdur    2. Paroxysmal Atrial fibrillation: EKG today shows NSR with rate of 66.     3. Chronic anticoagulation: patient is on Eliquis and denies any bleeding issues.     4. Hypertension: Controlled in office. Continue current medications    5. Hyperlipidemia: Lipid panel 7/20/2023 shows elevated LDL at 133. Continue pravastatin. Managed and followed by PCP    6. WENDY    7. CVA    8. COPD: Patient is on continuous supplemental oxygen at 4L via NC. Patient follows with pulmonology.     I attest that all portions of this note reviewed and all information has been updated by myself to reflect the patient's current status.      I spent 37 minutes caring for Gonzalo on this date of service. This time includes time spent by me in the following activities:preparing for the visit, reviewing tests, obtaining and/or reviewing a separately obtained history, performing a medically appropriate examination and/or evaluation , counseling and educating the patient/family/caregiver, and documenting information in the medical record    I spent 2 minutes on the separately reported service of EKG. This time is not included in the time used to support the E/M service also reported today.    Patient is to follow up in 6 months or sooner if needed

## 2024-01-15 ENCOUNTER — OFFICE VISIT (OUTPATIENT)
Dept: OTOLARYNGOLOGY | Facility: CLINIC | Age: 89
End: 2024-01-15
Payer: MEDICARE

## 2024-01-15 VITALS
OXYGEN SATURATION: 94 % | HEART RATE: 54 BPM | DIASTOLIC BLOOD PRESSURE: 71 MMHG | SYSTOLIC BLOOD PRESSURE: 126 MMHG | BODY MASS INDEX: 29.63 KG/M2 | HEIGHT: 70 IN | WEIGHT: 207 LBS | TEMPERATURE: 98.2 F

## 2024-01-15 DIAGNOSIS — R04.0 EPISTAXIS: Primary | ICD-10-CM

## 2024-01-15 DIAGNOSIS — D48.7 NEOPLASM OF UNCERTAIN BEHAVIOR OF NOSE: ICD-10-CM

## 2024-01-15 DIAGNOSIS — T16.1XXA FOREIGN BODY OF RIGHT EAR, INITIAL ENCOUNTER: ICD-10-CM

## 2024-01-15 DIAGNOSIS — Z79.01 ANTICOAGULATED: ICD-10-CM

## 2024-01-15 NOTE — PROGRESS NOTES
YOB: 1934  Location: [facility]  Location Address: [address] [place] [address], [facility], [address] [place] [address]  Location Phone: [phone]    Chief Complaint   Patient presents with    Nose Bleed       History of Present Illness  Gonzalo Durham is a 89 y.o. male.  Gonzalo Durham is here for follow up of ENT complaints. The patient has had problems with epistaxis. He states that he has not had any more episodes since the last visit. He has been using saline gel and beeswax ointment intranasally. He currently uses oxygen 4L daily. This is humidified when he is at home.   He currently takes aspirin and eliquis.  He states that he saw his pulmonologist who recommended to continue to stay off CPAP. He follows up with him in March.    Today, he admits a nasal lesion that has been present for the last several months.      Past Medical History:   Diagnosis Date    A-fib     AAA (abdominal aortic aneurysm) without rupture     Arthritis     BPH (benign prostatic hypertrophy)     Cataract     bialteral    CKD (chronic kidney disease)     COPD (chronic obstructive pulmonary disease)     Coronary artery disease involving native coronary artery of native heart without angina pectoris 2017    CABG/Az/[place]/1981 No TCC echo  7.16.18 Right ventricular cavity is mild-to-moderately dilated. Left ventricular diastolic dysfunction (grade I) consistent with impaired relaxation. Left ventricular systolic function is normal. Left atrial cavity size is borderline dilated. 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 BMS vs REGGIE Get cabg report  ;  Surgeon: Bradley Carver MD;  Location:  [facility];  Service: Cardiology    COLONOSCOPY N/A 6/15/2017    Diverticulosis repeat exam prn    COLONOSCOPY W/ POLYPECTOMY  10/21/2013    2 Tubular adenomatous polyps, Diverticulosis repeat exam in 5 years    CORONARY ARTERY BYPASS GRAFT  1980    X 3    CYSTOSCOPY RETROGRADE PYELOGRAM Bilateral 2/8/2021    Procedure: CYSTOSCOPY RETROGRADE PYELOGRAM;  Surgeon: Danie Cadena MD;  Location:  [facility] OR;  Service: Urology;  Laterality: Bilateral;    ENDOSCOPY N/A 6/15/2017    LA Grade A reflux esophagitis    EYE SURGERY Bilateral     HERNIA REPAIR      TRANSURETHRAL RESECTION OF BLADDER TUMOR N/A 2/8/2021    Procedure: CYSTOSCOPY TRANSURETHRAL RESECTION OF BLADDER TUMOR WITH GEMCITABINE INSTILLATION;  Surgeon: Danie Cadena MD;  Location:  [facility] OR;  Service: Urology;  Laterality: N/A;       No outpatient medications have been marked as taking for the 1/15/24 encounter (Office Visit) with Ronnie Ugarte APRN.       Symbicort [budesonide-formoterol fumarate] and Prozac [fluoxetine hcl]    Family History   Problem Relation Age of Onset    Heart disease Mother     Stroke Mother     Melanoma Mother     Heart disease Father     Diabetes Father     Prostate cancer Father     Colon cancer Neg Hx     Colon polyps Neg Hx        Social History     Socioeconomic History    Marital status:    Tobacco Use    Smoking status: Former     Packs/day:  1.00     Years: 26.00     Additional pack years: 0.00     Total pack years: 26.00     Types: Cigarettes     Start date:      Quit date: 1980     Years since quittin.0     Passive exposure: Past    Smokeless tobacco: Never   Vaping Use    Vaping Use: Never used   Substance and Sexual Activity    Alcohol use: No    Drug use: No    Sexual activity: Defer       Review of Systems   Constitutional: Negative.    HENT: Negative.     Respiratory: Negative.     Skin:         Admits nasal lesion   Neurological: Negative.        Vitals:    01/15/24 0857   BP: 126/71   Pulse: 54   Temp: 98.2 °F (36.8 °C)   SpO2: 94%       Body mass index is 29.7 kg/m².    Objective     Physical Exam  Vitals reviewed.   Constitutional:       Appearance: Normal appearance.   HENT:      Head: Normocephalic.      Right Ear: Tympanic membrane and external ear normal. A foreign body is present.      Left Ear: Tympanic membrane, ear canal and external ear normal.      Ears:      Comments: Bilateral hearing aids noted, removed for exam     Nose: Nose normal.        Mouth/Throat:      Lips: Pink.      Mouth: Mucous membranes are moist.   Musculoskeletal:      Cervical back: Full passive range of motion without pain.   Neurological:      Mental Status: He is alert.   Psychiatric:         Behavior: Behavior is cooperative.     Foreign Body Removal    Date/Time: 1/15/2024 9:58 AM    Performed by: Ronnie Ugarte APRN  Authorized by: Ronnie Ugarte APRN  Consent: Verbal consent obtained.  Consent given by: patient  Patient understanding: patient states understanding of the procedure being performed  Patient identity confirmed: verbally with patient  Body area: ear  Location details: right ear    Sedation:  Patient sedated: no    Post-procedure assessment: foreign body removed  Patient tolerance: patient tolerated the procedure well with no immediate complications         Assessment & Plan   Diagnoses and all orders for this visit:    1. Epistaxis  (Primary)    2. Neoplasm of uncertain behavior of nose    3. Anticoagulated    Other orders  -     Foreign Body Removal      * Surgery not found *  Orders Placed This Encounter   Procedures    Foreign Body Removal     This order was created via procedure documentation     Order Specific Question:   Release to patient     Answer:   Routine Release [[id number]]     Continue saline gel  Will continue to monitor nasal lesion as patient states that he is not interested in surgical intervention at this time  Return for problems    Return in about 3 months (around 4/15/2024) for Recheck with Dr. Diaz.       Patient Instructions   Continue saline gel  Will continue to monitor nasal lesion as patient states that he is not interested in surgical intervention at this time  Return for problems    CONTACT INFORMATION:  The main office phone number is [phone]. For emergencies after hours and on weekends, this number will convert over to our answering service and the on call provider will answer. Please try to keep non emergent phone calls/ questions to office hours 9am-5pm Monday through Friday.      [employer]  As an alternative, you can sign up and use the Epic MyChart system for more direct and quicker access for non emergent questions/ problems.  [employer] allows you to send messages to your doctor, view your test results, renew your prescriptions, schedule appointments, and more. To sign up, go to [employer] and click on the Sign Up Now link in the New User? box. Enter your [employer] Activation Code exactly as it appears below along with the last four digits of your Social Security Number and your Date of Birth () to complete the sign-up process. If you do not sign up before the expiration date, you must request a new code.     [employer] Activation Code: Activation code not generated  Current [employer] Status: Active     If you have questions, you can email [email or web address] or call  [phone] to talk to our MyChart staff. Remember, MyChart is NOT to be used for urgent needs. For medical emergencies, dial 911.     IF YOU SMOKE OR USE TOBACCO PLEASE READ THE FOLLOWING:  Why is smoking bad for me?  Smoking increases the risk of heart disease, lung disease, vascular disease, stroke, and cancer. If you smoke, STOP!        IF YOU SMOKE OR USE TOBACCO PLEASE READ THE FOLLOWING:  Why is smoking bad for me?  Smoking increases the risk of heart disease, lung disease, vascular disease, stroke, and cancer. If you smoke, STOP!     For more information:  Quit Now [place]  1-800-QUIT-NOW  https://[place].quitlogix.org/en-US/

## 2024-01-15 NOTE — PATIENT INSTRUCTIONS
Continue saline gel  Will continue to monitor nasal lesion as patient states that he is not interested in surgical intervention at this time  Return for problems    CONTACT INFORMATION:  The main office phone number is [phone]. For emergencies after hours and on weekends, this number will convert over to our answering service and the on call provider will answer. Please try to keep non emergent phone calls/ questions to office hours 9am-5pm Monday through Friday.      [employer]  As an alternative, you can sign up and use the Epic MyChart system for more direct and quicker access for non emergent questions/ problems.  [employer] allows you to send messages to your doctor, view your test results, renew your prescriptions, schedule appointments, and more. To sign up, go to [employer] and click on the Sign Up Now link in the New User? box. Enter your [employer] Activation Code exactly as it appears below along with the last four digits of your Social Security Number and your Date of Birth () to complete the sign-up process. If you do not sign up before the expiration date, you must request a new code.     [employer] Activation Code: Activation code not generated  Current [employer] Status: Active     If you have questions, you can email [email or web address] or call [phone] to talk to our [employer] staff. Remember, [employer] is NOT to be used for urgent needs. For medical emergencies, dial 911.     IF YOU SMOKE OR USE TOBACCO PLEASE READ THE FOLLOWING:  Why is smoking bad for me?  Smoking increases the risk of heart disease, lung disease, vascular disease, stroke, and cancer. If you smoke, STOP!        IF YOU SMOKE OR USE TOBACCO PLEASE READ THE FOLLOWING:  Why is smoking bad for me?  Smoking increases the risk of heart disease, lung disease, vascular disease, stroke, and cancer. If you smoke, STOP!     For more information:  Quit Now  [place]  1-800-QUIT-NOW  https://[place].quitlogix.org/en-[place]/

## 2024-01-17 ENCOUNTER — TELEPHONE (OUTPATIENT)
Dept: FAMILY MEDICINE CLINIC | Facility: CLINIC | Age: 89
End: 2024-01-17
Payer: MEDICARE

## 2024-01-17 NOTE — TELEPHONE ENCOUNTER
Patient's emergency contact wants to know if he can stop on Monday and have surgery Wednesday and then resume the blood thinner, she is going to to call dentist and make sure that is also okay with them but would like to know your opinion on this

## 2024-01-17 NOTE — TELEPHONE ENCOUNTER
Needs to see if dentist can do 2 days instead.  If they are insistant on 7 days, we will need a lovenox bridge.  Let me know.    Electronically signed by DOMINIQUE Resendiz, 01/17/24, 2:14 PM CST.

## 2024-01-17 NOTE — TELEPHONE ENCOUNTER
Patient's daughter stated that the dentist would like him off of the eliquis for 2 days prior to the surgery, surgery is Wednesday at 11

## 2024-01-17 NOTE — TELEPHONE ENCOUNTER
2 days is okay, 3 is not.    Electronically signed by DOMINIQUE Resendiz, 01/17/24, 2:16 PM CST.

## 2024-01-17 NOTE — TELEPHONE ENCOUNTER
Caller: Gonzalo Durham    Relationship to patient: Self    Best call back number:[phone]         PATIENT WAS TOLD BY DENTIST THAT HE NEEDS TO GO OFF HIS     Eliquis 5 MG tablet tablet     FOR 2-3 DAYS BEFORE HIS DENTAL SURGERY 1/24/24     PATIENT WANTS 'S OPINION

## 2024-01-17 NOTE — TELEPHONE ENCOUNTER
Perfect, this is acceptable.    Electronically signed by DOMINIQUE Resendiz, 01/17/24, 2:47 PM CST.

## 2024-01-17 NOTE — TELEPHONE ENCOUNTER
Caller: Suyapa Osborn    Relationship: Emergency Contact    Best call back number: [phone]     What is the best time to reach you: ANYTIME    Who are you requesting to speak with (clinical staff, provider,  specific staff member): CLINICAL      What was the call regarding: PATIENT HAS LOOSE TOOTH AND THE DENTIST CAN GET HIM IN ON WEDNESDAY 01.24.24 BUT THEY REQUESTED THAT HE STOP TAKING BLOOD THINNERS, PATIENT'S DAUGHTER WANTS TO MAKE SURE ITS OKAY WITH DR KAUR THAT PATIENT STOP THESE MEDICATIONS FOR A WEEK.

## 2024-02-07 ENCOUNTER — OFFICE VISIT (OUTPATIENT)
Dept: FAMILY MEDICINE CLINIC | Facility: CLINIC | Age: 89
End: 2024-02-07
Payer: MEDICARE

## 2024-02-07 VITALS
WEIGHT: 202 LBS | HEIGHT: 70 IN | HEART RATE: 80 BPM | DIASTOLIC BLOOD PRESSURE: 70 MMHG | RESPIRATION RATE: 18 BRPM | SYSTOLIC BLOOD PRESSURE: 124 MMHG | TEMPERATURE: 96.6 F | OXYGEN SATURATION: 95 % | BODY MASS INDEX: 28.92 KG/M2

## 2024-02-07 DIAGNOSIS — Z71.84 TRAVEL ADVICE ENCOUNTER: ICD-10-CM

## 2024-02-07 DIAGNOSIS — F32.A DEPRESSION, UNSPECIFIED DEPRESSION TYPE: ICD-10-CM

## 2024-02-07 DIAGNOSIS — Z79.899 POLYPHARMACY: ICD-10-CM

## 2024-02-07 DIAGNOSIS — N40.0 PROSTATISM: Chronic | ICD-10-CM

## 2024-02-07 DIAGNOSIS — I50.32 CHRONIC DIASTOLIC CONGESTIVE HEART FAILURE: Chronic | ICD-10-CM

## 2024-02-07 DIAGNOSIS — R69 MULTIPLE CHRONIC DISEASES: ICD-10-CM

## 2024-02-07 DIAGNOSIS — J44.9 STAGE 2 MODERATE COPD BY GOLD CLASSIFICATION: Chronic | ICD-10-CM

## 2024-02-07 DIAGNOSIS — I70.90 ATHEROSCLEROSIS: ICD-10-CM

## 2024-02-07 DIAGNOSIS — I10 PRIMARY HYPERTENSION: Chronic | ICD-10-CM

## 2024-02-07 DIAGNOSIS — K21.9 GASTROESOPHAGEAL REFLUX DISEASE, UNSPECIFIED WHETHER ESOPHAGITIS PRESENT: ICD-10-CM

## 2024-02-07 DIAGNOSIS — G47.33 OBSTRUCTIVE SLEEP APNEA: Chronic | ICD-10-CM

## 2024-02-07 DIAGNOSIS — E78.5 HYPERLIPIDEMIA LDL GOAL <70: Chronic | ICD-10-CM

## 2024-02-07 DIAGNOSIS — E11.59 CONTROLLED TYPE 2 DIABETES MELLITUS WITH OTHER CIRCULATORY COMPLICATION, WITHOUT LONG-TERM CURRENT USE OF INSULIN: Chronic | ICD-10-CM

## 2024-02-07 DIAGNOSIS — D64.9 ANEMIA, UNSPECIFIED TYPE: ICD-10-CM

## 2024-02-07 DIAGNOSIS — N18.31 STAGE 3A CHRONIC KIDNEY DISEASE: Chronic | ICD-10-CM

## 2024-02-07 DIAGNOSIS — Z79.01 CHRONIC ANTICOAGULATION: Chronic | ICD-10-CM

## 2024-02-07 DIAGNOSIS — I25.10 CORONARY ARTERY DISEASE INVOLVING NATIVE CORONARY ARTERY OF NATIVE HEART WITHOUT ANGINA PECTORIS: Chronic | ICD-10-CM

## 2024-02-07 NOTE — PROGRESS NOTES
ELLEN”.   Subjective   Gonzalo Durham is a 89 y.o. male presenting with chief complaint of:   Chief Complaint   Patient presents with    Follow-up     AWV 7.27.23  Last Completed Annual Wellness Visit            ANNUAL WELLNESS VISIT (Yearly) Next due on 7/27/2024 07/27/2023  Level of Service: [place] PPPS, SUBSEQ VISIT    07/11/2022  Level of Service: [place] PPPS, SUBSEQ VISIT    06/24/2020  Done    06/24/2020  Level of Service: [place] PPPS, SUBSEQ VISIT                     History of Present Illness :  With daughter.   Here for review of chronic problems that includes DM2 and others    Has multiple chronic problems to consider that might have a bearing on today's issues;  an interval appointment.       Chronic/acute problems reviewed today:   1. Primary hypertension Chronic/stable. Stable here past/and occ home blood pressures.  No significant chest pain, SOB, LE edema, orthopnea, near syncope, dizziness/light headness.   Recent Vitals         1/12/2024 1/15/2024 2/7/2024       BP: 122/68 126/71 124/70     Pulse: 66 54 80     Temp: -- 98.2 °F (36.8 °C) 96.6 °F (35.9 °C)     Weight: 93.3 kg (205 lb 9.6 oz) 93.9 kg (207 lb) 91.6 kg (202 lb)     BMI (Calculated): 29.5 29.7 29              2. Coronary artery disease involving native coronary artery of native heart without angina pectoris chronic/stable as no chest pain, SOB, use of NTG     3. Polypharmacy many medications and of course at risk for interactions or drug side effects; makes this problem do not want to give him more meds   4. Hyperlipidemia LDL goal <70-statin Chronic/stable.  Tolerated use of Rx with labs showing improved lipid values and tolerant liver labs. No muscle aches unexpected.      5. Atherosclerosis: CAD, AAA vascular chronic/stable various areas of disease.  Sees vascular regularly and no plans for upcoming interventions.  Denies development/change in chest pain, claudication, CVA-TIA symptoms such as (Manifest by slurred speech asymmetry of face  dysfunction/weakness of extremities).  Blood thinners noted.      6. Anticoagulated-CAD, DM2, CVA/ASA 81+()+plavix » Anticoagulated-CAD, DM2, CVA/ASA 81+()+plavix+eliquist Chronic/stable reason for stopping or use of.  Denies bleeding issues; especially epistaxis, melena, hematochezia.  Upper arms/others do not significantly bruise easily.  No significant bleeding or falls.      7. Chronic diastolic congestive heart failure Chronic/stable.  Denies significant sob, orthopnea, leg edema, weight gain.  Aware of influence diet/salt and watching weight at home.       8. Controlled type 2 diabetes mellitus with other circulatory complication, without long-term current use of insulin Chronic/stable. No problem/pattern hypoglycemia/hyperglycemia manifest by poly- dypsia, phagia, uria, or sweats, diaphoretic episodes, syncope/near.     9. Gastroesophageal reflux disease, unspecified whether esophagitis present Chronic/stable.  Controlled heartburn, reflux without dysphagia, melena.  Rx used, periods not used proven currently needed with symptoms -currently doing ok.      10. Stage 3a chronic kidney disease Chronic/stable.  No nephrology.  No dysuria.  Previously warned/reminded:   To avoid further kidney function decline  A. Treat any time you think you have infection  B. Stay hydrated (dont get dehydrated); drink at least 60 oz fluid every 24 hr (1800 cc or nearly a 2L)  C. Do not allow any xrays with dye WITHOUT the doctor ordering checking your renal function  D. Do not get new medications without the doctor considering your renal condition  E. Do not use motrin/ibuprofen, alleve/naprosyn and these types of medications     11. Prostatism-(young) urology Chronic/stable.  Slower but tolerated stream with complete emptying.  Nocturia on occ; tolerated.  No desire to persure evaluations/surgery. Sees urology for occ cysto with bladder cancer     12. Anemia, unspecified type Chronic or past history/stable recent:  This has been present before.    There has been GI evaulation in the past. There is no current melena, hematochezia. It has been benign to date and stable/treating/watching.  Contributing comorbidities to date: iron def/other.     13. Depression, unspecified depression type past significant  mood swings, down moods, nervousness, difficulty with concentration to function home/work.  Others close have not been concerned.  No suicide ideation/intent.  Rx not desired      14. Stage 2 moderate COPD by GOLD classification Chronic/stable mild occ cough, sob, wheeze.  Rx helps.   No smoking.       15. Obstructive sleep apnea-(cpap) chronic diagnosis of sleep apnea and advised by pulmonary to forego his CPAP and use only as oxygen post:   16. Multiple chronic diseases Has multiple chronic problems with increased risks for management (involves polypharmacy, multiple providers, many required labs/imaging/ to manage)     17. Travel advice encounter he would like to go to [place] this summer and he is debating whether to drive over 2 to 3 days or to fly     Has an/another acute issue today: none.    The following portions of the patient's history were reviewed and updated as appropriate: allergies, current medications, past family history, past medical history, past social history, past surgical history, and problem list.      Current Outpatient Medications:     acetaminophen (TYLENOL) 325 MG tablet, Take 2 tablets by mouth 2 (Two) Times a Day., Disp: 360 tablet, Rfl: 2    albuterol sulfate  (90 Base) MCG/ACT inhaler, USE 2 INHALATIONS FOUR TIMES A DAY, Disp: 51 g, Rfl: 3    ascorbic acid (VITAMIN C) 500 MG tablet, Take 1 tablet by mouth 2 (Two) Times a Day. Indications: Inadequate Vitamin C, Disp: , Rfl:     aspirin 81 MG EC tablet, Take 1 tablet by mouth Daily. Indications: Acute Heart Attack, Disp: , Rfl:     Breztri Aerosphere 160-9-4.8 MCG/ACT aerosol inhaler, USE 2 INHALATIONS TWICE A DAY, Disp: 10.7 g, Rfl: 11     calcium carbonate, oyster shell, 500 MG tablet tablet, Take 1 tablet by mouth Daily., Disp: , Rfl:     Eliquis 5 MG tablet tablet, TAKE 1 TABLET TWICE A DAY, Disp: 180 tablet, Rfl: 3    ferrous sulfate 325 (65 Fe) MG tablet, Take 1 tablet by mouth Daily With Breakfast. Indications: Anemia From Inadequate Iron in the Body, Disp: , Rfl:     finasteride (PROSCAR) 5 MG tablet, TAKE 1 TABLET DAILY, Disp: 90 tablet, Rfl: 3    furosemide (LASIX) 20 MG tablet, TAKE 1 TABLET EVERY 12 HOURS FOR EDEMA, Disp: 90 tablet, Rfl: 7    glucose blood (FREESTYLE LITE) test strip, USE TO TEST BLOOD SUGAR DAILY, Disp: 100 each, Rfl: 3    glyburide (DIAbeta) 5 MG tablet, TAKE 1 TABLET EVERY MORNING AND ONE-HALF (1/2) TABLET BEFORE SUPPER, Disp: 135 tablet, Rfl: 3    guaiFENesin (MUCINEX) 600 MG 12 hr tablet, Take 1 tablet by mouth 2 (Two) Times a Day. Indications: Cough, Disp: , Rfl:     hydroCHLOROthiazide (MICROZIDE) 12.5 MG capsule, TAKE 1 CAPSULE DAILY, Disp: 90 capsule, Rfl: 3    isosorbide mononitrate (IMDUR) 30 MG 24 hr tablet, Take 1 tablet by mouth Daily. Indications: Stable Angina Pectoris, Disp: 90 tablet, Rfl: 3    Lactobacillus (PROBIOTIC ACIDOPHILUS PO), Take  by mouth., Disp: , Rfl:     loratadine (CLARITIN) 10 MG tablet, TAKE 1 TABLET DAILY, Disp: 90 tablet, Rfl: 3    melatonin 3 MG tablet, Take 1 tablet by mouth At Night As Needed for Sleep., Disp: , Rfl:     metoprolol tartrate (LOPRESSOR) 25 MG tablet, Take 0.5 tablets by mouth 2 (Two) Times a Day for 90 days. Indications: High Blood Pressure Disorder, Disp: 90 tablet, Rfl: 0    Multiple Vitamins-Minerals (MULTIVITAMIN & MINERAL PO), Take 1 tablet by mouth Daily. Indications: vit, Disp: , Rfl:     nitroglycerin (Nitrostat) 0.4 MG SL tablet, Place 1 tablet under the tongue Every 5 (Five) Minutes As Needed for Chest Pain., Disp: 90 tablet, Rfl: 3    pantoprazole (PROTONIX) 40 MG EC tablet, TAKE 1 TABLET DAILY, Disp: 90 tablet, Rfl: 3    polyethylene glycol 17 g packet,  Take 17 g by mouth Daily., Disp: , Rfl:     potassium chloride ER (K-TAB) 20 MEQ tablet controlled-release ER tablet, TAKE 1 TABLET TWICE A DAY WITH MEALS FOR LOW AMOUNT OF POTASSIUM IN THE BLOOD, Disp: 180 tablet, Rfl: 3    pravastatin (PRAVACHOL) 80 MG tablet, TAKE 1 TABLET DAILY, Disp: 90 tablet, Rfl: 3    Probiotic Product (PROBIOTIC ADVANCED PO), Take  by mouth., Disp: , Rfl:     saline (AYR) gel nasal gel, Apply 1 application  topically to the appropriate area as directed As Needed (epistaxis)., Disp: 1 each, Rfl: 0    terazosin (HYTRIN) 10 MG capsule, TAKE 1 CAPSULE DAILY, Disp: 90 capsule, Rfl: 3    No problems with medications.    Allergies   Allergen Reactions    Symbicort [Budesonide-Formoterol Fumarate] Dizziness     Patient passed out and fell    Prozac [Fluoxetine Hcl] Mental Status Change     Bad dreams.       Review of Systems   GENERAL:  Inactive/slower with limits, speed, samni for age and SOB.  Sleep is ok..   SKIN:  Ongoing callous of feet; no problems with this/other skin today unless above/below.    ENDO:  No syncope, near or diaphoretic sweaty spells.  BS Ok: with download-see correspondence.  HEENT: No head injury, headache.  No vision change.  Same mild hearing loss.  Ears without pain/drainage.  No sore throat.  No significant nasal/sinus congestion/drainage unless uses CPAP; then for couple hours. No epistaxis.  CHEST: No chest wall tenderness or mass. Stable occ cough without productive without wheeze except briefly after CPAP use.   Mild/same SOB; no hemoptysis.  Wears oxygen continous.   CV: No chest pain, palpatations, ankle edema.  GI: No heartburn significant dysphagia.  No abdominal pain, constipation, rectal bleeding, or melena; 6/loose stools day.    :  Voids without dysuria, or incontience to completion.  ORTHO: No painful/swollen joints but various on /off sore.  No change occ/usual sore neck or back.  But no acute neck but above mid back pain without recent injury.   NEURO:  No dizziness; diffuse weakness of extremities.  No change some LE numbness/parethesias. Walking often has to hold on; balance problems.   PSYCH: No memory loss.  Mood good; not that anxious, depressed but/and not suicidal.  Tolerated stress.   Screening:  Mammogram: NA  Bone density: NA  Low dose CT chest: Tobacco-smoker/age 22/1 ppd/dc 1980: (22): NA (had CT angio)  IMPRESSION: CT chest with/BH/1.10.22  1. Stable 6 mm subpleural right lower lobe nodule. Stable for 3.5 years.  No additional workup needed.  2. Linear atelectasis or scarring in both lower lobes. Calcified  granulomas. Centrilobular emphysema.  3. Linear secretions in the distal trachea.  4. 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  Dilated pulmonary arteries.  5. Fatty infiltration of the liver.       GI: Colon-div/[facility]/6.15.17/prn  Prostate: urology confirmed 2.7.24  chin confirmed 7.27.23  chin confirmed 7.11.22  urology/confirmed 7.8.21  [place]/confirmed 11.22.19  [place] in past   Usual lab order  6m CBC, BMP, A1c  12m CBC, CMP, A1c, TSH, LIPID, PSAs, Vit D    Copy/paste function used for ROS/exam AND each area of these were reviewed, updated, confirmed and supplemented as needed.  Data reviewed:   Recent admit/ER/MD visits: 7.27.23    1. Polypharmacy    2. Hyperlipidemia LDL goal <70-statin    3. Primary hypertension    4. Atherosclerosis: CAD, AAA vascular    5. Anticoagulated-CAD, DM2, CVA/ASA 81+()+plavix » Anticoagulated-CAD, DM2, CVA/ASA 81+()+plavix+eliquist    6. Controlled type 2 diabetes mellitus with other circulatory complication, without long-term current use of insulin    7. Gastroesophageal reflux disease, unspecified whether esophagitis present    8. Stage 3a chronic kidney disease    9. Prostatism-(young) urology    10. Wellness examination-done    11. Anemia, unspecified type          Issues that are new, uncontrolled, or required review HPI/ROS/exam and decisions beyond wellness today:  (ie: requiring the service of a physician and/or not likely to resolve independently without clinical intervention.)   Diarrhea 6/loose 24 hr persists; BS ok enough to hold metformin     Discussions/medical decisions/reviews:  BP ok  Other vitals noting weight better  Recent Vitals           7/11/2023 7/14/2023 7/27/2023          BP: 122/62 165/77 126/70       Pulse: 55 98 74       Temp: 98 °F (36.7 °C) 98.4 °F (36.9 °C) --       Weight: 87.1 kg (192 lb) 83.5 kg (184 lb) 86.6 kg (191 lb)       BMI (Calculated): 26.4 25.3 26.7               DM/A1c 6.6 7.20.23; down 7.4  DM/.20.23  Lipid  7.20.23-pravachol 80  PSA ok 7.20.23  CBC 11.6 7.20.23  Iron 48L 7.20.23; 325 mg qd  B12 497N 7.20.23  Folate 19.3 7.20.23  Renal 59 7.20.23  Liver ok 7.20.23  Vit D 36 7.20.23  Thyroid TSH 2.4N 7.20.23     Colonoscopy+hem/[facility]//4-7-00  Colonoscopy+jeo-novp-tqq/[facility]//11-17-08/3-5y  Colonoscopy+polyp-div/[facility]//10.21.13/5y  EGD-itis///6.15.17  Colon-div///6.15.17/prn  CKD  ASA 81  Eliquis  Iron yrs normal; 48L 7.20.23  Ferritin always ok; 113N 10.11.22  B12 always ok; 497N 7.20.23  Folate always ok; 19.3 7.20.23  Retic 1.04N 11.14.18  OB + 11.20.18; ok 1.12.21     Wellness/or annual done   Screening reviewed/updated   Temporary stop metformin; if diarrhea after that contact GI  Stay on iron; tyra 2m  If exercise; not beyond SOB or pain    Last cardiac testing:   Echo:   Results for orders placed during the hospital encounter of 10/26/22    Adult Transthoracic Echo Complete w/ Color, Spectral and Contrast if necessary per protocol    Interpretation Summary    Left ventricular systolic function is normal. Left ventricular ejection fraction appears to be 56 - 60%.    Left ventricular diastolic function was normal.    Abnormal global longitudinal LV strain (GLS) = -11.0%    Estimated right ventricular systolic pressure from tricuspid regurgitation is normal (<35 mmHg).    Radiology considered:   US Carotid  Bilateral    Result Date: 1/9/2024  Impression: 1. There is less than 50% stenosis of the right internal carotid artery. 2. There is less than 50% stenosis of the left internal carotid artery. 3. Antegrade flow is demonstrated in bilateral vertebral arteries.  Comments: Bilateral carotid vertebral arterial duplex scan was performed.  Grayscale imaging shows intimal thickening and calcified elements at the carotid bifurcation. The right internal carotid artery peak systolic velocity is 82 cm/sec. The end-diastolic velocity is 24.4 cm/sec. The right ICA/CCA ratio is approximately 1.1. These findings correlate with less than 50% stenosis of the right internal carotid artery.  Grayscale imaging shows intimal thickening and calcified elements at the carotid bifurcation. The left internal carotid artery peak systolic velocity is 102.1 cm/sec. The end-diastolic velocity is 29.8 cm/sec. The left ICA/CCA ratio is approximately 1.1. These findings correlate with less than 50% stenosis of the left internal carotid artery.  Antegrade flow is demonstrated in bilateral vertebral arteries. There is greater than 50% stenosis in the left external carotid artery.  This report was signed and finalized on 1/9/2024 4:35 PM by Dr. Nasir Gutierrez MD.      CT Angiogram Abdomen Pelvis    Result Date: 1/9/2024   1.  Slight increase in size of 4.5 x 4.7 cm infrarenal abdominal aortic aneurysm (previously 4.5 x 4.5 cm).  2.  No change in 6 mm pulmonary nodule in the right lung base. Severe emphysema is noted.  3.  Marked enlargement of the prostate with median lobe hypertrophy indenting the urinary bladder lumen.  This report was signed and finalized on 1/9/2024 10:55 AM by Dr. Reg Dhillon MD.       Lab Results:  Results for orders placed or performed during the hospital encounter of 01/09/24   POC Creatinine    Specimen: Blood   Result Value Ref Range    Creatinine 1.30 0.60 - 1.30 mg/dL       A1C:  Lab Results - Last 18 Months   Lab  Units 09/27/23  0714 07/20/23  1234 02/14/23  0621 01/16/23  1019   HEMOGLOBIN A1C % 6.30* 6.60* 7.40* 6.70*     GLUCOSE:  Lab Results - Last 18 Months   Lab Units 09/27/23  0714 07/20/23  1234 06/12/23  1243 05/31/23  0437 03/09/23  1033 03/02/23  0441 02/28/23  0412   GLUCOSE mg/dL 126* 96 169* 112* 150* 113* 155*     LIPID:  Lab Results - Last 18 Months   Lab Units 07/20/23  1234 02/14/23  0645 01/16/23  1019   CHOLESTEROL mg/dL 199  --  176   LDL CHOL mg/dL 133* 69 114*   HDL CHOL mg/dL 37* 43 36*   TRIGLYCERIDES mg/dL 161* 46 145     PSA:  Lab Results   Component Value Date/Time    PSA 1.050 07/20/2023 12:34 PM    PSA 1.720 01/16/2023 10:19 AM    PSA 0.983 01/05/2022 06:58 AM    PSA 1.040 01/05/2021 07:32 AM    PSA 1.100 05/15/2019 07:08 AM    PSA 1.230 11/08/2018 01:28 PM    PSA 1.240 09/08/2017 10:50 AM       CBC:  Lab Results - Last 18 Months   Lab Units 09/27/23  0714 07/20/23  1234 06/12/23  1243 05/31/23  0437 05/30/23  2129 05/30/23  1413 05/30/23  0835 03/09/23  1033 03/02/23  0441 02/07/23  0936 01/16/23  1019 10/11/22  0849   0000   WBC 10*3/mm3 8.60 8.61 9.26 8.44  --   --  9.38 6.72 5.90   < > 8.16 9.59  --    HEMOGLOBIN g/dL 12.1* 11.6* 11.2* 10.3* 9.5* 10.8* 10.1* 10.8* 9.5*   < > 11.6* 11.6*   < >   HEMATOCRIT % 37.5 36.6* 35.0* 33.4* 31.5* 35.9* 34.9* 33.9* 31.6*   < > 37.2* 36.3*   < >   PLATELETS 10*3/mm3 260 275 325 233  --   --  254 435 180   < > 226 239  --    IRON mcg/dL 49* 48* 56*  --   --   --   --  63  --   --  75 65  --    VITAMIN B 12 pg/mL 608 497  --   --   --   --   --  573  --   --  601  --   --    FOLATE ng/mL >20.00 19.30  --   --   --   --   --  17.40  --   --  >20.00  --   --     < > = values in this interval not displayed.     BMP/CMP:  Lab Results - Last 18 Months   Lab Units 01/09/24  0957 09/27/23  0714 07/20/23  1234 06/12/23  1243 05/31/23  0437 03/09/23  1033 03/02/23  0441 02/28/23  0412 01/24/23  0838 01/16/23  1019   SODIUM mmol/L  --  143 142 139 143 140 140 137    "< > 139   POTASSIUM mmol/L  --  4.4 4.7 4.4 3.9 4.7 3.8 3.4*   < > 4.5   CHLORIDE mmol/L  --  104 106 103 110* 100 101 98   < > 101   CO2 mmol/L  --  29.1* 25.8 28.2 26.0 28.2 30.0* 28.0   < > 30.2*   GLUCOSE mg/dL  --  126* 96 169* 112* 150* 113* 155*   < > 119*   BUN mg/dL  --  19 21 27* 15 23 18 17   < > 21   CREATININE mg/dL 1.30 1.30* 1.18 1.11 0.87 0.85 0.71* 0.69*   < > 1.20   EGFR RESULT mL/min/1.73  --  52.5* 59.0* 63.5  --  83.1  --   --   --  58.2*   CALCIUM mg/dL  --  10.1 10.0 10.2 8.7 9.4 8.5* 8.5*   < > 9.7    < > = values in this interval not displayed.       HEPATIC:  Lab Results - Last 18 Months   Lab Units 07/20/23  1234 05/31/23  0437 03/09/23  1033 02/20/23  0322 02/18/23  0333 02/14/23  0645 02/13/23  0230   ALT (SGPT) U/L 13 8 11 10 11 17 18   AST (SGOT) U/L 12 13 11 16 16 15 13   ALK PHOS U/L 63 56 69 68 65 62 61     HEPATITIS C ANTIBODY: No results found for: \"HEPCVIRUSABY\"  Vit D:  Lab Results - Last 18 Months   Lab Units 07/20/23  1234 01/16/23  1019   VIT D 25 HYDROXY ng/ml 36.1 54.3     THYROID:  Lab Results - Last 18 Months   Lab Units 07/20/23  1234 02/14/23  0645 01/16/23  1019   TSH uIU/mL 2.420 0.875 2.780       Objective   /70   Pulse 80   Temp 96.6 °F (35.9 °C) (Temporal)   Resp 18   Ht 177.8 cm (70\")   Wt 91.6 kg (202 lb)   SpO2 95%   BMI 28.98 kg/m²   Body mass index is 28.98 kg/m².    Recent Vitals         1/12/2024 1/15/2024 2/7/2024       BP: 122/68 126/71 124/70     Pulse: 66 54 80     Temp: -- 98.2 °F (36.8 °C) 96.6 °F (35.9 °C)     Weight: 93.3 kg (205 lb 9.6 oz) 93.9 kg (207 lb) 91.6 kg (202 lb)     BMI (Calculated): 29.5 29.7 29           Wt Readings from Last 15 Encounters:   02/07/24 0839 91.6 kg (202 lb)   01/15/24 0857 93.9 kg (207 lb)   01/12/24 0814 93.3 kg (205 lb 9.6 oz)   01/09/24 1116 88.5 kg (195 lb)   10/26/23 0905 89.3 kg (196 lb 12.8 oz)   09/25/23 0940 86.9 kg (191 lb 9.6 oz)   07/27/23 1347 88.5 kg (195 lb)   07/27/23 0922 86.6 kg (191 lb) "   07/14/23 0920 83.5 kg (184 lb)   07/11/23 0851 87.1 kg (192 lb)   07/10/23 0828 86.6 kg (191 lb)   06/12/23 1304 85 kg (187 lb 6.4 oz)   05/30/23 0550 84.9 kg (187 lb 1.6 oz)   04/26/23 0859 84.8 kg (187 lb)   03/22/23 1340 83 kg (183 lb)       Physical Exam  GENERAL:  Well nourished/developed in no acute distress.   SKIN: Turgor excellent, without wound, rash, lesion  HEENT: Normal cephalic without trauma.  Pupils equal round reactive to light. Extraocular motions full without nystagmus.    No thyroidmegaly, mass, tenderness, lymphadenopathy and supple.  CV: Regular rhythm.  No murmur, gallop,  edema. Posterior pulses intact.  No carotid bruits.  CHEST: No chest wall tenderness or mass.   LUNGS: Symmetric motion with clear/decreased to auscultation.   ABD: Soft, nontender without mass.   ORTHO: Symmetric extremities without swelling/point tenderness.  Full gross range of motion except hips reduced.  Symmetric LE.  Neg straight leg raising.  Neg Patricks maneuver.  Back is straight/mild lordosis.  Mid back sore touch.   NEURO: CN 2-12 grossly intact.  Symmetric facies. 1/4 x bicep knee equal reflexes.  UE/LE   3/5 strength throughout except 2/5  L.  Nonfocal use extremities. Speech clear.  Light touch decreased bilateral feet.   PSYCH: Oriented x 3.  Pleasant calm, well kept.  Purposeful/directed conservation with intact short/long gross memory.      Assessment & Plan     1. Primary hypertension    2. Coronary artery disease involving native coronary artery of native heart without angina pectoris    3. Polypharmacy    4. Hyperlipidemia LDL goal <70-statin    5. Atherosclerosis: CAD, AAA vascular    6. Anticoagulated-CAD, DM2, CVA/ASA 81+()+plavix » Anticoagulated-CAD, DM2, CVA/ASA 81+()+plavix+eliquist    7. Chronic diastolic congestive heart failure    8. Controlled type 2 diabetes mellitus with other circulatory complication, without long-term current use of insulin    9. Gastroesophageal  reflux disease, unspecified whether esophagitis present    10. Stage 3a chronic kidney disease    11. Prostatism-(young) urology    12. Anemia, unspecified type    13. Depression, unspecified depression type    14. Stage 2 moderate COPD by GOLD classification    15. Obstructive sleep apnea-(cpap)    16. Multiple chronic diseases    17. Travel advice encounter        Data review above:   Discussions/medical decisions/reviews:  BP ok  Other vitals ok  Recent Vitals         1/12/2024 1/15/2024 2/7/2024       BP: 122/68 126/71 124/70     Pulse: 66 54 80     Temp: -- 98.2 °F (36.8 °C) 96.6 °F (35.9 °C)     Weight: 93.3 kg (205 lb 9.6 oz) 93.9 kg (207 lb) 91.6 kg (202 lb)     BMI (Calculated): 29.5 29.7 29           DM/A1c 6.3 9.27.23  DM/ 9.27.23  Lipid  7.20.23  PSA ok 7.20.23  CBC 12.1 9.27.23  Iron 49L 9.27.23; 325/d  B12 608 9.27.23  Folate > 20 9.27.23  Renal 52 9.27.23  Liver ok 7.20.23  Vit D 36 7.20.23  Thyroid ok 7.20.23    Screening reviewed/updated   Vaccines discussed (see below)   Lab today;   Lab 3m    Follow up: Return for lab today; lab 3m and lab/apt 6m.  Future Appointments   Date Time Provider Department Center   4/16/2024  9:30 AM Joseph Diaz MD [facility] PAD   4/30/2024  9:00 AM Feliz Frye APRN [facility] PAD   5/7/2024  8:40 AM [facility] [place] [facility]   7/12/2024  8:30 AM Junior Rees APRN [facility]AD PAD   8/7/2024  9:00 AM Abel Romero MD [facility] METR PAD   12/4/2024 10:20 AM Danie Cadena MD [facility] PAD   1/7/2025  8:00 AM PAD [facility] CT 1 BH PAD CT BI PAD   1/7/2025  8:30 AM Bicking, Nasir K, DO [facility] PAD PAD       Data review above:   Rx: reviewed and decisions:   Rx new/changes: none  No orders of the defined types were placed in this encounter.      Orders placed:   LAB/Testing/Referrals: reviewed/orders:   Today:   Orders Placed This Encounter   Procedures    Ferritin    Iron Profile    Comprehensive Metabolic Panel     "Reticulocytes    CBC & Differential     Chronic/recurrent labs above or change to:   Same     Immunization History   Administered Date(s) Administered    COVID-19 (MODERNA) 1st,2nd,3rd Dose Monovalent 03/03/2021, 03/31/2021    FLUAD TRI 65YR+ 11/22/2019    Fluad Quad 65+ 10/06/2020    Fluzone High Dose =>65 Years ([employer] ONLY) 10/09/2018    Fluzone High-Dose 65+yrs 12/27/2021, 01/26/2023    Fluzone Quad >6mos (Multi-dose) 10/21/2015, 01/20/2017, 01/29/2018    Influenza Whole 10/21/2015    Pneumococcal Conjugate 13-Valent (PCV13) 10/21/2015    Shingrix 06/09/2023     We advised/reaffirmed our support/suggestion for staying complete with covid- covid boosters, seasonal flu/yearly and any missing vaccine from list we supplied; when cannot be given here we suggest contact with local health department office or pharmacy to review missing/needed vaccines and then bring nursing documentation for these vaccines to this office or call this information in. Shingles became \"free\" 1.1.23 for medicare insurance.    Health maintenance:   Body mass index is 28.98 kg/m².  BMI is >= 25 and <30. (Overweight) The following options were offered after discussion;: none (medical contraindication)      Tobacco use reviewed:   Gonzalo Durham  reports that he quit smoking about 44 years ago. His smoking use included cigarettes. He started smoking about 70 years ago. He has a 26.00 pack-year smoking history. He has been exposed to tobacco smoke. He has never used smokeless tobacco..     There are no Patient Instructions on file for this visit.          "

## 2024-02-08 LAB
ALBUMIN SERPL-MCNC: 4.5 G/DL (ref 3.5–5.2)
ALBUMIN/GLOB SERPL: 2 G/DL
ALP SERPL-CCNC: 65 U/L (ref 39–117)
ALT SERPL-CCNC: 12 U/L (ref 1–41)
AST SERPL-CCNC: 14 U/L (ref 1–40)
BASOPHILS # BLD AUTO: 0.07 10*3/MM3 (ref 0–0.2)
BASOPHILS NFR BLD AUTO: 0.7 % (ref 0–1.5)
BILIRUB SERPL-MCNC: 0.3 MG/DL (ref 0–1.2)
BUN SERPL-MCNC: 26 MG/DL (ref 8–23)
BUN/CREAT SERPL: 20.5 (ref 7–25)
CALCIUM SERPL-MCNC: 10.1 MG/DL (ref 8.2–9.6)
CHLORIDE SERPL-SCNC: 99 MMOL/L (ref 98–107)
CO2 SERPL-SCNC: 28.8 MMOL/L (ref 22–29)
CREAT SERPL-MCNC: 1.27 MG/DL (ref 0.76–1.27)
EGFRCR SERPLBLD CKD-EPI 2021: 53.7 ML/MIN/1.73
EOSINOPHIL # BLD AUTO: 0.63 10*3/MM3 (ref 0–0.4)
EOSINOPHIL NFR BLD AUTO: 6.3 % (ref 0.3–6.2)
ERYTHROCYTE [DISTWIDTH] IN BLOOD BY AUTOMATED COUNT: 13.1 % (ref 12.3–15.4)
FERRITIN SERPL-MCNC: 149 NG/ML (ref 30–400)
GLOBULIN SER CALC-MCNC: 2.3 GM/DL
GLUCOSE SERPL-MCNC: 155 MG/DL (ref 65–99)
HCT VFR BLD AUTO: 38.8 % (ref 37.5–51)
HGB BLD-MCNC: 12.4 G/DL (ref 13–17.7)
IMM GRANULOCYTES # BLD AUTO: 0.04 10*3/MM3 (ref 0–0.05)
IMM GRANULOCYTES NFR BLD AUTO: 0.4 % (ref 0–0.5)
IRON SATN MFR SERPL: 31 % (ref 20–50)
IRON SERPL-MCNC: 116 MCG/DL (ref 59–158)
LYMPHOCYTES # BLD AUTO: 1.86 10*3/MM3 (ref 0.7–3.1)
LYMPHOCYTES NFR BLD AUTO: 18.6 % (ref 19.6–45.3)
MCH RBC QN AUTO: 28.4 PG (ref 26.6–33)
MCHC RBC AUTO-ENTMCNC: 32 G/DL (ref 31.5–35.7)
MCV RBC AUTO: 89 FL (ref 79–97)
MONOCYTES # BLD AUTO: 0.74 10*3/MM3 (ref 0.1–0.9)
MONOCYTES NFR BLD AUTO: 7.4 % (ref 5–12)
NEUTROPHILS # BLD AUTO: 6.66 10*3/MM3 (ref 1.7–7)
NEUTROPHILS NFR BLD AUTO: 66.6 % (ref 42.7–76)
NRBC BLD AUTO-RTO: 0 /100 WBC (ref 0–0.2)
PLATELET # BLD AUTO: 275 10*3/MM3 (ref 140–450)
POTASSIUM SERPL-SCNC: 4.8 MMOL/L (ref 3.5–5.2)
PROT SERPL-MCNC: 6.8 G/DL (ref 6–8.5)
RBC # BLD AUTO: 4.36 10*6/MM3 (ref 4.14–5.8)
RETICS/RBC NFR AUTO: 1.06 % (ref 0.7–1.9)
SODIUM SERPL-SCNC: 141 MMOL/L (ref 136–145)
TIBC SERPL-MCNC: 371 MCG/DL
UIBC SERPL-MCNC: 255 MCG/DL (ref 112–346)
WBC # BLD AUTO: 10 10*3/MM3 (ref 3.4–10.8)

## 2024-02-27 ENCOUNTER — PATIENT OUTREACH (OUTPATIENT)
Dept: CASE MANAGEMENT | Facility: OTHER | Age: 89
End: 2024-02-27
Payer: MEDICARE

## 2024-02-27 NOTE — OUTREACH NOTE
AMBULATORY CASE MANAGEMENT NOTE    Name and Relationship of Patient/Support Person: Suyapa Osborn - Emergency Contact    Patient Outreach    [facility] Prospective Outreach with patients daughter. Provided daughter ACM contact information.     Adult Patient Profile  Questions/Answers      Flowsheet Row Most Recent Value   Symptoms/Conditions Managed at Home diabetes, type 2, respiratory   Barriers to Managing Health none   Diabetes Management Strategies medication therapy, routine screenings, blood glucose testing   Last A1C Result Hgb A1C 6.30 on 9.27.23   Diabetes Comment Hgb A1C down from 6.60 on 7.20.23   Respiratory Symptoms/Conditions COPD   Respiratory Management Strategies medication therapy   Respiratory Comment Patient has slight cough but noting worisome per daughter   Missed Doses of Prescribed Medications During Past Week no   Taken Prescribed Medications at Different Time or Schedule During Past Week no   Barriers to Taking Medication as Prescribed none   Wear Glasses or Blind yes   Equipment Currently Used at Home bath bench, walker, standard  [Life Alert]   Change in Functional Status Since Onset of Current Illness/Injury no   People in Home alone   Family Caregiver if Needed child(felicita), adult   Family Caregiver Names Suyapa Osborn        Social Work Assessment  Questions/Answers      Flowsheet Row Most Recent Value   People in Home alone   Primary Care Provided by self, child(felicita)  [Patient has a caregiver 3 hours per day 7 days per week. Daughter sees and prepares breakfast for him daily.]   Family Caregiver if Needed child(felicita), adult   Family Caregiver Names Suyapa Osborn   Equipment Currently Used at Home bath bench, walker, standard  [Life Alert]            Elizabet HOWARD  Ambulatory Case Management    2/27/2024, 11:32 CST

## 2024-03-06 ENCOUNTER — APPOINTMENT (OUTPATIENT)
Dept: GENERAL RADIOLOGY | Facility: HOSPITAL | Age: 89
End: 2024-03-06
Payer: MEDICARE

## 2024-03-06 ENCOUNTER — HOSPITAL ENCOUNTER (EMERGENCY)
Facility: HOSPITAL | Age: 89
Discharge: HOME | End: 2024-03-06
Attending: EMERGENCY MEDICINE | Admitting: EMERGENCY MEDICINE
Payer: MEDICARE

## 2024-03-06 VITALS
RESPIRATION RATE: 20 BRPM | WEIGHT: 205 LBS | TEMPERATURE: 98.6 F | DIASTOLIC BLOOD PRESSURE: 73 MMHG | BODY MASS INDEX: 28.7 KG/M2 | HEIGHT: 71 IN | OXYGEN SATURATION: 92 % | SYSTOLIC BLOOD PRESSURE: 128 MMHG | HEART RATE: 63 BPM

## 2024-03-06 DIAGNOSIS — J44.1 COPD EXACERBATION: Primary | ICD-10-CM

## 2024-03-06 LAB
ALBUMIN SERPL-MCNC: 4 G/DL (ref 3.5–5.2)
ALBUMIN/GLOB SERPL: 1.2 G/DL
ALP SERPL-CCNC: 66 U/L (ref 39–117)
ALT SERPL W P-5'-P-CCNC: 11 U/L (ref 1–41)
ANION GAP SERPL CALCULATED.3IONS-SCNC: 12 MMOL/L (ref 5–15)
AST SERPL-CCNC: 14 U/L (ref 1–40)
BASOPHILS # BLD AUTO: 0.05 10*3/MM3 (ref 0–0.2)
BASOPHILS NFR BLD AUTO: 0.4 % (ref 0–1.5)
BILIRUB SERPL-MCNC: 0.3 MG/DL (ref 0–1.2)
BUN SERPL-MCNC: 25 MG/DL (ref 8–23)
BUN/CREAT SERPL: 20.7 (ref 7–25)
CALCIUM SPEC-SCNC: 9.9 MG/DL (ref 8.2–9.6)
CHLORIDE SERPL-SCNC: 98 MMOL/L (ref 98–107)
CO2 SERPL-SCNC: 30 MMOL/L (ref 22–29)
CREAT SERPL-MCNC: 1.21 MG/DL (ref 0.76–1.27)
D DIMER PPP FEU-MCNC: 0.7 MCGFEU/ML (ref 0–0.9)
D-LACTATE SERPL-SCNC: 1.3 MMOL/L (ref 0.5–2)
DEPRECATED RDW RBC AUTO: 44.5 FL (ref 37–54)
EGFRCR SERPLBLD CKD-EPI 2021: 56.9 ML/MIN/1.73
EOSINOPHIL # BLD AUTO: 0.27 10*3/MM3 (ref 0–0.4)
EOSINOPHIL NFR BLD AUTO: 2.3 % (ref 0.3–6.2)
ERYTHROCYTE [DISTWIDTH] IN BLOOD BY AUTOMATED COUNT: 13.1 % (ref 12.3–15.4)
FLUAV RNA RESP QL NAA+PROBE: NOT DETECTED
FLUBV RNA RESP QL NAA+PROBE: NOT DETECTED
GLOBULIN UR ELPH-MCNC: 3.4 GM/DL
GLUCOSE SERPL-MCNC: 139 MG/DL (ref 65–99)
HCT VFR BLD AUTO: 37.6 % (ref 37.5–51)
HGB BLD-MCNC: 11.9 G/DL (ref 13–17.7)
HOLD SPECIMEN: NORMAL
IMM GRANULOCYTES # BLD AUTO: 0.04 10*3/MM3 (ref 0–0.05)
IMM GRANULOCYTES NFR BLD AUTO: 0.3 % (ref 0–0.5)
LYMPHOCYTES # BLD AUTO: 1.38 10*3/MM3 (ref 0.7–3.1)
LYMPHOCYTES NFR BLD AUTO: 11.9 % (ref 19.6–45.3)
MAGNESIUM SERPL-MCNC: 2.1 MG/DL (ref 1.6–2.4)
MCH RBC QN AUTO: 29 PG (ref 26.6–33)
MCHC RBC AUTO-ENTMCNC: 31.6 G/DL (ref 31.5–35.7)
MCV RBC AUTO: 91.7 FL (ref 79–97)
MONOCYTES # BLD AUTO: 0.91 10*3/MM3 (ref 0.1–0.9)
MONOCYTES NFR BLD AUTO: 7.8 % (ref 5–12)
NEUTROPHILS NFR BLD AUTO: 77.3 % (ref 42.7–76)
NEUTROPHILS NFR BLD AUTO: 8.97 10*3/MM3 (ref 1.7–7)
NRBC BLD AUTO-RTO: 0 /100 WBC (ref 0–0.2)
NT-PROBNP SERPL-MCNC: 760.4 PG/ML (ref 0–1800)
PLATELET # BLD AUTO: 295 10*3/MM3 (ref 140–450)
PMV BLD AUTO: 9.3 FL (ref 6–12)
POTASSIUM SERPL-SCNC: 4.4 MMOL/L (ref 3.5–5.2)
PROT SERPL-MCNC: 7.4 G/DL (ref 6–8.5)
RBC # BLD AUTO: 4.1 10*6/MM3 (ref 4.14–5.8)
RSV RNA RESP QL NAA+PROBE: NOT DETECTED
SARS-COV-2 RNA RESP QL NAA+PROBE: NOT DETECTED
SODIUM SERPL-SCNC: 140 MMOL/L (ref 136–145)
TROPONIN T SERPL HS-MCNC: 29 NG/L
WBC NRBC COR # BLD AUTO: 11.62 10*3/MM3 (ref 3.4–10.8)
WHOLE BLOOD HOLD COAG: NORMAL
WHOLE BLOOD HOLD SPECIMEN: NORMAL

## 2024-03-06 PROCEDURE — 83605 ASSAY OF LACTIC ACID: CPT | Performed by: EMERGENCY MEDICINE

## 2024-03-06 PROCEDURE — 99284 EMERGENCY DEPT VISIT MOD MDM: CPT

## 2024-03-06 PROCEDURE — 93010 ELECTROCARDIOGRAM REPORT: CPT | Performed by: INTERNAL MEDICINE

## 2024-03-06 PROCEDURE — 93005 ELECTROCARDIOGRAM TRACING: CPT | Performed by: EMERGENCY MEDICINE

## 2024-03-06 PROCEDURE — 87186 SC STD MICRODIL/AGAR DIL: CPT | Performed by: EMERGENCY MEDICINE

## 2024-03-06 PROCEDURE — 71045 X-RAY EXAM CHEST 1 VIEW: CPT

## 2024-03-06 PROCEDURE — 83735 ASSAY OF MAGNESIUM: CPT | Performed by: EMERGENCY MEDICINE

## 2024-03-06 PROCEDURE — 87154 CUL TYP ID BLD PTHGN 6+ TRGT: CPT | Performed by: EMERGENCY MEDICINE

## 2024-03-06 PROCEDURE — 25010000002 METHYLPREDNISOLONE PER 125 MG: Performed by: EMERGENCY MEDICINE

## 2024-03-06 PROCEDURE — 87637 SARSCOV2&INF A&B&RSV AMP PRB: CPT | Performed by: EMERGENCY MEDICINE

## 2024-03-06 PROCEDURE — 87077 CULTURE AEROBIC IDENTIFY: CPT | Performed by: EMERGENCY MEDICINE

## 2024-03-06 PROCEDURE — 87040 BLOOD CULTURE FOR BACTERIA: CPT | Performed by: EMERGENCY MEDICINE

## 2024-03-06 PROCEDURE — 83880 ASSAY OF NATRIURETIC PEPTIDE: CPT | Performed by: EMERGENCY MEDICINE

## 2024-03-06 PROCEDURE — 84484 ASSAY OF TROPONIN QUANT: CPT | Performed by: EMERGENCY MEDICINE

## 2024-03-06 PROCEDURE — 96374 THER/PROPH/DIAG INJ IV PUSH: CPT

## 2024-03-06 PROCEDURE — 80053 COMPREHEN METABOLIC PANEL: CPT | Performed by: EMERGENCY MEDICINE

## 2024-03-06 PROCEDURE — 85025 COMPLETE CBC W/AUTO DIFF WBC: CPT | Performed by: EMERGENCY MEDICINE

## 2024-03-06 PROCEDURE — 85379 FIBRIN DEGRADATION QUANT: CPT | Performed by: EMERGENCY MEDICINE

## 2024-03-06 RX ORDER — SODIUM CHLORIDE 0.9 % (FLUSH) 0.9 %
10 SYRINGE (ML) INJECTION AS NEEDED
Status: DISCONTINUED | OUTPATIENT
Start: 2024-03-06 | End: 2024-03-06 | Stop reason: HOSPADM

## 2024-03-06 RX ORDER — METHYLPREDNISOLONE SODIUM SUCCINATE 125 MG/2ML
125 INJECTION, POWDER, LYOPHILIZED, FOR SOLUTION INTRAMUSCULAR; INTRAVENOUS ONCE
Status: COMPLETED | OUTPATIENT
Start: 2024-03-06 | End: 2024-03-06

## 2024-03-06 RX ORDER — PREDNISONE 20 MG/1
20 TABLET ORAL 2 TIMES DAILY
Qty: 14 TABLET | Refills: 0 | Status: SHIPPED | OUTPATIENT
Start: 2024-03-06

## 2024-03-06 RX ADMIN — METHYLPREDNISOLONE SODIUM SUCCINATE 125 MG: 125 INJECTION, POWDER, FOR SOLUTION INTRAMUSCULAR; INTRAVENOUS at 13:57

## 2024-03-06 NOTE — ED PROVIDER NOTES
Subjective   History of Present Illness  Patient complains of shortness of breath.  He complains of increased cough and congestion that he hears mostly in his right lung.  This all started yesterday.  He did not note any fever or chills.  He is having a lot more nasal drainage and he has been having before is been grayish in color at times.  He is coughing a lot more but not really much coming up.  He did have some dull chest pain on the way here.  He had COVID about a year ago without he had recovered from that pretty well.  He is on 4 L of oxygen at all times due to his COPD.  He said his pulse ox at home dropped down to 88% at 1 point.    History provided by:  Patient and relative   used: No    Shortness of Breath  Severity:  Moderate  Onset quality:  Gradual  Duration:  1 day  Timing:  Constant  Progression:  Waxing and waning  Chronicity:  New  Context: not activity, not animal exposure, not emotional upset, not fumes, not known allergens, not occupational exposure, not pollens, not smoke exposure, not strong odors, not URI and not weather changes    Relieved by:  Nothing  Worsened by:  Nothing  Ineffective treatments:  None tried  Associated symptoms: chest pain and cough    Associated symptoms: no abdominal pain, no claudication, no diaphoresis, no ear pain, no fever, no headaches, no hemoptysis, no neck pain, no PND, no rash, no sore throat, no sputum production, no syncope, no swollen glands, no vomiting and no wheezing    Risk factors: no recent alcohol use, no family hx of DVT, no hx of cancer, no hx of PE/DVT, no obesity, no oral contraceptive use, no prolonged immobilization, no recent surgery and no tobacco use        Review of Systems   Constitutional: Negative.  Negative for diaphoresis and fever.   HENT:  Positive for congestion. Negative for ear pain and sore throat.    Respiratory:  Positive for cough and shortness of breath. Negative for hemoptysis, sputum production and  wheezing.    Cardiovascular:  Positive for chest pain. Negative for claudication, syncope and PND.   Gastrointestinal: Negative.  Negative for abdominal pain and vomiting.   Genitourinary: Negative.    Musculoskeletal: Negative.  Negative for neck pain.   Skin: Negative.  Negative for rash.   Neurological: Negative.  Negative for headaches.   Psychiatric/Behavioral: Negative.     All other systems reviewed and are negative.      Past Medical History:   Diagnosis Date    A-fib     AAA (abdominal aortic aneurysm) without rupture     Arthritis     BPH (benign prostatic hypertrophy)     Cataract     bialteral    CKD (chronic kidney disease)     COPD (chronic obstructive pulmonary disease)     Coronary artery disease involving native coronary artery of native heart without angina pectoris 1/20/2017    CABG/Az/[place]/1981 No TCC echo  7.16.18 Right ventricular cavity is mild-to-moderately dilated. Left ventricular diastolic dysfunction (grade I) consistent with impaired relaxation. Left ventricular systolic function is normal. Left atrial cavity size is borderline dilated. 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       Past Surgical History:   Procedure Laterality Date    APPENDECTOMY      CARDIAC CATHETERIZATION      CARDIAC CATHETERIZATION Left 2019    Procedure: Cardiac Catheterization/Vascular Study Positive occult blood in stools  ? BMS vs REGGIE Get cabg report  ;  Surgeon: Bradley Carver MD;  Location:  [facility] CATH INVASIVE [facility];  Service: Cardiology    COLONOSCOPY N/A 6/15/2017    Diverticulosis repeat exam prn    COLONOSCOPY W/ POLYPECTOMY  10/21/2013    2 Tubular adenomatous polyps, Diverticulosis repeat exam in 5 years    CORONARY ARTERY BYPASS GRAFT  1980    X 3    CYSTOSCOPY RETROGRADE PYELOGRAM Bilateral 2021    Procedure: CYSTOSCOPY RETROGRADE PYELOGRAM;  Surgeon: Danie Cadena MD;  Location:  [facility] OR;  Service: Urology;  Laterality: Bilateral;    ENDOSCOPY N/A 6/15/2017    LA Grade A reflux esophagitis    EYE SURGERY Bilateral     HERNIA REPAIR      TRANSURETHRAL RESECTION OF BLADDER TUMOR N/A 2021    Procedure: CYSTOSCOPY TRANSURETHRAL RESECTION OF BLADDER TUMOR WITH GEMCITABINE INSTILLATION;  Surgeon: Danie Cadena MD;  Location:  [facility] OR;  Service: Urology;  Laterality: N/A;       Family History   Problem Relation Age of Onset    Heart disease Mother     Stroke Mother     Melanoma Mother     Heart disease Father     Diabetes Father     Prostate cancer Father     Colon cancer Neg Hx     Colon polyps Neg Hx        Social History     Socioeconomic History    Marital status:    Tobacco Use    Smoking status: Former     Current packs/day: 0.00     Average packs/day: 1 pack/day for 26.0 years (26.0 ttl pk-yrs)     Types: Cigarettes     Start date:      Quit date:      Years since quittin.2     Passive exposure: Past    Smokeless tobacco: Never   Vaping Use    Vaping status: Never Used   Substance and Sexual Activity    Alcohol use: No    Drug use: No    Sexual activity: Defer       Prior to Admission medications    Medication  Sig Start Date End Date Taking? Authorizing Provider   acetaminophen (TYLENOL) 325 MG tablet Take 2 tablets by mouth 2 (Two) Times a Day. 4/8/21   Abel Romero MD   albuterol sulfate  (90 Base) MCG/ACT inhaler USE 2 INHALATIONS FOUR TIMES A DAY 9/7/23   Abel Romero MD   ascorbic acid (VITAMIN C) 500 MG tablet Take 1 tablet by mouth 2 (Two) Times a Day. Indications: Inadequate Vitamin C    Gal Fischer MD   aspirin 81 MG EC tablet Take 1 tablet by mouth Daily. Indications: Acute Heart Attack    Gal Fischer MD Breztrdiana Aerosphere 160-9-4.8 MCG/ACT aerosol inhaler USE 2 INHALATIONS TWICE A DAY 6/14/23   Ross Cameron APRN   calcium carbonate, oyster shell, 500 MG tablet tablet Take 1 tablet by mouth Daily.    Gal Fischer MD   Eliquis 5 MG tablet tablet TAKE 1 TABLET TWICE A DAY 7/10/23   Abel Romero MD   ferrous sulfate 325 (65 Fe) MG tablet Take 1 tablet by mouth Daily With Breakfast. Indications: Anemia From Inadequate Iron in the Body    Gal Fischer MD   finasteride (PROSCAR) 5 MG tablet TAKE 1 TABLET DAILY 7/10/23   Abel Romero MD   furosemide (LASIX) 20 MG tablet TAKE 1 TABLET EVERY 12 HOURS FOR EDEMA 6/5/23   Abel Romero MD   glucose blood (FREESTYLE LITE) test strip USE TO TEST BLOOD SUGAR DAILY 10/30/23   Abel Romero MD   glyburide (DIAbeta) 5 MG tablet TAKE 1 TABLET EVERY MORNING AND ONE-HALF (1/2) TABLET BEFORE SUPPER 7/10/23   Abel Romero MD   guaiFENesin (MUCINEX) 600 MG 12 hr tablet Take 1 tablet by mouth 2 (Two) Times a Day. Indications: Cough    Gal Fischer MD   hydroCHLOROthiazide (MICROZIDE) 12.5 MG capsule TAKE 1 CAPSULE DAILY 9/19/23   Bradley Carver MD   isosorbide mononitrate (IMDUR) 30 MG 24 hr tablet Take 1 tablet by mouth Daily. Indications: Stable Angina Pectoris 10/30/23   Abel Romero MD   Lactobacillus (PROBIOTIC ACIDOPHILUS PO) Take  by mouth.    Provider  MD Gal   loratadine (CLARITIN) 10 MG tablet TAKE 1 TABLET DAILY 7/10/23   Abel Romero MD   melatonin 3 MG tablet Take 1 tablet by mouth At Night As Needed for Sleep. 2/17/23   Robert Dunn MD   metoprolol tartrate (LOPRESSOR) 25 MG tablet Take 0.5 tablets by mouth 2 (Two) Times a Day for 90 days. Indications: High Blood Pressure Disorder 12/19/23 3/18/24  Abel Romero MD   Multiple Vitamins-Minerals (MULTIVITAMIN & MINERAL PO) Take 1 tablet by mouth Daily. Indications: vit 3/4/23   ProviderGal MD   nitroglycerin (Nitrostat) 0.4 MG SL tablet Place 1 tablet under the tongue Every 5 (Five) Minutes As Needed for Chest Pain. 7/6/22   Abel Romero MD   pantoprazole (PROTONIX) 40 MG EC tablet TAKE 1 TABLET DAILY 6/22/23   Abel Romero MD   polyethylene glycol 17 g packet Take 17 g by mouth Daily. 2/17/23   Robert Dunn MD   potassium chloride ER (K-TAB) 20 MEQ tablet controlled-release ER tablet TAKE 1 TABLET TWICE A DAY WITH MEALS FOR LOW AMOUNT OF POTASSIUM IN THE BLOOD 8/21/23   Abel Romero MD   pravastatin (PRAVACHOL) 80 MG tablet TAKE 1 TABLET DAILY 7/10/23   Abel Romero MD   Probiotic Product (PROBIOTIC ADVANCED PO) Take  by mouth.    Provider, MD Gal   saline (AYR) gel nasal gel Apply 1 application  topically to the appropriate area as directed As Needed (epistaxis). 7/14/23   Ronnie Ugarte APRN   terazosin (HYTRIN) 10 MG capsule TAKE 1 CAPSULE DAILY 7/10/23   Abel Romero MD       Medications   methylPREDNISolone sodium succinate (SOLU-Medrol) injection 125 mg (125 mg Intravenous Given 3/6/24 1357)       Vitals:    03/06/24 1359   BP: 128/73   Pulse: 63   Resp: 20   Temp: 98.6 °F (37 °C)   SpO2: 92%         Objective   Physical Exam  Vitals and nursing note reviewed.   Constitutional:       Appearance: He is well-developed.   HENT:      Head: Normocephalic and atraumatic.   Cardiovascular:      Rate and Rhythm:  Normal rate and regular rhythm.   Pulmonary:      Effort: Pulmonary effort is normal.      Breath sounds: Examination of the right-middle field reveals rhonchi. Examination of the left-middle field reveals rhonchi. Rhonchi present.   Abdominal:      General: Bowel sounds are normal.      Palpations: Abdomen is soft.   Musculoskeletal:         General: Normal range of motion.      Cervical back: Normal range of motion and neck supple.   Skin:     General: Skin is warm and dry.   Neurological:      General: No focal deficit present.      Mental Status: He is alert and oriented to person, place, and time.   Psychiatric:         Mood and Affect: Mood normal.         Behavior: Behavior normal.         Procedures         Lab Results (last 24 hours)       Procedure Component Value Units Date/Time    [facility] Blood Culture Bottle Set [[id number]] Collected: 03/06/24 1044    Specimen: Blood from Arm, Right Updated: 03/06/24 1145     Extra Tube Hold for add-ons.     Comment: Auto resulted.       BNP [[id number]]  (Normal) Collected: 03/06/24 1044    Specimen: Blood Updated: 03/06/24 1128     proBNP 760.4 pg/mL     Narrative:      This assay is used as an aid in the diagnosis of individuals suspected of having heart failure. It can be used as an aid in the diagnosis of acute decompensated heart failure (ADHF) in patients presenting with signs and symptoms of ADHF to the emergency department (ED). In addition, NT-proBNP of <300 pg/mL indicates ADHF is not likely.    Age Range Result Interpretation  NT-proBNP Concentration (pg/mL:      <50             Positive            >450                   Gray                 300-450                    Negative             <300    50-75           Positive            >900                  Gray                300-900                  Negative            <300      >75             Positive            >1800                  Gray                300-1800                  Negative            <300     "Comprehensive Metabolic Panel [[id number]]  (Abnormal) Collected: 03/06/24 1044    Specimen: Blood Updated: 03/06/24 1130     Glucose 139 mg/dL      BUN 25 mg/dL      Creatinine 1.21 mg/dL      Sodium 140 mmol/L      Potassium 4.4 mmol/L      Chloride 98 mmol/L      CO2 30.0 mmol/L      Calcium 9.9 mg/dL      Total Protein 7.4 g/dL      Albumin 4.0 g/dL      ALT (SGPT) 11 U/L      AST (SGOT) 14 U/L      Alkaline Phosphatase 66 U/L      Total Bilirubin 0.3 mg/dL      Globulin 3.4 gm/dL      A/G Ratio 1.2 g/dL      BUN/Creatinine Ratio 20.7     Anion Gap 12.0 mmol/L      eGFR 56.9 mL/min/1.73     Narrative:      GFR Normal >60  Chronic Kidney Disease <60  Kidney Failure <15    The GFR formula is only valid for adults with stable renal function between ages 18 and 70.    D-dimer, Quantitative [[id number]]  (Normal) Collected: 03/06/24 1044    Specimen: Blood Updated: 03/06/24 1122     D-Dimer, Quantitative 0.70 MCGFEU/mL     Narrative:      According to the assay 's published package insert, a normal (<0.50 MCGFEU/mL) D-dimer result in conjunction with a non-high clinical probability assessment, excludes deep vein thrombosis (DVT) and pulmonary embolism (PE) with high sensitivity.    D-dimer values increase with age and this can make VTE exclusion of an older population difficult. To address this, the [employer] College [employer], based on best available evidence and recent guidelines, recommends that clinicians use age-adjusted D-dimer thresholds in patients greater than 50 years of age with: a) a low probability of PE who do not meet all Pulmonary Embolism Rule Out Criteria, or b) in those with intermediate probability of PE.   The formula for an age-adjusted D-dimer cut-off is \"age/100\".  For example, a 60 year old patient would have an age-adjusted cut-off of 0.60 MCGFEU/mL and an 80 year old 0.80 MCGFEU/mL.    Lactic Acid, Plasma [[id number]]  (Normal) Collected: 03/06/24 1044    Specimen: Blood " Updated: 03/06/24 1128     Lactate 1.3 mmol/L     Magnesium [[id number]]  (Normal) Collected: 03/06/24 1044    Specimen: Blood Updated: 03/06/24 1126     Magnesium 2.1 mg/dL     CBC & Differential [[id number]]  (Abnormal) Collected: 03/06/24 1044    Specimen: Blood Updated: 03/06/24 1110    Narrative:      The following orders were created for panel order CBC & Differential.  Procedure                               Abnormality         Status                     ---------                               -----------         ------                     CBC Auto Differential[[id number]]        Abnormal            Final result                 Please view results for these tests on the individual orders.    Blood Culture - Blood, Arm, Right [[id number]] Collected: 03/06/24 1044    Specimen: Blood from Arm, Right Updated: 03/06/24 1136    CBC Auto Differential [[id number]]  (Abnormal) Collected: 03/06/24 1044    Specimen: Blood Updated: 03/06/24 1110     WBC 11.62 10*3/mm3      RBC 4.10 10*6/mm3      Hemoglobin 11.9 g/dL      Hematocrit 37.6 %      MCV 91.7 fL      MCH 29.0 pg      MCHC 31.6 g/dL      RDW 13.1 %      RDW-SD 44.5 fl      MPV 9.3 fL      Platelets 295 10*3/mm3      Neutrophil % 77.3 %      Lymphocyte % 11.9 %      Monocyte % 7.8 %      Eosinophil % 2.3 %      Basophil % 0.4 %      Immature Grans % 0.3 %      Neutrophils, Absolute 8.97 10*3/mm3      Lymphocytes, Absolute 1.38 10*3/mm3      Monocytes, Absolute 0.91 10*3/mm3      Eosinophils, Absolute 0.27 10*3/mm3      Basophils, Absolute 0.05 10*3/mm3      Immature Grans, Absolute 0.04 10*3/mm3      nRBC 0.0 /100 WBC     Single High Sensitivity Troponin T [[id number]]  (Abnormal) Collected: 03/06/24 1044    Specimen: Blood Updated: 03/06/24 1128     HS Troponin T 29 ng/L     Narrative:      High Sensitive Troponin T Reference Range:  <14.0 ng/L- Negative Female for AMI  <22.0 ng/L- Negative Male for AMI  >=14 - Abnormal Female indicating possible myocardial  injury.  >=22 - Abnormal Male indicating possible myocardial injury.   Clinicians would have to utilize clinical acumen, EKG, Troponin, and serial changes to determine if it is an Acute Myocardial Infarction or myocardial injury due to an underlying chronic condition.         COVID PRE-OP / PRE-PROCEDURE SCREENING ORDER (NO ISOLATION) - Swab, Nasopharynx [[id number]]  (Normal) Collected: 03/06/24 1056    Specimen: Swab from Nasopharynx Updated: 03/06/24 1204    Narrative:      The following orders were created for panel order COVID PRE-OP / PRE-PROCEDURE SCREENING ORDER (NO ISOLATION) - Swab, Nasopharynx.  Procedure                               Abnormality         Status                     ---------                               -----------         ------                     COVID-19, FLU A/B, RSV P...[[id number]]  Normal              Final result                 Please view results for these tests on the individual orders.    COVID-19, FLU A/B, RSV PCR 1 HR TAT - Swab, Nasopharynx [[id number]]  (Normal) Collected: 03/06/24 1056    Specimen: Swab from Nasopharynx Updated: 03/06/24 1204     COVID19 Not Detected     Influenza A PCR Not Detected     Influenza B PCR Not Detected     RSV, PCR Not Detected    Narrative:      Fact sheet for providers: https://www.fda.gov/media/749943/download    Fact sheet for patients: https://www.fda.gov/media/633800/download    Test performed by PCR.    Blood Culture - Blood, Arm, Right [[id number]] Collected: 03/06/24 1352    Specimen: Blood from Arm, Right Updated: 03/06/24 1420            XR Chest 1 View   Final Result   1.. Changes of bullous emphysema. There is bronchovascular crowding in   the lung bases as well as scarring in both lung bases. Stable appearance   from previous study of 1 year earlier.       This report was signed and finalized on 3/6/2024 11:08 AM by Dr. Paul Brasher MD.              ED Course          MDM  Number of Diagnoses or Management Options  COPD  exacerbation: new and requires workup  Diagnosis management comments: Patient's lab work has come back and actually looks okay.  His pulse ox has been jumping around from 85-95 during the entire time here but he says is not really having increasing shortness of breath at the present time.  I think he is just having a COPD exacerbation from some type of viral illness.  I did offer to put him in the hospital to treat this since his pulse ox does get low at times but he says he oxygen at home and feels okay right now as he would rather go home.  His family is concerned he will get exposed to things here that he would not to at home and I think it is reasonable.  We put him on some steroids to see if we get this better.  He is discharged in stable condition.       Amount and/or Complexity of Data Reviewed  Clinical lab tests: ordered and reviewed  Tests in the radiology section of CPT®: ordered and reviewed  Tests in the medicine section of CPT®: reviewed and ordered    Risk of Complications, Morbidity, and/or Mortality  Presenting problems: moderate  Diagnostic procedures: moderate  Management options: moderate    Patient Progress  Patient progress: stable        Final diagnoses:   COPD exacerbation          Ulices Zimmerman Jr., MD  03/06/24 2110

## 2024-03-07 LAB
BACTERIA BLD CULT: ABNORMAL
BOTTLE TYPE: ABNORMAL
QT INTERVAL: 410 MS
QTC INTERVAL: 426 MS

## 2024-03-07 RX ORDER — IPRATROPIUM BROMIDE 21 UG/1
2 SPRAY, METERED NASAL 2 TIMES DAILY
Qty: 1 EACH | Refills: 12 | Status: SHIPPED | OUTPATIENT
Start: 2024-03-07

## 2024-03-09 LAB
BACTERIA SPEC AEROBE CULT: ABNORMAL
GRAM STN SPEC: ABNORMAL
ISOLATED FROM: ABNORMAL

## 2024-03-11 LAB — BACTERIA SPEC AEROBE CULT: NORMAL

## 2024-04-15 NOTE — PROGRESS NOTES
YOB: 1934  Location: [facility]  Location Address: [address] [place] [address],  3, [address] [place] [address]  Location Phone: [phone]    Chief Complaint   Patient presents with    Nose Bleed     Has gotten better but still having issues in right nostril    Difficulty Swallowing       History of Present Illness  Gonzalo Durham is a 89 y.o. male.  Gonzalo Durham is here for follow up of ENT complaints. The patient has had problems with epistaxis   Patient denies episodes of epistaxis since last visit. He is currently using beeswax in the nose as well as atrovent. He has not been using saline gel   Patient is oxygen dependent and uses nasal canula and feels as if this contributes to dry nasal membranes. He reports increased dryness when waking and crusting to the right side of his nose   Patient also complains of scaly lesion to left nasal bridge that has been present for the past several months. This tends to scab and he scratches it from time to time.   Patient also complains of difficulty swallowing certain foods. This seems to be worse with pasta. He has a recent diagnosis of aspiration pneumonia and was hospitalized in [facility]    He has not had swallow study   Patient is on eliquis daily        Past Medical History:   Diagnosis Date    A-fib     AAA (abdominal aortic aneurysm) without rupture     Arthritis     BPH (benign prostatic hypertrophy)     Cataract     bialteral    CKD (chronic kidney disease)     COPD (chronic obstructive pulmonary disease)     Coronary artery disease involving native coronary artery of native heart without angina pectoris 2017    CABG/Az/[place]/ No TCC echo  7.16.18 Right ventricular cavity is mild-to-moderately dilated. Left ventricular diastolic dysfunction (grade I) consistent with impaired relaxation. Left ventricular systolic function is normal. Left atrial cavity size is borderline dilated. 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 BMS vs REGGIE Get cabg report  ;  Surgeon: Bradley Carver MD;  Location:  [facility];  Service: Cardiology    COLONOSCOPY N/A 6/15/2017    Diverticulosis repeat exam prn    COLONOSCOPY W/ POLYPECTOMY  10/21/2013    2 Tubular adenomatous polyps, Diverticulosis repeat exam in 5 years    CORONARY ARTERY BYPASS GRAFT  1980    X 3    CYSTOSCOPY RETROGRADE PYELOGRAM Bilateral 2/8/2021    Procedure: CYSTOSCOPY RETROGRADE PYELOGRAM;  Surgeon: Danie Cadena MD;  Location:  [facility] OR;  Service: Urology;  Laterality: Bilateral;    ENDOSCOPY N/A 6/15/2017    LA Grade A reflux esophagitis    EYE SURGERY Bilateral     HERNIA REPAIR      TRANSURETHRAL RESECTION OF BLADDER TUMOR N/A 2/8/2021    Procedure: CYSTOSCOPY TRANSURETHRAL RESECTION OF BLADDER TUMOR WITH GEMCITABINE INSTILLATION;  Surgeon: Danie Cadean MD;  Location:  [facility] OR;  Service: Urology;  Laterality: N/A;       No outpatient medications have been marked as taking for the 4/16/24 encounter (Office Visit) with Joseph Diaz MD.       Symbicort [budesonide-formoterol  fumarate] and Prozac [fluoxetine hcl]    Family History   Problem Relation Age of Onset    Heart disease Mother     Stroke Mother     Melanoma Mother     Heart disease Father     Diabetes Father     Prostate cancer Father     Colon cancer Neg Hx     Colon polyps Neg Hx        Social History     Socioeconomic History    Marital status:    Tobacco Use    Smoking status: Former     Current packs/day: 0.00     Average packs/day: 1 pack/day for 26.0 years (26.0 ttl pk-yrs)     Types: Cigarettes     Start date:      Quit date:      Years since quittin.3     Passive exposure: Past    Smokeless tobacco: Never   Vaping Use    Vaping status: Never Used   Substance and Sexual Activity    Alcohol use: No    Drug use: No    Sexual activity: Defer       Review of Systems   Constitutional:  Positive for fatigue.   HENT:  Positive for nosebleeds and trouble swallowing.        Vitals:    24 0919   BP: 118/60   Pulse: 69   Temp: 98 °F (36.7 °C)       Body mass index is 27.77 kg/m².    Objective     Physical Exam  Vitals reviewed.   Constitutional:       Appearance: He is normal weight.   HENT:      Head: Normocephalic.      Right Ear: Tympanic membrane and external ear normal. Decreased hearing noted.      Left Ear: Tympanic membrane and external ear normal. Decreased hearing noted.      Ears:      Comments: Bilateral hearing aids in place        Nose: Septal deviation present.        Comments: Right sided septal crusting   Nasal canula in place        Mouth/Throat:      Lips: Pink.      Mouth: Mucous membranes are dry.   Neurological:      Mental Status: He is alert.       Excision    Date/Time: 2024 12:29 PM    Performed by: Nicky Del Angel APRN  Authorized by: Nicky Del Angel APRN    Number of Lesions: 1  Lesion 1:     Body area: head/neck    Head/neck location: nose    Initial size (mm): 2    Malignancy: malignancy unknown      Destruction method: cryotherapy        Assessment & Plan   Diagnoses and  all orders for this visit:    1. Dysphagia, unspecified type (Primary)  -     FL Video Swallow With Speech Single Contrast; Future    2. Lesion of skin of nose    3. Epistaxis    4. Nasal crusting    5. Oxygen dependent    Other orders  -     mupirocin (BACTROBAN) 2 % ointment; Apply 1 Application topically to the appropriate area as directed 2 (Two) Times a Day for 14 days.  Dispense: 28 g; Refill: 0  -     Excision      * Surgery not found *  Orders Placed This Encounter   Procedures    FL Video Swallow With Speech Single Contrast     Standing Status:   Future     Standing Expiration Date:   4/16/2025     Order Specific Question:   Reason for Exam:     Answer:   dysphagia     Order Specific Question:   Release to patient     Answer:   Routine Release [[id number]]     No follow-ups on file.     Continue nasal sprays   Start mupirocin - will have to use around nasal canula oxygen use and recommended only applying to area of nasal septal crusting     Can continue saline gel as needed   Humidification on oxygen   Swallow study prior to f/u       There are no Patient Instructions on file for this visit.

## 2024-04-16 ENCOUNTER — OFFICE VISIT (OUTPATIENT)
Dept: OTOLARYNGOLOGY | Facility: CLINIC | Age: 89
End: 2024-04-16
Payer: MEDICARE

## 2024-04-16 VITALS
SYSTOLIC BLOOD PRESSURE: 118 MMHG | BODY MASS INDEX: 27.88 KG/M2 | HEIGHT: 71 IN | TEMPERATURE: 98 F | WEIGHT: 199.13 LBS | DIASTOLIC BLOOD PRESSURE: 60 MMHG | HEART RATE: 69 BPM

## 2024-04-16 DIAGNOSIS — Z99.81 OXYGEN DEPENDENT: ICD-10-CM

## 2024-04-16 DIAGNOSIS — J34.89 NASAL CRUSTING: ICD-10-CM

## 2024-04-16 DIAGNOSIS — L98.9 LESION OF SKIN OF NOSE: ICD-10-CM

## 2024-04-16 DIAGNOSIS — R04.0 EPISTAXIS: ICD-10-CM

## 2024-04-16 DIAGNOSIS — R13.10 DYSPHAGIA, UNSPECIFIED TYPE: Primary | ICD-10-CM

## 2024-04-26 ENCOUNTER — HOSPITAL ENCOUNTER (OUTPATIENT)
Dept: GENERAL RADIOLOGY | Facility: HOSPITAL | Age: 89
Discharge: HOME | End: 2024-04-26
Payer: MEDICARE

## 2024-04-26 DIAGNOSIS — R13.12 OROPHARYNGEAL DYSPHAGIA: ICD-10-CM

## 2024-04-26 PROCEDURE — 63710000001 BARIUM SULFATE 40 % RECONSTITUTED SUSPENSION: Performed by: NURSE PRACTITIONER

## 2024-04-26 PROCEDURE — 63710000001 BARIUM SULFATE 40 % SUSPENSION: Performed by: NURSE PRACTITIONER

## 2024-04-26 PROCEDURE — 63710000001 BARIUM SULFATE 40 % PASTE: Performed by: NURSE PRACTITIONER

## 2024-04-26 PROCEDURE — A9270 NON-COVERED ITEM OR SERVICE: HCPCS | Performed by: NURSE PRACTITIONER

## 2024-04-26 PROCEDURE — 92611 MOTION FLUOROSCOPY/SWALLOW: CPT | Performed by: SPEECH-LANGUAGE PATHOLOGIST

## 2024-04-26 PROCEDURE — 74230 X-RAY XM SWLNG FUNCJ C+: CPT

## 2024-04-26 RX ADMIN — BARIUM SULFATE 20 ML: 400 PASTE ORAL at 09:31

## 2024-04-26 RX ADMIN — BARIUM SULFATE 10 ML: 400 SUSPENSION ORAL at 09:31

## 2024-04-26 RX ADMIN — BARIUM SULFATE 20 ML: 400 SUSPENSION ORAL at 09:31

## 2024-04-26 RX ADMIN — BARIUM SULFATE 30 ML: 0.81 POWDER, FOR SUSPENSION ORAL at 09:31

## 2024-04-26 NOTE — MBS/VFSS/FEES
Speech Language Pathology   MBS FEES / Discharge Summary  [facility]       Patient Name: Gonzalo Durham  : 1934  MRN: [id number]    Today's Date: 2024      Visit Date: 2024   SPEECH-LANGUAGE PATHOLOGY EVALUTION - MBS/VFSS  Subjective: The patient was seen on this date for a VFSS(Videofluoroscopic Swallowing Study).  Patient was alert and cooperative.    Significant history: Presented from referral by ENT due to dysphagia. Reports a recent incidence of choking on noodle from [facility]. He was hospitalized with aspiration pnuemonia at [place]. Medical history includes O2 depedence (4L), esophagitis, CABG, stroke, CKD, sleep apnea. GERD, DM, COPD. Prior speech therapy for memory evaluation with RBANS x2 and home health speech for breath support work.   Objective: Risks/benefits were reviewed with the patient and family, and consent was obtained. Oral motor examination results: WFL. The study was completed with SLP and Radiologist present. The patient was seen in lateral view(s). Textures given included thin liquid, nectar thick liquid, honey thick liquid, puree consistency, mechanical soft consistency, and regular consistency.  Assessment: Difficulties were noted with thin liquid and nectar thick liquid, characterized by penetration.   Observations:      Trial 1: Honey Thick - Spoon  Poor oral control with premature loss to the vallecula. Swallow completed with no penetration or aspiration. Mild lingual , vallecular, and pyriform residue. Clears with a spontaneous multiple swallow.      Trial 2: Nectar Thick - Straw  Large bolus obtained with piecemeal swallows observed. Poor oral control with premature loss over the tip of the epiglottis. Swallow completed with silent flash penetration but no aspiration.      Trial 3: Multiple swallow  Continued piecemeal swallows of nectar thick trial. Clears lingual, vallecular, and pyriform residue to a minimal amount.      Trial 4: Thin Liquid - Straw  Large bolus  obtained with piecemeal swallows observed. Poor oral control with loss to the lateral sulci and premature loss. Swallow completed with silent flash penetration but no aspiration. Minimal undercoating of the epiglottis remains. At end of frame minimal coating of vallecula.      Trial 5: Pudding Thick Liquid - Spoon  Poor oral control with loss to the base of the tongue. Completed with no penetration or aspiration. Mild oral residue clears with spontaneous subsequent swallow.      Trial 6: Soft Solids  Functional chew for soft solids. Mild loss of the puree coating mixed with juice of fruit to the vallecula. Swallow completed with no penetration or aspiration. No significant oral or pharyngeal residue present post swallow.      Trial 7: Regular Solids  Mastication of regular solid with mildly increased prep time.      Trial 8: Regular Solids continued  Loss to the vallecula noted. No penetration or aspiration. Mild lingual and vallecular residue. Clears with spontaenous subsequent swallow.      Trial 9: Thin Liquid - Straw  Large bolus obtained with piecemeal swallows observed. Poor oral control with loss to the lateral sulci and premature loss. Swallow completed with silent flash penetration but no aspiration. Minimal undercoating of the epiglottis remains. At end of frame minimal coating of vallecula.     Esophageal screen was not performed.  SLP Findings: Patient presents with mild oropharyngeal dysphagia.     Recommendations: Diet Textures: Regular diet with thin liquids. Medications should be taken whole with puree.   Comments: We discussed noted penetration of thin and nectar. At this juncture the patient does not report significant hospitalizations, increased congestion, or increasing need for chronic O2. I recommend he follow closely with MD and possibly home health SLP in order to determine continued safety with unrestricted diet. We discussed avoidance of mixed consistencies and that safest option for  medication would be with a thicker option such as pudding or yogurt. We discussed the significance of oral care and continued upright positioning/ambulation.   Recommended Strategies: upright for PO, small bites and sips, alternate liquids and solids, and double swallow with all intake . Oral care before meals and PRN.  Other Recommended Evaluations: Repeat VFSS with increasing concerns. Could work with home health SLP for swallowing therapy if he wishes to pursue at this juncture.     Dysphagia therapy is recommended.      Nai Sargent MS CCC-SLP 4/26/2024 11:11 CDT    Visit Dx:     ICD-10-CM ICD-9-CM   1. Oropharyngeal dysphagia  R13.12 787.22       Patient Active Problem List   Diagnosis    Wellness examination-done    Obesity    Controlled type 2 diabetes mellitus with circulatory disorder, without long-term current use of insulin    Stage 3a chronic kidney disease-ro    Erectile dysfunction    Coronary artery disease involving native coronary artery of native heart without angina pectoris    Hyperlipidemia LDL goal <70-statin    Hypertension    Prostatism-(young) urology    Tremor    Varicose vein of leg    Plantar fasciitis    Nocturnal leg movements    Bad dreams    Depression    Peripheral neuropathy- clinical    Hx of colonic polyps    Gastroesophageal reflux disease    Left lower quadrant pain    Laboratory test*    Anticoagulated-CAD, DM2, CVA/ASA 81+()+plavix » Anticoagulated-CAD, DM2, CVA/ASA 81+()+plavix+eliquist    SOB: copd,CAD,#, hypoxemia    Hypoxia#to pulmonary    Corn or callus    Anemia: ASA81,plavix,eliquis,gi(3/3+,div,cp,eitis    Atherosclerosis: CAD, AAA vascular    AAA: 2022-(ro) steffen    Heme positive stool    Stage 2 moderate COPD by GOLD classification    Abnormal stress test    Tingling of both feet-to consider NCV    Cryptogenic stroke    Chronic diastolic congestive heart failure    Gross hematuria    BPH with obstruction/lower urinary tract symptoms    Chest pain     Obstructive sleep apnea-(cpap)    Abnormal CT of the chest 1.2022/done    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    Oxygen dependent    S/P CABG x 3    Pulmonary hypertension    PAF (paroxysmal atrial fibrillation)    Umbilical hernia-a waqar    History of COVID-19    Cytokine release syndrome, grade 1    Hypokalemia    Overweight    Post-COVID syndrome    Chronic respiratory failure with hypoxia    Epistaxis    Personal history of nicotine dependence    BRBPR (bright red blood per rectum)    Acute diarrhea    Chronic anticoagulation    Asymptomatic colonization Clostridioides difficile    Polypharmacy        Past Medical History:   Diagnosis Date    A-fib     AAA (abdominal aortic aneurysm) without rupture     Arthritis     BPH (benign prostatic hypertrophy)     Cataract     bialteral    CKD (chronic kidney disease)     COPD (chronic obstructive pulmonary disease)     Coronary artery disease involving native coronary artery of native heart without angina pectoris 1/20/2017    CABG/Az/[place]/1981 No TCC echo  7.16.18 Right ventricular cavity is mild-to-moderately dilated. Left ventricular diastolic dysfunction (grade I) consistent with impaired relaxation. Left ventricular systolic function is normal. Left atrial cavity size is borderline dilated. 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 BMS vs REGGIE Get cabg report  ;  Surgeon: Bradley Carver MD;  Location:  [facility];  Service: Cardiology    COLONOSCOPY N/A 6/15/2017    Diverticulosis repeat exam prn    COLONOSCOPY W/ POLYPECTOMY  10/21/2013    2 Tubular adenomatous polyps, Diverticulosis repeat exam in 5 years    CORONARY ARTERY BYPASS GRAFT  1980    X 3    CYSTOSCOPY RETROGRADE PYELOGRAM Bilateral 2/8/2021    Procedure: CYSTOSCOPY RETROGRADE PYELOGRAM;  Surgeon: Danie Cadena MD;  Location:  [facility] OR;  Service: Urology;  Laterality: Bilateral;    ENDOSCOPY N/A 6/15/2017    LA Grade A reflux esophagitis    EYE SURGERY Bilateral     HERNIA REPAIR      TRANSURETHRAL RESECTION OF BLADDER TUMOR N/A 2/8/2021    Procedure: CYSTOSCOPY TRANSURETHRAL RESECTION OF BLADDER TUMOR WITH GEMCITABINE INSTILLATION;  Surgeon: Danie aCdena MD;  Location:  [facility] OR;  Service: Urology;  Laterality: N/A;                      SLP Adult Swallow Evaluation       Row Name 04/26/24 0916       Rehab Evaluation    Document Type evaluation  -MG    Subjective Information no complaints  -MG    Patient Observations alert;cooperative;agree to therapy  -MG    Patient/Family/Caregiver Comments/Observations Daughter present.  -MG    Patient Effort good  -MG    Symptoms Noted During/After Treatment none  -MG       General Information    Patient Profile Reviewed yes  -MG    Pertinent History Of Current Problem Presented from referral by ENT due to dysphagia. Reports a recent incidence of choking on noodle from [facility]. He was hospitalized with aspiration pnuemonia at [place]. Medical history includes O2 depedence (4L), esophagitis, CABG, stroke, CKD, sleep apnea. GERD, DM,  COPD. Prior speech therapy for memory evaluation with RBANS x2 and home health speech for breath support work.  -MG    Current Method of Nutrition regular textures;thin liquids  -MG    Precautions/Limitations, Vision WFL with corrective lenses;for purposes of eval  -MG    Precautions/Limitations, Hearing WFL;difficult to assess  -MG    Prior Level of Function-Communication WFL  -MG    Prior Level of Function-Swallowing no diet consistency restrictions  -MG    Plans/Goals Discussed with patient and family  -MG    Barriers to Rehab none identified  -MG    Patient's Goals for Discharge eat/drink without coughing/choking  -MG       Pain    Additional Documentation Pain Scale: FACES Pre/Post-Treatment (Group)  -MG       Pain Scale: FACES Pre/Post-Treatment    Pain: FACES Scale, Pretreatment 0-->no hurt  -MG    Posttreatment Pain Rating 0-->no hurt  -MG       Oral Motor Structure and Function    Dentition Assessment natural, present and adequate  -MG    Secretion Management WNL/WFL  -MG    Mucosal Quality moist, healthy  -MG    Volitional Swallow WFL  -MG    Volitional Cough WFL  -MG       Oral Musculature and Cranial Nerve Assessment    Oral Motor General Assessment WFL  -MG       General Eating/Swallowing Observations    Respiratory Support Currently in Use nasal cannula  -MG    O2 Liters 4L  -MG       Respiratory    Respiratory Status WFL  -MG       MBS/VFSS    Utensils Used spoon;straw  -MG    Consistencies Trialed pureed;honey-thick liquids;nectar/syrup-thick liquids;thin liquids;regular textures;soft to chew textures  -MG       MBS/VFSS Interpretation    Oral Prep Phase WFL  -MG    Oral Transit Phase impaired  -MG    Oral Residue impaired  -MG    VFSS Summary See note  -MG       Initiation of Pharyngeal Swallow    Initiation of Pharyngeal Swallow bolus in valleculae  -MG    Pharyngeal Phase impaired pharyngeal phase of swallowing  -MG    Penetration During the Swallow thin liquids;nectar-thick liquids;secondary to  delayed swallow initiation or mistiming;secondary to reduced vestibular closure  -MG    Pharyngeal Residue base of tongue;valleculae  -MG    Response to Residue cleared residue with spontaneous subsequent swallow  -MG       Esophageal Phase    Esophageal Phase no impairments;see radiology report for further details  -MG       SLP Evaluation Clinical Impression    SLP Swallowing Diagnosis mild;oral dysphagia;pharyngeal dysphagia  -MG    Functional Impact risk of aspiration/pneumonia  -MG    Rehab Potential/Prognosis, Swallowing good, to achieve stated therapy goals  -MG    Swallow Criteria for Skilled Therapeutic Interventions Met demonstrates skilled criteria  -MG       Recommendations    Therapy Frequency (Swallow) evaluation only  -MG    Predicted Duration Therapy Intervention (Days) until discharge  -MG    SLP Diet Recommendation regular textures;thin liquids  -MG    Recommended Diagnostics VFSS (MBS)  -MG    Recommended Precautions and Strategies upright posture during/after eating;small bites of food and sips of liquid;multiple swallows per bite of food;multiple swallows per sip of liquid;general aspiration precautions;fatigue precautions;volitional throat clear  -MG    Oral Care Recommendations Oral Care before breakfast, after meals and PRN;Toothbrush  -MG    SLP Rec. for Method of Medication Administration meds whole;with puree  -MG    Monitor for Signs of Aspiration yes;notify SLP if any concerns  -MG    Anticipated Discharge Disposition (SLP) home with home health  -MG    Demonstrates Need for Referral to Another Service speech therapy  home health  -MG              User Key  (r) = Recorded By, (t) = Taken By, (c) = Cosigned By      Initials Name Provider Type    MG Nai Sargent MS CCC-SLP Speech and Language Pathologist                                    OP SLP Education       Row Name 04/26/24 0072       Education    Barriers to Learning No barriers identified  -MG    Education Provided Described  results of evaluation;Patient expressed understanding of evaluation;Family/caregivers expressed understanding of evaluation  -MG    Assessed Learning needs;Learning motivation;Learning preferences;Learning readiness  -MG    Learning Motivation Strong  -MG    Learning Method Explanation  -MG    Teaching Response Verbalized understanding  -MG    Education Comments Reviewed results of study and recommendations with the patient and his daughter.  -MG              User Key  (r) = Recorded By, (t) = Taken By, (c) = Cosigned By      Initials Name Effective Dates    MG Nai Sargent MS CCC-SLP 07/11/23 -                                        Time Calculation:   Untimed Charges  SLP Eval/Re-eval : ST Motion Fluoro Eval Swallow - 96553  [id number]-[id number] Motion Fluoro Eval Swallow Minutes: 117  Total Minutes  Untimed Charges Total Minutes: 117   Total Minutes: 117    Therapy Charges for Today       Code Description Service Date Service Provider Modifiers Qty    69364455025 HC ST MOTION FLUORO EVAL SWALLOW 8 4/26/2024 Nai Sargent MS CCC-SLP GN 1                    Nai Sargent MS CCC-SLP  4/26/2024

## 2024-04-29 ENCOUNTER — TELEPHONE (OUTPATIENT)
Dept: OTOLARYNGOLOGY | Facility: CLINIC | Age: 89
End: 2024-04-29
Payer: MEDICARE

## 2024-04-29 DIAGNOSIS — R13.10 DYSPHAGIA, UNSPECIFIED TYPE: Primary | ICD-10-CM

## 2024-04-29 NOTE — TELEPHONE ENCOUNTER
Notified patient daughter of results and they would like to proceed with dysphagia therapy     ----- Message from Viki HOWARD sent at 4/29/2024  9:15 AM CDT -----  Speech recommends dysphagia therapy

## 2024-04-30 ENCOUNTER — OFFICE VISIT (OUTPATIENT)
Dept: PULMONOLOGY | Facility: CLINIC | Age: 89
End: 2024-04-30
Payer: MEDICARE

## 2024-04-30 VITALS
HEIGHT: 71 IN | BODY MASS INDEX: 28.56 KG/M2 | HEART RATE: 59 BPM | WEIGHT: 204 LBS | DIASTOLIC BLOOD PRESSURE: 72 MMHG | SYSTOLIC BLOOD PRESSURE: 118 MMHG | OXYGEN SATURATION: 96 %

## 2024-04-30 DIAGNOSIS — Z77.090 HISTORY OF EXPOSURE TO ASBESTOS: Chronic | ICD-10-CM

## 2024-04-30 DIAGNOSIS — J43.9 PULMONARY EMPHYSEMA, UNSPECIFIED EMPHYSEMA TYPE: Chronic | ICD-10-CM

## 2024-04-30 DIAGNOSIS — J96.11 CHRONIC RESPIRATORY FAILURE WITH HYPOXIA: Chronic | ICD-10-CM

## 2024-04-30 DIAGNOSIS — J44.9 STAGE 2 MODERATE COPD BY GOLD CLASSIFICATION: Primary | Chronic | ICD-10-CM

## 2024-04-30 PROCEDURE — 99214 OFFICE O/P EST MOD 30 MIN: CPT | Performed by: NURSE PRACTITIONER

## 2024-04-30 PROCEDURE — 1160F RVW MEDS BY RX/DR IN RCRD: CPT | Performed by: NURSE PRACTITIONER

## 2024-04-30 PROCEDURE — 1159F MED LIST DOCD IN RCRD: CPT | Performed by: NURSE PRACTITIONER

## 2024-04-30 NOTE — PROGRESS NOTES
"Chief Complaint  COPD    Subjective    History of Present Illness      Gonzalo Durham presents to [facility] for:    History of Present Illness   Follow-up and management of COPD, emphysema with lung scarring, chronic respiratory failure and a history of previous asbestos and heavy metal exposure.  He has been in and out of the hospital over the past year.  He had a chest x-ray 3/6/2024 showing emphysema and stable lung scarring.  He recently had a speech evaluation with recommended dysphagia therapy.  He comes in today accompanied by his 2 daughters and they are asking whether or not he should be back on his PAP device.  He has been off of it for about 2 years now.  The patient reports that he does not feel that he needs it.  He is however agreeable to an overnight pulse ox study.  He is on chronic 4 L of oxygen.  He is on daily Breztri, albuterol and Mucinex.  He uses nasal sprays as needed.  He has a lot of ongoing and chronic sinus problems and is being followed by ENT.  He states up-to-date on vaccines.  Objective   Vital Signs:   /72   Pulse 59   Ht 180.3 cm (71\")   Wt 92.5 kg (204 lb)   SpO2 96% Comment: 4L  BMI 28.45 kg/m²     Physical Exam  Vitals reviewed.   Constitutional:       General: He is not in acute distress.     Appearance: Normal appearance.      Interventions: Nasal cannula in place.   HENT:      Head: Normocephalic and atraumatic.      Right Ear: Decreased hearing noted.      Left Ear: Decreased hearing noted.      Ears:      Comments: Bilateral hearing aids  Cardiovascular:      Rate and Rhythm: Normal rate and regular rhythm.   Pulmonary:      Breath sounds: Decreased breath sounds (At bases only, otherwise clear) present.   Musculoskeletal:      Cervical back: Normal range of motion.   Skin:     General: Skin is warm and dry.   Neurological:      Mental Status: He is alert and oriented to person, place, and time.   Psychiatric:    "      Mood and Affect: Mood normal.         Behavior: Behavior normal.        Result Review :   The following data was reviewed by: DOMINIQUE Yeung on 04/30/2024:  XR Chest 1 View (03/06/2024 11:01)   Results for orders placed in visit on 01/26/23    Pulmonary Function Test    Narrative  Pulmonary Function Test  Performed by: Cindy Ramires, RRT  Authorized by: Feliz Frye APRN    Pre Drug % Predicted  FVC: 124%  FEV1: 64%  FEF 25-75%: 24%  FEV1/FVC: 38%    Interpretation  Spirometry  Spirometry shows moderate obstruction. There is reduced midflow suggesting small airway/airflow obstruction.      Results for orders placed in visit on 01/26/22    Pulmonary Function Test    Narrative  Pulmonary Function Test  Performed by: Cindy Ramires, REJI  Authorized by: Feliz Frye APRN    Pre Drug % Predicted  FVC: 87%  FEV1: 50%  FEF 25-75%: 15%  FEV1/FVC: 43.7%    Interpretation  Spirometry  Spirometry shows moderate obstruction. There is reduced midflow suggesting small airway/airflow obstruction.  Overall comments: Current FEV1 is 50% predicted compared to 66% predicted in 2019.  Reviewed the results of the drop in lung function with the patient and his daughter.  The patient states understanding but was not interested in changing his inhaler regimen.  He reports that he feels no more symptomatic than he did 2 or 3 years ago.      Results for orders placed in visit on 02/25/19    Pulmonary Function Test    Rest/Exercise Pulse Ox Values          1/26/2023    09:30   Rest/Exercise Pulse Ox Results   Rest room air SAT % 92   Exercise room air SAT % 79   Exercise on O2 @ Liters 2   Exercise on O2 SAT % 89              Assessment and Plan      Diagnoses and all orders for this visit:    1. Stage 2 moderate COPD by GOLD classification (Primary)  Comments:  Mostly stable.  Continue Breztri, albuterol, Mucinex and oxygen.  Flow-volume loop on return.    2. Chronic respiratory  failure with hypoxia  Comments:  Stable.  He is on chronic oxygen at 4 L.  Will get overnight pulse ox study to assure adequacy.  Orders:  -     Overnight Sleep Oximetry Study; Future    3. History of exposure to asbestos  Comments:  Stable.  Continue annual chest scans.    4. Pulmonary emphysema, unspecified emphysema type  Comments:  Stable.  Continue current pulmonary regimen and annual chest scans.        Feliz Frye, APRN  4/30/2024  11:53 CDT    Follow Up   Return in about 6 months (around 10/30/2024) for overnight pulse ox study, FVL on return.    Patient was given instructions and counseling regarding his condition or for health maintenance advice. Please see specific information pulled into the AVS if appropriate.

## 2024-05-02 DIAGNOSIS — J96.11 CHRONIC RESPIRATORY FAILURE WITH HYPOXIA: Chronic | ICD-10-CM

## 2024-05-07 ENCOUNTER — LAB (OUTPATIENT)
Dept: FAMILY MEDICINE CLINIC | Facility: CLINIC | Age: 89
End: 2024-05-07
Payer: MEDICARE

## 2024-05-07 DIAGNOSIS — N18.31 STAGE 3A CHRONIC KIDNEY DISEASE: Chronic | ICD-10-CM

## 2024-05-07 DIAGNOSIS — E11.59 CONTROLLED TYPE 2 DIABETES MELLITUS WITH OTHER CIRCULATORY COMPLICATION, WITHOUT LONG-TERM CURRENT USE OF INSULIN: Chronic | ICD-10-CM

## 2024-05-07 DIAGNOSIS — R31.0 GROSS HEMATURIA: ICD-10-CM

## 2024-05-07 DIAGNOSIS — Z79.01 CHRONIC ANTICOAGULATION: Chronic | ICD-10-CM

## 2024-05-07 DIAGNOSIS — F32.A DEPRESSION, UNSPECIFIED DEPRESSION TYPE: ICD-10-CM

## 2024-05-07 DIAGNOSIS — E78.5 HYPERLIPIDEMIA LDL GOAL <70: Chronic | ICD-10-CM

## 2024-05-07 DIAGNOSIS — Z12.5 ENCOUNTER FOR PROSTATE CANCER SCREENING: ICD-10-CM

## 2024-05-07 DIAGNOSIS — I25.10 CORONARY ARTERY DISEASE INVOLVING NATIVE CORONARY ARTERY OF NATIVE HEART WITHOUT ANGINA PECTORIS: Primary | Chronic | ICD-10-CM

## 2024-05-07 DIAGNOSIS — E87.6 HYPOKALEMIA: ICD-10-CM

## 2024-05-07 DIAGNOSIS — I10 PRIMARY HYPERTENSION: Chronic | ICD-10-CM

## 2024-05-07 DIAGNOSIS — J44.9 STAGE 2 MODERATE COPD BY GOLD CLASSIFICATION: Chronic | ICD-10-CM

## 2024-05-08 ENCOUNTER — TELEPHONE (OUTPATIENT)
Dept: FAMILY MEDICINE CLINIC | Facility: CLINIC | Age: 89
End: 2024-05-08

## 2024-05-08 LAB
25(OH)D3+25(OH)D2 SERPL-MCNC: 46.4 NG/ML (ref 30–100)
ALBUMIN SERPL-MCNC: 4.4 G/DL (ref 3.5–5.2)
ALBUMIN/GLOB SERPL: 2 G/DL
ALP SERPL-CCNC: 59 U/L (ref 39–117)
ALT SERPL-CCNC: 11 U/L (ref 1–41)
AST SERPL-CCNC: 14 U/L (ref 1–40)
BASOPHILS # BLD AUTO: 0.07 10*3/MM3 (ref 0–0.2)
BASOPHILS NFR BLD AUTO: 0.8 % (ref 0–1.5)
BILIRUB SERPL-MCNC: 0.2 MG/DL (ref 0–1.2)
BUN SERPL-MCNC: 24 MG/DL (ref 8–23)
BUN/CREAT SERPL: 19.8 (ref 7–25)
CALCIUM SERPL-MCNC: 9.9 MG/DL (ref 8.2–9.6)
CHLORIDE SERPL-SCNC: 102 MMOL/L (ref 98–107)
CHOLEST SERPL-MCNC: 232 MG/DL (ref 0–200)
CHOLEST/HDLC SERPL: 6.63 {RATIO}
CO2 SERPL-SCNC: 31.4 MMOL/L (ref 22–29)
CREAT SERPL-MCNC: 1.21 MG/DL (ref 0.76–1.27)
EGFRCR SERPLBLD CKD-EPI 2021: 56.9 ML/MIN/1.73
EOSINOPHIL # BLD AUTO: 0.73 10*3/MM3 (ref 0–0.4)
EOSINOPHIL NFR BLD AUTO: 8 % (ref 0.3–6.2)
ERYTHROCYTE [DISTWIDTH] IN BLOOD BY AUTOMATED COUNT: 13.8 % (ref 12.3–15.4)
FOLATE SERPL-MCNC: >20 NG/ML (ref 4.78–24.2)
GLOBULIN SER CALC-MCNC: 2.2 GM/DL
GLUCOSE SERPL-MCNC: 143 MG/DL (ref 65–99)
HBA1C MFR BLD: 7.4 % (ref 4.8–5.6)
HCT VFR BLD AUTO: 38 % (ref 37.5–51)
HDLC SERPL-MCNC: 35 MG/DL (ref 40–60)
HGB BLD-MCNC: 12 G/DL (ref 13–17.7)
IMM GRANULOCYTES # BLD AUTO: 0.02 10*3/MM3 (ref 0–0.05)
IMM GRANULOCYTES NFR BLD AUTO: 0.2 % (ref 0–0.5)
IRON SERPL-MCNC: 86 MCG/DL (ref 59–158)
LDLC SERPL CALC-MCNC: 163 MG/DL (ref 0–100)
LYMPHOCYTES # BLD AUTO: 1.96 10*3/MM3 (ref 0.7–3.1)
LYMPHOCYTES NFR BLD AUTO: 21.5 % (ref 19.6–45.3)
MCH RBC QN AUTO: 28.8 PG (ref 26.6–33)
MCHC RBC AUTO-ENTMCNC: 31.6 G/DL (ref 31.5–35.7)
MCV RBC AUTO: 91.3 FL (ref 79–97)
MONOCYTES # BLD AUTO: 0.62 10*3/MM3 (ref 0.1–0.9)
MONOCYTES NFR BLD AUTO: 6.8 % (ref 5–12)
NEUTROPHILS # BLD AUTO: 5.72 10*3/MM3 (ref 1.7–7)
NEUTROPHILS NFR BLD AUTO: 62.7 % (ref 42.7–76)
NRBC BLD AUTO-RTO: 0 /100 WBC (ref 0–0.2)
PLATELET # BLD AUTO: 246 10*3/MM3 (ref 140–450)
POTASSIUM SERPL-SCNC: 4.7 MMOL/L (ref 3.5–5.2)
PROT SERPL-MCNC: 6.6 G/DL (ref 6–8.5)
PSA SERPL-MCNC: 1.9 NG/ML (ref 0–4)
RBC # BLD AUTO: 4.16 10*6/MM3 (ref 4.14–5.8)
SODIUM SERPL-SCNC: 142 MMOL/L (ref 136–145)
TRIGL SERPL-MCNC: 184 MG/DL (ref 0–150)
TSH SERPL DL<=0.005 MIU/L-ACNC: 1.82 UIU/ML (ref 0.27–4.2)
VIT B12 SERPL-MCNC: 671 PG/ML (ref 211–946)
VLDLC SERPL CALC-MCNC: 34 MG/DL (ref 5–40)
WBC # BLD AUTO: 9.12 10*3/MM3 (ref 3.4–10.8)

## 2024-05-08 NOTE — TELEPHONE ENCOUNTER
Caller: Suyapa Osborn (POA)    Relationship: Emergency Contact    Best call back number: [phone]     What is the best time to reach you: ANYTIME    Who are you requesting to speak with (clinical staff, provider,  specific staff member): CLINICAL    What was the call regarding: RETURNING CALL TO DAVID    Is it okay if the provider responds through MyChart: NO

## 2024-05-09 ENCOUNTER — PATIENT MESSAGE (OUTPATIENT)
Dept: FAMILY MEDICINE CLINIC | Facility: CLINIC | Age: 89
End: 2024-05-09
Payer: MEDICARE

## 2024-05-09 DIAGNOSIS — F32.A DEPRESSION, UNSPECIFIED DEPRESSION TYPE: Primary | ICD-10-CM

## 2024-05-10 ENCOUNTER — OFFICE VISIT (OUTPATIENT)
Dept: PHYSICAL THERAPY | Facility: CLINIC | Age: 89
End: 2024-05-10
Payer: MEDICARE

## 2024-05-10 DIAGNOSIS — R13.12 OROPHARYNGEAL DYSPHAGIA: Primary | ICD-10-CM

## 2024-05-15 ENCOUNTER — TREATMENT (OUTPATIENT)
Dept: PHYSICAL THERAPY | Facility: CLINIC | Age: 89
End: 2024-05-15
Payer: MEDICARE

## 2024-05-15 DIAGNOSIS — R13.12 OROPHARYNGEAL DYSPHAGIA: Primary | ICD-10-CM

## 2024-05-15 PROCEDURE — 92526 ORAL FUNCTION THERAPY: CPT | Performed by: SPEECH-LANGUAGE PATHOLOGIST

## 2024-05-15 NOTE — PROGRESS NOTES
Speech Language Pathology Treatment Note  [address] [place] [address]    Patient: Gonzalo Durham                                                                                     Visit Date: 5/15/2024  :     1934    Referring practitioner:    No ref. provider found  Date of Initial Visit:          Type: THERAPY  Noted: 5/10/2024    Patient seen for 2 sessions    Visit Diagnoses:    ICD-10-CM ICD-9-CM   1. Oropharyngeal dysphagia  R13.12 787.22     SUBJECTIVE     First therapy session. Patient alert and ready for therapy.     OBJECTIVE   GOALS  Goals                                            STG  Comments Status   Patient will improve oral skills to enhance safety and increase eating efficiency and bolus control as measured by improved bolus formation and improved posterior propulsion of the bolus.  Patient instructed on tongue press up and tongue press out exercises. Able to demonstrate after instruction and cues.   New   Patient will improve timing, improve epiglottic inversion, and increase base of the tongue strength and posterior pharyngeal wall excursion as measured by decreased overt signs and symptoms of aspiration and decreased penetration and aspiration on repeat instrumental swallow study, if applicable.   Patient instructed on effortful swallow exercise. Able to demonstrate after instruction and cues.   New   Patient will report decreased difficulty with swallowing.  Initial EAT 10 score 9 - Mild. Patient stated that he has more difficulty swallowing when he only chews and swallows on the right side, he does better if he is able to transfer food to the left side. He only has teeth on the right.   New   Patient will improve oral hygiene to enhance safety and decrease risk of aspiration pneumonia  Patient given instruction on aspiration  risks and oral hygiene. Patient demonstrated understanding.  New   LTG        Patient will  safely consume full PO diet of regular consistency food and thin liquids without discomfort and difficulty or complications such as aspiration or pneumonia.   Patient able to demonstrate exercises after instruction and cues. Demonstrated understanding of education New       Therapy Education/Self Care    Details: POC   Given [employer] (access code  )Access Code: [email or web address]  URL: [email or web address]  Date: 05/15/2024  Prepared by: Nola Hightower    Exercises  - Tongue Resistance with Front of Tongue   - 1 x daily - 7 x weekly - 3 sets - 10 reps  - Tongue Resistance with Top of Tongue   - 1 x daily - 7 x weekly - 3 sets - 10 reps  - Effortful Swallow  - 3 x daily - 7 x weekly - 3 sets - 10 reps   Progress: New   Education provided to:  Patient   Level of understanding Verbalized and Teach back level of understanding             Total Time of Visit:             35   mins         ASSESSMENT/PLAN     ASSESSMENT: Patient able to demonstrate exercises after instruction and cues. Demonstrated understanding of education    PLAN: Continue therapy and home exercises.     Progress Note Due Date: 6/7/2024  Recertification Due Date:8/7/2024    SIGNATURE: Nola Hightower, CCC-SLP, [place] License #: [id number]  Electronically Signed on 5/15/2024          Richie Stover  [place] [address]  [phone]

## 2024-05-22 ENCOUNTER — TREATMENT (OUTPATIENT)
Dept: PHYSICAL THERAPY | Facility: CLINIC | Age: 89
End: 2024-05-22
Payer: MEDICARE

## 2024-05-22 DIAGNOSIS — R13.12 OROPHARYNGEAL DYSPHAGIA: Primary | ICD-10-CM

## 2024-05-22 PROCEDURE — 92526 ORAL FUNCTION THERAPY: CPT | Performed by: SPEECH-LANGUAGE PATHOLOGIST

## 2024-05-22 NOTE — PROGRESS NOTES
Speech Language Pathology Treatment Note and Discharge  [address] [place] [address]    Patient: Gonzalo Durham                                                                                     Visit Date: 2024  :     1934    Referring practitioner:    DOMINIQUE Engel  Date of Initial Visit:          Type: THERAPY  Noted: 5/10/2024    Patient seen for 3 sessions    Visit Diagnoses:    ICD-10-CM ICD-9-CM   1. Oropharyngeal dysphagia  R13.12 787.22     SUBJECTIVE     Patient alert and ready for therapy. He has been doing his exercises.     OBJECTIVE   GOALS  Goals                                            STG  Comments Status   Patient will improve oral skills to enhance safety and increase eating efficiency and bolus control as measured by improved bolus formation and improved posterior propulsion of the bolus. Reviewed tongue press up and tongue press out exercises. Able to demonstrate with minimal cues.   Independent with these exercises.    Patient will improve timing, improve epiglottic inversion, and increase base of the tongue strength and posterior pharyngeal wall excursion as measured by decreased overt signs and symptoms of aspiration and decreased penetration and aspiration on repeat instrumental swallow study, if applicable.   Reviewed effortful swallow exercise. Able to demonstrate 25 swallows with minimal cues.   Independent with these exercises.    Patient will report decreased difficulty with swallowing.  Initial EAT 10 score 9 - Mild. Patient has been doing his exercises. He feels that he has improved.  Met   Patient will improve oral hygiene to enhance safety and decrease risk of aspiration pneumonia  Patient demonstrated understanding of  aspiration  risks and oral hygiene. Met   LTG        Patient will safely consume full PO diet of regular consistency food and thin liquids without discomfort and difficulty or  complications such as aspiration or pneumonia.   Patient able to demonstrate exercises with minimal cues. Demonstrated understanding of education Independent with exercises.        Therapy Education/Self Care    Details: POC   Given [employer] (access code  )Access Code: [email or web address]  URL: [email or web address]  Date: 05/15/2024  Prepared by: Nola Hightower    Exercises  - Tongue Resistance with Front of Tongue   - 1 x daily - 7 x weekly - 3 sets - 10 reps  - Tongue Resistance with Top of Tongue   - 1 x daily - 7 x weekly - 3 sets - 10 reps  - Effortful Swallow  - 3 x daily - 7 x weekly - 3 sets - 10 reps   Progress: Reinforced   Education provided to:  Patient   Level of understanding Verbalized and Teach back level of understanding             Total Time of Visit:             20   mins         ASSESSMENT/PLAN     ASSESSMENT: Patient able to demonstrate exercises independently. Demonstrated understanding of education. He feels competent to continue home exercises without continued therapy.     PLAN: Continue home exercises and strategies. Discharge from direct therapy at this time.       SIGNATURE: Nola Hightower, CCC-SLP, [place] License #: [id number]  Electronically Signed on 5/22/2024          [address] [place] [address] [place] [address]  [phone]

## 2024-05-25 DIAGNOSIS — K21.9 GASTROESOPHAGEAL REFLUX DISEASE: ICD-10-CM

## 2024-05-28 RX ORDER — PANTOPRAZOLE SODIUM 40 MG/1
TABLET, DELAYED RELEASE ORAL
Qty: 90 TABLET | Refills: 3 | Status: SHIPPED | OUTPATIENT
Start: 2024-05-28

## 2024-05-28 NOTE — TELEPHONE ENCOUNTER
Rx Refill Note  Requested Prescriptions     Pending Prescriptions Disp Refills    pantoprazole (PROTONIX) 40 MG EC tablet [Pharmacy Med Name: PANTOPRAZOLE SODIUM DR TABS 40MG] 90 tablet 3     Sig: TAKE 1 TABLET DAILY      Last office visit with prescribing clinician: 2/7/2024   Last telemedicine visit with prescribing clinician: Visit date not found   Next office visit with prescribing clinician: 8/7/2024   Krishna Westbrook MA  05/28/24, 08:10 CDT

## 2024-05-30 RX ORDER — FUROSEMIDE 20 MG/1
TABLET ORAL
Qty: 90 TABLET | Refills: 7 | Status: SHIPPED | OUTPATIENT
Start: 2024-05-30

## 2024-06-10 RX ORDER — NITROGLYCERIN 0.4 MG/1
0.4 TABLET SUBLINGUAL
Qty: 90 TABLET | Refills: 0 | Status: SHIPPED | OUTPATIENT
Start: 2024-06-10

## 2024-06-10 RX ORDER — BUDESONIDE, GLYCOPYRROLATE, AND FORMOTEROL FUMARATE 160; 9; 4.8 UG/1; UG/1; UG/1
AEROSOL, METERED RESPIRATORY (INHALATION)
Qty: 10.7 G | Refills: 11 | Status: SHIPPED | OUTPATIENT
Start: 2024-06-10

## 2024-06-17 RX ORDER — APIXABAN 5 MG/1
TABLET, FILM COATED ORAL
Qty: 180 TABLET | Refills: 3 | Status: SHIPPED | OUTPATIENT
Start: 2024-06-17

## 2024-06-17 RX ORDER — TERAZOSIN 10 MG/1
CAPSULE ORAL
Qty: 90 CAPSULE | Refills: 3 | Status: SHIPPED | OUTPATIENT
Start: 2024-06-17

## 2024-06-17 RX ORDER — GLYBURIDE 5 MG/1
TABLET ORAL
Qty: 135 TABLET | Refills: 3 | Status: SHIPPED | OUTPATIENT
Start: 2024-06-17

## 2024-06-17 RX ORDER — LORATADINE 10 MG/1
TABLET ORAL
Qty: 90 TABLET | Refills: 3 | Status: SHIPPED | OUTPATIENT
Start: 2024-06-17

## 2024-07-09 ENCOUNTER — PATIENT MESSAGE (OUTPATIENT)
Dept: FAMILY MEDICINE CLINIC | Facility: CLINIC | Age: 89
End: 2024-07-09
Payer: MEDICARE

## 2024-07-09 RX ORDER — DEXTRAN 70/HYPROMELLOSE
DROPS OPHTHALMIC (EYE)
Qty: 15 ML | Refills: 0 | Status: SHIPPED | OUTPATIENT
Start: 2024-07-09

## 2024-07-09 NOTE — TELEPHONE ENCOUNTER
----- Message from Jeane MAXWELL sent at 7/9/2024  8:17 AM CDT -----  Regarding: FW: Artificial tears  Contact: [phone]    ----- Message -----  From: Gonzalo Durham  Sent: 7/9/2024   8:15 AM CDT  To: [facility]  Subject: Artificial tears                                 I used to have a script fo artificial tears. It does not show up on my med list anymore. Can I get a [facility]. I have been having scratchy dry eyes this season.  Thank you   Gonzalo Durham

## 2024-07-15 ENCOUNTER — OFFICE VISIT (OUTPATIENT)
Dept: OTOLARYNGOLOGY | Facility: CLINIC | Age: 89
End: 2024-07-15
Payer: MEDICARE

## 2024-07-15 VITALS
WEIGHT: 204 LBS | HEIGHT: 71 IN | DIASTOLIC BLOOD PRESSURE: 62 MMHG | SYSTOLIC BLOOD PRESSURE: 118 MMHG | HEART RATE: 100 BPM | BODY MASS INDEX: 28.56 KG/M2 | TEMPERATURE: 98.4 F

## 2024-07-15 DIAGNOSIS — Z79.01 ANTICOAGULATED: Primary | ICD-10-CM

## 2024-07-15 DIAGNOSIS — Z99.81 OXYGEN DEPENDENT: ICD-10-CM

## 2024-07-15 DIAGNOSIS — R13.10 DYSPHAGIA, UNSPECIFIED TYPE: ICD-10-CM

## 2024-07-15 DIAGNOSIS — J34.89 NASAL CRUSTING: ICD-10-CM

## 2024-07-15 DIAGNOSIS — R04.0 EPISTAXIS: ICD-10-CM

## 2024-07-15 DIAGNOSIS — D48.7 NEOPLASM OF UNCERTAIN BEHAVIOR OF NOSE: ICD-10-CM

## 2024-07-15 NOTE — PROGRESS NOTES
YOB: 1934  Location: [facility]  Location Address: [address] [place] [address], [facility], [address] [place] [address]  Location Phone: [phone]    Chief Complaint   Patient presents with    Skin Lesion    Difficulty Swallowing       History of Present Illness  Gonzalo Durham is a 89 y.o. male.  Gonzalo Durham is here for follow up of ENT complaints. The patient has had problems with intermittent epistaxis as well as a lesion to the left nasal sidewall   Patient denies episodes of epistaxis since last visit and does report improvement in nasal lesion   He reports some improvement in swallowing since completing dysphagia therapy   He has worsening symptoms with noodles, cornbread and rice      FL Video Swallow With Speech Single Contrast (2024 09:32)      Past Medical History:   Diagnosis Date    A-fib     AAA (abdominal aortic aneurysm) without rupture     Arthritis     BPH (benign prostatic hypertrophy)     Cataract     bialteral    CKD (chronic kidney disease)     COPD (chronic obstructive pulmonary disease)     Coronary artery disease involving native coronary artery of native heart without angina pectoris 2017    CABG/Az/[place]/ No TCC echo  7.16.18 Right ventricular cavity is mild-to-moderately dilated. Left ventricular diastolic dysfunction (grade I) consistent with impaired relaxation. Left ventricular systolic function is normal. Left atrial cavity size is borderline dilated. 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 BMS vs REGGIE Get cabg report  ;  Surgeon: Bradley Carver MD;  Location:  [facility];  Service: Cardiology    COLONOSCOPY N/A 6/15/2017    Diverticulosis repeat exam prn    COLONOSCOPY W/ POLYPECTOMY  10/21/2013    2 Tubular adenomatous polyps, Diverticulosis repeat exam in 5 years    CORONARY ARTERY BYPASS GRAFT  1980    X 3    CYSTOSCOPY RETROGRADE PYELOGRAM Bilateral 2/8/2021    Procedure: CYSTOSCOPY RETROGRADE PYELOGRAM;  Surgeon: Danie Cadena MD;  Location:  [facility] OR;  Service: Urology;  Laterality: Bilateral;    ENDOSCOPY N/A 6/15/2017    LA Grade A reflux esophagitis    EYE SURGERY Bilateral     HERNIA REPAIR      TRANSURETHRAL RESECTION OF BLADDER TUMOR N/A 2/8/2021    Procedure: CYSTOSCOPY TRANSURETHRAL RESECTION OF BLADDER TUMOR WITH GEMCITABINE INSTILLATION;  Surgeon: Danie Cadena MD;  Location:  [facility] OR;  Service: Urology;  Laterality: N/A;       Outpatient Medications Marked as Taking for the 7/15/24 encounter (Office Visit) with Nicky Del Angel APRN   Medication Sig Dispense Refill    acetaminophen (TYLENOL) 325 MG tablet Take 2 tablets by mouth 2 (Two) Times a Day. 360 tablet 2    albuterol sulfate  (90 Base) MCG/ACT inhaler USE 2 INHALATIONS FOUR TIMES A DAY 51 g 3    Artificial Tear Solution (Just Tears Eye Drops) solution 1-2 drops daily as needed 15 mL 0    ascorbic acid (VITAMIN C) 500 MG tablet Take 1 tablet by mouth 2 (Two) Times a Day. Indications: Inadequate Vitamin C      aspirin 81 MG EC tablet Take 1 tablet by mouth Daily.  Indications: Acute Heart Attack      Breztri Aerosphere 160-9-4.8 MCG/ACT aerosol inhaler USE 2 INHALATIONS TWICE A DAY 10.7 g 11    calcium carbonate, oyster shell, 500 MG tablet tablet Take 1 tablet by mouth Daily.      Eliquis 5 MG tablet tablet TAKE 1 TABLET TWICE A  tablet 3    ferrous sulfate 325 (65 Fe) MG tablet Take 1 tablet by mouth Daily With Breakfast. Indications: Anemia From Inadequate Iron in the Body      finasteride (PROSCAR) 5 MG tablet TAKE 1 TABLET DAILY 90 tablet 3    furosemide (LASIX) 20 MG tablet TAKE 1 TABLET EVERY 12 HOURS FOR EDEMA 90 tablet 7    glucose blood (FREESTYLE LITE) test strip USE TO TEST BLOOD SUGAR DAILY 100 each 3    glyburide (DIAbeta) 5 MG tablet TAKE 1 TABLET EVERY MORNING AND ONE-HALF (1/2) TABLET BEFORE SUPPER 135 tablet 3    guaiFENesin (MUCINEX) 600 MG 12 hr tablet Take 1 tablet by mouth 2 (Two) Times a Day. Indications: Cough      hydroCHLOROthiazide (MICROZIDE) 12.5 MG capsule TAKE 1 CAPSULE DAILY 90 capsule 3    ipratropium (ATROVENT) 0.03 % nasal spray 2 sprays into the nostril(s) as directed by provider 2 (Two) Times a Day. 1 each 12    isosorbide mononitrate (IMDUR) 30 MG 24 hr tablet Take 1 tablet by mouth Daily. Indications: Stable Angina Pectoris 90 tablet 3    Lactobacillus (PROBIOTIC ACIDOPHILUS PO) Take  by mouth.      loratadine (CLARITIN) 10 MG tablet TAKE 1 TABLET DAILY 90 tablet 3    melatonin 3 MG tablet Take 1 tablet by mouth At Night As Needed for Sleep.      metoprolol tartrate (LOPRESSOR) 25 MG tablet TAKE ONE-HALF (1/2) TABLET TWICE A DAY FOR HIGH BLOOD PRESSURE DISORDER 90 tablet 3    Multiple Vitamins-Minerals (MULTIVITAMIN & MINERAL PO) Take 1 tablet by mouth Daily. Indications: vit      nitroglycerin (Nitrostat) 0.4 MG SL tablet Place 1 tablet under the tongue Every 5 (Five) Minutes As Needed for Chest Pain. Indications: Acute Angina Pectoris 90 tablet 0    pantoprazole (PROTONIX) 40 MG EC tablet TAKE 1 TABLET DAILY 90 tablet 3     polyethylene glycol 17 g packet Take 17 g by mouth Daily.      potassium chloride ER (K-TAB) 20 MEQ tablet controlled-release ER tablet TAKE 1 TABLET TWICE A DAY WITH MEALS FOR LOW AMOUNT OF POTASSIUM IN THE BLOOD 180 tablet 3    pravastatin (PRAVACHOL) 80 MG tablet TAKE 1 TABLET DAILY 90 tablet 3    predniSONE (DELTASONE) 20 MG tablet Take 1 tablet by mouth 2 (Two) Times a Day. 14 tablet 0    Probiotic Product (PROBIOTIC ADVANCED PO) Take  by mouth.      saline (AYR) gel nasal gel Apply 1 application  topically to the appropriate area as directed As Needed (epistaxis). 1 each 0    terazosin (HYTRIN) 10 MG capsule TAKE 1 CAPSULE DAILY 90 capsule 3       Symbicort [budesonide-formoterol fumarate] and Prozac [fluoxetine hcl]    Family History   Problem Relation Age of Onset    Heart disease Mother     Stroke Mother     Melanoma Mother     Heart disease Father     Diabetes Father     Prostate cancer Father     Colon cancer Neg Hx     Colon polyps Neg Hx        Social History     Socioeconomic History    Marital status:    Tobacco Use    Smoking status: Former     Current packs/day: 0.00     Average packs/day: 1 pack/day for 26.0 years (26.0 ttl pk-yrs)     Types: Cigarettes     Start date:      Quit date:      Years since quittin.5     Passive exposure: Past    Smokeless tobacco: Never   Vaping Use    Vaping status: Never Used   Substance and Sexual Activity    Alcohol use: No    Drug use: No    Sexual activity: Defer       Review of Systems   Constitutional:  Positive for fatigue.   HENT:  Positive for nosebleeds and trouble swallowing.        Vitals:    07/15/24 1047   BP: 118/62   Pulse: 100   Temp: 98.4 °F (36.9 °C)       Body mass index is 28.45 kg/m².    Objective     Physical Exam  Vitals reviewed.   Constitutional:       Appearance: He is normal weight.   HENT:      Head: Normocephalic.      Right Ear: Tympanic membrane and external ear normal. Decreased hearing noted.      Left Ear:  Tympanic membrane and external ear normal. Decreased hearing noted.      Ears:      Comments: Bilateral hearing aids in place        Nose: Septal deviation present.      Comments: Right sided septal crusting   Nasal canula in place     Nasal bridge area with improvement in scaling        Mouth/Throat:      Lips: Pink.      Mouth: Mucous membranes are dry.   Neurological:      Mental Status: He is alert.         Assessment & Plan   Diagnoses and all orders for this visit:    1. Anticoagulated (Primary)    2. Neoplasm of uncertain behavior of nose    3. Oxygen dependent    4. Nasal crusting    5. Epistaxis    6. Dysphagia, unspecified type      * Surgery not found *  No orders of the defined types were placed in this encounter.    Return in about 6 months (around 1/15/2025).     Continue swallowing exercises   Call for new/worsening concerns     Patient Instructions   Gastroesophageal Reflux Disease (Laryngopharyngeal Reflux), Adult  Gastroesophageal reflux disease (GERD) and/or Laryngopharyngeal Reflux, (LPR) happens when acid from your stomach flows up into the esophagus and/or throat and voicebox or larynx. When acid comes in contact with the these organs, the acid can cause soreness (inflammation). Over time, GERD may create small holes (ulcers) in the lining of the esophagus and may lead to the development of hoarseness, difficulty swallowing,   feeling of something stuck in the throat, increased mucous or drainage and even predispose to the development of malignancies, (cancer).    CAUSES   Increased body weight. This puts pressure on the stomach, making acid rise from the stomach into the esophagus.  Smoking. This increases acid production in the stomach.  Drinking alcohol. This causes decreased pressure in the lower esophageal sphincter (valve or ring of muscle between the esophagus and stomach), allowing acid from the stomach into the esophagus.  Late evening meals and a full stomach. This increases pressure  and acid production in the stomach.  A malformed lower esophageal sphincter  Diet which can include avoidance of gluten and dairy products  Age  SYMPTOMS   Burning pain in the lower part of the mid-chest behind the breastbone and in the mid-stomach area. This may occur twice a week or more often.  Trouble swallowing.  Sore throat.  Dry cough.  Asthma-like symptoms including chest tightness, shortness of breath, or wheezing.  Globus sensation-something stuck in the throat/fullness  Hoarseness  DIAGNOSIS   Your caregiver may be able to diagnose GERD based on your symptoms. In some cases, X-rays and other tests may be done to check for complications or to check the condition of your stomach and esophagus.  You may need to see another doctor.  TREATMENT   Over-the-counter or prescription medicines to help decrease acid production.   Dietary and behavioral modifications or changes may be also recommended.  HOME CARE INSTRUCTIONS   Change the factors that you can control. Ask your caregiver for guidance concerning weight loss, quitting smoking, and alcohol consumption.  Avoid foods and drinks that make your symptoms worse, and MAY include such as:  Caffeine or alcoholic drinks.  Chocolate.  Gluten containing foods  Dairy  Peppermint or mint flavorings.  Garlic and onions.  Spicy foods.  Citrus fruits, such as oranges, zari, or limes.  Tomato-based foods such as sauce, chili, salsa, and pizza.  Fried and fatty foods.  Avoid lying down for the 3 hours prior to your bedtime or prior to taking a nap.  Eat small, frequent meals instead of large meals.  Wear loose-fitting clothing. Do not wear anything tight around your waist that causes pressure on your stomach.  Raise the head of your bed 6 to 8 inches with wood blocks to help you sleep. Extra pillows will not help.  Only take over-the-counter or prescription medicines for pain, discomfort, or fever as directed by your caregiver.  Do not take aspirin, ibuprofen, or other  nonsteroidal anti-inflammatory drugs if possible (NSAIDs).  SEEK IMMEDIATE MEDICAL CARE IF:   You have pain in your arms, neck, jaw, teeth, or back.  Your pain increases or changes in intensity or duration.  You develop nausea, vomiting, or sweating (diaphoresis).  You develop shortness of breath, or you faint.  Your vomit is green, yellow, black, or looks like coffee grounds or blood.  Your stool is red, bloody, or black.  These symptoms could be signs of other problems, such as heart disease, gastric bleeding, or esophageal bleeding.  MAKE SURE YOU:   Understand these instructions.  Will watch your condition.  Will get help right away if you are not doing well or get worse.     This information is not intended to replace advice given to you by your physician. Make sure you discuss any questions you have with your health care provider.     Modified by Joseph Diaz MD, FACS 9/8/2016.  Document Released: 09/27/2006 Document Revised: 01/08/2016 Document Reviewed: 04/13/2016  [employer] Interactive Patient Education ©2016 [employer]

## 2024-07-15 NOTE — PATIENT INSTRUCTIONS
Gastroesophageal Reflux Disease (Laryngopharyngeal Reflux), Adult  Gastroesophageal reflux disease (GERD) and/or Laryngopharyngeal Reflux, (LPR) happens when acid from your stomach flows up into the esophagus and/or throat and voicebox or larynx. When acid comes in contact with the these organs, the acid can cause soreness (inflammation). Over time, GERD may create small holes (ulcers) in the lining of the esophagus and may lead to the development of hoarseness, difficulty swallowing,   feeling of something stuck in the throat, increased mucous or drainage and even predispose to the development of malignancies, (cancer).    CAUSES   Increased body weight. This puts pressure on the stomach, making acid rise from the stomach into the esophagus.  Smoking. This increases acid production in the stomach.  Drinking alcohol. This causes decreased pressure in the lower esophageal sphincter (valve or ring of muscle between the esophagus and stomach), allowing acid from the stomach into the esophagus.  Late evening meals and a full stomach. This increases pressure and acid production in the stomach.  A malformed lower esophageal sphincter  Diet which can include avoidance of gluten and dairy products  Age  SYMPTOMS   Burning pain in the lower part of the mid-chest behind the breastbone and in the mid-stomach area. This may occur twice a week or more often.  Trouble swallowing.  Sore throat.  Dry cough.  Asthma-like symptoms including chest tightness, shortness of breath, or wheezing.  Globus sensation-something stuck in the throat/fullness  Hoarseness  DIAGNOSIS   Your caregiver may be able to diagnose GERD based on your symptoms. In some cases, X-rays and other tests may be done to check for complications or to check the condition of your stomach and esophagus.  You may need to see another doctor.  TREATMENT   Over-the-counter or prescription medicines to help decrease acid production.   Dietary and behavioral modifications  or changes may be also recommended.  HOME CARE INSTRUCTIONS   Change the factors that you can control. Ask your caregiver for guidance concerning weight loss, quitting smoking, and alcohol consumption.  Avoid foods and drinks that make your symptoms worse, and MAY include such as:  Caffeine or alcoholic drinks.  Chocolate.  Gluten containing foods  Dairy  Peppermint or mint flavorings.  Garlic and onions.  Spicy foods.  Citrus fruits, such as oranges, zari, or limes.  Tomato-based foods such as sauce, chili, salsa, and pizza.  Fried and fatty foods.  Avoid lying down for the 3 hours prior to your bedtime or prior to taking a nap.  Eat small, frequent meals instead of large meals.  Wear loose-fitting clothing. Do not wear anything tight around your waist that causes pressure on your stomach.  Raise the head of your bed 6 to 8 inches with wood blocks to help you sleep. Extra pillows will not help.  Only take over-the-counter or prescription medicines for pain, discomfort, or fever as directed by your caregiver.  Do not take aspirin, ibuprofen, or other nonsteroidal anti-inflammatory drugs if possible (NSAIDs).  SEEK IMMEDIATE MEDICAL CARE IF:   You have pain in your arms, neck, jaw, teeth, or back.  Your pain increases or changes in intensity or duration.  You develop nausea, vomiting, or sweating (diaphoresis).  You develop shortness of breath, or you faint.  Your vomit is green, yellow, black, or looks like coffee grounds or blood.  Your stool is red, bloody, or black.  These symptoms could be signs of other problems, such as heart disease, gastric bleeding, or esophageal bleeding.  MAKE SURE YOU:   Understand these instructions.  Will watch your condition.  Will get help right away if you are not doing well or get worse.     This information is not intended to replace advice given to you by your physician. Make sure you discuss any questions you have with your health care provider.     Modified by Joseph Diaz,  MD, FACS 9/8/2016.  Document Released: 09/27/2006 Document Revised: 01/08/2016 Document Reviewed: 04/13/2016  [employer] Interactive Patient Education ©2016 [employer]

## 2024-07-29 ENCOUNTER — OFFICE VISIT (OUTPATIENT)
Dept: FAMILY MEDICINE CLINIC | Facility: CLINIC | Age: 89
End: 2024-07-29
Payer: MEDICARE

## 2024-07-29 ENCOUNTER — TELEPHONE (OUTPATIENT)
Dept: FAMILY MEDICINE CLINIC | Facility: CLINIC | Age: 89
End: 2024-07-29
Payer: MEDICARE

## 2024-07-29 VITALS
WEIGHT: 208.2 LBS | OXYGEN SATURATION: 94 % | DIASTOLIC BLOOD PRESSURE: 54 MMHG | HEIGHT: 71 IN | SYSTOLIC BLOOD PRESSURE: 110 MMHG | BODY MASS INDEX: 29.15 KG/M2 | HEART RATE: 64 BPM

## 2024-07-29 DIAGNOSIS — R69 MULTIPLE CHRONIC DISEASES: ICD-10-CM

## 2024-07-29 DIAGNOSIS — Z71.84 TRAVEL ADVICE ENCOUNTER: ICD-10-CM

## 2024-07-29 PROCEDURE — 1126F AMNT PAIN NOTED NONE PRSNT: CPT | Performed by: FAMILY MEDICINE

## 2024-07-29 PROCEDURE — 99212 OFFICE O/P EST SF 10 MIN: CPT | Performed by: FAMILY MEDICINE

## 2024-07-29 NOTE — LETTER
July 29, 2024     Patient: Gonzalo Durham   YOB: 1934   Date of Visit: 7/29/2024       To Whom It May Concern:    Gonzalo Durham has multiple medical problems but accepts the unknown risks of travel and has his medical conditions in a stable condition at this point.      He does need to continue his continuous oxygen while traveling.          Sincerely,        Electronically signed by Abel Romero MD, 07/29/24, 3:32 PM CDT.    Abel Romero MD    CC: No Recipients

## 2024-07-29 NOTE — TELEPHONE ENCOUNTER
Pt came into office stating he is needing Dr. Romero to write him a letter stating whether he is okay to go on a 7day cruise or not. Pt is hoping to get this by the end of today. -please advise
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
## 2024-07-31 DIAGNOSIS — E78.5 HYPERLIPIDEMIA, UNSPECIFIED HYPERLIPIDEMIA TYPE: Chronic | ICD-10-CM

## 2024-07-31 RX ORDER — FINASTERIDE 5 MG/1
TABLET, FILM COATED ORAL
Qty: 90 TABLET | Refills: 3 | Status: SHIPPED | OUTPATIENT
Start: 2024-07-31

## 2024-07-31 RX ORDER — PRAVASTATIN SODIUM 80 MG/1
TABLET ORAL
Qty: 90 TABLET | Refills: 3 | Status: SHIPPED | OUTPATIENT
Start: 2024-07-31

## 2024-08-01 RX ORDER — IPRATROPIUM BROMIDE 21 UG/1
2 SPRAY, METERED NASAL 2 TIMES DAILY
Qty: 90 ML | Refills: 3 | Status: SHIPPED | OUTPATIENT
Start: 2024-08-01

## 2024-08-01 NOTE — TELEPHONE ENCOUNTER
Rx Refill Note  Requested Prescriptions     Pending Prescriptions Disp Refills    ipratropium (ATROVENT) 0.03 % nasal spray 1 each 12     Si sprays into the nostril(s) as directed by provider 2 (Two) Times a Day.      Last office visit with prescribing clinician: 2024   Last telemedicine visit with prescribing clinician: Visit date not found   Next office visit with prescribing clinician: 10/31/2024                         Would you like a call back once the refill request has been completed: [] Yes [] No    If the office needs to give you a call back, can they leave a voicemail: [] Yes [] No    Cindy Diaz CMA  24, 11:11 CDT

## 2024-08-07 ENCOUNTER — OFFICE VISIT (OUTPATIENT)
Dept: FAMILY MEDICINE CLINIC | Facility: CLINIC | Age: 89
End: 2024-08-07
Payer: MEDICARE

## 2024-08-07 VITALS
WEIGHT: 207.8 LBS | BODY MASS INDEX: 29.09 KG/M2 | SYSTOLIC BLOOD PRESSURE: 128 MMHG | DIASTOLIC BLOOD PRESSURE: 72 MMHG | OXYGEN SATURATION: 96 % | HEART RATE: 55 BPM | HEIGHT: 71 IN

## 2024-08-07 DIAGNOSIS — I10 PRIMARY HYPERTENSION: Chronic | ICD-10-CM

## 2024-08-07 DIAGNOSIS — I25.10 CORONARY ARTERY DISEASE INVOLVING NATIVE CORONARY ARTERY OF NATIVE HEART WITHOUT ANGINA PECTORIS: Chronic | ICD-10-CM

## 2024-08-07 DIAGNOSIS — Z79.01 CHRONIC ANTICOAGULATION: Chronic | ICD-10-CM

## 2024-08-07 DIAGNOSIS — R91.1 LUNG NODULE: ICD-10-CM

## 2024-08-07 DIAGNOSIS — E87.6 HYPOKALEMIA: ICD-10-CM

## 2024-08-07 DIAGNOSIS — N18.31 STAGE 3A CHRONIC KIDNEY DISEASE: Chronic | ICD-10-CM

## 2024-08-07 DIAGNOSIS — E78.5 HYPERLIPIDEMIA LDL GOAL <70: Chronic | ICD-10-CM

## 2024-08-07 DIAGNOSIS — E11.59 CONTROLLED TYPE 2 DIABETES MELLITUS WITH OTHER CIRCULATORY COMPLICATION, WITHOUT LONG-TERM CURRENT USE OF INSULIN: ICD-10-CM

## 2024-08-07 DIAGNOSIS — D64.9 ANEMIA, UNSPECIFIED TYPE: ICD-10-CM

## 2024-08-07 DIAGNOSIS — R69 MULTIPLE CHRONIC DISEASES: ICD-10-CM

## 2024-08-07 LAB — HBA1C MFR BLD: 7.3 % (ref 4.8–5.6)

## 2024-08-07 PROCEDURE — 1126F AMNT PAIN NOTED NONE PRSNT: CPT | Performed by: FAMILY MEDICINE

## 2024-08-07 PROCEDURE — 99214 OFFICE O/P EST MOD 30 MIN: CPT | Performed by: FAMILY MEDICINE

## 2024-08-07 NOTE — PROGRESS NOTES
ELLEN”.   Subjective   Gonzalo Durham is a 89 y.o. male presenting with chief complaint of:   Chief Complaint   Patient presents with    Hypertension    COPD     AWV NA  Last Completed Annual Wellness Visit       This patient has no relevant Health Maintenance data.             History of Present Illness :  With son in law.   Here for review of chronic problems that includes DM2 and others    Has multiple chronic problems to consider that might have a bearing on today's issues;  an interval appointment.       Chronic/acute problems reviewed today:   1. Primary hypertension: Chronic/stable. Stable here past/and occ home blood pressures.  No significant chest pain, SOB, LE edema, orthopnea, near syncope, dizziness/light headness.   Recent Vitals         7/15/2024 7/29/2024 8/7/2024       BP: 118/62 110/54 128/72     Pulse: 100 64 55     Temp: 98.4 °F (36.9 °C) -- --     Weight: 92.5 kg (204 lb) 94.4 kg (208 lb 3.2 oz) 94.3 kg (207 lb 12.8 oz)     BMI (Calculated): 28.5 29.1 29              2. Coronary artery disease involving native coronary artery of native heart without angina pectoris: chronic/stable as no chest pain, SOB, use of NTG    3. Stage 3a chronic kidney disease-ro: Chronic/stable.  No nephrology.  No dysuria.  Previously warned/reminded:   To avoid further kidney function decline  A. Treat any time you think you have infection  B. Stay hydrated (dont get dehydrated); drink at least 60 oz fluid every 24 hr (1800 cc or nearly a 2L)  C. Do not allow any xrays with dye WITHOUT the doctor ordering checking your renal function  D. Do not get new medications without the doctor considering your renal condition  E. Do not use motrin/ibuprofen, alleve/naprosyn and these types of medications     4. Hypokalemia: Chronic/variable high or low K as uses diuretic; has to have lab monitoring.  No significant fatigue and same muscle cramps     5. Anemia, unspecified type: Chronic or past history/stable: This has been  present before.    There has been GI evaulation in the past. There is no current melena, hematochezia. It has been benign to date and stable/treating/watching.  Contributing comorbidities to date: benign iron def/ASA use.     6. Controlled type 2 diabetes mellitus with other circulatory complication, without long-term current use of insulin: Chronic/stable No problem/pattern hypoglycemia/hyperglycemia manifest by poly- dypsia, phagia, uria, or sweats, diaphoretic episodes, syncope/near.  Advised does not likely qualify for carlos/dexcon right now     7. Multiple chronic diseases: Has multiple chronic problems with increased risks for management (involves polypharmacy, multiple providers, many required labs/imaging/ to manage)     8. Lung nodule: chronic; believed to be stable last BH but recent ER/[facility] with nodules.  Same cough; no hemoptysis   9. Anticoagulated-CAD, DM2, CVA/ASA 81+()+plavix » Anticoagulated-CAD, DM2, CVA/ASA 81+()+plavix+eliquist: Chronic/stable reason for stopping or use of.  Denies bleeding issues; especially epistaxis, melena, hematochezia.  Upper arms/others do not significantly bruise easily.  No significant bleeding or falls.  We discussed recent previous and data leading to changes in suggestion for aspirin for anticoagulation purposes.  We discussed how this seems to be safer for people less than 60; those over 60 he should only be using aspirin if they have documented known cardiovascular disease or risk.  Considering this the patient has decided to stay on ASA.       10. Hyperlipidemia LDL goal <70-statin: Chronic/stable.  Tolerated use of Rx with labs showing improved lipid values and tolerant liver labs. No muscle aches unexpected.        Has an/another acute issue today: none.    The following portions of the patient's history were reviewed and updated as appropriate: allergies, current medications, past family history, past medical history, past social history, past  surgical history, and problem list.      Current Outpatient Medications:     acetaminophen (TYLENOL) 325 MG tablet, Take 2 tablets by mouth 2 (Two) Times a Day., Disp: 360 tablet, Rfl: 2    albuterol sulfate  (90 Base) MCG/ACT inhaler, USE 2 INHALATIONS FOUR TIMES A DAY, Disp: 51 g, Rfl: 3    Artificial Tear Solution (Just Tears Eye Drops) solution, 1-2 drops daily as needed, Disp: 15 mL, Rfl: 0    ascorbic acid (VITAMIN C) 500 MG tablet, Take 1 tablet by mouth 2 (Two) Times a Day. Indications: Inadequate Vitamin C, Disp: , Rfl:     aspirin 81 MG EC tablet, Take 1 tablet by mouth Daily. Indications: Acute Heart Attack, Disp: , Rfl:     Breztri Aerosphere 160-9-4.8 MCG/ACT aerosol inhaler, USE 2 INHALATIONS TWICE A DAY, Disp: 10.7 g, Rfl: 11    calcium carbonate, oyster shell, 500 MG tablet tablet, Take 1 tablet by mouth Daily., Disp: , Rfl:     Eliquis 5 MG tablet tablet, TAKE 1 TABLET TWICE A DAY, Disp: 180 tablet, Rfl: 3    ferrous sulfate 325 (65 Fe) MG tablet, Take 1 tablet by mouth Daily With Breakfast. Indications: Anemia From Inadequate Iron in the Body, Disp: , Rfl:     finasteride (PROSCAR) 5 MG tablet, TAKE 1 TABLET DAILY, Disp: 90 tablet, Rfl: 3    furosemide (LASIX) 20 MG tablet, TAKE 1 TABLET EVERY 12 HOURS FOR EDEMA, Disp: 90 tablet, Rfl: 7    glucose blood (FREESTYLE LITE) test strip, USE TO TEST BLOOD SUGAR DAILY, Disp: 100 each, Rfl: 3    glyburide (DIAbeta) 5 MG tablet, TAKE 1 TABLET EVERY MORNING AND ONE-HALF (1/2) TABLET BEFORE SUPPER, Disp: 135 tablet, Rfl: 3    guaiFENesin (MUCINEX) 600 MG 12 hr tablet, Take 1 tablet by mouth 2 (Two) Times a Day. Indications: Cough, Disp: , Rfl:     hydroCHLOROthiazide (MICROZIDE) 12.5 MG capsule, TAKE 1 CAPSULE DAILY, Disp: 90 capsule, Rfl: 3    ipratropium (ATROVENT) 0.03 % nasal spray, 2 sprays into the nostril(s) as directed by provider 2 (Two) Times a Day., Disp: 90 mL, Rfl: 3    isosorbide mononitrate (IMDUR) 30 MG 24 hr tablet, Take 1 tablet by  mouth Daily. Indications: Stable Angina Pectoris, Disp: 90 tablet, Rfl: 3    Lactobacillus (PROBIOTIC ACIDOPHILUS PO), Take  by mouth., Disp: , Rfl:     loratadine (CLARITIN) 10 MG tablet, TAKE 1 TABLET DAILY, Disp: 90 tablet, Rfl: 3    melatonin 3 MG tablet, Take 1 tablet by mouth At Night As Needed for Sleep., Disp: , Rfl:     metoprolol tartrate (LOPRESSOR) 25 MG tablet, TAKE ONE-HALF (1/2) TABLET TWICE A DAY FOR HIGH BLOOD PRESSURE DISORDER, Disp: 90 tablet, Rfl: 3    Multiple Vitamins-Minerals (MULTIVITAMIN & MINERAL PO), Take 1 tablet by mouth Daily. Indications: vit, Disp: , Rfl:     nitroglycerin (Nitrostat) 0.4 MG SL tablet, Place 1 tablet under the tongue Every 5 (Five) Minutes As Needed for Chest Pain. Indications: Acute Angina Pectoris, Disp: 90 tablet, Rfl: 0    pantoprazole (PROTONIX) 40 MG EC tablet, TAKE 1 TABLET DAILY, Disp: 90 tablet, Rfl: 3    polyethylene glycol 17 g packet, Take 17 g by mouth Daily., Disp: , Rfl:     potassium chloride ER (K-TAB) 20 MEQ tablet controlled-release ER tablet, TAKE 1 TABLET TWICE A DAY WITH MEALS FOR LOW AMOUNT OF POTASSIUM IN THE BLOOD, Disp: 180 tablet, Rfl: 3    pravastatin (PRAVACHOL) 80 MG tablet, TAKE 1 TABLET DAILY, Disp: 90 tablet, Rfl: 3    predniSONE (DELTASONE) 20 MG tablet, Take 1 tablet by mouth 2 (Two) Times a Day., Disp: 14 tablet, Rfl: 0    Probiotic Product (PROBIOTIC ADVANCED PO), Take  by mouth., Disp: , Rfl:     saline (AYR) gel nasal gel, Apply 1 application  topically to the appropriate area as directed As Needed (epistaxis)., Disp: 1 each, Rfl: 0    terazosin (HYTRIN) 10 MG capsule, TAKE 1 CAPSULE DAILY, Disp: 90 capsule, Rfl: 3    No problems with medications.    Allergies   Allergen Reactions    Symbicort [Budesonide-Formoterol Fumarate] Dizziness     Patient passed out and fell    Prozac [Fluoxetine Hcl] Mental Status Change     Bad dreams.       Review of Systems   GENERAL:  Inactive/slower with limits, speed, samni for age and SOB.  Sleep  is ok..   SKIN:  Ongoing callous of feet; no problems with this/other skin today unless above/below.    ENDO:  No syncope, near or diaphoretic sweaty spells.  BS Ok: with download-see correspondence.  HEENT: No head injury, headache.  No vision change.  Same mild hearing loss.  Ears without pain/drainage.  No sore throat.  No significant nasal/sinus congestion/drainage unless uses CPAP; then for couple hours. No epistaxis.  CHEST: No chest wall tenderness or mass. Stable occ cough without productive without wheeze except briefly after CPAP use.   Mild/same SOB; no hemoptysis.  Wears oxygen continous.   CV: No chest pain, palpatations, ankle edema.  GI: No heartburn significant dysphagia.  No abdominal pain, constipation, rectal bleeding, or melena; 6/loose stools day.    :  Voids without dysuria, or incontience to completion.  ORTHO: No painful/swollen joints but various on /off sore.  No change occ/usual sore neck or back.  But no acute neck but above mid back pain without recent injury.   NEURO: No dizziness; diffuse weakness of extremities.  No change some LE numbness/parethesias. Walking often has to hold on; balance problems.   PSYCH: No memory loss.  Mood good; not that anxious, depressed but/and not suicidal.  Tolerated stress.   Screening:  Mammogram: NA  Bone density: NA  Low dose CT chest: Tobacco-smoker/age 22/1 ppd/dc 1980: (22): NA (had CT angio)  See xray below  IMPRESSION: CT chest with/BH/1.10.22  1. Stable 6 mm subpleural right lower lobe nodule. Stable for 3.5 years.  No additional workup needed.  2. Linear atelectasis or scarring in both lower lobes. Calcified  granulomas. Centrilobular emphysema.  3. Linear secretions in the distal trachea.  4. 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  Dilated pulmonary arteries.  5. Fatty infiltration of the liver.       GI: Colon-div/[facility]/6.15.17/prn  Prostate:   chin confirmed 7.27.23  chin confirmed 7.11.22  urology/confirmed  7.8.21  [place]/confirmed 11.22.19  [place] in past   Usual lab order  6m CBC, BMP, A1c  12m CBC, CMP, A1c, TSH, LIPID, PSAs, Vit D    Copy/paste function used for ROS/exam AND each area of these were reviewed, updated, confirmed and supplemented as needed.  Data reviewed:   1. Primary hypertension    2. Coronary artery disease involving native coronary artery of native heart without angina pectoris    3. Polypharmacy    4. Hyperlipidemia LDL goal <70-statin    5. Atherosclerosis: CAD, AAA vascular    6. Anticoagulated-CAD, DM2, CVA/ASA 81+()+plavix » Anticoagulated-CAD, DM2, CVA/ASA 81+()+plavix+eliquist    7. Chronic diastolic congestive heart failure    8. Controlled type 2 diabetes mellitus with other circulatory complication, without long-term current use of insulin    9. Gastroesophageal reflux disease, unspecified whether esophagitis present    10. Stage 3a chronic kidney disease    11. Prostatism-(young) urology    12. Anemia, unspecified type    13. Depression, unspecified depression type    14. Stage 2 moderate COPD by GOLD classification    15. Obstructive sleep apnea-(cpap)    16. Multiple chronic diseases    17. Travel advice encounter          Data review above:   Discussions/medical decisions/reviews:  BP ok  Other vitals ok  Recent Vitals           1/12/2024 1/15/2024 2/7/2024          BP: 122/68 126/71 124/70       Pulse: 66 54 80       Temp: -- 98.2 °F (36.8 °C) 96.6 °F (35.9 °C)       Weight: 93.3 kg (205 lb 9.6 oz) 93.9 kg (207 lb) 91.6 kg (202 lb)       BMI (Calculated): 29.5 29.7 29               DM/A1c 6.3 9.27.23  DM/ 9.27.23  Lipid  7.20.23  PSA ok 7.20.23  CBC 12.1 9.27.23  Iron 49L 9.27.23; 325/d  B12 608 9.27.23  Folate > 20 9.27.23  Renal 52 9.27.23  Liver ok 7.20.23  Vit D 36 7.20.23  Thyroid ok 7.20.23     Screening reviewed/updated   Vaccines discussed (see below)   Lab today;   Lab 3m       Last cardiac testing:   Echo:   Results for orders placed during the  hospital encounter of 10/26/22    Adult Transthoracic Echo Complete w/ Color, Spectral and Contrast if necessary per protocol    Interpretation Summary    Left ventricular systolic function is normal. Left ventricular ejection fraction appears to be 56 - 60%.    Left ventricular diastolic function was normal.    Abnormal global longitudinal LV strain (GLS) = -11.0%    Estimated right ventricular systolic pressure from tricuspid regurgitation is normal (<35 mmHg).    Radiology considered:   No radiology results for the last 90 days.     IMPRESSION: CT chest with//1.10.22  1. Stable 6 mm subpleural right lower lobe nodule. Stable for 3.5 years.  No additional workup needed.  2. Linear atelectasis or scarring in both lower lobes. Calcified  granulomas. Centrilobular emphysema.  3. Linear secretions in the distal trachea.  4. 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  Dilated pulmonary arteries.  5. Fatty infiltration of the liver.     [facility]/imaging/ED/5.30.23 reviewed today  CT abdomen/pelvis  Rectal blood?   Enlarged prostate  4.5 cm AAA  Umbilical hernia  Lung nodules  COPD    [facility]/imaging/ED/3.22.24 reviewed today  CT chest without  Bilateral LL nodules-3m tyra  Empysema  RLE infiltrate    Lab Results:  Results for orders placed or performed in visit on 05/07/24   Comprehensive metabolic panel    Specimen: Blood   Result Value Ref Range    Glucose 143 (H) 65 - 99 mg/dL    BUN 24 (H) 8 - 23 mg/dL    Creatinine 1.21 0.76 - 1.27 mg/dL    EGFR Result 56.9 (L) >60.0 mL/min/1.73    BUN/Creatinine Ratio 19.8 7.0 - 25.0    Sodium 142 136 - 145 mmol/L    Potassium 4.7 3.5 - 5.2 mmol/L    Chloride 102 98 - 107 mmol/L    Total CO2 31.4 (H) 22.0 - 29.0 mmol/L    Calcium 9.9 (H) 8.2 - 9.6 mg/dL    Total Protein 6.6 6.0 - 8.5 g/dL    Albumin 4.4 3.5 - 5.2 g/dL    Globulin 2.2 gm/dL    A/G Ratio 2.0 g/dL    Total Bilirubin 0.2 0.0 - 1.2 mg/dL    Alkaline Phosphatase 59 39 - 117 U/L    AST (SGOT) 14 1 - 40  U/L    ALT (SGPT) 11 1 - 41 U/L   Hemoglobin A1c    Specimen: Blood   Result Value Ref Range    Hemoglobin A1C 7.40 (H) 4.80 - 5.60 %   TSH    Specimen: Blood   Result Value Ref Range    TSH 1.820 0.270 - 4.200 uIU/mL   Lipid Panel w/ Chol/HDL Ratio    Specimen: Blood   Result Value Ref Range    Total Cholesterol 232 (H) 0 - 200 mg/dL    Triglycerides 184 (H) 0 - 150 mg/dL    HDL Cholesterol 35 (L) 40 - 60 mg/dL    VLDL Cholesterol Kieran 34 5 - 40 mg/dL    LDL Chol Calc (NIH) 163 (H) 0 - 100 mg/dL    Chol/HDL Ratio 6.63    PSA SCREENING    Specimen: Blood   Result Value Ref Range    PSA 1.900 0.000 - 4.000 ng/mL   Vitamin D 25 hydroxy    Specimen: Blood   Result Value Ref Range    25 Hydroxy, Vitamin D 46.4 30.0 - 100.0 ng/ml   Iron    Specimen: Blood   Result Value Ref Range    Iron 86 59 - 158 mcg/dL   Vitamin B12    Specimen: Blood   Result Value Ref Range    Vitamin B-12 671 211 - 946 pg/mL   Folate    Specimen: Blood   Result Value Ref Range    Folate >20.00 4.78 - 24.20 ng/mL   CBC w AUTO Differential    Specimen: Blood   Result Value Ref Range    WBC 9.12 3.40 - 10.80 10*3/mm3    RBC 4.16 4.14 - 5.80 10*6/mm3    Hemoglobin 12.0 (L) 13.0 - 17.7 g/dL    Hematocrit 38.0 37.5 - 51.0 %    MCV 91.3 79.0 - 97.0 fL    MCH 28.8 26.6 - 33.0 pg    MCHC 31.6 31.5 - 35.7 g/dL    RDW 13.8 12.3 - 15.4 %    Platelets 246 140 - 450 10*3/mm3    Neutrophil Rel % 62.7 42.7 - 76.0 %    Lymphocyte Rel % 21.5 19.6 - 45.3 %    Monocyte Rel % 6.8 5.0 - 12.0 %    Eosinophil Rel % 8.0 (H) 0.3 - 6.2 %    Basophil Rel % 0.8 0.0 - 1.5 %    Neutrophils Absolute 5.72 1.70 - 7.00 10*3/mm3    Lymphocytes Absolute 1.96 0.70 - 3.10 10*3/mm3    Monocytes Absolute 0.62 0.10 - 0.90 10*3/mm3    Eosinophils Absolute 0.73 (H) 0.00 - 0.40 10*3/mm3    Basophils Absolute 0.07 0.00 - 0.20 10*3/mm3    Immature Granulocyte Rel % 0.2 0.0 - 0.5 %    Immature Grans Absolute 0.02 0.00 - 0.05 10*3/mm3    nRBC 0.0 0.0 - 0.2 /100 WBC       A1C:  Lab Results - Last  18 Months   Lab Units 05/07/24  0858 09/27/23  0714 07/20/23  1234 02/14/23  0621   HEMOGLOBIN A1C % 7.40* 6.30* 6.60* 7.40*     GLUCOSE:  Lab Results - Last 18 Months   Lab Units 05/07/24  0858 03/06/24  1044 02/07/24  0848 09/27/23  0714 07/20/23  1234 06/12/23  1243 05/31/23  0437   GLUCOSE mg/dL 143* 139* 155* 126* 96 169* 112*     LIPID:  Lab Results - Last 18 Months   Lab Units 05/07/24  0858 07/20/23  1234 02/14/23  0645   CHOLESTEROL mg/dL 232* 199  --    LDL CHOL mg/dL 163* 133* 69   HDL CHOL mg/dL 35* 37* 43   TRIGLYCERIDES mg/dL 184* 161* 46     PSA:  Lab Results   Component Value Date/Time    PSA 1.900 05/07/2024 08:58 AM    PSA 1.050 07/20/2023 12:34 PM    PSA 1.720 01/16/2023 10:19 AM    PSA 0.983 01/05/2022 06:58 AM    PSA 1.040 01/05/2021 07:32 AM    PSA 1.100 05/15/2019 07:08 AM    PSA 1.230 11/08/2018 01:28 PM       CBC:  Lab Results - Last 18 Months   Lab Units 05/07/24  0858 03/06/24  1044 02/07/24  0848 09/27/23  0714 07/20/23  1234 06/12/23  1243 05/31/23  0437 05/30/23  0835 03/09/23  1033   WBC 10*3/mm3 9.12 11.62* 10.00 8.60 8.61 9.26 8.44   < > 6.72   HEMOGLOBIN g/dL 12.0* 11.9* 12.4* 12.1* 11.6* 11.2* 10.3*   < > 10.8*   HEMATOCRIT % 38.0 37.6 38.8 37.5 36.6* 35.0* 33.4*   < > 33.9*   PLATELETS 10*3/mm3 246 295 275 260 275 325 233   < > 435   IRON mcg/dL 86  --  116 49* 48* 56*  --   --  63   VITAMIN B 12 pg/mL 671  --   --  608 497  --   --   --  573   FOLATE ng/mL >20.00  --   --  >20.00 19.30  --   --   --  17.40    < > = values in this interval not displayed.     BMP/CMP:  Lab Results - Last 18 Months   Lab Units 05/07/24  0858 03/06/24  1044 02/07/24  0848 01/09/24  0957 09/27/23  0714 07/20/23  1234 06/12/23  1243 05/31/23  0437 03/09/23  1033   SODIUM mmol/L 142 140 141  --  143 142 139 143 140   POTASSIUM mmol/L 4.7 4.4 4.8  --  4.4 4.7 4.4 3.9 4.7   CHLORIDE mmol/L 102 98 99  --  104 106 103 110* 100   CO2 mmol/L 31.4* 30.0* 28.8  --  29.1* 25.8 28.2 26.0 28.2   GLUCOSE mg/dL 143*  "139* 155*  --  126* 96 169* 112* 150*   BUN mg/dL 24* 25* 26*  --  19 21 27* 15 23   CREATININE mg/dL 1.21 1.21 1.27 1.30 1.30* 1.18 1.11 0.87 0.85   EGFR RESULT mL/min/1.73 56.9*  --  53.7*  --  52.5* 59.0* 63.5  --  83.1   CALCIUM mg/dL 9.9* 9.9* 10.1*  --  10.1 10.0 10.2 8.7 9.4       HEPATIC:  Lab Results - Last 18 Months   Lab Units 05/07/24  0858 03/06/24  1044 02/07/24  0848 07/20/23  1234 05/31/23  0437 03/09/23  1033 02/20/23  0322   ALT (SGPT) U/L 11 11 12 13 8 11 10   AST (SGOT) U/L 14 14 14 12 13 11 16   ALK PHOS U/L 59 66 65 63 56 69 68     HEPATITIS C ANTIBODY: No results found for: \"HEPCVIRUSABY\"  Vit D:  Lab Results - Last 18 Months   Lab Units 05/07/24  0858 07/20/23  1234   VIT D 25 HYDROXY ng/ml 46.4 36.1     THYROID:  Lab Results - Last 18 Months   Lab Units 05/07/24  0858 07/20/23  1234 02/14/23  0645   TSH uIU/mL 1.820 2.420 0.875       Objective   /72   Pulse 55   Ht 180.3 cm (71\")   Wt 94.3 kg (207 lb 12.8 oz)   SpO2 96% Comment: O2 4L  BMI 28.98 kg/m²   Body mass index is 28.98 kg/m².    Recent Vitals         7/15/2024 7/29/2024 8/7/2024       BP: 118/62 110/54 128/72     Pulse: 100 64 55     Temp: 98.4 °F (36.9 °C) -- --     Weight: 92.5 kg (204 lb) 94.4 kg (208 lb 3.2 oz) 94.3 kg (207 lb 12.8 oz)     BMI (Calculated): 28.5 29.1 29             Physical Exam  GENERAL:  Well nourished/developed in no acute distress.   SKIN: Turgor excellent, without wound, rash, lesion  HEENT: Normal cephalic without trauma.  Pupils equal round reactive to light. Extraocular motions full without nystagmus.    No thyroidmegaly, mass, tenderness, lymphadenopathy and supple.  CV: Regular rhythm.  No murmur, gallop,  edema. Posterior pulses intact.  No carotid bruits.  CHEST: No chest wall tenderness or mass.   LUNGS: Symmetric motion with clear/decreased to auscultation.   ABD: Soft, nontender without mass.   ORTHO: Symmetric extremities without swelling/point tenderness.  Full gross range of motion " except hips reduced.  Symmetric LE.  Neg straight leg raising.  Neg Patricks maneuver.  Back is straight/mild lordosis.  Mid back sore touch.   NEURO: CN 2-12 grossly intact.  Symmetric facies. 1/4 x bicep knee equal reflexes.  UE/LE   3/5 strength throughout except 2/5  L.  Nonfocal use extremities. Speech clear.  Light touch decreased bilateral feet.   PSYCH: Oriented x 3.  Pleasant calm, well kept.  Purposeful/directed conservation with intact short/long gross memory.      Diabetic foot exam:   Left: Filament test reduced   Pulses Dorsalis Pedis:  diminished   Reflexes 1+    Vibratory sensation diminished   Proprioception diminished   Sharp/dull discrimination diminished       Right: Filament test reduced   Pulses Dorsalis Pedis:  diminished  Posterior Tibial:  present   Reflexes 1+    Vibratory sensation diminished   Proprioception diminished   Sharp/dull discrimination diminished  Assessment & Plan     1. Primary hypertension    2. Coronary artery disease involving native coronary artery of native heart without angina pectoris    3. Stage 3a chronic kidney disease-ro    4. Hypokalemia    5. Anemia, unspecified type    6. Controlled type 2 diabetes mellitus with other circulatory complication, without long-term current use of insulin    7. Multiple chronic diseases    8. Lung nodule    9. Anticoagulated-CAD, DM2, CVA/ASA 81+()+plavix » Anticoagulated-CAD, DM2, CVA/ASA 81+()+plavix+eliquist    10. Hyperlipidemia LDL goal <70-statin        Data review above:   Discussions/medical decisions/reviews:  Recent Vitals         7/15/2024 7/29/2024 8/7/2024       BP: 118/62 110/54 128/72     Pulse: 100 64 55     Temp: 98.4 °F (36.9 °C) -- --     Weight: 92.5 kg (204 lb) 94.4 kg (208 lb 3.2 oz) 94.3 kg (207 lb 12.8 oz)     BMI (Calculated): 28.5 29.1 29             Wt Readings from Last 15 Encounters:   08/07/24 0855 94.3 kg (207 lb 12.8 oz)   07/29/24 1509 94.4 kg (208 lb 3.2 oz)   07/15/24 1047 92.5 kg  (204 lb)   04/30/24 0900 92.5 kg (204 lb)   04/16/24 0919 90.3 kg (199 lb 2 oz)   03/06/24 1004 93 kg (205 lb)   02/07/24 0839 91.6 kg (202 lb)   01/15/24 0857 93.9 kg (207 lb)   01/12/24 0814 93.3 kg (205 lb 9.6 oz)   01/09/24 1116 88.5 kg (195 lb)   10/26/23 0905 89.3 kg (196 lb 12.8 oz)   09/25/23 0940 86.9 kg (191 lb 9.6 oz)   07/27/23 1347 88.5 kg (195 lb)   07/27/23 0922 86.6 kg (191 lb)   07/14/23 0920 83.5 kg (184 lb)       BP ok  Other vitals ok  Weight ok  DM/A1c 7.4 5.7.24  DM/ 5.7.24  Lipid  5.7.24; pravachol 80  PSA ok 5.7.24; slight increase  CBC 12.0 5.7.24  Iron 86 5.7.24; 325/d  B12 671 5.7.24  Folate > 20 5.7.24  Renal 56 5.7.24  Liver ok 5.7.24  Vit D 46 5.7.24  Thyroid TSH ok 5.7.24; none    Screening reviewed/updated   Vaccines discussed (see below) winter covid, flu, RSV; later Tdap  Labs today; to update and if on iron every 3m minimum   Pro/con CT chest without; agreed and  to compare to past  (considering recent [facility])    Discussed upcoming jail of me/Dr Romero.  Discussed new physician, Dr Rico Chi coming as replacement. Decision to: stay at /[place].    Follow up: Return for lab today then lab 3m then lab/Alon 6m .  Future Appointments   Date Time Provider Department Center   10/15/2024 10:30 AM Junior Rees APRN [facility] CD PAD PAD   10/31/2024  9:30 AM Feliz Frye APRN [facility] RD PAD PAD   12/4/2024 10:20 AM Danie Cadena MD [facility] U PAD PAD   1/7/2025  8:00 AM PAD [facility]IC CT 1 BH PAD CT BI PAD   1/7/2025  8:30 AM Nasir Gutierrez,  [facility] VS PAD PAD   1/13/2025 10:15 AM Nicky Del Angel APRN [facility] ENT PAD PAD       Data review above:   Rx: reviewed and decisions:   Rx new/changes: none  No orders of the defined types were placed in this encounter.    Orders placed:   LAB/Testing/Referrals: reviewed/orders:   Today:   Orders Placed This Encounter   Procedures    CT Chest Without Contrast    Ferritin    Iron Profile    Reticulocytes    TSH Rfx On Abnormal To  "Free T4    Comprehensive Metabolic Panel    Hemoglobin A1c    CBC & Differential     Chronic/recurrent labs above or change to:   Same     Immunization History   Administered Date(s) Administered    COVID-19 (MODERNA) 1st,2nd,3rd Dose Monovalent 03/03/2021, 03/31/2021    FLUAD TRI 65YR+ 11/22/2019    Fluad Quad 65+ 10/06/2020    Fluzone High Dose =>65 Years ([employer] ONLY) 10/09/2018    Fluzone High-Dose 65+yrs 12/27/2021, 01/26/2023, 10/24/2023    Fluzone Quad >6mos (Multi-dose) 10/21/2015, 01/20/2017, 01/29/2018    Influenza Whole 10/21/2015    Pneumococcal Conjugate 13-Valent (PCV13) 10/21/2015    Shingrix 06/09/2023     We advised/reaffirmed our support/suggestion for staying complete with covid- covid boosters, seasonal flu/yearly and any missing vaccine from list we supplied; when cannot be given here we suggest contact with local health department office or pharmacy to review missing/needed vaccines and then bring nursing documentation for these vaccines to this office or call this information in. Shingles became \"free\" 1.1.23 for medicare insurance.    Health maintenance:   Body mass index is 28.98 kg/m².         Tobacco use reviewed:     Gonzlao Durham  reports that he quit smoking about 44 years ago. His smoking use included cigarettes. He started smoking about 70 years ago. He has a 26 pack-year smoking history. He has been exposed to tobacco smoke. He has never used smokeless tobacco.       There are no Patient Instructions on file for this visit.          "

## 2024-08-08 LAB
ALBUMIN SERPL-MCNC: 4.3 G/DL (ref 3.5–5.2)
ALBUMIN/GLOB SERPL: 2 G/DL
ALP SERPL-CCNC: 57 U/L (ref 39–117)
ALT SERPL-CCNC: 14 U/L (ref 1–41)
AST SERPL-CCNC: 16 U/L (ref 1–40)
BASOPHILS # BLD AUTO: 0.06 10*3/MM3 (ref 0–0.2)
BASOPHILS NFR BLD AUTO: 0.7 % (ref 0–1.5)
BILIRUB SERPL-MCNC: <0.2 MG/DL (ref 0–1.2)
BUN SERPL-MCNC: 18 MG/DL (ref 8–23)
BUN/CREAT SERPL: 14.9 (ref 7–25)
CALCIUM SERPL-MCNC: 9.4 MG/DL (ref 8.2–9.6)
CHLORIDE SERPL-SCNC: 102 MMOL/L (ref 98–107)
CO2 SERPL-SCNC: 28.2 MMOL/L (ref 22–29)
CREAT SERPL-MCNC: 1.21 MG/DL (ref 0.76–1.27)
EGFRCR SERPLBLD CKD-EPI 2021: 56.9 ML/MIN/1.73
EOSINOPHIL # BLD AUTO: 0.46 10*3/MM3 (ref 0–0.4)
EOSINOPHIL NFR BLD AUTO: 5.6 % (ref 0.3–6.2)
ERYTHROCYTE [DISTWIDTH] IN BLOOD BY AUTOMATED COUNT: 12.9 % (ref 12.3–15.4)
FERRITIN SERPL-MCNC: 154 NG/ML (ref 30–400)
GLOBULIN SER CALC-MCNC: 2.1 GM/DL
GLUCOSE SERPL-MCNC: 132 MG/DL (ref 65–99)
HCT VFR BLD AUTO: 38 % (ref 37.5–51)
HGB BLD-MCNC: 12.1 G/DL (ref 13–17.7)
IMM GRANULOCYTES # BLD AUTO: 0.02 10*3/MM3 (ref 0–0.05)
IMM GRANULOCYTES NFR BLD AUTO: 0.2 % (ref 0–0.5)
IRON SATN MFR SERPL: 24 % (ref 20–50)
IRON SERPL-MCNC: 87 MCG/DL (ref 59–158)
LYMPHOCYTES # BLD AUTO: 1.94 10*3/MM3 (ref 0.7–3.1)
LYMPHOCYTES NFR BLD AUTO: 23.6 % (ref 19.6–45.3)
MCH RBC QN AUTO: 29.3 PG (ref 26.6–33)
MCHC RBC AUTO-ENTMCNC: 31.8 G/DL (ref 31.5–35.7)
MCV RBC AUTO: 92 FL (ref 79–97)
MONOCYTES # BLD AUTO: 0.63 10*3/MM3 (ref 0.1–0.9)
MONOCYTES NFR BLD AUTO: 7.7 % (ref 5–12)
NEUTROPHILS # BLD AUTO: 5.12 10*3/MM3 (ref 1.7–7)
NEUTROPHILS NFR BLD AUTO: 62.2 % (ref 42.7–76)
NRBC BLD AUTO-RTO: 0 /100 WBC (ref 0–0.2)
PLATELET # BLD AUTO: 234 10*3/MM3 (ref 140–450)
POTASSIUM SERPL-SCNC: 4.8 MMOL/L (ref 3.5–5.2)
PROT SERPL-MCNC: 6.4 G/DL (ref 6–8.5)
RBC # BLD AUTO: 4.13 10*6/MM3 (ref 4.14–5.8)
RETICS/RBC NFR AUTO: 0.84 % (ref 0.7–1.9)
SODIUM SERPL-SCNC: 140 MMOL/L (ref 136–145)
TIBC SERPL-MCNC: 356 MCG/DL
TSH SERPL DL<=0.005 MIU/L-ACNC: 1.7 UIU/ML (ref 0.27–4.2)
UIBC SERPL-MCNC: 269 MCG/DL (ref 112–346)
WBC # BLD AUTO: 8.23 10*3/MM3 (ref 3.4–10.8)

## 2024-08-15 RX ORDER — POTASSIUM CHLORIDE 1500 MG/1
TABLET, EXTENDED RELEASE ORAL
Qty: 180 TABLET | Refills: 3 | Status: SHIPPED | OUTPATIENT
Start: 2024-08-15

## 2024-09-03 ENCOUNTER — HOSPITAL ENCOUNTER (OUTPATIENT)
Dept: CT IMAGING | Facility: HOSPITAL | Age: 89
Discharge: HOME | End: 2024-09-03
Admitting: FAMILY MEDICINE
Payer: MEDICARE

## 2024-09-03 DIAGNOSIS — E11.59 CONTROLLED TYPE 2 DIABETES MELLITUS WITH OTHER CIRCULATORY COMPLICATION, WITHOUT LONG-TERM CURRENT USE OF INSULIN: ICD-10-CM

## 2024-09-03 DIAGNOSIS — E78.5 HYPERLIPIDEMIA, UNSPECIFIED HYPERLIPIDEMIA TYPE: ICD-10-CM

## 2024-09-03 DIAGNOSIS — D64.9 ANEMIA, UNSPECIFIED TYPE: ICD-10-CM

## 2024-09-03 DIAGNOSIS — R91.1 LUNG NODULE: ICD-10-CM

## 2024-09-03 DIAGNOSIS — I10 PRIMARY HYPERTENSION: Primary | ICD-10-CM

## 2024-09-03 PROCEDURE — 71250 CT THORAX DX C-: CPT

## 2024-09-03 RX ORDER — ALBUTEROL SULFATE 90 UG/1
AEROSOL, METERED RESPIRATORY (INHALATION)
Qty: 34 G | Refills: 3 | Status: SHIPPED | OUTPATIENT
Start: 2024-09-03

## 2024-09-03 NOTE — PROGRESS NOTES
Reviewed results - [employer] message sent.  If not seen in 3 days (3 day alert set), will send to pool to call the message.      Electronically signed by DOMINIQUE Resendiz, 09/03/24, 11:37 AM CDT.

## 2024-09-04 LAB
BASOPHILS # BLD AUTO: 0.06 10*3/MM3 (ref 0–0.2)
BASOPHILS NFR BLD AUTO: 0.8 % (ref 0–1.5)
BUN SERPL-MCNC: 17 MG/DL (ref 8–23)
BUN/CREAT SERPL: 14.3 (ref 7–25)
CALCIUM SERPL-MCNC: 9.9 MG/DL (ref 8.2–9.6)
CHLORIDE SERPL-SCNC: 101 MMOL/L (ref 98–107)
CO2 SERPL-SCNC: 31.4 MMOL/L (ref 22–29)
CREAT SERPL-MCNC: 1.19 MG/DL (ref 0.76–1.27)
EGFRCR SERPLBLD CKD-EPI 2021: 58 ML/MIN/1.73
EOSINOPHIL # BLD AUTO: 0.47 10*3/MM3 (ref 0–0.4)
EOSINOPHIL NFR BLD AUTO: 5.9 % (ref 0.3–6.2)
ERYTHROCYTE [DISTWIDTH] IN BLOOD BY AUTOMATED COUNT: 13 % (ref 12.3–15.4)
FOLATE SERPL-MCNC: 19 NG/ML (ref 4.78–24.2)
GLUCOSE SERPL-MCNC: 137 MG/DL (ref 65–99)
HBA1C MFR BLD: 7.2 % (ref 4.8–5.6)
HCT VFR BLD AUTO: 38.7 % (ref 37.5–51)
HGB BLD-MCNC: 12.5 G/DL (ref 13–17.7)
IMM GRANULOCYTES # BLD AUTO: 0.03 10*3/MM3 (ref 0–0.05)
IMM GRANULOCYTES NFR BLD AUTO: 0.4 % (ref 0–0.5)
IRON SATN MFR SERPL: 18 % (ref 20–50)
IRON SERPL-MCNC: 65 MCG/DL (ref 59–158)
LYMPHOCYTES # BLD AUTO: 1.61 10*3/MM3 (ref 0.7–3.1)
LYMPHOCYTES NFR BLD AUTO: 20.4 % (ref 19.6–45.3)
MCH RBC QN AUTO: 28.8 PG (ref 26.6–33)
MCHC RBC AUTO-ENTMCNC: 32.3 G/DL (ref 31.5–35.7)
MCV RBC AUTO: 89.2 FL (ref 79–97)
MONOCYTES # BLD AUTO: 0.58 10*3/MM3 (ref 0.1–0.9)
MONOCYTES NFR BLD AUTO: 7.3 % (ref 5–12)
NEUTROPHILS # BLD AUTO: 5.15 10*3/MM3 (ref 1.7–7)
NEUTROPHILS NFR BLD AUTO: 65.2 % (ref 42.7–76)
NRBC BLD AUTO-RTO: 0 /100 WBC (ref 0–0.2)
PLATELET # BLD AUTO: 251 10*3/MM3 (ref 140–450)
POTASSIUM SERPL-SCNC: 4.9 MMOL/L (ref 3.5–5.2)
RBC # BLD AUTO: 4.34 10*6/MM3 (ref 4.14–5.8)
SODIUM SERPL-SCNC: 141 MMOL/L (ref 136–145)
TIBC SERPL-MCNC: 358 MCG/DL
UIBC SERPL-MCNC: 293 MCG/DL (ref 112–346)
VIT B12 SERPL-MCNC: 508 PG/ML (ref 211–946)
WBC # BLD AUTO: 7.9 10*3/MM3 (ref 3.4–10.8)

## 2024-09-04 NOTE — PROGRESS NOTES
Reviewed results - [employer] message sent.  If not seen in 3 days (3 day alert set), will send to pool to call the message.      Electronically signed by DOMINIQUE Resendiz, 09/04/24, 6:58 AM CDT.

## 2024-10-08 RX ORDER — BLOOD-GLUCOSE METER
KIT MISCELLANEOUS
Qty: 100 EACH | Refills: 3 | Status: SHIPPED | OUTPATIENT
Start: 2024-10-08

## 2024-10-10 RX ORDER — BLOOD-GLUCOSE METER
KIT MISCELLANEOUS
Qty: 100 EACH | Refills: 3 | OUTPATIENT
Start: 2024-10-10

## 2024-10-15 ENCOUNTER — OFFICE VISIT (OUTPATIENT)
Dept: CARDIOLOGY | Facility: CLINIC | Age: 89
End: 2024-10-15
Payer: MEDICARE

## 2024-10-15 VITALS
SYSTOLIC BLOOD PRESSURE: 122 MMHG | WEIGHT: 204 LBS | HEART RATE: 65 BPM | BODY MASS INDEX: 28.56 KG/M2 | HEIGHT: 71 IN | DIASTOLIC BLOOD PRESSURE: 62 MMHG

## 2024-10-15 DIAGNOSIS — I63.9 CEREBROVASCULAR ACCIDENT (CVA), UNSPECIFIED MECHANISM: ICD-10-CM

## 2024-10-15 DIAGNOSIS — Z79.01 CHRONIC ANTICOAGULATION: ICD-10-CM

## 2024-10-15 DIAGNOSIS — J41.8 MIXED SIMPLE AND MUCOPURULENT CHRONIC BRONCHITIS: ICD-10-CM

## 2024-10-15 DIAGNOSIS — I25.10 CORONARY ARTERY DISEASE INVOLVING NATIVE CORONARY ARTERY OF NATIVE HEART WITHOUT ANGINA PECTORIS: Primary | ICD-10-CM

## 2024-10-15 DIAGNOSIS — I10 PRIMARY HYPERTENSION: ICD-10-CM

## 2024-10-15 DIAGNOSIS — E78.5 HYPERLIPIDEMIA LDL GOAL <70: ICD-10-CM

## 2024-10-15 DIAGNOSIS — G47.33 OBSTRUCTIVE SLEEP APNEA: ICD-10-CM

## 2024-10-15 DIAGNOSIS — I48.0 PAF (PAROXYSMAL ATRIAL FIBRILLATION): ICD-10-CM

## 2024-10-15 NOTE — PROGRESS NOTES
Subjective:     Encounter Date: 10/15/2024      Patient ID: Gonzalo Durham is a 89 y.o. male with coronary artery disease s/p 3V-CABG in 1980 in [facility], CVA, pulmonary nodule, hypertension, hyperlipidemia, paroxysmal atrial fibrillation and obstructive sleep apnea.     Chief Complaint: routine follow up  History of Present Illness  Patient presents today for management of coronary artery disease. He reports that he has been doing about the same. He denies any chest pain. He reports that he has chronic dyspnea on exertion and is on continuous supplemental oxygen at 4L via NC. He denies any worsening dyspnea on exertion since LOV. He denies any heart racing or palpitations. He reports that his BP has remained controlled 118-125/60-70. He denies any leg swelling, orthopnea or PND. He reports that is ambulating well. He went on a cruise with his daughter recently with no difficulties. He denies any dizziness or lightheadedness. Patient is on eliquis and denies any bleeding issues. Patient follows with Dr Romero as PCP.     The following portions of the patient's history were reviewed and updated as appropriate: allergies, current medications, past family history, past medical history, past social history, past surgical history and problem list.    Allergies   Allergen Reactions    Symbicort [Budesonide-Formoterol Fumarate] Dizziness     Patient passed out and fell    Prozac [Fluoxetine Hcl] Mental Status Change     Bad dreams.       Current Outpatient Medications:     acetaminophen (TYLENOL) 325 MG tablet, Take 2 tablets by mouth 2 (Two) Times a Day., Disp: 360 tablet, Rfl: 2    albuterol sulfate  (90 Base) MCG/ACT inhaler, USE 2 INHALATIONS FOUR TIMES A DAY, Disp: 34 g, Rfl: 3    Artificial Tear Solution (Just Tears Eye Drops) solution, 1-2 drops daily as needed, Disp: 15 mL, Rfl: 0    ascorbic acid (VITAMIN C) 500 MG tablet, Take 1 tablet by mouth 2 (Two) Times a Day. Indications: Inadequate Vitamin C, Disp:  , Rfl:     aspirin 81 MG EC tablet, Take 1 tablet by mouth Daily. Indications: Acute Heart Attack, Disp: , Rfl:     Breztri Aerosphere 160-9-4.8 MCG/ACT aerosol inhaler, USE 2 INHALATIONS TWICE A DAY, Disp: 10.7 g, Rfl: 11    calcium carbonate, oyster shell, 500 MG tablet tablet, Take 1 tablet by mouth Daily., Disp: , Rfl:     Eliquis 5 MG tablet tablet, TAKE 1 TABLET TWICE A DAY, Disp: 180 tablet, Rfl: 3    ferrous sulfate 325 (65 Fe) MG tablet, Take 1 tablet by mouth Daily With Breakfast. Indications: Anemia From Inadequate Iron in the Body, Disp: , Rfl:     finasteride (PROSCAR) 5 MG tablet, TAKE 1 TABLET DAILY, Disp: 90 tablet, Rfl: 3    furosemide (LASIX) 20 MG tablet, TAKE 1 TABLET EVERY 12 HOURS FOR EDEMA, Disp: 90 tablet, Rfl: 7    glucose blood (FREESTYLE LITE) test strip, USE TO TEST BLOOD SUGAR DAILY, Disp: 100 each, Rfl: 3    glyburide (DIAbeta) 5 MG tablet, TAKE 1 TABLET EVERY MORNING AND ONE-HALF (1/2) TABLET BEFORE SUPPER, Disp: 135 tablet, Rfl: 3    guaiFENesin (MUCINEX) 600 MG 12 hr tablet, Take 1 tablet by mouth 2 (Two) Times a Day. Indications: Cough, Disp: , Rfl:     hydroCHLOROthiazide (MICROZIDE) 12.5 MG capsule, TAKE 1 CAPSULE DAILY, Disp: 90 capsule, Rfl: 3    isosorbide mononitrate (IMDUR) 30 MG 24 hr tablet, Take 1 tablet by mouth Daily. Indications: Stable Angina Pectoris, Disp: 90 tablet, Rfl: 3    loratadine (CLARITIN) 10 MG tablet, TAKE 1 TABLET DAILY, Disp: 90 tablet, Rfl: 3    melatonin 3 MG tablet, Take 1 tablet by mouth At Night As Needed for Sleep., Disp: , Rfl:     metoprolol tartrate (LOPRESSOR) 25 MG tablet, TAKE ONE-HALF (1/2) TABLET TWICE A DAY FOR HIGH BLOOD PRESSURE DISORDER, Disp: 90 tablet, Rfl: 3    Multiple Vitamins-Minerals (MULTIVITAMIN & MINERAL PO), Take 1 tablet by mouth Daily. Indications: vit, Disp: , Rfl:     nitroglycerin (Nitrostat) 0.4 MG SL tablet, Place 1 tablet under the tongue Every 5 (Five) Minutes As Needed for Chest Pain. Indications: Acute Angina  Pectoris, Disp: 90 tablet, Rfl: 0    pantoprazole (PROTONIX) 40 MG EC tablet, TAKE 1 TABLET DAILY, Disp: 90 tablet, Rfl: 3    polyethylene glycol 17 g packet, Take 17 g by mouth Daily., Disp: , Rfl:     potassium chloride ER (K-TAB) 20 MEQ tablet controlled-release ER tablet, TAKE 1 TABLET TWICE A DAY WITH MEALS FOR LOW AMOUNT OF POTASSIUM IN THE BLOOD, Disp: 180 tablet, Rfl: 3    pravastatin (PRAVACHOL) 80 MG tablet, TAKE 1 TABLET DAILY, Disp: 90 tablet, Rfl: 3    Probiotic Product (PROBIOTIC ADVANCED PO), Take  by mouth., Disp: , Rfl:     saline (AYR) gel nasal gel, Apply 1 application  topically to the appropriate area as directed As Needed (epistaxis)., Disp: 1 each, Rfl: 0    terazosin (HYTRIN) 10 MG capsule, TAKE 1 CAPSULE DAILY, Disp: 90 capsule, Rfl: 3    Past Medical History:   Diagnosis Date    A-fib     AAA (abdominal aortic aneurysm) without rupture     Arthritis     BPH (benign prostatic hypertrophy)     Cataract     bialteral    CKD (chronic kidney disease)     COPD (chronic obstructive pulmonary disease)     Coronary artery disease involving native coronary artery of native heart without angina pectoris 1/20/2017    CABG/Az/[place]/1981 No [facility] echo  7.16.18 Right ventricular cavity is mild-to-moderately dilated. Left ventricular diastolic dysfunction (grade I) consistent with impaired relaxation. Left ventricular systolic function is normal. Left atrial cavity size is borderline dilated. RIGHT HEART ENLARGEMENT - RVSP NOT ASSESSABLE NORMAL LV AND RV SYSTOLIC FUNCTION NO SIGNIFICANT VALVULAR DYSFUNCTION  Seein    Diabetes mellitus     Type 2    DM (diabetes mellitus screen)     GERD (gastroesophageal reflux disease)     History of loop recorder     at current time    Hx of colonic polyp     Hypercholesteremia     Hypertension     Macular degeneration     treated with injections in right eye    Myocardial infarction     Neuropathy     Oxygen deficit     at 4liters    Prostate cancer     Pulmonary  hypertension 2022    S/P CABG x 3 2022 [facility]    Sleep apnea     cpap    Stage 3a chronic kidney disease 2017    Stroke     recovererd    Varicose vein of leg     bialteral     Social History     Socioeconomic History    Marital status:    Tobacco Use    Smoking status: Former     Current packs/day: 0.00     Average packs/day: 1 pack/day for 26.0 years (26.0 ttl pk-yrs)     Types: Cigarettes     Start date:      Quit date:      Years since quittin.8     Passive exposure: Past    Smokeless tobacco: Never   Vaping Use    Vaping status: Never Used   Substance and Sexual Activity    Alcohol use: No    Drug use: No    Sexual activity: Defer       Review of Systems   Constitutional: Negative for malaise/fatigue.   HENT:  Negative for nosebleeds.    Cardiovascular:  Positive for dyspnea on exertion (chronic; on supplemental O2). Negative for chest pain, irregular heartbeat, leg swelling, near-syncope, orthopnea, palpitations, paroxysmal nocturnal dyspnea and syncope.   Respiratory:  Negative for shortness of breath.    Hematologic/Lymphatic: Bruises/bleeds easily.   Gastrointestinal:  Negative for diarrhea.   Genitourinary:  Negative for hematuria.   Neurological:  Negative for dizziness and weakness.   All other systems reviewed and are negative.         Objective:     Vitals reviewed.   Constitutional:       General: Not in acute distress.     Appearance: Normal appearance. Well-developed. Frail.      Interventions: Nasal cannula in place.      Comments: Patient is on continuous supplemental oxygen at 4L via NC   Eyes:      Pupils: Pupils are equal, round, and reactive to light.   HENT:      Head: Normocephalic and atraumatic.      Nose: Nose normal.   Neck:      Vascular: No carotid bruit.   Pulmonary:      Effort: Pulmonary effort is normal. No respiratory distress.      Breath sounds: Normal breath sounds. No wheezing. No rales.   Cardiovascular:      Normal rate. Regular  "rhythm.      Murmurs: There is no murmur.   Edema:     Peripheral edema absent.   Abdominal:      General: There is no distension.      Palpations: Abdomen is soft.   Musculoskeletal: Normal range of motion.      Cervical back: Normal range of motion and neck supple. Skin:     General: Skin is warm.      Findings: No erythema or rash.   Neurological:      General: No focal deficit present.      Mental Status: Alert and oriented to person, place, and time.   Psychiatric:         Attention and Perception: Attention normal.         Mood and Affect: Mood normal.         Speech: Speech normal.         Behavior: Behavior normal.         Thought Content: Thought content normal.         Judgment: Judgment normal.         /62 (BP Location: Left arm, Patient Position: Sitting, Cuff Size: Adult)   Pulse 65   Ht 180.3 cm (70.98\")   Wt 92.5 kg (204 lb)   BMI 28.47 kg/m²       ECG 12 Lead    Date/Time: 10/15/2024 10:05 AM  Performed by: Junior Rees APRN    Authorized by: Junior Rees APRN  Comparison: compared with previous ECG from 3/6/2024  Similar to previous ECG  Rhythm: sinus rhythm  Rate: normal  BPM: 65        Lab Review:     Results for orders placed during the hospital encounter of 10/26/22    Adult Transthoracic Echo Complete w/ Color, Spectral and Contrast if necessary per protocol    Interpretation Summary    Left ventricular systolic function is normal. Left ventricular ejection fraction appears to be 56 - 60%.    Left ventricular diastolic function was normal.    Abnormal global longitudinal LV strain (GLS) = -11.0%    Estimated right ventricular systolic pressure from tricuspid regurgitation is normal (<35 mmHg).    Lab Results   Component Value Date    CHOL 123 02/14/2023    CHLPL 232 (H) 05/07/2024    TRIG 184 (H) 05/07/2024    HDL 35 (L) 05/07/2024     (H) 05/07/2024     I have personally reviewed echo, labs, hospitalization notes and past office notes prior to patients " visit  Assessment:          Diagnosis Plan   1. Coronary artery disease involving native coronary artery of native heart without angina pectoris        2. PAF (paroxysmal atrial fibrillation)  ECG 12 Lead      3. Chronic anticoagulation        4. Primary hypertension        5. Hyperlipidemia LDL goal <70-statin        6. Obstructive sleep apnea        7. Cerebrovascular accident (CVA), unspecified mechanism        8. Mixed simple and mucopurulent chronic bronchitis                   Plan:       CAD: s/p 3V-CABG in 1980. [facility] 2019 showed 70% stenosis of diagonal branch, patent SVG to LAD, patent SVG to RCA, occluded mid LAD stent and occluded proximal RCA. Patient denies any chest pain. Continue aspirin, metoprolol, pravastatin and imdur    2. Paroxysmal Atrial fibrillation: EKG today shows NSR with rate of 65. Continue metoprolol and eliquis.     3. Chronic anticoagulation: patient is on Eliquis and denies any bleeding issues.     4. Hypertension: Controlled in office. Continue current medications    5. Hyperlipidemia: Lipid panel 5/17/2024 shows elevated LDL at 163. Continue pravastatin. Managed and followed by PCP    6. WENDY    7. CVA    8. COPD: Patient is on continuous supplemental oxygen at 4L via NC. Patient follows with pulmonology.     I attest that all portions of this note reviewed and all information has been updated by myself to reflect the patient's current status.      I spent 33 minutes caring for Gonzalo on this date of service. This time includes time spent by me in the following activities:preparing for the visit, reviewing tests, obtaining and/or reviewing a separately obtained history, performing a medically appropriate examination and/or evaluation , counseling and educating the patient/family/caregiver, and documenting information in the medical record    I spent 2 minutes on the separately reported service of EKG. This time is not included in the time used to support the E/M service also reported  today.    Patient is to follow up in 6 months or sooner if needed

## 2024-10-18 ENCOUNTER — TELEPHONE (OUTPATIENT)
Dept: FAMILY MEDICINE CLINIC | Facility: CLINIC | Age: 89
End: 2024-10-18
Payer: MEDICARE

## 2024-10-18 RX ORDER — ISOSORBIDE MONONITRATE 30 MG/1
30 TABLET, EXTENDED RELEASE ORAL DAILY
Qty: 90 TABLET | Refills: 3 | Status: SHIPPED | OUTPATIENT
Start: 2024-10-18

## 2024-10-22 RX ORDER — BLOOD-GLUCOSE METER
KIT MISCELLANEOUS
Qty: 100 EACH | Refills: 3 | Status: SHIPPED | OUTPATIENT
Start: 2024-10-22

## 2024-10-31 ENCOUNTER — OFFICE VISIT (OUTPATIENT)
Dept: PULMONOLOGY | Facility: CLINIC | Age: 89
End: 2024-10-31
Payer: MEDICARE

## 2024-10-31 VITALS
HEART RATE: 66 BPM | SYSTOLIC BLOOD PRESSURE: 124 MMHG | OXYGEN SATURATION: 96 % | WEIGHT: 204 LBS | BODY MASS INDEX: 30.92 KG/M2 | HEIGHT: 68 IN | DIASTOLIC BLOOD PRESSURE: 76 MMHG

## 2024-10-31 DIAGNOSIS — J96.11 CHRONIC RESPIRATORY FAILURE WITH HYPOXIA: Chronic | ICD-10-CM

## 2024-10-31 DIAGNOSIS — J44.9 STAGE 2 MODERATE COPD BY GOLD CLASSIFICATION: Primary | Chronic | ICD-10-CM

## 2024-10-31 DIAGNOSIS — Z77.090 HISTORY OF EXPOSURE TO ASBESTOS: Chronic | ICD-10-CM

## 2024-10-31 PROCEDURE — 1160F RVW MEDS BY RX/DR IN RCRD: CPT | Performed by: NURSE PRACTITIONER

## 2024-10-31 PROCEDURE — 1159F MED LIST DOCD IN RCRD: CPT | Performed by: NURSE PRACTITIONER

## 2024-10-31 PROCEDURE — 99214 OFFICE O/P EST MOD 30 MIN: CPT | Performed by: NURSE PRACTITIONER

## 2024-10-31 NOTE — PROCEDURES
Spirometry    Performed by: Cindy Ramires, RRT  Authorized by: Feliz Frye APRN     Pre Drug % Predicted    FVC: 127%   FEV1: 77%   FEV1/FVC: 44%    Interpretation   Spirometry   Spirometry shows moderate obstruction.

## 2024-10-31 NOTE — PROGRESS NOTES
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  He had updated spirometry today showing some improvement in FEV1 from last year.  He had an overnight pulse ox study in April on 4 L that looked good.  He reports that his pulmonary status is stable.  He is on daily Breztri and chronic oxygen therapy.  We discussed diaphragmatic breathing today and that it would be okay for him to take his daytime oxygen off for short periods of time when he is sitting.  He stays up-to-date on vaccines.  Objective   Vital Signs:   /76   Pulse 66   Ht 171.5 cm (67.5\")   Wt 92.5 kg (204 lb)   SpO2 96% Comment: 4LPD  BMI 31.48 kg/m²     Physical Exam  Vitals reviewed.   Constitutional:       General: He is not in acute distress.     Appearance: Normal appearance.      Interventions: Nasal cannula in place.   HENT:      Head: Normocephalic and atraumatic.      Right Ear: Decreased hearing noted.      Left Ear: Decreased hearing noted.      Ears:      Comments: Bilateral hearing aids  Cardiovascular:      Rate and Rhythm: Normal rate and regular rhythm.   Pulmonary:      Breath sounds: Decreased breath sounds (At bases only, otherwise clear) present.   Musculoskeletal:      Cervical back: Normal range of motion.   Skin:     General: Skin is warm and dry.   Neurological:      Mental Status: He is alert and oriented to person, place, and time.   Psychiatric:         Mood and Affect: Mood normal.         Behavior: Behavior normal.        Result Review :   The following data was reviewed by: DOMINIQUE Yeung on 10/31/2024:  CT Chest Without Contrast Diagnostic (09/03/2024 09:50)   Results for orders placed in visit on " 10/31/24    Spirometry    Narrative  Spirometry    Performed by: Cindy Ramires RRT  Authorized by: Feliz Frye APRN  Pre Drug % Predicted  FVC: 127%  FEV1: 77%  FEV1/FVC: 44%    Interpretation  Spirometry  Spirometry shows moderate obstruction.      Results for orders placed in visit on 01/26/23    Pulmonary Function Test    Narrative  Pulmonary Function Test  Performed by: Cindy Ramires RRT  Authorized by: Feliz Frye APRN    Pre Drug % Predicted  FVC: 124%  FEV1: 64%  FEF 25-75%: 24%  FEV1/FVC: 38%    Interpretation  Spirometry  Spirometry shows moderate obstruction. There is reduced midflow suggesting small airway/airflow obstruction.      Results for orders placed in visit on 01/26/22    Pulmonary Function Test    Narrative  Pulmonary Function Test  Performed by: Cindy Ramires RRT  Authorized by: Feliz Frye APRN    Pre Drug % Predicted  FVC: 87%  FEV1: 50%  FEF 25-75%: 15%  FEV1/FVC: 43.7%    Interpretation  Spirometry  Spirometry shows moderate obstruction. There is reduced midflow suggesting small airway/airflow obstruction.  Overall comments: Current FEV1 is 50% predicted compared to 66% predicted in 2019.  Reviewed the results of the drop in lung function with the patient and his daughter.  The patient states understanding but was not interested in changing his inhaler regimen.  He reports that he feels no more symptomatic than he did 2 or 3 years ago.    Rest/Exercise Pulse Ox Values          1/26/2023    09:30   Rest/Exercise Pulse Ox Results   Rest room air SAT % 92   Exercise room air SAT % 79   Exercise on O2 @ Liters 2   Exercise on O2 SAT % 89              Assessment and Plan      Diagnoses and all orders for this visit:    1. Stage 2 moderate COPD by GOLD classification (Primary)  Comments:  With some improvement in FEV1.  Continue daily Breztri.  Use albuterol and Mucinex as needed.  Orders:  -     Spirometry    2. Chronic  respiratory failure with hypoxia  Comments:  Stable.  Continue oxygen as ordered and discussed.    3. History of exposure to asbestos  Comments:  Stable.  Reviewed most recent CT scan.  Continue current pulmonary regimen.      Feliz Frye, APRN  10/31/2024  09:50 CDT    Follow Up   Return in about 9 months (around 7/31/2025).    Patient was given instructions and counseling regarding his condition or for health maintenance advice. Please see specific information pulled into the AVS if appropriate.

## 2024-12-04 ENCOUNTER — PROCEDURE VISIT (OUTPATIENT)
Dept: UROLOGY | Facility: CLINIC | Age: 89
End: 2024-12-04
Payer: MEDICARE

## 2024-12-04 DIAGNOSIS — C67.5 MALIGNANT NEOPLASM OF URINARY BLADDER NECK: ICD-10-CM

## 2024-12-04 DIAGNOSIS — Z85.51 HISTORY OF BLADDER CANCER: Primary | ICD-10-CM

## 2024-12-04 NOTE — PROGRESS NOTES
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  The urethra was anesthetized with 2% lidocaine jelly.  A flexible cystoscope was introduced per urethra.      Urethra:  Normal    Bladder:  Normal mucosa and mild trabeculation.  There are small papillary bladder tumors present on his intravesical prostate lobe urothelium.  These appear to be overall stable from 12 months ago     Ureteral orifices:  Normal position bilaterally and Clear efflux bilaterally    Prostate:  lateral lobe hypertrophy-with large intravesical lobe    Patient tolerated the procedure well    Complications: none    Blood loss: minimal    Follow up:    Routine follow up-for surveillance cystoscopy in 2 years or sooner as needed.  Given his advanced age and oxygen requirement along with the very small size and stability of his papillary bladder tumors on his intravesical lobe, patient would like to observe for now.  He will follow-up with me for surveillance cystoscopy in 2 years or sooner as needed.       This document has been signed by KY Cadena MD on December 4, 2024 10:49 CST

## 2024-12-09 RX ORDER — HYDROCHLOROTHIAZIDE 12.5 MG/1
CAPSULE ORAL
Qty: 90 CAPSULE | Refills: 3 | Status: SHIPPED | OUTPATIENT
Start: 2024-12-09

## 2024-12-31 ENCOUNTER — OFFICE VISIT (OUTPATIENT)
Dept: FAMILY MEDICINE CLINIC | Facility: CLINIC | Age: 89
End: 2024-12-31
Payer: MEDICARE

## 2024-12-31 VITALS
OXYGEN SATURATION: 91 % | BODY MASS INDEX: 31.86 KG/M2 | SYSTOLIC BLOOD PRESSURE: 104 MMHG | HEART RATE: 77 BPM | HEIGHT: 67 IN | WEIGHT: 203 LBS | DIASTOLIC BLOOD PRESSURE: 60 MMHG

## 2024-12-31 DIAGNOSIS — G25.0 FAMILIAL TREMOR: ICD-10-CM

## 2024-12-31 DIAGNOSIS — J44.9 STAGE 2 MODERATE COPD BY GOLD CLASSIFICATION: Primary | Chronic | ICD-10-CM

## 2024-12-31 DIAGNOSIS — E11.9 CONTROLLED TYPE 2 DIABETES MELLITUS WITHOUT COMPLICATION, WITHOUT LONG-TERM CURRENT USE OF INSULIN: ICD-10-CM

## 2024-12-31 DIAGNOSIS — J96.11 CHRONIC RESPIRATORY FAILURE WITH HYPOXIA: ICD-10-CM

## 2024-12-31 PROBLEM — Z00.00 WELLNESS EXAMINATION: Status: RESOLVED | Noted: 2017-01-20 | Resolved: 2024-12-31

## 2024-12-31 PROBLEM — Z01.89 LABORATORY TEST: Status: RESOLVED | Noted: 2017-09-08 | Resolved: 2024-12-31

## 2024-12-31 PROBLEM — R25.1 TREMOR: Status: RESOLVED | Noted: 2017-01-20 | Resolved: 2024-12-31

## 2024-12-31 LAB
EXPIRATION DATE: ABNORMAL
HBA1C MFR BLD: 6.9 % (ref 4.5–5.7)
Lab: ABNORMAL

## 2024-12-31 PROCEDURE — 83036 HEMOGLOBIN GLYCOSYLATED A1C: CPT

## 2024-12-31 PROCEDURE — 1170F FXNL STATUS ASSESSED: CPT

## 2024-12-31 PROCEDURE — 3044F HG A1C LEVEL LT 7.0%: CPT

## 2024-12-31 PROCEDURE — 99214 OFFICE O/P EST MOD 30 MIN: CPT

## 2024-12-31 PROCEDURE — G0439 PPPS, SUBSEQ VISIT: HCPCS

## 2024-12-31 PROCEDURE — 1126F AMNT PAIN NOTED NONE PRSNT: CPT

## 2024-12-31 RX ORDER — PRIMIDONE 50 MG/1
50 TABLET ORAL NIGHTLY
Qty: 90 TABLET | Refills: 1 | Status: SHIPPED | OUTPATIENT
Start: 2024-12-31

## 2024-12-31 NOTE — PROGRESS NOTES
Subjective   The ABCs of the Annual Wellness Visit  Medicare Wellness Visit      Gonzalo Durham is a 89 y.o. patient who presents for a Medicare Wellness Visit.    The following portions of the patient's history were reviewed and   updated as appropriate: allergies, current medications, past family history, past medical history, past social history, past surgical history, and problem list.    Compared to one year ago, the patient's physical   health is worse. Had COVID last year and was in hospital for a few weeks.   Compared to one year ago, the patient's mental   health is worse.    Recent Hospitalizations:  He was admitted within the past 365 days at [facility].     Current Medical Providers:  Patient Care Team:  Rober Chi MD as PCP - General (Family Medicine)  Bradley Carver MD as Cardiologist (Cardiology)  Sly Ahn MD as Consulting Physician (Gastroenterology)  Juan Lane MD as Consulting Physician (Otolaryngology)  Feliz Frye APRN as Nurse Practitioner (Pulmonary Disease)  [facility]  Danie Cadena MD as Consulting Physician (Urology)  Teo Jha MD as Consulting Physician (Pulmonary Disease)  Junior Rees APRN as Nurse Practitioner (Cardiology)    Outpatient Medications Prior to Visit   Medication Sig Dispense Refill    acetaminophen (TYLENOL) 325 MG tablet Take 2 tablets by mouth 2 (Two) Times a Day. 360 tablet 2    albuterol sulfate  (90 Base) MCG/ACT inhaler USE 2 INHALATIONS FOUR TIMES A DAY 34 g 3    Artificial Tear Solution (Just Tears Eye Drops) solution 1-2 drops daily as needed 15 mL 0    ascorbic acid (VITAMIN C) 500 MG tablet Take 1 tablet by mouth 2 (Two) Times a Day. Indications: Inadequate Vitamin C      aspirin 81 MG EC tablet Take 1 tablet by mouth Daily. Indications: Acute Heart Attack      Breztri Aerosphere 160-9-4.8 MCG/ACT aerosol inhaler USE 2 INHALATIONS TWICE A DAY 10.7 g 11    calcium carbonate, oyster shell,  500 MG tablet tablet Take 1 tablet by mouth Daily.      Eliquis 5 MG tablet tablet TAKE 1 TABLET TWICE A  tablet 3    ferrous sulfate 325 (65 Fe) MG tablet Take 1 tablet by mouth Daily With Breakfast. Indications: Anemia From Inadequate Iron in the Body      finasteride (PROSCAR) 5 MG tablet TAKE 1 TABLET DAILY 90 tablet 3    furosemide (LASIX) 20 MG tablet TAKE 1 TABLET EVERY 12 HOURS FOR EDEMA 90 tablet 7    glucose blood (FREESTYLE LITE) test strip USE TO TEST BLOOD SUGAR DAILY 100 each 3    glyburide (DIAbeta) 5 MG tablet TAKE 1 TABLET EVERY MORNING AND ONE-HALF (1/2) TABLET BEFORE SUPPER 135 tablet 3    guaiFENesin (MUCINEX) 600 MG 12 hr tablet Take 1 tablet by mouth 2 (Two) Times a Day. Indications: Cough      hydroCHLOROthiazide (MICROZIDE) 12.5 MG capsule TAKE 1 CAPSULE DAILY 90 capsule 3    isosorbide mononitrate (IMDUR) 30 MG 24 hr tablet Take 1 tablet by mouth Daily. Indications: Stable Angina Pectoris 90 tablet 3    loratadine (CLARITIN) 10 MG tablet TAKE 1 TABLET DAILY 90 tablet 3    melatonin 3 MG tablet Take 1 tablet by mouth At Night As Needed for Sleep.      metoprolol tartrate (LOPRESSOR) 25 MG tablet TAKE ONE-HALF (1/2) TABLET TWICE A DAY FOR HIGH BLOOD PRESSURE DISORDER 90 tablet 3    Multiple Vitamins-Minerals (MULTIVITAMIN & MINERAL PO) Take 1 tablet by mouth Daily. Indications: vit      nitroglycerin (Nitrostat) 0.4 MG SL tablet Place 1 tablet under the tongue Every 5 (Five) Minutes As Needed for Chest Pain. Indications: Acute Angina Pectoris 90 tablet 0    pantoprazole (PROTONIX) 40 MG EC tablet TAKE 1 TABLET DAILY 90 tablet 3    polyethylene glycol 17 g packet Take 17 g by mouth Daily.      potassium chloride ER (K-TAB) 20 MEQ tablet controlled-release ER tablet TAKE 1 TABLET TWICE A DAY WITH MEALS FOR LOW AMOUNT OF POTASSIUM IN THE BLOOD 180 tablet 3    pravastatin (PRAVACHOL) 80 MG tablet TAKE 1 TABLET DAILY 90 tablet 3    Probiotic Product (PROBIOTIC ADVANCED PO) Take  by mouth.       saline (AYR) gel nasal gel Apply 1 application  topically to the appropriate area as directed As Needed (epistaxis). 1 each 0    terazosin (HYTRIN) 10 MG capsule TAKE 1 CAPSULE DAILY 90 capsule 3     No facility-administered medications prior to visit.     No opioid medication identified on active medication list. I have reviewed chart for other potential  high risk medication/s and harmful drug interactions in the elderly.      Aspirin is on active medication list. Aspirin use is indicated based on review of current medical condition/s. Pros and cons of this therapy have been discussed today. Benefits of this medication outweigh potential harm.  Patient has been encouraged to continue taking this medication.  .      Patient Active Problem List   Diagnosis    Obesity    Controlled type 2 diabetes mellitus without complication, without long-term current use of insulin    Stage 3a chronic kidney disease-ro    Erectile dysfunction    Coronary artery disease involving native coronary artery of native heart without angina pectoris    Hyperlipidemia LDL goal <70-statin    Hypertension    Prostatism-(young) urology    Varicose vein of leg    Plantar fasciitis    Nocturnal leg movements    Bad dreams    Depression    Peripheral neuropathy- clinical    Hx of colonic polyps    Gastroesophageal reflux disease    Left lower quadrant pain    Anticoagulated-CAD, DM2, CVA/ASA 81+()+plavix » Anticoagulated-CAD, DM2, CVA/ASA 81+()+plavix+eliquist    SOB: copd,CAD,#, hypoxemia    Hypoxia#to pulmonary    Corn or callus    Anemia: ASA81,plavix,eliquis,gi(3/3+,div,cp,eitis    Atherosclerosis: CAD, AAA vascular    AAA: 2022-(ro) steffen    Heme positive stool    Stage 2 moderate COPD by GOLD classification    Abnormal stress test    Tingling of both feet-to consider NCV    Cryptogenic stroke    Chronic diastolic congestive heart failure    Gross hematuria    BPH with obstruction/lower urinary tract symptoms    Chest pain  "   Obstructive sleep apnea-(cpap)    Abnormal CT of the chest 1./done    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    Oxygen dependent    S/P CABG x 3    Pulmonary hypertension    PAF (paroxysmal atrial fibrillation)    Umbilical hernia-a waqar    History of COVID-19    Cytokine release syndrome, grade 1    Hypokalemia    Overweight    Post-COVID syndrome    Chronic respiratory failure with hypoxia    Epistaxis    Personal history of nicotine dependence    BRBPR (bright red blood per rectum)    Acute diarrhea    Chronic anticoagulation    Asymptomatic colonization Clostridioides difficile    Polypharmacy    History of exposure to asbestos    Pulmonary emphysema    Familial tremor     Advance Care Planning Advance Directive is on file.  ACP discussion was held with the patient during this visit. Patient has an advance directive in EMR which is still valid.             Objective   Vitals:    24 0818   BP: 104/60   Pulse: 77   SpO2: 91%  Comment: O2 4L   Weight: 92.1 kg (203 lb)   Height: 170.2 cm (67\")   PainSc: 0-No pain       Estimated body mass index is 31.79 kg/m² as calculated from the following:    Height as of this encounter: 170.2 cm (67\").    Weight as of this encounter: 92.1 kg (203 lb).              Gait and Balance Evaluation:  Normal    Does the patient have evidence of cognitive impairment? No                                                                                                Health  Risk Assessment    Smoking Status:  Social History     Tobacco Use   Smoking Status Former    Current packs/day: 0.00    Average packs/day: 1 pack/day for 26.0 years (26.0 ttl pk-yrs)    Types: Cigarettes    Start date:     Quit date: 1980    Years since quittin.0    Passive exposure: Past   Smokeless Tobacco Never     Alcohol Consumption:  Social History     Substance and Sexual Activity   Alcohol Use No       Fall Risk Screen  STEADI Fall Risk Assessment was completed, and patient is at LOW " risk for falls.Assessment completed on:2024    Depression Screening   Little interest or pleasure in doing things? Not at all   Feeling down, depressed, or hopeless? Not at all   PHQ-2 Total Score 0      Health Habits and Functional and Cognitive Screenin/31/2024     8:14 AM   Functional & Cognitive Status   Do you have difficulty preparing food and eating? No   Do you have difficulty bathing yourself, getting dressed or grooming yourself? Yes   Do you have difficulty using the toilet? No   Do you have difficulty moving around from place to place? No   Do you have trouble with steps or getting out of a bed or a chair? No   Current Diet Unhealthy Diet   Dental Exam Up to date   Eye Exam Up to date   Exercise (times per week) 7 times per week   Current Exercises Include Cardiovascular Workout   Do you need help using the phone?  No   Are you deaf or do you have serious difficulty hearing?  Yes   Do you need help to go to places out of walking distance? No   Do you need help shopping? No   Do you need help preparing meals?  Yes   Do you need help with housework?  Yes   Do you need help with laundry? Yes   Do you need help taking your medications? Yes   Do you need help managing money? No   Do you ever drive or ride in a car without wearing a seat belt? No   Have you felt unusual stress, anger or loneliness in the last month? Yes   Who do you live with? Alone   If you need help, do you have trouble finding someone available to you? No   Have you been bothered in the last four weeks by sexual problems? No   Do you have difficulty concentrating, remembering or making decisions? Yes           Age-appropriate Screening Schedule:  Refer to the list below for future screening recommendations based on patient's age, sex and/or medical conditions. Orders for these recommended tests are listed in the plan section. The patient has been provided with a written plan.    Health Maintenance List  Health Maintenance    Topic Date Due    TDAP/TD VACCINES (1 - Tdap) Never done    RSV Vaccine - Adults (1 - 1-dose 75+ series) Never done    URINE MICROALBUMIN  05/15/2020    DIABETIC EYE EXAM  03/12/2021    ZOSTER VACCINE (2 of 2) 08/04/2023    ANNUAL WELLNESS VISIT  07/27/2024    COVID-19 Vaccine (3 - 2024-25 season) 09/01/2024    HEMOGLOBIN A1C  03/03/2025    LIPID PANEL  05/07/2025    BMI FOLLOWUP  07/29/2025    INFLUENZA VACCINE  Completed    Pneumococcal Vaccine 65+  Discontinued                                                                                                                                                CMS Preventative Services Quick Reference  Risk Factors Identified During Encounter  Chronic Pain: Natural history and expected course discussed. Questions answered.  Fall Risk-High or Moderate: Discussed Fall Prevention in the home  Hearing Problem:  Has hearing aids    The above risks/problems have been discussed with the patient.  Pertinent information has been shared with the patient in the After Visit Summary.  An After Visit Summary and PPPS were made available to the patient.    Follow Up:   Next Medicare Wellness visit to be scheduled in 1 year.         Additional E&M Note during same encounter follows:  Patient has additional, significant, and separately identifiable condition(s)/problem(s) that require work above and beyond the Medicare Wellness Visit     Chief Complaint  Chief Complaint   Patient presents with    Medicare Wellness-subsequent        Subjective      History of Present Illness  The patient is a 89-year-old male who is here today for a subsequent Medicare wellness visit.    He has been self-administering a natural remedy containing eucalyptus, peppermint, licorice root, chamomile, honey, vitamin C, and vitamin E for the past 6 months. This remedy is used 3 times daily and involves spraying it into his mouth and inhaling it into his lungs. He reports that this treatment has improved his  "respiratory function by 20 percent over a 6-month period, as evidenced by his pulmonary function tests. He continues to use his prescribed inhalers and has an upcoming appointment with his pulmonologist.    He experiences tremors, which have been a long-standing issue but appear to be worsening. These tremors interfere with his ability to sign his name. He also reports muscle weakness upon standing, despite engaging in daily exercises such as cycling and aerobics. He has a family history of tremors, with both parents affected. He has noticed a recent tendency towards micrographia, a change that occurred following his recovery from COVID-19.    His A1c level is 6.9, and he has an upcoming appointment with his endocrinologist. His morning blood glucose levels have been around 170, slightly higher than his usual range.    He has been experiencing back pain, which is exacerbated by certain postures and alleviated by straightening up. He also reports leg weakness during knee bends. His back pain has been present since his last physical therapy session a week ago. He occasionally experiences difficulty initiating movement after standing still for a few seconds, but this resolves spontaneously. He has been receiving chiropractic care and massages for his back pain, which have provided some relief.    Supplemental Information  He has macular degeneration in one eye, which affects his vision and depth perception. He has hearing aids and recently received new ones from the [facility]. He has been experiencing hand cramps, particularly while driving. He has found relief from these cramps by consuming tonic water.    SOCIAL HISTORY  He does not smoke or drink alcohol.    FAMILY HISTORY  His mother had tremors.    MEDICATIONS  Current: metoprolol          Objective   Vital Signs:  /60   Pulse 77   Ht 170.2 cm (67\")   Wt 92.1 kg (203 lb)   SpO2 91% Comment: O2 4L  BMI 31.79 kg/m²     The following " labs/imaging/notes were reviewed and discussed with the patient by Rober Chi MD on 12/31/2024:   Latest Reference Range & Units 08/07/24 09:10 09/03/24 08:14   Sodium 136 - 145 mmol/L 140 141   Potassium 3.5 - 5.2 mmol/L 4.8 4.9   Chloride 98 - 107 mmol/L 102 101   CO2 22.0 - 29.0 mmol/L 28.2 31.4 (H)   BUN 8 - 23 mg/dL 18 17   Creatinine 0.76 - 1.27 mg/dL 1.21 1.19   BUN/Creatinine Ratio 7.0 - 25.0  14.9 14.3   EGFR Result >60.0 mL/min/1.73 56.9 (L) 58.0 (L)   Glucose 65 - 99 mg/dL 132 (H) 137 (H)   Calcium 8.2 - 9.6 mg/dL 9.4 9.9 (H)   Alkaline Phosphatase 39 - 117 U/L 57    Total Protein 6.0 - 8.5 g/dL 6.4    Albumin 3.5 - 5.2 g/dL 4.3    A/G Ratio g/dL 2.0    AST (SGOT) 1 - 40 U/L 16    ALT (SGPT) 1 - 41 U/L 14    Total Bilirubin 0.0 - 1.2 mg/dL <0.2    Hemoglobin A1C 4.80 - 5.60 %  7.20 (H)   TSH Baseline 0.270 - 4.200 uIU/mL 1.700    Iron 59 - 158 mcg/dL 87 65   Ferritin 30.00 - 400.00 ng/mL 154.00    Iron Saturation 20 - 50 % 24 18 (L)   TIBC mcg/dL 356 358   UIBC 112 - 346 mcg/dL 269 293   Vitamin B-12 211 - 946 pg/mL  508   Folate 4.78 - 24.20 ng/mL  19.00   Globulin gm/dL 2.1    WBC 3.40 - 10.80 10*3/mm3 8.23 7.90   RBC 4.14 - 5.80 10*6/mm3 4.13 (L) 4.34   Hemoglobin 13.0 - 17.7 g/dL 12.1 (L) 12.5 (L)   Hematocrit 37.5 - 51.0 % 38.0 38.7   Platelets 140 - 450 10*3/mm3 234 251   RDW 12.3 - 15.4 % 12.9 13.0   MCV 79.0 - 97.0 fL 92.0 89.2   MCH 26.6 - 33.0 pg 29.3 28.8   MCHC 31.5 - 35.7 g/dL 31.8 32.3   Neutrophil Rel % 42.7 - 76.0 % 62.2 65.2   Lymphocyte Rel % 19.6 - 45.3 % 23.6 20.4   Monocyte Rel % 5.0 - 12.0 % 7.7 7.3   Eosinophil Rel % 0.3 - 6.2 % 5.6 5.9   Basophil Rel % 0.0 - 1.5 % 0.7 0.8   Immature Granulocyte Rel % 0.0 - 0.5 % 0.2 0.4   Reticulocyte Absolute 0.70 - 1.90 % 0.84    Neutrophils Absolute 1.70 - 7.00 10*3/mm3 5.12 5.15   Lymphocytes Absolute 0.70 - 3.10 10*3/mm3 1.94 1.61   Monocytes Absolute 0.10 - 0.90 10*3/mm3 0.63 0.58   Eosinophils Absolute 0.00 - 0.40 10*3/mm3 0.46 (H) 0.47  (H)   Basophils Absolute 0.00 - 0.20 10*3/mm3 0.06 0.06   Immature Grans, Absolute 0.00 - 0.05 10*3/mm3 0.02 0.03   nRBC 0.0 - 0.2 /100 WBC 0.0 0.0   (H): Data is abnormally high  (L): Data is abnormally low  Physical Exam    Physical Exam          Assessment      Diagnoses and all orders for this visit:    1. Stage 2 moderate COPD by GOLD classification (Primary)    2. Chronic respiratory failure with hypoxia    3. Familial tremor    4. Controlled type 2 diabetes mellitus without complication, without long-term current use of insulin    Other orders  -     primidone (MYSOLINE) 50 MG tablet; Take 1 tablet by mouth Every Night.  Dispense: 90 tablet; Refill: 1             Assessment & Plan  1. Chronic Obstructive Pulmonary Disease (COPD).  His COPD may be contributing to his shortness of breath and overall health status. He is advised to continue using oxygen and inhalers. The use of essential oils is permitted as long as it does not interfere with his current medication regimen.    2. Essential Tremors.  His tremors are not indicative of Parkinson's disease. A trial of primidone will be initiated at a low dose of 25 mg at night, with the potential to increase the dosage if necessary. If the primidone does not adequately control the tremors, the dosage can be increased.    3. Type 2 Diabetes Mellitus.  His A1c level has decreased from 7.2 to 6.9, which is within the desired range for his age group. His glucose levels were slightly elevated during his last lab work in September 2023. He will maintain his current medication regimen.    4. Back Pain.  His back pain is likely due to age-related changes and a sedentary lifestyle. He will continue with his physical therapy sessions once a week and perform the recommended exercises at home. If he experiences any leg weakness, bowel or bladder leakage, he will inform us immediately for further evaluation and potential imaging studies.    Follow-up  The patient will follow up  in 2 months.    No orders of the defined types were placed in this encounter.              Follow Up   Return in about 3 months (around 3/31/2025) for Familial tremer with new meds. .  Patient was given instructions and counseling regarding his condition or for health maintenance advice. Please see specific information pulled into the AVS if appropriate.    a

## 2025-01-31 DIAGNOSIS — I71.40 ABDOMINAL AORTIC ANEURYSM (AAA) 3.0 CM TO 5.5 CM IN DIAMETER IN MALE: Primary | ICD-10-CM

## 2025-02-03 ENCOUNTER — TELEPHONE (OUTPATIENT)
Dept: VASCULAR SURGERY | Facility: CLINIC | Age: OVER 89
End: 2025-02-03
Payer: MEDICARE

## 2025-02-03 NOTE — TELEPHONE ENCOUNTER
SPOKE WITH POA AS A REMINDER OF PT APT 0800 ARRIVAL FOR 0830 TESTING AT [facility]. MELODY 6 HRS PRIOR. THEN TO SEE DR JHAVERI AT 0915.

## 2025-02-04 ENCOUNTER — OFFICE VISIT (OUTPATIENT)
Dept: VASCULAR SURGERY | Facility: CLINIC | Age: OVER 89
End: 2025-02-04
Payer: MEDICARE

## 2025-02-04 ENCOUNTER — HOSPITAL ENCOUNTER (OUTPATIENT)
Dept: CT IMAGING | Facility: HOSPITAL | Age: OVER 89
Discharge: HOME | End: 2025-02-04
Admitting: NURSE PRACTITIONER
Payer: MEDICARE

## 2025-02-04 VITALS
SYSTOLIC BLOOD PRESSURE: 124 MMHG | OXYGEN SATURATION: 92 % | HEART RATE: 64 BPM | BODY MASS INDEX: 28.28 KG/M2 | HEIGHT: 71 IN | WEIGHT: 202 LBS | DIASTOLIC BLOOD PRESSURE: 72 MMHG

## 2025-02-04 DIAGNOSIS — E66.3 OVERWEIGHT (BMI 25.0-29.9): ICD-10-CM

## 2025-02-04 DIAGNOSIS — I10 PRIMARY HYPERTENSION: ICD-10-CM

## 2025-02-04 DIAGNOSIS — E78.5 HYPERLIPIDEMIA LDL GOAL <70: ICD-10-CM

## 2025-02-04 DIAGNOSIS — I71.40 ABDOMINAL AORTIC ANEURYSM (AAA) 3.0 CM TO 5.5 CM IN DIAMETER IN MALE: Primary | ICD-10-CM

## 2025-02-04 DIAGNOSIS — I71.40 ABDOMINAL AORTIC ANEURYSM (AAA) 3.0 CM TO 5.5 CM IN DIAMETER IN MALE: ICD-10-CM

## 2025-02-04 LAB — CREAT BLDA-MCNC: 1.3 MG/DL (ref 0.6–1.3)

## 2025-02-04 PROCEDURE — 1160F RVW MEDS BY RX/DR IN RCRD: CPT | Performed by: SURGERY

## 2025-02-04 PROCEDURE — 1159F MED LIST DOCD IN RCRD: CPT | Performed by: SURGERY

## 2025-02-04 PROCEDURE — 25510000001 IOPAMIDOL PER 1 ML: Performed by: NURSE PRACTITIONER

## 2025-02-04 PROCEDURE — 82565 ASSAY OF CREATININE: CPT

## 2025-02-04 PROCEDURE — 99214 OFFICE O/P EST MOD 30 MIN: CPT | Performed by: SURGERY

## 2025-02-04 PROCEDURE — 74174 CTA ABD&PLVS W/CONTRAST: CPT

## 2025-02-04 RX ORDER — IOPAMIDOL 755 MG/ML
100 INJECTION, SOLUTION INTRAVASCULAR
Status: COMPLETED | OUTPATIENT
Start: 2025-02-04 | End: 2025-02-04

## 2025-02-04 RX ADMIN — IOPAMIDOL 100 ML: 755 INJECTION, SOLUTION INTRAVENOUS at 08:45

## 2025-02-04 NOTE — PROGRESS NOTES
"2/4/2025       Rober Chi MD  [address] [facility] [address]    Gonzalo Durham  1934    Chief Complaint   Patient presents with    Follow-up     1 year follow up w/ testing. Last seen 1/9/24. Patient denies any new/worsening back/abdominal pains. Concerned about hernia that's causing pain and wants to ask for [facility]'s opinions. Patient does not have med list, but states no changes since 12/31/24 appointment.        Dear Rober Chi MD     I had the pleasure of seeing your patient Gonzalo Durham in the office today.    As you recall, Gonzalo Durham is a 89 y.o.  male who we are following for an abdominal aortic aneurysm.  He was previously a smoker but quit 30 years ago.  He is on 4L of O2 via NC.  He is maintained on aspirin, Eliquis, and Pravachol.  He denies any abdominal pain.  He did have noninvasive testing performed, which I did review in office.    Review of Systems   Constitutional: Negative.    HENT: Negative.     Eyes: Negative.    Respiratory:  Positive for shortness of breath.    Cardiovascular: Negative.    Gastrointestinal: Negative.         Umbilical hernia   Endocrine: Negative.    Genitourinary: Negative.    Musculoskeletal: Negative.    Skin: Negative.    Allergic/Immunologic: Negative.    Neurological:  Positive for numbness (bilateral feet).   Hematological: Negative.    Psychiatric/Behavioral: Negative.     All other systems reviewed and are negative.      /72   Pulse 64   Ht 180.3 cm (71\")   Wt 91.6 kg (202 lb)   SpO2 92%   BMI 28.17 kg/m²       Physical Exam  Vitals and nursing note reviewed.   Constitutional:       Appearance: He is well-developed and overweight.   HENT:      Head: Normocephalic and atraumatic.   Eyes:      General: No scleral icterus.     Pupils: Pupils are equal, round, and reactive to light.   Neck:      Thyroid: No thyromegaly.      Vascular: No carotid bruit or JVD.   Cardiovascular:      Rate and Rhythm: Normal rate and regular rhythm.      Heart " sounds: Normal heart sounds.   Pulmonary:      Effort: Pulmonary effort is normal.      Breath sounds: Normal breath sounds.      Comments: 4L O2 via NC  Abdominal:      General: Bowel sounds are normal. There is no distension or abdominal bruit.      Palpations: Abdomen is soft. There is no mass.      Tenderness: There is no abdominal tenderness.   Musculoskeletal:         General: Normal range of motion.      Cervical back: Neck supple.   Lymphadenopathy:      Cervical: No cervical adenopathy.   Skin:     General: Skin is warm and dry.   Neurological:      Mental Status: He is alert and oriented to person, place, and time.      Cranial Nerves: No cranial nerve deficit.      Sensory: No sensory deficit.   Psychiatric:         Mood and Affect: Mood normal.         Behavior: Behavior normal.         Thought Content: Thought content normal.         Judgment: Judgment normal.          Diagnostic Data:  CT Angiogram Abdomen Pelvis    Result Date: 2/4/2025  Narrative: EXAM/TECHNIQUE: CT angiography with 3D MIP images abdomen and pelvis with IV contrast  INDICATION: AAA; I71.40-Abdominal aortic aneurysm, without rupture, unspecified  COMPARISON: 1/9/2024  DLP: 545.34 mGy.cm. Automated exposure control was utilized to decrease patient radiation dose.  FINDINGS:  LOWER CHEST: Stable 6 mm right lower lobe pulmonary nodule image 20. Mild bibasilar atelectasis.  LIVER AND BILIARY: No arterial enhancing liver lesion. No gallstones or biliary ductal dilatation.  PANCREAS: Unremarkable.  SPLEEN: Unremarkable.  ADRENAL: Unremarkable.  KIDNEYS/BLADDER: No solid renal mass. No urolithiasis or hydronephrosis. No focal urinary bladder wall thickening.  BOWEL: Heavy colonic diverticulosis without evidence of diverticulitis. No small bowel distention or inflammatory change.  PERITONEUM: No ascites.  PELVIC ORGANS: Prostate is enlarged measuring 6.1 cm transverse dimension.  VASCULATURE: 4.6 x 4.7 cm infrarenal abdominal aortic aneurysm.  Heavy atherosclerotic calcification and plaque throughout the abdominal aorta and arterial vasculature of the abdomen and pelvis.  LYMPH NODES: No enlarged lymph nodes.  SOFT TISSUES: Small periumbilical fat-containing hernia.  BONES: No acute findings.      Impression:  1.  No significant change in 4.6 x 4.7 cm infrarenal abdominal aortic aneurysm.  2.  Enlarged prostate.  3.  Heavy colonic diverticulosis without evidence of diverticulitis.  4.  No change in 6 mm pulmonary nodule in the right lung base.     This report was signed and finalized on 2/4/2025 9:49 AM by Dr. Reg Dhillon MD.           Patient Active Problem List   Diagnosis    Obesity    Controlled type 2 diabetes mellitus without complication, without long-term current use of insulin    Stage 3a chronic kidney disease-ro    Erectile dysfunction    Coronary artery disease involving native coronary artery of native heart without angina pectoris    Hyperlipidemia LDL goal <70-statin    Hypertension    Prostatism-(young) urology    Varicose vein of leg    Plantar fasciitis    Nocturnal leg movements    Bad dreams    Depression    Peripheral neuropathy- clinical    Hx of colonic polyps    Gastroesophageal reflux disease    Left lower quadrant pain    Anticoagulated-CAD, DM2, CVA/ASA 81+()+plavix » Anticoagulated-CAD, DM2, CVA/ASA 81+()+plavix+eliquist    SOB: copd,CAD,#, hypoxemia    Hypoxia#to pulmonary    Corn or callus    Anemia: ASA81,plavix,eliquis,gi(3/3+,div,cp,eitis    Atherosclerosis: CAD, AAA vascular    AAA: 2022-(ro) steffen    Heme positive stool    Stage 2 moderate COPD by GOLD classification    Abnormal stress test    Tingling of both feet-to consider NCV    Cryptogenic stroke    Chronic diastolic congestive heart failure    Gross hematuria    BPH with obstruction/lower urinary tract symptoms    Chest pain    Obstructive sleep apnea-(cpap)    Abnormal CT of the chest 1.2022/done    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     Oxygen dependent    S/P CABG x 3    Pulmonary hypertension    PAF (paroxysmal atrial fibrillation)    Umbilical hernia-a [place]    History of COVID-19    Cytokine release syndrome, grade 1    Hypokalemia    Overweight    Post-COVID syndrome    Chronic respiratory failure with hypoxia    Epistaxis    Personal history of nicotine dependence    BRBPR (bright red blood per rectum)    Acute diarrhea    Chronic anticoagulation    Asymptomatic colonization Clostridioides difficile    Polypharmacy    History of exposure to asbestos    Pulmonary emphysema    Familial tremor         ICD-10-CM ICD-9-CM   1. Abdominal aortic aneurysm (AAA) 3.0 cm to 5.5 cm in diameter in male  I71.40 441.4   2. Primary hypertension  I10 401.9   3. Hyperlipidemia LDL goal <70  E78.5 272.4   4. Overweight (BMI 25.0-29.9)  E66.3 278.02             Plan: After thoroughly evaluating Gonzalo Durham, I believe the best course of action is to remain conservastive from vascular surgery standpoint.  I did review his testing and his aaa remains unchanged.  He has nto restrictions from our standpoint.  He just should not be lifting really heavy items.  We will see him back in 1 year with repeat noninvasive testing to include CTA a/p.  I did discuss vascular risk factors as they pertain to the progression of vascular disease including controlling his hypertension and hyperlipidemia.  His blood pressure is stable today in office.  His last lipid panel shows elevated triglycerides, total cholesterol and LDL and a decreased HDL.  He should continue his aspirin 81 mg daily, Eliquis 5 mg twice daily and Pravachol 80 mg daily in addition to his other medications.  Body mass index is 28.17 kg/m².       This was all discussed in full with complete understanding.    Thank you for allowing me to participate in the care of your patient.  Please do not hesitate with any questions or concerns.  I will keep you aware of any further encounters with Gonzalo  Herminio.        Sincerely yours,         Nasir Gutierrez, DO

## 2025-02-11 ENCOUNTER — TELEPHONE (OUTPATIENT)
Dept: NEUROLOGY | Facility: CLINIC | Age: OVER 89
End: 2025-02-11
Payer: MEDICARE

## 2025-02-11 NOTE — TELEPHONE ENCOUNTER
Provider: NO KNOWN PROVIDER    Caller: Suyapa Osborn (DOUGLASA)    Relationship to Patient: Emergency Contact    Phone Number: [phone]    Reason for Call: PT HAD PREVIOUSLY SEEN BARBARA SANTOS.  PT'S DAUGHTER IS CALLING BECAUSE SHE FEELS HE HAS DECLINED.  FOR THE LAST MONTH AND A HALF HE HAS HAD SOME GENERAL WEAKNESS.  THIS PAST SUNDAY NIGHT, 2/9/25, PT CALLED HER AND HIS SPEECH WAS SLURRED AND HE WAS HAVING TROUBLE FINDING WORDS AND SAID HE DIDN'T FEEL WELL.  HE JUST WANTED TO LAY DOWN AND GO TO SLEEP AND HE SLEPT THROUGH THE NIGHT PRETTY WELL.  SHE SPENT THE NIGHT WITH HIM.  PT IS STILL HAVING TROUBLE WITH WORDING FINDING, DOESN'T FEEL WELL AND HIS SPEECH IS STILL A LITTLE SLURRED.      I ADVISED TO TAKE PT TO NEAREST ED. SHE STATES SHE WILL GO TO HIS HOUSE TO SEE IF HE WILL GO.      WILL W/T TO OFFICE.  PLEASE REVIEW AND ADVISE     THANK YOU

## 2025-03-18 ENCOUNTER — PATIENT MESSAGE (OUTPATIENT)
Dept: FAMILY MEDICINE CLINIC | Facility: CLINIC | Age: OVER 89
End: 2025-03-18
Payer: MEDICARE

## 2025-03-18 RX ORDER — METOPROLOL TARTRATE 25 MG/1
TABLET, FILM COATED ORAL
Qty: 90 TABLET | Refills: 1 | Status: SHIPPED | OUTPATIENT
Start: 2025-03-18

## 2025-04-02 ENCOUNTER — OFFICE VISIT (OUTPATIENT)
Dept: FAMILY MEDICINE CLINIC | Facility: CLINIC | Age: OVER 89
End: 2025-04-02
Payer: MEDICARE

## 2025-04-02 VITALS
OXYGEN SATURATION: 94 % | DIASTOLIC BLOOD PRESSURE: 64 MMHG | BODY MASS INDEX: 28.14 KG/M2 | SYSTOLIC BLOOD PRESSURE: 120 MMHG | HEART RATE: 57 BPM | HEIGHT: 71 IN | WEIGHT: 201 LBS

## 2025-04-02 DIAGNOSIS — J96.11 CHRONIC RESPIRATORY FAILURE WITH HYPOXIA: ICD-10-CM

## 2025-04-02 DIAGNOSIS — E11.9 CONTROLLED TYPE 2 DIABETES MELLITUS WITHOUT COMPLICATION, WITHOUT LONG-TERM CURRENT USE OF INSULIN: ICD-10-CM

## 2025-04-02 DIAGNOSIS — K42.9 UMBILICAL HERNIA WITHOUT OBSTRUCTION AND WITHOUT GANGRENE: ICD-10-CM

## 2025-04-02 DIAGNOSIS — G25.0 FAMILIAL TREMOR: Primary | ICD-10-CM

## 2025-04-02 DIAGNOSIS — E78.5 HYPERLIPIDEMIA LDL GOAL <70: Chronic | ICD-10-CM

## 2025-04-02 LAB
EXPIRATION DATE: ABNORMAL
HBA1C MFR BLD: 6.6 % (ref 4.5–5.7)
Lab: ABNORMAL

## 2025-04-02 RX ORDER — IPRATROPIUM BROMIDE 21 UG/1
1 SPRAY, METERED NASAL 2 TIMES DAILY
COMMUNITY
Start: 2025-02-03

## 2025-04-02 NOTE — PROGRESS NOTES
Chief Complaint  Familial Tremer (Pt states medication did not work, states he thinks it made it worse.)    Subjective      Gonzalo Durham presents to [facility]  History of Present Illness  The patient is a 89-year-old male who is here today for follow-up on familial tremor. He was last seen on 12/31/2024, where he was started on primidone at a low dose of 25 mg at night to see if that could potentially help.    He reports an overall decline in his health status. He experienced an episode of weakness, initially suspected to be a stroke, but subsequent emergency room evaluation at [facility] on 02/11/2025 revealed no signs of edema or stroke. He has not undergone any recent blood work. He continues to experience intermittent weakness, particularly in his legs and feet, and occasional dizziness upon standing. Despite discontinuing primidone, he still experiences difficulty with ambulation, necessitating the use of a walker or cane. He also reports generalized body pain, including his back, shoulders, arms, and feet. His sleep is disrupted, with only 2 to 3 hours of rest before waking up and struggling to return to sleep. He has not consulted a neurologist for his tremors, which have progressed to the point where he is unable to write his name or read his checkbooks. He also reports constant coldness and shaking. He is currently on metoprolol.    He has an umbilical hernia that is protruding about 2 inches from his belly button and appears to be enlarging. He does not report any constipation or bowel issues. He uses a binder for support.    He monitors his vitals daily, which remain within normal limits. His blood pressure typically measures around 115/65.    He has been diagnosed with macular degeneration and received injections in his right eye for approximately 1.5 years, which have improved his condition.    He is under the care of a pulmonologist, whom he consults every  "6 months, with the next appointment scheduled for May 2025.    MEDICATIONS  Current: Metoprolol.  Discontinued: Primidone.      Objective   Vital Signs:  /64   Pulse 57   Ht 180.3 cm (71\")   Wt 91.2 kg (201 lb)   SpO2 94%   BMI 28.03 kg/m²   Estimated body mass index is 28.03 kg/m² as calculated from the following:    Height as of this encounter: 180.3 cm (71\").    Weight as of this encounter: 91.2 kg (201 lb).            Physical Exam     Physical Exam  Umbilical hernia noted.      Result Review :  The following labs/imaging/notes were reviewed and discussed with the patient by Rober Chi MD on 04/02/2025:     Latest Reference Range & Units 08/07/24 09:10 09/03/24 08:14 12/31/24 13:33   Sodium 136 - 145 mmol/L 140 141    Potassium 3.5 - 5.2 mmol/L 4.8 4.9    Chloride 98 - 107 mmol/L 102 101    CO2 22.0 - 29.0 mmol/L 28.2 31.4 (H)    BUN 8 - 23 mg/dL 18 17    Creatinine 0.76 - 1.27 mg/dL 1.21 1.19    BUN/Creatinine Ratio 7.0 - 25.0  14.9 14.3    EGFR Result >60.0 mL/min/1.73 56.9 (L) 58.0 (L)    Glucose 65 - 99 mg/dL 132 (H) 137 (H)    Calcium 8.2 - 9.6 mg/dL 9.4 9.9 (H)    Alkaline Phosphatase 39 - 117 U/L 57     Total Protein 6.0 - 8.5 g/dL 6.4     Albumin 3.5 - 5.2 g/dL 4.3     A/G Ratio g/dL 2.0     AST (SGOT) 1 - 40 U/L 16     ALT (SGPT) 1 - 41 U/L 14     Total Bilirubin 0.0 - 1.2 mg/dL <0.2     Hemoglobin A1C 4.5 - 5.7 %  7.20 (H) 6.9 !   TSH Baseline 0.270 - 4.200 uIU/mL 1.700     Iron 59 - 158 mcg/dL 87 65    Ferritin 30.00 - 400.00 ng/mL 154.00     Iron Saturation 20 - 50 % 24 18 (L)    TIBC mcg/dL 356 358    UIBC 112 - 346 mcg/dL 269 293    Vitamin B-12 211 - 946 pg/mL  508    (H): Data is abnormally high  (L): Data is abnormally low  !: Data is abnormal       Latest Reference Range & Units 08/07/24 09:10 09/03/24 08:14   WBC 3.40 - 10.80 10*3/mm3 8.23 7.90   RBC 4.14 - 5.80 10*6/mm3 4.13 (L) 4.34   Hemoglobin 13.0 - 17.7 g/dL 12.1 (L) 12.5 (L)   Hematocrit 37.5 - 51.0 % 38.0 38.7   Platelets " 140 - 450 10*3/mm3 234 251   RDW 12.3 - 15.4 % 12.9 13.0   MCV 79.0 - 97.0 fL 92.0 89.2   MCH 26.6 - 33.0 pg 29.3 28.8   MCHC 31.5 - 35.7 g/dL 31.8 32.3   Neutrophil Rel % 42.7 - 76.0 % 62.2 65.2   Lymphocyte Rel % 19.6 - 45.3 % 23.6 20.4   Monocyte Rel % 5.0 - 12.0 % 7.7 7.3   Eosinophil Rel % 0.3 - 6.2 % 5.6 5.9   Basophil Rel % 0.0 - 1.5 % 0.7 0.8   Immature Granulocyte Rel % 0.0 - 0.5 % 0.2 0.4   Reticulocyte Absolute 0.70 - 1.90 % 0.84    Neutrophils Absolute 1.70 - 7.00 10*3/mm3 5.12 5.15   Lymphocytes Absolute 0.70 - 3.10 10*3/mm3 1.94 1.61   Monocytes Absolute 0.10 - 0.90 10*3/mm3 0.63 0.58   Eosinophils Absolute 0.00 - 0.40 10*3/mm3 0.46 (H) 0.47 (H)   Basophils Absolute 0.00 - 0.20 10*3/mm3 0.06 0.06   Immature Grans, Absolute 0.00 - 0.05 10*3/mm3 0.02 0.03   nRBC 0.0 - 0.2 /100 WBC 0.0 0.0   (L): Data is abnormally low  (H): Data is abnormally high    Assessment      Diagnoses and all orders for this visit:    1. Familial tremor (Primary)  -     Ambulatory Referral to Neurology  -     CBC & Differential  -     Comprehensive Metabolic Panel  -     Microalbumin / Creatinine Urine Ratio - Urine, Clean Catch  -     Lipid Panel  -     Vitamin B12 & Folate  -     Magnesium    2. Umbilical hernia without obstruction and without gangrene  -     CBC & Differential  -     Comprehensive Metabolic Panel  -     Microalbumin / Creatinine Urine Ratio - Urine, Clean Catch  -     Lipid Panel  -     Vitamin B12 & Folate  -     Magnesium    3. Controlled type 2 diabetes mellitus without complication, without long-term current use of insulin  -     CBC & Differential  -     Comprehensive Metabolic Panel  -     Microalbumin / Creatinine Urine Ratio - Urine, Clean Catch  -     Lipid Panel  -     Vitamin B12 & Folate  -     Magnesium  -     POC Glycosylated Hemoglobin (Hb A1C)    4. Hyperlipidemia LDL goal <70-statin  -     CBC & Differential  -     Comprehensive Metabolic Panel  -     Microalbumin / Creatinine Urine Ratio -  Urine, Clean Catch  -     Lipid Panel  -     Vitamin B12 & Folate  -     Magnesium    5. Chronic respiratory failure with hypoxia  -     CBC & Differential  -     Comprehensive Metabolic Panel  -     Microalbumin / Creatinine Urine Ratio - Urine, Clean Catch  -     Lipid Panel  -     Vitamin B12 & Folate  -     Magnesium             Assessment & Plan  1. Familial tremor.  The patient's condition has shown improvement since the discontinuation of primidone. A referral to neurology will be made for further evaluation and management of the tremor.    2. Umbilical hernia.  Given the patient's need for oxygen and other associated risks, he is not deemed a suitable candidate for surgical intervention unless absolutely necessary. He is advised to utilize a belt or binder to maintain pressure on the hernia. Should he experience pain, constipation, or any other symptoms beyond the current state, he is instructed to inform us immediately or seek hospital care.    3. Diabetes mellitus.  An A1c test will be conducted today to monitor the diabetes. The current diabetes management plan will be maintained.    4. Macular degeneration.  He is advised to consult an ophthalmologist at least once annually.    5. Chronic obstructive pulmonary disease (COPD).  The patient's COPD may be contributing to his overall weakness and difficulty walking. He will continue to follow up with his lung doctor, with the next appointment expected in May.    6. Hypertension.  He will continue his current medication regimen.    Follow-up  The patient will follow up in 6 months.    Orders Placed This Encounter   Procedures    Comprehensive Metabolic Panel     Release to patient:   Routine Release [[id number]]    Microalbumin / Creatinine Urine Ratio - Urine, Clean Catch     Release to patient:   Routine Release [[id number]]    Lipid Panel     Release to patient:   Routine Release [[id number]]    Vitamin B12 & Folate     Release to patient:   Routine  Release [[id number]]    Magnesium     Release to patient:   Routine Release [[id number]]    Ambulatory Referral to Neurology     Referral Priority:   Routine     Referral Type:   Consultation     Referral Reason:   Specialty Services Required     Requested Specialty:   Neurology     Number of Visits Requested:   1    POC Glycosylated Hemoglobin (Hb A1C)     Release to patient:   Routine Release [[id number]]    CBC & Differential     Manual Differential:   No     Release to patient:   Routine Release [[id number]]       Follow Up   Return in about 6 months (around 10/2/2025) for familial tremer w/ neuro referal, DMII, Umbilical hernina, hypoxia w/ o2, HLD, HTN, CAD.  Patient was given instructions and counseling regarding his condition or for health maintenance advice. Please see specific information pulled into the AVS if appropriate.         Patient or patient representative verbalized consent for the use of Ambient Listening during the visit with  Rober Chi MD for chart documentation. 4/6/2025  14:05 CDT

## 2025-04-03 LAB
ALBUMIN SERPL-MCNC: 4.6 G/DL (ref 3.5–5.2)
ALBUMIN/CREAT UR: <6 MG/G CREAT (ref 0–29)
ALBUMIN/GLOB SERPL: 1.8 G/DL
ALP SERPL-CCNC: 68 U/L (ref 39–117)
ALT SERPL-CCNC: 15 U/L (ref 1–41)
AST SERPL-CCNC: 19 U/L (ref 1–40)
BASOPHILS # BLD AUTO: 0.05 10*3/MM3 (ref 0–0.2)
BASOPHILS NFR BLD AUTO: 0.5 % (ref 0–1.5)
BILIRUB SERPL-MCNC: 0.3 MG/DL (ref 0–1.2)
BUN SERPL-MCNC: 23 MG/DL (ref 8–23)
BUN/CREAT SERPL: 16.3 (ref 7–25)
CALCIUM SERPL-MCNC: 10.3 MG/DL (ref 8.2–9.6)
CHLORIDE SERPL-SCNC: 99 MMOL/L (ref 98–107)
CHOLEST SERPL-MCNC: 245 MG/DL (ref 0–200)
CO2 SERPL-SCNC: 29.9 MMOL/L (ref 22–29)
CREAT SERPL-MCNC: 1.41 MG/DL (ref 0.76–1.27)
CREAT UR-MCNC: 48.6 MG/DL
EGFRCR SERPLBLD CKD-EPI 2021: 47 ML/MIN/1.73
EOSINOPHIL # BLD AUTO: 0.49 10*3/MM3 (ref 0–0.4)
EOSINOPHIL NFR BLD AUTO: 5.3 % (ref 0.3–6.2)
ERYTHROCYTE [DISTWIDTH] IN BLOOD BY AUTOMATED COUNT: 13.6 % (ref 12.3–15.4)
FOLATE SERPL-MCNC: >20 NG/ML (ref 4.78–24.2)
GLOBULIN SER CALC-MCNC: 2.6 GM/DL
GLUCOSE SERPL-MCNC: 121 MG/DL (ref 65–99)
HCT VFR BLD AUTO: 43 % (ref 37.5–51)
HDLC SERPL-MCNC: 36 MG/DL (ref 40–60)
HGB BLD-MCNC: 13.3 G/DL (ref 13–17.7)
IMM GRANULOCYTES # BLD AUTO: 0.02 10*3/MM3 (ref 0–0.05)
IMM GRANULOCYTES NFR BLD AUTO: 0.2 % (ref 0–0.5)
LDLC SERPL CALC-MCNC: 166 MG/DL (ref 0–100)
LYMPHOCYTES # BLD AUTO: 2.46 10*3/MM3 (ref 0.7–3.1)
LYMPHOCYTES NFR BLD AUTO: 26.8 % (ref 19.6–45.3)
MAGNESIUM SERPL-MCNC: 2.3 MG/DL (ref 1.7–2.3)
MCH RBC QN AUTO: 28.7 PG (ref 26.6–33)
MCHC RBC AUTO-ENTMCNC: 30.9 G/DL (ref 31.5–35.7)
MCV RBC AUTO: 92.7 FL (ref 79–97)
MICROALBUMIN UR-MCNC: <3 UG/ML
MONOCYTES # BLD AUTO: 0.59 10*3/MM3 (ref 0.1–0.9)
MONOCYTES NFR BLD AUTO: 6.4 % (ref 5–12)
NEUTROPHILS # BLD AUTO: 5.57 10*3/MM3 (ref 1.7–7)
NEUTROPHILS NFR BLD AUTO: 60.8 % (ref 42.7–76)
NRBC BLD AUTO-RTO: 0 /100 WBC (ref 0–0.2)
PLATELET # BLD AUTO: 280 10*3/MM3 (ref 140–450)
POTASSIUM SERPL-SCNC: 4.8 MMOL/L (ref 3.5–5.2)
PROT SERPL-MCNC: 7.2 G/DL (ref 6–8.5)
RBC # BLD AUTO: 4.64 10*6/MM3 (ref 4.14–5.8)
SODIUM SERPL-SCNC: 141 MMOL/L (ref 136–145)
TRIGL SERPL-MCNC: 229 MG/DL (ref 0–150)
VIT B12 SERPL-MCNC: 573 PG/ML (ref 211–946)
VLDLC SERPL CALC-MCNC: 43 MG/DL (ref 5–40)
WBC # BLD AUTO: 9.18 10*3/MM3 (ref 3.4–10.8)

## 2025-04-14 NOTE — PROGRESS NOTES
Subjective:     Encounter Date: 04/15/2025      Patient ID: Gonzalo Durham is a 89 y.o. male with coronary artery disease s/p 3V-CABG in 1980 in [facility], CVA, pulmonary nodule, hypertension, hyperlipidemia, paroxysmal atrial fibrillation and obstructive sleep apnea.     Chief Complaint: no complaints  History of Present Illness  Patient presents today for management of coronary artery disease. Today he reports that he has been doing fairly well. He reports that his tremors have been a little more severe. He has chronic back pain. He reports that he has an appointment with neurology. He states that his gait has been getting a little worse as well. He denies any chest pain. He reports that he has chronic dyspnea on exertion and is on continuous supplemental oxygen at 4L via NC. He denies any worsening dyspnea on exertion since LOV. He denies any heart racing or palpitations. He reports that his BP has remained controlled 118-125/60-70. He denies any leg swelling, orthopnea or PND. He reports some dizziness at times. Patient is on eliquis and denies any bleeding issues. He reports that he has been having more difficulty with memory. Patient follows with Dr Chi as PCP.     The following portions of the patient's history were reviewed and updated as appropriate: allergies, current medications, past family history, past medical history, past social history, past surgical history and problem list.    Allergies   Allergen Reactions    Symbicort [Budesonide-Formoterol Fumarate] Dizziness     Patient passed out and fell    Primidone Other (See Comments)     Weakness/ fatigue.     Prozac [Fluoxetine Hcl] Mental Status Change     Bad dreams.       Current Outpatient Medications:     acetaminophen (TYLENOL) 325 MG tablet, Take 2 tablets by mouth 2 (Two) Times a Day., Disp: 360 tablet, Rfl: 2    albuterol sulfate  (90 Base) MCG/ACT inhaler, USE 2 INHALATIONS FOUR TIMES A DAY, Disp: 34 g, Rfl: 3    Artificial Tear  Solution (Just Tears Eye Drops) solution, 1-2 drops daily as needed, Disp: 15 mL, Rfl: 0    ascorbic acid (VITAMIN C) 500 MG tablet, Take 1 tablet by mouth 2 (Two) Times a Day. Indications: Inadequate Vitamin C, Disp: , Rfl:     aspirin 81 MG EC tablet, Take 1 tablet by mouth Daily. Indications: Acute Heart Attack, Disp: , Rfl:     Breztri Aerosphere 160-9-4.8 MCG/ACT aerosol inhaler, USE 2 INHALATIONS TWICE A DAY, Disp: 10.7 g, Rfl: 11    calcium carbonate, oyster shell, 500 MG tablet tablet, Take 1 tablet by mouth Daily., Disp: , Rfl:     Eliquis 5 MG tablet tablet, TAKE 1 TABLET TWICE A DAY, Disp: 180 tablet, Rfl: 3    ferrous sulfate 325 (65 Fe) MG tablet, Take 1 tablet by mouth Daily With Breakfast. Indications: Anemia From Inadequate Iron in the Body, Disp: , Rfl:     finasteride (PROSCAR) 5 MG tablet, TAKE 1 TABLET DAILY, Disp: 90 tablet, Rfl: 3    furosemide (LASIX) 20 MG tablet, TAKE 1 TABLET EVERY 12 HOURS FOR EDEMA, Disp: 90 tablet, Rfl: 7    glucose blood (FREESTYLE LITE) test strip, USE TO TEST BLOOD SUGAR DAILY, Disp: 100 each, Rfl: 3    glyburide (DIAbeta) 5 MG tablet, TAKE 1 TABLET EVERY MORNING AND ONE-HALF (1/2) TABLET BEFORE SUPPER, Disp: 135 tablet, Rfl: 3    guaiFENesin (MUCINEX) 600 MG 12 hr tablet, Take 1 tablet by mouth 2 (Two) Times a Day. Indications: Cough, Disp: , Rfl:     hydroCHLOROthiazide (MICROZIDE) 12.5 MG capsule, TAKE 1 CAPSULE DAILY, Disp: 90 capsule, Rfl: 3    ipratropium (ATROVENT) 0.03 % nasal spray, Administer 1 spray into the nostril(s) as directed by provider 2 (Two) Times a Day., Disp: , Rfl:     isosorbide mononitrate (IMDUR) 30 MG 24 hr tablet, Take 1 tablet by mouth Daily. Indications: Stable Angina Pectoris, Disp: 90 tablet, Rfl: 3    loratadine (CLARITIN) 10 MG tablet, TAKE 1 TABLET DAILY, Disp: 90 tablet, Rfl: 3    melatonin 3 MG tablet, Take 1 tablet by mouth At Night As Needed for Sleep., Disp: , Rfl:     metoprolol tartrate (LOPRESSOR) 25 MG tablet, Take one-half  tablet twice a day for high blood pressure, Disp: 90 tablet, Rfl: 1    Multiple Vitamins-Minerals (MULTIVITAMIN & MINERAL PO), Take 1 tablet by mouth Daily. Indications: vit, Disp: , Rfl:     nitroglycerin (Nitrostat) 0.4 MG SL tablet, Place 1 tablet under the tongue Every 5 (Five) Minutes As Needed for Chest Pain. Indications: Acute Angina Pectoris, Disp: 90 tablet, Rfl: 0    pantoprazole (PROTONIX) 40 MG EC tablet, TAKE 1 TABLET DAILY, Disp: 90 tablet, Rfl: 3    polyethylene glycol 17 g packet, Take 17 g by mouth Daily., Disp: , Rfl:     potassium chloride ER (K-TAB) 20 MEQ tablet controlled-release ER tablet, TAKE 1 TABLET TWICE A DAY WITH MEALS FOR LOW AMOUNT OF POTASSIUM IN THE BLOOD, Disp: 180 tablet, Rfl: 3    pravastatin (PRAVACHOL) 80 MG tablet, TAKE 1 TABLET DAILY, Disp: 90 tablet, Rfl: 3    Probiotic Product (PROBIOTIC ADVANCED PO), Take  by mouth., Disp: , Rfl:     saline (AYR) gel nasal gel, Apply 1 application  topically to the appropriate area as directed As Needed (epistaxis)., Disp: 1 each, Rfl: 0    terazosin (HYTRIN) 10 MG capsule, TAKE 1 CAPSULE DAILY, Disp: 90 capsule, Rfl: 3    Past Medical History:   Diagnosis Date    A-fib     AAA (abdominal aortic aneurysm) without rupture     Arthritis     BPH (benign prostatic hypertrophy)     Cataract     bialteral    CKD (chronic kidney disease)     COPD (chronic obstructive pulmonary disease)     Coronary artery disease involving native coronary artery of native heart without angina pectoris 1/20/2017    CABG/Az/[place]/1981 No [facility] echo  7.16.18 Right ventricular cavity is mild-to-moderately dilated. Left ventricular diastolic dysfunction (grade I) consistent with impaired relaxation. Left ventricular systolic function is normal. Left atrial cavity size is borderline dilated. RIGHT HEART ENLARGEMENT - RVSP NOT ASSESSABLE NORMAL LV AND RV SYSTOLIC FUNCTION NO SIGNIFICANT VALVULAR DYSFUNCTION  Seein    Diabetes mellitus     Type 2    DM (diabetes mellitus  screen)     GERD (gastroesophageal reflux disease)     History of loop recorder     at current time    Hx of colonic polyp     Hypercholesteremia     Hypertension     Macular degeneration     treated with injections in right eye    Myocardial infarction     Neuropathy     Oxygen deficit     at 4liters    Prostate cancer     Pulmonary hypertension 2022    S/P CABG x 3 2022 [facility]    Sleep apnea     cpap    Stage 3a chronic kidney disease 2017    Stroke     recovererd    Varicose vein of leg     bialteral     Social History     Socioeconomic History    Marital status:    Tobacco Use    Smoking status: Former     Current packs/day: 0.00     Average packs/day: 1 pack/day for 26.0 years (26.0 ttl pk-yrs)     Types: Cigarettes     Start date:      Quit date:      Years since quittin.3     Passive exposure: Past    Smokeless tobacco: Never   Vaping Use    Vaping status: Never Used   Substance and Sexual Activity    Alcohol use: No    Drug use: No    Sexual activity: Defer       Review of Systems   Constitutional: Negative for malaise/fatigue.   HENT:  Negative for nosebleeds.    Cardiovascular:  Positive for dyspnea on exertion (chronic; on supplemental O2). Negative for chest pain, irregular heartbeat, leg swelling, near-syncope, orthopnea, palpitations, paroxysmal nocturnal dyspnea and syncope.   Respiratory:  Negative for shortness of breath.    Hematologic/Lymphatic: Bruises/bleeds easily.   Gastrointestinal:  Negative for diarrhea.   Genitourinary:  Negative for hematuria.   Neurological:  Negative for dizziness and weakness.   All other systems reviewed and are negative.         Objective:     Vitals reviewed.   Constitutional:       General: Not in acute distress.     Appearance: Normal appearance. Well-developed. Frail.      Interventions: Nasal cannula in place.      Comments: Patient is on continuous supplemental oxygen at 4L via NC   Eyes:      Pupils: Pupils are  "equal, round, and reactive to light.   HENT:      Head: Normocephalic and atraumatic.      Nose: Nose normal.   Neck:      Vascular: No carotid bruit.   Pulmonary:      Effort: Pulmonary effort is normal. No respiratory distress.      Breath sounds: Normal breath sounds. No wheezing. No rales.   Cardiovascular:      Normal rate. Regular rhythm.      Murmurs: There is no murmur.   Edema:     Peripheral edema absent.   Abdominal:      General: There is no distension.      Palpations: Abdomen is soft.   Musculoskeletal: Normal range of motion.      Cervical back: Normal range of motion and neck supple. Skin:     General: Skin is warm.      Findings: No erythema or rash.   Neurological:      General: No focal deficit present.      Mental Status: Alert and oriented to person, place, and time.   Psychiatric:         Attention and Perception: Attention normal.         Mood and Affect: Mood normal.         Speech: Speech normal.         Behavior: Behavior normal.         Thought Content: Thought content normal.         Judgment: Judgment normal.         /70   Pulse 58   Ht 180.3 cm (71\")   Wt 92.1 kg (203 lb)   SpO2 96%   BMI 28.31 kg/m²     Procedures  Lab Review:     Results for orders placed during the hospital encounter of 10/26/22    Adult Transthoracic Echo Complete w/ Color, Spectral and Contrast if necessary per protocol    Interpretation Summary    Left ventricular systolic function is normal. Left ventricular ejection fraction appears to be 56 - 60%.    Left ventricular diastolic function was normal.    Abnormal global longitudinal LV strain (GLS) = -11.0%    Estimated right ventricular systolic pressure from tricuspid regurgitation is normal (<35 mmHg).    Lab Results   Component Value Date    CHOL 123 02/14/2023    CHLPL 245 (H) 04/02/2025    TRIG 229 (H) 04/02/2025    HDL 36 (L) 04/02/2025     (H) 04/02/2025     I have personally reviewed echo, labs, hospitalization notes and past office notes " prior to patients visit  Assessment:          Diagnosis Plan   1. Coronary artery disease involving native coronary artery of native heart without angina pectoris        2. PAF (paroxysmal atrial fibrillation)        3. Chronic anticoagulation        4. Primary hypertension        5. Hyperlipidemia LDL goal <70-statin        6. Obstructive sleep apnea        7. Cerebrovascular accident (CVA), unspecified mechanism        8. Mixed simple and mucopurulent chronic bronchitis                     Plan:       CAD: s/p 3V-CABG in 1980. [facility] 2019 showed 70% stenosis of diagonal branch, patent SVG to LAD, patent SVG to RCA, occluded mid LAD stent and occluded proximal RCA. Patient denies any chest pain. Continue aspirin, metoprolol, pravastatin and imdur    2. Paroxysmal Atrial fibrillation: Regular rhythm on examination. Continue metoprolol and eliquis.     3. Chronic anticoagulation: patient is on Eliquis and denies any bleeding issues.     4. Hypertension: Controlled in office. Continue current medications    5. Hyperlipidemia: Lipid panel 4/2025 shows elevated LDL at 166. Continue pravastatin. Managed and followed by PCP. Patient has declined PSCK9i or Leqvio    6. WENDY: patient denies using CPAP or BiPAP    7. CVA    8. COPD: Patient is on continuous supplemental oxygen at 4L via NC. Patient follows with pulmonology.     I attest that all portions of this note reviewed and all information has been updated by myself to reflect the patient's current status.      I spent 36 minutes caring for Gonzalo on this date of service. This time includes time spent by me in the following activities:preparing for the visit, reviewing tests, obtaining and/or reviewing a separately obtained history, performing a medically appropriate examination and/or evaluation , counseling and educating the patient/family/caregiver, and documenting information in the medical record    Patient is to follow up in 6 months or sooner if needed

## 2025-04-15 ENCOUNTER — OFFICE VISIT (OUTPATIENT)
Dept: CARDIOLOGY | Facility: CLINIC | Age: OVER 89
End: 2025-04-15
Payer: MEDICARE

## 2025-04-15 VITALS
OXYGEN SATURATION: 96 % | HEIGHT: 71 IN | DIASTOLIC BLOOD PRESSURE: 70 MMHG | HEART RATE: 58 BPM | WEIGHT: 203 LBS | BODY MASS INDEX: 28.42 KG/M2 | SYSTOLIC BLOOD PRESSURE: 130 MMHG

## 2025-04-15 DIAGNOSIS — J41.8 MIXED SIMPLE AND MUCOPURULENT CHRONIC BRONCHITIS: ICD-10-CM

## 2025-04-15 DIAGNOSIS — I25.10 CORONARY ARTERY DISEASE INVOLVING NATIVE CORONARY ARTERY OF NATIVE HEART WITHOUT ANGINA PECTORIS: Primary | ICD-10-CM

## 2025-04-15 DIAGNOSIS — G47.33 OBSTRUCTIVE SLEEP APNEA: ICD-10-CM

## 2025-04-15 DIAGNOSIS — I48.0 PAF (PAROXYSMAL ATRIAL FIBRILLATION): ICD-10-CM

## 2025-04-15 DIAGNOSIS — Z79.01 CHRONIC ANTICOAGULATION: ICD-10-CM

## 2025-04-15 DIAGNOSIS — I10 PRIMARY HYPERTENSION: ICD-10-CM

## 2025-04-15 DIAGNOSIS — I63.9 CEREBROVASCULAR ACCIDENT (CVA), UNSPECIFIED MECHANISM: ICD-10-CM

## 2025-04-15 DIAGNOSIS — E78.5 HYPERLIPIDEMIA LDL GOAL <70: ICD-10-CM

## 2025-04-15 PROCEDURE — G2211 COMPLEX E/M VISIT ADD ON: HCPCS | Performed by: NURSE PRACTITIONER

## 2025-04-15 PROCEDURE — 1160F RVW MEDS BY RX/DR IN RCRD: CPT | Performed by: NURSE PRACTITIONER

## 2025-04-15 PROCEDURE — 99214 OFFICE O/P EST MOD 30 MIN: CPT | Performed by: NURSE PRACTITIONER

## 2025-04-15 PROCEDURE — 1159F MED LIST DOCD IN RCRD: CPT | Performed by: NURSE PRACTITIONER

## 2025-05-27 ENCOUNTER — OFFICE VISIT (OUTPATIENT)
Dept: FAMILY MEDICINE CLINIC | Facility: CLINIC | Age: OVER 89
End: 2025-05-27
Payer: MEDICARE

## 2025-05-27 VITALS
HEIGHT: 71 IN | HEART RATE: 61 BPM | OXYGEN SATURATION: 92 % | BODY MASS INDEX: 27.3 KG/M2 | DIASTOLIC BLOOD PRESSURE: 56 MMHG | WEIGHT: 195 LBS | SYSTOLIC BLOOD PRESSURE: 102 MMHG

## 2025-05-27 DIAGNOSIS — J18.9 PNEUMONIA DUE TO INFECTIOUS ORGANISM, UNSPECIFIED LATERALITY, UNSPECIFIED PART OF LUNG: Primary | ICD-10-CM

## 2025-05-27 NOTE — PROGRESS NOTES
"Chief Complaint  Pneumonia (Hospital follow up, pneumonia, CHF exacerbation)    Subjective      Gonzalo Durham presents to [facility]  History of Present Illness  The patient is a 89-year-old male who is here today to follow up on pneumonia and CHF exacerbation.    He sought emergency care at [facility] on 05/23/2025 due to shortness of breath, cough, and general malaise. He was discharged with a prescription for five medications. However, his condition deteriorated over the weekend, necessitating readmission on 05/26/2025 due to fever. During his hospital stay, he was diagnosed with fluid accumulation around his lungs and was prescribed furosemide. His medication regimen was adjusted during his hospital stay, which resulted in an improvement in his condition. He underwent several cultures, but no significant findings were reported. He has one dose of amoxicillin remaining, scheduled for tonight. He reports no leg swelling or fluid retention. He has been using 4 liters of oxygen for several years and has an incentive spirometer at home. He has a scheduled appointment with pulmonology in July and cardiology in October. A neurology referral was postponed due to his hospitalization and needs to be rescheduled.    His blood sugar levels have improved since his hospital discharge.      Objective   Vital Signs:  /56   Pulse 61   Ht 180.3 cm (71\")   Wt 88.5 kg (195 lb)   SpO2 92% Comment: O2 4L  BMI 27.20 kg/m²   Estimated body mass index is 27.2 kg/m² as calculated from the following:    Height as of this encounter: 180.3 cm (71\").    Weight as of this encounter: 88.5 kg (195 lb).            Physical Exam     Physical Exam  Respiratory: Clear to auscultation, no wheezing, rales or rhonchi  Cardiovascular: Regular rate and rhythm, no murmurs, rubs, or gallops  Extremities: No edema, no cyanosis      Result Review :  The following labs/imaging/notes were reviewed and discussed " with the patient by Rober Chi MD on 05/27/2025:   Latest Reference Range & Units 04/02/25 10:46 04/02/25 11:58   Sodium 136 - 145 mmol/L 141    Potassium 3.5 - 5.2 mmol/L 4.8    Chloride 98 - 107 mmol/L 99    CO2 22.0 - 29.0 mmol/L 29.9 (H)    BUN 8 - 23 mg/dL 23    Creatinine 0.76 - 1.27 mg/dL 1.41 (H)    BUN/Creatinine Ratio 7.0 - 25.0  16.3    EGFR Result >60.0 mL/min/1.73 47.0 (L)    Glucose 65 - 99 mg/dL 121 (H)    Calcium 8.2 - 9.6 mg/dL 10.3 (H)    Magnesium 1.7 - 2.3 mg/dL 2.3    Alkaline Phosphatase 39 - 117 U/L 68    Total Protein 6.0 - 8.5 g/dL 7.2    Albumin 3.5 - 5.2 g/dL 4.6    A/G Ratio g/dL 1.8    AST (SGOT) 1 - 40 U/L 19    ALT (SGPT) 1 - 41 U/L 15    Total Bilirubin 0.0 - 1.2 mg/dL 0.3    Hemoglobin A1C 4.5 - 5.7 %  6.6 !   Vitamin B-12 211 - 946 pg/mL 573    Folate 4.78 - 24.20 ng/mL >20.00    Total Cholesterol 0 - 200 mg/dL 245 (H)    HDL Cholesterol 40 - 60 mg/dL 36 (L)    LDL Cholesterol  0 - 100 mg/dL 166 (H)    Triglycerides 0 - 150 mg/dL 229 (H)    VLDL Cholesterol Kieran 5 - 40 mg/dL 43 (H)    Globulin gm/dL 2.6    WBC 3.40 - 10.80 10*3/mm3 9.18    RBC 4.14 - 5.80 10*6/mm3 4.64    Hemoglobin 13.0 - 17.7 g/dL 13.3    Hematocrit 37.5 - 51.0 % 43.0    Platelets 140 - 450 10*3/mm3 280    RDW 12.3 - 15.4 % 13.6    MCV 79.0 - 97.0 fL 92.7    MCH 26.6 - 33.0 pg 28.7    MCHC 31.5 - 35.7 g/dL 30.9 (L)    Neutrophil Rel % 42.7 - 76.0 % 60.8    Lymphocyte Rel % 19.6 - 45.3 % 26.8    Monocyte Rel % 5.0 - 12.0 % 6.4    Eosinophil Rel % 0.3 - 6.2 % 5.3    Basophil Rel % 0.0 - 1.5 % 0.5    Immature Granulocyte Rel % 0.0 - 0.5 % 0.2    Neutrophils Absolute 1.70 - 7.00 10*3/mm3 5.57    Lymphocytes Absolute 0.70 - 3.10 10*3/mm3 2.46    Monocytes Absolute 0.10 - 0.90 10*3/mm3 0.59    Eosinophils Absolute 0.00 - 0.40 10*3/mm3 0.49 (H)    Basophils Absolute 0.00 - 0.20 10*3/mm3 0.05    Immature Grans, Absolute 0.00 - 0.05 10*3/mm3 0.02    nRBC 0.0 - 0.2 /100 WBC 0.0    Creatinine, Urine Not Estab. mg/dL 48.6     Microalbumin, Urine Not Estab. ug/mL <3.0    Microalbumin/Creatinine Ratio 0 - 29 mg/g creat <6    Expiration Date   11,062,026   Lot Number   [id number]   (H): Data is abnormally high  (L): Data is abnormally low  !: Data is abnormal          Assessment      There are no diagnoses linked to this encounter.         Assessment & Plan  1. Pneumonia.  - Significant improvement noted  - Previously on doxycycline, amoxicillin-clavulanic acid, and prednisone.  - One dose of amoxicillin-clavulanic acid remains to be taken tonight.  - Continue current treatment regimen and notify if condition deteriorates.    2. Congestive Heart Failure (CHF) exacerbation.  - Hospitalized due to fluid accumulation around lungs; treated with furosemide (Lasix).  - Breathing has improved; back to baseline oxygen level of 4 L.  - Continue current medications and follow up with cardiology in 10/2025.    3. Diabetes Mellitus.  - Blood sugar levels have improved since hospital stay.  - Continue current diabetes management plan.    Follow-up  - Follow-up appointment scheduled for 10/2025.    No orders of the defined types were placed in this encounter.      Follow Up   No follow-ups on file.  Patient was given instructions and counseling regarding his condition or for health maintenance advice. Please see specific information pulled into the AVS if appropriate.         Patient or patient representative verbalized consent for the use of Ambient Listening during the visit with  Rober Chi MD for chart documentation. 5/27/2025  13:48 CDT

## 2025-05-30 ENCOUNTER — OFFICE VISIT (OUTPATIENT)
Dept: NEUROLOGY | Facility: CLINIC | Age: OVER 89
End: 2025-05-30
Payer: MEDICARE

## 2025-05-30 VITALS
WEIGHT: 195 LBS | BODY MASS INDEX: 27.3 KG/M2 | SYSTOLIC BLOOD PRESSURE: 108 MMHG | OXYGEN SATURATION: 96 % | HEART RATE: 74 BPM | HEIGHT: 71 IN | DIASTOLIC BLOOD PRESSURE: 68 MMHG

## 2025-05-30 DIAGNOSIS — R54 ADVANCED AGE: ICD-10-CM

## 2025-05-30 DIAGNOSIS — G25.0 ESSENTIAL TREMOR: Primary | ICD-10-CM

## 2025-05-30 NOTE — PROGRESS NOTES
Neurology Consult Note    [facility]  [address], [address]  [place] [address]  Phone: [phone]  Fax: [phone]    Referring Provider:   Rober Chi MD  Primary Care Provider:  Rober Chi MD    Reason for Consult:  familial tremor  Subjective      Chief Complaint   Patient presents with    Cerebrovascular Accident     History of CVA   Was in the hospital a few weeks ago   They were able to get a some fluid off  His tremors are a lot better      History of Present Illness  89-year-old male referred to my clinic by Dr. Rober Chi for evaluation of familial tremor.  Patient saw Dr. Chi on 4/2/2025.  This appeared to be for a follow-up.  Patient was tried on primidone 25 mg nightly.  He was referred to our clinic for further evaluation.    Today patient presents with his daughter.  Reports to me the tremors been ongoing for several years.  This did worsen in 2023 after being diagnosed with COVID.  Patient notes he got to the point where he could not write.  They are extremely severe.  He notes a tremor most with activities.  He recently in the hospital for pneumonia he underwent diuresis as well as treatment for pneumonia.  He noted his tremor drastically improved.  He was tried on primidone in the past however he did not feel well on this medicine and felt it made him worse.  He notes he has a family history of tremors.  Parents had tremors.  He does not consider his tremor disabling only a nuisance.  He is right-handed.  This did begin in both hands.  Has not moved to head or other parts of his body.  Patient Active Problem List   Diagnosis    Obesity    Controlled type 2 diabetes mellitus without complication, without long-term current use of insulin    Stage 3a chronic kidney disease-ro    Erectile dysfunction    Coronary artery disease involving native coronary artery of native heart without angina pectoris    Hyperlipidemia LDL goal <70-statin    Hypertension     Prostatism-(young) urology    Varicose vein of leg    Plantar fasciitis    Nocturnal leg movements    Bad dreams    Depression    Peripheral neuropathy- clinical    Hx of colonic polyps    Gastroesophageal reflux disease    Left lower quadrant pain    Anticoagulated-CAD, DM2, CVA/ASA 81+()+plavix » Anticoagulated-CAD, DM2, CVA/ASA 81+()+plavix+eliquist    SOB: copd,CAD,#, hypoxemia    Hypoxia#to pulmonary    Corn or callus    Anemia: ASA81,plavix,eliquis,gi(3/3+,div,cp,eitis    Atherosclerosis: CAD, AAA vascular    AAA: 2022-(ro) steffen    Heme positive stool    Stage 2 moderate COPD by GOLD classification    Abnormal stress test    Tingling of both feet-to consider NCV    Cryptogenic stroke    Chronic diastolic congestive heart failure    Gross hematuria    BPH with obstruction/lower urinary tract symptoms    Chest pain    Obstructive sleep apnea-(cpap)    Abnormal CT of the chest 1.2022/done    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    Oxygen dependent    S/P CABG x 3    Pulmonary hypertension    PAF (paroxysmal atrial fibrillation)    Umbilical hernia-a waqar    History of COVID-19    Cytokine release syndrome, grade 1    Hypokalemia    Overweight    Post-COVID syndrome    Chronic respiratory failure with hypoxia    Epistaxis    Personal history of nicotine dependence    BRBPR (bright red blood per rectum)    Acute diarrhea    Chronic anticoagulation    Asymptomatic colonization Clostridioides difficile    Polypharmacy    History of exposure to asbestos    Pulmonary emphysema    Familial tremor        Past Medical History:   Diagnosis Date    A-fib     AAA (abdominal aortic aneurysm) without rupture     Arthritis     BPH (benign prostatic hypertrophy)     Cataract     bialteral    CKD (chronic kidney disease)     COPD (chronic obstructive pulmonary disease)     Coronary artery disease involving native coronary artery of native heart without angina pectoris 01/20/2017    CABG/Az/Aries/Ned No  TCC echo  7.16.18 Right ventricular cavity is mild-to-moderately dilated. Left ventricular diastolic dysfunction (grade I) consistent with impaired relaxation. Left ventricular systolic function is normal. Left atrial cavity size is borderline dilated. RIGHT HEART ENLARGEMENT - RVSP NOT ASSESSABLE NORMAL LV AND RV SYSTOLIC FUNCTION NO SIGNIFICANT VALVULAR DYSFUNCTION  Seein    Diabetes mellitus     Type 2    DM (diabetes mellitus screen)     GERD (gastroesophageal reflux disease)     History of loop recorder     at current time    Hx of colonic polyp     Hypercholesteremia     Hypertension     Macular degeneration     treated with injections in right eye    Myocardial infarction     Neuropathy     Oxygen deficit     at 4liters    Pneumonia     Prostate cancer     Pulmonary hypertension 2022    S/P CABG x 3 2022 [facility]    Sleep apnea     cpap    Stage 3a chronic kidney disease 2017    Stroke     recovererd    Varicose vein of leg     bialteral        Social History     Socioeconomic History    Marital status:    Tobacco Use    Smoking status: Former     Current packs/day: 0.00     Average packs/day: 1 pack/day for 26.0 years (26.0 ttl pk-yrs)     Types: Cigarettes     Start date:      Quit date:      Years since quittin.4     Passive exposure: Past    Smokeless tobacco: Never   Vaping Use    Vaping status: Never Used   Substance and Sexual Activity    Alcohol use: No    Drug use: No    Sexual activity: Defer        Allergies   Allergen Reactions    Symbicort [Budesonide-Formoterol Fumarate] Dizziness     Patient passed out and fell    Primidone Other (See Comments)     Weakness/ fatigue.     Prozac [Fluoxetine Hcl] Mental Status Change     Bad dreams.          Current Outpatient Medications:     acetaminophen (TYLENOL) 325 MG tablet, Take 2 tablets by mouth 2 (Two) Times a Day., Disp: 360 tablet, Rfl: 2    albuterol sulfate  (90 Base) MCG/ACT inhaler, USE 2  INHALATIONS FOUR TIMES A DAY, Disp: 34 g, Rfl: 3    Artificial Tear Solution (Just Tears Eye Drops) solution, 1-2 drops daily as needed, Disp: 15 mL, Rfl: 0    ascorbic acid (VITAMIN C) 500 MG tablet, Take 1 tablet by mouth 2 (Two) Times a Day. Indications: Inadequate Vitamin C, Disp: , Rfl:     aspirin 81 MG EC tablet, Take 1 tablet by mouth Daily. Indications: Acute Heart Attack, Disp: , Rfl:     Breztri Aerosphere 160-9-4.8 MCG/ACT aerosol inhaler, USE 2 INHALATIONS TWICE A DAY, Disp: 10.7 g, Rfl: 11    calcium carbonate, oyster shell, 500 MG tablet tablet, Take 1 tablet by mouth Daily., Disp: , Rfl:     Eliquis 5 MG tablet tablet, TAKE 1 TABLET TWICE A DAY, Disp: 180 tablet, Rfl: 3    ferrous sulfate 325 (65 Fe) MG tablet, Take 1 tablet by mouth Daily With Breakfast. Indications: Anemia From Inadequate Iron in the Body, Disp: , Rfl:     finasteride (PROSCAR) 5 MG tablet, TAKE 1 TABLET DAILY, Disp: 90 tablet, Rfl: 3    furosemide (LASIX) 20 MG tablet, TAKE 1 TABLET EVERY 12 HOURS FOR EDEMA, Disp: 90 tablet, Rfl: 7    glucose blood (FREESTYLE LITE) test strip, USE TO TEST BLOOD SUGAR DAILY, Disp: 100 each, Rfl: 3    glyburide (DIAbeta) 5 MG tablet, TAKE 1 TABLET EVERY MORNING AND ONE-HALF (1/2) TABLET BEFORE SUPPER, Disp: 135 tablet, Rfl: 3    guaiFENesin (MUCINEX) 600 MG 12 hr tablet, Take 1 tablet by mouth 2 (Two) Times a Day. Indications: Cough, Disp: , Rfl:     hydroCHLOROthiazide (MICROZIDE) 12.5 MG capsule, TAKE 1 CAPSULE DAILY, Disp: 90 capsule, Rfl: 3    ipratropium (ATROVENT) 0.03 % nasal spray, Administer 1 spray into the nostril(s) as directed by provider 2 (Two) Times a Day., Disp: , Rfl:     isosorbide mononitrate (IMDUR) 30 MG 24 hr tablet, Take 1 tablet by mouth Daily. Indications: Stable Angina Pectoris, Disp: 90 tablet, Rfl: 3    loratadine (CLARITIN) 10 MG tablet, TAKE 1 TABLET DAILY, Disp: 90 tablet, Rfl: 3    metoprolol tartrate (LOPRESSOR) 25 MG tablet, Take one-half tablet twice a day for  "high blood pressure, Disp: 90 tablet, Rfl: 1    Multiple Vitamins-Minerals (MULTIVITAMIN & MINERAL PO), Take 1 tablet by mouth Daily. Indications: vit, Disp: , Rfl:     nitroglycerin (Nitrostat) 0.4 MG SL tablet, Place 1 tablet under the tongue Every 5 (Five) Minutes As Needed for Chest Pain. Indications: Acute Angina Pectoris, Disp: 90 tablet, Rfl: 0    pantoprazole (PROTONIX) 40 MG EC tablet, TAKE 1 TABLET DAILY, Disp: 90 tablet, Rfl: 3    potassium chloride ER (K-TAB) 20 MEQ tablet controlled-release ER tablet, TAKE 1 TABLET TWICE A DAY WITH MEALS FOR LOW AMOUNT OF POTASSIUM IN THE BLOOD, Disp: 180 tablet, Rfl: 3    pravastatin (PRAVACHOL) 80 MG tablet, TAKE 1 TABLET DAILY, Disp: 90 tablet, Rfl: 3    saline (AYR) gel nasal gel, Apply 1 application  topically to the appropriate area as directed As Needed (epistaxis)., Disp: 1 each, Rfl: 0    terazosin (HYTRIN) 10 MG capsule, TAKE 1 CAPSULE DAILY, Disp: 90 capsule, Rfl: 3    amoxicillin-clavulanate (AUGMENTIN) 875-125 MG per tablet, Take 1 tablet by mouth 2 (Two) Times a Day. (Patient not taking: Reported on 5/30/2025), Disp: , Rfl:     melatonin 3 MG tablet, Take 1 tablet by mouth At Night As Needed for Sleep. (Patient not taking: Reported on 5/30/2025), Disp: , Rfl:     polyethylene glycol 17 g packet, Take 17 g by mouth Daily., Disp: , Rfl:     Probiotic Product (PROBIOTIC ADVANCED PO), Take  by mouth. (Patient not taking: Reported on 5/30/2025), Disp: , Rfl:        Objective   Vital Signs:   /68   Pulse 74   Ht 180.3 cm (71\")   Wt 88.5 kg (195 lb)   SpO2 96%   BMI 27.20 kg/m²       Physical Exam  Vitals and nursing note reviewed.   Constitutional:       Appearance: Normal appearance.   HENT:      Head: Normocephalic.   Eyes:      General: Lids are normal.      Extraocular Movements: Extraocular movements intact.      Pupils: Pupils are equal, round, and reactive to light.   Pulmonary:      Effort: Pulmonary effort is normal. No respiratory distress. "   Skin:     General: Skin is warm.   Neurological:      Mental Status: He is alert.      Motor: Motor strength is normal.  Psychiatric:         Speech: Speech normal.        Neurological Exam  Mental Status  Alert. Oriented to person, place, time and situation. Speech is normal. Language is fluent with no aphasia.    Cranial Nerves  CN II: Visual fields full to confrontation.  CN III, IV, VI: Extraocular movements intact bilaterally. Normal lids and orbits bilaterally. Pupils equal round and reactive to light bilaterally.  CN V: Facial sensation is normal.  CN VII: Full and symmetric facial movement.  CN IX, X: Palate elevates symmetrically. Normal gag reflex.  CN XI: Shoulder shrug strength is normal.  CN XII: Tongue midline without atrophy or fasciculations.    Motor  Normal muscle bulk throughout. No fasciculations present. Normal muscle tone. The following abnormal movements were seen: No resting tremor.  No bradykinesia.  No cogwheel rigidity.  Tremor noted with outstretched arms and bird wing position, described as mild to moderate amplitude and frequency.  Tremor also present with intention such as finger-to-nose..   Strength is 5/5 throughout all four extremities.    Sensory  Light touch is normal in upper and lower extremities.     Gait  Casual gait is normal including stance, stride, and arm swing.      Result Review :   The following data was reviewed by: DOMINIQUE Lindquist on 05/30/2025:  CMP          9/3/2024    08:14 2/4/2025    08:02 4/2/2025    10:46   CMP   Glucose 137   121    BUN 17   23    Creatinine 1.19  1.30  1.41    EGFR 58.0   47.0    Sodium 141   141    Potassium 4.9   4.8    Chloride 101   99    Calcium 9.9   10.3    Total Protein   7.2    Albumin   4.6    Globulin   2.6    Total Bilirubin   0.3    Alkaline Phosphatase   68    AST (SGOT)   19    ALT (SGPT)   15    Albumin/Globulin Ratio   1.8    BUN/Creatinine Ratio 14.3   16.3      CBC w/diff          8/7/2024    09:10 9/3/2024     08:14 4/2/2025    10:46   CBC w/Diff   WBC 8.23  7.90  9.18    RBC 4.13  4.34  4.64    Hemoglobin 12.1  12.5  13.3    Hematocrit 38.0  38.7  43.0    MCV 92.0  89.2  92.7    MCH 29.3  28.8  28.7    MCHC 31.8  32.3  30.9    RDW 12.9  13.0  13.6    Platelets 234  251  280    Neutrophil Rel % 62.2  65.2  60.8    Lymphocyte Rel % 23.6  20.4  26.8    Monocyte Rel % 7.7  7.3  6.4    Eosinophil Rel % 5.6  5.9  5.3    Basophil Rel % 0.7  0.8  0.5      Lipid Panel          4/2/2025    10:46   Lipid Panel   Total Cholesterol 245    Triglycerides 229    HDL Cholesterol 36    VLDL Cholesterol 43    LDL Cholesterol  166      TSH          8/7/2024    09:10   TSH   TSH 1.700      Most Recent A1C          4/2/2025    11:58   HGBA1C Most Recent   Hemoglobin A1C 6.6      Progress Notes by Rober Chi MD (05/27/2025 13:00)  Progress Notes by Rober Chi MD (04/02/2025 10:45)                  Impression:  Gonzalo Durham is a 89 y.o. male who presents for evaluation of tremor.  Tremor likely represents an essential tremor.  He has failed primidone therapy.  Currently on metoprolol.  He notes now his tremor is nondisabling only a nuisance.  We discussed other pharmacological interventions.  I would be hesitant to utilize propranolol due to his lung disease.  I would be hesitant to utilize topiramate due to his macular degeneration.  He reports he was on gabapentin in the past and this did not help his symptoms.  Addition would not use benzodiazepines due to his advanced age and risk for injury and cognitive effects.  After discussion of above medicines, patient elected to watch his tremor.  He does not want add any further pharmacological interventions.  No further workup indicated.    Diagnoses and all orders for this visit:    1. Essential tremor (Primary)    2. Advanced age        Plan:  Defer initiation of pharmacologic interventions per patient request after risk versus benefit discussion of different treatment options  Contact our  office with any worsening symptoms  Follow-up with primary care as scheduled  Follow-up in our clinic 6 months or sooner if needed    The patient and I have discussed the plan of care and he is in full agreement at this time.     Follow Up   Return in about 6 months (around 11/30/2025) for Tremor.            Jordan Clark, DOMINIQUE  05/30/25  10:12 CDT

## 2025-06-03 RX ORDER — BUDESONIDE, GLYCOPYRROLATE, AND FORMOTEROL FUMARATE 160; 9; 4.8 UG/1; UG/1; UG/1
2 AEROSOL, METERED RESPIRATORY (INHALATION) 2 TIMES DAILY
Qty: 32.1 G | Refills: 3 | Status: SHIPPED | OUTPATIENT
Start: 2025-06-03

## 2025-06-03 NOTE — TELEPHONE ENCOUNTER
Rx Refill Note  Requested Prescriptions     Pending Prescriptions Disp Refills    Breztri Aerosphere 160-9-4.8 MCG/ACT aerosol inhaler [Pharmacy Med Name: BREZTRI AEROSPHERE INH 10.7GM] 32.1 g 3     Sig: USE 2 INHALATIONS TWICE A DAY      Last office visit with prescribing clinician: 05/27/25  Last telemedicine visit with prescribing clinician: Visit date not found   Next office visit with prescribing clinician: 10/02/25    Would you like a call back once the refill request has been completed: [] Yes [x] No    If the office needs to give you a call back, can they leave a voicemail: [] Yes [x] No    Jeane Michael MA  06/03/25, 06:58 CDT

## 2025-06-05 RX ORDER — FUROSEMIDE 20 MG/1
20 TABLET ORAL EVERY 12 HOURS
Qty: 180 TABLET | Refills: 3 | Status: SHIPPED | OUTPATIENT
Start: 2025-06-05

## 2025-06-05 NOTE — TELEPHONE ENCOUNTER
Rx Refill Note  Requested Prescriptions     Pending Prescriptions Disp Refills    furosemide (LASIX) 20 MG tablet 180 tablet 3     Sig: Take 1 tablet by mouth Every 12 (Twelve) Hours.      Last office visit with office: [id number]  Next office visit with office: [id number]    UDS:     DATE OF LAST REFILL: [id number]    Controlled Substance Agreement:     MEGAN OR MALIKA:          {TIP  Is Refill Pharmacy correct?:  Beck Martínez MA  06/05/25, 11:32 CDT

## 2025-06-30 RX ORDER — GLYBURIDE 5 MG/1
TABLET ORAL
Qty: 135 TABLET | Refills: 3 | Status: SHIPPED | OUTPATIENT
Start: 2025-06-30

## 2025-06-30 RX ORDER — GLYBURIDE 5 MG/1
TABLET ORAL
Qty: 135 TABLET | Refills: 3 | Status: SHIPPED | OUTPATIENT
Start: 2025-06-30 | End: 2025-06-30 | Stop reason: SDUPTHER

## 2025-07-23 ENCOUNTER — PATIENT MESSAGE (OUTPATIENT)
Dept: FAMILY MEDICINE CLINIC | Facility: CLINIC | Age: OVER 89
End: 2025-07-23
Payer: MEDICARE

## 2025-07-23 RX ORDER — POTASSIUM CHLORIDE 1500 MG/1
20 TABLET, EXTENDED RELEASE ORAL 2 TIMES DAILY
Qty: 180 TABLET | Refills: 3 | Status: SHIPPED | OUTPATIENT
Start: 2025-07-23

## 2025-07-23 RX ORDER — POTASSIUM CHLORIDE 1500 MG/1
20 TABLET, EXTENDED RELEASE ORAL 2 TIMES DAILY
Qty: 180 TABLET | Refills: 3 | Status: SHIPPED | OUTPATIENT
Start: 2025-07-23 | End: 2025-07-23 | Stop reason: SDUPTHER

## 2025-07-25 DIAGNOSIS — K21.9 GASTROESOPHAGEAL REFLUX DISEASE: ICD-10-CM

## 2025-07-27 RX ORDER — PANTOPRAZOLE SODIUM 40 MG/1
40 TABLET, DELAYED RELEASE ORAL DAILY
Qty: 90 TABLET | Refills: 3 | Status: SHIPPED | OUTPATIENT
Start: 2025-07-27

## 2025-07-31 ENCOUNTER — OFFICE VISIT (OUTPATIENT)
Dept: PULMONOLOGY | Facility: CLINIC | Age: OVER 89
End: 2025-07-31
Payer: MEDICARE

## 2025-07-31 VITALS
WEIGHT: 200 LBS | HEART RATE: 74 BPM | OXYGEN SATURATION: 92 % | SYSTOLIC BLOOD PRESSURE: 124 MMHG | BODY MASS INDEX: 28 KG/M2 | HEIGHT: 71 IN | DIASTOLIC BLOOD PRESSURE: 74 MMHG

## 2025-07-31 DIAGNOSIS — J44.9 STAGE 2 MODERATE COPD BY GOLD CLASSIFICATION: Primary | Chronic | ICD-10-CM

## 2025-07-31 DIAGNOSIS — Z77.090 HISTORY OF EXPOSURE TO ASBESTOS: Chronic | ICD-10-CM

## 2025-07-31 DIAGNOSIS — J30.9 ALLERGIC RHINITIS, UNSPECIFIED SEASONALITY, UNSPECIFIED TRIGGER: Chronic | ICD-10-CM

## 2025-07-31 DIAGNOSIS — J96.11 CHRONIC RESPIRATORY FAILURE WITH HYPOXIA: Chronic | ICD-10-CM

## 2025-07-31 DIAGNOSIS — J43.9 PULMONARY EMPHYSEMA, UNSPECIFIED EMPHYSEMA TYPE: Chronic | ICD-10-CM

## 2025-07-31 RX ORDER — FLUTICASONE PROPIONATE 50 MCG
2 SPRAY, SUSPENSION (ML) NASAL DAILY
Qty: 16 G | Refills: 6 | Status: SHIPPED | OUTPATIENT
Start: 2025-07-31

## 2025-07-31 NOTE — PROGRESS NOTES
"Chief Complaint  COPD    Subjective    History of Present Illness      Gonzalo Durham presents to [facility] for:    History of Present Illness   Management of COPD, emphysema and chronic respiratory failure.  He has a history of exposure to asbestos and heavy metals.  I reviewed a chest x-ray that was done at [place] on 5/19/2025 with no acute findings.  He reports that his pulmonary status is \"not very good but the same as always\".  He is on daily Breztri and oxygen.  He uses albuterol and Mucinex as needed.  He is having some increased nasal congestion particularly in his right nostril and I am prescribing him Flonase.  He bumped his arm against a doorway last night and has a skin tear on his left arm that is oozing blood.  This has been redressed for him.  He stays up-to-date on vaccines.  Objective   Vital Signs:   /74   Pulse 74   Ht 180.3 cm (71\")   Wt 90.7 kg (200 lb)   SpO2 92% Comment: 4LPD  BMI 27.89 kg/m²     Physical Exam  Vitals reviewed.   Constitutional:       General: He is not in acute distress.     Appearance: Normal appearance.      Interventions: Nasal cannula in place.   HENT:      Head: Normocephalic and atraumatic.      Right Ear: Decreased hearing noted.      Left Ear: Decreased hearing noted.      Ears:      Comments: Bilateral hearing aids  Cardiovascular:      Rate and Rhythm: Normal rate and regular rhythm.   Pulmonary:      Breath sounds: Decreased breath sounds (At bases only, otherwise clear) present.   Musculoskeletal:      Cervical back: Normal range of motion.   Skin:     General: Skin is warm and dry.      Findings: Wound (skin tear to left arm; requiring a dressing to be placed) present.   Neurological:      Mental Status: He is alert and oriented to person, place, and time.   Psychiatric:         Mood and Affect: Mood normal.         Behavior: Behavior normal.        Result Review :   The following data was reviewed " by: DOMINIQUE Yeung on 07/31/2025:  IMAGING SCANNED (05/19/2025 00:00)   Results for orders placed in visit on 10/31/24    Spirometry    Narrative  Spirometry    Performed by: Cindy Ramires RRT  Authorized by: Feliz Frye APRN  Pre Drug % Predicted  FVC: 127%  FEV1: 77%  FEV1/FVC: 44%    Interpretation  Spirometry  Spirometry shows moderate obstruction.      Results for orders placed in visit on 01/26/23    Pulmonary Function Test    Narrative  Pulmonary Function Test  Performed by: Cindy Ramires RRT  Authorized by: Feliz Frye APRN    Pre Drug % Predicted  FVC: 124%  FEV1: 64%  FEF 25-75%: 24%  FEV1/FVC: 38%    Interpretation  Spirometry  Spirometry shows moderate obstruction. There is reduced midflow suggesting small airway/airflow obstruction.      Results for orders placed in visit on 01/26/22    Pulmonary Function Test    Narrative  Pulmonary Function Test  Performed by: Cindy Ramires RRT  Authorized by: Feliz Frye APRN    Pre Drug % Predicted  FVC: 87%  FEV1: 50%  FEF 25-75%: 15%  FEV1/FVC: 43.7%    Interpretation  Spirometry  Spirometry shows moderate obstruction. There is reduced midflow suggesting small airway/airflow obstruction.  Overall comments: Current FEV1 is 50% predicted compared to 66% predicted in 2019.  Reviewed the results of the drop in lung function with the patient and his daughter.  The patient states understanding but was not interested in changing his inhaler regimen.  He reports that he feels no more symptomatic than he did 2 or 3 years ago.             Assessment and Plan      Diagnoses and all orders for this visit:    1. Stage 2 moderate COPD by GOLD classification (Primary)  Comments:  With daily cough and shortness of breath.  Continue daily Breztri.  Use albuterol and Mucinex as needed.    2. Chronic respiratory failure with hypoxia  Comments:  Stable.  Continue supplemental oxygen.    3. History of  exposure to asbestos  Comments:  Continue annual chest scans.    4. Pulmonary emphysema, unspecified emphysema type  Comments:  With daily symptoms.  Continue current pulmonary regimen and monitor oxygen needs.    5. Allergic rhinitis, unspecified seasonality, unspecified trigger  Comments:  With increased nasal congestion.  Start Flonase as directed.  Orders:  -     fluticasone (FLONASE) 50 MCG/ACT nasal spray; Administer 2 sprays into the nostril(s) as directed by provider Daily.  Dispense: 16 g; Refill: 6      Feliz Frye, DOMINIQUE  7/31/2025  09:03 CDT    Follow Up   Return in about 9 months (around 4/30/2026).    Patient was given instructions and counseling regarding his condition or for health maintenance advice. Please see specific information pulled into the AVS if appropriate.

## 2025-08-06 DIAGNOSIS — E78.5 HYPERLIPIDEMIA, UNSPECIFIED HYPERLIPIDEMIA TYPE: Chronic | ICD-10-CM

## 2025-08-06 RX ORDER — LORATADINE 10 MG/1
10 TABLET ORAL DAILY
Qty: 90 TABLET | Refills: 3 | Status: SHIPPED | OUTPATIENT
Start: 2025-08-06

## 2025-08-06 RX ORDER — PRAVASTATIN SODIUM 80 MG/1
80 TABLET ORAL DAILY
Qty: 90 TABLET | Refills: 3 | Status: SHIPPED | OUTPATIENT
Start: 2025-08-06

## 2025-08-08 ENCOUNTER — TELEPHONE (OUTPATIENT)
Dept: PULMONOLOGY | Facility: CLINIC | Age: OVER 89
End: 2025-08-08
Payer: MEDICARE

## 2025-08-08 DIAGNOSIS — J30.9 ALLERGIC RHINITIS, UNSPECIFIED SEASONALITY, UNSPECIFIED TRIGGER: Chronic | ICD-10-CM

## 2025-08-11 RX ORDER — TERAZOSIN 10 MG/1
10 CAPSULE ORAL DAILY
Qty: 90 CAPSULE | Refills: 3 | Status: SHIPPED | OUTPATIENT
Start: 2025-08-11

## 2025-08-11 RX ORDER — NITROGLYCERIN 0.4 MG/1
TABLET SUBLINGUAL
Qty: 75 TABLET | Refills: 3 | Status: SHIPPED | OUTPATIENT
Start: 2025-08-11

## 2025-08-11 RX ORDER — APIXABAN 5 MG/1
5 TABLET, FILM COATED ORAL 2 TIMES DAILY
Qty: 180 TABLET | Refills: 3 | Status: SHIPPED | OUTPATIENT
Start: 2025-08-11

## 2025-08-11 RX ORDER — FINASTERIDE 5 MG/1
5 TABLET, FILM COATED ORAL DAILY
Qty: 90 TABLET | Refills: 3 | Status: SHIPPED | OUTPATIENT
Start: 2025-08-11

## 2025-08-13 RX ORDER — FLUTICASONE PROPIONATE 50 MCG
2 SPRAY, SUSPENSION (ML) NASAL DAILY
Qty: 48 G | Refills: 3 | Status: SHIPPED | OUTPATIENT
Start: 2025-08-13

## 2025-08-25 RX ORDER — METOPROLOL TARTRATE 25 MG/1
TABLET, FILM COATED ORAL
Qty: 90 TABLET | Refills: 3 | Status: SHIPPED | OUTPATIENT
Start: 2025-08-25

## 2025-08-26 RX ORDER — IPRATROPIUM BROMIDE 21 UG/1
SPRAY, METERED NASAL
Qty: 60 ML | Refills: 3 | Status: SHIPPED | OUTPATIENT
Start: 2025-08-26

## (undated) DEVICE — PTCH HEMOCON PRO 2X2IN

## (undated) DEVICE — BAG,DRAINAGE,4L,A/R TOWER,LL,SLIDE TAP: Brand: MEDLINE

## (undated) DEVICE — CATH F5 INF MP A2 100CM 2SH: Brand: INFINITI

## (undated) DEVICE — ELECTRD PAD DEFIB A/

## (undated) DEVICE — SENSR O2 OXIMAX FNGR A/ 18IN NONSTR

## (undated) DEVICE — MYNXGRIP 6F/7F: Brand: MYNXGRIP

## (undated) DEVICE — GLV SURG BIOGEL M LTX PF 7 1/2

## (undated) DEVICE — GW STARTER FXD CORE J .035 3X260CM 3MM

## (undated) DEVICE — ELECTRD LP CUT 24/26F YEL

## (undated) DEVICE — GW SENSR DUALFLEX NITNL STR .038IN 3X150CM

## (undated) DEVICE — CATH URETRL OPN/END 5F70CM

## (undated) DEVICE — CANN CO2/O2 NASL A/

## (undated) DEVICE — PINNACLE INTRODUCER SHEATH: Brand: PINNACLE

## (undated) DEVICE — CATH F5 INF AL II 100CM: Brand: INFINITI

## (undated) DEVICE — MODEL BT2000 P/N 700287-012KIT CONTENTS: MANIFOLD WITH SALINE AND CONTRAST PORTS, SALINE TUBING WITH SPIKE AND HAND SYRINGE, TRANSDUCER: Brand: BT2000 AUTOMATED MANIFOLD KIT

## (undated) DEVICE — CONMED SCOPE SAVER BITE BLOCK, 20X27 MM: Brand: SCOPE SAVER

## (undated) DEVICE — MSK O2 MD CONCENTR A/ LF 7FT 1P/U

## (undated) DEVICE — FR5 INFINITI MULTIPAC: Brand: INFINITI

## (undated) DEVICE — CATH DIAG DXTERITY NOTO CRV 5F 100CM 0/SH

## (undated) DEVICE — ENDOGATOR AUXILIARY WATER JET CONNECTOR: Brand: ENDOGATOR

## (undated) DEVICE — MODEL AT P65, P/N 701554-001KIT CONTENTS: HAND CONTROLLER, 3-WAY HIGH-PRESSURE STOPCOCK WITH ROTATING END AND PREMIUM HIGH-PRESSURE TUBING: Brand: ANGIOTOUCH® KIT

## (undated) DEVICE — DRSNG PRESS SAFEGUARD

## (undated) DEVICE — EVAC BLDR UROVAC W ADAPT

## (undated) DEVICE — SOL IRR NACL 0.9PCT BT 1000ML

## (undated) DEVICE — DOVER HYDROGEL COATED LATEX FOLEY CATHETER, 5 ML, 3-WAY 22 FR/CH (7.3 MM): Brand: DOVER

## (undated) DEVICE — PK CYSTO 30

## (undated) DEVICE — THE CHANNEL CLEANING BRUSH IS A NYLON FLEXI BRUSH ATTACHED TO A FLEXIBLE PLASTIC SHEATH DESIGNED TO SAFELY REMOVE DEBRIS FROM FLEXIBLE ENDOSCOPES.

## (undated) DEVICE — PK TURNOVER CYSTO RM

## (undated) DEVICE — CUFF,BP,DISP,1 TUBE,ADULT,HP: Brand: MEDLINE

## (undated) DEVICE — Device: Brand: DEFENDO AIR/WATER/SUCTION AND BIOPSY VALVE

## (undated) DEVICE — TBG SMPL FLTR LINE NASL 02/C02 A/ BX/100

## (undated) DEVICE — GW WHOLEY HITORQ MOD/J .035 175CM

## (undated) DEVICE — SOLIDIFIER LIQUI LOC PLUS 2000CC

## (undated) DEVICE — SYRINGE,PISTON,IRRIGATION,60ML,STERILE: Brand: MEDLINE

## (undated) DEVICE — GW STARTER FXD CORE J .035 3X150CM 3MM

## (undated) DEVICE — PK CATH CARD 30 CA/4